# Patient Record
Sex: MALE | Race: WHITE | Employment: OTHER | URBAN - METROPOLITAN AREA
[De-identification: names, ages, dates, MRNs, and addresses within clinical notes are randomized per-mention and may not be internally consistent; named-entity substitution may affect disease eponyms.]

---

## 2017-10-20 ENCOUNTER — OFFICE VISIT (OUTPATIENT)
Dept: FAMILY MEDICINE | Facility: CLINIC | Age: 52
End: 2017-10-20

## 2017-10-20 VITALS
DIASTOLIC BLOOD PRESSURE: 100 MMHG | SYSTOLIC BLOOD PRESSURE: 160 MMHG | HEIGHT: 68 IN | WEIGHT: 206 LBS | BODY MASS INDEX: 31.22 KG/M2 | HEART RATE: 80 BPM

## 2017-10-20 DIAGNOSIS — I47.10 PAROXYSMAL SUPRAVENTRICULAR TACHYCARDIA (H): ICD-10-CM

## 2017-10-20 DIAGNOSIS — F10.11 ALCOHOL ABUSE, IN REMISSION: ICD-10-CM

## 2017-10-20 DIAGNOSIS — R53.83 FATIGUE, UNSPECIFIED TYPE: Primary | ICD-10-CM

## 2017-10-20 DIAGNOSIS — Z13.6 SCREENING FOR CARDIOVASCULAR CONDITION: ICD-10-CM

## 2017-10-20 DIAGNOSIS — I10 HYPERTENSION, GOAL BELOW 140/90: ICD-10-CM

## 2017-10-20 DIAGNOSIS — F41.1 GAD (GENERALIZED ANXIETY DISORDER): ICD-10-CM

## 2017-10-20 DIAGNOSIS — R94.30 EJECTION FRACTION < 50%: ICD-10-CM

## 2017-10-20 DIAGNOSIS — Z72.0 TOBACCO ABUSE: ICD-10-CM

## 2017-10-20 LAB
ANION GAP SERPL CALCULATED.3IONS-SCNC: 5 MMOL/L (ref 3–14)
BUN SERPL-MCNC: 12 MG/DL (ref 7–30)
CALCIUM SERPL-MCNC: 8.8 MG/DL (ref 8.5–10.1)
CHLORIDE SERPL-SCNC: 105 MMOL/L (ref 94–109)
CHOLEST SERPL-MCNC: 149 MG/DL
CO2 SERPL-SCNC: 26 MMOL/L (ref 20–32)
CREAT SERPL-MCNC: 0.81 MG/DL (ref 0.66–1.25)
ERYTHROCYTE [DISTWIDTH] IN BLOOD BY AUTOMATED COUNT: 12.8 % (ref 10–15)
GFR SERPL CREATININE-BSD FRML MDRD: >90 ML/MIN/1.7M2
GLUCOSE SERPL-MCNC: 82 MG/DL (ref 70–99)
HBA1C MFR BLD: 5.6 % (ref 4.3–6)
HCT VFR BLD AUTO: 40.4 % (ref 40–53)
HDLC SERPL-MCNC: 31 MG/DL
HGB BLD-MCNC: 14.1 G/DL (ref 13.3–17.7)
LDLC SERPL CALC-MCNC: 79 MG/DL
MCH RBC QN AUTO: 31.5 PG (ref 26.5–33)
MCHC RBC AUTO-ENTMCNC: 34.9 G/DL (ref 31.5–36.5)
MCV RBC AUTO: 90 FL (ref 78–100)
NONHDLC SERPL-MCNC: 118 MG/DL
PLATELET # BLD AUTO: 201 10E9/L (ref 150–450)
POTASSIUM SERPL-SCNC: 4.1 MMOL/L (ref 3.4–5.3)
RBC # BLD AUTO: 4.48 10E12/L (ref 4.4–5.9)
SODIUM SERPL-SCNC: 136 MMOL/L (ref 133–144)
TRIGL SERPL-MCNC: 197 MG/DL
TSH SERPL DL<=0.005 MIU/L-ACNC: 2.61 MU/L (ref 0.4–4)
WBC # BLD AUTO: 5.1 10E9/L (ref 4–11)

## 2017-10-20 PROCEDURE — 84443 ASSAY THYROID STIM HORMONE: CPT | Performed by: FAMILY MEDICINE

## 2017-10-20 PROCEDURE — 80048 BASIC METABOLIC PNL TOTAL CA: CPT | Performed by: FAMILY MEDICINE

## 2017-10-20 PROCEDURE — 83036 HEMOGLOBIN GLYCOSYLATED A1C: CPT | Performed by: FAMILY MEDICINE

## 2017-10-20 PROCEDURE — 99203 OFFICE O/P NEW LOW 30 MIN: CPT | Performed by: FAMILY MEDICINE

## 2017-10-20 PROCEDURE — 80061 LIPID PANEL: CPT | Performed by: FAMILY MEDICINE

## 2017-10-20 PROCEDURE — 36415 COLL VENOUS BLD VENIPUNCTURE: CPT | Performed by: FAMILY MEDICINE

## 2017-10-20 PROCEDURE — 85027 COMPLETE CBC AUTOMATED: CPT | Performed by: FAMILY MEDICINE

## 2017-10-20 RX ORDER — LISINOPRIL 20 MG/1
20 TABLET ORAL 2 TIMES DAILY
COMMUNITY
Start: 2016-02-04 | End: 2017-10-20

## 2017-10-20 RX ORDER — AMLODIPINE BESYLATE 10 MG/1
10 TABLET ORAL DAILY
COMMUNITY
Start: 2017-02-22 | End: 2017-10-20

## 2017-10-20 RX ORDER — HYDROCHLOROTHIAZIDE 25 MG/1
25 TABLET ORAL DAILY
COMMUNITY
Start: 2017-03-08 | End: 2018-08-29

## 2017-10-20 RX ORDER — LISINOPRIL 20 MG/1
20 TABLET ORAL 2 TIMES DAILY
Qty: 90 TABLET | Refills: 1 | Status: SHIPPED | OUTPATIENT
Start: 2017-10-20 | End: 2018-01-09

## 2017-10-20 RX ORDER — ESCITALOPRAM OXALATE 20 MG/1
20 TABLET ORAL DAILY
Qty: 90 TABLET | Refills: 1 | Status: SHIPPED | OUTPATIENT
Start: 2017-10-20 | End: 2018-08-29

## 2017-10-20 RX ORDER — ALPRAZOLAM 0.5 MG
.25-.5 TABLET ORAL 2 TIMES DAILY PRN
COMMUNITY
Start: 2017-03-08 | End: 2017-11-07

## 2017-10-20 RX ORDER — AMLODIPINE BESYLATE 10 MG/1
10 TABLET ORAL DAILY
Qty: 90 TABLET | Refills: 1 | Status: SHIPPED | OUTPATIENT
Start: 2017-10-20 | End: 2018-07-07

## 2017-10-20 ASSESSMENT — ANXIETY QUESTIONNAIRES
2. NOT BEING ABLE TO STOP OR CONTROL WORRYING: SEVERAL DAYS
1. FEELING NERVOUS, ANXIOUS, OR ON EDGE: MORE THAN HALF THE DAYS
7. FEELING AFRAID AS IF SOMETHING AWFUL MIGHT HAPPEN: MORE THAN HALF THE DAYS
GAD7 TOTAL SCORE: 8
3. WORRYING TOO MUCH ABOUT DIFFERENT THINGS: MORE THAN HALF THE DAYS
5. BEING SO RESTLESS THAT IT IS HARD TO SIT STILL: NOT AT ALL
6. BECOMING EASILY ANNOYED OR IRRITABLE: SEVERAL DAYS

## 2017-10-20 ASSESSMENT — PATIENT HEALTH QUESTIONNAIRE - PHQ9
5. POOR APPETITE OR OVEREATING: NOT AT ALL
SUM OF ALL RESPONSES TO PHQ QUESTIONS 1-9: 5

## 2017-10-20 NOTE — NURSING NOTE
"Chief Complaint   Patient presents with     Hypertension       Initial BP (!) 160/100  Pulse 80  Ht 5' 8\" (1.727 m)  Wt 206 lb (93.4 kg)  BMI 31.32 kg/m2 Estimated body mass index is 31.32 kg/(m^2) as calculated from the following:    Height as of this encounter: 5' 8\" (1.727 m).    Weight as of this encounter: 206 lb (93.4 kg).  Medication Reconciliation: complete  "

## 2017-10-20 NOTE — PROGRESS NOTES
SUBJECTIVE:   Munir Storey is a 52 year old male who presents to clinic today for the following health issues:    The patient is currently uninsured.    * New patient to  system. He was previously seen at [facility] *      Hypertension Follow-up  Amlodipine 10mg qd, hydrochlorothiazide 25mg qd, lisinopril 20mg bid    Outpatient blood pressures are being checked at home.    Low Salt Diet: not monitoring salt    Anxiety Follow-Up    Status since last visit: He states he has some good days and some bad days. He was previously prescribed Lexapro 10 mg qd and Xanax 0.25-0.5 mg bid prn but stopped taking this due to wanting to deal with his anxiety without medications. He believes his anxiety has been somewhat worse since discontinuing these medications. He is interested in restarting medication therapy.    Other associated symptoms: fatigue    Complicating factors:   Significant life event: Yes - Father passed away this past spring. He also notes work stress.  Current substance abuse: None. He has a hx of alcoholism and has been sober for 2 years. He quit drinking due to developing an atrial flutter.  Depression symptoms: Yes    No flowsheet data found.      - Tobacco abuse. Patient currently is smoking cigarettes, 0.5 ppd. He also uses chewing tobacco. He feels it would be difficult for him to quit smoking. He has not tried any specific therapies to assist with tobacco use cessation.    - Patient reports having colonoscopy completed, biopsy taken and was negative.    - He often needs to take nap during the day. He reports his girlfriend is concerned this could be due to diabetes. He has been trying to watch what he eats and has reduced his soda intake to 1-2 servings per day. He is not exercising regularly, but works a physical job. He denies extreme thirst or weight changes. He has a family hx of diabetes (father). He snores and has not had a sleep study completed in the past.        Past medical,  "family, and social histories, medications, and allergies are reviewed and updated in Epic as needed.        Amount of exercise or physical activity: None outside of work    Problems taking medications regularly: Yes, discontinued Lexapro (as above) and out of BP medications for a couple days.    Medication side effects: none    Diet: regular (no restrictions)      Past Medical History:   Diagnosis Date     Atrial flutter (H) 6/30/2015     Depressive disorder      SANDRA (generalized anxiety disorder) 10/20/2017     Hypertension, goal below 140/90 1/23/2009     Tobacco abuse 10/20/2017       Past Surgical History:   Procedure Laterality Date     COLONOSCOPY  08/01/2013    w/ EGD       Family History   Problem Relation Age of Onset     DIABETES Father      Depression Father      Depression Paternal Grandfather        Social History   Substance Use Topics     Smoking status: Current Every Day Smoker     Packs/day: 0.50     Types: Cigarettes, Dip, chew, snus or snuff     Smokeless tobacco: Current User     Types: Chew     Alcohol use No      Comment: hx alcohol abuse, sober since 2015         ROS:   HENT: No current dental provider.  MS: Asymptomatic umbilical hernia. Occasional knee and other joint pain.  7 point ROS otherwise normal except for those listed above.        This document serves as a record of the services and decisions personally performed and made by Magalis Morales MD. It was created on his behalf by Tatiana Negron, a trained medical scribe. The creation of this document is based the provider's statements to the medical scribe.  Tatiana Negron 11:31 AM October 20, 2017  OBJECTIVE:     BP (!) 160/100  Pulse 80  Ht 5' 8\" (1.727 m)  Wt 206 lb (93.4 kg)  BMI 31.32 kg/m2  Body mass index is 31.32 kg/(m^2).     GENERAL: Healthy, alert and no distress  RESP: Lungs clear to auscultation - no rales, rhonchi or wheezes  CV: Regular rate and rhythm, normal S1 S2, no murmur, no peripheral edema  ABD: umbilical hernia  MS: " No gross musculoskeletal defects noted, no edema  NEURO: Normal strength and tone, mentation intact and speech normal  PSYCH: Mentation appears normal, affect normal/bright      Results for orders placed or performed in visit on 10/20/17   Hemoglobin A1c   Result Value Ref Range    Hemoglobin A1C 5.6 4.3 - 6.0 %     ASSESSMENT/PLAN:     (R53.83) Fatigue, unspecified type  (primary encounter diagnosis)  Comment: A1c 5.6 today, no evidence of diabetes. Given his snoring, advised sleep study, however, patient is currently uninsured. His fatigue may be contributed to by an underlying depression.   Plan: TSH with free T4 reflex, CBC with platelets - Patient to look into out-of-pocket cost of sleep study and if there are other options that would be more affordable.    (Z13.6) Screening for cardiovascular condition  Comment: Routine screening.  Plan: Lipid panel reflex to direct LDL    (Z72.0) Tobacco abuse  Comment: Encouraged to consider using Nicotine patches to assist with cessation.  Plan: TOBACCO CESSATION - FOR HEALTH MAINTENANCE - Due to lack of insurance coverage, patient to buy patches OTC if desired.    (I47.1) Paroxysmal supraventricular tachycardia (H)  Comment: Appears to have resolved with discontinuation of alcohol intake.  Plan: Will continue to monitor.      (I10) Hypertension, goal below 140/90  Comment: /100, elevated, likely secondary to being out of his medications for the past couple days.  Plan: amLODIPine (NORVASC) 10 MG tablet, lisinopril         (PRINIVIL/ZESTRIL) 20 MG tablet - Refilled medications. Recheck BP in 1 month.    (F41.1) SANDRA (generalized anxiety disorder)  Comment: Increased symptoms since he discontinued his medications. I suspect he was underdosed with Lexapro 10 mg qd.  Plan: escitalopram (LEXAPRO) 20 MG tablet - Restart Lexapro at higher dose, 20 mg qd.        Patient will follow up in 1 month for nurse BP check and 3 months for clinic visit or sooner, PRN. Patient  instructed to call with any questions or concerns.      Patient Instructions   *   Your blood pressure is up.     *   You don't have diabetes.     *   For anxiety, sounds like work is a source stress.     *   Okay to say no occasionally.    *   Anyway to stop smoking and chewing? Try the nicotine patch.     *   Stop by here for a nurse blood pressure in 1 months.     *   Not sure what to tell you about insurance.     *   See the dentist.     *   The skin spots are okay.     *   The belly button is called an umbilical hernia. It's okay.     *    Think about a sleep study.     *    Will increase the dose of lexapro 20 mg daily.     *     Follow up in 3 months.       The information in this document, created by a scribe for me, accurately reflects the services I personally performed and the decisions made by me. I have reviewed and approved this document for accuracy.  11:50 AM October 20, 2017    Magalis Morales MD  [facility]

## 2017-10-20 NOTE — MR AVS SNAPSHOT
After Visit Summary   10/20/2017    Munir Storey    MRN: [id number]           Patient Information     Date Of Birth          1965        Visit Information        Provider Department      10/20/2017 10:40 AM Magalis Morales MD [facility]        Today's Diagnoses     Fatigue, unspecified type    -  1    Screening for cardiovascular condition        Tobacco abuse        Paroxysmal supraventricular tachycardia (H)        Hypertension, goal below 140/90          Care Instructions    *   Your blood pressure is up.     *   You don't have diabetes.     *   For anxiety, sounds like work is a source stress.     *   Okay to say no occasionally.    *   Anyway to stop smoking and chewing? Try the nicotine patch.     *   Stop by here for a nurse blood pressure in 1 months.     *   Not sure what to tell you about insurance.     *   See the dentist.     *   The skin spots are okay.     *   The belly button is called an umbilical hernia. It's okay.           Follow-ups after your visit        Who to contact     Normal or non-critical lab and imaging results will be communicated to you by [employer], letter or phone within 4 business days after the clinic has received the results. If you do not hear from us within 7 days, please contact the clinic through [employer] or phone. If you have a critical or abnormal lab result, we will notify you by phone as soon as possible.  Submit refill requests through [employer] or call your pharmacy and they will forward the refill request to us. Please allow 3 business days for your refill to be completed.          If you need to speak with a  for additional information , please call: [phone]           Additional Information About Your Visit        TwtBks Information     TwtBks lets you send messages to your doctor, view your test results, renew your prescriptions, schedule appointments and more. To sign up, go to [email or web address] .  "Click on \"Log in\" on the left side of the screen, which will take you to the Welcome page. Then click on \"Sign up Now\" on the right side of the page.     You will be asked to enter the access code listed below, as well as some personal information. Please follow the directions to create your username and password.     Your access code is: [email or web address]  Expires: 2018 11:50 AM     Your access code will  in 90 days. If you need help or a new code, please call your [facility] clinic or [phone].        Care EveryWhere ID     This is your Care EveryWhere ID. This could be used by other organizations to access your [facility] medical records  [id number]        Your Vitals Were     Pulse Height BMI (Body Mass Index)             80 5' 8\" (1.727 m) 31.32 kg/m2          Blood Pressure from Last 3 Encounters:   10/20/17 (!) 160/100    Weight from Last 3 Encounters:   10/20/17 206 lb (93.4 kg)              We Performed the Following     Basic metabolic panel  (Ca, Cl, CO2, Creat, Gluc, K, Na, BUN)     CBC with platelets     Hemoglobin A1c     Lipid panel reflex to direct LDL     TOBACCO CESSATION - FOR HEALTH MAINTENANCE     TSH with free T4 reflex          Where to get your medicines      These medications were sent to [facility] [id number] [place] [address]  [address] [place] [id number]     Phone:  [phone]     amLODIPine 10 MG tablet    lisinopril 20 MG tablet          Primary Care Provider Office Phone # Fax #    Magalis Morales [phone] [address] [place] [address]        Equal Access to Services     AMBER LINDSAY : Hadcatrachito Aguilar, anand abarca, qashahnaz garza. So [facility] [phone].    ATENCIÓN: Si habla español, tiene a montague disposición servicios gratuitos de asistencia lingüística. Llame al [phone].    We comply with applicable federal civil rights laws and [place] laws. " We do not discriminate on the basis of race, color, national origin, age, disability, sex, sexual orientation, or gender identity.            Thank you!     Thank you for choosing [facility]  for your care. Our goal is always to provide you with excellent care. Hearing back from our patients is one way we can continue to improve our services. Please take a few minutes to complete the written survey that you may receive in the mail after your visit with us. Thank you!             Your Updated Medication List - Protect others around you: Learn how to safely use, store and throw away your medicines at www.disposemymeds.org.          This list is accurate as of: 10/20/17 11:50 AM.  Always use your most recent med list.                   Brand Name Dispense Instructions for use Diagnosis    ALPRAZolam 0.5 MG tablet    XANAX     Take 0.25-0.5 mg by mouth 2 times daily as needed        amLODIPine 10 MG tablet    NORVASC    90 tablet    Take 1 tablet (10 mg) by mouth daily    Hypertension, goal below 140/90       hydrochlorothiazide 25 MG tablet    HYDRODIURIL     Take 25 mg by mouth daily        lisinopril 20 MG tablet    PRINIVIL/ZESTRIL    90 tablet    Take 1 tablet (20 mg) by mouth 2 times daily    Hypertension, goal below 140/90

## 2017-10-20 NOTE — PATIENT INSTRUCTIONS
*   Your blood pressure is up.     *   You don't have diabetes.     *   For anxiety, sounds like work is a source stress.     *   Okay to say no occasionally.    *   Anyway to stop smoking and chewing? Try the nicotine patch.     *   Stop by here for a nurse blood pressure in 1 months.     *   Not sure what to tell you about insurance.     *   See the dentist.     *   The skin spots are okay.     *   The belly button is called an umbilical hernia. It's okay.     *    Think about a sleep study.     *    Will increase the dose of lexapro 20 mg daily.     *     Follow up in 3 months.

## 2017-10-20 NOTE — LETTER
Dear Munir Storey,      Thank you for choosing our clinic.     Your blood tests show that you have normal kidney and thyroid function.  Also, there's no sign of diabetes or amenia. Please see enclosed copies.  Your cholesterol is acceptable, there's no need for a cholesterol lowering medication at this time. Nothing with your blood tests would explain your fatigue.     Hopefully, restarting the Lexapro will help with your mood.  As we talked about, I would recommend a follow up appointment to recheck your blood pressure.     Please call with any questions or concerns.    Sincerely,    Magalis Morales MD/ BARNEY VILLANUEVA

## 2017-10-21 ASSESSMENT — ANXIETY QUESTIONNAIRES: GAD7 TOTAL SCORE: 8

## 2017-11-06 RX ORDER — ALPRAZOLAM 0.5 MG
.25-.5 TABLET ORAL 2 TIMES DAILY PRN
Qty: 90 TABLET | Status: CANCELLED | OUTPATIENT
Start: 2017-11-06

## 2017-11-06 NOTE — TELEPHONE ENCOUNTER
Reason for Call:  Medication or medication refill:    Do you use a [facility]?  Name of the pharmacy and phone number for the current request:  See above    Name of the medication requested: xanax     Other request: patient needs medication before Wednesday for flying.    Can we leave a detailed message on this number? YES    Phone number patient can be reached at: Home number on file [phone] (home)    Best Time:     Call taken on 11/6/2017 at 1:47 PM by Jaimie Freitas

## 2017-11-07 ENCOUNTER — ALLIED HEALTH/NURSE VISIT (OUTPATIENT)
Dept: NURSING | Facility: CLINIC | Age: 52
End: 2017-11-07

## 2017-11-07 VITALS — DIASTOLIC BLOOD PRESSURE: 80 MMHG | SYSTOLIC BLOOD PRESSURE: 144 MMHG

## 2017-11-07 DIAGNOSIS — F41.1 GAD (GENERALIZED ANXIETY DISORDER): Primary | ICD-10-CM

## 2017-11-07 DIAGNOSIS — F41.9 ANXIETY: Primary | ICD-10-CM

## 2017-11-07 PROCEDURE — 99207 ZZC NO CHARGE NURSE ONLY: CPT

## 2017-11-07 RX ORDER — ALPRAZOLAM 0.5 MG
TABLET ORAL
Qty: 10 TABLET | Refills: 0 | Status: SHIPPED | OUTPATIENT
Start: 2017-11-07 | End: 2018-02-09

## 2017-11-07 NOTE — PROGRESS NOTES
PAtient is going out of town and requested medication for flying anxiety. Spoke with Dr. Gamboa. Script ordred and handed to roshni.  Muna Black RN

## 2017-11-07 NOTE — MR AVS SNAPSHOT
"              After Visit Summary   2017    Munir Storey    MRN: [id number]           Patient Information     Date Of Birth          1965        Visit Information        Provider Department      2017 3:30 PM [place] DARWIN VARELA [facility]        Today's Diagnoses     Anxiety    -  1       Follow-ups after your visit        Who to contact     If you have questions or need follow up information about today's clinic visit or your schedule please contact [facility] directly at [phone].  Normal or non-critical lab and imaging results will be communicated to you by MyChart, letter or phone within 4 business days after the clinic has received the results. If you do not hear from us within 7 days, please contact the clinic through MyChart or phone. If you have a critical or abnormal lab result, we will notify you by phone as soon as possible.  Submit refill requests through [employer] or call your pharmacy and they will forward the refill request to us. Please allow 3 business days for your refill to be completed.          Additional Information About Your Visit        MyChart Information     [employer] lets you send messages to your doctor, view your test results, renew your prescriptions, schedule appointments and more. To sign up, go to [email or web address] . Click on \"Log in\" on the left side of the screen, which will take you to the Welcome page. Then click on \"Sign up Now\" on the right side of the page.     You will be asked to enter the access code listed below, as well as some personal information. Please follow the directions to create your username and password.     Your access code is: [email or web address]  Expires: 2018 10:50 AM     Your access code will  in 90 days. If you need help or a new code, please call your [facility] or [phone].        Care EveryWhere ID     This is your Care EveryWhere ID. This could be used by other organizations to access your " [place] medical records  [id number]         Blood Pressure from Last 3 Encounters:   11/07/17 144/80   10/20/17 (!) 160/100    Weight from Last 3 Encounters:   10/20/17 206 lb (93.4 kg)              Today, you had the following     No orders found for display         Today's Medication Changes          These changes are accurate as of: 11/7/17  3:50 PM.  If you have any questions, ask your nurse or doctor.               These medicines have changed or have updated prescriptions.        Dose/Directions    ALPRAZolam 0.5 MG tablet   Commonly known as:  XANAX   This may have changed:  See the new instructions.   Used for:  SANDRA (generalized anxiety disorder)   Changed by:  Magalis Morales MD        TAKE 0.5-1 TABLETS BY MOUTH 2 TIMES DAILY IF NEEDED FOR ANXIETY OR PANIC.   Quantity:  10 tablet   Refills:  0            Where to get your medicines      Some of these will need a paper prescription and others can be bought over the counter.  Ask your nurse if you have questions.     Bring a paper prescription for each of these medications     ALPRAZolam 0.5 MG tablet                Primary Care Provider Office Phone # Fax #    Magalis Morales [phone] [address] [place] [address]        Equal Access to Services     NGUYEN LINDSAY AH: Hadii charlotte kim hadasho Soomaali, waaxda luqadaha, qaybta kaalmada adeegyada, shahnaz garcia. So [facility] [phone].    ATENCIÓN: Si habla español, tiene a montague disposición servicios gratuitos de asistencia lingüística. Llame al [phone].    We comply with applicable federal civil rights laws and [place] laws. We do not discriminate on the basis of race, color, national origin, age, disability, sex, sexual orientation, or gender identity.            Thank you!     Thank you for choosing [facility] [place]  for your care. Our goal is always to provide you with excellent care. Hearing back from our patients is one way we  can continue to improve our services. Please take a few minutes to complete the written survey that you may receive in the mail after your visit with us. Thank you!             Your Updated Medication List - Protect others around you: Learn how to safely use, store and throw away your medicines at www.disposemymeds.org.          This list is accurate as of: 11/7/17  3:50 PM.  Always use your most recent med list.                   Brand Name Dispense Instructions for use Diagnosis    ALPRAZolam 0.5 MG tablet    XANAX    10 tablet    TAKE 0.5-1 TABLETS BY MOUTH 2 TIMES DAILY IF NEEDED FOR ANXIETY OR PANIC.    SANDRA (generalized anxiety disorder)       amLODIPine 10 MG tablet    NORVASC    90 tablet    Take 1 tablet (10 mg) by mouth daily    Hypertension, goal below 140/90       escitalopram 20 MG tablet    LEXAPRO    90 tablet    Take 1 tablet (20 mg) by mouth daily    SANDRA (generalized anxiety disorder)       hydrochlorothiazide 25 MG tablet    HYDRODIURIL     Take 25 mg by mouth daily        lisinopril 20 MG tablet    PRINIVIL/ZESTRIL    90 tablet    Take 1 tablet (20 mg) by mouth 2 times daily    Hypertension, goal below 140/90

## 2018-01-09 DIAGNOSIS — I10 HYPERTENSION, GOAL BELOW 140/90: ICD-10-CM

## 2018-01-09 NOTE — TELEPHONE ENCOUNTER
"Requested Prescriptions   Pending Prescriptions Disp Refills     lisinopril (PRINIVIL/ZESTRIL) 20 MG tablet 90 tablet 1    Last Written Prescription Date:  10/20/2017 #90 x 1  Last filled - not provided  Last Office Visit with [facility], [facility] or [facility] prescribing provider:  10/20/2017 SHADY Morales   Future Office Visit: none      Note: Requested a 90 day supply, disp changed to #180   Sig: Take 1 tablet (20 mg) by mouth 2 times daily    ACE Inhibitors (Including Combos) Protocol Failed    1/9/2018 11:34 AM       Failed - Blood pressure under 140/90    BP Readings from Last 3 Encounters:   11/07/17 144/80   10/20/17 (!) 160/100                Passed - Recent or future visit with authorizing provider's specialty    Patient had office visit in the last year or has a visit in the next 30 days with authorizing provider.  See \"Patient Info\" tab in inbasket, or \"Choose Columns\" in Meds & Orders section of the refill encounter.              Passed - Patient is age 18 or older       Passed - Normal serum creatinine on file in past 12 months    Recent Labs   Lab Test  10/20/17   1124   CR  0.81            Passed - Normal serum potassium on file in past 12 months    Recent Labs   Lab Test  10/20/17   1124   POTASSIUM  4.1               "

## 2018-01-12 ENCOUNTER — OFFICE VISIT (OUTPATIENT)
Dept: FAMILY MEDICINE | Facility: CLINIC | Age: 53
End: 2018-01-12

## 2018-01-12 VITALS
SYSTOLIC BLOOD PRESSURE: 158 MMHG | DIASTOLIC BLOOD PRESSURE: 91 MMHG | BODY MASS INDEX: 31.81 KG/M2 | TEMPERATURE: 98.1 F | HEART RATE: 91 BPM | WEIGHT: 209.2 LBS

## 2018-01-12 DIAGNOSIS — Z72.0 TOBACCO ABUSE: ICD-10-CM

## 2018-01-12 DIAGNOSIS — F41.1 GAD (GENERALIZED ANXIETY DISORDER): ICD-10-CM

## 2018-01-12 DIAGNOSIS — Z28.21 VACCINATION NOT CARRIED OUT BECAUSE OF PATIENT REFUSAL: ICD-10-CM

## 2018-01-12 DIAGNOSIS — I10 HYPERTENSION, GOAL BELOW 140/90: Primary | ICD-10-CM

## 2018-01-12 PROCEDURE — 99214 OFFICE O/P EST MOD 30 MIN: CPT | Performed by: FAMILY MEDICINE

## 2018-01-12 RX ORDER — HYDROCHLOROTHIAZIDE 25 MG/1
25 TABLET ORAL DAILY
Qty: 30 TABLET | Status: CANCELLED | OUTPATIENT
Start: 2018-01-12

## 2018-01-12 RX ORDER — HYDROCHLOROTHIAZIDE 12.5 MG/1
12.5 TABLET ORAL DAILY
Qty: 90 TABLET | Refills: 3 | Status: SHIPPED | OUTPATIENT
Start: 2018-01-12 | End: 2019-03-06

## 2018-01-12 ASSESSMENT — ANXIETY QUESTIONNAIRES
7. FEELING AFRAID AS IF SOMETHING AWFUL MIGHT HAPPEN: MORE THAN HALF THE DAYS
1. FEELING NERVOUS, ANXIOUS, OR ON EDGE: NEARLY EVERY DAY
6. BECOMING EASILY ANNOYED OR IRRITABLE: SEVERAL DAYS
GAD7 TOTAL SCORE: 12
2. NOT BEING ABLE TO STOP OR CONTROL WORRYING: NEARLY EVERY DAY
3. WORRYING TOO MUCH ABOUT DIFFERENT THINGS: NEARLY EVERY DAY
5. BEING SO RESTLESS THAT IT IS HARD TO SIT STILL: NOT AT ALL

## 2018-01-12 ASSESSMENT — PATIENT HEALTH QUESTIONNAIRE - PHQ9
5. POOR APPETITE OR OVEREATING: NOT AT ALL
SUM OF ALL RESPONSES TO PHQ QUESTIONS 1-9: 13

## 2018-01-12 NOTE — MR AVS SNAPSHOT
After Visit Summary   1/12/2018    Munir Storey    MRN: [id number]           Patient Information     Date Of Birth          1965        Visit Information        Provider Department      1/12/2018 8:20 AM Magalis Morales MD [facility]        Today's Diagnoses     Hypertension, goal below 140/90    -  1    SANDRA (generalized anxiety disorder)        Vaccination not carried out because of patient refusal        Tobacco abuse          Care Instructions    *  Sounds like side effects from the Lexapro.     *  The side effects should get better in 2 weeks.     *  Give the Lexapro about one month to work.     *  Consider seeing a counselor, call [facility] [phone]    *  Follow up in one month.           Follow-ups after your visit        Additional Services     MENTAL HEALTH REFERRAL  - Adult; Outpatient Treatment; Individual/Couples/Family/Group Therapy/Health Psychology; Other: [facility] [phone]; We will contact you to schedule the appointment or please call with any questi...       All scheduling is subject to the client's specific insurance plan & benefits, provider/location availability, and provider clinical specialities.  Please arrive 15 minutes early for your first appointment and bring your completed paperwork.    Please be aware that coverage of these services is subject to the terms and limitations of your health insurance plan.  Call member services at your health plan with any benefit or coverage questions.                            Who to contact     Normal or non-critical lab and imaging results will be communicated to you by MyChart, letter or phone within 4 business days after the clinic has received the results. If you do not hear from us within 7 days, please contact the clinic through MyChart or phone. If you have a critical or abnormal lab result, we will notify you by phone as soon as possible.  Submit  "refill requests through [employer] or call your pharmacy and they will forward the refill request to us. Please allow 3 business days for your refill to be completed.          If you need to speak with a  for additional information , please call: [phone]           Additional Information About Your Visit        [employer] Information     [employer] lets you send messages to your doctor, view your test results, renew your prescriptions, schedule appointments and more. To sign up, go to [email or web address] . Click on \"Log in\" on the left side of the screen, which will take you to the Welcome page. Then click on \"Sign up Now\" on the right side of the page.     You will be asked to enter the access code listed below, as well as some personal information. Please follow the directions to create your username and password.     Your access code is: [email or web address]  Expires: 2018 10:50 AM     Your access code will  in 90 days. If you need help or a new code, please call your [facility] clinic or [phone].        Care EveryWhere ID     This is your Care EveryWhere ID. This could be used by other organizations to access your [facility] medical records  [id number]        Your Vitals Were     Pulse Temperature BMI (Body Mass Index)             91 98.1  F (36.7  C) (Tympanic) 31.81 kg/m2          Blood Pressure from Last 3 Encounters:   18 (!) 158/91   17 144/80   10/20/17 (!) 160/100    Weight from Last 3 Encounters:   18 209 lb 3.2 oz (94.9 kg)   10/20/17 206 lb (93.4 kg)              We Performed the Following     MENTAL HEALTH REFERRAL  - Adult; Outpatient Treatment; Individual/Couples/Family/Group Therapy/Health Psychology; Other: [facility] [phone]; We will contact you to schedule the appointment or please call with any questi...          Today's Medication Changes          These changes are accurate as of: 18  9:06 AM.  If you have any questions, " ask your nurse or doctor.               These medicines have changed or have updated prescriptions.        Dose/Directions    * hydrochlorothiazide 25 MG tablet   Commonly known as:  HYDRODIURIL   This may have changed:  Another medication with the same name was added. Make sure you understand how and when to take each.   Changed by:  Magalis Morales MD        Dose:  25 mg   Take 25 mg by mouth daily   Refills:  0       * hydrochlorothiazide 12.5 MG Tabs tablet   This may have changed:  You were already taking a medication with the same name, and this prescription was added. Make sure you understand how and when to take each.   Used for:  Hypertension, goal below 140/90   Changed by:  Magalis Morales MD        Dose:  12.5 mg   Take 1 tablet (12.5 mg) by mouth daily   Quantity:  90 tablet   Refills:  3       * Notice:  This list has 2 medication(s) that are the same as other medications prescribed for you. Read the directions carefully, and ask your doctor or other care provider to review them with you.         Where to get your medicines      These medications were sent to [place] [id number] [place] [address] [address]     Phone:  [phone]     hydrochlorothiazide 12.5 MG Tabs tablet                Primary Care Provider Office Phone # Fax #    Magalis Morales [phone] [address] [facility] [address]        Equal Access to Services     AMBER LINDSAY AH: Hadii charlotte kim hadasho Sotitus, waaxda luqadaha, qaybta kaalmada sandra, shahnaz garcia. So [facility] [phone].    ATENCIÓN: Si habla español, tiene a montague disposición servicios gratuitos de asistencia lingüística. Llame al [phone].    We comply with applicable federal civil rights laws and [place] laws. We do not discriminate on the basis of race, color, national origin, age, disability, sex, sexual orientation, or gender identity.            Thank  you!     Thank you for choosing [facility]  for your care. Our goal is always to provide you with excellent care. Hearing back from our patients is one way we can continue to improve our services. Please take a few minutes to complete the written survey that you may receive in the mail after your visit with us. Thank you!             Your Updated Medication List - Protect others around you: Learn how to safely use, store and throw away your medicines at www.disposemymeds.org.          This list is accurate as of: 1/12/18  9:06 AM.  Always use your most recent med list.                   Brand Name Dispense Instructions for use Diagnosis    ALPRAZolam 0.5 MG tablet    XANAX    10 tablet    TAKE 0.5-1 TABLETS BY MOUTH 2 TIMES DAILY IF NEEDED FOR ANXIETY OR PANIC.    SANDRA (generalized anxiety disorder)       amLODIPine 10 MG tablet    NORVASC    90 tablet    Take 1 tablet (10 mg) by mouth daily    Hypertension, goal below 140/90       escitalopram 20 MG tablet    LEXAPRO    90 tablet    Take 1 tablet (20 mg) by mouth daily    SANDRA (generalized anxiety disorder)       * hydrochlorothiazide 25 MG tablet    HYDRODIURIL     Take 25 mg by mouth daily        * hydrochlorothiazide 12.5 MG Tabs tablet     90 tablet    Take 1 tablet (12.5 mg) by mouth daily    Hypertension, goal below 140/90       lisinopril 20 MG tablet    PRINIVIL/ZESTRIL    90 tablet    Take 1 tablet (20 mg) by mouth 2 times daily    Hypertension, goal below 140/90       * Notice:  This list has 2 medication(s) that are the same as other medications prescribed for you. Read the directions carefully, and ask your doctor or other care provider to review them with you.

## 2018-01-12 NOTE — PROGRESS NOTES
"  SUBJECTIVE:   Munir Storey is a 52 year old male who presents to clinic today for the following health issues:        Hypertension Follow-up  Lisinopril 20 mg bid, amlodipine 10 mg qd, hydrochlorothiazide 25 mg qd    Outpatient blood pressures are not being checked.    Low Salt Diet: no added salt    He is also prescribed hydrochlorothiazide 25 mg qd. He says he has not been taking this for a while because he just stopped picking up the prescription.      Anxiety Follow-Up  Lexapro 20 mg qd, Xanax 0.25-0.5 mg bid prn    Status since last visit: He ran out of Lexapro around [place] time and did not pick it up because he was sick with a URI. His anxiety \"doubled\" with stopping Lexapro. He restarted Lexapro 4 days ago but still feels very anxious. He has also had some left-sided abdominal pain and GI upset since restarting Lexapro. He feels like everything in his life is \"perfect\", except for how he feels. He gets very anxious about any physical symptoms he is experiencing. He notes that his anxiety symptoms originally developed ~10 years ago after his best friend's wife  unexpectedly. He has never seen a counselor before.       Complicating factors:   Significant life event: No - but this past [place] was the first one since his father . He has also not seen his two daughters for nearly 15 years after his divorce.  Current substance abuse: None - prior hx of alcoholism. He has noticed things being more difficult now that he needs to \"deal with them\" instead of drinking.  Depression symptoms: Yes- recently worse with stopping Lexapro. He notes lack of sex drive and little interest in doing doing things he has previously enjoyed, such as pheasant hunting. He is still able to get an erection. He denies any relationship issues, but does note a decreased lack of romantic affection and that his partner has insecurities about her appearance.     SANDRA-7 SCORE 10/20/2017   Total Score 8           Amount of " exercise or physical activity: 4-5 days/week for an average of greater than 60 minutes    Problems taking medications regularly: Yes, not taking hydrochlorothiazide and stopped taking Lexapro for a period of time (see above)     Medication side effects: none    Diet: regular (no restrictions)        Problem list, Medication list, Allergies, and Medical/Social/Surgical/Family histories reviewed in [facility] and updated as appropriate.    Labs reviewed in EPIC      ROS:  POSITIVE for URI symptoms of cough and plugged ears for 3 weeks.  Constitutional, HEENT, cardiovascular, pulmonary, gi and gu systems are negative, except as otherwise noted.      This document serves as a record of the services and decisions personally performed and made by Magalis Morales MD. It was created on his behalf by Tatiana Negron, a trained medical scribe. The creation of this document is based the provider's statements to the medical scribe.  Tatiana Negron 8:48 AM January 12, 2018  OBJECTIVE:     BP (!) 158/91  Pulse 91  Temp 98.1  F (36.7  C) (Tympanic)  Wt 209 lb 3.2 oz (94.9 kg)  BMI 31.81 kg/m2  Body mass index is 31.81 kg/(m^2).     8:38 AM: 169/98  8:43 AM: 158/91    GENERAL: Healthy, alert and mild distress  RESP: Lungs clear to auscultation - no rales, rhonchi or wheezes  CV: Regular rate and rhythm, normal S1 S2, no murmur  MS: No gross musculoskeletal defects noted, no edema  NEURO: Normal strength and tone, mentation intact and speech normal  PSYCH: Mentation appears normal, affect normal    ASSESSMENT/PLAN:     (I10) Hypertension, goal below 140/90  (primary encounter diagnosis)  Comment: BP elevated today, likely secondary to increased anxiety as well as non-compliance with diuretic therapy.  Plan: hydrochlorothiazide 12.5 MG TABS tablet - Advised patient to restart hydrochlorothiazide but at lower dose of 12.5 mg qd. Continue other medications as prescribed.    (F41.1) SANDRA (generalized anxiety disorder)  Comment: Increased symptoms  secondary to discontinuing Lexapro for a period of time. He restarted this 4 days ago, but continues to feel anxious. I discussed that Lexapro will take 2-4 weeks to build up in his system again. Counseling referral offered and accepted by patient.  Plan: MENTAL HEALTH REFERRAL  - Adult; Outpatient         Treatment; Individual/Couples/Family/Group         Therapy/Health Psychology; Other: [facility] [phone]; We will         contact you to schedule the appointment or         please call with any questi... - Continue on current medications.     (Z28.21) Vaccination not carried out because of patient refusal  Comment: Influenza and tetanus vaccines offered but declined by the patient.    (Z72.0) Tobacco abuse  Comment: Reviewed tobacco use.  Plan: Encouraged cessation.        Total time spent with patient was 25 minutes. 20 minutes of the visit was spent discussing the above issues, including pathophysiology, treatment options, and expected outcomes.      Patient will follow up in 1 month or sooner, PRN. Patient instructed to call with any questions or concerns.      Patient Instructions   *  Sounds like side effects from the Lexapro.     *  The side effects should get better in 2 weeks.     *  Give the Lexapro about one month to work.     *  Consider seeing a counselor, call [facility] [phone]    *  Follow up in one month.       The information in this document, created by a scribe for me, accurately reflects the services I personally performed and the decisions made by me. I have reviewed and approved this document for accuracy.  9:08 AM January 12, 2018    Magalis Morales MD  [facility]

## 2018-01-12 NOTE — PATIENT INSTRUCTIONS
*  Sounds like side effects from the Lexapro.     *  The side effects should get better in 2 weeks.     *  Give the Lexapro about one month to work.     *  Consider seeing a counselor, call [facility] [phone]    *  Follow up in one month.

## 2018-01-12 NOTE — NURSING NOTE
"Chief Complaint   Patient presents with     Anxiety     Hypertension       Initial There were no vitals taken for this visit. Estimated body mass index is 31.32 kg/(m^2) as calculated from the following:    Height as of 10/20/17: 5' 8\" (1.727 m).    Weight as of 10/20/17: 206 lb (93.4 kg).  Medication Reconciliation: complete   Roopa Bueno CMA      "

## 2018-01-13 ASSESSMENT — ANXIETY QUESTIONNAIRES: GAD7 TOTAL SCORE: 12

## 2018-01-15 RX ORDER — LISINOPRIL 20 MG/1
20 TABLET ORAL 2 TIMES DAILY
Qty: 180 TABLET | Refills: 3 | Status: SHIPPED | OUTPATIENT
Start: 2018-01-15 | End: 2019-03-06

## 2018-01-15 NOTE — TELEPHONE ENCOUNTER
Prescription approved per [facility] Refill Protocol or patient Primary care provider (PCP)  INGRIS Rubalcava RN/[facility]

## 2018-02-08 ENCOUNTER — TELEPHONE (OUTPATIENT)
Dept: FAMILY MEDICINE | Facility: CLINIC | Age: 53
End: 2018-02-08

## 2018-02-08 NOTE — TELEPHONE ENCOUNTER
"Dark stools   Patient  Presents  As  walk in to discuss his stool,(hehas sample in brown bad) Today he noted his  Stool  To be black, this is first  Time,  He is very ANXIOUS about it, denies any bright red blood in  toilet or with wiping,   Has had GI surgery for a 'black spot\" on his small intestine was removed , NOT cancer   occ has blood from know hemorrhoid  Denies constipation   Is having problems with heart burn and has been using Pepto -bismal  Several doses  Has taken meds for heart burn in past  -none current    Advised this may cause dark stools and may be reason, again pt very anxious   Advised may do FIT test if Dr Morales orders --explained  ANXIETY   Pt having anxiety about  his first two children during divorce s;hoose not to be in contact with him and it has been over 15 years, they are adults not and not returning any of his contact,  Blames his first wife  Currently things are going well for him he has a wife family step children, and grand children, finically comfortable, not a lot of job stress as  He has his own [employer] company and enjoy his work  Pt denies any thought os hurting self or others    He is taking Lexapro and not liking how he feels on it, is taking at night  Pt states he is taking his BP meds as ordered   ? His cuff will bring to have ck here in clinic   138/88  - 84 - 24    Discussed in past pt has had Xanax for emergency use , and when he flies or goes out of town as he feels something bad will happen  Last filled here on 11/7/2017  # 10 tabs NO  done     I spoke with him for a byron gtime he appeared to settle down advised appt made for tomorrow , advised it sx change or worsen may seek [facility]   Pt states he felt better    Pt has Mental health  ref advised to call today and make appt     [facility]  RN/[facility]             "

## 2018-02-09 ENCOUNTER — TELEPHONE (OUTPATIENT)
Dept: FAMILY MEDICINE | Facility: CLINIC | Age: 53
End: 2018-02-09

## 2018-02-09 DIAGNOSIS — I10 HYPERTENSION, GOAL BELOW 140/90: ICD-10-CM

## 2018-02-09 DIAGNOSIS — F41.1 GAD (GENERALIZED ANXIETY DISORDER): ICD-10-CM

## 2018-02-09 RX ORDER — ALPRAZOLAM 0.5 MG
TABLET ORAL
Qty: 10 TABLET | Refills: 0 | Status: SHIPPED | OUTPATIENT
Start: 2018-02-09 | End: 2018-08-29

## 2018-02-09 NOTE — TELEPHONE ENCOUNTER
Reason for Call:  Medication or medication refill:    Do you use a [facility]?  Name of the pharmacy and phone number for the current request:  See above    Name of the medication requested: xanax    Other request: patient cant make appt today but will need his script filled.    Can we leave a detailed message on this number? YES    Phone number patient can be reached at: Home number on file [phone] (home)    Best Time:     Call taken on 2/9/2018 at 9:06 AM by Jaimie Freitas

## 2018-07-07 DIAGNOSIS — I10 HYPERTENSION, GOAL BELOW 140/90: ICD-10-CM

## 2018-07-09 RX ORDER — AMLODIPINE BESYLATE 10 MG/1
TABLET ORAL
Qty: 90 TABLET | Refills: 1 | Status: SHIPPED | OUTPATIENT
Start: 2018-07-09 | End: 2019-03-06

## 2018-07-09 NOTE — TELEPHONE ENCOUNTER
Routing refill request to provider for review/approval because:    Failed RN protocol, see below, patient was told to follow up in one month per office visit instructions on 1-12-18.  B/P not at goal.    NICHOLE Hernández

## 2018-07-09 NOTE — TELEPHONE ENCOUNTER
"Requested Prescriptions   Pending Prescriptions Disp Refills     amLODIPine (NORVASC) 10 MG tablet [Pharmacy Med Name: AMLODIPINE BESYLATE 10 MG TAB] 90 tablet 1    Last Written Prescription Date:  10/20/2017 #90 x 1  Last filled 04/09/2018  Last office visit: 1/12/2018 SHADY Morales   Future Office Visit: None    Sig: TAKE 1 TABLET (10 MG) BY MOUTH DAILY    Calcium Channel Blockers Protocol  Failed    7/7/2018  1:33 AM       Failed - Blood pressure under 140/90 in past 12 months    BP Readings from Last 3 Encounters:   01/12/18 (!) 158/91   11/07/17 144/80   10/20/17 (!) 160/100                Passed - Recent (12 mo) or future (30 days) visit within the authorizing provider's specialty    Patient had office visit in the last 12 months or has a visit in the next 30 days with authorizing provider or within the authorizing provider's specialty.  See \"Patient Info\" tab in inbasket, or \"Choose Columns\" in Meds & Orders section of the refill encounter.           Passed - Patient is age 18 or older       Passed - Normal serum creatinine on file in past 12 months    Recent Labs   Lab Test  10/20/17   1124   CR  0.81               "

## 2018-08-29 ENCOUNTER — OFFICE VISIT (OUTPATIENT)
Dept: FAMILY MEDICINE | Facility: CLINIC | Age: 53
End: 2018-08-29

## 2018-08-29 VITALS
OXYGEN SATURATION: 97 % | WEIGHT: 199.6 LBS | HEIGHT: 68 IN | DIASTOLIC BLOOD PRESSURE: 80 MMHG | SYSTOLIC BLOOD PRESSURE: 142 MMHG | TEMPERATURE: 97.6 F | BODY MASS INDEX: 30.25 KG/M2 | HEART RATE: 78 BPM | RESPIRATION RATE: 16 BRPM

## 2018-08-29 DIAGNOSIS — Z72.0 TOBACCO ABUSE: ICD-10-CM

## 2018-08-29 DIAGNOSIS — F10.11 ALCOHOL ABUSE, IN REMISSION: ICD-10-CM

## 2018-08-29 DIAGNOSIS — I48.92 ATRIAL FLUTTER, UNSPECIFIED TYPE (H): ICD-10-CM

## 2018-08-29 DIAGNOSIS — F41.1 GAD (GENERALIZED ANXIETY DISORDER): ICD-10-CM

## 2018-08-29 DIAGNOSIS — F33.1 MODERATE EPISODE OF RECURRENT MAJOR DEPRESSIVE DISORDER (H): ICD-10-CM

## 2018-08-29 DIAGNOSIS — J06.9 VIRAL URI WITH COUGH: Primary | ICD-10-CM

## 2018-08-29 PROCEDURE — 99214 OFFICE O/P EST MOD 30 MIN: CPT | Performed by: PHYSICIAN ASSISTANT

## 2018-08-29 RX ORDER — ALPRAZOLAM 0.5 MG
TABLET ORAL
Qty: 20 TABLET | Refills: 0 | Status: SHIPPED | OUTPATIENT
Start: 2018-08-29 | End: 2019-07-27

## 2018-08-29 ASSESSMENT — ANXIETY QUESTIONNAIRES
6. BECOMING EASILY ANNOYED OR IRRITABLE: MORE THAN HALF THE DAYS
5. BEING SO RESTLESS THAT IT IS HARD TO SIT STILL: NOT AT ALL
1. FEELING NERVOUS, ANXIOUS, OR ON EDGE: NEARLY EVERY DAY
7. FEELING AFRAID AS IF SOMETHING AWFUL MIGHT HAPPEN: MORE THAN HALF THE DAYS
2. NOT BEING ABLE TO STOP OR CONTROL WORRYING: NEARLY EVERY DAY
3. WORRYING TOO MUCH ABOUT DIFFERENT THINGS: NEARLY EVERY DAY

## 2018-08-29 NOTE — PROGRESS NOTES
SUBJECTIVE:   Munir Storey is a 53 year old male who presents to clinic today for the following health issues:    ENT Symptoms             Symptoms: cc Present Absent Comment   Fever/Chills   x    Fatigue  x     Muscle Aches   x    Eye Irritation   x    Sneezing  x     Nasal Michael/Drg   x    Sinus Pressure/Pain   x    Loss of smell   x    Dental pain   x    Sore Throat   x    Swollen Glands   x    Ear Pain/Fullness   x    Cough  x  Productive clear mucus   Wheeze  x     Chest Pain   x    Shortness of breath   x    Rash   x    Other   x      Symptom duration:  3 weeks   Symptom severity:  moderate   Treatments tried: none   Contacts:  Grandson was sick but had different symptoms         Anxiety Follow-Up    Status since last visit: No change    Other associated symptoms:Lack of motivation, fatigue    Complicating factors:   Significant life event: No   Current substance abuse: None  Depression symptoms: Yes  SANDRA-7 SCORE 10/20/2017 2018   Total Score 8 12       SANDRA-7    Doesn't feel like lexapro is helping with anxiety.  He has tried to go off the medication and feels better (less fatigue and headaches).   But then the anxiety restarts a few weeks later.   Was on wellbutrin in the past, unsure how this worked.      He is worried about his heart.  He will get chest pains off and on (for years).   He is very worried about his heart especially given his family history.   Stress test: normal 3 years ago.  He has seen a cardiologist in the past as well, and told things are normal.     Family history of heart issues:  Mom  of an aortic aneurysm.    Father  due to CAD.     He does still smoke cigarettes (approximately 1 ppd).    He has stopped drinking alcohol.        Problem list and histories reviewed & adjusted, as indicated.  Additional history: as documented    Patient Active Problem List   Diagnosis     Atrial flutter (H)     Hypertension, goal below 140/90     Tobacco abuse     SANDRA (generalized anxiety  disorder)     Ejection fraction < 50%     Alcohol abuse, in remission     Past Surgical History:   Procedure Laterality Date     BACK SURGERY      cervical fusion      CARDIAC SURGERY  07/06/2015    EP cardioversion     COLONOSCOPY  08/01/2013    w/ EGD     GI SURGERY  08/08/2013    small bowel resection and lap dx abd     HERNIA REPAIR         Social History   Substance Use Topics     Smoking status: Current Every Day Smoker     Packs/day: 0.50     Types: Cigarettes, Dip, chew, snus or snuff     Smokeless tobacco: Current User     Types: Chew     Alcohol use No      Comment: hx alcohol abuse, sober since 2015     Family History   Problem Relation Age of Onset     Diabetes Father      Depression Father      Depression Paternal Grandfather          Current Outpatient Prescriptions   Medication Sig Dispense Refill     ALPRAZolam (XANAX) 0.5 MG tablet TAKE 0.5-1 TABLETS BY MOUTH 2 TIMES DAILY IF NEEDED FOR ANXIETY OR PANIC. 20 tablet 0     amLODIPine (NORVASC) 10 MG tablet TAKE 1 TABLET (10 MG) BY MOUTH DAILY 90 tablet 1     hydrochlorothiazide 12.5 MG TABS tablet Take 1 tablet (12.5 mg) by mouth daily 90 tablet 3     lisinopril (PRINIVIL/ZESTRIL) 20 MG tablet Take 1 tablet (20 mg) by mouth 2 times daily 180 tablet 3     sertraline (ZOLOFT) 50 MG tablet Take 1 tablet (50 mg) by mouth daily 30 tablet 0     [DISCONTINUED] hydrochlorothiazide (HYDRODIURIL) 25 MG tablet Take 25 mg by mouth daily       BP Readings from Last 3 Encounters:   08/29/18 142/80   01/12/18 (!) 158/91   11/07/17 144/80    Wt Readings from Last 3 Encounters:   08/29/18 199 lb 9.6 oz (90.5 kg)   01/12/18 209 lb 3.2 oz (94.9 kg)   10/20/17 206 lb (93.4 kg)                    Reviewed and updated as needed this visit by clinical staff       Reviewed and updated as needed this visit by Provider         ROS:  Constitutional, HEENT, cardiovascular, pulmonary, gi and gu systems are negative, except as otherwise noted.    OBJECTIVE:     /80  Pulse 78  " Temp 97.6  F (36.4  C) (Tympanic)  Resp 16  Ht 5' 8\" (1.727 m)  Wt 199 lb 9.6 oz (90.5 kg)  SpO2 97%  BMI 30.35 kg/m2  Body mass index is 30.35 kg/(m^2).  GENERAL: healthy, alert and no distress  EYES: Eyes grossly normal to inspection, PERRL and conjunctivae and sclerae normal  HENT: ear canals and TM's normal, nose and mouth without ulcers or lesions  NECK: no adenopathy, no asymmetry, masses, or scars and thyroid normal to palpation  RESP: lungs clear to auscultation - no rales, rhonchi or wheezes  CV: regular rate and rhythm, normal S1 S2, no S3 or S4, no murmur, click or rub, no peripheral edema and peripheral pulses strong  ABDOMEN: soft, nontender, no hepatosplenomegaly, no masses and bowel sounds normal  MS: no gross musculoskeletal defects noted, no edema    Diagnostic Test Results:  none     ASSESSMENT/PLAN:       1. Viral URI with cough  URI (Upper Respiratory Infection)  (primary encounter diagnosis)    I discussed the pathophysiology of upper respiratory infections and likely viral etiology.   Discussed general respiratory tract infection care including importance of hydration, rest, over the counter therapies and techniques to prevent future infection as well as transmission to others.  Discussed signs or symptoms that would indicate need for recheck.    Patient was instructed to f/u or call if symptoms worsen or fail to improve as anticipated.       2. SANDRA (generalized anxiety disorder)  Anxiety    I discussed the potential side effects of antianxiety medications as well as the likelihood of worsening before improvement over the next few weeks. I reemphasized the importance of a multi-armed approach towards the treatment of anxiety including regular exercise, adequate sleep, and a well rounded, low-glycemic diet.  In addition, I emphasized the importance of ongoing development of his support network. If new or worsening symptoms, he will seek help immediately.      - ALPRAZolam (XANAX) 0.5 MG " tablet; TAKE 0.5-1 TABLETS BY MOUTH 2 TIMES DAILY IF NEEDED FOR ANXIETY OR PANIC.  Dispense: 20 tablet; Refill: 0  - sertraline (ZOLOFT) 50 MG tablet; Take 1 tablet (50 mg) by mouth daily  Dispense: 30 tablet; Refill: 0    3. Tobacco abuse  Discussed importance of smoking cessation to decrease cardiovascular risks.      4. Atrial flutter, unspecified type (H)  S/p ablation.     5. Alcohol abuse, in remission  Continues to refrain from alcohol use.       FUTURE APPOINTMENTS:       - Follow-up visit in 1 month for a recheck on anxiety/depression/smoking cessation/heart concerns.  I offered a referral to cardiology for a second opinion as far as reducing his heart risk, may consider a statin as well in the future.      Nasreen Martínez PA-C  [facility]

## 2018-08-29 NOTE — MR AVS SNAPSHOT
"              After Visit Summary   8/29/2018    Munir Storey    MRN: [id number]           Patient Information     Date Of Birth          1965        Visit Information        Provider Department      8/29/2018 3:20 PM Nasreen Martínez PA-C [facility]        Today's Diagnoses     SANDRA (generalized anxiety disorder)           Follow-ups after your visit        Who to contact     Normal or non-critical lab and imaging results will be communicated to you by MyChart, letter or phone within 4 business days after the clinic has received the results. If you do not hear from us within 7 days, please contact the clinic through MyChart or phone. If you have a critical or abnormal lab result, we will notify you by phone as soon as possible.  Submit refill requests through [employer] or call your pharmacy and they will forward the refill request to us. Please allow 3 business days for your refill to be completed.          If you need to speak with a  for additional information , please call: [phone]           Additional Information About Your Visit        Care EveryWhere ID     This is your Care EveryWhere ID. This could be used by other organizations to access your [place] medical records  [id number]        Your Vitals Were     Pulse Temperature Respirations Height Pulse Oximetry BMI (Body Mass Index)    78 97.6  F (36.4  C) (Tympanic) 16 5' 8\" (1.727 m) 97% 30.35 kg/m2       Blood Pressure from Last 3 Encounters:   08/29/18 142/80   01/12/18 (!) 158/91   11/07/17 144/80    Weight from Last 3 Encounters:   08/29/18 199 lb 9.6 oz (90.5 kg)   01/12/18 209 lb 3.2 oz (94.9 kg)   10/20/17 206 lb (93.4 kg)              Today, you had the following     No orders found for display         Today's Medication Changes          These changes are accurate as of 8/29/18  3:55 PM.  If you have any questions, ask your nurse or doctor.               Start taking these medicines.        " Dose/Directions    sertraline 50 MG tablet   Commonly known as:  ZOLOFT   Used for:  SANDRA (generalized anxiety disorder)   Started by:  Nasreen Martínez PA-C        Dose:  50 mg   Take 1 tablet (50 mg) by mouth daily   Quantity:  30 tablet   Refills:  0            Where to get your medicines      These medications were sent to [facility] [id number] [facility] - [place], [place] [facility] [address] [address]     Phone:  [phone]     sertraline 50 MG tablet         Some of these will need a paper prescription and others can be bought over the counter.  Ask your nurse if you have questions.     Bring a paper prescription for each of these medications     ALPRAZolam 0.5 MG tablet                Primary Care Provider Office Phone # Fax #    Magalis Morales [phone] [address]   [place] [facility] [address]        Equal Access to Services     : Hadii charlotte kim hadasho Sotitus, waaxda luqadaha, qaybta kaalmada adeegyada, shahnaz constantino haynadira lopez . So [facility] [phone].    ATENCIÓN: Si habla español, tiene a montague disposición servicios gratuitos de asistencia lingüística. Llame al [phone].    We comply with applicable federal civil rights laws and [place] laws. We do not discriminate on the basis of race, color, national origin, age, disability, sex, sexual orientation, or gender identity.            Thank you!     Thank you for choosing [facility]  for your care. Our goal is always to provide you with excellent care. Hearing back from our patients is one way we can continue to improve our services. Please take a few minutes to complete the written survey that you may receive in the mail after your visit with us. Thank you!             Your Updated Medication List - Protect others around you: Learn how to safely use, store and throw away your medicines at www.disposemymeds.org.          This list is accurate as of 8/29/18  3:55 PM.   Always use your most recent med list.                   Brand Name Dispense Instructions for use Diagnosis    ALPRAZolam 0.5 MG tablet    XANAX    20 tablet    TAKE 0.5-1 TABLETS BY MOUTH 2 TIMES DAILY IF NEEDED FOR ANXIETY OR PANIC.    SANDRA (generalized anxiety disorder)       amLODIPine 10 MG tablet    NORVASC    90 tablet    TAKE 1 TABLET (10 MG) BY MOUTH DAILY    Hypertension, goal below 140/90       hydrochlorothiazide 12.5 MG Tabs tablet     90 tablet    Take 1 tablet (12.5 mg) by mouth daily    Hypertension, goal below 140/90       lisinopril 20 MG tablet    PRINIVIL/ZESTRIL    180 tablet    Take 1 tablet (20 mg) by mouth 2 times daily    Hypertension, goal below 140/90       sertraline 50 MG tablet    ZOLOFT    30 tablet    Take 1 tablet (50 mg) by mouth daily    SANDRA (generalized anxiety disorder)

## 2018-08-29 NOTE — NURSING NOTE
"Initial There were no vitals taken for this visit. Estimated body mass index is 31.81 kg/(m^2) as calculated from the following:    Height as of 10/20/17: 5' 8\" (1.727 m).    Weight as of 1/12/18: 209 lb 3.2 oz (94.9 kg). .      "

## 2018-08-30 ASSESSMENT — PATIENT HEALTH QUESTIONNAIRE - PHQ9: SUM OF ALL RESPONSES TO PHQ QUESTIONS 1-9: 6

## 2018-10-17 ENCOUNTER — TELEPHONE (OUTPATIENT)
Dept: FAMILY MEDICINE | Facility: CLINIC | Age: 53
End: 2018-10-17

## 2018-10-17 DIAGNOSIS — F41.1 GAD (GENERALIZED ANXIETY DISORDER): ICD-10-CM

## 2018-10-18 NOTE — TELEPHONE ENCOUNTER
"Requested Prescriptions   Pending Prescriptions Disp Refills     sertraline (ZOLOFT) 50 MG tablet [Pharmacy Med Name: SERTRALINE HCL 50 MG TABLET] 30 tablet 0    Last Written Prescription Date:  08/29/2018 #30 x 0  Last filled 08/29/2018  Last office visit: 8/29/2018 SHADY Martínez   Future Office Visit:  None   Sig: TAKE 1 TABLET BY MOUTH EVERY DAY    SSRIs Protocol Passed    10/17/2018  4:53 PM  PHQ-9 SCORE 10/20/2017 1/12/2018 8/29/2018   Total Score 5 13 6       SANDRA-7 SCORE 10/20/2017 1/12/2018   Total Score 8 12              Passed - Recent (12 mo) or future (30 days) visit within the authorizing provider's specialty    Patient had office visit in the last 12 months or has a visit in the next 30 days with authorizing provider or within the authorizing provider's specialty.  See \"Patient Info\" tab in inbasket, or \"Choose Columns\" in Meds & Orders section of the refill encounter.             Passed - Patient is age 18 or older          "

## 2018-10-18 NOTE — TELEPHONE ENCOUNTER
**This refill requires provider completion and is not appropriate for RN review per RN refill guidelines.**  PH-Q9 needs to be less than 5 to approve medication on RN Refill protocol pt's score is 6.  Latoya Graham RN

## 2018-10-19 NOTE — TELEPHONE ENCOUNTER
Left msg for pt to cb and schedule visit for further refills.    Kaylan Hinojosa, [place]

## 2018-10-19 NOTE — TELEPHONE ENCOUNTER
Please call patient, due for recheck for further refills, please assist patient in scheduling appt with Dr. Andrew Pierre refill given.       Nasreen Martínez PA-C

## 2019-03-06 ENCOUNTER — ALLIED HEALTH/NURSE VISIT (OUTPATIENT)
Dept: FAMILY MEDICINE | Facility: CLINIC | Age: 54
End: 2019-03-06

## 2019-03-06 VITALS — HEART RATE: 84 BPM | SYSTOLIC BLOOD PRESSURE: 192 MMHG | OXYGEN SATURATION: 99 % | DIASTOLIC BLOOD PRESSURE: 96 MMHG

## 2019-03-06 DIAGNOSIS — F41.1 GAD (GENERALIZED ANXIETY DISORDER): ICD-10-CM

## 2019-03-06 DIAGNOSIS — K62.5 RECTAL BLEEDING: Primary | ICD-10-CM

## 2019-03-06 DIAGNOSIS — I10 HYPERTENSION, GOAL BELOW 140/90: ICD-10-CM

## 2019-03-06 LAB
ANION GAP SERPL CALCULATED.3IONS-SCNC: 7 MMOL/L (ref 3–14)
BUN SERPL-MCNC: 11 MG/DL (ref 7–30)
CALCIUM SERPL-MCNC: 9.1 MG/DL (ref 8.5–10.1)
CHLORIDE SERPL-SCNC: 104 MMOL/L (ref 94–109)
CO2 SERPL-SCNC: 27 MMOL/L (ref 20–32)
CREAT SERPL-MCNC: 0.78 MG/DL (ref 0.66–1.25)
ERYTHROCYTE [DISTWIDTH] IN BLOOD BY AUTOMATED COUNT: 12.7 % (ref 10–15)
GFR SERPL CREATININE-BSD FRML MDRD: >90 ML/MIN/{1.73_M2}
GLUCOSE SERPL-MCNC: 88 MG/DL (ref 70–99)
HCT VFR BLD AUTO: 44.8 % (ref 40–53)
HGB BLD-MCNC: 14.8 G/DL (ref 13.3–17.7)
MCH RBC QN AUTO: 30.5 PG (ref 26.5–33)
MCHC RBC AUTO-ENTMCNC: 33 G/DL (ref 31.5–36.5)
MCV RBC AUTO: 92 FL (ref 78–100)
PLATELET # BLD AUTO: 203 10E9/L (ref 150–450)
POTASSIUM SERPL-SCNC: 3.8 MMOL/L (ref 3.4–5.3)
RBC # BLD AUTO: 4.86 10E12/L (ref 4.4–5.9)
SODIUM SERPL-SCNC: 138 MMOL/L (ref 133–144)
WBC # BLD AUTO: 5.8 10E9/L (ref 4–11)

## 2019-03-06 PROCEDURE — 36415 COLL VENOUS BLD VENIPUNCTURE: CPT | Performed by: FAMILY MEDICINE

## 2019-03-06 PROCEDURE — 85027 COMPLETE CBC AUTOMATED: CPT | Performed by: FAMILY MEDICINE

## 2019-03-06 PROCEDURE — 80048 BASIC METABOLIC PNL TOTAL CA: CPT | Performed by: FAMILY MEDICINE

## 2019-03-06 PROCEDURE — 99213 OFFICE O/P EST LOW 20 MIN: CPT | Performed by: FAMILY MEDICINE

## 2019-03-06 RX ORDER — HYDROCHLOROTHIAZIDE 12.5 MG/1
12.5 TABLET ORAL DAILY
Qty: 90 TABLET | Refills: 1 | Status: SHIPPED | OUTPATIENT
Start: 2019-03-06 | End: 2019-07-31 | Stop reason: DRUGHIGH

## 2019-03-06 RX ORDER — AMLODIPINE BESYLATE 10 MG/1
10 TABLET ORAL DAILY
Qty: 90 TABLET | Refills: 1 | Status: SHIPPED | OUTPATIENT
Start: 2019-03-06 | End: 2019-07-27

## 2019-03-06 RX ORDER — LISINOPRIL 20 MG/1
20 TABLET ORAL 2 TIMES DAILY
Qty: 180 TABLET | Refills: 1 | Status: SHIPPED | OUTPATIENT
Start: 2019-03-06 | End: 2019-07-31 | Stop reason: DRUGHIGH

## 2019-03-06 ASSESSMENT — ANXIETY QUESTIONNAIRES
6. BECOMING EASILY ANNOYED OR IRRITABLE: SEVERAL DAYS
1. FEELING NERVOUS, ANXIOUS, OR ON EDGE: MORE THAN HALF THE DAYS
5. BEING SO RESTLESS THAT IT IS HARD TO SIT STILL: MORE THAN HALF THE DAYS
IF YOU CHECKED OFF ANY PROBLEMS ON THIS QUESTIONNAIRE, HOW DIFFICULT HAVE THESE PROBLEMS MADE IT FOR YOU TO DO YOUR WORK, TAKE CARE OF THINGS AT HOME, OR GET ALONG WITH OTHER PEOPLE: SOMEWHAT DIFFICULT
2. NOT BEING ABLE TO STOP OR CONTROL WORRYING: MORE THAN HALF THE DAYS
GAD7 TOTAL SCORE: 9
7. FEELING AFRAID AS IF SOMETHING AWFUL MIGHT HAPPEN: SEVERAL DAYS
3. WORRYING TOO MUCH ABOUT DIFFERENT THINGS: SEVERAL DAYS

## 2019-03-06 ASSESSMENT — PAIN SCALES - GENERAL: PAINLEVEL: NO PAIN (0)

## 2019-03-06 ASSESSMENT — PATIENT HEALTH QUESTIONNAIRE - PHQ9: 5. POOR APPETITE OR OVEREATING: NOT AT ALL

## 2019-03-06 NOTE — PROGRESS NOTES
SUBJECTIVE:   Munir Storey is a 53 year old male who presents to clinic today for the following health issues:    Gastrointestinal symptoms  rectal  Bleeding x 2 days   Pt has anxiety not currently taking anything  for     Rectal Bleeding    Duration: x 2 days     Description: bright red blood in the stool and bright red blood when wiping    Intensity:  Mild no pain or cramping     Accompanying signs and symptoms:  none and BM x 1 days  Normal  Stools      History  Previous similar problem: YES  occ gets bleeding  3 x year x 1 day  Previous evaluation:  Normal colonoscopy 2010. Colonoscopy 2013. Small bowel resection 2013 - pathology normal.      Aggravating factors: none and alcohol    Alleviating factors: nothing    Other Therapies tried: None    PKarlee [facility]  Clinic  RN/Junior Ervin    BP Readings from Last 3 Encounters:   03/06/19 (!) 192/96 08/29/18 142/80   01/12/18 (!) 158/91     Anxiety Follow-Up    Status since last visit: taking CBD oil x1 month - is not taking zoloft as directed, states doesn't like the side effects.    Other associated symptoms:fatigue wants to nap every day     Complicating factors:   Significant life event: No   Current substance abuse: No   Depression symptoms: Yes-  Fatigue   SANDRA-7 SCORE 10/20/2017 1/12/2018 3/6/2019   Total Score 8 12 9       SANDRA-7    Amount of exercise or physical activity: work physically     Problems taking medications regularly: No    Medication side effects: fatigue - using CBD oil Once a day     Diet: regular (no restrictions)       Hypertension Follow-up  Not taking his prescribed blood pressure medications as directed    Outpatient blood pressures are not being checked.    Low Salt Diet: not monitoring salt  BP Readings from Last 3 Encounters:   03/06/19 (!) 192/96   08/29/18 142/80   01/12/18 (!) 158/91         Problem list and histories reviewed & adjusted, as indicated.  Additional history: as documented    Labs reviewed in EPIC    Reviewed and updated as  needed this visit by clinical staff       Reviewed and updated as needed this visit by Provider         ROS:  CONSTITUTIONAL: NEGATIVE for fever, chills, change in weight  INTEGUMENTARY/SKIN: NEGATIVE for worrisome rashes, moles or lesions  ENT/MOUTH: NEGATIVE for ear, mouth and throat problems  RESP: NEGATIVE for significant cough or SOB  CV: NEGATIVE for chest pain, palpitations or peripheral edema  GI: POSITIVE for melena  PSYCHIATRIC: POSITIVE for anxiety    This document serves as a record of the services and decisions personally performed and made by Magalis Morales MD. It was created on his behalf by Harry Durán, a trained medical scribe. The creation of this document is based the provider's statements to the medical scribe.  Harry Durán 2:21 PM March 6, 2019    OBJECTIVE:                                                    BP (!) 192/96 (BP Location: Left arm, Patient Position: Sitting, Cuff Size: Adult Regular)   Pulse 84   SpO2 99%   There is no height or weight on file to calculate BMI.   GENERAL: alert and in mild distress  RESP: lungs clear to auscultation - no rales, no rhonchi, no wheezes  CV: regular rates and rhythm, normal S1 S2  ABDOMEN: soft, no tenderness  RECTAL- male: small external hemmorhoid noted  PSYCH: mentation appears normal, affect normal/bright    Diagnostic test results:  none      ASSESSMENT/PLAN:                                                    (K62.5) Rectal bleeding  (primary encounter diagnosis)  Comment: Probably secondary to hemorrhoid.  Patient is clinically stable.  Plan: **CBC with platelets FUTURE 1yr        When he has insurance, will order CBC.    (I10) Hypertension, goal below 140/90.  Comment: BP not within guidelines due to non compliance.  Secondary to lack of insurance.  Restart medication advise follow-up.  Plan: **Basic metabolic panel FUTURE 1yr, amLODIPine         (NORVASC) 10 MG tablet, hydrochlorothiazide         (HYDRODIURIL) 12.5 MG tablet, lisinopril          (PRINIVIL/ZESTRIL) 20 MG tablet      (F41.1) SANDRA (generalized anxiety disorder)  Comment: We will restart SSRI therapy.  Plan: sertraline (ZOLOFT) 50 MG tablet      Patient Instructions   *    The bleeding is okay, sounds like a hemorrhoid. Nothing serious.     *    I sent a refill for your blood pressure medications.     *    When you get insurance, come back for an office visit.     *    What you eat can make a difference.     See Patient Instructions    The information in this document, created by a scribe for me, accurately reflects the services I personally performed and the decisions made by me. I have reviewed and approved this document for accuracy.     Magalis Morales MD  [facility]

## 2019-03-06 NOTE — PATIENT INSTRUCTIONS
*    The bleeding is okay, sounds like a hemorrhoid. Nothing serious.     *    I sent a refill for your blood pressure medications.     *    When you get insurance, come back for an office visit.     *    What you eat can make a difference.

## 2019-03-07 ASSESSMENT — ANXIETY QUESTIONNAIRES: GAD7 TOTAL SCORE: 9

## 2019-07-27 ENCOUNTER — APPOINTMENT (OUTPATIENT)
Dept: GENERAL RADIOLOGY | Facility: CLINIC | Age: 54
End: 2019-07-27
Attending: FAMILY MEDICINE
Payer: COMMERCIAL

## 2019-07-27 ENCOUNTER — HOSPITAL ENCOUNTER (EMERGENCY)
Facility: CLINIC | Age: 54
Discharge: HOME | End: 2019-07-27
Attending: FAMILY MEDICINE | Admitting: FAMILY MEDICINE
Payer: COMMERCIAL

## 2019-07-27 VITALS
TEMPERATURE: 99.2 F | DIASTOLIC BLOOD PRESSURE: 106 MMHG | RESPIRATION RATE: 16 BRPM | BODY MASS INDEX: 30.61 KG/M2 | HEIGHT: 67 IN | OXYGEN SATURATION: 100 % | SYSTOLIC BLOOD PRESSURE: 199 MMHG | WEIGHT: 195 LBS

## 2019-07-27 DIAGNOSIS — M54.9 UPPER BACK PAIN: ICD-10-CM

## 2019-07-27 DIAGNOSIS — F41.9 ANXIETY: ICD-10-CM

## 2019-07-27 DIAGNOSIS — R35.89 POLYURIA: ICD-10-CM

## 2019-07-27 DIAGNOSIS — I10 HYPERTENSION, UNSPECIFIED TYPE: ICD-10-CM

## 2019-07-27 LAB
ALBUMIN SERPL-MCNC: 4.4 G/DL (ref 3.4–5)
ALP SERPL-CCNC: 73 U/L (ref 40–150)
ALT SERPL W P-5'-P-CCNC: 23 U/L (ref 0–70)
ANION GAP SERPL CALCULATED.3IONS-SCNC: 8 MMOL/L (ref 3–14)
AST SERPL W P-5'-P-CCNC: 15 U/L (ref 0–45)
BASOPHILS # BLD AUTO: 0 10E9/L (ref 0–0.2)
BASOPHILS NFR BLD AUTO: 0.7 %
BILIRUB SERPL-MCNC: 0.5 MG/DL (ref 0.2–1.3)
BUN SERPL-MCNC: 13 MG/DL (ref 7–30)
CALCIUM SERPL-MCNC: 8.7 MG/DL (ref 8.5–10.1)
CHLORIDE SERPL-SCNC: 108 MMOL/L (ref 94–109)
CO2 SERPL-SCNC: 26 MMOL/L (ref 20–32)
CREAT SERPL-MCNC: 0.82 MG/DL (ref 0.66–1.25)
D DIMER PPP FEU-MCNC: 0.5 UG/ML FEU (ref 0–0.5)
DIFFERENTIAL METHOD BLD: NORMAL
EOSINOPHIL # BLD AUTO: 0 10E9/L (ref 0–0.7)
EOSINOPHIL NFR BLD AUTO: 0.7 %
ERYTHROCYTE [DISTWIDTH] IN BLOOD BY AUTOMATED COUNT: 12.2 % (ref 10–15)
GFR SERPL CREATININE-BSD FRML MDRD: >90 ML/MIN/{1.73_M2}
GLUCOSE SERPL-MCNC: 104 MG/DL (ref 70–99)
HCT VFR BLD AUTO: 46.1 % (ref 40–53)
HGB BLD-MCNC: 15.4 G/DL (ref 13.3–17.7)
IMM GRANULOCYTES # BLD: 0 10E9/L (ref 0–0.4)
IMM GRANULOCYTES NFR BLD: 0.3 %
LYMPHOCYTES # BLD AUTO: 1.2 10E9/L (ref 0.8–5.3)
LYMPHOCYTES NFR BLD AUTO: 19.4 %
MCH RBC QN AUTO: 30.8 PG (ref 26.5–33)
MCHC RBC AUTO-ENTMCNC: 33.4 G/DL (ref 31.5–36.5)
MCV RBC AUTO: 92 FL (ref 78–100)
MONOCYTES # BLD AUTO: 0.4 10E9/L (ref 0–1.3)
MONOCYTES NFR BLD AUTO: 6.4 %
NEUTROPHILS # BLD AUTO: 4.3 10E9/L (ref 1.6–8.3)
NEUTROPHILS NFR BLD AUTO: 72.5 %
NRBC # BLD AUTO: 0 10*3/UL
NRBC BLD AUTO-RTO: 0 /100
PLATELET # BLD AUTO: 203 10E9/L (ref 150–450)
POTASSIUM SERPL-SCNC: 3.5 MMOL/L (ref 3.4–5.3)
PROT SERPL-MCNC: 8 G/DL (ref 6.8–8.8)
RBC # BLD AUTO: 5 10E12/L (ref 4.4–5.9)
SODIUM SERPL-SCNC: 142 MMOL/L (ref 133–144)
TROPONIN I SERPL-MCNC: <0.015 UG/L (ref 0–0.04)
WBC # BLD AUTO: 5.9 10E9/L (ref 4–11)

## 2019-07-27 PROCEDURE — 25000128 H RX IP 250 OP 636: Performed by: FAMILY MEDICINE

## 2019-07-27 PROCEDURE — 85025 COMPLETE CBC W/AUTO DIFF WBC: CPT | Performed by: FAMILY MEDICINE

## 2019-07-27 PROCEDURE — 80053 COMPREHEN METABOLIC PANEL: CPT | Performed by: FAMILY MEDICINE

## 2019-07-27 PROCEDURE — 85379 FIBRIN DEGRADATION QUANT: CPT | Performed by: FAMILY MEDICINE

## 2019-07-27 PROCEDURE — 71046 X-RAY EXAM CHEST 2 VIEWS: CPT

## 2019-07-27 PROCEDURE — 99285 EMERGENCY DEPT VISIT HI MDM: CPT | Mod: 25 | Performed by: FAMILY MEDICINE

## 2019-07-27 PROCEDURE — 84484 ASSAY OF TROPONIN QUANT: CPT | Performed by: FAMILY MEDICINE

## 2019-07-27 PROCEDURE — 96374 THER/PROPH/DIAG INJ IV PUSH: CPT | Performed by: FAMILY MEDICINE

## 2019-07-27 PROCEDURE — 93005 ELECTROCARDIOGRAM TRACING: CPT | Performed by: FAMILY MEDICINE

## 2019-07-27 PROCEDURE — 93010 ELECTROCARDIOGRAM REPORT: CPT | Mod: Z6 | Performed by: FAMILY MEDICINE

## 2019-07-27 RX ORDER — DIPHENHYDRAMINE HYDROCHLORIDE 50 MG/ML
25 INJECTION INTRAMUSCULAR; INTRAVENOUS ONCE
Status: COMPLETED | OUTPATIENT
Start: 2019-07-27 | End: 2019-07-27

## 2019-07-27 RX ADMIN — DIPHENHYDRAMINE HYDROCHLORIDE 25 MG: 50 INJECTION, SOLUTION INTRAMUSCULAR; INTRAVENOUS at 16:37

## 2019-07-27 ASSESSMENT — MIFFLIN-ST. JEOR: SCORE: 1673.14

## 2019-07-27 NOTE — ED NOTES
"Pt states that he is feeling \"off\".  Has had intermittent chest pains x2-3 days, primarily on left side of chest.  Denies SOA but states it \"feels different.\"  Pt is vague with symptoms description but continues to say he feels not right.  Made appt with PCP, but states he felt flush today and is concerned.  Hx of ablation, approx 2 years ago.    "

## 2019-07-27 NOTE — ED AVS SNAPSHOT
[facility]  [address] [address]  Phone:  [phone]  Fax:  [phone]                                    Munir Storey   MRN: [id number]    Department:  [facility]   Date of Visit:  7/27/2019           After Visit Summary Signature Page    I have received my discharge instructions, and my questions have been answered. I have discussed any challenges I see with this plan with the nurse or doctor.    ..........................................................................................................................................  Patient/Patient Representative Signature      ..........................................................................................................................................  Patient Representative Print Name and Relationship to Patient    ..................................................               ................................................  Date                                   Time    ..........................................................................................................................................  Reviewed by Signature/Title    ...................................................              ..............................................  Date                                               Time          22EPIC Rev 08/18

## 2019-07-27 NOTE — DISCHARGE INSTRUCTIONS
Return to the Emergency Room if the following occurs:     Severely worsened pain or breathing, fainting, fever >101, or for any concern at anytime.    Or, follow-up with the following provider as we discussed:     Return to your primary doctor next week to discuss your visit today.  Specifically, you should talk to them about your anxiety and hypertension.  I will call you if the urine analysis is abnormal.  If you continue to have increased urinary frequency despite a normal urine analysis he should talk to your doctor about this as well.    Medications discussed:    None new.  No changes.    If you received pain-relieving or sedating medication during your time in the ER, avoid alcohol, driving automobiles, or working with machinery.  Also, a responsible adult must stay with you.        Call the Nurse Advice Line at [phone] or [phone] for any concern at anytime.

## 2019-07-27 NOTE — ED PROVIDER NOTES
"  HPI   The patient is a 54-year-old male presenting with chest pain.  The patient denies any prior heart or lung pathology except for atrial flutter with an ablation.  He does not drink alcohol any longer.  He quit smoking 2 days ago.  He denies drugs of abuse.  He reports struggling with anxiety on a regular basis.    The patient has concerns about his breathing, chest tightness, and back discomfort since Monday, 5 days ago.  He has difficulty providing concise details about his symptoms but with repeated questioning we are able to get a better picture of what his concerns are.  He has had back discomfort that seems to come and go.  He cannot point to anything that obviously exacerbates or brings on his symptoms.  He cannot tell me if it lasts for seconds or hours.  The back discomfort is in his mid and upper back.  It is mild to moderate in severity when present.  He denies any recent trauma or injury.  No skin rash.  He has not had similar symptoms previously.  He also describes chest tightness.  This is new over the past 5 days as well.  It does not seem to correlate with his back discomfort.  It does not seem to correlate with his \"trouble breathing.\"  The chest tightness is intermittent and again he is unable to tell me how long it lasts.  He is unable to tell me what seems to make it better or worse.  Finally, he denies feeling short of breath but he simply says \"my breathing is not right.\"  He denies that it is worse with activity.  He does report occasional palpitations but these are not new.  He tells me that he saw \"a spike\" on the monitor when he felt one of these palpitations here in the room.  His palpitations do not seem to correlate with his other symptoms.        Allergies:  Allergies   Allergen Reactions     Sulfamethoxazole-Trimethoprim Nausea     Problem List:    Patient Active Problem List    Diagnosis Date Noted     Moderate episode of recurrent major depressive disorder (H) 08/29/2018     " Priority: Medium     Tobacco abuse 10/20/2017     Priority: Medium     SANDRA (generalized anxiety disorder) 10/20/2017     Priority: Medium     Ejection fraction < 50% 10/20/2017     Priority: Medium     Ejection fraction 45% per echo April 2015 (per [facility] records)  - Magalis Morales MD       Alcohol abuse, in remission 10/20/2017     Priority: Medium     October 20, 2017: sobriety x 2 years  - Magalis Morales MD       Atrial flutter (H) 06/30/2015     Priority: Medium     Hypertension, goal below 140/90 01/23/2009     Priority: Medium      Past Medical History:    Past Medical History:   Diagnosis Date     Atrial flutter (H) 6/30/2015     Depressive disorder      SANDRA (generalized anxiety disorder) 10/20/2017     Hypertension, goal below 140/90 1/23/2009     Tobacco abuse 10/20/2017     Past Surgical History:    Past Surgical History:   Procedure Laterality Date     BACK SURGERY      cervical fusion      CARDIAC SURGERY  07/06/2015    EP cardioversion     COLONOSCOPY  08/01/2013     COLONOSCOPY  4/6/2019    normal colonoscopy - repeat 10 years     HERNIA REPAIR       LAPAROSCOPIC RESECTION SMALL BOWEL  08/08/2013     UPPER GI ENDOSCOPY  08/01/2013     Family History:    Family History   Problem Relation Age of Onset     Diabetes Father      Depression Father      Depression Paternal Grandfather      Social History:  Marital Status:  Single [1]  Social History     Tobacco Use     Smoking status: Current Every Day Smoker     Packs/day: 0.50     Types: Cigarettes, Dip, chew, snus or snuff     Smokeless tobacco: Current User     Types: Chew   Substance Use Topics     Alcohol use: No     Comment: hx alcohol abuse, sober since 2015     Drug use: No      Medications:      hydrochlorothiazide (HYDRODIURIL) 12.5 MG tablet   lisinopril (PRINIVIL/ZESTRIL) 20 MG tablet     Review of Systems   All other systems reviewed and are negative.      PE   BP: (!) 199/106  Heart Rate: 94  Temp: 99.2  F (37.3  C)  Resp: 16  Height: 170.2 cm  "(5' 7\")  Weight: 88.5 kg (195 lb)  SpO2: 100 %  Physical Exam   Constitutional: He is oriented to person, place, and time. He appears distressed.   Anxious.   HENT:   Head: Atraumatic.   Mouth/Throat: Oropharynx is clear and moist.   Eyes: Pupils are equal, round, and reactive to light. EOM are normal. No scleral icterus.   Neck: Normal range of motion.   Cardiovascular: Normal rate, regular rhythm, normal heart sounds and intact distal pulses.   Pulmonary/Chest: Breath sounds normal. No respiratory distress.   Abdominal: Soft. Bowel sounds are normal. There is no tenderness.   Musculoskeletal: Normal range of motion. He exhibits no edema or tenderness.   Neurological: He is alert and oriented to person, place, and time.   Skin: Skin is warm. No rash noted. He is not diaphoretic.   Psychiatric:   Anxious.   Nursing note and vitals reviewed.      ED COURSE and [facility]   1617.  Patient is anxious.  He has multiple concerns which are described above.  His EKG does show some subtle ST depression as described below.  Lab values pending.  D-dimer added.  Chest x-ray ordered.  He requests something for his anxiety and Benadryl 25 mg IV as ordered.    1654.  The patient has a negative troponin.  My concern for acute coronary obstruction is very low.  His symptoms began 5 days ago and with a negative troponin this is highly unlikely.  His d-dimer is negative.  Low concern for PE.  His chest x-ray is unremarkable.  His urine analysis is still pending at the time of his discharge.  I will call the patient and discuss the results with him if there is any abnormality.  I reviewed this with him and his wife.  He does continue to have hypertension.  He is obviously anxious though.  He should continue to check his blood pressure on a regular basis in the morning and then have a conversation with his doctor if it continues to be high.  He will return here for worsening of symptoms, as discussed.    LABS  Labs Ordered and Resulted from " Time of ED Arrival Up to the Time of Departure from the ED   COMPREHENSIVE METABOLIC PANEL - Abnormal; Notable for the following components:       Result Value    Glucose 104 (*)     All other components within normal limits   CBC WITH PLATELETS DIFFERENTIAL   TROPONIN I   D DIMER QUANTITATIVE   PERIPHERAL IV CATHETER       IMAGING  Images reviewed by me.  Radiology report also reviewed.  XR Chest 2 Views   Final Result   IMPRESSION:  Negative exam.      MICH GREWAL MD        EKG  (0654)   Interpretation performed by me.  Rate: 85     Rhythm: sinus     Axis: nl  Intervals: KS (12-2) 196, QRS (<12) 100, QTc (>5) 415  P wave: nl     QRS complex: nl  ST segment / T-wave: 0.5 mm ST depression in leads V3-V6  Conclusion: Nonspecific ST depression in leads V3-V6.  Otherwise unremarkable EKG.      Medications   diphenhydrAMINE (BENADRYL) injection 25 mg (25 mg Intravenous Given 7/27/19 1637)         IMPRESSION       ICD-10-CM    1. Anxiety F41.9    2. Upper back pain M54.9    3. Polyuria R35.8    4. Hypertension, unspecified type I10             Medication List      There are no discharge medications for this visit.                       Rogelio Alfaro MD  07/27/19 [id number]

## 2019-07-31 ENCOUNTER — OFFICE VISIT (OUTPATIENT)
Dept: FAMILY MEDICINE | Facility: CLINIC | Age: 54
End: 2019-07-31
Payer: COMMERCIAL

## 2019-07-31 VITALS
HEART RATE: 72 BPM | BODY MASS INDEX: 28.98 KG/M2 | TEMPERATURE: 98.5 F | DIASTOLIC BLOOD PRESSURE: 92 MMHG | OXYGEN SATURATION: 99 % | HEIGHT: 68 IN | SYSTOLIC BLOOD PRESSURE: 168 MMHG | WEIGHT: 191.2 LBS | RESPIRATION RATE: 20 BRPM

## 2019-07-31 DIAGNOSIS — E66.3 OVERWEIGHT (BMI 25.0-29.9): ICD-10-CM

## 2019-07-31 DIAGNOSIS — F41.1 GAD (GENERALIZED ANXIETY DISORDER): Primary | ICD-10-CM

## 2019-07-31 DIAGNOSIS — I10 HYPERTENSION, GOAL BELOW 140/90: ICD-10-CM

## 2019-07-31 DIAGNOSIS — Z72.0 TOBACCO ABUSE: ICD-10-CM

## 2019-07-31 DIAGNOSIS — Z13.6 CARDIOVASCULAR SCREENING; LDL GOAL LESS THAN 160: ICD-10-CM

## 2019-07-31 DIAGNOSIS — F33.1 MODERATE EPISODE OF RECURRENT MAJOR DEPRESSIVE DISORDER (H): ICD-10-CM

## 2019-07-31 PROCEDURE — 99215 OFFICE O/P EST HI 40 MIN: CPT | Performed by: PHYSICIAN ASSISTANT

## 2019-07-31 RX ORDER — ALPRAZOLAM 0.5 MG
TABLET ORAL
Refills: 0 | COMMUNITY
Start: 2018-08-29 | End: 2019-07-31

## 2019-07-31 RX ORDER — AMLODIPINE BESYLATE 10 MG/1
10 TABLET ORAL DAILY
Refills: 1 | COMMUNITY
Start: 2019-06-19 | End: 2019-07-31 | Stop reason: DRUGHIGH

## 2019-07-31 RX ORDER — ESCITALOPRAM OXALATE 10 MG/1
10 TABLET ORAL DAILY
Qty: 30 TABLET | Refills: 0 | Status: SHIPPED | OUTPATIENT
Start: 2019-07-31 | End: 2019-08-14

## 2019-07-31 RX ORDER — AMLODIPINE BESYLATE 5 MG/1
5 TABLET ORAL DAILY
Qty: 30 TABLET | Refills: 0 | COMMUNITY
Start: 2019-07-31 | End: 2019-08-14

## 2019-07-31 RX ORDER — LOSARTAN POTASSIUM AND HYDROCHLOROTHIAZIDE 12.5; 1 MG/1; MG/1
1 TABLET ORAL DAILY
Qty: 30 TABLET | Refills: 0 | Status: SHIPPED | OUTPATIENT
Start: 2019-07-31 | End: 2019-08-14

## 2019-07-31 RX ORDER — ALPRAZOLAM 0.25 MG
0.25 TABLET ORAL 2 TIMES DAILY PRN
Qty: 30 TABLET | Refills: 0 | Status: SHIPPED | OUTPATIENT
Start: 2019-07-31 | End: 2019-09-02

## 2019-07-31 ASSESSMENT — ANXIETY QUESTIONNAIRES
5. BEING SO RESTLESS THAT IT IS HARD TO SIT STILL: SEVERAL DAYS
6. BECOMING EASILY ANNOYED OR IRRITABLE: SEVERAL DAYS
IF YOU CHECKED OFF ANY PROBLEMS ON THIS QUESTIONNAIRE, HOW DIFFICULT HAVE THESE PROBLEMS MADE IT FOR YOU TO DO YOUR WORK, TAKE CARE OF THINGS AT HOME, OR GET ALONG WITH OTHER PEOPLE: VERY DIFFICULT
2. NOT BEING ABLE TO STOP OR CONTROL WORRYING: NEARLY EVERY DAY
7. FEELING AFRAID AS IF SOMETHING AWFUL MIGHT HAPPEN: NEARLY EVERY DAY
GAD7 TOTAL SCORE: 15
3. WORRYING TOO MUCH ABOUT DIFFERENT THINGS: NEARLY EVERY DAY
1. FEELING NERVOUS, ANXIOUS, OR ON EDGE: NEARLY EVERY DAY

## 2019-07-31 ASSESSMENT — MIFFLIN-ST. JEOR: SCORE: 1671.78

## 2019-07-31 ASSESSMENT — PATIENT HEALTH QUESTIONNAIRE - PHQ9
5. POOR APPETITE OR OVEREATING: SEVERAL DAYS
SUM OF ALL RESPONSES TO PHQ QUESTIONS 1-9: 8

## 2019-07-31 NOTE — PATIENT INSTRUCTIONS
Do fasting labs first and then a follow-up physical/BP recheck a few days later.    BP medications are changing as follows:    Stop lisinopril and hydrochlorothiazide. . . Instead I am putting you on a combo medication called losartan/hctz.  The losartan replaces the lisinopril (which caused a cough for you).    Your BP is still quite high and I am concerned this combo med alone will not get you back into a safe range, therefore I suggest you restart the amlodipine but instead of 10 mg (which may have caused fatigue), let's go down to 5 mg (just cut up the tabs you have in half).     We'll recheck BP in 2 weeks at your physical.     Anxiety:  Limit caffeine and definitely no energy drinks.    Try to get outside and go for walks or do things you enjoy.   Start lexapro, which you have been on in the past and tolerated well.  Let me know if you have any issues with this (may have a mild headache or dizziness the first few days of taking it, this usually goes away).  Sometimes upset stomach, if this happens try taking it with food.  If it causes drowsiness, may consider starting it at night instead.     Referral for counselor placed today.

## 2019-07-31 NOTE — PROGRESS NOTES
Subjective     Munir Storey is a 54 year old male who presents to clinic today for the following health issues:    HPI   Hypertension Follow-up      Do you check your blood pressure regularly outside of the clinic? No     Are you following a low salt diet? No     Stopped amlodipine, he felt fatigued while on this.      Depression and Anxiety Follow-Up    How are you doing with your depression since your last visit? Worsened     How are you doing with your anxiety since your last visit?  Worsened    Are you having other symptoms that might be associated with depression or anxiety? Yes:  chest pain- evaluated in ED on 07/27/19    Have you had a significant life event? No     Do you have any concerns with your use of alcohol or other drugs? No    Social History     Tobacco Use     Smoking status: Current Every Day Smoker     Packs/day: 0.50     Types: Cigarettes, Dip, chew, snus or snuff     Smokeless tobacco: Current User     Types: Chew   Substance Use Topics     Alcohol use: No     Comment: hx alcohol abuse, sober since 2015     Drug use: No     PHQ 1/12/2018 8/29/2018 7/31/2019   PHQ-9 Total Score 13 6 8   Q9: Thoughts of better off dead/self-harm past 2 weeks Not at all Not at all Several days   F/U: Thoughts of suicide or self-harm - - No   F/U: Safety concerns - - No     SANDRA-7 SCORE 1/12/2018 3/6/2019 7/31/2019   Total Score 12 9 15     No flowsheet data found.  No flowsheet data found.      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 B.P. Also running high despite taking lisinopril and HCTZ     Sejal Garcia  "drank a cup of coffee and a 5 hour energy the morning of the ER visit (which he typically does not use).   Looking back at the situation, he realized it was this excessive caffeine burst and anxiety that likely caused his symptoms in the ER.      Was on zoloft, the first 10-20 days he felt very fatigued and he stopped it.   He tried lexapro in 2017 and was on this for a few months and he felt better so he stopped it .    He feels he cannot do the things he loves due to this anxiety.  The other day he opted to stay home instead of going out for ice cream with his family and Grandkids.  He is tearful while discussing how he feels about missing these family memories.  He used to enjoy golf, however no longer golfs.  He states \"I just want to get Early back.\"    Has tried to cut out carbs/sugars with a keto diet.  Has tried CBD oil.  These things have not helped his anxiety.       Didn't have insurance so he stopped coming in.     Patient Active Problem List   Diagnosis     Atrial flutter (H)     Hypertension, goal below 140/90     Tobacco abuse     SANDRA (generalized anxiety disorder)     Ejection fraction < 50%     Alcohol abuse, in remission     Moderate episode of recurrent major depressive disorder (H)     Past Surgical History:   Procedure Laterality Date     BACK SURGERY      cervical fusion      CARDIAC SURGERY  07/06/2015    EP cardioversion     COLONOSCOPY  08/01/2013     COLONOSCOPY  4/6/2019    normal colonoscopy - repeat 10 years     HERNIA REPAIR       LAPAROSCOPIC RESECTION SMALL BOWEL  08/08/2013     UPPER GI ENDOSCOPY  08/01/2013       Social History     Tobacco Use     Smoking status: Current Every Day Smoker     Packs/day: 0.50     Types: Cigarettes, Dip, chew, snus or snuff     Smokeless tobacco: Current User     Types: Chew   Substance Use Topics     Alcohol use: No     Comment: hx alcohol abuse, sober since 2015     Family History   Problem Relation Age of Onset     Diabetes Father      Depression " "Father      Depression Paternal Grandfather          Current Outpatient Medications   Medication Sig Dispense Refill     ALPRAZolam (XANAX) 0.25 MG tablet Take 1 tablet (0.25 mg) by mouth 2 times daily as needed for anxiety 30 tablet 0     amLODIPine (NORVASC) 5 MG tablet Take 1 tablet (5 mg) by mouth daily 30 tablet 0     escitalopram (LEXAPRO) 10 MG tablet Take 1 tablet (10 mg) by mouth daily 30 tablet 0     losartan-hydrochlorothiazide (HYZAAR) 100-12.5 MG tablet Take 1 tablet by mouth daily 30 tablet 0     BP Readings from Last 3 Encounters:   07/31/19 (!) 168/92   07/27/19 (!) 199/106   03/06/19 (!) 192/96    Wt Readings from Last 3 Encounters:   07/31/19 86.7 kg (191 lb 3.2 oz)   07/27/19 88.5 kg (195 lb)   08/29/18 90.5 kg (199 lb 9.6 oz)                      Reviewed and updated as needed this visit by Provider         Review of Systems   ROS COMP: Constitutional, HEENT, cardiovascular, pulmonary, GI, , musculoskeletal, neuro, skin, endocrine and psych systems are negative, except as otherwise noted.      Objective    BP (!) 168/92   Pulse 72   Temp 98.5  F (36.9  C) (Tympanic)   Resp 20   Ht 1.727 m (5' 8\")   Wt 86.7 kg (191 lb 3.2 oz)   SpO2 99%   BMI 29.07 kg/m    Body mass index is 29.07 kg/m .  Physical Exam   GENERAL: healthy, alert and no distress,  Tearful at times.   RESP: lungs clear to auscultation - no rales, rhonchi or wheezes  CV: regular rate and rhythm, normal S1 S2, no S3 or S4, no murmur, click or rub, no peripheral edema and peripheral pulses strong  ABDOMEN: soft, nontender, no hepatosplenomegaly, no masses and bowel sounds normal  MS: no gross musculoskeletal defects noted, no edema    Diagnostic Test Results:  Labs reviewed in Epic  none         Assessment & Plan     1. SANDRA (generalized anxiety disorder)    I discussed the potential side effects of antianxiety medications as well as the likelihood of worsening before improvement over the next few weeks. I reemphasized the " importance of a multi-armed approach towards the treatment of anxiety including regular exercise, adequate sleep, and a well rounded, low-glycemic diet.  In addition, I emphasized the importance of ongoing development of his support network. If new or worsening symptoms, he will seek help immediately.         - escitalopram (LEXAPRO) 10 MG tablet; Take 1 tablet (10 mg) by mouth daily  Dispense: 30 tablet; Refill: 0  - ALPRAZolam (XANAX) 0.25 MG tablet; Take 1 tablet (0.25 mg) by mouth 2 times daily as needed for anxiety  Dispense: 30 tablet; Refill: 0  - MENTAL HEALTH REFERRAL  - Adult; Outpatient Treatment; Individual/Couples/Family/Group Therapy/Health Psychology; [facility]: [facility] [phone]; We will contact you to schedule the appointment or please call with any questions    2. Hypertension, goal below 140/90  Do fasting labs first and then a follow-up physical/BP recheck a few days later.    BP medications are changing as follows:    Stop lisinopril and hydrochlorothiazide. . . Instead I am putting you on a combo medication called losartan/hctz.  The losartan replaces the lisinopril (which caused a cough for you).    Your BP is still quite high and I am concerned this combo med alone will not get you back into a safe range, therefore I suggest you restart the amlodipine but instead of 10 mg (which may have caused fatigue), let's go down to 5 mg (just cut up the tabs you have in half).     We'll recheck BP in 2 weeks at your physical.   - Albumin Random Urine Quantitative with Creat Ratio; Future  - losartan-hydrochlorothiazide (HYZAAR) 100-12.5 MG tablet; Take 1 tablet by mouth daily  Dispense: 30 tablet; Refill: 0    3. Tobacco abuse  Will work on this in the future    4. Moderate episode of recurrent major depressive disorder (H)   Limit caffeine and definitely no energy drinks.    Try to get outside and go for walks or do things you enjoy.   Start lexapro, which you have been on in the  "past and tolerated well.  Let me know if you have any issues with this (may have a mild headache or dizziness the first few days of taking it, this usually goes away).  Sometimes upset stomach, if this happens try taking it with food.  If it causes drowsiness, may consider starting it at night instead.     Referral for counselor placed today.    - MENTAL HEALTH REFERRAL  - Adult; Outpatient Treatment; Individual/Couples/Family/Group Therapy/Health Psychology; G: [facility] [phone]; We will contact you to schedule the appointment or please call with any questions    5. CARDIOVASCULAR SCREENING; LDL GOAL LESS THAN 160  Will check prior to upcoming physical in 2 weeks.   - Lipid panel reflex to direct LDL Fasting; Future     Tobacco Cessation:   reports that he has been smoking cigarettes and dip, chew, snus or snuff.  He has been smoking about 0.50 packs per day. His smokeless tobacco use includes chew.  Tobacco Cessation Action Plan: Information offered: Patient not interested at this time      BMI:   Estimated body mass index is 29.07 kg/m  as calculated from the following:    Height as of this encounter: 1.727 m (5' 8\").    Weight as of this encounter: 86.7 kg (191 lb 3.2 oz).   Weight management plan: Discussed healthy diet and exercise guidelines            Return in about 2 weeks (around 8/14/2019) for BP Recheck, Physical Exam.    Approximately 41 minutes were spent face-to-face with patient and greater than 50% of that time was spent on counseling and coordination of care for the above issues.    Nasreen Martínez PA-C  [facility]      "

## 2019-08-01 ASSESSMENT — ANXIETY QUESTIONNAIRES: GAD7 TOTAL SCORE: 15

## 2019-08-13 NOTE — PROGRESS NOTES
SUBJECTIVE:   CC: Munir Storey is an 54 year old male who presents for preventive health visit.   *Advanced care planning information outside of door     Healthy Habits:    Do you get at least three servings of calcium containing foods daily (dairy, green leafy vegetables, etc.)? yes    Amount of exercise or daily activities, outside of work: 4 day(s) per week    Problems taking medications regularly No    Medication side effects: Yes headache    Have you had an eye exam in the past two years? no    Do you see a dentist twice per year? no    Do you have sleep apnea, excessive snoring or daytime drowsiness?yes- snoring  Colonoscopy done on this date: 08/01/13, by this group: [email or web address], results were Normal/Negative.     *Copied and pasted the above information into the CC chart note and routed to abstract data pool*    Hypertension Follow-up      Do you check your blood pressure regularly outside of the clinic? No     Are you following a low salt diet? Yes    Are your blood pressures ever more than 140 on the top number (systolic) OR more   than 90 on the bottom number (diastolic), for example 140/90? Yes     Currently taking hyzaar 1 tab daily and 10 mg amlodipine (not cutting those in half as we planned).   Start hyzaar 3 days ago (pharmacy did not have it in stock).       Depression and Anxiety Follow-Up    How are you doing with your depression since your last visit? No change    How are you doing with your anxiety since your last visit?  No change/ slightly improved    Are you having other symptoms that might be associated with depression or anxiety? No    Have you had a significant life event? No     Do you have any concerns with your use of alcohol or other drugs? Yes:      Had a panic attack the other day.  He tried a dose of xanax and a few hours later slept for a few weeks.  Has been feeling good otherwise all week, does not feel anxious today.     Energy level is good.      Smoking about 11-15  cigs/day.  He is using nicotine lozenges throughout the day, sometimes while he is smoking a cigarette as well.    He does smoke within 1 hour of waking.    Has quit in the past for about 5-6 weeks.    Triggers:  Smoke breaks at work      He has a spot on his right forehead that has been present for about 2 years now.  NO change, however part of it did fall off the other day.    Social History     Tobacco Use     Smoking status: Current Every Day Smoker     Packs/day: 0.50     Years: 15.00     Pack years: 7.50     Types: Cigarettes, Dip, chew, snus or snuff     Smokeless tobacco: Current User     Types: Chew   Substance Use Topics     Alcohol use: No     Comment: hx alcohol abuse, sober since 2015     Drug use: No     PHQ 8/29/2018 7/31/2019 8/14/2019   PHQ-9 Total Score 6 8 8   Q9: Thoughts of better off dead/self-harm past 2 weeks Not at all Several days Several days   F/U: Thoughts of suicide or self-harm - No -   F/U: Safety concerns - No -     SANDRA-7 SCORE 3/6/2019 7/31/2019 8/14/2019   Total Score 9 15 11     No flowsheet data found.  No flowsheet data found.      Suicide Assessment Five-step Evaluation and Treatment (SAFE-T)    Today's PHQ-2 Score:   PHQ-2 ( 1999 [employer]) 10/20/2017   Q1: Little interest or pleasure in doing things 2   Q2: Feeling down, depressed or hopeless 0   PHQ-2 Score 2       Abuse: Current or Past(Physical, Sexual or Emotional)- No  Do you feel safe in your environment? Yes    Social History     Tobacco Use     Smoking status: Current Every Day Smoker     Packs/day: 0.50     Years: 15.00     Pack years: 7.50     Types: Cigarettes, Dip, chew, snus or snuff     Smokeless tobacco: Current User     Types: Chew   Substance Use Topics     Alcohol use: No     Comment: hx alcohol abuse, sober since 2015     If you drink alcohol do you typically have >3 drinks per day or >7 drinks per week? Sober for 4 years.                      Last PSA: No results found for: PSA    Reviewed orders with  "patient. Reviewed health maintenance and updated orders accordingly - Yes  BP Readings from Last 3 Encounters:   08/14/19 (!) 144/88   07/31/19 (!) 168/92   07/27/19 (!) 199/106    Wt Readings from Last 3 Encounters:   08/14/19 84.4 kg (186 lb)   07/31/19 86.7 kg (191 lb 3.2 oz)   07/27/19 88.5 kg (195 lb)        Reviewed and updated as needed this visit by clinical staff  Nasir Marcelino [facility]    Reviewed and updated as needed this visit by Provider            ROS:  CONSTITUTIONAL: NEGATIVE for fever, chills, change in weight  INTEGUMENTARY/SKIN: NEGATIVE for worrisome rashes, moles or lesions  EYES: NEGATIVE for vision changes or irritation  ENT: NEGATIVE for ear, mouth and throat problems  RESP: NEGATIVE for significant cough or SOB  CV: NEGATIVE for chest pain, palpitations or peripheral edema  GI: NEGATIVE for nausea, abdominal pain, heartburn, or change in bowel habits   male: negative for dysuria, hematuria, decreased urinary stream, erectile dysfunction, urethral discharge  MUSCULOSKELETAL: NEGATIVE for significant arthralgias or myalgia  NEURO: NEGATIVE for weakness, dizziness or paresthesias  PSYCHIATRIC: NEGATIVE for changes in mood or affect    OBJECTIVE:   BP (!) 144/88   Pulse 79   Temp 98.5  F (36.9  C) (Tympanic)   Resp 18   Ht 1.727 m (5' 8\")   Wt 84.4 kg (186 lb)   SpO2 98%   BMI 28.28 kg/m    EXAM:  GENERAL: healthy, alert and no distress  EYES: Eyes grossly normal to inspection, PERRL and conjunctivae and sclerae normal  HENT: ear canals and TM's normal, nose and mouth without ulcers or lesions  NECK: no adenopathy, no asymmetry, masses, or scars and thyroid normal to palpation  RESP: lungs clear to auscultation - no rales, rhonchi or wheezes  CV: regular rate and rhythm, normal S1 S2, no S3 or S4, no murmur, click or rub, no peripheral edema and peripheral pulses strong  ABDOMEN: soft, nontender, no hepatosplenomegaly, no masses and bowel sounds normal   (male): " "normal male genitalia without lesions or urethral discharge, no hernia  MS: no gross musculoskeletal defects noted, no edema  SKIN: Skin: Rough, \"stuck on\" appearing papules, located right forehead  Color: tan  Size of largest lesion: 3 mm    NEURO: Normal strength and tone, mentation intact and speech normal  PSYCH: mentation appears normal, affect normal/bright    Diagnostic Test Results:  Labs reviewed in Epic  No results found for this or any previous visit (from the past 24 hour(s)).    ASSESSMENT/PLAN:   1. Routine general medical examination at a health care facility       HEALTH CARE MAINTENANCE              Reviewed USPTF recommendations and anticipatory guidance.              See orders.            2. Hypertension, goal below 140/90  BP not quite at goal, however has only been on hyzaar for 3 days now.  Continue with both hyzaar and norvasc 10 mg daily for now.  Appt for recheck in 1 month scheduled with MA  - losartan-hydrochlorothiazide (HYZAAR) 100-25 MG tablet; Take 1 tablet by mouth daily  Dispense: 90 tablet; Refill: 0  - amLODIPine (NORVASC) 10 MG tablet; Take 1 tablet (10 mg) by mouth daily  Dispense: 90 tablet; Refill: 0    3. Overweight (BMI 25.0-29.9)  Will work on healthy lifestyle changes.     4. Tobacco abuse    Discussed risks of smoking and strongly advised smoking cessation.  Went over various options including patches, meds, and cessation programs available.    - TOBACCO CESSATION ORDER FOR   - Prof fee: Shared Decisionmaking for Lung Cancer Screening  - CT Chest Lung Cancer Scrn Low Dose wo; Future  - Okay for Smoking Cessation Study (PLUTO) to Contact Patient    5. Need for hepatitis C screening test  Due for screening    6. Encounter for screening for HIV  Due for screeing    7. Screening for prostate cancer  The meaning of a false positive PSA and a false negative PSA has been discussed, and the patient indicates his understanding of the limitations of this screening test.       8. " SANDRA (generalized anxiety disorder)  Recently started on this, he feels like it is starting to work.  Will leave as is for now.    - escitalopram (LEXAPRO) 10 MG tablet; Take 1 tablet (10 mg) by mouth daily  Dispense: 90 tablet; Refill: 1    9. Personal history of tobacco use  As above.     10. Bilateral impacted cerumen  Cerumen Impaction    Wax successfully removed from left ear(s) using irrigation and removed a large amount of wax but not fully cleaned out of right ear with irrigation at first and then I used a currette to remove large collections of wax.   Hearing improved s/p wax removal.  Tympanic membranes intact s/p wax removal.   Can use OTC debrox PRN in future (to the right ear currently).  May f/u to have ears rechecked by nurse in 1 month.           Seborrheic keratoses.  Discussed the benign nature of these lesions, patient may opt for treatment or not.   Treatment consists of liquid nitrogen, which may cause blistering or scarring.    He declines treatment today, may consider at next visit.            COUNSELING:  Reviewed preventive health counseling, as reflected in patient instructions       Regular exercise       Healthy diet/nutrition       Consider Hep C screening for patients born between 1945 and 1965       HIV screeninx in teen years, 1x in adult years, and at intervals if high risk       Colon cancer screening       Prostate cancer screening       Consider lung cancer screening for ages 55-80 years and 30 pack-year smoking history.  Discussed with Munir that this screening may not be covered as he has about 16 pack year smoking history.  Will also refer to Pluto Study, he may qualify for this.  Otherwise, he is contemplating paying out of pocket for this screening test as he is worried.  I discussed with him the most important thing he can do for his health is to stop smoking.  He will work on this (may consider chantix or wellbutrin in the future, once anxiety levels are stable).         "Advance Care Planning    Estimated body mass index is 28.28 kg/m  as calculated from the following:    Height as of this encounter: 1.727 m (5' 8\").    Weight as of this encounter: 84.4 kg (186 lb).    Weight management plan: Discussed healthy diet and exercise guidelines     reports that he has been smoking cigarettes and dip, chew, snus or snuff.  He has a 7.50 pack-year smoking history. His smokeless tobacco use includes chew.  Tobacco Cessation Action Plan: Self help information given to patient    Counseling Resources:  ATP IV Guidelines  Pooled Cohorts Equation Calculator  FRAX Risk Assessment  ICSI Preventive Guidelines  Dietary Guidelines for Americans, 2010  [employer]'s MyPlate  ASA Prophylaxis  Lung CA Screening    Nasreen Martínez PA-C  [facility]  Lung Cancer Screening Shared Decision Making Visit     Munir Storey is not eligible for lung cancer screening on the basis of the information provided in my signed lung cancer screening order. Munir's smoking history is below the threshold and so it is not recommended.  May consider enrollment in PLUTO trial    Patient is currently a smoker and so we did discuss that the only way to prevent lung cancer is to not smoke. Smoking cessation assistance was offered.    ShouldIScreen    "

## 2019-08-14 ENCOUNTER — OFFICE VISIT (OUTPATIENT)
Dept: FAMILY MEDICINE | Facility: CLINIC | Age: 54
End: 2019-08-14
Payer: COMMERCIAL

## 2019-08-14 VITALS
SYSTOLIC BLOOD PRESSURE: 144 MMHG | HEART RATE: 79 BPM | OXYGEN SATURATION: 98 % | HEIGHT: 68 IN | TEMPERATURE: 98.5 F | DIASTOLIC BLOOD PRESSURE: 88 MMHG | RESPIRATION RATE: 18 BRPM | WEIGHT: 186 LBS | BODY MASS INDEX: 28.19 KG/M2

## 2019-08-14 DIAGNOSIS — F41.1 GAD (GENERALIZED ANXIETY DISORDER): ICD-10-CM

## 2019-08-14 DIAGNOSIS — H61.23 BILATERAL IMPACTED CERUMEN: ICD-10-CM

## 2019-08-14 DIAGNOSIS — Z12.5 SCREENING FOR PROSTATE CANCER: ICD-10-CM

## 2019-08-14 DIAGNOSIS — Z87.891 PERSONAL HISTORY OF TOBACCO USE: ICD-10-CM

## 2019-08-14 DIAGNOSIS — I10 HYPERTENSION, GOAL BELOW 140/90: ICD-10-CM

## 2019-08-14 DIAGNOSIS — Z11.4 ENCOUNTER FOR SCREENING FOR HIV: ICD-10-CM

## 2019-08-14 DIAGNOSIS — E66.3 OVERWEIGHT (BMI 25.0-29.9): ICD-10-CM

## 2019-08-14 DIAGNOSIS — Z13.6 CARDIOVASCULAR SCREENING; LDL GOAL LESS THAN 160: ICD-10-CM

## 2019-08-14 DIAGNOSIS — Z11.59 NEED FOR HEPATITIS C SCREENING TEST: ICD-10-CM

## 2019-08-14 DIAGNOSIS — Z72.0 TOBACCO ABUSE: ICD-10-CM

## 2019-08-14 DIAGNOSIS — Z00.00 ROUTINE GENERAL MEDICAL EXAMINATION AT A HEALTH CARE FACILITY: Primary | ICD-10-CM

## 2019-08-14 DIAGNOSIS — L82.1 SEBORRHEIC KERATOSIS: ICD-10-CM

## 2019-08-14 LAB
ALBUMIN SERPL-MCNC: 4.2 G/DL (ref 3.4–5)
ALP SERPL-CCNC: 67 U/L (ref 40–150)
ALT SERPL W P-5'-P-CCNC: 24 U/L (ref 0–70)
ANION GAP SERPL CALCULATED.3IONS-SCNC: 6 MMOL/L (ref 3–14)
AST SERPL W P-5'-P-CCNC: 18 U/L (ref 0–45)
BILIRUB SERPL-MCNC: 0.5 MG/DL (ref 0.2–1.3)
BUN SERPL-MCNC: 19 MG/DL (ref 7–30)
CALCIUM SERPL-MCNC: 8.8 MG/DL (ref 8.5–10.1)
CHLORIDE SERPL-SCNC: 104 MMOL/L (ref 94–109)
CHOLEST SERPL-MCNC: 170 MG/DL
CO2 SERPL-SCNC: 27 MMOL/L (ref 20–32)
CREAT SERPL-MCNC: 0.82 MG/DL (ref 0.66–1.25)
CREAT UR-MCNC: 165 MG/DL
GFR SERPL CREATININE-BSD FRML MDRD: >90 ML/MIN/{1.73_M2}
GLUCOSE SERPL-MCNC: 97 MG/DL (ref 70–99)
HDLC SERPL-MCNC: 39 MG/DL
LDLC SERPL CALC-MCNC: 104 MG/DL
MICROALBUMIN UR-MCNC: 17 MG/L
MICROALBUMIN/CREAT UR: 10.3 MG/G CR (ref 0–17)
NONHDLC SERPL-MCNC: 131 MG/DL
POTASSIUM SERPL-SCNC: 3.8 MMOL/L (ref 3.4–5.3)
PROT SERPL-MCNC: 7.8 G/DL (ref 6.8–8.8)
PSA SERPL-ACNC: 0.52 UG/L (ref 0–4)
SODIUM SERPL-SCNC: 137 MMOL/L (ref 133–144)
TRIGL SERPL-MCNC: 135 MG/DL

## 2019-08-14 PROCEDURE — 80061 LIPID PANEL: CPT | Performed by: PHYSICIAN ASSISTANT

## 2019-08-14 PROCEDURE — 99212 OFFICE O/P EST SF 10 MIN: CPT | Mod: 25 | Performed by: PHYSICIAN ASSISTANT

## 2019-08-14 PROCEDURE — 36415 COLL VENOUS BLD VENIPUNCTURE: CPT | Performed by: PHYSICIAN ASSISTANT

## 2019-08-14 PROCEDURE — 99396 PREV VISIT EST AGE 40-64: CPT | Mod: 25 | Performed by: PHYSICIAN ASSISTANT

## 2019-08-14 PROCEDURE — 80053 COMPREHEN METABOLIC PANEL: CPT | Performed by: PHYSICIAN ASSISTANT

## 2019-08-14 PROCEDURE — G0103 PSA SCREENING: HCPCS | Performed by: PHYSICIAN ASSISTANT

## 2019-08-14 PROCEDURE — G0296 VISIT TO DETERM LDCT ELIG: HCPCS | Performed by: PHYSICIAN ASSISTANT

## 2019-08-14 PROCEDURE — 69209 REMOVE IMPACTED EAR WAX UNI: CPT | Mod: 50 | Performed by: PHYSICIAN ASSISTANT

## 2019-08-14 PROCEDURE — 82043 UR ALBUMIN QUANTITATIVE: CPT | Performed by: PHYSICIAN ASSISTANT

## 2019-08-14 RX ORDER — LOSARTAN POTASSIUM AND HYDROCHLOROTHIAZIDE 25; 100 MG/1; MG/1
1 TABLET ORAL DAILY
Qty: 90 TABLET | Refills: 0 | Status: SHIPPED | OUTPATIENT
Start: 2019-08-14 | End: 2019-09-11

## 2019-08-14 RX ORDER — ESCITALOPRAM OXALATE 10 MG/1
10 TABLET ORAL DAILY
Qty: 90 TABLET | Refills: 1 | Status: SHIPPED | OUTPATIENT
Start: 2019-08-14 | End: 2019-09-03

## 2019-08-14 RX ORDER — AMLODIPINE BESYLATE 10 MG/1
10 TABLET ORAL DAILY
Qty: 90 TABLET | Refills: 0 | Status: SHIPPED | OUTPATIENT
Start: 2019-08-14 | End: 2019-09-30

## 2019-08-14 ASSESSMENT — ANXIETY QUESTIONNAIRES
IF YOU CHECKED OFF ANY PROBLEMS ON THIS QUESTIONNAIRE, HOW DIFFICULT HAVE THESE PROBLEMS MADE IT FOR YOU TO DO YOUR WORK, TAKE CARE OF THINGS AT HOME, OR GET ALONG WITH OTHER PEOPLE: SOMEWHAT DIFFICULT
5. BEING SO RESTLESS THAT IT IS HARD TO SIT STILL: SEVERAL DAYS
1. FEELING NERVOUS, ANXIOUS, OR ON EDGE: MORE THAN HALF THE DAYS
2. NOT BEING ABLE TO STOP OR CONTROL WORRYING: MORE THAN HALF THE DAYS
6. BECOMING EASILY ANNOYED OR IRRITABLE: SEVERAL DAYS
3. WORRYING TOO MUCH ABOUT DIFFERENT THINGS: MORE THAN HALF THE DAYS
GAD7 TOTAL SCORE: 11
7. FEELING AFRAID AS IF SOMETHING AWFUL MIGHT HAPPEN: MORE THAN HALF THE DAYS

## 2019-08-14 ASSESSMENT — PATIENT HEALTH QUESTIONNAIRE - PHQ9
SUM OF ALL RESPONSES TO PHQ QUESTIONS 1-9: 8
5. POOR APPETITE OR OVEREATING: SEVERAL DAYS

## 2019-08-14 ASSESSMENT — MIFFLIN-ST. JEOR: SCORE: 1648.19

## 2019-08-14 NOTE — Clinical Note
Colonoscopy done on this date: 08/01/13, by this group: [facility], results were Normal/Negative.

## 2019-08-15 ASSESSMENT — ANXIETY QUESTIONNAIRES: GAD7 TOTAL SCORE: 11

## 2019-08-19 ENCOUNTER — TELEPHONE (OUTPATIENT)
Dept: FAMILY MEDICINE | Facility: CLINIC | Age: 54
End: 2019-08-19

## 2019-08-19 NOTE — TELEPHONE ENCOUNTER
"Patient called regarding an episode of a quick/sharp pain and burning sensation yesterday.  Patient reports that on the left side of his chest he had a \"quick, sharp pain and burning sensation that lasted about 2 seconds.\" About an hour later the burning sensation returned and radiated down toward his legs. Patient denies chest heaviness or shortness of breath. No other symptoms reported. No symptoms today. Patient did state that he has been advised to abstain from caffeine intake and he did \"drink a bunch of caffeine\" yesterday prior to this episode. Patient does have anxiety also.  Advised patient x3 to call 911 if chest heaviness or shortness of breath occurs. If patient does not experience any further symptoms he will be ok to keep 8/21 appointment with Nasreen. Patient is requesting to be seen in clinic with Nasreen.    Daxa Wesley RN    "

## 2019-08-20 NOTE — PROGRESS NOTES
Subjective     Munir Storey is a 54 year old male who presents to clinic today for the following health issues:    HPI   CHEST PAIN     Onset: 1 day- now resolved    Description:   Location:  entire chest  Character: sharp and then burning into bilateral legs  Radiation: down left arm and both legs  Duration: 15 seconds     Intensity: severe    Progression of Symptoms:  Improved- 15 seconds then resolved    Accompanying Signs & Symptoms:  Shortness of breath: no  Sweating: no  Nausea/vomiting: no  Lightheadedness: no   Palpitations: no  Fever/Chills: no  Cough: no  Heartburn: no     History:   Family history of heart disease no  Tobacco use: YES    Precipitating factors:   Worse with exertion: no   Worse with deep breaths :  no   Related to food: no     Alleviating factors:  None- now resolved       Therapies Tried and outcome: None    * Increased anxiety since episode.    Sejal Marcelino CMA    Has been on lexapro x 3 weeks.  He does have a foggy brain and headache since starting this.  Sleep is not very restful currently (trying many different mattresses).  Takes lexapro after dinner and then goes to bed.  Took xanax after the above episode and this made his very drowsy the rest of the day and no further sensations since that time.   Still taking lexapro (upon review of his chart he has actually been on on this in the past from his previous PCP).     Son is on effexor and has tolerated this well.    Dad had a heart attack in his 50's.   Mom  of an aortic dissection at a young age.     Heart history:      He has a past medical history of atrial flutter with rapid ventricular response occuring in the setting of heavy alcohol use. Patient also has hypertension and obstructive sleep apnea. Patient was started on anticoagulation in 2015 when atrial flutter was diagnosed. Since then he has had two cardioversions, one in 2015 and one in 2015.     Note from [facility] 1/11/16    Impression:   1. Atrial flutter, status post ablation and maintaining sinus rhythm at this time.  2. Hypertension with suboptimal control.   3. History of alcohol use but he tells me he has cut back a lot on alcohol.     Discussion:   We will leave Mr. Storey off of Metoprolol at this time, given the sedating and fatiguing effects of this drug. I left him on Amlodipine. He does not have any lower extremity swelling. He will remain on Lisinopril as well. I have added hydrochlorothiazide 25 mg once a day to help with his blood pressure. He will have a basic metabolic panel in 1 week to check his potassium and creatinine. I have asked him to check in with Dr. Holland in 2 weeks to continue working on his blood pressure.    I will also check an echocardiogram to see what his ejection fraction is; however, we will wait for 1 more month. This will give him 2 months of being in sinus rhythm before we review his ejection fraction. If his ejection fraction is normal, we will leave him off of Metoprolol and see him on an as-needed basis; however, if his ejection fraction is diminished, we would contact him and put him back on a beta blocker and continue to follow him for congestive heart failure management.     Echo from 2/11/16  Final Conclusion Previous Study: 04/29/2015  Normal LV size and systolic function with estimated ejection fraction of 55-60%.  No regional wall motion abnormalities noted.  Left ventricular wall thickness mildly increased.  No significant valvular heart disease noted.  Normal RV size and systolic function.  Cannot estimate PA pressures on this study.  Ascending aorta dilated within the upper limits of normal.  When compared to the previous echocardiographic report of 04/29/2015, there has been no   significant change.  Estimated EF: 55-60%          Patient Active Problem List   Diagnosis     Hypertension, goal below 140/90     Tobacco abuse     SANDRA (generalized anxiety disorder)      Ejection fraction < 50%     Alcohol abuse, in remission     Moderate episode of recurrent major depressive disorder (H)     Overweight (BMI 25.0-29.9)     Seborrheic keratosis     H/O atrial flutter     H/O atrioventricular quita ablation     Peyronie disease     Past Surgical History:   Procedure Laterality Date     BACK SURGERY      cervical fusion      CARDIAC SURGERY  2015    EP cardioversion     COLONOSCOPY  2013     COLONOSCOPY  2019    normal colonoscopy - repeat 10 years     HERNIA REPAIR       LAPAROSCOPIC RESECTION SMALL BOWEL  2013     UPPER GI ENDOSCOPY  2013       Social History     Tobacco Use     Smoking status: Former Smoker     Packs/day: 0.50     Years: 15.00     Pack years: 7.50     Types: Cigarettes, Dip, chew, snus or snuff     Last attempt to quit: 2019     Years since quittin.0     Smokeless tobacco: Current User     Types: Chew   Substance Use Topics     Alcohol use: No     Comment: hx alcohol abuse, sober since      Family History   Problem Relation Age of Onset     Diabetes Father      Depression Father      Depression Paternal Grandfather          Current Outpatient Medications   Medication Sig Dispense Refill     ALPRAZolam (XANAX) 0.25 MG tablet Take 1 tablet (0.25 mg) by mouth 2 times daily as needed for anxiety 30 tablet 0     amLODIPine (NORVASC) 10 MG tablet Take 1 tablet (10 mg) by mouth daily 90 tablet 0     escitalopram (LEXAPRO) 10 MG tablet Take 1 tablet (10 mg) by mouth daily 90 tablet 1     losartan-hydrochlorothiazide (HYZAAR) 100-25 MG tablet Take 1 tablet by mouth daily 90 tablet 0     BP Readings from Last 3 Encounters:   19 (!) 144/88   19 (!) 168/92   19 (!) 199/106    Wt Readings from Last 3 Encounters:   19 84.1 kg (185 lb 6.4 oz)   19 84.4 kg (186 lb)   19 86.7 kg (191 lb 3.2 oz)                      Reviewed and updated as needed this visit by Provider  Tobacco  Allergies  Meds  Problems  " Med Hx  Surg Hx  Fam Hx         Review of Systems   ROS COMP: Constitutional, HEENT, cardiovascular, pulmonary, GI, , musculoskeletal, neuro, skin, endocrine and psych systems are negative, except as otherwise noted.      Objective    Pulse 80   Temp 98.7  F (37.1  C) (Tympanic)   Resp 20   Ht 1.727 m (5' 8\")   Wt 84.1 kg (185 lb 6.4 oz)   SpO2 97%   BMI 28.19 kg/m    Body mass index is 28.19 kg/m .  Physical Exam   GENERAL: healthy, alert, anxious demeanor  NECK: no adenopathy, no asymmetry, masses, or scars and thyroid normal to palpation  RESP: lungs clear to auscultation - no rales, rhonchi or wheezes  CV: regular rate and rhythm, normal S1 S2, no S3 or S4, no murmur, click or rub, no peripheral edema and peripheral pulses strong  ABDOMEN: soft, nontender, no hepatosplenomegaly, no masses and bowel sounds normal  MS: no gross musculoskeletal defects noted, no edema    Diagnostic Test Results:  Labs reviewed in Epic  none         Assessment & Plan     I reassured Munir that his previous symptoms of chest pain are related to anxiety and do not sound cardiac in etiology.       1. SANDRA (generalized anxiety disorder)    Lexapro start taking 10 mg in the am going forward.  Increase to 15 mg after 3-4 days if you are tolerating the medication ok.  If your side effects do not improve, then let's consider switching to effexor.      Call me if you want to switch to effexor.        2. Moderate episode of recurrent major depressive disorder (H)  Continue on lexapro for now    3. Hypertension, goal below 140/90  BP not documented in [facility] by MA staff, however last BP during physical was 144/88 mmHg and today's BP was similar.  Will continue to monitor this, scheduled BP recheck visit on 9/11/19       4. Family history of aortic aneurysm  Please call [facility] at [phone]  to set up the abdominal ultrasound (you may want to check with insurance to see coverage, but I do recommend this given " "your family and smoking history).   - US Abdominal Aorta Imaging; Future    5. H/O atrial flutter  Reviewed cardiac history in care everywhere and was f/b [facility] [place].  Atrial flutter in the past was in the setting of heavy alcohol use and he has since had 2 ablations and has remained in sinus rhythm.  He has been sober since 2015, will continue to monitor this.  Last visit with cardiology, as long as EF on echo as normal, no f/u needed.      He was on a beta blocker in the past, however this caused too much fatigue.  Will leave meds as is and no f/u needed with cardio at this time.      6. H/O atrioventricular quita ablation  As above.     7. Alcohol abuse, in remission  Remains sober, will continue to monitor this.     8. Tobacco abuse  Please call [facility] at [phone]  to set up the abdominal ultrasound (you may want to check with insurance to see coverage, but I do recommend this given your family and smoking history).     - US Abdominal Aorta Imaging; Future     BMI:   Estimated body mass index is 28.19 kg/m  as calculated from the following:    Height as of this encounter: 1.727 m (5' 8\").    Weight as of this encounter: 84.1 kg (185 lb 6.4 oz).   Weight management plan: Discussed healthy diet and exercise guidelines            Return in about 4 weeks (around 9/18/2019) for anxiety recheck.    Nasreen Martínez PA-C  [facility]      "

## 2019-08-21 ENCOUNTER — OFFICE VISIT (OUTPATIENT)
Dept: FAMILY MEDICINE | Facility: CLINIC | Age: 54
End: 2019-08-21
Payer: COMMERCIAL

## 2019-08-21 VITALS
WEIGHT: 185.4 LBS | RESPIRATION RATE: 20 BRPM | HEART RATE: 80 BPM | BODY MASS INDEX: 28.1 KG/M2 | HEIGHT: 68 IN | TEMPERATURE: 98.7 F | OXYGEN SATURATION: 97 %

## 2019-08-21 DIAGNOSIS — I10 HYPERTENSION, GOAL BELOW 140/90: ICD-10-CM

## 2019-08-21 DIAGNOSIS — Z82.49 FAMILY HISTORY OF AORTIC ANEURYSM: ICD-10-CM

## 2019-08-21 DIAGNOSIS — F33.1 MODERATE EPISODE OF RECURRENT MAJOR DEPRESSIVE DISORDER (H): ICD-10-CM

## 2019-08-21 DIAGNOSIS — F41.1 GAD (GENERALIZED ANXIETY DISORDER): Primary | ICD-10-CM

## 2019-08-21 DIAGNOSIS — Z86.79 H/O ATRIAL FLUTTER: ICD-10-CM

## 2019-08-21 DIAGNOSIS — Z98.890 H/O ATRIOVENTRICULAR NODAL ABLATION: ICD-10-CM

## 2019-08-21 DIAGNOSIS — F10.11 ALCOHOL ABUSE, IN REMISSION: ICD-10-CM

## 2019-08-21 DIAGNOSIS — Z72.0 TOBACCO ABUSE: ICD-10-CM

## 2019-08-21 PROCEDURE — 99214 OFFICE O/P EST MOD 30 MIN: CPT | Performed by: PHYSICIAN ASSISTANT

## 2019-08-21 ASSESSMENT — MIFFLIN-ST. JEOR: SCORE: 1645.47

## 2019-08-21 NOTE — PATIENT INSTRUCTIONS
Please call [facility] at [phone]  to set up the abdominal ultrasound (you may want to check with insurance to see coverage, but I do recommend this given your family and smoking history).     Lexapro start taking 10 mg in the am going forward.  Increase to 15 mg after 3-4 days if you are tolerating the medication ok.  If your side effects do not improve, then let's consider switching to effexor.      Call me if you want to switch to effexor.

## 2019-08-30 DIAGNOSIS — F41.1 GAD (GENERALIZED ANXIETY DISORDER): ICD-10-CM

## 2019-08-30 NOTE — TELEPHONE ENCOUNTER
Patient calling to request a refill on his Xanax.     Please review,  Thank you!  THELMA Stephens.

## 2019-09-02 PROBLEM — Z98.890 H/O ATRIOVENTRICULAR NODAL ABLATION: Status: ACTIVE | Noted: 2019-09-02

## 2019-09-02 PROBLEM — Z86.79 H/O ATRIAL FLUTTER: Status: ACTIVE | Noted: 2019-09-02

## 2019-09-02 RX ORDER — ALPRAZOLAM 0.25 MG
0.25 TABLET ORAL 2 TIMES DAILY PRN
Qty: 30 TABLET | Refills: 0 | Status: SHIPPED | OUTPATIENT
Start: 2019-09-02 | End: 2020-01-28

## 2019-09-02 NOTE — TELEPHONE ENCOUNTER
Please call Munir,     I have refilled his xanax.  How does he feel the lexapro is working?  Is he taking it daily?  Does he want to switch to effexor if it is not working?    I did review his cardiac history in detail and he does not need any further testing.  Last echo 2/11/16 with [facility] [place] was normal and he was discharged back to primary care team (may f/u with cardioly PRN) to manage BP.      Please remind him to come in for BP check as scheduled 9/11/19    Let's do a recheck on his anxiety in about 1 month (ok to do a phone visit if he prefers)    Nasreen Martínez PA-C

## 2019-09-03 RX ORDER — ESCITALOPRAM OXALATE 10 MG/1
10 TABLET ORAL DAILY
Qty: 30 TABLET | Refills: 0 | Status: SHIPPED | OUTPATIENT
Start: 2019-09-03 | End: 2019-09-30

## 2019-09-03 NOTE — TELEPHONE ENCOUNTER
"Call placed to patient.  Relayed PAOLO Martínez message.  Patient reports he is out of Lexapro.  Patient wants to refill this for \"a week or so\".  Patient does report headaches.  Patient would like to  Start Effexor \"after this next week- I have plans and don't want to change now\".  Relayed message regarding cardiac questions.  Reminded of BP check appointment.  Patient agrees to schedule telephone visit in 1 month.  Debbie Daniels RN     "

## 2019-09-11 ENCOUNTER — OFFICE VISIT (OUTPATIENT)
Dept: FAMILY MEDICINE | Facility: CLINIC | Age: 54
End: 2019-09-11
Payer: COMMERCIAL

## 2019-09-11 VITALS — SYSTOLIC BLOOD PRESSURE: 136 MMHG | DIASTOLIC BLOOD PRESSURE: 80 MMHG | HEART RATE: 82 BPM

## 2019-09-11 DIAGNOSIS — I10 HYPERTENSION, GOAL BELOW 140/90: ICD-10-CM

## 2019-09-11 PROCEDURE — 99207 ZZC NO CHARGE NURSE ONLY: CPT

## 2019-09-11 RX ORDER — LOSARTAN POTASSIUM AND HYDROCHLOROTHIAZIDE 25; 100 MG/1; MG/1
1 TABLET ORAL DAILY
Qty: 90 TABLET | Refills: 1 | Status: SHIPPED | OUTPATIENT
Start: 2019-09-11 | End: 2019-09-21

## 2019-09-11 RX ORDER — LOSARTAN POTASSIUM AND HYDROCHLOROTHIAZIDE 25; 100 MG/1; MG/1
1 TABLET ORAL DAILY
Qty: 90 TABLET | Refills: 0 | Status: CANCELLED | OUTPATIENT
Start: 2019-09-11

## 2019-09-11 NOTE — PROGRESS NOTES
SUBJECTIVE:  Munir Storey is a 54 year old male who presents for a follow up evaluation of his hypertension.    The reason for the visit is:  a recent medication change    Patient is taking medication as prescribed  Patient is tolerating medications well.  Patient is not monitoring Blood Pressure at home.      Current complaints: patient still complaining of headaches and anxiety but states these symptoms have improved some and are tolerable.        Current Outpatient Medications   Medication     ALPRAZolam (XANAX) 0.25 MG tablet     amLODIPine (NORVASC) 10 MG tablet     escitalopram (LEXAPRO) 10 MG tablet     losartan-hydrochlorothiazide (HYZAAR) 100-25 MG tablet     No current facility-administered medications for this visit.        Allergies   Allergen Reactions     Sulfamethoxazole-Trimethoprim Nausea         OBJECTIVE:  Please get a blood pressure AND a pulse.  A height is also needed if has not been done in the past year.    BP (!) 148/80 (BP Location: Right arm, Cuff Size: Adult Large)   Pulse 82     Vitals as recorded, a large cuff was used.    ASSESSMENT:    Is the HYPERTENSION goal on the problem list? Yes  Patient Active Problem List   Diagnosis     Hypertension, goal below 140/90     Tobacco abuse     SANDRA (generalized anxiety disorder)     Ejection fraction < 50%     Alcohol abuse, in remission     Moderate episode of recurrent major depressive disorder (H)     Overweight (BMI 25.0-29.9)     Seborrheic keratosis     H/O atrial flutter     H/O atrioventricular quita ablation     Peyronie disease       Plan:         Betty Nelson CMA on 9/11/2019 at 3:12 PM

## 2019-09-17 ENCOUNTER — TELEPHONE (OUTPATIENT)
Dept: FAMILY MEDICINE | Facility: CLINIC | Age: 54
End: 2019-09-17

## 2019-09-17 DIAGNOSIS — I10 HYPERTENSION, GOAL BELOW 140/90: Primary | ICD-10-CM

## 2019-09-20 NOTE — TELEPHONE ENCOUNTER
Pharmacy calling in regards to this back order.     Please respond at your earliest convenience.     Thank you,  Melanie Moore, CSS

## 2019-09-21 RX ORDER — HYDROCHLOROTHIAZIDE 12.5 MG/1
25 TABLET ORAL DAILY
Qty: 90 TABLET | Refills: 0 | Status: SHIPPED | OUTPATIENT
Start: 2019-09-21 | End: 2019-10-18

## 2019-09-21 RX ORDER — LOSARTAN POTASSIUM 100 MG/1
100 TABLET ORAL DAILY
Qty: 90 TABLET | Refills: 1 | Status: SHIPPED | OUTPATIENT
Start: 2019-09-21 | End: 2019-09-30 | Stop reason: ALTCHOICE

## 2019-09-26 DIAGNOSIS — F41.1 GAD (GENERALIZED ANXIETY DISORDER): ICD-10-CM

## 2019-09-26 NOTE — TELEPHONE ENCOUNTER
"Requested Prescriptions   Pending Prescriptions Disp Refills     escitalopram (LEXAPRO) 10 MG tablet [Pharmacy Med Name: ESCITALOPRAM 10 MG TABLET] 30 tablet 0     Sig: TAKE 1 TABLET BY MOUTH EVERY DAY  Last Written Prescription Date:  09/03/2019 #30 x 0  Last filled 09/03/2019  Last office visit: 08/21/2019 [facility]   Future Office Visit:  None         SSRIs Protocol Passed - 9/26/2019  8:31 AM  PHQ-9 SCORE 8/29/2018 7/31/2019 8/14/2019   PHQ-9 Total Score 6 8 8       SANDRA-7 SCORE 3/6/2019 7/31/2019 8/14/2019   Total Score 9 15 11               Passed - Recent (12 mo) or future (30 days) visit within the authorizing provider's specialty     Patient had office visit in the last 12 months or has a visit in the next 30 days with authorizing provider or within the authorizing provider's specialty.  See \"Patient Info\" tab in inbasket, or \"Choose Columns\" in Meds & Orders section of the refill encounter.              Passed - Medication is active on med list        Passed - Patient is age 18 or older          "

## 2019-09-27 RX ORDER — ESCITALOPRAM OXALATE 10 MG/1
TABLET ORAL
Qty: 30 TABLET | Refills: 0 | OUTPATIENT
Start: 2019-09-27

## 2019-09-27 NOTE — TELEPHONE ENCOUNTER
Patient scheduled for 9/30. Should have enough meds to last until then. Pharmacy notified.    Latoya Graham RN

## 2019-09-30 ENCOUNTER — OFFICE VISIT (OUTPATIENT)
Dept: FAMILY MEDICINE | Facility: CLINIC | Age: 54
End: 2019-09-30
Payer: COMMERCIAL

## 2019-09-30 VITALS
TEMPERATURE: 99.5 F | RESPIRATION RATE: 20 BRPM | HEART RATE: 84 BPM | OXYGEN SATURATION: 97 % | BODY MASS INDEX: 29.1 KG/M2 | HEIGHT: 68 IN | DIASTOLIC BLOOD PRESSURE: 70 MMHG | SYSTOLIC BLOOD PRESSURE: 138 MMHG | WEIGHT: 192 LBS

## 2019-09-30 DIAGNOSIS — F41.1 GAD (GENERALIZED ANXIETY DISORDER): Primary | ICD-10-CM

## 2019-09-30 DIAGNOSIS — I10 HYPERTENSION, GOAL BELOW 140/90: ICD-10-CM

## 2019-09-30 DIAGNOSIS — Z23 NEED FOR VACCINATION: ICD-10-CM

## 2019-09-30 DIAGNOSIS — F33.1 MODERATE EPISODE OF RECURRENT MAJOR DEPRESSIVE DISORDER (H): ICD-10-CM

## 2019-09-30 DIAGNOSIS — L82.0 INFLAMED SEBORRHEIC KERATOSIS: ICD-10-CM

## 2019-09-30 DIAGNOSIS — R06.83 SNORING: ICD-10-CM

## 2019-09-30 PROCEDURE — 90471 IMMUNIZATION ADMIN: CPT | Performed by: PHYSICIAN ASSISTANT

## 2019-09-30 PROCEDURE — 17110 DESTRUCTION B9 LES UP TO 14: CPT | Performed by: PHYSICIAN ASSISTANT

## 2019-09-30 PROCEDURE — 90682 RIV4 VACC RECOMBINANT DNA IM: CPT | Performed by: PHYSICIAN ASSISTANT

## 2019-09-30 PROCEDURE — 99214 OFFICE O/P EST MOD 30 MIN: CPT | Mod: 25 | Performed by: PHYSICIAN ASSISTANT

## 2019-09-30 RX ORDER — LISINOPRIL 40 MG/1
40 TABLET ORAL DAILY
Qty: 90 TABLET | Refills: 1 | Status: SHIPPED | OUTPATIENT
Start: 2019-09-30 | End: 2020-01-28

## 2019-09-30 RX ORDER — AMLODIPINE BESYLATE 10 MG/1
10 TABLET ORAL DAILY
Qty: 90 TABLET | Refills: 1 | Status: SHIPPED | OUTPATIENT
Start: 2019-09-30 | End: 2020-01-28

## 2019-09-30 RX ORDER — ESCITALOPRAM OXALATE 10 MG/1
15 TABLET ORAL DAILY
Qty: 135 TABLET | Refills: 1 | Status: SHIPPED | OUTPATIENT
Start: 2019-09-30 | End: 2020-01-28

## 2019-09-30 ASSESSMENT — MIFFLIN-ST. JEOR: SCORE: 1675.41

## 2019-09-30 ASSESSMENT — ANXIETY QUESTIONNAIRES
IF YOU CHECKED OFF ANY PROBLEMS ON THIS QUESTIONNAIRE, HOW DIFFICULT HAVE THESE PROBLEMS MADE IT FOR YOU TO DO YOUR WORK, TAKE CARE OF THINGS AT HOME, OR GET ALONG WITH OTHER PEOPLE: SOMEWHAT DIFFICULT
3. WORRYING TOO MUCH ABOUT DIFFERENT THINGS: SEVERAL DAYS
2. NOT BEING ABLE TO STOP OR CONTROL WORRYING: SEVERAL DAYS
5. BEING SO RESTLESS THAT IT IS HARD TO SIT STILL: SEVERAL DAYS
6. BECOMING EASILY ANNOYED OR IRRITABLE: SEVERAL DAYS
GAD7 TOTAL SCORE: 8
1. FEELING NERVOUS, ANXIOUS, OR ON EDGE: SEVERAL DAYS
7. FEELING AFRAID AS IF SOMETHING AWFUL MIGHT HAPPEN: MORE THAN HALF THE DAYS

## 2019-09-30 ASSESSMENT — PATIENT HEALTH QUESTIONNAIRE - PHQ9
5. POOR APPETITE OR OVEREATING: SEVERAL DAYS
SUM OF ALL RESPONSES TO PHQ QUESTIONS 1-9: 7

## 2019-09-30 NOTE — PROGRESS NOTES
Subjective     Munir Storey is a 54 year old male who presents to clinic today for the following health issues:    *Unsure if wants to get flu vaccine- discuss further    HPI     Hypertension Follow-up      Do you check your blood pressure regularly outside of the clinic? No     Are you following a low salt diet? No    Are your blood pressures ever more than 140 on the top number (systolic) OR more   than 90 on the bottom number (diastolic), for example 140/90? Yes     Depression and Anxiety Follow-Up    How are you doing with your depression since your last visit? Improved     How are you doing with your anxiety since your last visit?  Improved     Are you having other symptoms that might be associated with depression or anxiety? Yes:  increased fatigue    Have you had a significant life event? No     Do you have any concerns with your use of alcohol or other drugs? No     He feels the medication has helped, but there is room for improvement.   Mental fogginess and headache side effect has resolved.   He c/o decreased energy mid-day.      He is feeling a decrease in his energy mid-day.      Sleeps in a recliner (this is more comfortable for his neck).   Goes to bed around 10 pm and up around 4:30 - 5 am.    Does not wake up at night.  He has been told he snores at night.   He has not done a sleep study yet.  His insurance is not the best (per Munir) and he is concerned about the cost.      He does feel more engaged with friends and family with social activities.     No cigarettes for about 3 weeks now.  He is still having cravings, he will use a nicotine pouch to help     Not on losartan due to backorder.  Switched to lisinopril, but unsure of the dose.     Social History     Tobacco Use     Smoking status: Former Smoker     Packs/day: 0.50     Years: 15.00     Pack years: 7.50     Types: Cigarettes, Dip, chew, snus or snuff     Last attempt to quit: 2019     Years since quittin.1     Smokeless tobacco:  Current User     Types: Chew   Substance Use Topics     Alcohol use: No     Comment: hx alcohol abuse, sober since      Drug use: No     PHQ 2019   PHQ-9 Total Score 8 8 7   Q9: Thoughts of better off dead/self-harm past 2 weeks Several days Several days Not at all   F/U: Thoughts of suicide or self-harm No - -   F/U: Safety concerns No - -     SANDRA-7 SCORE 2019   Total Score 15 11 8     No flowsheet data found.  No flowsheet data found.      Suicide Assessment Five-step Evaluation and Treatment (SAFE-T)      How many servings of fruits and vegetables do you eat daily?  0-1    On average, how many sweetened beverages do you drink each day (soda, juice, sweet tea, etc)?   1    How many days per week do you miss taking your medication? 0    *Check/ possibly remove lesion of scalp  Present for about 1 year.  It did fall off once, but then return.  No bleeding.   Sejal aMrcelino CMA        Patient Active Problem List   Diagnosis     Hypertension, goal below 140/90     Tobacco abuse     SANDRA (generalized anxiety disorder)     Ejection fraction < 50%     Alcohol abuse, in remission     Moderate episode of recurrent major depressive disorder (H)     Overweight (BMI 25.0-29.9)     Seborrheic keratosis     H/O atrial flutter     H/O atrioventricular quita ablation     Peyronie disease     Past Surgical History:   Procedure Laterality Date     BACK SURGERY      cervical fusion      CARDIAC SURGERY  2015    EP cardioversion     COLONOSCOPY  2013     COLONOSCOPY  2019    normal colonoscopy - repeat 10 years     HERNIA REPAIR       LAPAROSCOPIC RESECTION SMALL BOWEL  2013     UPPER GI ENDOSCOPY  2013       Social History     Tobacco Use     Smoking status: Former Smoker     Packs/day: 0.50     Years: 15.00     Pack years: 7.50     Types: Cigarettes, Dip, chew, snus or snuff     Last attempt to quit: 2019     Years since quittin.1     Smokeless  "tobacco: Current User     Types: Chew   Substance Use Topics     Alcohol use: No     Comment: hx alcohol abuse, sober since 2015     Family History   Problem Relation Age of Onset     Diabetes Father      Depression Father      Depression Paternal Grandfather          Current Outpatient Medications   Medication Sig Dispense Refill     ALPRAZolam (XANAX) 0.25 MG tablet Take 1 tablet (0.25 mg) by mouth 2 times daily as needed for anxiety 30 tablet 0     amLODIPine (NORVASC) 10 MG tablet Take 1 tablet (10 mg) by mouth daily 90 tablet 1     escitalopram (LEXAPRO) 10 MG tablet Take 1.5 tablets (15 mg) by mouth daily 135 tablet 1     hydrochlorothiazide (HYDRODIURIL) 12.5 MG tablet Take 2 tablets (25 mg) by mouth daily For blood pressure. 90 tablet 0     lisinopril (PRINIVIL/ZESTRIL) 40 MG tablet Take 1 tablet (40 mg) by mouth daily 90 tablet 1     BP Readings from Last 3 Encounters:   09/30/19 138/70   09/11/19 136/80   08/14/19 (!) 144/88    Wt Readings from Last 3 Encounters:   09/30/19 87.1 kg (192 lb)   08/21/19 84.1 kg (185 lb 6.4 oz)   08/14/19 84.4 kg (186 lb)                      Reviewed and updated as needed this visit by Provider         Review of Systems   ROS COMP: Constitutional, HEENT, cardiovascular, pulmonary, GI, , musculoskeletal, neuro, skin, endocrine and psych systems are negative, except as otherwise noted.      Objective    /70   Pulse 84   Temp 99.5  F (37.5  C) (Tympanic)   Resp 20   Ht 1.727 m (5' 8\")   Wt 87.1 kg (192 lb)   SpO2 97%   BMI 29.19 kg/m    Body mass index is 29.19 kg/m .  Physical Exam   GENERAL: healthy, alert and no distress  NECK: no adenopathy, no asymmetry, masses, or scars and thyroid normal to palpation  RESP: lungs clear to auscultation - no rales, rhonchi or wheezes  CV: regular rate and rhythm, normal S1 S2, no S3 or S4, no murmur, click or rub, no peripheral edema and peripheral pulses strong  ABDOMEN: soft, nontender, no hepatosplenomegaly, no masses and " "bowel sounds normal  MS: no gross musculoskeletal defects noted, no edema  Skin: Rough, \"stuck on\" appearing papules, located right scalp  Color: flesh  Size of largest lesion: 3 mm  Diagnostic Test Results:  none         Assessment & Plan     1. SANDRA (generalized anxiety disorder)  Will try increasing dose of lexapro from 10 mg to 15 mg and monitor for changes/side effects.    - escitalopram (LEXAPRO) 10 MG tablet; Take 1.5 tablets (15 mg) by mouth daily  Dispense: 135 tablet; Refill: 1    2. Moderate episode of recurrent major depressive disorder (H)  Will try higher dose of lexapro, anxiety and depression have improved    3. Hypertension, goal below 140/90  Repeat BP at goal.  Will leave medication as is (of note, lisinopril switched from losartan due to recall)  - lisinopril (PRINIVIL/ZESTRIL) 40 MG tablet; Take 1 tablet (40 mg) by mouth daily  Dispense: 90 tablet; Refill: 1  - amLODIPine (NORVASC) 10 MG tablet; Take 1 tablet (10 mg) by mouth daily  Dispense: 90 tablet; Refill: 1    4. Inflamed seborrheic keratosis    Seborrheic keratoses.  Discussed the benign nature of these lesions, patient may opt for treatment or not.   Treatment consists of liquid nitrogen, which may cause blistering or scarring.     Liquid nitrogen was applied for 10-12 seconds x 2 to the lesion and the expected blistering or scabbing reaction explained.     If lesion does not resolve, f/u with derm.       - DERMATOLOGY REFERRAL  - DESTRUCT BENIGN LESION, UP TO 14    5. Snoring  Discussed importance of detecting and diagnosed sleep apnea, as this can affect his health and raise his cardiovascular risk.    - SLEEP EVALUATION & MANAGEMENT REFERRAL - ADULT -[facility] [place]  [phone] (Age 15 and up); Future    6. Need for vaccination  Due for flu.  Discussed Shingrex vaccine, patient will inquire at pharmacy for coverage and administration.      - INFLUENZA QUAD, RECOMBINANT, P-FREE (RIV4) (FLUBLOCK) [13765]  - " "ADMIN: Vaccine, Initial (04980)             Seborrheic keratoses.  Discussed the benign nature of these lesions, patient may opt for treatment or not.   Treatment consists of liquid nitrogen, which may cause blistering or scarring.          BMI:   Estimated body mass index is 29.19 kg/m  as calculated from the following:    Height as of this encounter: 1.727 m (5' 8\").    Weight as of this encounter: 87.1 kg (192 lb).               Return in about 3 months (around 12/30/2019) for recheck on anxiety/depression.    Nasreen Martínez PA-C  [facility]      "

## 2019-09-30 NOTE — PATIENT INSTRUCTIONS
If the skin lesion on your scalp does not resolve, I suggest f/u with derm.     Increase lexapro from 10 mg to 15 mg.     Schedule a sleep study    Go to pharmacy to shingles vaccine.

## 2019-10-01 ASSESSMENT — ANXIETY QUESTIONNAIRES: GAD7 TOTAL SCORE: 8

## 2019-10-18 DIAGNOSIS — I10 HYPERTENSION, GOAL BELOW 140/90: ICD-10-CM

## 2019-10-18 NOTE — TELEPHONE ENCOUNTER
"Requested Prescriptions   Pending Prescriptions Disp Refills     hydrochlorothiazide (HYDRODIURIL) 12.5 MG tablet [Pharmacy Med Name: HYDROCHLOROTHIAZIDE 12.5 MG TB] 60 tablet 1     Sig: TAKE 2 TABLETS (25 MG) BY MOUTH DAILY FOR BLOOD PRESSURE.  Last Written Prescription Date:  09/21/2019 #90 x 0  Last filled 09/21/2019 #60 x 1  Last office visit: 9/30/2019 [facility]   Future Office Visit:  None         Diuretics (Including Combos) Protocol Passed - 10/18/2019 12:36 PM        Passed - Blood pressure under 140/90 in past 12 months     BP Readings from Last 3 Encounters:   09/30/19 138/70   09/11/19 136/80   08/14/19 (!) 144/88                 Passed - Recent (12 mo) or future (30 days) visit within the authorizing provider's specialty     Patient has had an office visit with the authorizing provider or a provider within the authorizing providers department within the previous 12 mos or has a future within next 30 days. See \"Patient Info\" tab in inbasket, or \"Choose Columns\" in Meds & Orders section of the refill encounter.              Passed - Medication is active on med list        Passed - Patient is age 18 or older        Passed - Normal serum creatinine on file in past 12 months     Recent Labs   Lab Test 08/14/19  0744   CR 0.82              Passed - Normal serum potassium on file in past 12 months     Recent Labs   Lab Test 08/14/19  0744   POTASSIUM 3.8                    Passed - Normal serum sodium on file in past 12 months     Recent Labs   Lab Test 08/14/19  0744                   "

## 2019-10-23 RX ORDER — HYDROCHLOROTHIAZIDE 12.5 MG/1
25 TABLET ORAL DAILY
Qty: 60 TABLET | Refills: 5 | Status: SHIPPED | OUTPATIENT
Start: 2019-10-23 | End: 2019-11-07

## 2019-10-23 NOTE — TELEPHONE ENCOUNTER
Prescription approved per G Refill Protocol or patient Primary care provider (PCP) Chart and last OV reviewed   INGRIS Rubalcava RN/Junior Ervin

## 2019-11-07 ENCOUNTER — TELEPHONE (OUTPATIENT)
Dept: FAMILY MEDICINE | Facility: CLINIC | Age: 54
End: 2019-11-07

## 2019-11-07 DIAGNOSIS — I10 HYPERTENSION, GOAL BELOW 140/90: ICD-10-CM

## 2019-11-07 RX ORDER — HYDROCHLOROTHIAZIDE 12.5 MG/1
25 TABLET ORAL DAILY
Qty: 180 TABLET | Refills: 1 | Status: SHIPPED | OUTPATIENT
Start: 2019-11-07 | End: 2020-01-28

## 2019-11-07 NOTE — TELEPHONE ENCOUNTER
Received a fax from [facility] for HCTZ 12.5mg    Requesting a 90 day supply    Approved 10/23/2019 #60 x 5    Vik Erazo RT (r)  [facility]

## 2020-01-28 ENCOUNTER — OFFICE VISIT (OUTPATIENT)
Dept: FAMILY MEDICINE | Facility: CLINIC | Age: 55
End: 2020-01-28
Payer: COMMERCIAL

## 2020-01-28 VITALS
DIASTOLIC BLOOD PRESSURE: 84 MMHG | WEIGHT: 195.38 LBS | BODY MASS INDEX: 29.61 KG/M2 | RESPIRATION RATE: 16 BRPM | HEART RATE: 88 BPM | SYSTOLIC BLOOD PRESSURE: 134 MMHG | HEIGHT: 68 IN | TEMPERATURE: 97.6 F

## 2020-01-28 DIAGNOSIS — I10 HYPERTENSION, GOAL BELOW 140/90: ICD-10-CM

## 2020-01-28 DIAGNOSIS — F41.1 GENERALIZED ANXIETY DISORDER: Primary | ICD-10-CM

## 2020-01-28 PROCEDURE — 99214 OFFICE O/P EST MOD 30 MIN: CPT | Performed by: FAMILY MEDICINE

## 2020-01-28 RX ORDER — VENLAFAXINE HYDROCHLORIDE 37.5 MG/1
CAPSULE, EXTENDED RELEASE ORAL
Qty: 60 CAPSULE | Refills: 1 | Status: SHIPPED | OUTPATIENT
Start: 2020-01-28 | End: 2020-06-29

## 2020-01-28 RX ORDER — HYDROCHLOROTHIAZIDE 12.5 MG/1
12.5 TABLET ORAL DAILY
Qty: 90 TABLET | Refills: 1 | Status: SHIPPED | OUTPATIENT
Start: 2020-01-28 | End: 2020-09-24

## 2020-01-28 RX ORDER — AMLODIPINE BESYLATE 10 MG/1
10 TABLET ORAL DAILY
Qty: 90 TABLET | Refills: 1 | Status: SHIPPED | OUTPATIENT
Start: 2020-01-28 | End: 2020-09-28

## 2020-01-28 RX ORDER — LISINOPRIL 40 MG/1
40 TABLET ORAL DAILY
Qty: 90 TABLET | Refills: 1 | Status: SHIPPED | OUTPATIENT
Start: 2020-01-28 | End: 2020-09-24

## 2020-01-28 ASSESSMENT — PAIN SCALES - GENERAL: PAINLEVEL: NO PAIN (0)

## 2020-01-28 ASSESSMENT — ANXIETY QUESTIONNAIRES
1. FEELING NERVOUS, ANXIOUS, OR ON EDGE: SEVERAL DAYS
3. WORRYING TOO MUCH ABOUT DIFFERENT THINGS: SEVERAL DAYS
2. NOT BEING ABLE TO STOP OR CONTROL WORRYING: SEVERAL DAYS
5. BEING SO RESTLESS THAT IT IS HARD TO SIT STILL: NOT AT ALL
6. BECOMING EASILY ANNOYED OR IRRITABLE: SEVERAL DAYS
7. FEELING AFRAID AS IF SOMETHING AWFUL MIGHT HAPPEN: SEVERAL DAYS
GAD7 TOTAL SCORE: 5

## 2020-01-28 ASSESSMENT — PATIENT HEALTH QUESTIONNAIRE - PHQ9
5. POOR APPETITE OR OVEREATING: NOT AT ALL
SUM OF ALL RESPONSES TO PHQ QUESTIONS 1-9: 4

## 2020-01-28 ASSESSMENT — MIFFLIN-ST. JEOR: SCORE: 1690.72

## 2020-01-28 NOTE — PATIENT INSTRUCTIONS
*    Okay with some eggs and red meat, portion. Up to 6 eggs per day.     *     More vegetables, less sugar, less flour, less potatoes, less pasta.     *    Less processed food.     *    I think your heart is okay.     *    Stay on the blood pressure pills.     *    Try a new medication for anxiety: venlafaxine.     *    Consider seeing a counselor:  Call [phone].     *    Follow up in 2 months.

## 2020-01-28 NOTE — PROGRESS NOTES
Subjective     Munir Storey is a 54 year old male who presents to clinic today for the following health issues:    HPI     Depression and Anxiety Follow-Up  Lexapro 15mg every day, xanax 0.25mg bid prn (last filled 19) (last Rx 19, never filled) - He is not taking these medications about a month ago due to causing side effects, headaches and fatigue    How are you doing with your depression since your last visit? Unsure    How are you doing with your anxiety since your last visit?  He notes that his anxiety is low but his drive and ambition is not there. He has hunting dogs and this year was unable to take them out.Worries about having a MI. He does have intermittent heart flutters. Denies any chest pain, vision or speech changes, headaches, sweating. Notes some pain in the left extremity but thinks this is from back pain    Are you having other symptoms that might be associated with depression or anxiety? No    Have you had a significant life event? Less hours at work     Do you have any concerns with your use of alcohol or other drugs? No    Social History     Tobacco Use     Smoking status: Former Smoker     Packs/day: 0.50     Years: 15.00     Pack years: 7.50     Types: Cigarettes, Dip, chew, snus or snuff     Last attempt to quit: 2019     Years since quittin.4     Smokeless tobacco: Current User     Types: Chew   Substance Use Topics     Alcohol use: No     Comment: hx alcohol abuse, sober since      Drug use: No     PHQ 2019   PHQ-9 Total Score 8 7 4   Q9: Thoughts of better off dead/self-harm past 2 weeks Several days Not at all Not at all   F/U: Thoughts of suicide or self-harm - - -   F/U: Safety concerns - - -     SANDRA-7 SCORE 2019   Total Score 11 8 5         Reviewed and updated as needed this visit by Provider         Review of Systems   ROS COMP: CONSTITUTIONAL:NEGATIVE for fever, chills, change in weight  INTEGUMENTARY/SKIN:  "NEGATIVE for worrisome rashes, moles or lesions  RESP:NEGATIVE for significant cough or SOB  CV: POSITIVE for palpitations  MUSCULOSKELETAL: POSITIVE for left upper extremity pain and back pain   NEURO: NEGATIVE for weakness, dizziness or paresthesias  PSYCHIATRIC: POSITIVE for stress/worrying, Hx anxiety and depression    This document serves as a record of the services and decisions personally performed and made by Magalis Morales MD. It was created on his behalf by Harry Durán, a trained medical scribe. The creation of this document is based the provider's statements to the medical scribe.  Harry Durán 9:29 AM January 28, 2020        Objective    /84   Pulse 88   Temp 97.6  F (36.4  C) (Tympanic)   Resp 16   Ht 1.727 m (5' 8\")   Wt 88.6 kg (195 lb 6 oz)   BMI 29.71 kg/m    Body mass index is 29.71 kg/m .  Physical Exam   GENERAL: alert, pleasant and no distress  RESP: lungs clear to auscultation - no rales, rhonchi or wheezes  CV: regular rate and rhythm, normal S1 S2  MS: no gross musculoskeletal defects noted, no edema  SKIN: no suspicious lesions or rashes  NEURO: mentation intact and speech normal  PSYCH: mentation appears normal, affect normal/bright    Diagnostic Test Results:    Component Latest Ref Rng & Units 8/14/2019   Sodium 133 - 144 mmol/L 137   Potassium 3.4 - 5.3 mmol/L 3.8   Chloride 94 - 109 mmol/L 104   Carbon Dioxide 20 - 32 mmol/L 27   Anion Gap 3 - 14 mmol/L 6   Glucose 70 - 99 mg/dL 97   Urea Nitrogen 7 - 30 mg/dL 19   Creatinine 0.66 - 1.25 mg/dL 0.82   GFR Estimate >60 mL/min/1.73:m2 >90   GFR Estimate If Black >60 mL/min/1.73:m2 >90   Calcium 8.5 - 10.1 mg/dL 8.8   Bilirubin Total 0.2 - 1.3 mg/dL 0.5   Albumin 3.4 - 5.0 g/dL 4.2   Protein Total 6.8 - 8.8 g/dL 7.8   Alkaline Phosphatase 40 - 150 U/L 67   ALT 0 - 70 U/L 24   AST 0 - 45 U/L 18   Cholesterol <200 mg/dL 170   Triglycerides <150 mg/dL 135   HDL Cholesterol >39 mg/dL 39 (L)   LDL Cholesterol Calculated <100 mg/dL " 104 (H)   Non HDL Cholesterol <130 mg/dL 131 (H)   Creatinine Urine mg/dL 165   Albumin Urine mg/L mg/L 17   Albumin Urine mg/g Cr 0 - 17 mg/g Cr 10.30   PSA  0 - 4 ug/L 0.52           Assessment & Plan     (F41.1) Generalized anxiety disorder  (primary encounter diagnosis)  Comment: Reviewed options, will start SSRN and refer for counseling.  Plan: venlafaxine (EFFEXOR-XR) 37.5 MG 24 hr capsule,        MENTAL HEALTH REFERRAL  - Adult; Outpatient         Treatment; Individual/Couples/Family/Group         Therapy/Health Psychology; G: [facility] [phone]; We will         contact you to schedule the appointment or         please call with any questions        Reviewed side effects.  Follow-up in 2 months.    (I10) Hypertension, goal below 140/90  Comment: BP within guidelines using 3 drug therapy, ACE Inhibitor, calcium channel blocker, thiazide diuretic.   Plan: amLODIPine (NORVASC) 10 MG tablet,         hydrochlorothiazide (HYDRODIURIL) 12.5 MG         tablet, lisinopril (PRINIVIL/ZESTRIL) 40 MG         tablet        Continue current medications.    Patient Instructions   *    Okay with some eggs and red meat, portion. Up to 6 eggs per day.     *     More vegetables, less sugar, less flour, less potatoes, less pasta.     *    Less processed food.     *    I think your heart is okay.     *    Stay on the blood pressure pills.     *    Try a new medication for anxiety: venlafaxine.     *    Consider seeing a counselor:  Call [phone].     *    Follow up in 2 months.        No follow-ups on file.    The information in this document, created by a scribe for me, accurately reflects the services I personally performed and the decisions made by me. I have reviewed and approved this document for accuracy.     Magalis Morales MD  [facility]

## 2020-01-29 ASSESSMENT — ANXIETY QUESTIONNAIRES: GAD7 TOTAL SCORE: 5

## 2020-04-02 ENCOUNTER — APPOINTMENT (OUTPATIENT)
Dept: CT IMAGING | Facility: CLINIC | Age: 55
End: 2020-04-02
Attending: EMERGENCY MEDICINE
Payer: COMMERCIAL

## 2020-04-02 ENCOUNTER — HOSPITAL ENCOUNTER (EMERGENCY)
Facility: CLINIC | Age: 55
Discharge: HOME | End: 2020-04-02
Attending: EMERGENCY MEDICINE | Admitting: EMERGENCY MEDICINE
Payer: COMMERCIAL

## 2020-04-02 VITALS
BODY MASS INDEX: 30.77 KG/M2 | RESPIRATION RATE: 18 BRPM | HEART RATE: 83 BPM | WEIGHT: 203 LBS | SYSTOLIC BLOOD PRESSURE: 143 MMHG | HEIGHT: 68 IN | OXYGEN SATURATION: 97 % | TEMPERATURE: 98.2 F | DIASTOLIC BLOOD PRESSURE: 81 MMHG

## 2020-04-02 DIAGNOSIS — N20.1 URETEROLITHIASIS: ICD-10-CM

## 2020-04-02 DIAGNOSIS — N13.30 HYDRONEPHROSIS, UNSPECIFIED HYDRONEPHROSIS TYPE: ICD-10-CM

## 2020-04-02 LAB
ALBUMIN SERPL-MCNC: 4.2 G/DL (ref 3.4–5)
ALBUMIN UR-MCNC: NEGATIVE MG/DL
ALP SERPL-CCNC: 62 U/L (ref 40–150)
ALT SERPL W P-5'-P-CCNC: 28 U/L (ref 0–70)
ANION GAP SERPL CALCULATED.3IONS-SCNC: 5 MMOL/L (ref 3–14)
APPEARANCE UR: CLEAR
AST SERPL W P-5'-P-CCNC: 23 U/L (ref 0–45)
BASOPHILS # BLD AUTO: 0 10E9/L (ref 0–0.2)
BASOPHILS NFR BLD AUTO: 0.2 %
BILIRUB SERPL-MCNC: 0.6 MG/DL (ref 0.2–1.3)
BILIRUB UR QL STRIP: NEGATIVE
BUN SERPL-MCNC: 15 MG/DL (ref 7–30)
CALCIUM SERPL-MCNC: 8.9 MG/DL (ref 8.5–10.1)
CHLORIDE SERPL-SCNC: 105 MMOL/L (ref 94–109)
CO2 SERPL-SCNC: 27 MMOL/L (ref 20–32)
COLOR UR AUTO: YELLOW
CREAT SERPL-MCNC: 0.82 MG/DL (ref 0.66–1.25)
DIFFERENTIAL METHOD BLD: NORMAL
EOSINOPHIL # BLD AUTO: 0 10E9/L (ref 0–0.7)
EOSINOPHIL NFR BLD AUTO: 0.3 %
ERYTHROCYTE [DISTWIDTH] IN BLOOD BY AUTOMATED COUNT: 12.7 % (ref 10–15)
GFR SERPL CREATININE-BSD FRML MDRD: >90 ML/MIN/{1.73_M2}
GLUCOSE SERPL-MCNC: 104 MG/DL (ref 70–99)
GLUCOSE UR STRIP-MCNC: NEGATIVE MG/DL
HCT VFR BLD AUTO: 41.2 % (ref 40–53)
HGB BLD-MCNC: 13.4 G/DL (ref 13.3–17.7)
HGB UR QL STRIP: NEGATIVE
IMM GRANULOCYTES # BLD: 0 10E9/L (ref 0–0.4)
IMM GRANULOCYTES NFR BLD: 0.3 %
KETONES UR STRIP-MCNC: 20 MG/DL
LACTATE BLD-SCNC: 1.4 MMOL/L (ref 0.7–2)
LEUKOCYTE ESTERASE UR QL STRIP: NEGATIVE
LIPASE SERPL-CCNC: 56 U/L (ref 73–393)
LYMPHOCYTES # BLD AUTO: 0.9 10E9/L (ref 0.8–5.3)
LYMPHOCYTES NFR BLD AUTO: 9.2 %
MCH RBC QN AUTO: 29.7 PG (ref 26.5–33)
MCHC RBC AUTO-ENTMCNC: 32.5 G/DL (ref 31.5–36.5)
MCV RBC AUTO: 91 FL (ref 78–100)
MONOCYTES # BLD AUTO: 0.5 10E9/L (ref 0–1.3)
MONOCYTES NFR BLD AUTO: 5.6 %
NEUTROPHILS # BLD AUTO: 7.8 10E9/L (ref 1.6–8.3)
NEUTROPHILS NFR BLD AUTO: 84.4 %
NITRATE UR QL: NEGATIVE
NRBC # BLD AUTO: 0 10*3/UL
NRBC BLD AUTO-RTO: 0 /100
PH UR STRIP: 5 PH (ref 5–7)
PLATELET # BLD AUTO: 216 10E9/L (ref 150–450)
POTASSIUM SERPL-SCNC: 3.8 MMOL/L (ref 3.4–5.3)
PROT SERPL-MCNC: 7.8 G/DL (ref 6.8–8.8)
RBC # BLD AUTO: 4.51 10E12/L (ref 4.4–5.9)
SODIUM SERPL-SCNC: 137 MMOL/L (ref 133–144)
SOURCE: ABNORMAL
SP GR UR STRIP: 1.03 (ref 1–1.03)
UROBILINOGEN UR STRIP-MCNC: 0 MG/DL (ref 0–2)
WBC # BLD AUTO: 9.3 10E9/L (ref 4–11)

## 2020-04-02 PROCEDURE — 99285 EMERGENCY DEPT VISIT HI MDM: CPT | Mod: Z6 | Performed by: EMERGENCY MEDICINE

## 2020-04-02 PROCEDURE — 25000128 H RX IP 250 OP 636: Performed by: EMERGENCY MEDICINE

## 2020-04-02 PROCEDURE — 96361 HYDRATE IV INFUSION ADD-ON: CPT | Performed by: EMERGENCY MEDICINE

## 2020-04-02 PROCEDURE — 85025 COMPLETE CBC W/AUTO DIFF WBC: CPT | Performed by: EMERGENCY MEDICINE

## 2020-04-02 PROCEDURE — 25000132 ZZH RX MED GY IP 250 OP 250 PS 637: Performed by: EMERGENCY MEDICINE

## 2020-04-02 PROCEDURE — 74177 CT ABD & PELVIS W/CONTRAST: CPT

## 2020-04-02 PROCEDURE — 83605 ASSAY OF LACTIC ACID: CPT | Performed by: EMERGENCY MEDICINE

## 2020-04-02 PROCEDURE — 81003 URINALYSIS AUTO W/O SCOPE: CPT | Performed by: EMERGENCY MEDICINE

## 2020-04-02 PROCEDURE — 96374 THER/PROPH/DIAG INJ IV PUSH: CPT | Mod: 59 | Performed by: EMERGENCY MEDICINE

## 2020-04-02 PROCEDURE — 96376 TX/PRO/DX INJ SAME DRUG ADON: CPT | Performed by: EMERGENCY MEDICINE

## 2020-04-02 PROCEDURE — 99285 EMERGENCY DEPT VISIT HI MDM: CPT | Mod: 25 | Performed by: EMERGENCY MEDICINE

## 2020-04-02 PROCEDURE — 25800030 ZZH RX IP 258 OP 636: Performed by: EMERGENCY MEDICINE

## 2020-04-02 PROCEDURE — 25000125 ZZHC RX 250: Performed by: EMERGENCY MEDICINE

## 2020-04-02 PROCEDURE — 83690 ASSAY OF LIPASE: CPT | Performed by: EMERGENCY MEDICINE

## 2020-04-02 PROCEDURE — 80053 COMPREHEN METABOLIC PANEL: CPT | Performed by: EMERGENCY MEDICINE

## 2020-04-02 RX ORDER — ONDANSETRON 2 MG/ML
4 INJECTION INTRAMUSCULAR; INTRAVENOUS EVERY 30 MIN PRN
Status: DISCONTINUED | OUTPATIENT
Start: 2020-04-02 | End: 2020-04-02 | Stop reason: HOSPADM

## 2020-04-02 RX ORDER — IOPAMIDOL 755 MG/ML
99 INJECTION, SOLUTION INTRAVASCULAR ONCE
Status: COMPLETED | OUTPATIENT
Start: 2020-04-02 | End: 2020-04-02

## 2020-04-02 RX ORDER — OXYCODONE HYDROCHLORIDE 5 MG/1
5 TABLET ORAL EVERY 6 HOURS PRN
Qty: 12 TABLET | Refills: 0 | Status: SHIPPED | OUTPATIENT
Start: 2020-04-02 | End: 2020-06-29

## 2020-04-02 RX ORDER — ONDANSETRON 4 MG/1
4 TABLET, ORALLY DISINTEGRATING ORAL EVERY 8 HOURS PRN
Qty: 12 TABLET | Refills: 0 | Status: SHIPPED | OUTPATIENT
Start: 2020-04-02 | End: 2021-11-11

## 2020-04-02 RX ORDER — HYDROMORPHONE HYDROCHLORIDE 1 MG/ML
0.5 INJECTION, SOLUTION INTRAMUSCULAR; INTRAVENOUS; SUBCUTANEOUS
Status: COMPLETED | OUTPATIENT
Start: 2020-04-02 | End: 2020-04-02

## 2020-04-02 RX ADMIN — IOPAMIDOL 99 ML: 755 INJECTION, SOLUTION INTRAVENOUS at 20:21

## 2020-04-02 RX ADMIN — HYDROMORPHONE HYDROCHLORIDE 0.5 MG: 1 INJECTION, SOLUTION INTRAMUSCULAR; INTRAVENOUS; SUBCUTANEOUS at 19:12

## 2020-04-02 RX ADMIN — SODIUM CHLORIDE, POTASSIUM CHLORIDE, SODIUM LACTATE AND CALCIUM CHLORIDE 1000 ML: 600; 310; 30; 20 INJECTION, SOLUTION INTRAVENOUS at 19:11

## 2020-04-02 RX ADMIN — SODIUM CHLORIDE 65 ML: 9 INJECTION, SOLUTION INTRAVENOUS at 20:21

## 2020-04-02 RX ADMIN — HYDROMORPHONE HYDROCHLORIDE 0.5 MG: 1 INJECTION, SOLUTION INTRAMUSCULAR; INTRAVENOUS; SUBCUTANEOUS at 19:51

## 2020-04-02 RX ADMIN — HYDROMORPHONE HYDROCHLORIDE 0.5 MG: 1 INJECTION, SOLUTION INTRAMUSCULAR; INTRAVENOUS; SUBCUTANEOUS at 20:33

## 2020-04-02 RX ADMIN — IBUPROFEN 600 MG: 400 TABLET ORAL at 21:19

## 2020-04-02 ASSESSMENT — ENCOUNTER SYMPTOMS
NAUSEA: 0
CHEST TIGHTNESS: 0
DIFFICULTY URINATING: 1
ABDOMINAL PAIN: 1
FREQUENCY: 0
DIARRHEA: 0
PALPITATIONS: 0
CHILLS: 0
SORE THROAT: 0
DYSURIA: 0
VOMITING: 0
FEVER: 0

## 2020-04-02 ASSESSMENT — MIFFLIN-ST. JEOR: SCORE: 1725.3

## 2020-04-02 NOTE — ED NOTES
Right-sided abdominal pain that started at approximately 1600 after having a bowel movement.  Patient stated that he feels like needs to urinate and then doesn't.  Patient denies nausea/vomiting/diarrhea.

## 2020-04-02 NOTE — ED PROVIDER NOTES
History     Chief Complaint   Patient presents with     Abdominal Pain     right upper quadrant pain  sudden onset       HPI  Munir Storey is a 54 year old male with history of hypertension, anxiety, depression, atrial flutter status post ablation, and alcohol abuse in remission who presents to the ED with right lower quadrant abdominal pain that onset at 4 pm today. The patient reports he had a bowel movement and then had a sudden onset of right lower quadrant abdominal pain shortly after. Pain is non radiating, constant, moderate severity, and not associated with n/v/d. He reports a sense of urinary urgency but states he often has little to no urine output. This urinary symptom onset today. He denies dysuria and frequency. He also denies  back pain, cough, headache, chest pain, fever, chills, changes in vision, changes in hearing, sore throat, palpitations, leg swelling, penile discharge, testicular pain. He denies a history of kidney stones. The patient still has his appendix.     The patient's PMHx, Surgical Hx, Allergies, and Medications were all reviewed with the patient.    Allergies:  Allergies   Allergen Reactions     Sulfamethoxazole-Trimethoprim Nausea       Problem List:    Patient Active Problem List    Diagnosis Date Noted     H/O atrial flutter 09/02/2019     Priority: Medium     H/O atrioventricular quita ablation 09/02/2019     Priority: Medium     Seborrheic keratosis 08/14/2019     Priority: Medium     Overweight (BMI 25.0-29.9) 08/04/2019     Priority: Medium     Moderate episode of recurrent major depressive disorder (H) 08/29/2018     Priority: Medium     Tobacco abuse 10/20/2017     Priority: Medium     SANDRA (generalized anxiety disorder) 10/20/2017     Priority: Medium     Ejection fraction < 50% 10/20/2017     Priority: Medium     Ejection fraction 45% per echo April 2015 (per [facility] records)  - Magalis Morales MD       Alcohol abuse, in remission 10/20/2017     Priority: Medium     October  2017: sobriety x 2 years  - Magalis Morales MD       Peyronie disease 2009     Priority: Medium     Hypertension, goal below 140/90 2009     Priority: Medium        Past Medical History:    Past Medical History:   Diagnosis Date     Atrial flutter (H) 2015     Depressive disorder      SANDRA (generalized anxiety disorder) 10/20/2017     Hypertension, goal below 140/90 2009     Tobacco abuse 10/20/2017       Past Surgical History:    Past Surgical History:   Procedure Laterality Date     BACK SURGERY      cervical fusion      CARDIAC SURGERY  2015    EP cardioversion     COLONOSCOPY  2013     COLONOSCOPY  2019    normal colonoscopy - repeat 10 years     HERNIA REPAIR       LAPAROSCOPIC RESECTION SMALL BOWEL  2013     UPPER GI ENDOSCOPY  2013       Family History:    Family History   Problem Relation Age of Onset     Diabetes Father      Depression Father      Depression Paternal Grandfather        Social History:  Marital Status:  Single [1]  Social History     Tobacco Use     Smoking status: Former Smoker     Packs/day: 0.50     Years: 15.00     Pack years: 7.50     Types: Cigarettes, Dip, chew, snus or snuff     Last attempt to quit: 2019     Years since quittin.6     Smokeless tobacco: Current User     Types: Chew   Substance Use Topics     Alcohol use: No     Comment: hx alcohol abuse, sober since      Drug use: No        Medications:    ondansetron (ZOFRAN-ODT) 4 MG ODT tab  oxyCODONE (ROXICODONE) 5 MG tablet  amLODIPine (NORVASC) 10 MG tablet  hydrochlorothiazide (HYDRODIURIL) 12.5 MG tablet  lisinopril (PRINIVIL/ZESTRIL) 40 MG tablet  venlafaxine (EFFEXOR-XR) 37.5 MG 24 hr capsule          Review of Systems   Constitutional: Negative for chills and fever.   HENT: Negative for sore throat.    Eyes: Negative for visual disturbance.   Respiratory: Negative for chest tightness.    Cardiovascular: Negative for chest pain, palpitations and leg swelling.  "  Gastrointestinal: Positive for abdominal pain. Negative for diarrhea, nausea and vomiting.   Genitourinary: Positive for difficulty urinating and urgency. Negative for discharge, dysuria, frequency and testicular pain.   Musculoskeletal: Negative for neck pain and neck stiffness.   Skin: Negative for rash.   Allergic/Immunologic: Negative for immunocompromised state.   Neurological: Negative for headaches.   Hematological: Does not bruise/bleed easily.       Physical Exam   BP: (!) 152/74  Pulse: 65  Temp: 98.2  F (36.8  C)  Resp: 18  Height: 172.7 cm (5' 8\")  Weight: 92.1 kg (203 lb)  SpO2: 100 %    Physical Exam  GEN: Awake, alert, and cooperative. Appears mildly distressed.   HENT: MMM. External ears and nose normal bilaterally.  EYES: EOM intact. Conjunctiva clear.   NECK: Supple, symmetric.  CV : Regular rate and rhythm. Extremities warm and well perfused. No murmurs appreciated.   PULM: Normal effort. No wheezes, rales, or rhonchi bilaterally.  ABD: Soft, mild right sided lumbar tenderness, non-distended. No rebound or guarding.   CHEST: No CVA tenderness.   NEURO: Normal speech. Following commands. CN II-XII grossly intact. Answering questions and interacting appropriately.   EXT: No gross deformity.   INT: Warm. No diaphoresis. Normal color.        ED Course        Procedures           Critical Care time:  none               Results for orders placed or performed during the hospital encounter of 04/02/20 (from the past 24 hour(s))   CBC with platelets differential   Result Value Ref Range    WBC 9.3 4.0 - 11.0 10e9/L    RBC Count 4.51 4.4 - 5.9 10e12/L    Hemoglobin 13.4 13.3 - 17.7 g/dL    Hematocrit 41.2 40.0 - 53.0 %    MCV 91 78 - 100 fl    MCH 29.7 26.5 - 33.0 pg    MCHC 32.5 31.5 - 36.5 g/dL    RDW 12.7 10.0 - 15.0 %    Platelet Count 216 150 - 450 10e9/L    Diff Method Automated Method     % Neutrophils 84.4 %    % Lymphocytes 9.2 %    % Monocytes 5.6 %    % Eosinophils 0.3 %    % Basophils 0.2 %    " % Immature Granulocytes 0.3 %    Nucleated RBCs 0 0 /100    Absolute Neutrophil 7.8 1.6 - 8.3 10e9/L    Absolute Lymphocytes 0.9 0.8 - 5.3 10e9/L    Absolute Monocytes 0.5 0.0 - 1.3 10e9/L    Absolute Eosinophils 0.0 0.0 - 0.7 10e9/L    Absolute Basophils 0.0 0.0 - 0.2 10e9/L    Abs Immature Granulocytes 0.0 0 - 0.4 10e9/L    Absolute Nucleated RBC 0.0    Comprehensive metabolic panel   Result Value Ref Range    Sodium 137 133 - 144 mmol/L    Potassium 3.8 3.4 - 5.3 mmol/L    Chloride 105 94 - 109 mmol/L    Carbon Dioxide 27 20 - 32 mmol/L    Anion Gap 5 3 - 14 mmol/L    Glucose 104 (H) 70 - 99 mg/dL    Urea Nitrogen 15 7 - 30 mg/dL    Creatinine 0.82 0.66 - 1.25 mg/dL    GFR Estimate >90 >60 mL/min/[1.73_m2]    GFR Estimate If Black >90 >60 mL/min/[1.73_m2]    Calcium 8.9 8.5 - 10.1 mg/dL    Bilirubin Total 0.6 0.2 - 1.3 mg/dL    Albumin 4.2 3.4 - 5.0 g/dL    Protein Total 7.8 6.8 - 8.8 g/dL    Alkaline Phosphatase 62 40 - 150 U/L    ALT 28 0 - 70 U/L    AST 23 0 - 45 U/L   Lipase   Result Value Ref Range    Lipase 56 (L) 73 - 393 U/L   Lactic acid whole blood   Result Value Ref Range    Lactic Acid 1.4 0.7 - 2.0 mmol/L   UA reflex to Microscopic   Result Value Ref Range    Color Urine Yellow     Appearance Urine Clear     Glucose Urine Negative NEG^Negative mg/dL    Bilirubin Urine Negative NEG^Negative    Ketones Urine 20 (A) NEG^Negative mg/dL    Specific Gravity Urine 1.028 1.003 - 1.035    Blood Urine Negative NEG^Negative    pH Urine 5.0 5.0 - 7.0 pH    Protein Albumin Urine Negative NEG^Negative mg/dL    Urobilinogen mg/dL 0.0 0.0 - 2.0 mg/dL    Nitrite Urine Negative NEG^Negative    Leukocyte Esterase Urine Negative NEG^Negative    Source Midstream Urine    CT Abdomen Pelvis w Contrast    Narrative    EXAM: CT ABDOMEN PELVIS W CONTRAST  LOCATION: [facility]  DATE/TIME: 4/2/2020 8:21 PM    INDICATION: Acute right-sided pain.  COMPARISON: None.  TECHNIQUE: CT scan of the abdomen and pelvis was  performed following injection of IV contrast. Multiplanar reformats were obtained. Dose reduction techniques were used.  CONTRAST: 99 mL Isovue 370.    FINDINGS:   LOWER CHEST: Basilar atelectasis.    HEPATOBILIARY: Normal.    PANCREAS: Normal.    SPLEEN: Normal.    ADRENAL GLANDS: Normal.    KIDNEYS/BLADDER: 2 mm stone in the distal right ureter with moderate right-sided hydronephrosis and perinephric soft tissue stranding. There is an additional 4 mm stone in the upper pole of the right kidney.    BOWEL: Postsurgical changes in the small intestine. Normal appendix.    LYMPH NODES: Normal.    VASCULATURE: Unremarkable.    PELVIC ORGANS: Normal.    MUSCULOSKELETAL: Normal.      Impression    IMPRESSION:   1.  2 mm stone in the distal right ureter with moderate right-sided hydronephrosis and perinephric soft tissue stranding. Additional 4 mm stone upper right kidney.    2.  No appendicitis.          Medications   lactated ringers BOLUS 1,000 mL (0 mLs Intravenous Stopped 4/2/20 2035)   HYDROmorphone (PF) (DILAUDID) injection 0.5 mg (0.5 mg Intravenous Given 4/2/20 2033)   iopamidol (ISOVUE-370) solution 99 mL (99 mLs Intravenous Given 4/2/20 2021)   sodium chloride 0.9 % bag 500mL for CT scan flush use (65 mLs Intravenous Given 4/2/20 2021)   ibuprofen (ADVIL/MOTRIN) tablet 600 mg (600 mg Oral Given 4/2/20 2119)        7:14 PM Patient assessed.    Assessments & Plan (with Medical Decision Making)   54 year old male with past medical history of hypertension, anxiety, depression, small bowel resection open (2013), atrial fibrillation status post ablation, with acute onset right-sided abdominal pain not associated with nausea, vomiting, diarrhea.  on arrival to Emergency Department, vital signs were notable for blood pressure 152/74.  On exam patient appeared mildly distressed.  Mild right-sided abdominal pain in the lumbar region without peritoneal signs.  Patient was given 0.5 mg IV hydromorphone for pain with modest  "improvement.  Additional dose given approximately 30 minutes later. On reevaluation, pain was now \"tolerable\". Urinalysis without evidence of acute infection, no CVA tenderness. CT of abdomen/pelvis shows 2mm stone in distal right ureter, additional 4 mm stone in right kidney. Mild hydronephrosis and perinephric standing also noted. No fever, chills, CVA tenderness, no leukocytosis, and urinalysis is without evidence of acute infection. I have low suspicion for underlying systemic infection or UTI. No other intraabdominal pathology identified (no appendicitis/cholecystitis). This is first time stone and given that it is 2 mm is size and already in the distal ureter I feel there is a high likelihood that it will pass spontaneously. All results discussed with patient. Plan for ibuprofen for pain control with oxycodone for breakthru pain. Not currently nauseated but could develop and will also send prescription for ondansetron. Plan for PCP follow up. He understands that pain could last for weeks. Urine strainer given. He expresses agreement and understanding of plan and discharged in improved condition.     I have reviewed the nursing notes.         Discharge Medication List as of 4/2/2020  9:05 PM      START taking these medications    Details   ondansetron (ZOFRAN-ODT) 4 MG ODT tab Take 1 tablet (4 mg) by mouth every 8 hours as needed for nausea or vomiting, Disp-12 tablet,R-0, E-Prescribe      oxyCODONE (ROXICODONE) 5 MG tablet Take 1 tablet (5 mg) by mouth every 6 hours as needed for severe pain, Disp-12 tablet,R-0, E-Prescribe             Final diagnoses:   Ureterolithiasis   Hydronephrosis, unspecified hydronephrosis type     Tremaine Cotter MD    This document serves as a record of the services and decisions personally performed and made by Tremaine Cotter MD. It was created on HIS/HER behalf by   Juan Manuel Hill, a trained medical scribe. The creation of this document is based the provider's statements " to the medical scribe.  Juan Manuel Hill 7:14 PM 4/2/2020    Provider:   The information in this document, created by the medical scribe for me, accurately reflects the services I personally performed and the decisions made by me. I have reviewed and approved this document for accuracy prior to leaving the patient care area.  Tremaine Cotter MD 7:14 PM 4/2/2020 4/2/2020   [facility] EMERGENCY DEPARTMENT    Disclaimer: This note consists of words and symbols derived from keyboarding and dictation using voice recognition software.  As a result, there may be errors that have gone undetected.  Please consider this when interpreting information found in this note.             Tremaine Cotter MD  04/03/20 [id number]

## 2020-04-02 NOTE — ED AVS SNAPSHOT
[facility] Emergency Department  [address] [address]  Phone:  [phone]  Fax:  [phone]                                    Munir Storey   MRN: [id number]    Department:  [facility] Emergency Department   Date of Visit:  4/2/2020           After Visit Summary Signature Page    I have received my discharge instructions, and my questions have been answered. I have discussed any challenges I see with this plan with the nurse or doctor.    ..........................................................................................................................................  Patient/Patient Representative Signature      ..........................................................................................................................................  Patient Representative Print Name and Relationship to Patient    ..................................................               ................................................  Date                                   Time    ..........................................................................................................................................  Reviewed by Signature/Title    ...................................................              ..............................................  Date                                               Time          22EPIC Rev 08/18

## 2020-04-03 ASSESSMENT — ENCOUNTER SYMPTOMS
NECK STIFFNESS: 0
NECK PAIN: 0
HEADACHES: 0
BRUISES/BLEEDS EASILY: 0

## 2020-04-03 NOTE — DISCHARGE INSTRUCTIONS
You have a right-sided kidney stone which is likely causing your pain.  Recommend taking 600 mg ibuprofen up to 3 times a day as needed for pain.  You may take Roxicodone for breakthrough pain.  Is important that you do not drive or operate machinery while taking this medication.  You also given a prescription for ondansetron which you can use if you develop nausea or vomiting.  Sure drink plenty fluids to stay hydrated and follow-up with your primary care physician this next week.  You may need referral to urologist.  I expect the stone will pass on its own.    You develop fever, chills, persistent nausea vomiting, pain with urination, or uncontrollable abdominal pain, please return to emergency department for further evaluation and treatment.    You may try [facility] @ [phone] for your dental needs.

## 2020-06-24 ENCOUNTER — TELEPHONE (OUTPATIENT)
Dept: FAMILY MEDICINE | Facility: CLINIC | Age: 55
End: 2020-06-24

## 2020-06-24 NOTE — TELEPHONE ENCOUNTER
Appt on Friday is with Dr. Morales and last visit for anxiety was with Dr. Morales.  Will forward to him for refill request.     Nasreen Martínez PA-C

## 2020-06-24 NOTE — TELEPHONE ENCOUNTER
Pt has appt 6/29. Requesting xanax as prescribed by Sind[facility] in the past. Experiencing increased anxiety. Pt uses [facility] if approved.    Thank you,    Kaylan Hinojosa, [place]

## 2020-06-25 NOTE — TELEPHONE ENCOUNTER
ML for pt that request will be addressed at appt tomorrow , advised needs to keep appt  [facility]  RN/Junior Ervin

## 2020-06-25 NOTE — TELEPHONE ENCOUNTER
Please call, we can address this at his visit tomorrow. Typically, I don't use this medication to treat anxiety but we can talk about it. Thank you.

## 2020-06-26 NOTE — TELEPHONE ENCOUNTER
Patient called and says he wants his medication today please.  His appt is on Monday 6/29.    Sarah Freitas [facility]

## 2020-06-29 ENCOUNTER — OFFICE VISIT (OUTPATIENT)
Dept: FAMILY MEDICINE | Facility: CLINIC | Age: 55
End: 2020-06-29
Payer: COMMERCIAL

## 2020-06-29 VITALS
HEIGHT: 68 IN | HEART RATE: 76 BPM | SYSTOLIC BLOOD PRESSURE: 165 MMHG | TEMPERATURE: 97.9 F | RESPIRATION RATE: 16 BRPM | WEIGHT: 182.13 LBS | DIASTOLIC BLOOD PRESSURE: 92 MMHG | BODY MASS INDEX: 27.6 KG/M2

## 2020-06-29 DIAGNOSIS — F41.1 GAD (GENERALIZED ANXIETY DISORDER): ICD-10-CM

## 2020-06-29 DIAGNOSIS — I10 HYPERTENSION, GOAL BELOW 140/90: Primary | ICD-10-CM

## 2020-06-29 DIAGNOSIS — F10.11 ALCOHOL ABUSE, IN REMISSION: ICD-10-CM

## 2020-06-29 DIAGNOSIS — R06.83 SNORING: ICD-10-CM

## 2020-06-29 PROCEDURE — 99214 OFFICE O/P EST MOD 30 MIN: CPT | Performed by: FAMILY MEDICINE

## 2020-06-29 RX ORDER — LORAZEPAM 0.5 MG/1
0.5 TABLET ORAL EVERY 6 HOURS PRN
Qty: 30 TABLET | Refills: 0 | Status: SHIPPED | OUTPATIENT
Start: 2020-06-29 | End: 2020-12-14

## 2020-06-29 RX ORDER — DULOXETIN HYDROCHLORIDE 30 MG/1
30 CAPSULE, DELAYED RELEASE ORAL DAILY
Qty: 30 CAPSULE | Refills: 1 | Status: SHIPPED | OUTPATIENT
Start: 2020-06-29 | End: 2020-08-14

## 2020-06-29 ASSESSMENT — MIFFLIN-ST. JEOR: SCORE: 1625.61

## 2020-06-29 ASSESSMENT — PAIN SCALES - GENERAL: PAINLEVEL: NO PAIN (0)

## 2020-06-29 NOTE — PATIENT INSTRUCTIONS
*   We can another for anxiety, similar to venlafaxine. It's called Cymbalta.     *   Take one pill each morning for one month, then update me about how it's working.      *    Time to quit chewing too.     *   Refill and the happy pills, I use lorazepam instead of alprazolam.     *   Blood pressure is still up a bit.     *   Come back in 3 months for a blood pressure.     *   Call about setting up a sleep study: [phone].

## 2020-06-29 NOTE — PROGRESS NOTES
Subjective     Munir Storey is a 55 year old male who presents to clinic today for the following health issues:    HPI     Hypertension Follow-up  Amlodipine 10mg every day, hydrochlorothiazide 12.5mg every day, lisinopril 40mg qd    Do you check your blood pressure regularly outside of the clinic? No     Are you following a low salt diet? Yes    Are your blood pressures ever more than 140 on the top number (systolic) OR more   than 90 on the bottom number (diastolic), for example 140/90? N/A    BP Readings from Last 6 Encounters:   20 (!) 165/92   20 (!) 143/81   20 134/84   19 138/70   19 136/80   19 (!) 144/88       Anxiety Follow-Up  Effexor XR 75mg every day - he notes that this medication makes him feel drowsy    How are you doing with your anxiety since your last visit? He notes have both good and bad days    Are you having other symptoms that might be associated with anxiety? Yes:  fatigue    Have you had a significant life event? He got  yesterday!     Are you feeling depressed? No    Do you have any concerns with your use of alcohol or other drugs? No    Social History     Tobacco Use     Smoking status: Former Smoker     Packs/day: 0.50     Years: 15.00     Pack years: 7.50     Types: Cigarettes, Dip, chew, snus or snuff     Last attempt to quit: 2019     Years since quittin.9     Smokeless tobacco: Former User     Types: Chew   Substance Use Topics     Alcohol use: No     Comment: hx alcohol abuse, sober since      Drug use: No     SANDRA-7 SCORE 2019   Total Score 11 8 5     PHQ 2019   PHQ-9 Total Score 8 7 4   Q9: Thoughts of better off dead/self-harm past 2 weeks Several days Not at all Not at all   F/U: Thoughts of suicide or self-harm - - -   F/U: Safety concerns - - -     Last PHQ-9 2020   1.  Little interest or pleasure in doing things 2   2.  Feeling down, depressed, or hopeless 0   3.   Trouble falling or staying asleep, or sleeping too much 0   4.  Feeling tired or having little energy 2   5.  Poor appetite or overeating 0   6.  Feeling bad about yourself 0   7.  Trouble concentrating 0   8.  Moving slowly or restless 0   Q9: Thoughts of better off dead/self-harm past 2 weeks 0   PHQ-9 Total Score 4   Difficulty at work, home, or with people -   In the past two weeks have you had thoughts of suicide or self harm? -   Do you have concerns about your personal safety or the safety of others? -     SANDRA-7  1/28/2020   1. Feeling nervous, anxious, or on edge 1   2. Not being able to stop or control worrying 1   3. Worrying too much about different things 1   4. Trouble relaxing 0   5. Being so restless that it is hard to sit still 0   6. Becoming easily annoyed or irritable 1   7. Feeling afraid, as if something awful might happen 1   SANDRA-7 Total Score 5   If you checked any problems, how difficult have they made it for you to do your work, take care of things at home, or get along with other people? -       - Since last visit he has lost 21lb! He notes doing low sugar and sometimes keto diet. He also stopped smoking tobacco!    - Discuss lab draw for Covid exposure.      How many servings of fruits and vegetables do you eat daily?  2-3    On average, how many sweetened beverages do you drink each day (Examples: soda, juice, sweet tea, etc.  Do NOT count diet or artificially sweetened beverages)?   0    How many days per week do you exercise enough to make your heart beat faster? 7    How many minutes a day do you exercise enough to make your heart beat faster? While at work    How many days per week do you miss taking your medication? 0        Patient Active Problem List   Diagnosis     Hypertension, goal below 140/90     Tobacco abuse     SANDRA (generalized anxiety disorder)     Ejection fraction < 50%     Alcohol abuse, in remission     Moderate episode of recurrent major depressive disorder (H)      Overweight (BMI 25.0-29.9)     Seborrheic keratosis     H/O atrial flutter     H/O atrioventricular quita ablation     Peyronie disease     Past Surgical History:   Procedure Laterality Date     BACK SURGERY      cervical fusion      CARDIAC SURGERY  2015    EP cardioversion     COLONOSCOPY  2013     COLONOSCOPY  2019    normal colonoscopy - repeat 10 years     HERNIA REPAIR       LAPAROSCOPIC RESECTION SMALL BOWEL  2013     UPPER GI ENDOSCOPY  2013       Social History     Tobacco Use     Smoking status: Former Smoker     Packs/day: 0.50     Years: 15.00     Pack years: 7.50     Types: Cigarettes, Dip, chew, snus or snuff     Last attempt to quit: 2019     Years since quittin.9     Smokeless tobacco: Former User     Types: Chew   Substance Use Topics     Alcohol use: No     Comment: hx alcohol abuse, sober since      Family History   Problem Relation Age of Onset     Diabetes Father      Depression Father      Depression Paternal Grandfather          Current Outpatient Medications   Medication Sig Dispense Refill     amLODIPine (NORVASC) 10 MG tablet Take 1 tablet (10 mg) by mouth daily 90 tablet 1     DULoxetine (CYMBALTA) 30 MG capsule Take 1 capsule (30 mg) by mouth daily For anxiety. 30 capsule 1     hydrochlorothiazide (HYDRODIURIL) 12.5 MG tablet Take 1 tablet (12.5 mg) by mouth daily For blood pressure. 90 tablet 1     lisinopril (PRINIVIL/ZESTRIL) 40 MG tablet Take 1 tablet (40 mg) by mouth daily 90 tablet 1     LORazepam (ATIVAN) 0.5 MG tablet Take 1 tablet (0.5 mg) by mouth every 6 hours as needed for anxiety 30 tablet 0     ondansetron (ZOFRAN-ODT) 4 MG ODT tab Take 1 tablet (4 mg) by mouth every 8 hours as needed for nausea or vomiting 12 tablet 0         Reviewed and updated as needed this visit by Provider         Review of Systems   CONSTITUTIONAL: NEGATIVE for fever, chills, change in weight  ENT/MOUTH: NEGATIVE for ear, mouth and throat problems  RESP:  "NEGATIVE for significant cough or SOB  CV: NEGATIVE for chest pain, palpitations or peripheral edema  PSYCHIATRIC: Notes increased anxiety.      Objective    BP (!) 165/92   Pulse 76   Temp 97.9  F (36.6  C) (Tympanic)   Resp 16   Ht 1.727 m (5' 8\")   Wt 82.6 kg (182 lb 2 oz)   BMI 27.69 kg/m    Body mass index is 27.69 kg/m .  Physical Exam   GENERAL: Healthy, alert and no distress  EYES: Eyes grossly normal to inspection, conjunctivae and sclerae normal  RESP: Lungs clear to auscultation - no rales, rhonchi or wheezes  CV: Regular rate and rhythm, normal S1 S2, no murmur  MS: No gross musculoskeletal defects noted, no edema  NEURO: Normal strength and tone, mentation intact and speech normal  PSYCH: Mentation appears normal, affect normal/bright        Assessment/Plan    No results found for any visits on 06/29/20.     (I10) Hypertension, goal below 140/90  (primary encounter diagnosis)  Comment: Above recommended guidelines using 2 drug therapy, amlodipine plus ACE inhibitor.  Excellent renal function.  Plan: For now, continue on current medications, address sleep apnea, recheck blood pressure in 3 months.  If still elevated, will add a third agent.    (F41.1) SANDRA (generalized anxiety disorder)  Comment: Increased.  Try another SSRN.  Blood pressure may be a consideration when using this medication.  Also refilled lorazepam to be used as needed for now anxiety.  Discussed that this is not for the long-term manage of anxiety, just short-term management of anxiety symptoms.  Plan: DULoxetine (CYMBALTA) 30 MG capsule, LORazepam         (ATIVAN) 0.5 MG tablet        Reviewed side effects.    (F10.11) Alcohol abuse, in remission  Comment: Patient states he is abstaining from alcohol, does not feel the desire to drink.  Plan: Monitor.    (R06.83) Snoring  Comment: Reviewed the multiple comorbidities associated with untreated obstructive sleep apnea including elevated blood pressure.  Plan: SLEEP EVALUATION & " MANAGEMENT REFERRAL - ADULT         -[facility] - [facility]          [phone] (Age 15 and up)        Referral placed.  Stressed importance of completing a sleep evaluation.    Patient Instructions   *   We can another for anxiety, similar to venlafaxine. It's called Cymbalta.     *   Take one pill each morning for one month, then update me about how it's working.      *    Time to quit chewing too.     *   Refill and the happy pills, I use lorazepam instead of alprazolam.     *   Blood pressure is still up a bit.     *   Come back in 3 months for a blood pressure.     *   Call about setting up a sleep study: [phone].

## 2020-07-03 ENCOUNTER — TELEPHONE (OUTPATIENT)
Dept: SLEEP MEDICINE | Facility: CLINIC | Age: 55
End: 2020-07-03

## 2020-08-14 ENCOUNTER — TELEPHONE (OUTPATIENT)
Dept: FAMILY MEDICINE | Facility: CLINIC | Age: 55
End: 2020-08-14

## 2020-08-14 ENCOUNTER — OFFICE VISIT (OUTPATIENT)
Dept: FAMILY MEDICINE | Facility: CLINIC | Age: 55
End: 2020-08-14
Payer: COMMERCIAL

## 2020-08-14 VITALS
TEMPERATURE: 98.8 F | DIASTOLIC BLOOD PRESSURE: 100 MMHG | WEIGHT: 179 LBS | BODY MASS INDEX: 27.13 KG/M2 | SYSTOLIC BLOOD PRESSURE: 158 MMHG | HEIGHT: 68 IN | RESPIRATION RATE: 14 BRPM | OXYGEN SATURATION: 98 % | HEART RATE: 88 BPM

## 2020-08-14 DIAGNOSIS — I10 HYPERTENSION, GOAL BELOW 140/90: ICD-10-CM

## 2020-08-14 DIAGNOSIS — F41.1 GAD (GENERALIZED ANXIETY DISORDER): ICD-10-CM

## 2020-08-14 DIAGNOSIS — R53.83 FATIGUE, UNSPECIFIED TYPE: ICD-10-CM

## 2020-08-14 DIAGNOSIS — R00.2 PALPITATIONS: Primary | ICD-10-CM

## 2020-08-14 DIAGNOSIS — Z11.59 ENCOUNTER FOR SCREENING FOR OTHER VIRAL DISEASES: ICD-10-CM

## 2020-08-14 LAB
ANION GAP SERPL CALCULATED.3IONS-SCNC: 6 MMOL/L (ref 3–14)
BUN SERPL-MCNC: 10 MG/DL (ref 7–30)
CALCIUM SERPL-MCNC: 9.2 MG/DL (ref 8.5–10.1)
CHLORIDE SERPL-SCNC: 105 MMOL/L (ref 94–109)
CO2 SERPL-SCNC: 28 MMOL/L (ref 20–32)
CREAT SERPL-MCNC: 0.72 MG/DL (ref 0.66–1.25)
ERYTHROCYTE [DISTWIDTH] IN BLOOD BY AUTOMATED COUNT: 12.7 % (ref 10–15)
GFR SERPL CREATININE-BSD FRML MDRD: >90 ML/MIN/{1.73_M2}
GLUCOSE SERPL-MCNC: 97 MG/DL (ref 70–99)
HCT VFR BLD AUTO: 45.6 % (ref 40–53)
HGB BLD-MCNC: 15.4 G/DL (ref 13.3–17.7)
MCH RBC QN AUTO: 31.2 PG (ref 26.5–33)
MCHC RBC AUTO-ENTMCNC: 33.8 G/DL (ref 31.5–36.5)
MCV RBC AUTO: 92 FL (ref 78–100)
PLATELET # BLD AUTO: 211 10E9/L (ref 150–450)
POTASSIUM SERPL-SCNC: 4.5 MMOL/L (ref 3.4–5.3)
RBC # BLD AUTO: 4.94 10E12/L (ref 4.4–5.9)
SODIUM SERPL-SCNC: 139 MMOL/L (ref 133–144)
WBC # BLD AUTO: 5.1 10E9/L (ref 4–11)

## 2020-08-14 PROCEDURE — 86769 SARS-COV-2 COVID-19 ANTIBODY: CPT | Performed by: PHYSICIAN ASSISTANT

## 2020-08-14 PROCEDURE — 99214 OFFICE O/P EST MOD 30 MIN: CPT | Performed by: PHYSICIAN ASSISTANT

## 2020-08-14 PROCEDURE — 96127 BRIEF EMOTIONAL/BEHAV ASSMT: CPT | Performed by: PHYSICIAN ASSISTANT

## 2020-08-14 PROCEDURE — 85027 COMPLETE CBC AUTOMATED: CPT | Performed by: PHYSICIAN ASSISTANT

## 2020-08-14 PROCEDURE — 80048 BASIC METABOLIC PNL TOTAL CA: CPT | Performed by: PHYSICIAN ASSISTANT

## 2020-08-14 PROCEDURE — 36415 COLL VENOUS BLD VENIPUNCTURE: CPT | Performed by: PHYSICIAN ASSISTANT

## 2020-08-14 PROCEDURE — 93000 ELECTROCARDIOGRAM COMPLETE: CPT | Performed by: PHYSICIAN ASSISTANT

## 2020-08-14 RX ORDER — DULOXETIN HYDROCHLORIDE 30 MG/1
30 CAPSULE, DELAYED RELEASE ORAL DAILY
Qty: 30 CAPSULE | Refills: 0 | Status: SHIPPED | OUTPATIENT
Start: 2020-08-14 | End: 2020-10-06

## 2020-08-14 ASSESSMENT — ANXIETY QUESTIONNAIRES
3. WORRYING TOO MUCH ABOUT DIFFERENT THINGS: NOT AT ALL
5. BEING SO RESTLESS THAT IT IS HARD TO SIT STILL: NOT AT ALL
7. FEELING AFRAID AS IF SOMETHING AWFUL MIGHT HAPPEN: MORE THAN HALF THE DAYS
2. NOT BEING ABLE TO STOP OR CONTROL WORRYING: SEVERAL DAYS
6. BECOMING EASILY ANNOYED OR IRRITABLE: SEVERAL DAYS
IF YOU CHECKED OFF ANY PROBLEMS ON THIS QUESTIONNAIRE, HOW DIFFICULT HAVE THESE PROBLEMS MADE IT FOR YOU TO DO YOUR WORK, TAKE CARE OF THINGS AT HOME, OR GET ALONG WITH OTHER PEOPLE: SOMEWHAT DIFFICULT
1. FEELING NERVOUS, ANXIOUS, OR ON EDGE: MORE THAN HALF THE DAYS
GAD7 TOTAL SCORE: 6

## 2020-08-14 ASSESSMENT — ENCOUNTER SYMPTOMS
SHORTNESS OF BREATH: 0
LIGHT-HEADEDNESS: 0
ABDOMINAL PAIN: 0
COUGH: 0
NERVOUS/ANXIOUS: 0
PALPITATIONS: 0
FEVER: 0
NAUSEA: 0
DIAPHORESIS: 0
DYSURIA: 0

## 2020-08-14 ASSESSMENT — MIFFLIN-ST. JEOR: SCORE: 1611.44

## 2020-08-14 ASSESSMENT — PAIN SCALES - GENERAL: PAINLEVEL: NO PAIN (0)

## 2020-08-14 ASSESSMENT — PATIENT HEALTH QUESTIONNAIRE - PHQ9
SUM OF ALL RESPONSES TO PHQ QUESTIONS 1-9: 7
5. POOR APPETITE OR OVEREATING: NOT AT ALL

## 2020-08-14 NOTE — TELEPHONE ENCOUNTER
Patient reports that yesterday and today he has felt flush, tired and lethargic, heart racing and skipping beats and feels like his BP is elevated. Patient does not have shortness of breath, sore throat, temp or congestion. He is not drinking as much as he should. Advised to be seen. Appointment made.  Muna Black RN

## 2020-08-14 NOTE — PATIENT INSTRUCTIONS
It is unclear what specifically caused your heart rate to feel faster yesterday.  Your EKG does not show any abnormal rhythms today and the rate is normal.  We will complete lab work to ensure your electrolytes are normal as well as your blood counts today.    Your blood pressure continues to be elevated in clinic here today.  You have been prescribed a blood pressure cuff to use at home.  You may  a blood pressure cuff at the pharmacy.  Please keep a log of your blood pressures (check it daily during rest and while you are feeling calm) and follow-up with your primary care doctor, Dr. Morales, in 3-4 weeks for evaluation of your log as well as possible medication management if needed.    Your Cymbalta has been refilled.  Please follow-up with your primary care provider at your next visit to discuss whether this medication is working well for you or not.    A COVID antibody test has been ordered for you as you think you may have had an exposure in November/December.  This will be completed with your lab work.  We will contact you with results.    Patient Education     Controlling High Blood Pressure  High blood pressure (hypertension) is often called the silent killer. This is because many people who have it, don t know it. High blood pressure can raise your risk of heart attack, stroke, heart disease, and heart failure. Controlling your blood pressure can decrease your risk of these problems. Know your blood pressure and remember to check it regularly. Doing so can save your life.  Blood pressure measurements are given as 2 numbers. Systolic blood pressure is the upper number. This is the pressure when the heart contracts. Diastolic blood pressure is the lower number. This is the pressure when the heart relaxes between beats.  Blood pressure is categorized as normal, elevated, or stage 1 or stage 2 high blood pressure:    Normal blood pressure is systolic of less than 120 and diastolic of less than 80  (120/80)    Elevated blood pressure is systolic of 120 to 129 and diastolic less than 80    Stage 1 high blood pressure is systolic is 130 to 139 or diastolic between 80 to 89    Stage 2 high blood pressure is when systolic is 140 or higher or the diastolic is 90 or higher  Here are some things you can do to help control your blood pressure.    Choose heart-healthy foods    Select low-salt, low-fat foods. Limit sodium intake to 2,400 mg per day or the amount suggested by your healthcare provider.    Limit canned, dried, cured, packaged, and fast foods. These can contain a lot of salt.    Eat 8 to 10 servings of fruits and vegetables every day.    Choose lean meats, fish, or chicken.    Eat whole-grain pasta, brown rice, and beans.    Eat 2 to 3 servings of low-fat or fat-free dairy products.    Ask your doctor about the DASH eating plan. This plan helps reduce blood pressure.    When you go to a restaurant, ask that your meal be prepared with no added salt.    Maintain a healthy weight    Ask your healthcare provider how many calories to eat a day. Then stick to that number.    Ask your healthcare provider what weight range is healthiest for you. If you are overweight, a weight loss of only 3% to 5% of your body weight can help lower blood pressure. Generally, a good weight loss goal is to lose 10% of your body weight in a year.    Limit snacks and sweets.    Get regular exercise.    Get up and get active    Choose activities you enjoy. Find ones you can do with friends or family. This includes bicycling, dancing, walking, and jogging.    Park farther away from building entrances.    Use stairs instead of the elevator.    When you can, walk or bike instead of driving.    [place] leaves, garden, or do household repairs.    Be active at a moderate to vigorous level of physical activity for at least 40 minutes for a minimum of 3 to 4 days a week.     Manage stress    Make time to relax and enjoy life. Find time to  laugh.    Communicate your concerns with your loved ones and your healthcare provider.    Visit with family and friends, and keep up with hobbies.    Limit alcohol and quit smoking    Men should have no more than 2 drinks per day.    Women should have no more than 1 drink per day.    Talk with your healthcare provider about quitting smoking. Smoking significantly increases your risk for heart disease and stroke. Ask your healthcare provider about community smoking cessation programs and other options.    Medicines  If lifestyle changes aren t enough, your healthcare provider may prescribe high blood pressure medicine. Take all medicines as prescribed. If you have any questions about your medicines, ask your healthcare provider before stopping or changing them.  Date Last Reviewed: 4/27/2016 2000-2019 [employer]. [address] [place] [address], [place] [address]. All rights reserved. This information is not intended as a substitute for professional medical care. Always follow your healthcare professional's instructions.           Patient Education     Understanding Heart Palpitations    Heart palpitations are a symptom. It s the feeling you have when your heartbeat seems to be racing, pounding, skipping, or fluttering. Heart palpitations are most often felt in the chest. Sometimes, they may also be felt in the neck.  What causes heart palpitations?  In most cases, heart palpitations are caused by:    Stress or anxiety    Exercise    Pregnancy    Some medicines    Caffeine    Nicotine    Alcohol    Illegal drugs, such as cocaine    Health problems, such as anemia or overactive thyroid  In some cases, heart palpitations may be caused by a problem with the heart. Abnormal heart rhythms (arrhythmias) are the main concern. They may need to be managed by you and your healthcare provider or treated right away.  How are heart palpitations treated?  Treatments for heart palpitations depend on the cause. Options may  include:    Managing the things that trigger your heart palpitations. This could mean:  ? Learning ways to reduce stress and anxiety  ? Avoiding caffeine, nicotine, alcohol, or illegal drugs  ? Stopping the use of certain medicines, under your doctor s guidance    Medicines, procedures, or surgery to treat an arrhythmia or other health problem that is causing your symptoms  What are the complications of heart palpitations?  Complications of heart palpitations are rare unless they are caused by a problem such as an arrhythmia. In such cases, complications can include:    Fainting    Heart failure. This problem occurs when the heart is so weak it no longer pumps blood well.    Blood clots and stroke    Sudden cardiac arrest. This problem occurs when the heart suddenly stops beating.  When should I call my healthcare provider?  Call your healthcare provider right away if you have any of these:    Fever of 100.4 F (38 C) or higher, or as directed    Symptoms that don t get better with treatment, or symptoms that get worse    New symptoms, such as chest pain, shortness of breath, dizziness, or fainting   Date Last Reviewed: 5/1/2016 2000-2019 [employer]. [address] [place] [address], [place] [address]. All rights reserved. This information is not intended as a substitute for professional medical care. Always follow your healthcare professional's instructions.

## 2020-08-14 NOTE — LETTER
August 14, 2020      Munir Storey  [address] [id number]        Dear ,    We are writing to inform you of your test results.    Your blood counts appear normal.  Please continue with your treatment plan as discussed.     Resulted Orders   CBC with platelets   Result Value Ref Range    WBC 5.1 4.0 - 11.0 10e9/L    RBC Count 4.94 4.4 - 5.9 10e12/L    Hemoglobin 15.4 13.3 - 17.7 g/dL    Hematocrit 45.6 40.0 - 53.0 %    MCV 92 78 - 100 fl    MCH 31.2 26.5 - 33.0 pg    MCHC 33.8 31.5 - 36.5 g/dL    RDW 12.7 10.0 - 15.0 %    Platelet Count 211 150 - 450 10e9/L       If you have any questions or concerns, please call the clinic at the number listed above.       Sincerely,        Leticia Ruano PA-C/ mary

## 2020-08-14 NOTE — LETTER
August 17, 2020      Munir Storey  [address] [address]        Dear ,    We are writing to inform you of your test results.    Your COVID-19 antibody screening was negative.  This means that you were not previously exposed to COVID-19, or you no longer have antibodies to this virus.       Please follow-up with your primary care provider as discussed.       Take care,   KENNETH Day-C/am    Resulted Orders   CBC with platelets   Result Value Ref Range    WBC 5.1 4.0 - 11.0 10e9/L    RBC Count 4.94 4.4 - 5.9 10e12/L    Hemoglobin 15.4 13.3 - 17.7 g/dL    Hematocrit 45.6 40.0 - 53.0 %    MCV 92 78 - 100 fl    MCH 31.2 26.5 - 33.0 pg    MCHC 33.8 31.5 - 36.5 g/dL    RDW 12.7 10.0 - 15.0 %    Platelet Count 211 150 - 450 10e9/L   Basic metabolic panel  (Ca, Cl, CO2, Creat, Gluc, K, Na, BUN)   Result Value Ref Range    Sodium 139 133 - 144 mmol/L    Potassium 4.5 3.4 - 5.3 mmol/L    Chloride 105 94 - 109 mmol/L    Carbon Dioxide 28 20 - 32 mmol/L    Anion Gap 6 3 - 14 mmol/L    Glucose 97 70 - 99 mg/dL    Urea Nitrogen 10 7 - 30 mg/dL    Creatinine 0.72 0.66 - 1.25 mg/dL    GFR Estimate >90 >60 mL/min/[1.73_m2]      Comment:      Non  GFR Calc  Starting 12/18/2018, serum creatinine based estimated GFR (eGFR) will be   calculated using the Chronic Kidney Disease Epidemiology Collaboration   (CKD-EPI) equation.      GFR Estimate If Black >90 >60 mL/min/[1.73_m2]      Comment:       GFR Calc  Starting 12/18/2018, serum creatinine based estimated GFR (eGFR) will be   calculated using the Chronic Kidney Disease Epidemiology Collaboration   (CKD-EPI) equation.      Calcium 9.2 8.5 - 10.1 mg/dL   COVID-19 Virus (Coronavirus) Antibody & Titer Reflex   Result Value Ref Range    COVID-19 Antibody Screen Negative       Comment:      No COVID-19 antibodies detected.  Patients within 10 days of symptom onset for   COVID-19 may not produce sufficient levels of detectable  antibodies.    Immunocompromised COVID-19 patients may take longer to develop antibodies.      COVID-19 Antibody, IgG Titer Not Applicable       Comment:      Qualitative screen for total antibodies to COVID-19 (SARS-CoV-2) with   semi-quantitative measurement of IgG COVID-19 antibodies by endpoint titer.    COVID-19 antibodies may be elevated due to a past or current infection.  Negative results do not rule out COVID-19 infection.  Results from antibody   testing should not be used as the sole basis to diagnose or exclude SARS-CoV-2   infection or to inform infection status.  COVID-19 PCR test should be ordered   if current infection is suspected.  False positive results may occur in rare   cases due to cross-reacting antibodies.  This test was developed and its performance characteristics determined by the   [facility] ([facility]),   which is regulated under CLIA as qualified to perform high-complexity testing.    This test has not been reviewed by the FDA.  Testing performed by [facility], [facility], [address], [address], [place] [address]

## 2020-08-14 NOTE — PROGRESS NOTES
Subjective     Munir Storey is a 55 year old male who presents to clinic today for the following health issues:    HPI     Patient notes he woke up from nap yesterday with his heart rate seemingly faster than normal, but not racing. He noticed a few skipped beats. Symptoms lasted for around 3-4 minutes and have not returned. He has been compliant with medications, but states he is out of Duloxetine which is for anxiety. He feels this medication worked well with anxiety. He notes ongoing physical fatigue which has been present for months. Patient admits to feeling anxious during clinic visits and does not have capability to check blood pressure at home.  He has been compliant with antihypertensive medications.    Patient denies chest pain, dyspnea, palpations, cough, fever.      Patient also notes that he had influenza-like illness in November/December and would like COVID antibody testing.  No known contacts with COVID-19.    Anxiety Follow-Up    How are you doing with your anxiety since your last visit? Worsened been having fatigue and woke up with heart racing and skipped a beat one time    Are you having other symptoms that might be associated with anxiety? No    Have you had a significant life event? No     Are you feeling depressed? No    Do you have any concerns with your use of alcohol or other drugs? No    Social History     Tobacco Use     Smoking status: Former Smoker     Packs/day: 0.50     Years: 15.00     Pack years: 7.50     Types: Cigarettes, Dip, chew, snus or snuff     Last attempt to quit: 2019     Years since quittin.9     Smokeless tobacco: Former User     Types: Chew   Substance Use Topics     Alcohol use: No     Comment: hx alcohol abuse, sober since      Drug use: No     SANDRA-7 SCORE 2019   Total Score 11 8 5     PHQ 2019   PHQ-9 Total Score 8 7 4   Q9: Thoughts of better off dead/self-harm past 2 weeks Several days Not at all Not  at all   F/U: Thoughts of suicide or self-harm - - -   F/U: Safety concerns - - -     Last PHQ-9 8/14/2020   1.  Little interest or pleasure in doing things 2   2.  Feeling down, depressed, or hopeless 0   3.  Trouble falling or staying asleep, or sleeping too much 2   4.  Feeling tired or having little energy 3   5.  Poor appetite or overeating 0   6.  Feeling bad about yourself 0   7.  Trouble concentrating 0   8.  Moving slowly or restless 0   Q9: Thoughts of better off dead/self-harm past 2 weeks 0   PHQ-9 Total Score 7   Difficulty at work, home, or with people Somewhat difficult   In the past two weeks have you had thoughts of suicide or self harm? -   Do you have concerns about your personal safety or the safety of others? -     SANDRA-7  8/14/2020   1. Feeling nervous, anxious, or on edge 2   2. Not being able to stop or control worrying 1   3. Worrying too much about different things 0   4. Trouble relaxing 0   5. Being so restless that it is hard to sit still 0   6. Becoming easily annoyed or irritable 1   7. Feeling afraid, as if something awful might happen 2   SANDRA-7 Total Score 6   If you checked any problems, how difficult have they made it for you to do your work, take care of things at home, or get along with other people? Somewhat difficult         How many days per week do you miss taking your medication? 0    Patient Active Problem List   Diagnosis     Hypertension, goal below 140/90     Tobacco abuse     SANDRA (generalized anxiety disorder)     Ejection fraction < 50%     Alcohol abuse, in remission     Moderate episode of recurrent major depressive disorder (H)     Overweight (BMI 25.0-29.9)     Seborrheic keratosis     H/O atrial flutter     H/O atrioventricular quita ablation     Peyronie disease     Past Surgical History:   Procedure Laterality Date     BACK SURGERY      cervical fusion      CARDIAC SURGERY  07/06/2015    EP cardioversion     COLONOSCOPY  08/01/2013     COLONOSCOPY  4/6/2019     normal colonoscopy - repeat 10 years     HERNIA REPAIR       LAPAROSCOPIC RESECTION SMALL BOWEL  2013     UPPER GI ENDOSCOPY  2013       Social History     Tobacco Use     Smoking status: Former Smoker     Packs/day: 0.50     Years: 15.00     Pack years: 7.50     Types: Cigarettes, Dip, chew, snus or snuff     Last attempt to quit: 2019     Years since quittin.9     Smokeless tobacco: Former User     Types: Chew   Substance Use Topics     Alcohol use: No     Comment: hx alcohol abuse, sober since      Family History   Problem Relation Age of Onset     Diabetes Father      Depression Father      Depression Paternal Grandfather          Current Outpatient Medications   Medication Sig Dispense Refill     amLODIPine (NORVASC) 10 MG tablet Take 1 tablet (10 mg) by mouth daily 90 tablet 1     DULoxetine (CYMBALTA) 30 MG capsule Take 1 capsule (30 mg) by mouth daily For anxiety. 30 capsule 1     hydrochlorothiazide (HYDRODIURIL) 12.5 MG tablet Take 1 tablet (12.5 mg) by mouth daily For blood pressure. 90 tablet 1     lisinopril (PRINIVIL/ZESTRIL) 40 MG tablet Take 1 tablet (40 mg) by mouth daily 90 tablet 1     LORazepam (ATIVAN) 0.5 MG tablet Take 1 tablet (0.5 mg) by mouth every 6 hours as needed for anxiety 30 tablet 0     ondansetron (ZOFRAN-ODT) 4 MG ODT tab Take 1 tablet (4 mg) by mouth every 8 hours as needed for nausea or vomiting (Patient not taking: Reported on 2020) 12 tablet 0     Allergies   Allergen Reactions     Sulfamethoxazole-Trimethoprim Nausea       Reviewed and updated as needed this visit by Provider         Review of Systems   Constitutional: Negative for diaphoresis and fever.   HENT: Negative for congestion.    Respiratory: Negative for cough and shortness of breath.    Cardiovascular: Negative for chest pain, palpitations and peripheral edema.   Gastrointestinal: Negative for abdominal pain and nausea.   Genitourinary: Negative for dysuria and urgency.   Skin:  "Negative for rash.   Neurological: Negative for light-headedness.   Psychiatric/Behavioral: The patient is not nervous/anxious.             Objective    BP (!) 158/100 (BP Location: Left arm, Patient Position: Chair, Cuff Size: Adult Large)   Pulse 88   Temp 98.8  F (37.1  C) (Tympanic)   Resp 14   Ht 1.727 m (5' 8\")   Wt 81.2 kg (179 lb)   SpO2 98%   BMI 27.22 kg/m    Body mass index is 27.22 kg/m .  Physical Exam  Vitals signs and nursing note reviewed.   Constitutional:       General: He is not in acute distress.     Appearance: Normal appearance.   HENT:      Head: Normocephalic and atraumatic.      Nose: No rhinorrhea.      Mouth/Throat:      Mouth: Mucous membranes are moist.      Pharynx: Oropharynx is clear.   Eyes:      Extraocular Movements: Extraocular movements intact.      Pupils: Pupils are equal, round, and reactive to light.   Neck:      Musculoskeletal: Normal range of motion.   Cardiovascular:      Rate and Rhythm: Normal rate and regular rhythm.      Heart sounds: Normal heart sounds. No murmur. No gallop.    Pulmonary:      Effort: Pulmonary effort is normal.      Breath sounds: Normal breath sounds. No wheezing, rhonchi or rales.   Musculoskeletal: Normal range of motion.   Skin:     General: Skin is warm and dry.   Neurological:      General: No focal deficit present.      Mental Status: He is alert.   Psychiatric:         Mood and Affect: Mood normal.         Behavior: Behavior normal.            Diagnostic Test Results:  Labs reviewed in Epic  EKG -NSR, no arrhythmia  CBC, BMP pending        Assessment & Plan     1. Palpitations  EKG shows NSR.  No signs of arrhythmia.  We will complete lab work to ensure no electrolyte abnormalities.  Unclear etiology of symptoms.  Recommended follow-up if symptoms return and will consider Holter monitor at that time.  - EKG 12-lead complete w/read - Clinics    2. SANDRA (generalized anxiety disorder)  Patient has been on Cymbalta for 1 month for " generalized anxiety disorder and notes this medication helps some.  Recommended continuing medication as it may not have full effect for 6-8 weeks.  Recommended follow-up with PCP in 1 month for reevaluation.  Refill provided.  - DULoxetine (CYMBALTA) 30 MG capsule; Take 1 capsule (30 mg) by mouth daily For anxiety.  Dispense: 30 capsule; Refill: 0    3. Hypertension, goal below 140/90  Patient's blood pressure is elevated today in clinic.  Additionally he notes anxiety with office visits and in general.  Patient does not have blood pressure monitor at home and has been compliant with antihypertensive medications.  Patient prescribed home blood pressure monitor.  Recommended keeping log and reviewing log when following up with PCP next month.  - Home Blood Pressure Monitor Order for DME - ONLY FOR DME    4. Fatigue, unspecified type  Patient notes ongoing fatigue.  EKG without signs of arrhythmia.  Patient notes fatigue has not improved with treatment for anxiety.  Will evaluate with lab work.  - CBC with platelets  - Basic metabolic panel  (Ca, Cl, CO2, Creat, Gluc, K, Na, BUN)       See Patient Instructions    Return in about 4 weeks (around 9/11/2020) for BP Recheck and PCP follow up.    Leticia Ruano PA-C  [facility]

## 2020-08-15 LAB
COVID-19 SPIKE RBD ABY TITER: NORMAL
COVID-19 SPIKE RBD ABY: NEGATIVE

## 2020-08-15 ASSESSMENT — ANXIETY QUESTIONNAIRES: GAD7 TOTAL SCORE: 6

## 2020-09-21 ENCOUNTER — TELEPHONE (OUTPATIENT)
Dept: FAMILY MEDICINE | Facility: CLINIC | Age: 55
End: 2020-09-21

## 2020-09-21 DIAGNOSIS — I10 HYPERTENSION, GOAL BELOW 140/90: ICD-10-CM

## 2020-09-21 NOTE — TELEPHONE ENCOUNTER
Pt is calling back and states that he has no idea what this form is for, please email it to him at     [email or web address]

## 2020-09-21 NOTE — TELEPHONE ENCOUNTER
Left message for patient to return call to clinic. We received a form from [employer] for Nasreen Martínez to complete. We need to ask the patient what this is for before Nasreen can fill it out for him. We will hold onto the form in Pod 4 at [place].   Merle Meza CMA

## 2020-09-23 DIAGNOSIS — I10 HYPERTENSION, GOAL BELOW 140/90: ICD-10-CM

## 2020-09-24 RX ORDER — HYDROCHLOROTHIAZIDE 12.5 MG/1
12.5 TABLET ORAL DAILY
Qty: 90 TABLET | Refills: 1 | Status: SHIPPED | OUTPATIENT
Start: 2020-09-24 | End: 2021-06-13

## 2020-09-24 RX ORDER — LISINOPRIL 40 MG/1
TABLET ORAL
Qty: 90 TABLET | Refills: 1 | Status: SHIPPED | OUTPATIENT
Start: 2020-09-24 | End: 2021-06-13

## 2020-09-24 NOTE — TELEPHONE ENCOUNTER
Routing refill request to provider for review/approval because:  Labs out of range    BP Readings from Last 3 Encounters:   08/14/20 (!) 158/100   06/29/20 (!) 165/92   04/02/20 (!) 143/81     LOV:  8/14/20 with BURAK Ruano  Return in about 4 weeks (around 9/11/2020) for BP Recheck and PCP follow up.  No appointment scheduled    Sarah Beach RN

## 2020-09-25 NOTE — TELEPHONE ENCOUNTER
"  Routing refill request to provider for review/approval because:  Blood pressure out of range   Muna Black RN    Last office visit: 8/14/2020 with prescribing provider:  Bindu   Requested Prescriptions   Pending Prescriptions Disp Refills     amLODIPine (NORVASC) 10 MG tablet [Pharmacy Med Name: AMLODIPINE BESYLATE 10 MG TAB] 90 tablet 1     Sig: TAKE 1 TABLET BY MOUTH EVERY DAY       Calcium Channel Blockers Protocol  Failed - 9/23/2020 12:24 AM        Failed - Blood pressure under 140/90 in past 12 months     BP Readings from Last 3 Encounters:   08/14/20 (!) 158/100   06/29/20 (!) 165/92   04/02/20 (!) 143/81                 Passed - Recent (12 mo) or future (30 days) visit within the authorizing provider's specialty     Patient has had an office visit with the authorizing provider or a provider within the authorizing providers department within the previous 12 mos or has a future within next 30 days. See \"Patient Info\" tab in inbasket, or \"Choose Columns\" in Meds & Orders section of the refill encounter.              Passed - Medication is active on med list        Passed - Patient is age 18 or older        Passed - Normal serum creatinine on file in past 12 months     Recent Labs   Lab Test 08/14/20  1203   CR 0.72       Ok to refill medication if creatinine is low               "

## 2020-09-28 RX ORDER — AMLODIPINE BESYLATE 10 MG/1
TABLET ORAL
Qty: 90 TABLET | Refills: 1 | Status: SHIPPED | OUTPATIENT
Start: 2020-09-28 | End: 2020-12-14

## 2020-10-06 DIAGNOSIS — F41.1 GAD (GENERALIZED ANXIETY DISORDER): ICD-10-CM

## 2020-10-06 RX ORDER — DULOXETIN HYDROCHLORIDE 30 MG/1
30 CAPSULE, DELAYED RELEASE ORAL DAILY
Qty: 30 CAPSULE | Refills: 0 | Status: SHIPPED | OUTPATIENT
Start: 2020-10-06 | End: 2020-10-30

## 2020-10-06 NOTE — TELEPHONE ENCOUNTER
Pt is calling and asking for a refill on his medication cymbalta. Pt is out of medication .    Brianna Rogers, [facility]

## 2020-10-06 NOTE — TELEPHONE ENCOUNTER
Routing refill request to provider for review/approval because:  Labs out of range:  BP  Patient needs to be seen because Over due for office visit    LOV:  8/14/20, recommended 1 month follow-up (9/11/2020) no appointment scheduled    BP Readings from Last 3 Encounters:   08/14/20 (!) 158/100   06/29/20 (!) 165/92   04/02/20 (!) 143/81     Sarah Beach RN

## 2020-12-14 ENCOUNTER — OFFICE VISIT (OUTPATIENT)
Dept: FAMILY MEDICINE | Facility: CLINIC | Age: 55
End: 2020-12-14
Payer: COMMERCIAL

## 2020-12-14 VITALS
TEMPERATURE: 97.7 F | WEIGHT: 189.8 LBS | DIASTOLIC BLOOD PRESSURE: 95 MMHG | HEART RATE: 85 BPM | BODY MASS INDEX: 28.86 KG/M2 | OXYGEN SATURATION: 99 % | RESPIRATION RATE: 22 BRPM | SYSTOLIC BLOOD PRESSURE: 170 MMHG

## 2020-12-14 DIAGNOSIS — F41.1 GAD (GENERALIZED ANXIETY DISORDER): Primary | ICD-10-CM

## 2020-12-14 DIAGNOSIS — I10 HYPERTENSION, GOAL BELOW 140/90: ICD-10-CM

## 2020-12-14 DIAGNOSIS — Z86.79 H/O ATRIAL FLUTTER: ICD-10-CM

## 2020-12-14 DIAGNOSIS — F10.11 ALCOHOL ABUSE, IN REMISSION: ICD-10-CM

## 2020-12-14 DIAGNOSIS — Z98.890 H/O ATRIOVENTRICULAR NODAL ABLATION: ICD-10-CM

## 2020-12-14 DIAGNOSIS — F33.1 MODERATE EPISODE OF RECURRENT MAJOR DEPRESSIVE DISORDER (H): ICD-10-CM

## 2020-12-14 DIAGNOSIS — Z72.0 TOBACCO ABUSE: ICD-10-CM

## 2020-12-14 PROCEDURE — 99215 OFFICE O/P EST HI 40 MIN: CPT | Performed by: PHYSICIAN ASSISTANT

## 2020-12-14 RX ORDER — LORAZEPAM 0.5 MG/1
0.5 TABLET ORAL EVERY 6 HOURS PRN
Qty: 30 TABLET | Refills: 0 | Status: SHIPPED | OUTPATIENT
Start: 2020-12-14 | End: 2021-06-10

## 2020-12-14 RX ORDER — PROPRANOLOL HCL 60 MG
60 CAPSULE, EXTENDED RELEASE 24HR ORAL DAILY
Qty: 30 CAPSULE | Refills: 0 | Status: SHIPPED | OUTPATIENT
Start: 2020-12-14 | End: 2021-01-08

## 2020-12-14 ASSESSMENT — ANXIETY QUESTIONNAIRES
6. BECOMING EASILY ANNOYED OR IRRITABLE: SEVERAL DAYS
7. FEELING AFRAID AS IF SOMETHING AWFUL MIGHT HAPPEN: NEARLY EVERY DAY
1. FEELING NERVOUS, ANXIOUS, OR ON EDGE: MORE THAN HALF THE DAYS
2. NOT BEING ABLE TO STOP OR CONTROL WORRYING: MORE THAN HALF THE DAYS
3. WORRYING TOO MUCH ABOUT DIFFERENT THINGS: MORE THAN HALF THE DAYS
5. BEING SO RESTLESS THAT IT IS HARD TO SIT STILL: NOT AT ALL
GAD7 TOTAL SCORE: 10
IF YOU CHECKED OFF ANY PROBLEMS ON THIS QUESTIONNAIRE, HOW DIFFICULT HAVE THESE PROBLEMS MADE IT FOR YOU TO DO YOUR WORK, TAKE CARE OF THINGS AT HOME, OR GET ALONG WITH OTHER PEOPLE: SOMEWHAT DIFFICULT

## 2020-12-14 ASSESSMENT — PATIENT HEALTH QUESTIONNAIRE - PHQ9
SUM OF ALL RESPONSES TO PHQ QUESTIONS 1-9: 6
5. POOR APPETITE OR OVEREATING: NOT AT ALL

## 2020-12-14 NOTE — PROGRESS NOTES
Subjective     Munir Storey is a 55 year old male who presents to clinic today for the following health issues:    HPI         Chest Pain  Onset/Duration: unable to answer   Description:   Location: left side  Character: quick, sharp pain   Radiation: left shoulder  Duration: seconds    Intensity: 2/10  Progression of Symptoms: has not had chest pain since last week but worsening of symptoms, felt more fatigue yesterday and did not feel right   Accompanying Signs & Symptoms:  Shortness of breath: no  Sweating: no  Nausea/vomiting: no  Lightheadedness: YES- some  Palpitations: YES- reporting fluttering in chest not heart but feels like his heart is skipping a beat   Fever/Chills: YES- yesterday, chills   Cough: no           Heartburn: no  History:   Family history of heart disease: YES- mother and father   Tobacco use: no, former smoker   Previous similar symptoms: YES- x2-3 years ago prior to ablation   Precipitating factors:   Worse with exertion: no  Worse with deep breaths: no           Related to eating: no           Better with burping: YES  Alleviating factors: na     Unsure if these symptoms are related to his heart, anxiety or pinched nerve.     He stopped his Cymbalta x2 weeks ago because he thought it was causing headaches.  He stopped amlodipine x3 weeks ago as he thought it was causing fatigue.        Has pinched nerve in left side of neck which radiates down left arm     Feels heart rate is out of rhythm at times.  Feels like it will skip a beat.    He continues to struggle with depression and anxiety.    He has no motivation to do things he loves (hunting) and has a constant fear something is wrong.   He has tried 5 meds and feels like he isn't getting anywhere.      Does not use ativan.       He has been able to continue his sobriety from alcohol.  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      He has not done therapy as advised in the  past.  He is cautious and worried this will bring out a lot of skeletons in the closet that he has kept hidden for so long (he states nothing too worrisome/bad).        Therapies tried and outcome: na         Review of Systems   Constitutional, HEENT, cardiovascular, pulmonary, GI, , musculoskeletal, neuro, skin, endocrine and psych systems are negative, except as otherwise noted.      Objective    BP (!) 170/95   Pulse 85   Temp 97.7  F (36.5  C) (Tympanic)   Resp 22   Wt 86.1 kg (189 lb 12.8 oz)   SpO2 99%   BMI 28.86 kg/m    Body mass index is 28.86 kg/m .  Physical Exam   GENERAL: healthy, anxious appearing at times  NECK: no adenopathy, no asymmetry, masses, or scars and thyroid normal to palpation  RESP: lungs clear to auscultation - no rales, rhonchi or wheezes  CV: regular rate and rhythm, normal S1 S2, no S3 or S4, no murmur, click or rub, no peripheral edema and peripheral pulses strong  MS: no gross musculoskeletal defects noted, no edema            Assessment & Plan     SANDRA (generalized anxiety disorder)  We had a long discussion about anxiety and how this can affect the body.  It is extremely important that he address this anxiety.      Discussed with Munir that I do not recommend he stop medications on his own in the future.      He stopped the amlodipine 3 weeks ago due to fatigue (however he has had fatigue prior to this).  He then noticed worsening headaches and stopped the cymbalta, which I actually would think the headaches were due to increases in his BP since stopping the amlodipine.  I suggest going forward if he has any issues, to reach out to the clinic and we'll trouble shoot issues as they come up.      For now, will continue to hold off on the cymbalta but may consider restarting this down the road.      I suggest instead let's add a beta blocker which should help with headaches, hypertension and anxiety.  I think he would do well with this medication as well due to some heart  palpitations he has had.      We discussed checking a holter monitor and I am ok with ordering that at anytime.  He is somewhat concerned with the cost of his medical care at this time.  Discussed that beta blockers can help with rate control and are often used for arrhythmia's.  His heart rate and rhythm was normal today during the exam, therefore I feel comfortable giving this more time and a trial on a beta blocker first.     Encouraged him to reach out to a counselor, this is a very important piece of getting his health back on track.     - MENTAL HEALTH REFERRAL  - Adult; Outpatient Treatment; Individual/Couples/Family/Group Therapy/Health Psychology; [facility]: [facility] [phone]; We will contact you to schedule the appointment or please call with any questions  - LORazepam (ATIVAN) 0.5 MG tablet; Take 1 tablet (0.5 mg) by mouth every 6 hours as needed for anxiety    Hypertension, goal below 140/90  BP not at goal, he stopped the amlodipine on his own.  We may consider coming back to this down the road, but first, I'd like to try a beta blocker to see if this will also help with his anxiety.   - propranolol ER (INDERAL LA) 60 MG 24 hr capsule; Take 1 capsule (60 mg) by mouth daily  - MENTAL HEALTH REFERRAL  - Adult; Outpatient Treatment; Individual/Couples/Family/Group Therapy/Health Psychology; [facility]: [facility] [phone]; We will contact you to schedule the appointment or please call with any questions    H/O atrial flutter  Currently in sinus rhythm.  EKG from 8/14/20 was NSR.     H/O atrioventricular quita ablation  Currently in sinus rhythm.     Alcohol abuse, in remission  Congratulated him on continued sobriety.      Tobacco abuse  Cessation advised.     Moderate episode of recurrent major depressive disorder (H)  Encouraged f/u with therapist and may consider restarting cymbalta down the road.        BMI:   Estimated body mass index is 28.86 kg/m  as calculated  "from the following:    Height as of 8/14/20: 1.727 m (5' 8\").    Weight as of this encounter: 86.1 kg (189 lb 12.8 oz).   Weight management plan: Discussed healthy diet and exercise guidelines       Patient instructions:   Start checking your blood pressure every other day.    Will add propranolol once per day to help with anxiety, BP, headache, and heart palpitations.      Continue to hold the Cymbalta and amlodipine for now, we may re-introduce those down the road.     Please sign up for mychart and send me a message if you develop any symptoms.  Do not stop meds without a message in.      Schedule a visit with a therapist.      Continue to work on things you love (woodworking and skating)    Return in about 4 weeks (around 1/11/2021) for Physical Exam.    Approximately 45 minutes were spent face-to-face with patient and greater than 50% of that time was spent on counseling and coordination of care for the above issues.    Nasreen Martínez PA-C  [facility]    "

## 2020-12-14 NOTE — PATIENT INSTRUCTIONS
Start checking your blood pressure every other day.    Will add propranolol once per day to help with anxiety, BP, headache, and heart palpitations.      Continue to hold the Cymbalta and amlodipine for now, we may re-introduce those down the road.     Please sign up for mychart and send me a message if you develop any symptoms.  Do not stop meds without a message in.      Schedule a visit with a therapist.      Continue to work on things you love (woodworking and skating)

## 2020-12-15 ASSESSMENT — ANXIETY QUESTIONNAIRES: GAD7 TOTAL SCORE: 10

## 2021-01-05 DIAGNOSIS — I10 HYPERTENSION, GOAL BELOW 140/90: ICD-10-CM

## 2021-01-07 NOTE — TELEPHONE ENCOUNTER
Routing refill request to provider for review/approval because:    Beta-Blockers Protocol [id number]/05/2021 12:08 PM  x Blood pressure under 140/90 in past 12 months         BP Readings from Last 3 Encounters:   12/14/20 (!) 170/95   08/14/20 (!) 158/100   06/29/20 (!) 165/92

## 2021-01-08 RX ORDER — PROPRANOLOL HCL 60 MG
CAPSULE, EXTENDED RELEASE 24HR ORAL
Qty: 30 CAPSULE | Refills: 0 | Status: SHIPPED | OUTPATIENT
Start: 2021-01-08 | End: 2021-02-18

## 2021-01-15 PROBLEM — F10.11 ALCOHOL ABUSE, IN REMISSION: Status: ACTIVE | Noted: 2017-10-20

## 2021-01-15 PROBLEM — E66.3 OVERWEIGHT (BMI 25.0-29.9): Status: ACTIVE | Noted: 2019-08-04

## 2021-01-15 PROBLEM — R94.30 EJECTION FRACTION < 50%: Status: ACTIVE | Noted: 2017-10-20

## 2021-01-15 PROBLEM — R94.30 EJECTION FRACTION < 50%: Status: RESOLVED | Noted: 2017-10-20 | Resolved: 2021-01-15

## 2021-01-15 PROBLEM — L82.1 SEBORRHEIC KERATOSIS: Status: ACTIVE | Noted: 2019-08-14

## 2021-01-15 PROBLEM — F41.1 GAD (GENERALIZED ANXIETY DISORDER): Chronic | Status: ACTIVE | Noted: 2017-10-20

## 2021-01-15 PROBLEM — Z98.890 H/O ATRIOVENTRICULAR NODAL ABLATION: Status: RESOLVED | Noted: 2019-09-02 | Resolved: 2021-01-15

## 2021-01-15 PROBLEM — Z86.79 H/O ATRIAL FLUTTER: Chronic | Status: ACTIVE | Noted: 2019-09-02

## 2021-01-26 ENCOUNTER — TELEPHONE (OUTPATIENT)
Dept: SLEEP MEDICINE | Facility: CLINIC | Age: 56
End: 2021-01-26

## 2021-01-26 NOTE — TELEPHONE ENCOUNTER
Reason for call:  Other   Patient called regarding (reason for call): call back  Additional comments: Patient called for price of sleep study, not covered under insurance per patient.    Phone number to reach patient:  Cell number on file:    Telephone Information:   Mobile [phone]       Best Time:  Anytime    Can we leave a detailed message on this number?  YES    Travel screening: Not Applicable

## 2021-02-16 NOTE — PATIENT INSTRUCTIONS
Check with insurance to see if they will cover a home sleep study.  Otherwise, check with the clinic in [place] to see if this is more affordable and if they can also prescribe CPAP machine.      Restart the amlodipine.     Preventive Health Recommendations  Male Ages 50 - 64    Yearly exam:             See your health care provider every year in order to  o   Review health changes.   o   Discuss preventive care.    o   Review your medicines if your doctor has prescribed any.     Have a cholesterol test every 5 years, or more frequently if you are at risk for high cholesterol/heart disease.     Have a diabetes test (fasting glucose) every three years. If you are at risk for diabetes, you should have this test more often.     Have a colonoscopy at age 50, or have a yearly FIT test (stool test). These exams will check for colon cancer.      Talk with your health care provider about whether or not a prostate cancer screening test (PSA) is right for you.    You should be tested each year for STDs (sexually transmitted diseases), if you re at risk.     Shots: Get a flu shot each year. Get a tetanus shot every 10 years.     Nutrition:    Eat at least 5 servings of fruits and vegetables daily.     Eat whole-grain bread, whole-wheat pasta and brown rice instead of white grains and rice.     Get adequate Calcium and Vitamin D.     Lifestyle    Exercise for at least 150 minutes a week (30 minutes a day, 5 days a week). This will help you control your weight and prevent disease.     Limit alcohol to one drink per day.     No smoking.     Wear sunscreen to prevent skin cancer.     See your dentist every six months for an exam and cleaning.     See your eye doctor every 1 to 2 years.

## 2021-02-16 NOTE — PROGRESS NOTES
3  SUBJECTIVE:   CC: Munir Storey is an 55 year old male who presents for preventive health visit.       Patient has been advised of split billing requirements and indicates understanding: Yes  Healthy Habits:    Do you get at least three servings of calcium containing foods daily (dairy, green leafy vegetables, etc.)? no    Amount of exercise or daily activities, outside of work: 1 day(s) per week    Problems taking medications regularly No    Medication side effects: Yes fatigue    Have you had an eye exam in the past two years? no    Do you see a dentist twice per year? no    Do you have sleep apnea, excessive snoring or daytime drowsiness?yes    Energy levels are up and down.        He started propranolol, no longer with heart palpitations. His energy levels/fatigue have worsened however.     Sleep continues to be an issue, however insurance does not cover a sleep study and he cannot afford testing at this time.   He has tried the mouth guard to help with sleep apnea, however he wakes up with dry mouth.    Feels unrested when he wakes up.  His neck pain seems to keep him p at night; he has tried many different beds (including a sleep number) however the recliner is the only comfortable way to sleep.  He has had cervical spinal fusion in the past.  He does have pain in his neck from the neck/shoulder and radiates down the arm and into the chest.   If he is sitting and relaxing, this is worse.   He thinks this stems from his previous neck fusion.  This pain has been present for years, is unchanged.  He has been seen in ER many times for this and cardiac workup negative.  Has seen [facility] in the past.  Was on metoprolol in the past but stopped due to fatigue/sedation.     Does not come on with a flight or two of stairs.    No swelling of his legs.      Energy levels have worsened.  He still feels quite depressed and his hobbies feel like chores now.      He has tried effexor in the past,  this made him feel drowsy.   He has tried cymbalta in the past, he stopped this on his own.   He has tried lexapro, stopped this due to side effects of headache/fatigue when dose was increased from 10 mg to 15 mg.   Sertraline tried, also with side effects.     He does monitor blood pressures at home BP are usually < 140/90 mmHg.           Today's PHQ-2 Score:   PHQ-2 (  [employer]) 2020 10/20/2017   Q1: Little interest or pleasure in doing things 2 2   Q2: Feeling down, depressed or hopeless 0 0   PHQ-2 Score 2 2       Abuse: Current or Past(Physical, Sexual or Emotional)- No  Do you feel safe in your environment? Yes        Social History     Tobacco Use     Smoking status: Former Smoker     Packs/day: 0.50     Years: 15.00     Pack years: 7.50     Types: Cigarettes, Dip, chew, snus or snuff     Quit date: 2019     Years since quittin.5     Smokeless tobacco: Former User     Types: Chew   Substance Use Topics     Alcohol use: No     Comment: hx alcohol abuse, sober since      If you drink alcohol do you typically have >3 drinks per day or >7 drinks per week? No                      Last PSA:   PSA   Date Value Ref Range Status   2019 0.52 0 - 4 ug/L Final     Comment:     Assay Method:  Chemiluminescence using Siemens Vista analyzer       Reviewed orders with patient. Reviewed health maintenance and updated orders accordingly - Yes  Labs reviewed in EPIC  BP Readings from Last 3 Encounters:   21 (!) 194/106   20 (!) 170/95   20 (!) 158/100    Wt Readings from Last 3 Encounters:   21 90.2 kg (198 lb 12.8 oz)   20 86.1 kg (189 lb 12.8 oz)   20 81.2 kg (179 lb)                  Patient Active Problem List   Diagnosis     Hypertension, goal below 140/90     Tobacco abuse     SANDRA (generalized anxiety disorder)     Alcohol abuse, in remission     Moderate episode of recurrent major depressive disorder (H)     Overweight (BMI 25.0-29.9)     Seborrheic keratosis  "    H/O atrial flutter     Peyronie disease     Chronic neck pain with history of cervical spinal surgery     Alcohol dependence in remission (H)     Past Surgical History:   Procedure Laterality Date     CARDIAC SURGERY  2015    EP cardioversion     COLONOSCOPY  2013     COLONOSCOPY  2019    normal colonoscopy - repeat 10 years     HERNIA REPAIR       LAPAROSCOPIC RESECTION SMALL BOWEL  2013     SPINE SURGERY      cervical fusion      UPPER GI ENDOSCOPY  2013       Social History     Tobacco Use     Smoking status: Former Smoker     Packs/day: 0.50     Years: 15.00     Pack years: 7.50     Types: Cigarettes, Dip, chew, snus or snuff     Quit date: 2019     Years since quittin.5     Smokeless tobacco: Former User     Types: Chew   Substance Use Topics     Alcohol use: No     Comment: hx alcohol abuse, sober since      Family History   Problem Relation Age of Onset     Diabetes Father      Depression Father      Depression Paternal Grandfather            Reviewed and updated as needed this visit by clinical staff  Tobacco  Allergies  Meds              Reviewed and updated as needed this visit by Provider                    ROS:  CONSTITUTIONAL: NEGATIVE for fever, chills, change in weight  INTEGUMENTARY/SKIN: NEGATIVE for worrisome rashes, moles or lesions  EYES: NEGATIVE for vision changes or irritation  ENT: NEGATIVE for ear, mouth and throat problems  RESP: NEGATIVE for significant cough or SOB  CV: NEGATIVE for chest pain, palpitations or peripheral edema  GI: NEGATIVE for nausea, abdominal pain, heartburn, or change in bowel habits   male: negative for dysuria, hematuria, decreased urinary stream, erectile dysfunction, urethral discharge  MUSCULOSKELETAL: NEGATIVE for significant arthralgias or myalgia  NEURO: NEGATIVE for weakness, dizziness or paresthesias  PSYCHIATRIC: NEGATIVE for changes in mood or affect    OBJECTIVE:   Ht 1.715 m (5' 7.5\")   Wt 90.2 kg (198 " lb 12.8 oz)   BMI 30.68 kg/m    EXAM:  GENERAL: healthy, alert and no distress  EYES: Eyes grossly normal to inspection, PERRL and conjunctivae and sclerae normal  HENT: ear canals and TM's normal, nose and mouth without ulcers or lesions  NECK: no adenopathy, no asymmetry, masses, or scars and thyroid normal to palpation  RESP: lungs clear to auscultation - no rales, rhonchi or wheezes  CV: regular rate and rhythm, normal S1 S2, no S3 or S4, no murmur, click or rub, no peripheral edema and peripheral pulses strong  ABDOMEN: soft, nontender, no hepatosplenomegaly, no masses and bowel sounds normal  MS: no gross musculoskeletal defects noted, no edema  SKIN: no suspicious lesions or rashes  NEURO: Normal strength and tone, mentation intact and speech normal  PSYCH: mentation appears normal, affect normal/bright    Diagnostic Test Results:  Labs reviewed in Epic  Results for orders placed or performed in visit on 02/18/21   Vitamin D Deficiency     Status: Abnormal   Result Value Ref Range    Vitamin D Deficiency screening 17 (L) 20 - 75 ug/L   CBC with platelets     Status: None   Result Value Ref Range    WBC 4.6 4.0 - 11.0 10e9/L    RBC Count 4.62 4.4 - 5.9 10e12/L    Hemoglobin 14.5 13.3 - 17.7 g/dL    Hematocrit 43.7 40.0 - 53.0 %    MCV 95 78 - 100 fl    MCH 31.4 26.5 - 33.0 pg    MCHC 33.2 31.5 - 36.5 g/dL    RDW 12.8 10.0 - 15.0 %    Platelet Count 203 150 - 450 10e9/L   TSH with free T4 reflex     Status: None   Result Value Ref Range    TSH 3.34 0.40 - 4.00 mU/L   Vitamin B12     Status: None   Result Value Ref Range    Vitamin B12 477 193 - 986 pg/mL   Comprehensive metabolic panel (BMP + Alb, Alk Phos, ALT, AST, Total. Bili, TP)     Status: Abnormal   Result Value Ref Range    Sodium 137 133 - 144 mmol/L    Potassium 4.7 3.4 - 5.3 mmol/L    Chloride 103 94 - 109 mmol/L    Carbon Dioxide 28 20 - 32 mmol/L    Anion Gap 6 3 - 14 mmol/L    Glucose 101 (H) 70 - 99 mg/dL    Urea Nitrogen 12 7 - 30 mg/dL     Creatinine 0.92 0.66 - 1.25 mg/dL    GFR Estimate >90 >60 mL/min/[1.73_m2]    GFR Estimate If Black >90 >60 mL/min/[1.73_m2]    Calcium 9.1 8.5 - 10.1 mg/dL    Bilirubin Total 0.7 0.2 - 1.3 mg/dL    Albumin 4.4 3.4 - 5.0 g/dL    Protein Total 7.7 6.8 - 8.8 g/dL    Alkaline Phosphatase 55 40 - 150 U/L    ALT 29 0 - 70 U/L    AST 16 0 - 45 U/L   Lyme Disease Jaimie with reflex to WB Serum     Status: None   Result Value Ref Range    Lyme Disease Antibodies Serum 0.07 0.00 - 0.89   Albumin Random Urine Quantitative with Creat Ratio     Status: None   Result Value Ref Range    Creatinine Urine 96 mg/dL    Albumin Urine mg/L 7 mg/L    Albumin Urine mg/g Cr 7.44 0 - 17 mg/g Cr   Lipid panel reflex to direct LDL Fasting     Status: Abnormal   Result Value Ref Range    Cholesterol 210 (H) <200 mg/dL    Triglycerides 144 <150 mg/dL    HDL Cholesterol 43 >39 mg/dL    LDL Cholesterol Calculated 138 (H) <100 mg/dL    Non HDL Cholesterol 167 (H) <130 mg/dL       ASSESSMENT/PLAN:   1. Routine general medical examination at a health care facility       HEALTH CARE MAINTENANCE              Reviewed USPTF recommendations and anticipatory guidance.              See orders.        2. Fatigue, unspecified type/Daytime somnolence  Likely multifactorial in etiology.  Labs do show a significantly low Vitamin D level, this certainly can contribute to fatigue.  I also worry the beta blocker is worsening his fatigue.  It doesn't seem to have help as much with his anxiety as hoped, therefore will also stop this.      Will place an order for a sleep study in hopes he can do a home sleep study.  He is very certain that he has sleep apnea and would like a CPAP machine.  I explained to him that I cannot just order a CPAP, he needs to have a sleep study and then settings for a CPAP machine will be determined based off that.  There is a place in [place] that possibly does affordable sleep studies.           - Vitamin D Deficiency  - CBC with  platelets  - TSH with free T4 reflex  - Vitamin B12  - Comprehensive metabolic panel (BMP + Alb, Alk Phos, ALT, AST, Total. Bili, TP)  - Lyme Disease Jaimie with reflex to WB Serum  - SLEEP EVALUATION & MANAGEMENT REFERRAL - ADULT -[facility] [place]  [phone] (Age 15 and up); Future    3. Hypertension, goal below 140/90  Blood pressure not well controlled.  Will stop the propranolol, as I am worried this is contributing to fatigue.  Instead, will use amlodipine, which we know has worked well in the past.   - Comprehensive metabolic panel (BMP + Alb, Alk Phos, ALT, AST, Total. Bili, TP)  - Albumin Random Urine Quantitative with Creat Ratio  - amLODIPine (NORVASC) 10 MG tablet; Take 1 tablet (10 mg) by mouth daily  Dispense: 90 tablet; Refill: 0    4. CARDIOVASCULAR SCREENING; LDL GOAL LESS THAN 160  Due for screening.   - Lipid panel reflex to direct LDL Fasting    5. Chronic neck pain with history of cervical spinal surgery  He may need to restart physical therapy and/or see his spine specialist for this.     6. SANDRA (generalized anxiety disorder)  Will try a new medication and slowly increase as tolerated.   - FLUoxetine 20 MG tablet; Take 0.5 tablets (10 mg) by mouth daily for 7 days, THEN 1 tablet (20 mg) daily for 21 days.  Dispense: 30 tablet; Refill: 1    7. Moderate episode of recurrent major depressive disorder (H)  Will try prozac instead, I think this will help with both depression and anxiety.    - FLUoxetine 20 MG tablet; Take 0.5 tablets (10 mg) by mouth daily for 7 days, THEN 1 tablet (20 mg) daily for 21 days.  Dispense: 30 tablet; Refill: 1    8. Vitamin D deficiency  Your vitamin D level is low.  Vitamin D is important in preventing bone loss and preventing certain cancers.  Low levels often contribute to fatigue.  I recommend you start taking a prescriptions strength Vitamin D supplement of 50,000 IU once per week for the next 8 weeks.      Lets recheck this level in 8 weeks.  "        - vitamin D3 (CHOLECALCIFEROL) 1.25 MG (94151 UT) capsule; Take 1 capsule (50,000 Units) by mouth every 7 days  Dispense: 8 capsule; Refill: 0    9. Alcohol dependence in remission (H)  Continues to remain sober.       Patient has been advised of split billing requirements and indicates understanding: Yes  COUNSELING:  Reviewed preventive health counseling, as reflected in patient instructions       Regular exercise       Healthy diet/nutrition       Colon cancer screening    Estimated body mass index is 30.68 kg/m  as calculated from the following:    Height as of this encounter: 1.715 m (5' 7.5\").    Weight as of this encounter: 90.2 kg (198 lb 12.8 oz).    Weight management plan: Discussed healthy diet and exercise guidelines    He reports that he quit smoking about 18 months ago. His smoking use included cigarettes and dip, chew, snus or snuff. He has a 7.50 pack-year smoking history. He has quit using smokeless tobacco.  His smokeless tobacco use included chew.      Counseling Resources:  ATP IV Guidelines  Pooled Cohorts Equation Calculator  FRAX Risk Assessment  ICSI Preventive Guidelines  Dietary Guidelines for Americans, 2010  USDA's MyPlate  ASA Prophylaxis  Lung CA Screening    CE[facility]  "

## 2021-02-18 ENCOUNTER — OFFICE VISIT (OUTPATIENT)
Dept: FAMILY MEDICINE | Facility: CLINIC | Age: 56
End: 2021-02-18
Payer: COMMERCIAL

## 2021-02-18 VITALS
SYSTOLIC BLOOD PRESSURE: 194 MMHG | OXYGEN SATURATION: 100 % | BODY MASS INDEX: 30.13 KG/M2 | WEIGHT: 198.8 LBS | DIASTOLIC BLOOD PRESSURE: 106 MMHG | TEMPERATURE: 98.2 F | HEART RATE: 59 BPM | HEIGHT: 68 IN

## 2021-02-18 DIAGNOSIS — Z00.00 ROUTINE GENERAL MEDICAL EXAMINATION AT A HEALTH CARE FACILITY: Primary | ICD-10-CM

## 2021-02-18 DIAGNOSIS — F10.21 ALCOHOL DEPENDENCE IN REMISSION (H): ICD-10-CM

## 2021-02-18 DIAGNOSIS — Z13.6 CARDIOVASCULAR SCREENING; LDL GOAL LESS THAN 160: ICD-10-CM

## 2021-02-18 DIAGNOSIS — R40.0 DAYTIME SOMNOLENCE: ICD-10-CM

## 2021-02-18 DIAGNOSIS — I10 HYPERTENSION, GOAL BELOW 140/90: ICD-10-CM

## 2021-02-18 DIAGNOSIS — E55.9 VITAMIN D DEFICIENCY: ICD-10-CM

## 2021-02-18 DIAGNOSIS — F33.1 MODERATE EPISODE OF RECURRENT MAJOR DEPRESSIVE DISORDER (H): ICD-10-CM

## 2021-02-18 DIAGNOSIS — F41.1 GAD (GENERALIZED ANXIETY DISORDER): Chronic | ICD-10-CM

## 2021-02-18 DIAGNOSIS — G89.28 CHRONIC NECK PAIN WITH HISTORY OF CERVICAL SPINAL SURGERY: ICD-10-CM

## 2021-02-18 DIAGNOSIS — M54.2 CHRONIC NECK PAIN WITH HISTORY OF CERVICAL SPINAL SURGERY: ICD-10-CM

## 2021-02-18 DIAGNOSIS — Z98.890 CHRONIC NECK PAIN WITH HISTORY OF CERVICAL SPINAL SURGERY: ICD-10-CM

## 2021-02-18 DIAGNOSIS — R53.83 FATIGUE, UNSPECIFIED TYPE: ICD-10-CM

## 2021-02-18 LAB
ALBUMIN SERPL-MCNC: 4.4 G/DL (ref 3.4–5)
ALP SERPL-CCNC: 55 U/L (ref 40–150)
ALT SERPL W P-5'-P-CCNC: 29 U/L (ref 0–70)
ANION GAP SERPL CALCULATED.3IONS-SCNC: 6 MMOL/L (ref 3–14)
AST SERPL W P-5'-P-CCNC: 16 U/L (ref 0–45)
BILIRUB SERPL-MCNC: 0.7 MG/DL (ref 0.2–1.3)
BUN SERPL-MCNC: 12 MG/DL (ref 7–30)
CALCIUM SERPL-MCNC: 9.1 MG/DL (ref 8.5–10.1)
CHLORIDE SERPL-SCNC: 103 MMOL/L (ref 94–109)
CHOLEST SERPL-MCNC: 210 MG/DL
CO2 SERPL-SCNC: 28 MMOL/L (ref 20–32)
CREAT SERPL-MCNC: 0.92 MG/DL (ref 0.66–1.25)
CREAT UR-MCNC: 96 MG/DL
ERYTHROCYTE [DISTWIDTH] IN BLOOD BY AUTOMATED COUNT: 12.8 % (ref 10–15)
GFR SERPL CREATININE-BSD FRML MDRD: >90 ML/MIN/{1.73_M2}
GLUCOSE SERPL-MCNC: 101 MG/DL (ref 70–99)
HCT VFR BLD AUTO: 43.7 % (ref 40–53)
HDLC SERPL-MCNC: 43 MG/DL
HGB BLD-MCNC: 14.5 G/DL (ref 13.3–17.7)
LDLC SERPL CALC-MCNC: 138 MG/DL
MCH RBC QN AUTO: 31.4 PG (ref 26.5–33)
MCHC RBC AUTO-ENTMCNC: 33.2 G/DL (ref 31.5–36.5)
MCV RBC AUTO: 95 FL (ref 78–100)
MICROALBUMIN UR-MCNC: 7 MG/L
MICROALBUMIN/CREAT UR: 7.44 MG/G CR (ref 0–17)
NONHDLC SERPL-MCNC: 167 MG/DL
PLATELET # BLD AUTO: 203 10E9/L (ref 150–450)
POTASSIUM SERPL-SCNC: 4.7 MMOL/L (ref 3.4–5.3)
PROT SERPL-MCNC: 7.7 G/DL (ref 6.8–8.8)
RBC # BLD AUTO: 4.62 10E12/L (ref 4.4–5.9)
SODIUM SERPL-SCNC: 137 MMOL/L (ref 133–144)
TRIGL SERPL-MCNC: 144 MG/DL
TSH SERPL DL<=0.005 MIU/L-ACNC: 3.34 MU/L (ref 0.4–4)
VIT B12 SERPL-MCNC: 477 PG/ML (ref 193–986)
WBC # BLD AUTO: 4.6 10E9/L (ref 4–11)

## 2021-02-18 PROCEDURE — 82607 VITAMIN B-12: CPT | Performed by: PHYSICIAN ASSISTANT

## 2021-02-18 PROCEDURE — 36415 COLL VENOUS BLD VENIPUNCTURE: CPT | Performed by: PHYSICIAN ASSISTANT

## 2021-02-18 PROCEDURE — 82306 VITAMIN D 25 HYDROXY: CPT | Performed by: PHYSICIAN ASSISTANT

## 2021-02-18 PROCEDURE — 99214 OFFICE O/P EST MOD 30 MIN: CPT | Mod: 25 | Performed by: PHYSICIAN ASSISTANT

## 2021-02-18 PROCEDURE — 99396 PREV VISIT EST AGE 40-64: CPT | Performed by: PHYSICIAN ASSISTANT

## 2021-02-18 PROCEDURE — 80061 LIPID PANEL: CPT | Performed by: PHYSICIAN ASSISTANT

## 2021-02-18 PROCEDURE — 82043 UR ALBUMIN QUANTITATIVE: CPT | Performed by: PHYSICIAN ASSISTANT

## 2021-02-18 PROCEDURE — 84443 ASSAY THYROID STIM HORMONE: CPT | Performed by: PHYSICIAN ASSISTANT

## 2021-02-18 PROCEDURE — 80053 COMPREHEN METABOLIC PANEL: CPT | Performed by: PHYSICIAN ASSISTANT

## 2021-02-18 PROCEDURE — 86618 LYME DISEASE ANTIBODY: CPT | Performed by: PHYSICIAN ASSISTANT

## 2021-02-18 PROCEDURE — 85027 COMPLETE CBC AUTOMATED: CPT | Performed by: PHYSICIAN ASSISTANT

## 2021-02-18 RX ORDER — FLUOXETINE 20 MG/1
TABLET, FILM COATED ORAL
Qty: 30 TABLET | Refills: 1 | Status: SHIPPED | OUTPATIENT
Start: 2021-02-18 | End: 2021-03-16

## 2021-02-18 RX ORDER — AMLODIPINE BESYLATE 10 MG/1
10 TABLET ORAL DAILY
Qty: 90 TABLET | Refills: 0 | Status: SHIPPED | OUTPATIENT
Start: 2021-02-18 | End: 2021-10-20

## 2021-02-18 ASSESSMENT — ANXIETY QUESTIONNAIRES
IF YOU CHECKED OFF ANY PROBLEMS ON THIS QUESTIONNAIRE, HOW DIFFICULT HAVE THESE PROBLEMS MADE IT FOR YOU TO DO YOUR WORK, TAKE CARE OF THINGS AT HOME, OR GET ALONG WITH OTHER PEOPLE: SOMEWHAT DIFFICULT
1. FEELING NERVOUS, ANXIOUS, OR ON EDGE: NEARLY EVERY DAY
3. WORRYING TOO MUCH ABOUT DIFFERENT THINGS: NEARLY EVERY DAY
GAD7 TOTAL SCORE: 13
2. NOT BEING ABLE TO STOP OR CONTROL WORRYING: NEARLY EVERY DAY
5. BEING SO RESTLESS THAT IT IS HARD TO SIT STILL: NOT AT ALL
6. BECOMING EASILY ANNOYED OR IRRITABLE: SEVERAL DAYS
7. FEELING AFRAID AS IF SOMETHING AWFUL MIGHT HAPPEN: MORE THAN HALF THE DAYS

## 2021-02-18 ASSESSMENT — MIFFLIN-ST. JEOR: SCORE: 1693.31

## 2021-02-18 ASSESSMENT — PATIENT HEALTH QUESTIONNAIRE - PHQ9
SUM OF ALL RESPONSES TO PHQ QUESTIONS 1-9: 9
5. POOR APPETITE OR OVEREATING: SEVERAL DAYS

## 2021-02-18 NOTE — LETTER
[facility]    [address] [place] [address]  [phone]                                   February 19, 2021    Munir Storey  [address] [address]        Dear Munir,    Your vitamin D level is low.  Vitamin D is important in preventing bone loss and preventing certain cancers.  Low levels often contribute to fatigue.  I recommend you start taking a prescriptions strength Vitamin D supplement of 50,000 IU once per week for the next 8 weeks.       Lets recheck this level in 8 weeks.     The rest of your labs are normal.       Please follow-up if you have any questions or concerns.     Results for orders placed or performed in visit on 02/18/21   Vitamin D Deficiency     Status: Abnormal   Result Value Ref Range    Vitamin D Deficiency screening 17 (L) 20 - 75 ug/L   CBC with platelets     Status: None   Result Value Ref Range    WBC 4.6 4.0 - 11.0 10e9/L    RBC Count 4.62 4.4 - 5.9 10e12/L    Hemoglobin 14.5 13.3 - 17.7 g/dL    Hematocrit 43.7 40.0 - 53.0 %    MCV 95 78 - 100 fl    MCH 31.4 26.5 - 33.0 pg    MCHC 33.2 31.5 - 36.5 g/dL    RDW 12.8 10.0 - 15.0 %    Platelet Count 203 150 - 450 10e9/L   TSH with free T4 reflex     Status: None   Result Value Ref Range    TSH 3.34 0.40 - 4.00 mU/L   Vitamin B12     Status: None   Result Value Ref Range    Vitamin B12 477 193 - 986 pg/mL   Comprehensive metabolic panel (BMP + Alb, Alk Phos, ALT, AST, Total. Bili, TP)     Status: Abnormal   Result Value Ref Range    Sodium 137 133 - 144 mmol/L    Potassium 4.7 3.4 - 5.3 mmol/L    Chloride 103 94 - 109 mmol/L    Carbon Dioxide 28 20 - 32 mmol/L    Anion Gap 6 3 - 14 mmol/L    Glucose 101 (H) 70 - 99 mg/dL    Urea Nitrogen 12 7 - 30 mg/dL    Creatinine 0.92 0.66 - 1.25 mg/dL    GFR Estimate >90 >60 mL/min/[1.73_m2]    GFR Estimate If Black >90 >60 mL/min/[1.73_m2]    Calcium 9.1 8.5 - 10.1 mg/dL    Bilirubin Total 0.7 0.2 - 1.3 mg/dL    Albumin 4.4 3.4 - 5.0 g/dL    Protein Total 7.7  6.8 - 8.8 g/dL    Alkaline Phosphatase 55 40 - 150 U/L    ALT 29 0 - 70 U/L    AST 16 0 - 45 U/L   Lyme Disease Jaimie with reflex to WB Serum     Status: None   Result Value Ref Range    Lyme Disease Antibodies Serum 0.07 0.00 - 0.89   Albumin Random Urine Quantitative with Creat Ratio     Status: None   Result Value Ref Range    Creatinine Urine 96 mg/dL    Albumin Urine mg/L 7 mg/L    Albumin Urine mg/g Cr 7.44 0 - 17 mg/g Cr   Lipid panel reflex to direct LDL Fasting     Status: Abnormal   Result Value Ref Range    Cholesterol 210 (H) <200 mg/dL    Triglycerides 144 <150 mg/dL    HDL Cholesterol 43 >39 mg/dL    LDL Cholesterol Calculated 138 (H) <100 mg/dL    Non HDL Cholesterol 167 (H) <130 mg/dL       If you have any questions please call the clinic at [phone]    Sincerely,    Nasreen Martínez PA-C  bmd

## 2021-02-19 DIAGNOSIS — I10 HYPERTENSION, GOAL BELOW 140/90: ICD-10-CM

## 2021-02-19 LAB
B BURGDOR IGG+IGM SER QL: 0.07 (ref 0–0.89)
DEPRECATED CALCIDIOL+CALCIFEROL SERPL-MC: 17 UG/L (ref 20–75)

## 2021-02-19 RX ORDER — PROPRANOLOL HCL 60 MG
CAPSULE, EXTENDED RELEASE 24HR ORAL
Qty: 30 CAPSULE | Refills: 0 | OUTPATIENT
Start: 2021-02-19

## 2021-02-19 ASSESSMENT — ANXIETY QUESTIONNAIRES: GAD7 TOTAL SCORE: 13

## 2021-02-22 NOTE — PATIENT INSTRUCTIONS
Discussed Shingrex vaccine, patient will inquire at pharmacy for coverage and administration.      I recommend you use over-the-counter Debrox ear drops, 5-10 drops to right ear twice per day for a total of 4 days.  Follow-up if no improvement.          Preventive Health Recommendations  Male Ages 50 - 64    Yearly exam:             See your health care provider every year in order to  o   Review health changes.   o   Discuss preventive care.    o   Review your medicines if your doctor has prescribed any.     Have a cholesterol test every 5 years, or more frequently if you are at risk for high cholesterol/heart disease.     Have a diabetes test (fasting glucose) every three years. If you are at risk for diabetes, you should have this test more often.     Have a colonoscopy at age 50, or have a yearly FIT test (stool test). These exams will check for colon cancer.      Talk with your health care provider about whether or not a prostate cancer screening test (PSA) is right for you.    You should be tested each year for STDs (sexually transmitted diseases), if you re at risk.     Shots: Get a flu shot each year. Get a tetanus shot every 10 years.     Nutrition:    Eat at least 5 servings of fruits and vegetables daily.     Eat whole-grain bread, whole-wheat pasta and brown rice instead of white grains and rice.     Get adequate Calcium and Vitamin D.     Lifestyle    Exercise for at least 150 minutes a week (30 minutes a day, 5 days a week). This will help you control your weight and prevent disease.     Limit alcohol to one drink per day.     No smoking.     Wear sunscreen to prevent skin cancer.     See your dentist every six months for an exam and cleaning.     See your eye doctor every 1 to 2 years.    Lung Cancer Screening   Frequently Asked Questions  If you are at high-risk for lung cancer, getting screened with low-dose computed tomography (LDCT) every year can help save your life. This handout offers answers  to some of the most common questions about lung cancer screening. If you have other questions, please call [phone] ([phone]).     What is it?  Lung cancer screening uses special X-ray technology to create an image of your lung tissue. The exam is quick and easy and takes less than 10 seconds. We don t give you any medicine or use any needles. You can eat before and after the exam. You don t need to change your clothes as long as the clothing on your chest doesn t contain metal. But, you do need to be able to hold your breath for at least 6 seconds during the exam.    What is the goal of lung cancer screening?  The goal of lung cancer screening is to save lives. Many times, lung cancer is not found until a person starts having physical symptoms. Lung cancer screening can help detect lung cancer in the earliest stages when it may be easier to treat.    Who should be screened for lung cancer?  We suggest lung cancer screening for anyone who is at high-risk for lung cancer. You are in the high-risk group if you:      are between the ages of 55 and 79, and    have smoked at least 1 pack of cigarettes a day for 30 or more years, and    still smoke or have quit within the past 15 years.    However, if you have a new cough or shortness of breath, you should talk to your doctor before being screened.    Some national lung health advocacy groups also recommend screening for people ages 50 to 79 who have smoked an average of 1 pack of cigarettes a day for 20 years. They must also have at least 1 other risk factor for lung cancer, not including exposure to secondhand smoke. Other risk factors are having had cancer in the past, emphysema, pulmonary fibrosis, COPD, a family history of lung cancer, or exposure to certain materials such as arsenic, asbestos, beryllium, cadmium, chromium, diesel fumes, nickel, radon or silica. Your care team can help you know if you have one of these risk factors.     Why does it  matter if I have symptoms?  Certain symptoms can be a sign that you have a condition in your lungs that should be checked and treated by your doctor. These symptoms include fever, chest pain, a new or changing cough, shortness of breath that you have never felt before, coughing up blood or unexplained weight loss. Having any of these symptoms can greatly affect the results of lung cancer screening.       Should all smokers get an LDCT lung cancer screening exam?  It depends. Lung cancer screening is for a very specific group of men and women who have a history of heavy smoking over a long period of time (see  Who should be screened for lung cancer  above).  I am in the high-risk group, but have been diagnosed with cancer in the past. Is LDCT lung cancer screening right for me?  In some cases, you should not have LDCT lung screening, such as when your doctor is already following your cancer with CT scan studies. Your doctor will help you decide if LDCT lung screening is right for you.  Do I need to have a screening exam every year?  Yes. If you are in the high-risk group described earlier, you should get an LDCT lung cancer screening exam every year until you are 79, or are no longer willing or able to undergo screening and possible procedures to diagnose and treat lung cancer.  How effective is LDCT at preventing death from lung cancer?  Studies have shown that LDCT lung cancer screening can lower the risk of death from lung cancer by 20 percent in people who are at high-risk.  What are the risks?  There are some risks and limitations of LDCT lung cancer screening. We want to make sure you understand the risks and benefits, so please let us know if you have any questions. Your doctor may want to talk with you more about these risks.    Radiation exposure: As with any exam that uses radiation, there is a very small increased risk of cancer. The amount of radiation in LDCT is small--about the same amount a person  would get from a mammogram. Your doctor orders the exam when he or she feels the potential benefits outweigh the risks.    False negatives: No test is perfect, including LDCT. It is possible that you may have a medical condition, including lung cancer, that is not found during your exam. This is called a false negative result.    False positives and more testing: LDCT very often finds something in the lung that could be cancer, but in fact is not. This is called a false positive result. False positive tests often cause anxiety. To make sure these findings are not cancer, you may need to have more tests. These tests will be done only if you give us permission. Sometimes patients need a treatment that can have side effects, such as a biopsy. For more information on false positives, see  What can I expect from the results?     Findings not related to lung cancer: Your LDCT exam also takes pictures of areas of your body next to your lungs. In a very small number of cases, the CT scan will show an abnormal finding in one of these areas, such as your kidneys, adrenal glands, liver or thyroid. This finding may not be serious, but you may need more tests. Your doctor can help you decide what other tests you may need, if any.  What can I expect from the results?  About 1 out of 4 LDCT exams will find something that may need more tests. Most of the time, these findings are lung nodules. Lung nodules are very small collections of tissue in the lung. These nodules are very common, and the vast majority--more than 97 percent--are not cancer (benign). Most are normal lymph nodes or small areas of scarring from past infections.  But, if a small lung nodule is found to be cancer, the cancer can be cured more than 90 percent of the time. To know if the nodule is cancer, we may need to get more images before your next yearly screening exam. If the nodule has suspicious features (for example, it is large, has an odd shape or grows  over time), we will refer you to a specialist for further testing.  Will my doctor also get the results?  Yes. Your doctor will get a copy of your results.  Is it okay to keep smoking now that there s a cancer screening exam?  No. Tobacco is one of the strongest cancer-causing agents. It causes not only lung cancer, but other cancers and cardiovascular (heart) diseases as well. The damage caused by smoking builds over time. This means that the longer you smoke, the higher your risk of disease. While it is never too late to quit, the sooner you quit, the better.  Where can I find help to quit smoking?  The best way to prevent lung cancer is to stop smoking. If you have already quit smoking, congratulations and keep it up! For help on quitting smoking, please call [employer] at 8-604-103-BSCB (1742) or the American Cancer Society at 1-393.616.3900 to find local resources near you.  One-on-one health coaching:  If you d prefer to work individually with a health care provider on tobacco cessation, we offer:      Medication Therapy Management:  Our specially trained pharmacists work closely with you and your doctor to help you quit smoking.  Call [phone] or [phone] (toll free).     Can Do: Health coaching offered by [facility].  [email or web address]     No

## 2021-02-23 PROBLEM — R53.83 FATIGUE, UNSPECIFIED TYPE: Status: ACTIVE | Noted: 2021-02-23

## 2021-02-23 PROBLEM — F10.21 ALCOHOL DEPENDENCE IN REMISSION (H): Status: ACTIVE | Noted: 2021-02-23

## 2021-02-23 PROBLEM — E66.9 OBESITY (BMI 30-39.9): Status: ACTIVE | Noted: 2019-08-04

## 2021-02-23 PROBLEM — E55.9 VITAMIN D DEFICIENCY: Status: ACTIVE | Noted: 2021-02-23

## 2021-02-25 ENCOUNTER — OFFICE VISIT (OUTPATIENT)
Dept: FAMILY MEDICINE | Facility: CLINIC | Age: 56
End: 2021-02-25
Payer: COMMERCIAL

## 2021-02-25 VITALS
DIASTOLIC BLOOD PRESSURE: 94 MMHG | WEIGHT: 189 LBS | BODY MASS INDEX: 28.64 KG/M2 | HEIGHT: 68 IN | SYSTOLIC BLOOD PRESSURE: 159 MMHG | HEART RATE: 82 BPM | OXYGEN SATURATION: 100 % | TEMPERATURE: 97.3 F

## 2021-02-25 DIAGNOSIS — F10.11 ALCOHOL ABUSE, IN REMISSION: ICD-10-CM

## 2021-02-25 DIAGNOSIS — I10 HYPERTENSION, GOAL BELOW 140/90: Chronic | ICD-10-CM

## 2021-02-25 DIAGNOSIS — F41.1 GAD (GENERALIZED ANXIETY DISORDER): Chronic | ICD-10-CM

## 2021-02-25 DIAGNOSIS — R35.0 INCREASED FREQUENCY OF URINATION: ICD-10-CM

## 2021-02-25 DIAGNOSIS — R10.12 ABDOMINAL PAIN, LEFT UPPER QUADRANT: Primary | ICD-10-CM

## 2021-02-25 LAB
ALBUMIN UR-MCNC: NEGATIVE MG/DL
APPEARANCE UR: CLEAR
BILIRUB UR QL STRIP: NEGATIVE
COLOR UR AUTO: YELLOW
GLUCOSE UR STRIP-MCNC: NEGATIVE MG/DL
HGB UR QL STRIP: NEGATIVE
KETONES UR STRIP-MCNC: 40 MG/DL
LEUKOCYTE ESTERASE UR QL STRIP: NEGATIVE
NITRATE UR QL: NEGATIVE
PH UR STRIP: 5.5 PH (ref 5–7)
SOURCE: ABNORMAL
SP GR UR STRIP: 1.01 (ref 1–1.03)
UROBILINOGEN UR STRIP-ACNC: 0.2 EU/DL (ref 0.2–1)

## 2021-02-25 PROCEDURE — 99214 OFFICE O/P EST MOD 30 MIN: CPT | Performed by: FAMILY MEDICINE

## 2021-02-25 PROCEDURE — 81003 URINALYSIS AUTO W/O SCOPE: CPT | Performed by: FAMILY MEDICINE

## 2021-02-25 ASSESSMENT — MIFFLIN-ST. JEOR: SCORE: 1648.86

## 2021-02-25 NOTE — PATIENT INSTRUCTIONS
*   I'm not sure what's going on. I think you're okay.     *   This could be constipation and anxiety.     *    I would recommend take a laxative, Miralax, there are generic forms. One capful daily, adjust the dose to a daily BM.     *    Stay on the Prozac.     *    If the pain isn't better in 2 weeks, or if worse. Let me know through MyChart.

## 2021-02-25 NOTE — LETTER
My Depression Action Plan  Name: Munir Storey   Date of Birth 1965  Date: 2/25/2021    My doctor: Nasreen Martínez   My clinic: [facility] [place]  [address]  [place] [id number] [phone]          GREEN    ZONE   Good Control    What it looks like:     Things are going generally well. You have normal ups and downs. You may even feel depressed from time to time, but bad moods usually last less than a day.   What you need to do:  1. Continue to care for yourself (see self care plan)  2. Check your depression survival kit and update it as needed  3. Follow your physician s recommendations including any medication.  4. Do not stop taking medication unless you consult with your physician first.           YELLOW         ZONE Getting Worse    What it looks like:     Depression is starting to interfere with your life.     It may be hard to get out of bed; you may be starting to isolate yourself from others.    Symptoms of depression are starting to last most all day and this has happened for several days.     You may have suicidal thoughts but they are not constant.   What you need to do:     1. Call your care team. Your response to treatment will improve if you keep your care team informed of your progress. Yellow periods are signs an adjustment may need to be made.     2. Continue your self-care.  Just get dressed and ready for the day.  Don't give yourself time to talk yourself out of it.    3. Talk to someone in your support network.    4. Open up your Depression Self-Care Plan/Wellness Kit.           RED    ZONE Medical Alert - Get Help    What it looks like:     Depression is seriously interfering with your life.     You may experience these or other symptoms: You can t get out of bed most days, can t work or engage in other necessary activities, you have trouble taking care of basic hygiene, or basic responsibilities, thoughts of suicide or death that will not  go away, self-injurious behavior.     What you need to do:  1. Call your care team and request a same-day appointment. If they are not available (weekends or after hours) call your local crisis line, emergency room or 911.          Depression Self-Care Plan / Wellness Kit    Many people find that medication and therapy are helpful treatments for managing depression. In addition, making small changes to your everyday life can help to boost your mood and improve your wellbeing. Below are some tips for you to consider. Be sure to talk with your medical provider and/or behavioral health consultant if your symptoms are worsening or not improving.     Sleep   Sleep hygiene  means all of the habits that support good, restful sleep. It includes maintaining a consistent bedtime and wake time, using your bedroom only for sleeping or sex, and keeping the bedroom dark and free of distractions like a computer, smartphone, or television.     Develop a Healthy Routine  Maintain good hygiene. Get out of bed in the morning, make your bed, brush your teeth, take a shower, and get dressed. Don t spend too much time viewing media that makes you feel stressed. Find time to relax each day.    Exercise  Get some form of exercise every day. This will help reduce pain and release endorphins, the  feel good  chemicals in your brain. It can be as simple as just going for a walk or doing some gardening, anything that will get you moving.      Diet  Strive to eat healthy foods, including fruits and vegetables. Drink plenty of water. Avoid excessive sugar, caffeine, alcohol, and other mood-altering substances.     Stay Connected with Others  Stay in touch with friends and family members.    Manage Your Mood  Try deep breathing, massage therapy, biofeedback, or meditation. Take part in fun activities when you can. Try to find something to smile about each day.     Psychotherapy  Be open to working with a therapist if your provider recommends it.      Medication  Be sure to take your medication as prescribed. Most anti-depressants need to be taken every day. It usually takes several weeks for medications to work. Not all medicines work for all people. It is important to follow-up with your provider to make sure you have a treatment plan that is working for you. Do not stop your medication abruptly without first discussing it with your provider.    Crisis Resources   These hotlines are for both adults and children. They and are open 24 hours a day, 7 days a week unless noted otherwise.      National Suicide Prevention Lifeline   6-102-093-TALK (5411)      Crisis Text Line    www.crisistextline.org  Text HOME to 737612 from anywhere in the [place], anytime, about any type of crisis. A live, trained crisis counselor will receive the text and respond quickly.      [facility] Lifeline for LGBTQ Youth  A national crisis intervention and suicide lifeline for LGBTQ youth under 25. Provides a safe place to talk without judgement. Call 1-901.877.2235; text START to 716544 or visit www.thetrevorproject.org to talk to a trained counselor.      For [facility] crisis numbers, visit the [facility] website at:  https://[place].gov/dhs/people-we-serve/adults/health-care/mental-health/resources/crisis-contacts.jsp

## 2021-02-25 NOTE — PROGRESS NOTES
Assessment & Plan     (R10.12) Abdominal pain, left upper quadrant  (primary encounter diagnosis)  Comment: Location of the left upper quadrant pain is suggestive of the splenic flexure of the large bowel.  Patient's BM history is consistent with intermittent episodes of constipation.  Unremarkable colonoscopy 8 years ago.  Exam suggest a very small inguinal hernia, this may contribute to his symptoms also.  Plan: Reassurance given, advised daily MiraLAX for consistent BM and see if his pain improves.  No signs of an acute abdomen, history not consistent with colitis.  Update in 2 weeks if there is no improvement, sooner if symptoms worsen.  Will consider abdominal CT scan and general surgery consult for possible inguinal hernia..    (R35.0) Increased frequency of urination  Comment: No signs of infection.  Plan: *UA reflex to Microscopic and Culture ([facility] (except [place] and         [place])        Monitor.    (F10.11) Alcohol abuse, in remission  Comment: Patient states he is not drinking alcohol.      (I10) Hypertension, goal below 140/90  Comment: Elevated, may be secondary to anxiety.  Currently on 3 drug regiment, ACE inhibitor, thiazide diuretic, amlodipine.  Plan: Continue.  Blood pressure check in 1 month.    (F41.1) SANDRA (generalized anxiety disorder)  Comment: Advised to continue SSRI therapy consider counseling.      Results for orders placed or performed in visit on 02/25/21   *UA reflex to Microscopic and Culture ([facility] (except [place] and [place])     Status: Abnormal    Specimen: Midstream Urine   Result Value Ref [facility]    Color Urine Yellow     Appearance Urine Clear     Glucose Urine Negative NEG^Negative mg/dL    Bilirubin Urine Negative NEG^Negative    Ketones Urine 40 (A) NEG^Negative mg/dL    Specific Gravity Urine 1.010 1.003 - 1.035    Blood Urine Negative NEG^Negative    pH Urine 5.5 5.0 - 7.0 pH    Protein Albumin Urine Negative  NEG^Negative mg/dL    Urobilinogen Urine 0.2 0.2 - 1.0 EU/dL    Nitrite Urine Negative NEG^Negative    Leukocyte Esterase Urine Negative NEG^Negative    Source Midstream Urine         Patient Instructions   *   I'm not sure what's going on. I think you're okay.     *   This could be constipation and anxiety.     *    I would recommend take a laxative, Miralax, there are generic forms. One capful daily, adjust the dose to a daily BM.     *    Stay on the Prozac.     *    If the pain isn't better in 2 weeks, or if worse. Let me know through MyChart.        Return in about 2 weeks (around 3/11/2021), or if symptoms worsen or fail to improve.    Magalis Morales MD  [facility] [place] Amaral is a 55 year old who presents for the following health issues     HPI       Abdominal/Flank Pain  Onset/Duration: Patient noticed it 2-3 weeks ago   Description:   Character: Can be sharp enough to take pts breath away, pressure sometimes has funny feeling in chest sometimes   Location: left upper quadrant  Radiation: Center Back  Intensity: mild to severe  Progression of Symptoms:  worsening  Accompanying Signs & Symptoms:  Fever/Chills: no  Gas/Bloating: YES- gas   Nausea: Yes- mild   Vomitting: no  Diarrhea: no  Constipation: YES  Dysuria or Hematuria: no  History:   Trauma: YES- patient has had surgery in that area a couple years ago   Previous similar pain: no  Previous tests done: Colonoscopy 6 years ago   Precipitating factors:   Does the pain change with:     Food: no    Bowel Movement: no    Urination: YES   Other factors:  no  Therapies tried and outcome: No carb diet has helped with bloating       Past medical history: See problem list.    Review of Systems   CONSTITUTIONAL: NEGATIVE for fever, chills, change in weight  ENT/MOUTH: NEGATIVE for ear, mouth and throat problems  RESP: NEGATIVE for significant cough or SOB  CV: NEGATIVE for chest pain, palpitations or peripheral edema  GI:  "Above-noted abdominal pain.  : Urinary frequency.  PSYCHIATRIC: Notes anxiety.      Objective    BP (!) 159/94 (BP Location: Left arm, Patient Position: Chair, Cuff Size: Adult Large)   Pulse 82   Temp 97.3  F (36.3  C) (Tympanic)   Ht 1.715 m (5' 7.5\")   Wt 85.7 kg (189 lb)   SpO2 100%   BMI 29.16 kg/m    Body mass index is 29.16 kg/m .  Physical Exam   GENERAL: Healthy, alert and no distress  EYES: Eyes grossly normal to inspection, conjunctivae and sclerae normal  RESP: Lungs clear to auscultation - no rales, rhonchi or wheezes  CV: Regular rate and rhythm, normal S1 S2, no murmur  GI:  soft, questionable tenderness to palpation left upper quadrant without mass noted, no rebound, no guarding no HSM   : both testes descended, without mass, questionable small defect noted in the left inguinal canal.  MS: No gross musculoskeletal defects noted, no edema  NEURO: Normal strength and tone, mentation intact and speech is somewhat pressured.  PSYCH: Mentation appears normal, affect normal/bright, appears anxious           "

## 2021-03-12 ENCOUNTER — TELEPHONE (OUTPATIENT)
Dept: FAMILY MEDICINE | Facility: CLINIC | Age: 56
End: 2021-03-12

## 2021-03-12 DIAGNOSIS — F41.1 GAD (GENERALIZED ANXIETY DISORDER): Chronic | ICD-10-CM

## 2021-03-12 DIAGNOSIS — F33.1 MODERATE EPISODE OF RECURRENT MAJOR DEPRESSIVE DISORDER (H): ICD-10-CM

## 2021-03-16 RX ORDER — FLUOXETINE 20 MG/1
20 TABLET, FILM COATED ORAL DAILY
Qty: 30 TABLET | Refills: 0 | Status: SHIPPED | OUTPATIENT
Start: 2021-03-16 | End: 2021-04-13

## 2021-04-11 ENCOUNTER — TELEPHONE (OUTPATIENT)
Dept: FAMILY MEDICINE | Facility: CLINIC | Age: 56
End: 2021-04-11

## 2021-04-11 DIAGNOSIS — F41.1 GAD (GENERALIZED ANXIETY DISORDER): Chronic | ICD-10-CM

## 2021-04-11 DIAGNOSIS — F33.1 MODERATE EPISODE OF RECURRENT MAJOR DEPRESSIVE DISORDER (H): ICD-10-CM

## 2021-04-14 DIAGNOSIS — E55.9 VITAMIN D DEFICIENCY: ICD-10-CM

## 2021-04-14 NOTE — TELEPHONE ENCOUNTER
Routing refill request to provider for review/approval because:  Labs out of range:  phq9  Pt no show on 3/29/21    Medication pended for approval, 30 day supply with reminder.   2nd reminder

## 2021-04-15 RX ORDER — FLUOXETINE 20 MG/1
20 TABLET, FILM COATED ORAL DAILY
Qty: 30 TABLET | Refills: 0 | Status: SHIPPED | OUTPATIENT
Start: 2021-04-15 | End: 2021-05-03

## 2021-04-15 NOTE — TELEPHONE ENCOUNTER
Please call patient, due for recheck for further refills (last iesha refill), please assist patient in scheduling appt (office visit to recheck BP and recheck meds).  Iesha refill given.      Nasreen Martínez PA-C

## 2021-04-15 NOTE — TELEPHONE ENCOUNTER
Routing refill request to provider for review/approval because:  Failed protocol  Patient to have labs redone for follow up    Thank you,  Nasreen Montoya RN

## 2021-04-16 ENCOUNTER — MYC MEDICAL ADVICE (OUTPATIENT)
Dept: FAMILY MEDICINE | Facility: CLINIC | Age: 56
End: 2021-04-16

## 2021-04-16 RX ORDER — METHOCARBAMOL 750 MG/1
TABLET ORAL
Qty: 8 CAPSULE | Refills: 0
Start: 2021-04-16

## 2021-04-16 NOTE — TELEPHONE ENCOUNTER
Left message for patient to call back pod 1 hotline([phone]) and sent [employer]. Yasmin Rey MA

## 2021-04-20 ENCOUNTER — IMMUNIZATION (OUTPATIENT)
Dept: FAMILY MEDICINE | Facility: CLINIC | Age: 56
End: 2021-04-20
Payer: COMMERCIAL

## 2021-04-20 PROCEDURE — 91301 PR COVID VAC MODERNA 100 MCG/0.5 ML IM: CPT

## 2021-04-20 PROCEDURE — 0011A PR COVID VAC MODERNA 100 MCG/0.5 ML IM: CPT

## 2021-04-30 ENCOUNTER — TELEPHONE (OUTPATIENT)
Dept: FAMILY MEDICINE | Facility: CLINIC | Age: 56
End: 2021-04-30

## 2021-04-30 DIAGNOSIS — F41.1 GAD (GENERALIZED ANXIETY DISORDER): Chronic | ICD-10-CM

## 2021-04-30 DIAGNOSIS — F33.1 MODERATE EPISODE OF RECURRENT MAJOR DEPRESSIVE DISORDER (H): ICD-10-CM

## 2021-04-30 NOTE — TELEPHONE ENCOUNTER
Patient saw Dr. Morales on 2/25/21 for anxiety and abdominal pain.  He was advised to follow up in 2 weeks.    Patient no showed on 3/29/21 with Nasreen Martínez PA-C.    He is looking for a 90 day supply of his Prozac   # 30 tabs were given on 4/15/21 and he was advised to follow up      PHQ-9 score:    PHQ 2/18/2021   PHQ-9 Total Score 9   Q9: Thoughts of better off dead/self-harm past 2 weeks Not at all   F/U: Thoughts of suicide or self-harm -   F/U: Safety concerns -       Routing refill request to provider for review/approval because:  Failed protocol  Patient to have labs redone for follow up  PHQ 9 > 5        Routed to provider to advise on a 90 day refill    Ragini MÉNDEZN-RN  Triage Nurse  [facility]: [facility]

## 2021-05-03 RX ORDER — FLUOXETINE 20 MG/1
20 TABLET, FILM COATED ORAL DAILY
Qty: 90 TABLET | Refills: 0 | Status: SHIPPED | OUTPATIENT
Start: 2021-05-03 | End: 2021-06-10

## 2021-05-03 NOTE — TELEPHONE ENCOUNTER
Left message on voice mail for patient to call clinic.   [phone] to schedule an appointment prior to his next refill. Patient also informed of current refill.     Deandra Sarmiento RN BSN  [facility]

## 2021-05-18 ENCOUNTER — IMMUNIZATION (OUTPATIENT)
Dept: FAMILY MEDICINE | Facility: CLINIC | Age: 56
End: 2021-05-18
Attending: FAMILY MEDICINE
Payer: COMMERCIAL

## 2021-05-18 PROCEDURE — 91301 PR COVID VAC MODERNA 100 MCG/0.5 ML IM: CPT

## 2021-05-18 PROCEDURE — 0012A PR COVID VAC MODERNA 100 MCG/0.5 ML IM: CPT

## 2021-05-28 ENCOUNTER — APPOINTMENT (OUTPATIENT)
Dept: GENERAL RADIOLOGY | Facility: CLINIC | Age: 56
End: 2021-05-28
Attending: EMERGENCY MEDICINE
Payer: COMMERCIAL

## 2021-05-28 ENCOUNTER — HOSPITAL ENCOUNTER (EMERGENCY)
Facility: CLINIC | Age: 56
Discharge: HOME | End: 2021-05-28
Attending: EMERGENCY MEDICINE | Admitting: EMERGENCY MEDICINE
Payer: COMMERCIAL

## 2021-05-28 VITALS
HEIGHT: 67 IN | DIASTOLIC BLOOD PRESSURE: 103 MMHG | RESPIRATION RATE: 20 BRPM | WEIGHT: 170 LBS | OXYGEN SATURATION: 99 % | BODY MASS INDEX: 26.68 KG/M2 | TEMPERATURE: 98.6 F | SYSTOLIC BLOOD PRESSURE: 150 MMHG | HEART RATE: 67 BPM

## 2021-05-28 DIAGNOSIS — R07.9 ACUTE CHEST PAIN: ICD-10-CM

## 2021-05-28 LAB
ALBUMIN SERPL-MCNC: 4.6 G/DL (ref 3.4–5)
ALP SERPL-CCNC: 49 U/L (ref 40–150)
ALT SERPL W P-5'-P-CCNC: 29 U/L (ref 0–70)
ANION GAP SERPL CALCULATED.3IONS-SCNC: 9 MMOL/L (ref 3–14)
AST SERPL W P-5'-P-CCNC: 17 U/L (ref 0–45)
BASOPHILS # BLD AUTO: 0 10E9/L (ref 0–0.2)
BASOPHILS NFR BLD AUTO: 0.3 %
BILIRUB SERPL-MCNC: 1 MG/DL (ref 0.2–1.3)
BUN SERPL-MCNC: 14 MG/DL (ref 7–30)
CALCIUM SERPL-MCNC: 9.1 MG/DL (ref 8.5–10.1)
CHLORIDE SERPL-SCNC: 101 MMOL/L (ref 94–109)
CO2 SERPL-SCNC: 26 MMOL/L (ref 20–32)
CREAT SERPL-MCNC: 0.77 MG/DL (ref 0.66–1.25)
DIFFERENTIAL METHOD BLD: NORMAL
EOSINOPHIL # BLD AUTO: 0 10E9/L (ref 0–0.7)
EOSINOPHIL NFR BLD AUTO: 0.3 %
ERYTHROCYTE [DISTWIDTH] IN BLOOD BY AUTOMATED COUNT: 12.2 % (ref 10–15)
GFR SERPL CREATININE-BSD FRML MDRD: >90 ML/MIN/{1.73_M2}
GLUCOSE SERPL-MCNC: 116 MG/DL (ref 70–99)
HCT VFR BLD AUTO: 42.2 % (ref 40–53)
HGB BLD-MCNC: 14.3 G/DL (ref 13.3–17.7)
IMM GRANULOCYTES # BLD: 0 10E9/L (ref 0–0.4)
IMM GRANULOCYTES NFR BLD: 0.3 %
LYMPHOCYTES # BLD AUTO: 0.8 10E9/L (ref 0.8–5.3)
LYMPHOCYTES NFR BLD AUTO: 13.4 %
MCH RBC QN AUTO: 31.8 PG (ref 26.5–33)
MCHC RBC AUTO-ENTMCNC: 33.9 G/DL (ref 31.5–36.5)
MCV RBC AUTO: 94 FL (ref 78–100)
MONOCYTES # BLD AUTO: 0.3 10E9/L (ref 0–1.3)
MONOCYTES NFR BLD AUTO: 5.4 %
NEUTROPHILS # BLD AUTO: 4.9 10E9/L (ref 1.6–8.3)
NEUTROPHILS NFR BLD AUTO: 80.3 %
NRBC # BLD AUTO: 0 10*3/UL
NRBC BLD AUTO-RTO: 0 /100
PLATELET # BLD AUTO: 223 10E9/L (ref 150–450)
POTASSIUM SERPL-SCNC: 3.2 MMOL/L (ref 3.4–5.3)
PROT SERPL-MCNC: 7.9 G/DL (ref 6.8–8.8)
RBC # BLD AUTO: 4.49 10E12/L (ref 4.4–5.9)
SODIUM SERPL-SCNC: 136 MMOL/L (ref 133–144)
TROPONIN I SERPL-MCNC: <0.015 UG/L (ref 0–0.04)
TROPONIN I SERPL-MCNC: <0.015 UG/L (ref 0–0.04)
WBC # BLD AUTO: 6.1 10E9/L (ref 4–11)

## 2021-05-28 PROCEDURE — 85025 COMPLETE CBC W/AUTO DIFF WBC: CPT | Performed by: EMERGENCY MEDICINE

## 2021-05-28 PROCEDURE — 71045 X-RAY EXAM CHEST 1 VIEW: CPT

## 2021-05-28 PROCEDURE — 99285 EMERGENCY DEPT VISIT HI MDM: CPT | Mod: 25 | Performed by: EMERGENCY MEDICINE

## 2021-05-28 PROCEDURE — 84484 ASSAY OF TROPONIN QUANT: CPT | Mod: 91 | Performed by: EMERGENCY MEDICINE

## 2021-05-28 PROCEDURE — 93010 ELECTROCARDIOGRAM REPORT: CPT | Performed by: EMERGENCY MEDICINE

## 2021-05-28 PROCEDURE — 84484 ASSAY OF TROPONIN QUANT: CPT | Performed by: EMERGENCY MEDICINE

## 2021-05-28 PROCEDURE — 93308 TTE F-UP OR LMTD: CPT | Performed by: EMERGENCY MEDICINE

## 2021-05-28 PROCEDURE — 93005 ELECTROCARDIOGRAM TRACING: CPT | Performed by: EMERGENCY MEDICINE

## 2021-05-28 PROCEDURE — 80053 COMPREHEN METABOLIC PANEL: CPT | Performed by: EMERGENCY MEDICINE

## 2021-05-28 RX ORDER — FAMOTIDINE 20 MG/1
20 TABLET, FILM COATED ORAL 2 TIMES DAILY
Qty: 28 TABLET | Refills: 0 | Status: SHIPPED | OUTPATIENT
Start: 2021-05-28 | End: 2021-06-10

## 2021-05-28 ASSESSMENT — MIFFLIN-ST. JEOR: SCORE: 1554.74

## 2021-05-28 NOTE — ED PROVIDER NOTES
History     Chief Complaint   Patient presents with     Chest Pain     Back Pain     lower     HPI  Munir Storey is a 55 year old male who presents for episodic chest pain.  The patient says that this has been coming and going over the past several months.  He feels a sharp pain in his left chest that lasts for seconds and then he has a spreading warmth and flushness that goes into his face and left arm.  Pain is mild to moderate when it happens.  He is not sure what seems to trigger it.  It could happen at rest or when he is active.  No shortness of breath, cough, or hemoptysis.  No pleuritic pain.  No nausea or vomiting.  No diaphoresis with the symptoms.  He has not take anything for the symptoms.  Not currently having symptoms but it did happen about an hour prior to his arrival here.  He denies fever, chills, abdominal pain, diarrhea, bloody stools, or rash.    Allergies:  Allergies   Allergen Reactions     Sulfamethoxazole-Trimethoprim Nausea       Problem List:    Patient Active Problem List    Diagnosis Date Noted     Alcohol dependence in remission (H) 02/23/2021     Priority: Medium     Vitamin D deficiency 02/23/2021     Priority: Medium     Fatigue, unspecified type 02/23/2021     Priority: Medium     Chronic neck pain with history of cervical spinal surgery 02/18/2021     Priority: Medium     H/O atrial flutter 09/02/2019     Priority: Medium     Typical atrial flutter, new onset 04/28/2015, s/p cardioversion 7/2015, s/p atrial flutter ablation 12/2015         Seborrheic keratosis 08/14/2019     Priority: Medium     Obesity (BMI 30-39.9) 08/04/2019     Priority: Medium     Moderate episode of recurrent major depressive disorder (H) 08/29/2018     Priority: Medium     Tobacco abuse 10/20/2017     Priority: Medium     SANDRA (generalized anxiety disorder) 10/20/2017     Priority: Medium     Alcohol abuse, in remission 10/20/2017     Priority: Medium     October 20, 2017: sobriety x 2 years       Eunice  disease 2009     Priority: Medium     Hypertension, goal below 140/90 2009     Priority: Medium        Past Medical History:    Past Medical History:   Diagnosis Date     Alcohol withdrawal (H) 2015     Atrial flutter (H)      Depressive disorder      Ejection fraction < 50% 2015     SANDRA (generalized anxiety disorder)      H/O atrioventricular quita ablation 2015     Hypertension, goal below 140/90      Tobacco abuse        Past Surgical History:    Past Surgical History:   Procedure Laterality Date     CARDIAC SURGERY  2015    EP cardioversion     COLONOSCOPY  2013     COLONOSCOPY  2019    normal colonoscopy - repeat 10 years     HERNIA REPAIR       LAPAROSCOPIC RESECTION SMALL BOWEL  2013     SPINE SURGERY      cervical fusion      UPPER GI ENDOSCOPY  2013       Family History:    Family History   Problem Relation Age of Onset     Diabetes Father      Depression Father      Depression Paternal Grandfather        Social History:  Marital Status:   [2]  Social History     Tobacco Use     Smoking status: Former Smoker     Packs/day: 0.50     Years: 15.00     Pack years: 7.50     Types: Cigarettes, Dip, chew, snus or snuff     Quit date: 2019     Years since quittin.7     Smokeless tobacco: Former User     Types: Chew   Substance Use Topics     Alcohol use: No     Comment: hx alcohol abuse, sober since      Drug use: No        Medications:    famotidine (PEPCID) 20 MG tablet  amLODIPine (NORVASC) 10 MG tablet  FLUoxetine 20 MG tablet  hydrochlorothiazide (HYDRODIURIL) 12.5 MG tablet  lisinopril (ZESTRIL) 40 MG tablet  LORazepam (ATIVAN) 0.5 MG tablet  ondansetron (ZOFRAN-ODT) 4 MG ODT tab  vitamin D3 (CHOLECALCIFEROL) 1.25 MG (23960 UT) capsule          Review of Systems  Pertinent positives and negatives listed in the HPI, all other systems reviewed and are negative.    Physical Exam   BP: (!) 191/98  Pulse: 89  Temp: 98.6  F (37  C)  Resp:  "16  Height: 170.2 cm (5' 7\")  Weight: 77.1 kg (170 lb)  SpO2: 98 %      Physical Exam  Vitals signs and nursing note reviewed.   Constitutional:       General: He is in acute distress.      Appearance: He is well-developed. He is not diaphoretic.   HENT:      Head: Normocephalic and atraumatic.      Right Ear: External ear normal.      Left Ear: External ear normal.      Nose: Nose normal.   Eyes:      General: No scleral icterus.     Conjunctiva/sclera: Conjunctivae normal.   Neck:      Musculoskeletal: Normal range of motion.   Cardiovascular:      Rate and Rhythm: Normal rate and regular rhythm.      Pulses:           Radial pulses are 2+ on the right side and 2+ on the left side.        Posterior tibial pulses are 2+ on the right side and 2+ on the left side.   Pulmonary:      Effort: Pulmonary effort is normal. No respiratory distress.      Breath sounds: No stridor.   Abdominal:      General: There is no distension.      Palpations: Abdomen is soft.   Musculoskeletal:      Right lower leg: No edema.      Left lower leg: No edema.   Skin:     General: Skin is warm and dry.   Neurological:      Mental Status: He is alert and oriented to person, place, and time.   Psychiatric:         Behavior: Behavior normal.         ED Course        Procedures    Results for orders placed during the hospital encounter of 05/28/21   POC US ECHO LIMITED    [facility] Procedure Note      Limited Bedside ED Cardiac Ultrasound:    PROCEDURE: PERFORMED BY: Dr. Slick Herrera MD  INDICATIONS/SYMPTOM:  Chest Pain  PROBE: Cardiac phased array probe  BODY LOCATION: Chest  FINDINGS:   The ultrasound was performed utilizing the parasternal long axis, parasternal short axis and apical 4 chamber views.  Cardiac contractility:  Present  Gross estimation of cardiac kinesis: normal  Pericardial Effusion:  None  RV:LV ratio: LV > RV  INTERPRETATION:    Chamber size and motion were grossly normal with LV > RV, normal " cardiac kinesis.  No pericardial effusion was found.   IMAGE DOCUMENTATION: Images were archived to PACs system.                  EKG Interpretation:      Interpreted by Slick Herrera MD  Time reviewed: 1230  Symptoms at time of EKG: None   Rhythm: normal sinus   Rate: normal  Axis: normal  Ectopy: none  Conduction: normal  ST Segments/ T Waves: No ST-T wave changes  Q Waves: none  Comparison to prior: Unchanged    Clinical Impression: normal EKG      Critical Care time:  none               Results for orders placed or performed during the hospital encounter of 05/28/21 (from the past 24 hour(s))   CBC with platelets differential   Result Value Ref Range    WBC 6.1 4.0 - 11.0 10e9/L    RBC Count 4.49 4.4 - 5.9 10e12/L    Hemoglobin 14.3 13.3 - 17.7 g/dL    Hematocrit 42.2 40.0 - 53.0 %    MCV 94 78 - 100 fl    MCH 31.8 26.5 - 33.0 pg    MCHC 33.9 31.5 - 36.5 g/dL    RDW 12.2 10.0 - 15.0 %    Platelet Count 223 150 - 450 10e9/L    Diff Method Automated Method     % Neutrophils 80.3 %    % Lymphocytes 13.4 %    % Monocytes 5.4 %    % Eosinophils 0.3 %    % Basophils 0.3 %    % Immature Granulocytes 0.3 %    Nucleated RBCs 0 0 /100    Absolute Neutrophil 4.9 1.6 - 8.3 10e9/L    Absolute Lymphocytes 0.8 0.8 - 5.3 10e9/L    Absolute Monocytes 0.3 0.0 - 1.3 10e9/L    Absolute Eosinophils 0.0 0.0 - 0.7 10e9/L    Absolute Basophils 0.0 0.0 - 0.2 10e9/L    Abs Immature Granulocytes 0.0 0 - 0.4 10e9/L    Absolute Nucleated RBC 0.0    Comprehensive metabolic panel   Result Value Ref Range    Sodium 136 133 - 144 mmol/L    Potassium 3.2 (L) 3.4 - 5.3 mmol/L    Chloride 101 94 - 109 mmol/L    Carbon Dioxide 26 20 - 32 mmol/L    Anion Gap 9 3 - 14 mmol/L    Glucose 116 (H) 70 - 99 mg/dL    Urea Nitrogen 14 7 - 30 mg/dL    Creatinine 0.77 0.66 - 1.25 mg/dL    GFR Estimate >90 >60 mL/min/[1.73_m2]    GFR Estimate If Black >90 >60 mL/min/[1.73_m2]    Calcium 9.1 8.5 - 10.1 mg/dL    Bilirubin Total 1.0 0.2 - 1.3 mg/dL     Albumin 4.6 3.4 - 5.0 g/dL    Protein Total 7.9 6.8 - 8.8 g/dL    Alkaline Phosphatase 49 40 - 150 U/L    ALT 29 0 - 70 U/L    AST 17 0 - 45 U/L   Troponin I   Result Value Ref Range    Troponin I ES <0.015 0.000 - 0.045 ug/L   POC US ECHO LIMITED    Impression    [facility] Procedure Note      Limited Bedside ED Cardiac Ultrasound:    PROCEDURE: PERFORMED BY: Dr. Slick Herrera MD  INDICATIONS/SYMPTOM:  Chest Pain  PROBE: Cardiac phased array probe  BODY LOCATION: Chest  FINDINGS:   The ultrasound was performed utilizing the parasternal long axis, parasternal short axis and apical 4 chamber views.  Cardiac contractility:  Present  Gross estimation of cardiac kinesis: normal  Pericardial Effusion:  None  RV:LV ratio: LV > RV  INTERPRETATION:    Chamber size and motion were grossly normal with LV > RV, normal cardiac kinesis.  No pericardial effusion was found.   IMAGE DOCUMENTATION: Images were archived to PACs system.        Troponin I   Result Value Ref Range    Troponin I ES <0.015 0.000 - 0.045 ug/L   XR Chest Port 1 View    Narrative    CHEST ONE VIEW  5/28/2021 3:49 PM     HISTORY: Intermittent chest pain    COMPARISON: July 27, 2019      Impression    IMPRESSION: No acute disease.    HARRY GAN MD       Medications - No data to display    Assessments & Plan (with Medical Decision Making)   55-year-old male presents for episodic chest pain.  Temperature is 37  C, heart rate 95, SPO2 is 98% on room air.  EKG is sinus rhythm without signs of ischemia or dysrhythmia.  Low blood cell count is 6.1.  Hemoglobin is 14.3, no signs of anemia.  Bedside ultrasound shows good cardiac function without pericardial effusion.  Chest x-ray obtained, images reviewed independently as well as radiology read reviewed, no signs of infiltrate to suggest pneumonia, no signs of pneumothorax.  Troponin is normal which makes ACS unlikely.  Repeat troponin is also normal which is very reassuring.  His electrolytes are  within normal limits.  He is safe to discharge home, unclear cause of his symptoms at this time.  He is told to return if he has worsening symptoms or other concerns, otherwise follow-up in clinic for a recheck in the next 1 to 2 weeks.  I did recommend trying famotidine for the next 2 weeks to see if this could help his symptoms.  The patient is in agreement to this plan.    I have reviewed the nursing notes.    I have reviewed the findings, diagnosis, plan and need for follow up with the patient.       Discharge Medication List as of 5/28/2021  4:54 PM      START taking these medications    Details   famotidine (PEPCID) 20 MG tablet Take 1 tablet (20 mg) by mouth 2 times daily for 14 days, Disp-28 tablet, R-0, E-Prescribe             Final diagnoses:   Acute chest pain       5/28/2021   [facility] EMERGENCY DEPT     Slick Herrera MD  05/28/21 [id number]

## 2021-05-28 NOTE — DISCHARGE INSTRUCTIONS
I do not know what is causing your chest pain.  So far all of your tests have returned reassuring.  Drink plenty fluids.  Return to the emergency department for worsening symptoms, repeated vomiting, or other concerns.  You could try taking famotidine twice a day for the next 2 weeks to see if this helps your symptoms.  Follow-up in clinic for recheck in the next 1 to 2 weeks.

## 2021-06-08 NOTE — PROGRESS NOTES
Assessment & Plan     SANDRA (generalized anxiety disorder)    He is concerned that there is a medical condition that is being missed to explain his symptoms.  He went into the ER recently and cardiac workup negative (again, has had a full cardiac workup in the past).     Labs have been relatively normal, besides a low VItamin D recently.     I reassured him that there is not another significant/urgent diagnosis that is contributing to his symptoms.  I very much think many of his symptoms (including the elevated blood pressure) are a result of uncontrolled anxiety.      He did have some improvement for a brief time since starting the prozac, I suggest we try increasing the dose from 20 mg to 30 mg.     Make sure you take this consistently every day (let's start taking this in the morning).     Try guided meditation (such as Tracks to Relax), yoga, pilates to help decrease anxiety/stress.     May consider counseling.     Recommend he use ativan very sparingly (last 30 day script lasted 6 months).  We discussed that this can be very effective short term for anxiety, but not safe to use long term.  He states, it is helpful sometimes just knowing he has it if needed.      He is concerned about his insurance and would like more information on different plans.  He has thought about just not having any insurance as he currently has a high deductible plan that doesn't seem to pay for much.        - LORazepam (ATIVAN) 0.5 MG tablet; Take 1 tablet (0.5 mg) by mouth every 6 hours as needed for anxiety  - FLUoxetine 20 MG tablet; Take 1.5 tablets (30 mg) by mouth daily  - CARE COORDINATION REFERRAL    Moderate episode of recurrent major depressive disorder (H)  Will increase dose from 20 mg to 30 mg.   - FLUoxetine 20 MG tablet; Take 1.5 tablets (30 mg) by mouth daily    Chronic bilateral low back pain without sciatica  Low Back Pain.    The patient was advised to apply heat intermittently (avoid sleeping on heating  pad).  Lifting mechanics discussed  Analgesics such as Tylenol and/or ibuprofen, see epic for orders.  Back exercises, instruction sheet given  Aerobic exercise program, this was strongly encouraged as one of the best ways to manage chronic back pain  Physical therapy/imaging if symptoms not improving    Patient was instructed to f/u if symptoms worsen or fail to improve as anticipated.        - cyclobenzaprine (FLEXERIL) 10 MG tablet; Take 1 tablet (10 mg) by mouth nightly as needed for muscle spasms      56 minutes spent on the date of the encounter doing chart review, history and exam, documentation and further activities per the note           Return in about 2 months (around 8/10/2021) for BP Recheck and med recheck. .    Nasreen Martínez PA-C  [facility] TESSA jensen is a 55 year old who presents for the following health issues     HPI     ED/UC Followup:    Facility:  [facility]  Date of visit: 5/28/21  Reason for visit: Acute Chest Pain  Current Status: Doing good, had an episode yesterday-lasted for maybe 3 minutes.     Was checking blood pressure at home and readings were 130/90 mmHg.  He brought in his BP cuff today and the reading he got was much different than our reading.    This was rechecked:  Manual BP: 170/86 mmHg   Home BP cuff: 150/82 mmHg.       Sometimes he feels overmedicated.  He will sometimes feel lightheaded.  He was out working on his boat and felt lightheaded when he was bending over.      He had a 2 week period where he felt really good, he was very happy and enjoyed the things he used to enjoy.  However that did not last and now depression is worsening again.  He has not been consistent with taking prozac.   He sometimes will get a mild headache since starting prozac.   Seems to occur if he misses a dose.       He c/o sharp pain in mid back, present for a few weeks.  Sometimes it happens when he moves or takes a deep breath.  Sometimes  with position changes.  Pain is sharp.  No radiation.  Has tried ibuprofen for this and his neck, with some relief.   Has not tried muscle relaxer's for this.   No injury noted.  No change in urine.      He woke up with the pain one day, but it does not wake him from sleep.           Review of Systems   Constitutional, HEENT, cardiovascular, pulmonary, GI, , musculoskeletal, neuro, skin, endocrine and psych systems are negative, except as otherwise noted.      Objective    There were no vitals taken for this visit.  There is no height or weight on file to calculate BMI.  Physical Exam   GENERAL: healthy, alert and no distress  NECK: no adenopathy, no asymmetry, masses, or scars and thyroid normal to palpation  RESP: lungs clear to auscultation - no rales, rhonchi or wheezes  CV: regular rate and rhythm, normal S1 S2, no S3 or S4, no murmur, click or rub, no peripheral edema and peripheral pulses strong  MS: no gross musculoskeletal defects noted, no edema    Patient appears to be in mild pain, normal gait noted. Lumbosacral spine area reveals normal spinal curvature and no local tenderness or mass.  Mildly painful but full LS ROM noted. Supine straight leg raise is negative at 90 degrees on both sides. DTR's, motor strength and sensation normal, including heel and toe gait.  Peripheral pulses are palpable.

## 2021-06-10 ENCOUNTER — OFFICE VISIT (OUTPATIENT)
Dept: FAMILY MEDICINE | Facility: CLINIC | Age: 56
End: 2021-06-10
Payer: COMMERCIAL

## 2021-06-10 ENCOUNTER — PATIENT OUTREACH (OUTPATIENT)
Dept: CARE COORDINATION | Facility: CLINIC | Age: 56
End: 2021-06-10

## 2021-06-10 VITALS
WEIGHT: 167.8 LBS | SYSTOLIC BLOOD PRESSURE: 164 MMHG | HEART RATE: 80 BPM | DIASTOLIC BLOOD PRESSURE: 103 MMHG | BODY MASS INDEX: 26.34 KG/M2 | OXYGEN SATURATION: 98 % | HEIGHT: 67 IN | TEMPERATURE: 98.3 F

## 2021-06-10 DIAGNOSIS — F41.1 GAD (GENERALIZED ANXIETY DISORDER): Primary | ICD-10-CM

## 2021-06-10 DIAGNOSIS — F33.1 MODERATE EPISODE OF RECURRENT MAJOR DEPRESSIVE DISORDER (H): ICD-10-CM

## 2021-06-10 DIAGNOSIS — G89.29 CHRONIC BILATERAL LOW BACK PAIN WITHOUT SCIATICA: ICD-10-CM

## 2021-06-10 DIAGNOSIS — M54.50 CHRONIC BILATERAL LOW BACK PAIN WITHOUT SCIATICA: ICD-10-CM

## 2021-06-10 PROCEDURE — 99215 OFFICE O/P EST HI 40 MIN: CPT | Performed by: PHYSICIAN ASSISTANT

## 2021-06-10 RX ORDER — CYCLOBENZAPRINE HCL 10 MG
10 TABLET ORAL
Qty: 30 TABLET | Refills: 0 | Status: SHIPPED | OUTPATIENT
Start: 2021-06-10 | End: 2022-02-10

## 2021-06-10 RX ORDER — LORAZEPAM 0.5 MG/1
0.5 TABLET ORAL EVERY 6 HOURS PRN
Qty: 30 TABLET | Refills: 0 | Status: SHIPPED | OUTPATIENT
Start: 2021-06-10 | End: 2021-10-29

## 2021-06-10 RX ORDER — FLUOXETINE 20 MG/1
30 TABLET, FILM COATED ORAL DAILY
Qty: 135 TABLET | Refills: 0 | Status: SHIPPED | OUTPATIENT
Start: 2021-06-10 | End: 2021-11-11 | Stop reason: SINTOL

## 2021-06-10 ASSESSMENT — MIFFLIN-ST. JEOR: SCORE: 1549.26

## 2021-06-10 NOTE — PATIENT INSTRUCTIONS
I suggest increasing the prozac from 20 mg to 30 mg.  Make sure you take this consistently every day (let's start taking this in the morning).     Try guided meditation (such as Tracks to Relax), yoga, pilates to help decrease anxiety/stress.     Restart amlodipine at 10 mg daily, along with your other BP meds.     Calorie goal:  Around 2300 calories/day.      Patient Education     Back Exercises: Abdominal Lift Brace with Marching    The abdominal lift brace with march strengthens your lower abdominal muscles, helping you keep your pelvis and back stable:    Lie on the floor with both knees bent. Put your feet flat on the floor and your arms by your sides. Tighten your abdominal muscles. Be sure to continue to breathe.    Lift one bent knee about 2 inches then return it to the floor and lift the other about 2 inches. Keep your abdominal muscles tight and continue to breathe. These motions should be slow and controlled without your pelvis rocking side to side.    Repeat 10 times.  [employer] last reviewed this educational content on 3/1/2018    9809-1984 [employer]. All rights reserved. This information is not intended as a substitute for professional medical care. Always follow your healthcare professional's instructions.           Patient Education     Back Exercises: Arm Reach    Do this exercise on your hands and knees. Keep your knees under your hips and your hands under your shoulders. Keep your spine in a neutral position (not arched or sagging). Be sure to maintain your neck s natural curve:    Stretch one arm straight out in front of you. Don t raise your head or let your supporting shoulder sag.    Hold for 5 seconds.    Return to starting position.    Repeat 5 times.    Switch arms.  [employer] last reviewed this educational content on 3/1/2018    3397-0816 [employer]. All rights reserved. This information is not intended as a substitute for professional medical care. Always  follow your healthcare professional's instructions.           Patient Education     Back Exercises: Back Release  Do this exercise on your hands and knees. Keep your knees under your hips and your hands under your shoulders.        Relax your abdominal and buttocks muscles, lift your head, and let your back sag. Be sure to keep your weight evenly distributed. Don t sit back on your hips.     Hold for 5 seconds.    Return to starting position.    Tuck your head and lift (arch) your back.    Hold for 5 seconds    Return to starting position.    Repeat 5 times.  [email or web address] last reviewed this educational content on 3/1/2018    4623-0767 [employer]. All rights reserved. This information is not intended as a substitute for professional medical care. Always follow your healthcare professional's instructions.           Patient Education     Back Exercises: Knee Lift         To start, lie on your back with your knees bent and feet flat on the floor. Don t press your neck or lower back to the floor. Breathe deeply. You should feel comfortable and relaxed in this position:    Start by tightening your abdominal muscles.    Lift one bent knee off the floor 2 to 4 inches.    Hold for 10 seconds. Return to start position.    Repeat 3 times.    Switch legs.  [employer] last reviewed this educational content on 3/1/2018    2053-9578 [employer]. All rights reserved. This information is not intended as a substitute for professional medical care. Always follow your healthcare professional's instructions.           Patient Education     Back Exercises: Leg Pull    To start, lie on your back with your knees bent and feet flat on the floor. Don t press your neck or lower back to the floor. Breathe deeply. You should feel comfortable and relaxed in this position.    Pull one knee to your chest.    Hold for 30 to 60 seconds. Return to starting position.    Repeat 2 times.    Switch legs.    For a double leg pull,  pull both legs to your chest at the same time. Repeat 2 times.  For your safety, check with your healthcare provider before starting an exercise program.    [employer] last reviewed this educational content on 3/1/2018    2383-6520 [employer]. All rights reserved. This information is not intended as a substitute for professional medical care. Always follow your healthcare professional's instructions.           Patient Education     Back Exercises: Lower Back Rotation    To start, lie on your back with your knees bent and feet flat on the floor. Don t press your neck or lower back to the floor. Breathe deeply. You should feel comfortable and relaxed in this position.    Drop both knees to one side. Turn your head to the other side. Keep your shoulders flat on the floor.    Do not push through pain.    Hold for 20 seconds.    Slowly switch sides.    Repeat 2 to 5 times.  [employer] last reviewed this educational content on 3/1/2018    4409-1333 [employer]. All rights reserved. This information is not intended as a substitute for professional medical care. Always follow your healthcare professional's instructions.           Patient Education     Back Exercises: Lower Back Stretch    To start, sit in a chair with your feet flat on the floor. Shift your weight slightly forward. Relax, and keep your ears, shoulders, and hips aligned while you do the following:    Sit with your feet well apart.    Bend forward and touch the floor with the backs of your hands. Relax and let your body drop.    Hold for  20 seconds. Return to starting position.    Repeat  2 times.   Note: If you've had back or hip surgery, talk with your healthcare provider before doing this stretch.  [employer] last reviewed this educational content on 4/1/2020 2000-2021 [employer]. All rights reserved. This information is not intended as a substitute for professional medical care. Always follow your healthcare  professional's instructions.

## 2021-06-10 NOTE — PROGRESS NOTES
Clinic Care Coordination Contact        Patient has a follow up appointment with a provider within 24-48 hours of hospital discharge. Will cancel/close post-hospital call rom [facility] at this time.

## 2021-06-11 DIAGNOSIS — I10 HYPERTENSION, GOAL BELOW 140/90: ICD-10-CM

## 2021-06-13 RX ORDER — HYDROCHLOROTHIAZIDE 12.5 MG/1
12.5 TABLET ORAL DAILY
Qty: 90 TABLET | Refills: 1 | Status: SHIPPED | OUTPATIENT
Start: 2021-06-13 | End: 2021-11-11

## 2021-06-13 RX ORDER — LISINOPRIL 40 MG/1
TABLET ORAL
Qty: 90 TABLET | Refills: 1 | Status: SHIPPED | OUTPATIENT
Start: 2021-06-13 | End: 2021-11-11

## 2021-06-13 NOTE — TELEPHONE ENCOUNTER
Routing refill request to provider for review/approval because:  BP Readings from Last 3 Encounters:   06/10/21 (!) 164/103   05/28/21 (!) 150/103   02/25/21 (!) 159/94     Potassium   Date Value Ref Range Status   05/28/2021 3.2 (L) 3.4 - 5.3 mmol/L Final

## 2021-08-14 DIAGNOSIS — F41.1 GAD (GENERALIZED ANXIETY DISORDER): ICD-10-CM

## 2021-08-14 DIAGNOSIS — F33.1 MODERATE EPISODE OF RECURRENT MAJOR DEPRESSIVE DISORDER (H): ICD-10-CM

## 2021-08-17 RX ORDER — FLUOXETINE 20 MG/1
TABLET, FILM COATED ORAL
Qty: 90 TABLET | OUTPATIENT
Start: 2021-08-17

## 2021-09-26 ENCOUNTER — HEALTH MAINTENANCE LETTER (OUTPATIENT)
Age: 56
End: 2021-09-26

## 2021-10-29 ENCOUNTER — TELEPHONE (OUTPATIENT)
Dept: FAMILY MEDICINE | Facility: CLINIC | Age: 56
End: 2021-10-29

## 2021-10-29 DIAGNOSIS — F41.1 GAD (GENERALIZED ANXIETY DISORDER): ICD-10-CM

## 2021-10-29 RX ORDER — LORAZEPAM 0.5 MG/1
0.5 TABLET ORAL EVERY 6 HOURS PRN
Qty: 30 TABLET | Refills: 0 | Status: SHIPPED | OUTPATIENT
Start: 2021-10-29 | End: 2021-11-11

## 2021-10-29 NOTE — TELEPHONE ENCOUNTER
Patient call taken, he says he is out of his lorazepam; does not take it daily (30 tabs sent 6/10/21 has lasted him this long).    See follow up plan at 6/10/21 visit:      Instructions         Return in about 2 months (around 8/10/2021) for BP Recheck and med recheck. .        Scheduled patient for BP follow up/med check, 11/11/21, with Nasreen Martínez.    I advised PCP not in clinic today but may be checking messages.   He is hoping someone else can refill it, says it is more of a crutch, helps his anxiety just to know it is available.    Pharmacy selected.     Patient requests I also route this to Dr. Morales as he has seen him as well.    Routed to PCP and to Dr. Morales to address lorazepam refill request.    Routing refill request to provider for review/approval because:  Drug not on the G refill protocol     Sima Rodriguez RN  [facility]

## 2021-11-04 DIAGNOSIS — I10 HYPERTENSION, GOAL BELOW 140/90: ICD-10-CM

## 2021-11-05 NOTE — TELEPHONE ENCOUNTER
"Requested Prescriptions   Pending Prescriptions Disp Refills    amLODIPine (NORVASC) 10 MG tablet 30 tablet 0     Sig: Take 1 tablet (10 mg) by mouth daily Due for a recheck.        Calcium Channel Blockers Protocol  Failed - 11/4/2021 12:26 PM        Failed - Blood pressure under 140/90 in past 12 months       BP Readings from Last 3 Encounters:   06/10/21 (!) 164/103   05/28/21 (!) 150/103   02/25/21 (!) 159/94                 Passed - Recent (12 mo) or future (30 days) visit within the authorizing provider's specialty     Patient has had an office visit with the authorizing provider or a provider within the authorizing providers department within the previous 12 mos or has a future within next 30 days. See \"Patient Info\" tab in inbasket, or \"Choose Columns\" in Meds & Orders section of the refill encounter.              Passed - Medication is active on med list        Passed - Patient is age 18 or older        Passed - Normal serum creatinine on file in past 12 months     Recent Labs   Lab Test 05/28/21  1311   CR 0.77       Ok to refill medication if creatinine is low                Routing refill request to provider for review/approval because:  Failed protocol.  Ragini Crowder RN, BSN  Triage nurse  [facility]: [place]      "

## 2021-11-06 RX ORDER — AMLODIPINE BESYLATE 10 MG/1
10 TABLET ORAL DAILY
Qty: 30 TABLET | Refills: 0 | Status: SHIPPED | OUTPATIENT
Start: 2021-11-06 | End: 2021-11-11

## 2021-11-08 ENCOUNTER — HOSPITAL ENCOUNTER (EMERGENCY)
Facility: CLINIC | Age: 56
Discharge: HOME | End: 2021-11-08
Attending: EMERGENCY MEDICINE | Admitting: EMERGENCY MEDICINE
Payer: COMMERCIAL

## 2021-11-08 ENCOUNTER — APPOINTMENT (OUTPATIENT)
Dept: GENERAL RADIOLOGY | Facility: CLINIC | Age: 56
End: 2021-11-08
Attending: EMERGENCY MEDICINE
Payer: COMMERCIAL

## 2021-11-08 VITALS
WEIGHT: 175 LBS | BODY MASS INDEX: 27.47 KG/M2 | RESPIRATION RATE: 18 BRPM | DIASTOLIC BLOOD PRESSURE: 87 MMHG | HEIGHT: 67 IN | TEMPERATURE: 97.2 F | HEART RATE: 69 BPM | SYSTOLIC BLOOD PRESSURE: 113 MMHG | OXYGEN SATURATION: 96 %

## 2021-11-08 DIAGNOSIS — G44.209 TENSION HEADACHE: ICD-10-CM

## 2021-11-08 DIAGNOSIS — I10 BENIGN ESSENTIAL HYPERTENSION: ICD-10-CM

## 2021-11-08 LAB
ALBUMIN SERPL-MCNC: 4.3 G/DL (ref 3.4–5)
ALP SERPL-CCNC: 51 U/L (ref 40–150)
ALT SERPL W P-5'-P-CCNC: 26 U/L (ref 0–70)
ANION GAP SERPL CALCULATED.3IONS-SCNC: 9 MMOL/L (ref 3–14)
AST SERPL W P-5'-P-CCNC: 15 U/L (ref 0–45)
BASOPHILS # BLD AUTO: 0 10E3/UL (ref 0–0.2)
BASOPHILS NFR BLD AUTO: 0 %
BILIRUB DIRECT SERPL-MCNC: 0.2 MG/DL (ref 0–0.2)
BILIRUB SERPL-MCNC: 0.7 MG/DL (ref 0.2–1.3)
BUN SERPL-MCNC: 17 MG/DL (ref 7–30)
CALCIUM SERPL-MCNC: 9.3 MG/DL (ref 8.5–10.1)
CHLORIDE BLD-SCNC: 101 MMOL/L (ref 94–109)
CO2 SERPL-SCNC: 25 MMOL/L (ref 20–32)
CREAT SERPL-MCNC: 0.75 MG/DL (ref 0.66–1.25)
EOSINOPHIL # BLD AUTO: 0.1 10E3/UL (ref 0–0.7)
EOSINOPHIL NFR BLD AUTO: 1 %
ERYTHROCYTE [DISTWIDTH] IN BLOOD BY AUTOMATED COUNT: 12 % (ref 10–15)
GFR SERPL CREATININE-BSD FRML MDRD: >90 ML/MIN/1.73M2
GLUCOSE BLD-MCNC: 92 MG/DL (ref 70–99)
HCT VFR BLD AUTO: 44.6 % (ref 40–53)
HGB BLD-MCNC: 14.7 G/DL (ref 13.3–17.7)
HOLD SPECIMEN: NORMAL
IMM GRANULOCYTES # BLD: 0 10E3/UL
IMM GRANULOCYTES NFR BLD: 0 %
LIPASE SERPL-CCNC: 70 U/L (ref 73–393)
LYMPHOCYTES # BLD AUTO: 1.3 10E3/UL (ref 0.8–5.3)
LYMPHOCYTES NFR BLD AUTO: 15 %
MCH RBC QN AUTO: 31.3 PG (ref 26.5–33)
MCHC RBC AUTO-ENTMCNC: 33 G/DL (ref 31.5–36.5)
MCV RBC AUTO: 95 FL (ref 78–100)
MONOCYTES # BLD AUTO: 0.4 10E3/UL (ref 0–1.3)
MONOCYTES NFR BLD AUTO: 5 %
NEUTROPHILS # BLD AUTO: 6.9 10E3/UL (ref 1.6–8.3)
NEUTROPHILS NFR BLD AUTO: 79 %
NRBC # BLD AUTO: 0 10E3/UL
NRBC BLD AUTO-RTO: 0 /100
PLATELET # BLD AUTO: 219 10E3/UL (ref 150–450)
POTASSIUM BLD-SCNC: 3.7 MMOL/L (ref 3.4–5.3)
PROT SERPL-MCNC: 8.2 G/DL (ref 6.8–8.8)
RBC # BLD AUTO: 4.69 10E6/UL (ref 4.4–5.9)
SODIUM SERPL-SCNC: 135 MMOL/L (ref 133–144)
TROPONIN I SERPL-MCNC: <0.015 UG/L (ref 0–0.04)
TROPONIN I SERPL-MCNC: <0.015 UG/L (ref 0–0.04)
WBC # BLD AUTO: 8.7 10E3/UL (ref 4–11)

## 2021-11-08 PROCEDURE — 93010 ELECTROCARDIOGRAM REPORT: CPT | Performed by: EMERGENCY MEDICINE

## 2021-11-08 PROCEDURE — 250N000013 HC RX MED GY IP 250 OP 250 PS 637: Performed by: FAMILY MEDICINE

## 2021-11-08 PROCEDURE — 85025 COMPLETE CBC W/AUTO DIFF WBC: CPT | Performed by: EMERGENCY MEDICINE

## 2021-11-08 PROCEDURE — 99285 EMERGENCY DEPT VISIT HI MDM: CPT | Mod: 25 | Performed by: EMERGENCY MEDICINE

## 2021-11-08 PROCEDURE — 85025 COMPLETE CBC W/AUTO DIFF WBC: CPT | Performed by: FAMILY MEDICINE

## 2021-11-08 PROCEDURE — 93005 ELECTROCARDIOGRAM TRACING: CPT | Performed by: EMERGENCY MEDICINE

## 2021-11-08 PROCEDURE — 36415 COLL VENOUS BLD VENIPUNCTURE: CPT | Performed by: FAMILY MEDICINE

## 2021-11-08 PROCEDURE — 36415 COLL VENOUS BLD VENIPUNCTURE: CPT | Performed by: EMERGENCY MEDICINE

## 2021-11-08 PROCEDURE — 84484 ASSAY OF TROPONIN QUANT: CPT | Performed by: FAMILY MEDICINE

## 2021-11-08 PROCEDURE — 80048 BASIC METABOLIC PNL TOTAL CA: CPT | Performed by: FAMILY MEDICINE

## 2021-11-08 PROCEDURE — 82248 BILIRUBIN DIRECT: CPT | Performed by: EMERGENCY MEDICINE

## 2021-11-08 PROCEDURE — 71046 X-RAY EXAM CHEST 2 VIEWS: CPT

## 2021-11-08 PROCEDURE — 250N000013 HC RX MED GY IP 250 OP 250 PS 637: Performed by: EMERGENCY MEDICINE

## 2021-11-08 PROCEDURE — 84484 ASSAY OF TROPONIN QUANT: CPT | Performed by: EMERGENCY MEDICINE

## 2021-11-08 PROCEDURE — 80048 BASIC METABOLIC PNL TOTAL CA: CPT | Performed by: EMERGENCY MEDICINE

## 2021-11-08 PROCEDURE — 83690 ASSAY OF LIPASE: CPT | Performed by: EMERGENCY MEDICINE

## 2021-11-08 RX ORDER — ACETAMINOPHEN 325 MG/1
650 TABLET ORAL ONCE
Status: COMPLETED | OUTPATIENT
Start: 2021-11-08 | End: 2021-11-08

## 2021-11-08 RX ORDER — ASPIRIN 81 MG/1
324 TABLET, CHEWABLE ORAL ONCE
Status: COMPLETED | OUTPATIENT
Start: 2021-11-08 | End: 2021-11-08

## 2021-11-08 RX ADMIN — ACETAMINOPHEN 650 MG: 325 TABLET, FILM COATED ORAL at 19:42

## 2021-11-08 RX ADMIN — ASPIRIN 81 MG CHEWABLE TABLET 324 MG: 81 TABLET CHEWABLE at 14:29

## 2021-11-08 ASSESSMENT — MIFFLIN-ST. JEOR: SCORE: 1572.42

## 2021-11-08 NOTE — ED NOTES
Pt states he came in for concerns about hypertension. He says he is on lisinopril, hydrochlorothiazide, and amlodipine. He took his blood pressure today and his SBP was in the 180's which concerned him. Pt denies chest pain but states he does have a headache.

## 2021-11-08 NOTE — ED PROVIDER NOTES
History     Chief Complaint   Patient presents with     Hypertension     stated blood pressure been high at home      Chest Pain     chest discomfort     HPI  Munir Storey is a 56 year old male with a past medical history significant for hypertension previous alcohol use in remission, fatigue history atrial flutter history of general anxiety disorder and depression who presents emergency department complaining of headache left shoulder pain elevated blood pressure.  Patient states symptoms have been present for few days now.  He has been monitoring his blood pressure and I have been in the 180s at times.  He states he has had frontal headaches and has had intermittent left shoulder pain.  He has had twinges in his chest but is not having chest pain at this time.  He denies any recent fevers or chills has not had a headache or visual changes.  He denies any neck pain or back pain.  He is not any bowel or bladder dysfunction denies any focal numbness weakness any extremity has not had any significant calf swelling or tenderness did had some tingling in his left foot at one point but this is not present at this time.    Allergies:  Allergies   Allergen Reactions     Sulfamethoxazole-Trimethoprim Nausea       Problem List:    Patient Active Problem List    Diagnosis Date Noted     Alcohol dependence in remission (H) 02/23/2021     Priority: Medium     Vitamin D deficiency 02/23/2021     Priority: Medium     Fatigue, unspecified type 02/23/2021     Priority: Medium     Chronic neck pain with history of cervical spinal surgery 02/18/2021     Priority: Medium     H/O atrial flutter 09/02/2019     Priority: Medium     Typical atrial flutter, new onset 04/28/2015, s/p cardioversion 7/2015, s/p atrial flutter ablation 12/2015         Seborrheic keratosis 08/14/2019     Priority: Medium     Obesity (BMI 30-39.9) 08/04/2019     Priority: Medium     Moderate episode of recurrent major depressive disorder (H) 08/29/2018      Priority: Medium     Tobacco abuse 10/20/2017     Priority: Medium     SANDRA (generalized anxiety disorder) 10/20/2017     Priority: Medium     Alcohol abuse, in remission 10/20/2017     Priority: Medium     2017: sobriety x 2 years       Peyronie disease 2009     Priority: Medium     Hypertension, goal below 140/90 2009     Priority: Medium        Past Medical History:    Past Medical History:   Diagnosis Date     Alcohol withdrawal (H) 2015     Atrial flutter (H)      Depressive disorder      Ejection fraction < 50% 2015     SANDRA (generalized anxiety disorder)      H/O atrioventricular quita ablation 2015     Hypertension, goal below 140/90      Tobacco abuse        Past Surgical History:    Past Surgical History:   Procedure Laterality Date     CARDIAC SURGERY  2015    EP cardioversion     COLONOSCOPY  2013     COLONOSCOPY  2019    normal colonoscopy - repeat 10 years     HERNIA REPAIR       LAPAROSCOPIC RESECTION SMALL BOWEL  2013     SPINE SURGERY      cervical fusion      UPPER GI ENDOSCOPY  2013       Family History:    Family History   Problem Relation Age of Onset     Diabetes Father      Depression Father      Depression Paternal Grandfather        Social History:  Marital Status:   [2]  Social History     Tobacco Use     Smoking status: Former Smoker     Packs/day: 0.50     Years: 15.00     Pack years: 7.50     Types: Cigarettes, Dip, chew, snus or snuff     Quit date: 2019     Years since quittin.2     Smokeless tobacco: Former User     Types: Chew   Substance Use Topics     Alcohol use: No     Comment: hx alcohol abuse, sober since      Drug use: No        Medications:    amLODIPine (NORVASC) 10 MG tablet  cyclobenzaprine (FLEXERIL) 10 MG tablet  FLUoxetine 20 MG tablet  hydrochlorothiazide (HYDRODIURIL) 12.5 MG tablet  lisinopril (ZESTRIL) 40 MG tablet  LORazepam (ATIVAN) 0.5 MG tablet  ondansetron (ZOFRAN-ODT) 4 MG ODT  "tab          Review of Systems  All systems reviewed and other than pertinent positives and negatives in HPI all other systems are negative.  Physical Exam   BP: (!) 167/104  Pulse: 95  Temp: 97.2  F (36.2  C)  Resp: 18  Height: 170.2 cm (5' 7\")  Weight: 79.4 kg (175 lb)  SpO2: 94 %      Physical Exam  Vitals and nursing note reviewed.   Constitutional:       General: He is not in acute distress.     Appearance: He is well-developed. He is not ill-appearing, toxic-appearing or diaphoretic.   HENT:      Head: Normocephalic and atraumatic.      Nose: Nose normal.      Mouth/Throat:      Mouth: Mucous membranes are moist.      Pharynx: Oropharynx is clear.   Eyes:      Extraocular Movements: Extraocular movements intact.      Conjunctiva/sclera: Conjunctivae normal.      Pupils: Pupils are equal, round, and reactive to light.   Cardiovascular:      Rate and Rhythm: Normal rate and regular rhythm.      Pulses: Normal pulses.      Heart sounds: Normal heart sounds. No murmur heard.      Pulmonary:      Effort: Pulmonary effort is normal.      Breath sounds: Normal breath sounds. No wheezing or rhonchi.   Abdominal:      General: Abdomen is flat. Bowel sounds are normal. There is no distension.      Palpations: Abdomen is soft.      Tenderness: There is no abdominal tenderness. There is no right CVA tenderness or left CVA tenderness.   Musculoskeletal:         General: No swelling. Normal range of motion.      Cervical back: Normal range of motion and neck supple. No tenderness.      Right lower leg: No edema.      Left lower leg: No edema.      Comments: Mild tenderness to palpation of left anterior shoulder this summer reproduces the patient's pain in his shoulder there is no anterior chest wall tenderness at this time.   Skin:     General: Skin is warm and dry.      Capillary Refill: Capillary refill takes less than 2 seconds.      Findings: No rash.   Neurological:      General: No focal deficit present.      Mental " Status: He is alert and oriented to person, place, and time.      Motor: No weakness.      Coordination: Coordination normal.   Psychiatric:         Mood and Affect: Mood normal.         ED Course        Procedures              EKG Interpretation:      Interpreted by Jonah Anglin MD  Rhythm: normal sinus   Rate: Normal  Axis: Normal  Ectopy: premature atrial contraction  Conduction: normal  ST Segments/ T Waves: Non-specific ST-T wave changes  Q Waves: none  Comparison to prior: Unchanged from 5/28/21    Clinical Impression: Normal sinus rhythm with occasional PAC and nonspecific ST-T wave changes.      Critical Care time:  none               Results for orders placed or performed during the hospital encounter of 11/08/21 (from the past 24 hour(s))   [place] Draw    Narrative    The following orders were created for panel order [place] Draw.  Procedure                               Abnormality         Status                     ---------                               -----------         ------                     Extra Blue Top Tube[[id number]]                              Final result               Extra Red Top Tube[[id number]]                               Final result               Extra Green Top (Lithium...[[id number]]                      Final result               Extra Purple Top Tube[[id number]]                            Final result                 Please view results for these tests on the individual orders.   CBC with Platelets & Differential    Narrative    The following orders were created for panel order CBC with Platelets & Differential.  Procedure                               Abnormality         Status                     ---------                               -----------         ------                     CBC with platelets and d...[[id number]]                      Final result                 Please view results for these tests on the individual orders.   Basic metabolic panel   Result  Value Ref Range    Sodium 135 133 - 144 mmol/L    Potassium 3.7 3.4 - 5.3 mmol/L    Chloride 101 94 - 109 mmol/L    Carbon Dioxide (CO2) 25 20 - 32 mmol/L    Anion Gap 9 3 - 14 mmol/L    Urea Nitrogen 17 7 - 30 mg/dL    Creatinine 0.75 0.66 - 1.25 mg/dL    Calcium 9.3 8.5 - 10.1 mg/dL    Glucose 92 70 - 99 mg/dL    GFR Estimate >90 >60 mL/min/1.73m2   Troponin I   Result Value Ref Range    Troponin I <0.015 0.000 - 0.045 ug/L   Extra Blue Top Tube   Result Value Ref Range    Hold Specimen JIC    Extra Red Top Tube   Result Value Ref Range    Hold Specimen JIC    Extra Green Top (Lithium Heparin) Tube   Result Value Ref Range    Hold Specimen JIC    Extra Purple Top Tube   Result Value Ref Range    Hold Specimen JIC    CBC with platelets and differential   Result Value Ref Range    WBC Count 8.7 4.0 - 11.0 10e3/uL    RBC Count 4.69 4.40 - 5.90 10e6/uL    Hemoglobin 14.7 13.3 - 17.7 g/dL    Hematocrit 44.6 40.0 - 53.0 %    MCV 95 78 - 100 fL    MCH 31.3 26.5 - 33.0 pg    MCHC 33.0 31.5 - 36.5 g/dL    RDW 12.0 10.0 - 15.0 %    Platelet Count 219 150 - 450 10e3/uL    % Neutrophils 79 %    % Lymphocytes 15 %    % Monocytes 5 %    % Eosinophils 1 %    % Basophils 0 %    % Immature Granulocytes 0 %    NRBCs per 100 WBC 0 <1 /100    Absolute Neutrophils 6.9 1.6 - 8.3 10e3/uL    Absolute Lymphocytes 1.3 0.8 - 5.3 10e3/uL    Absolute Monocytes 0.4 0.0 - 1.3 10e3/uL    Absolute Eosinophils 0.1 0.0 - 0.7 10e3/uL    Absolute Basophils 0.0 0.0 - 0.2 10e3/uL    Absolute Immature Granulocytes 0.0 <=0.0 10e3/uL    Absolute NRBCs 0.0 10e3/uL       Medications   aspirin (ASA) chewable tablet 324 mg (324 mg Oral Given 11/8/21 1429)       Assessments & Plan (with Medical Decision Making) records were reviewed.  Labs were obtained.  EKG was obtained.  Patient was given aspirin.  EKG revealed a sinus rhythm with 2 PACs no significant change from previous EKG.  Patient's white count was 8.7 hemoglobin 14.7 platelet count was 219.  Basic  metabolic panel without significant abnormality.  [place] panel and lipase were also unremarkable.  Troponin was less than 0.015.  His blood pressure did improve systolically and diastolic pressure remained in the 90s.  Chest x-ray revealed no obvious acute process present.  I have made an appointment for him tomorrow at the [facility] to recheck his blood pressure and possibly adjust this as needed.  I do not feel I should adjust his blood pressure further at this point.  His last blood pressure reading is 113/87.     I have reviewed the nursing notes.    I have reviewed the findings, diagnosis, plan and need for follow up with the patient.       Discharge Medication List as of 11/8/2021  7:36 PM          Final diagnoses:   Benign essential hypertension   Tension headache       11/8/2021   [facility] EMERGENCY DEPT     Derrek, Jonah Abad MD  11/09/21 1600

## 2021-11-09 ENCOUNTER — OFFICE VISIT (OUTPATIENT)
Dept: FAMILY MEDICINE | Facility: CLINIC | Age: 56
End: 2021-11-09
Payer: COMMERCIAL

## 2021-11-09 VITALS
DIASTOLIC BLOOD PRESSURE: 88 MMHG | BODY MASS INDEX: 28.72 KG/M2 | OXYGEN SATURATION: 95 % | SYSTOLIC BLOOD PRESSURE: 141 MMHG | HEART RATE: 83 BPM | RESPIRATION RATE: 16 BRPM | WEIGHT: 183.38 LBS | TEMPERATURE: 97.8 F

## 2021-11-09 DIAGNOSIS — I10 HYPERTENSION, GOAL BELOW 140/90: Primary | Chronic | ICD-10-CM

## 2021-11-09 DIAGNOSIS — R40.0 DAYTIME SOMNOLENCE: ICD-10-CM

## 2021-11-09 DIAGNOSIS — R06.83 SNORING: ICD-10-CM

## 2021-11-09 PROCEDURE — 99214 OFFICE O/P EST MOD 30 MIN: CPT | Performed by: PHYSICIAN ASSISTANT

## 2021-11-09 ASSESSMENT — ENCOUNTER SYMPTOMS
SHORTNESS OF BREATH: 0
NERVOUS/ANXIOUS: 0
ABDOMINAL PAIN: 0
COUGH: 0
LIGHT-HEADEDNESS: 0
FEVER: 0

## 2021-11-09 ASSESSMENT — PAIN SCALES - GENERAL: PAINLEVEL: NO PAIN (0)

## 2021-11-09 NOTE — DISCHARGE INSTRUCTIONS
Return if symptoms worsen or new symptoms develop.  Follow-up with [place] [facility] tomorrow to recheck your blood pressure.  Drink plenty of fluids.  Continue hypertensive medications.  If any worsening headaches vomiting focal numbness weakness in extremity or other symptoms present please return for further evaluation and care.  At this point your blood pressure is slightly elevated and this could be due to being in the emergency department.  I feel recheck blood pressure tomorrow with adjustments being made for elevated is warranted.  I do not want to drop your blood pressure too low at this point.

## 2021-11-09 NOTE — PATIENT INSTRUCTIONS
-Please continue hydrochlorothiazide and lisinopril as prescribed.  -Take 1/2 tablet of amlodipine each morning.  Hopefully, this will help decrease your blood pressure without the fatigue.  -A referral to the care coordination team was placed.  They will be reaching out to you to help figure out the best plan for for a sleep study as sleep apnea can play a large role in ongoing high blood pressure.  -Schedule a follow-up visit with Nasreen, your primary care provider, and 2-3 weeks for a recheck.      Patient Education     Controlling High Blood Pressure   High blood pressure (hypertension) is often called the silent killer. This is because many people who have it, don t know it. It can be very dangerous. High blood pressure can raise your risk of heart attack, stroke, heart disease, and heart failure. Controlling your blood pressure can decrease your risk of these problems. It's important to know the appropriate blood pressure range and remember to check your blood pressure regularly. Doing so can save your life.   Blood pressure measurements are given as 2 numbers. Systolic blood pressure is the upper number. This is the pressure when the heart contracts. Diastolic blood pressure is the lower number. This is the pressure when the heart relaxes between beats.   Blood pressure is categorized as normal, elevated, or stage 1 or stage 2 high blood pressure:     Normal blood pressure is systolic of less than 120 and diastolic of less than 80 (120/80)    Elevated blood pressure is systolic of 120 to 129 and diastolic less than 80    Stage 1 high blood pressure is systolic of 130 to 139 or diastolic between 80 to 89    Stage 2 high blood pressure is when systolic is 140 or higher or the diastolic is 90 or higher  A heart-healthy lifestyle can help you control your blood pressure without medicines. Here are some things you can do to pursue a heart-healthy lifestyle:     Choose heart-healthy foods     Select low-salt,  low-fat foods. Limit sodium intake to 2,400 mg per day or the amount suggested by your healthcare provider.    Limit canned, dried, cured, packaged, and fast foods. These can contain a lot of salt.    Eat 8 to 10 servings of fruits and vegetables every day.    Choose lean meats, fish, or chicken.    Eat whole-grain pasta, brown rice, and beans.    Eat 2 to 3 servings of low-fat or fat-free dairy products.    Ask your doctor about the DASH eating plan. This plan helps reduce blood pressure.    When you go to a restaurant, ask that your meal be prepared with no added salt.    Stay at a healthy weight     Ask your healthcare provider how many calories to eat a day. Then stick to that number.    Ask your healthcare provider what weight range is healthiest for you. If you are overweight, a weight loss of only 3% to 5% of your body weight can help lower blood pressure. Generally, a good weight loss goal is to lose 10% of your body weight in a year.    Limit snacks and sweets.    Get regular exercise.    Get up and get active     Find activities you enjoy that can be done alone or with friends or family. Such activities might include bicycling, dancing, walking, or jogging.    Park farther away from building entrances to walk more.    Use stairs instead of the elevator.    When you can, walk or bike instead of driving.    [place] leaves, garden, or do household repairs.    Be active at a moderate to vigorous level of physical activity for at least 40 minutes for a minimum of 3 to 4 days a week.     Manage stress     Make time to relax and enjoy life. Find time to laugh.    Communicate your concerns with your loved ones and your healthcare provider.    Visit with family and friends, and keep up with hobbies.    Limit alcohol and quit smoking     Men should have no more than 2 drinks per day.    Women should have no more than 1 drink per day.    Talk with your healthcare provider about quitting smoking. Smoking significantly  increases your risk for heart disease and stroke. Ask your healthcare provider about community smoking cessation programs and other options.    Medicines  If lifestyle changes aren t enough, your healthcare provider may prescribe high blood pressure medicine. Take all medicines as prescribed. If you have any questions about your medicines, ask your healthcare provider before stopping or changing them.   Wendy last reviewed this educational content on 6/1/2019 2000-2021 [employer]. All rights reserved. This information is not intended as a substitute for professional medical care. Always follow your healthcare professional's instructions.           Patient Education     What Are Snoring and Obstructive Sleep Apnea?  If you ve ever had a stuffed-up nose, you know the feeling of trying to breathe through a very narrow passageway. This is what happens in your throat when you snore. While you sleep, structures in your throat partly block your air passage. This makes the passage narrow and hard to breathe through. In some cases, the entire passage may become blocked so you can t breathe at all. This is called obstructive sleep apnea.      Snoring       Obstructive sleep apnea      Snoring  If your throat structures are too large or the muscles relax too much during sleep, the air passage may be partly blocked for a brief moment. But the air eventually passes through. As air from the nose or mouth passes around this blockage, the throat structures vibrate. This causes the familiar sound of snoring. This snoring sound can be loud and may wake up others, or even themselves, during the night. Snoring gets worse as more and more of the air passage is blocked.   Obstructive sleep apnea  If the structures partly or completely block the throat, air can t flow to the lungs at all. This is called hypopnea (decreased breathing) or apnea (meaning  no breathing ). The lungs aren t getting fresh air. So the brain  tells the body to wake up just enough to tighten the muscles and unblock the air passage. With a loud gasp, breathing starts again. This process may be repeated over and over again during the night. This can make your sleep fragmented with lighter stages of sleep. You may not remember waking up many times during the night however due to lighter sleep you will most likely feel tired the next day. The lack of sleep and fresh air can also strain your lungs, heart, and other organs. This may lead to problems such as high blood pressure, diabetes, behavioral disorders, heart attack, or stroke.   Problems in the nose and jaw  Problems in the structure of the nose may block breathing. A crooked (deviated) septum or swollen turbinates can make snoring worse or lead to apnea. Also, a receding jaw may make the tongue sit too far back. Then it s more likely to block the airway when you re asleep.   Wendy last reviewed this educational content on 9/1/2019 2000-2021 [employer]. All rights reserved. This information is not intended as a substitute for professional medical care. Always follow your healthcare professional's instructions.

## 2021-11-09 NOTE — PROGRESS NOTES
Assessment & Plan     Hypertension, goal below 140/90  Daytime somnolence  Snoring  Patient is a 56-year-old male presents to clinic for emergency department follow-up.  Patient was evaluated 1 day ago due to hypertension and chest pain per ED notes.  Patient notes he is compliant with prescribed lisinopril and hydrochlorothiazide, but does not take prescribed amlodipine as it makes him feel significantly fatigued.  Vital signs significant for blood pressure of 141/88 today, which is significantly improved from initial ED reading.  Patient notes he did take amlodipine this morning.     Discussed possible other causes of hypertension.  Patient no longer drinks alcohol and notes following healthy diet and exercising daily.  Patient notes he does snore and stop breathing at night.  He was previously referred for sleep study, but did not complete this as he was unable to cover the cost financially.  Insurance did not cover cost.  Discussed that significant sleep apnea could be a contributing factor to uncontrolled hypertension.  Patient would like assistance inferior plan for coverage.  Referral placed to care coordination.    In the meantime, as patient has been noncompliant with amlodipine, recommended taking half tablet (5 mg) of amlodipine daily over the next 2 weeks and keeping log of blood pressures.  Patient to schedule follow-up visit with primary care provider for review of log as well as further hypertension management.  Return precautions provided.    - Care Coordination Referral; Future    See Patient Instructions    Return in about 2 weeks (around 11/23/2021) for Reevaluation.    Leticia Ruano PA-C  [facility] TESSA jensen is a 56 year old who presents for the following health issues     HPI     ED/UC Followup:    Facility:  [facility]  Date of visit: 1/8/2021  Reason for visit: Hypertension, chest pain  Current Status: Patient was started on amlodipine 10mg  every day and is also taking hydrochlorothiazide 12.5mg every day and lisinopril 40mg every day and he notes fatigue, headaches and eye pain. He is not checking blood pressure at home currently. 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 He drinks 2 cups of caffeinated coffee per day and does not use alcohol. He does use chewing tobacco. He denies, chest pain, dizziness, sweating, LOC/falls, left sided neck/arm pain, vision changes, ringing in the ears and hearing loss.     Patient notes that he does not like the Amlodipine as it makes him fatigued. Fatigue occurs regardless of taking this medication at morning or night. He does not take it consistently as it makes him feel poorly, but notes that the medication does improve blood pressure control. He is compliant with hydrochlorothiazide and Lisinopril.     Patient notes that he snores and stops breathing at night. He was previously referred for a sleep study, but cannot afford this as he has to pay out of pocket for it.     BP Readings from Last 6 Encounters:   11/09/21 (!) 141/88   11/08/21 113/87   06/10/21 (!) 164/103   05/28/21 (!) 150/103   02/25/21 (!) 159/94   02/18/21 (!) 194/106            Per chart review, patient evaluated in emergency department yesterday due to chest pain and elevated blood pressure.  ED blood pressure was found to be 167/104.  EKG showing NSR, nonspecific ST-T wave changes.  And unchanged from prior readings.  Troponin negative.  Upon discharge blood pressure reading was 113/87.    Review of Systems   Constitutional: Negative for fever.   Respiratory: Negative for cough and shortness of breath.    Cardiovascular: Negative for chest pain.   Gastrointestinal: Negative for abdominal pain.   Skin: Negative for rash.   Neurological: Negative for light-headedness.   Psychiatric/Behavioral: The patient is not nervous/anxious.             Objective    BP (!) 141/88   Pulse 83   Temp 97.8   F (36.6  C) (Tympanic)   Resp 16   Wt 83.2 kg (183 lb 6 oz)   SpO2 95%   BMI 28.72 kg/m    Body mass index is 28.72 kg/m .  Physical Exam  Vitals and nursing note reviewed.   Constitutional:       General: He is not in acute distress.     Appearance: Normal appearance.   HENT:      Head: Normocephalic and atraumatic.      Mouth/Throat:      Mouth: Mucous membranes are moist.      Pharynx: Oropharynx is clear.   Eyes:      Extraocular Movements: Extraocular movements intact.      Pupils: Pupils are equal, round, and reactive to light.   Cardiovascular:      Rate and Rhythm: Normal rate and regular rhythm.      Heart sounds: Normal heart sounds.   Pulmonary:      Effort: Pulmonary effort is normal.      Breath sounds: Normal breath sounds.   Musculoskeletal:         General: Normal range of motion.      Cervical back: Normal range of motion.   Skin:     General: Skin is warm and dry.   Neurological:      General: No focal deficit present.      Mental Status: He is alert.   Psychiatric:         Mood and Affect: Mood normal.         Behavior: Behavior normal.

## 2021-11-10 ENCOUNTER — PATIENT OUTREACH (OUTPATIENT)
Dept: NURSING | Facility: CLINIC | Age: 56
End: 2021-11-10
Payer: COMMERCIAL

## 2021-11-10 NOTE — LETTER
[facility] CARE COORDINATION  [facility] [address]  [place] [address]    November 12, 2021    Munir Storey  [address] [place] [address]      Dear Munir,    I am a clinic community health worker who works with Nasreen Martínez PA-C at [facility]. I have been trying to reach you recently to introduce Clinic Care Coordination and to see if there was anything I could assist you with.  Below is a description of clinic care coordination and how I can further assist you.      The clinic care coordination team is made up of a registered nurse,  and community health worker who understand the health care system. The goal of clinic care coordination is to help you manage your health and improve access to the health care system in the most efficient manner. The team can assist you in meeting your health care goals by providing education, coordinating services, strengthening the communication among your providers and supporting you with any resource needs.    Please feel free to contact me at [phone] with any questions or concerns. We are focused on providing you with the highest-quality healthcare experience possible and that all starts with you.     Sincerely,     Savana Doan  Community Health Worker  [facility] Care Coordination  Office: [phone]

## 2021-11-10 NOTE — PROGRESS NOTES
Clinic Care Coordination Contact  [facility]/Voicemail    Reason for Referral: Financial Support       Additional Information: Patient needs sleep study but is having trouble affording  11/8/2021    ED Dx: Hypertension    Chest Pain     Clinical Data: Care Coordination Outreach  Outreach attempted x 1, phone disconnected.    CHW called back and left message on patient's voicemail with call back information and requested return call.  Plan: CHW will try to reach patient again in 1-2 business days.    Per OV note 11/9, Leticia Ruano PA-C:  Patient was previously referred for sleep study, but did not complete this as he was unable to cover the cost financially.  Insurance did not cover cost.  Discussed that significant sleep apnea could be a contributing factor to uncontrolled hypertension.  Patient would like assistance inferior plan for coverage.  Referral placed to care coordination.    Note: 11/11/2021   Pateint has BP and med check-up with Nasreen Martínez PA-C Shawna C  Community Health Worker  [facility]  Clinic Care Coordination  [facility]  [email or web address]   Office: [phone]

## 2021-11-11 ENCOUNTER — OFFICE VISIT (OUTPATIENT)
Dept: FAMILY MEDICINE | Facility: CLINIC | Age: 56
End: 2021-11-11
Payer: COMMERCIAL

## 2021-11-11 VITALS
HEART RATE: 80 BPM | OXYGEN SATURATION: 99 % | TEMPERATURE: 97.1 F | WEIGHT: 180 LBS | DIASTOLIC BLOOD PRESSURE: 86 MMHG | HEIGHT: 67 IN | SYSTOLIC BLOOD PRESSURE: 136 MMHG | BODY MASS INDEX: 28.25 KG/M2

## 2021-11-11 DIAGNOSIS — M19.041 PRIMARY OSTEOARTHRITIS OF RIGHT HAND: ICD-10-CM

## 2021-11-11 DIAGNOSIS — B35.3 TINEA PEDIS OF BOTH FEET: ICD-10-CM

## 2021-11-11 DIAGNOSIS — F41.1 GAD (GENERALIZED ANXIETY DISORDER): ICD-10-CM

## 2021-11-11 DIAGNOSIS — I10 HYPERTENSION, GOAL BELOW 140/90: ICD-10-CM

## 2021-11-11 DIAGNOSIS — F33.1 MODERATE EPISODE OF RECURRENT MAJOR DEPRESSIVE DISORDER (H): Primary | ICD-10-CM

## 2021-11-11 DIAGNOSIS — B35.1 ONYCHOMYCOSIS: ICD-10-CM

## 2021-11-11 DIAGNOSIS — Z11.4 SCREENING FOR HIV (HUMAN IMMUNODEFICIENCY VIRUS): ICD-10-CM

## 2021-11-11 DIAGNOSIS — R45.84 ANHEDONIA: ICD-10-CM

## 2021-11-11 DIAGNOSIS — Z11.59 NEED FOR HEPATITIS C SCREENING TEST: ICD-10-CM

## 2021-11-11 LAB — HIV 1+2 AB+HIV1 P24 AG SERPL QL IA: NONREACTIVE

## 2021-11-11 PROCEDURE — 87389 HIV-1 AG W/HIV-1&-2 AB AG IA: CPT | Performed by: PHYSICIAN ASSISTANT

## 2021-11-11 PROCEDURE — 86803 HEPATITIS C AB TEST: CPT | Performed by: PHYSICIAN ASSISTANT

## 2021-11-11 PROCEDURE — 96127 BRIEF EMOTIONAL/BEHAV ASSMT: CPT | Performed by: PHYSICIAN ASSISTANT

## 2021-11-11 PROCEDURE — 99215 OFFICE O/P EST HI 40 MIN: CPT | Performed by: PHYSICIAN ASSISTANT

## 2021-11-11 RX ORDER — AMLODIPINE BESYLATE 10 MG/1
10 TABLET ORAL DAILY
Qty: 90 TABLET | Refills: 1 | Status: SHIPPED | OUTPATIENT
Start: 2021-11-11 | End: 2022-10-19

## 2021-11-11 RX ORDER — BUPROPION HYDROCHLORIDE 150 MG/1
150 TABLET ORAL EVERY MORNING
Qty: 30 TABLET | Refills: 0 | Status: SHIPPED | OUTPATIENT
Start: 2021-11-11 | End: 2021-12-06

## 2021-11-11 RX ORDER — HYDROCHLOROTHIAZIDE 12.5 MG/1
12.5 TABLET ORAL DAILY
Qty: 90 TABLET | Refills: 1 | Status: SHIPPED | OUTPATIENT
Start: 2021-11-11 | End: 2022-02-16

## 2021-11-11 RX ORDER — CLOTRIMAZOLE 1 %
CREAM (GRAM) TOPICAL 2 TIMES DAILY
Qty: 45 G | Refills: 0 | Status: SHIPPED | OUTPATIENT
Start: 2021-11-11 | End: 2021-12-11

## 2021-11-11 RX ORDER — LORAZEPAM 0.5 MG/1
0.5 TABLET ORAL EVERY 6 HOURS PRN
Qty: 30 TABLET | Refills: 0 | Status: SHIPPED | OUTPATIENT
Start: 2021-11-11 | End: 2022-01-04

## 2021-11-11 RX ORDER — LISINOPRIL 40 MG/1
40 TABLET ORAL DAILY
Qty: 90 TABLET | Refills: 1 | Status: SHIPPED | OUTPATIENT
Start: 2021-11-11 | End: 2022-02-16

## 2021-11-11 ASSESSMENT — ANXIETY QUESTIONNAIRES
1. FEELING NERVOUS, ANXIOUS, OR ON EDGE: NEARLY EVERY DAY
6. BECOMING EASILY ANNOYED OR IRRITABLE: SEVERAL DAYS
GAD7 TOTAL SCORE: 12
IF YOU CHECKED OFF ANY PROBLEMS ON THIS QUESTIONNAIRE, HOW DIFFICULT HAVE THESE PROBLEMS MADE IT FOR YOU TO DO YOUR WORK, TAKE CARE OF THINGS AT HOME, OR GET ALONG WITH OTHER PEOPLE: SOMEWHAT DIFFICULT
2. NOT BEING ABLE TO STOP OR CONTROL WORRYING: NEARLY EVERY DAY
3. WORRYING TOO MUCH ABOUT DIFFERENT THINGS: SEVERAL DAYS
5. BEING SO RESTLESS THAT IT IS HARD TO SIT STILL: SEVERAL DAYS
7. FEELING AFRAID AS IF SOMETHING AWFUL MIGHT HAPPEN: NEARLY EVERY DAY

## 2021-11-11 ASSESSMENT — PATIENT HEALTH QUESTIONNAIRE - PHQ9: 5. POOR APPETITE OR OVEREATING: NOT AT ALL

## 2021-11-11 ASSESSMENT — MIFFLIN-ST. JEOR: SCORE: 1595.1

## 2021-11-11 NOTE — PATIENT INSTRUCTIONS
Think about getting a Moderna booster (at the  Pharmacy either M, W, Th, I am in clinic).  Or pfizer booster is an option as well.    After Nov 18, 2021.      Start lotrimin twice per day for athletes foot.     Try an arthritis cream.   Patient Education     Understanding Carpometacarpal Osteoarthritis     The base of the thumb where it meets the hand is called the carpometacarpal (CMC) joint. This joint lets the thumb move freely in many directions. It also provides strength so the hand can grasp and .   A smooth tissue called cartilage lines and cushions the bones of the CMC joint. Using the thumb puts stress on the joint. Over time, this can lead to the breakdown of the cartilage in the joint. This is known as osteoarthritis. With this condition, bones of the joint may be exposed and rub together. They may become irritated and rough. This keeps the joint from moving smoothly and can lead to pain.   How to say it  [id number]   What causes CMC joint osteoarthritis?    This type of osteoarthritis is mainly caused by years of using the hand and thumb. The condition may be more likely if you:     Regularly do things that put great stress on the thumb joint    Have had previous thumb injuries    Have weak or loose structures in the thumb      Symptoms of CMC joint osteoarthritis  Symptoms commonly include:    Thumb pain that may get worse with pinching or gripping    Thumb weakness    Sounds of grinding or popping in the thumb joint    Base of the thumb is enlarged   Treatment for CMC joint osteoarthritis  Osteoarthritis is a long-term (chronic) condition. Treatment focuses on managing symptoms. It may include:     Taking prescription or over-the-counter pain medicines to help reduce pain and swelling    Using a brace to rest and support the thumb joint    Using hot or cold therapy to help relieve pain    Learning and practicing ways to reduce stress on the thumb joint    Using devices that help  protect the joint such as jar openers, pen , and spring-action scissors    Following a plan of physical therapy and exercises to help improve the flexibility and strength of the hand and thumb    Getting shots of medicine into the joint to help relieve symptoms for a time  If these treatments don t do enough to relieve severe pain, you may need surgery. There are several different types of surgery. In general, the goal is to ease pain and help you to be able to use the hand.   When to call your healthcare provider  Call your healthcare provider right away if you have any of these:    Fever of 100.4 F (38 C) or higher, or as directed by your healthcare provider    Symptoms that don t get better, or get worse    New symptoms  [employer]Well last reviewed this educational content on 11/1/2019 2000-2021 [employer]. All rights reserved. This information is not intended as a substitute for professional medical care. Always follow your healthcare professional's instructions.           Patient Education     Athlete s Foot     Athlete s foot (tinea pedis) is caused by a fungal infection in the skin. It affects the skin between the toes, causing cracks in the skin called fissures. It can also affect the bottom of the foot where it causes dry white scales and peeling of the skin. This infection is more likely to occur when the foot is in hot, sweaty socks and shoes for long periods of time. You may feel itching and burning between your toes. This infection is treated with skin creams or medicine taken by mouth.  Home care  The following are general care guidelines:    It's important to keep the feet dry. Use absorbent cotton socks and change them if they become sweaty. Or wear an open-toe shoe or sandal. Wash the feet at least once a day with soap and water.    Apply the antifungal cream as prescribed. Some antifungal creams are available without a prescription.    It may take a week before the rash starts to  improve. It can take about 3 to 4 weeks to completely clear. Continue the medicine until the rash is all gone.    Use over-the-counter antifungal powders or sprays on your feet after exposure to high-risk environments, such as public showers, gyms, and locker rooms. This can help prevent future infections. Wearing appropriate shoes in these situations can help.  Prevention  These tips may help prevent athlete s foot:    Don't share shoes or socks with someone who has athlete's foot.    Don't walk barefoot in places where a fungal infection can spread quickly such as locker rooms, showers, and swimming pools.    Change your socks regularly.    Alternate shoes to help with drying.  Follow-up care  Follow up with your healthcare provider as recommended if the rash doesn't improve after 10 days of treatment, or if the rash continues to spread.  When to seek medical care  Get medical attention right away if any of the following occur:    Fever of 100.4 F (38 C) or higher, or as directed    Increasing redness or swelling of the foot    Infection comes back soon after treatment    Pus draining from cracks in the skin  Wendy last reviewed this educational content on 7/1/2019 2000-2021 [employer]. All rights reserved. This information is not intended as a substitute for professional medical care. Always follow your healthcare professional's instructions.

## 2021-11-11 NOTE — PROGRESS NOTES
Clinic Care Coordination Contact  [facility]/Voicemail    Clinical Data: Care Coordinator Outreach  Outreach attempted x 1.  Left message on patient's voicemail with call back information and requested return call.  Plan: Care Coordinator will try to reach patient again in 1-2 business days.    ** CHW will make 1 more attempt.    Savana Doan  Community Health Worker  [facility] Care Coordination   Office: [phone]

## 2021-11-11 NOTE — PROGRESS NOTES
Assessment & Plan     Moderate episode of recurrent major depressive disorder (H)  He has tried many selective serotonin reuptake inhibitor's in the past and has not tolerated them well.  He has used wellbutrin in the past and thinks this worked ok for him.   Will start Wellbutrin today.  We discussed various treatment options, including pros and cons of each.  I discussed in detail possible side effects of medications and that the full benefits of medication may take 4-6 weeks, patient verbalized understanding.  [facility].    We have discussed counseling as an adjuvent to medical treatment, patient was agreeable.    Follow-up in 4 weeks.      Patient is to follow-up sooner should symptoms change or worsen.             - buPROPion (WELLBUTRIN XL) 150 MG 24 hr tablet; Take 1 tablet (150 mg) by mouth every morning    Anhedonia  Encouraged him to continue to do things that have brought pleasure in the past, and with time, this rabia may return.  Adding wellbutrin may also specifically help with this symptom of his depression given it's affect on dopamine levels.       SANDRA (generalized anxiety disorder)  Stop prozac (as this was potentially causing fatigue, however many meds seem to cause this side effect in him and he still is overdue for a sleep study, so I question if this is truly a side effect of the medication).  Regardless, will instead try wellbutrin and start counseling.      Discussed the importance of using ativan very sparingly as it can become addictive and can increase risk of falls as he gets older.     - buPROPion (WELLBUTRIN XL) 150 MG 24 hr tablet; Take 1 tablet (150 mg) by mouth every morning  - LORazepam (ATIVAN) 0.5 MG tablet; Take 1 tablet (0.5 mg) by mouth every 6 hours as needed for anxiety  - MENTAL HEALTH REFERRAL  - Adult; Outpatient Treatment; Individual/Couples/Family/Group Therapy/Health Psychology; [facility] [phone]; We will contact you to schedule the  appointment or please call with any questions; Future    Hypertension, goal below 140/90  BP looks good, will leave things as is.   - lisinopril (ZESTRIL) 40 MG tablet; Take 1 tablet (40 mg) by mouth daily  - hydrochlorothiazide (HYDRODIURIL) 12.5 MG tablet; Take 1 tablet (12.5 mg) by mouth daily For blood pressure.  - amLODIPine (NORVASC) 10 MG tablet; Take 1 tablet (10 mg) by mouth daily    Primary osteoarthritis of right hand    I discussed the pathophysiology of osteoarthritis and the progressive nature of this disease.  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   I recommend he start ROM/strengthening exercises/ physical therapy.      Tinea pedis of both feet      Will start antifungal cream to be applied BID x 2-4weeks, see epic for orders.  Patient is to keep area clean and dry, he is to avoid contact of this area with others, as it is contagious.  Patient education materials given during visit today (Tinea Pedis)    - clotrimazole (LOTRIMIN) 1 % external cream; Apply topically 2 times daily    Onychomycosis  Will first start treatment with lotrimin, may consider PO lamisil down the road (I do not want to start two new meds at once given his propensity to side effects).     Screening for HIV (human immunodeficiency virus)  Discussed CDC guidelines on preventative screening for this condition.    Patient would like screening done.     - HIV Antigen Antibody Combo    Need for hepatitis C screening test  Discussed CDC guidelines on preventative screening for this condition.    Patient would like screening done.     - Hepatitis C Screen Reflex to HCV RNA Quant and Genotype      45 minutes spent on the date of the encounter doing chart review, history and exam, documentation and further activities per the note       BMI:   Estimated body mass index is 28.19 kg/m  as calculated from  "the following:    Height as of this encounter: 1.702 m (5' 7\").    Weight as of this encounter: 81.6 kg (180 lb).           Return in about 4 weeks (around 12/9/2021) for recheck on wellbutrin.    CE[facility] TESSA Amaral is a 56 year old who presents for the following health issues     HPI     Hypertension Follow-up  Patient would like to discuss amLODIPine (NORVASC) 10 MG tablet, medicaiton works well for his blood pressure but causes headaches and fatigue. Patient reports having same side effects with Lorazepam, headaches and fatigue, he says it helps with his mind but his body feels unreliable.     Do you check your blood pressure regularly outside of the clinic? Yes     Are you following a low salt diet? Yes, low added salt     Are your blood pressures ever more than 140 on the top number (systolic) OR more than 90 on the bottom number (diastolic), for example 140/90? Yes, fluctuates between 130 to 150    Depression and Anxiety Follow-Up    How are you doing with your depression since your last visit? Worsened     How are you doing with your anxiety since your last visit?  Worsened     Are you having other symptoms that might be associated with depression or anxiety? Yes:  fatigue and feels like he has no will to live     Have you had a significant life event? No     Do you have any concerns with your use of alcohol or other drugs? No    Off the prozac for the past month and feels less fatigued.  But now he has more racing thoughts and worsening anxiety.     He took a lorazepam today.  Has been taking them 4-5 days per week.     He has tried effexor in the past, this made him feel drowsy.   He has tried cymbalta in the past, he stopped this on his own.   He has tried lexapro, stopped this due to side effects of headache/fatigue when dose was increased from 10 mg to 15 mg.   Sertraline tried, also with side effects.     He was on wellbutrin in the past and " this was helpful    He did go out hunting with his son and had a good time (it was difficulty to go do it, but once he got there he felt good).    Feels swelling in left foot, this comes and goes. No injury.  No pain.      Right thumb pain, worse with hammering.  Works construction, he is the boss.     Chronic neck pain. Not interested    Social History     Tobacco Use     Smoking status: Former Smoker     Packs/day: 0.50     Years: 15.00     Pack years: 7.50     Types: Cigarettes, Dip, chew, snus or snuff     Quit date: 2019     Years since quittin.2     Smokeless tobacco: Former User     Types: Chew   Substance Use Topics     Alcohol use: No     Comment: hx alcohol abuse, sober since      Drug use: No     PHQ 2020   PHQ-9 Total Score 6 9 11   Q9: Thoughts of better off dead/self-harm past 2 weeks Not at all Not at all Not at all   F/U: Thoughts of suicide or self-harm - - -   F/U: Safety concerns - - -     SANDRA-7 SCORE 2020   Total Score 10 13 12     Last PHQ-9 2021   1.  Little interest or pleasure in doing things 2   2.  Feeling down, depressed, or hopeless 1   3.  Trouble falling or staying asleep, or sleeping too much 1   4.  Feeling tired or having little energy 3   5.  Poor appetite or overeating 1   6.  Feeling bad about yourself 2   7.  Trouble concentrating 1   8.  Moving slowly or restless 0   Q9: Thoughts of better off dead/self-harm past 2 weeks 0   PHQ-9 Total Score 11   Difficulty at work, home, or with people Very difficult   In the past two weeks have you had thoughts of suicide or self harm? -   Do you have concerns about your personal safety or the safety of others? -     SANDRA-7  2021   1. Feeling nervous, anxious, or on edge 3   2. Not being able to stop or control worrying 3   3. Worrying too much about different things 1   4. Trouble relaxing 0   5. Being so restless that it is hard to sit still 1   6. Becoming easily  "annoyed or irritable 1   7. Feeling afraid, as if something awful might happen 3   SANDRA-7 Total Score 12   If you checked any problems, how difficult have they made it for you to do your work, take care of things at home, or get along with other people? Somewhat difficult       Suicide Assessment Five-step Evaluation and Treatment (SAFE-T)      How many servings of fruits and vegetables do you eat daily?  0-1    On average, how many sweetened beverages do you drink each day (Examples: soda, juice, sweet tea, etc.  Do NOT count diet or artificially sweetened beverages)?   0    How many days per week do you exercise enough to make your heart beat faster? 3 or less    How many minutes a day do you exercise enough to make your heart beat faster? 9 or less    How many days per week do you miss taking your medication? 0        Review of Systems   Constitutional, HEENT, cardiovascular, pulmonary, GI, , musculoskeletal, neuro, skin, endocrine and psych systems are negative, except as otherwise noted.      Objective    /86 (BP Location: Left arm, Patient Position: Chair, Cuff Size: Adult Large)   Pulse 80   Temp 97.1  F (36.2  C) (Tympanic)   Ht 1.702 m (5' 7\")   Wt 81.6 kg (180 lb)   SpO2 99%   BMI 28.19 kg/m    Body mass index is 28.19 kg/m .  Physical Exam   GENERAL: healthy, alert and no distress  NECK: no adenopathy, no asymmetry, masses, or scars and thyroid normal to palpation  RESP: lungs clear to auscultation - no rales, rhonchi or wheezes  CV: regular rate and rhythm, normal S1 S2, no S3 or S4, no murmur, click or rub, no peripheral edema and peripheral pulses strong  ABDOMEN: soft, nontender, no hepatosplenomegaly, no masses and bowel sounds normal  MS: no gross musculoskeletal defects noted, no edema.  Tenderness noted over right CMC joint, no redness or warmth.  Full ROM of thumb.   Foot examination performed.  Diffuse dry cracking scaling feet, with callous formation and thickened/yellow toenails.  "  Dorsalis pedis and posterior tibialis pulses intact bilaterally.      No results found for any visits on 11/11/21.

## 2021-11-12 LAB — HCV AB SERPL QL IA: NONREACTIVE

## 2021-11-12 ASSESSMENT — ANXIETY QUESTIONNAIRES: GAD7 TOTAL SCORE: 12

## 2021-11-12 ASSESSMENT — PATIENT HEALTH QUESTIONNAIRE - PHQ9: SUM OF ALL RESPONSES TO PHQ QUESTIONS 1-9: 11

## 2021-11-12 NOTE — PROGRESS NOTES
Clinic Care Coordination Contact  [facility]/Voicemail       Clinical Data: Care Coordinator Outreach  Outreach attempted x 2.  Left message on patient's voicemail with call back information and requested return call.  Plan: Care Coordinator sent care coordination introduction letter on 11/12/21 via [employer]. Care Coordinator will do no further outreaches at this time.

## 2021-11-24 ENCOUNTER — PATIENT OUTREACH (OUTPATIENT)
Dept: NURSING | Facility: CLINIC | Age: 56
End: 2021-11-24
Payer: COMMERCIAL

## 2021-11-24 NOTE — PROGRESS NOTES
Clinic Care Coordination Contact  Community Health Worker Initial Outreach    CHW Initial Information Gathering:  Referral Source: Care Team  Preferred Hospital: [place], [place]  [phone]  Current living arrangement:: I live in a private home with spouse  Type of residence:: Private home - stairs  Community Resources: None  Supplies used at home:: None  Equipment Currently Used at Home: none  Informal Support system:: Family,Spouse  Transportation means:: Accessible car  CHW Additional Questions  MyChart active?: Yes  Patient sent Social Determinants of Health questionnaire?: Yes    Patient accepts CC: Yes. Patient scheduled for assessment with JULIUS Durán on 11/29/21 at 9:00. Patient noted desire to discuss help with looking into other Health insurance options, support with getting sleep study (patients current insurance does not cover sleep studies.)support with looking into financial assistance program for medical bills with [facility] and support with patients anxiety.     ** CHW sent Social Determinants of Health Questionnaire through [employer].

## 2021-11-29 ENCOUNTER — PATIENT OUTREACH (OUTPATIENT)
Dept: CARE COORDINATION | Facility: CLINIC | Age: 56
End: 2021-11-29
Payer: COMMERCIAL

## 2021-11-29 NOTE — PROGRESS NOTES
Clinic Care Coordination Contact  [facility]/Voicemail       Clinical Data: Care Coordinator Outreach  Outreach attempted x 2.  Left message on patient's voicemail with call back information and requested return call.    Plan: CHW please reschedule if able to reach pt

## 2021-12-01 ENCOUNTER — PATIENT OUTREACH (OUTPATIENT)
Dept: CARE COORDINATION | Facility: CLINIC | Age: 56
End: 2021-12-01
Payer: COMMERCIAL

## 2021-12-01 NOTE — PROGRESS NOTES
Clinic Care Coordination Contact  [facility]/Voicemail    Clinical Data: Care Coordinator Outreach  Outreach attempted x 1.  Left message on patient's voicemail with call back information and requested return call.  Plan: Care Coordinator will try to reach patient again in 1-2 business days.    [facility] #1 to  Assessment on 11/29/21.    Savana Doan  Critical access hospital Health Worker  [facility] Care Coordination    Office: [phone]

## 2021-12-01 NOTE — LETTER
[facility] CARE COORDINATION  [facility] [address]  [place] [address]    December 2, 2021    Munir Storey  [address] [place] [address]      Dear Munir,    I am a clinic community health worker who works with Nasreen Martínez PA-C at [facility]. I recently tried to call and was unable to reach you. Below is a description of clinic care coordination and how I can further assist you.      The clinic care coordination team is made up of a registered nurse,  and community health worker who understand the health care system. The goal of clinic care coordination is to help you manage your health and improve access to the health care system in the most efficient manner. The team can assist you in meeting your health care goals by providing education, coordinating services, strengthening the communication among your providers and supporting you with any resource needs.    Please feel free to contact me at [phone] with any questions or concerns. We are focused on providing you with the highest-quality healthcare experience possible and that all starts with you.     Sincerely,     Savana Doan  Community Health Worker  [facility] Care Coordination   Office: [phone]

## 2021-12-02 NOTE — PROGRESS NOTES
Clinic Care Coordination Contact  [facility]/Voicemail    Clinical Data: Care Coordinator Outreach  Outreach attempted x 2.  Left message on patient's voicemail with call back information and requested return call.  Plan: Care Coordinator sent care coordination introduction letter on 12/2/21 via [employer]. Care Coordinator will do no further outreaches at this time.

## 2021-12-03 DIAGNOSIS — F33.1 MODERATE EPISODE OF RECURRENT MAJOR DEPRESSIVE DISORDER (H): ICD-10-CM

## 2021-12-03 DIAGNOSIS — F41.1 GAD (GENERALIZED ANXIETY DISORDER): ICD-10-CM

## 2021-12-06 RX ORDER — BUPROPION HYDROCHLORIDE 150 MG/1
150 TABLET ORAL EVERY MORNING
Qty: 30 TABLET | Refills: 0 | Status: SHIPPED | OUTPATIENT
Start: 2021-12-06 | End: 2021-12-16

## 2021-12-06 NOTE — TELEPHONE ENCOUNTER
Routing refill request to provider for review/approval because:  Drug not on the [facility] refill protocol     PHQ-9 score:    PHQ 11/11/2021   PHQ-9 Total Score 11   Q9: Thoughts of better off dead/self-harm past 2 weeks Not at all   F/U: Thoughts of suicide or self-harm -   F/U: Safety concerns -

## 2021-12-16 ENCOUNTER — PATIENT OUTREACH (OUTPATIENT)
Dept: NURSING | Facility: CLINIC | Age: 56
End: 2021-12-16

## 2021-12-16 ENCOUNTER — VIRTUAL VISIT (OUTPATIENT)
Dept: FAMILY MEDICINE | Facility: CLINIC | Age: 56
End: 2021-12-16
Payer: COMMERCIAL

## 2021-12-16 DIAGNOSIS — Z87.891 PERSONAL HISTORY OF TOBACCO USE: Primary | ICD-10-CM

## 2021-12-16 DIAGNOSIS — F33.1 MODERATE EPISODE OF RECURRENT MAJOR DEPRESSIVE DISORDER (H): ICD-10-CM

## 2021-12-16 DIAGNOSIS — F41.1 GAD (GENERALIZED ANXIETY DISORDER): ICD-10-CM

## 2021-12-16 PROCEDURE — 99214 OFFICE O/P EST MOD 30 MIN: CPT | Mod: 95 | Performed by: PHYSICIAN ASSISTANT

## 2021-12-16 PROCEDURE — 96127 BRIEF EMOTIONAL/BEHAV ASSMT: CPT | Mod: 59 | Performed by: PHYSICIAN ASSISTANT

## 2021-12-16 RX ORDER — BUPROPION HYDROCHLORIDE 300 MG/1
300 TABLET ORAL EVERY MORNING
Qty: 30 TABLET | Refills: 1 | Status: SHIPPED | OUTPATIENT
Start: 2021-12-16 | End: 2022-01-12

## 2021-12-16 RX ORDER — HYDROXYZINE HYDROCHLORIDE 25 MG/1
25 TABLET, FILM COATED ORAL 3 TIMES DAILY PRN
Qty: 30 TABLET | Refills: 0 | Status: SHIPPED | OUTPATIENT
Start: 2021-12-16 | End: 2022-03-29

## 2021-12-16 ASSESSMENT — ANXIETY QUESTIONNAIRES
GAD7 TOTAL SCORE: 17
5. BEING SO RESTLESS THAT IT IS HARD TO SIT STILL: SEVERAL DAYS
2. NOT BEING ABLE TO STOP OR CONTROL WORRYING: NEARLY EVERY DAY
3. WORRYING TOO MUCH ABOUT DIFFERENT THINGS: NEARLY EVERY DAY
1. FEELING NERVOUS, ANXIOUS, OR ON EDGE: NEARLY EVERY DAY
IF YOU CHECKED OFF ANY PROBLEMS ON THIS QUESTIONNAIRE, HOW DIFFICULT HAVE THESE PROBLEMS MADE IT FOR YOU TO DO YOUR WORK, TAKE CARE OF THINGS AT HOME, OR GET ALONG WITH OTHER PEOPLE: VERY DIFFICULT
7. FEELING AFRAID AS IF SOMETHING AWFUL MIGHT HAPPEN: NEARLY EVERY DAY
6. BECOMING EASILY ANNOYED OR IRRITABLE: MORE THAN HALF THE DAYS

## 2021-12-16 ASSESSMENT — PATIENT HEALTH QUESTIONNAIRE - PHQ9
SUM OF ALL RESPONSES TO PHQ QUESTIONS 1-9: 6
5. POOR APPETITE OR OVEREATING: MORE THAN HALF THE DAYS

## 2021-12-16 NOTE — PROGRESS NOTES
Clinic Care Coordination Contact  Community Health Worker Initial Outreach     CHW Initial Information Gathering:  Referral Source: Care Team  Preferred Hospital: [place], [place]  [phone]  Current living arrangement:: I live in a private home with spouse  Type of residence:: Private home - stairs  Community Resources: None  Supplies used at home:: None  Equipment Currently Used at Home: none  Informal Support system:: Family,Spouse  Transportation means:: Accessible car  CHW Additional Questions  MyChart active?: Yes  Patient sent Social Determinants of Health questionnaire?: Yes     Patient accepts CC: Yes. Patient scheduled for assessment with JULIUS Durán on 12/20/21 at 3:00. Patient noted desire to discuss help with looking into other Health insurance options, support with getting sleep study (patients current insurance does not cover sleep studies.)support with looking into financial assistance program for medical bills with [facility] and support with patients anxiety.                 ** CHW sent Social Determinants of Health Questionnaire through [employer] 11/24/21.    Savana Doan  Community Health Worker  [facility] Care Coordination   Office: [phone]

## 2021-12-16 NOTE — PATIENT INSTRUCTIONS
Please call [facility] Scheduling at [phone]  to set up the CT scan of your lungs    Lung Cancer Screening   Frequently Asked Questions  If you are at high-risk for lung cancer, getting screened with low-dose computed tomography (LDCT) every year can help save your life. This handout offers answers to some of the most common questions about lung cancer screening. If you have other questions, please call [phone] ([phone]).     What is it?  Lung cancer screening uses special X-ray technology to create an image of your lung tissue. The exam is quick and easy and takes less than 10 seconds. We don t give you any medicine or use any needles. You can eat before and after the exam. You don t need to change your clothes as long as the clothing on your chest doesn t contain metal. But, you do need to be able to hold your breath for at least 6 seconds during the exam.    What is the goal of lung cancer screening?  The goal of lung cancer screening is to save lives. Many times, lung cancer is not found until a person starts having physical symptoms. Lung cancer screening can help detect lung cancer in the earliest stages when it may be easier to treat.    Who should be screened for lung cancer?  We suggest lung cancer screening for anyone who is at high-risk for lung cancer. You are in the high-risk group if you:      are between the ages of 55 and 79, and    have smoked at least 1 pack of cigarettes a day for 30 or more years, and    still smoke or have quit within the past 15 years.    However, if you have a new cough or shortness of breath, you should talk to your doctor before being screened.    Some national lung health advocacy groups also recommend screening for people ages 50 to 79 who have smoked an average of 1 pack of cigarettes a day for 20 years. They must also have at least 1 other risk factor for lung cancer, not including exposure to secondhand smoke. Other risk factors are having  had cancer in the past, emphysema, pulmonary fibrosis, COPD, a family history of lung cancer, or exposure to certain materials such as arsenic, asbestos, beryllium, cadmium, chromium, diesel fumes, nickel, radon or silica. Your care team can help you know if you have one of these risk factors.     Why does it matter if I have symptoms?  Certain symptoms can be a sign that you have a condition in your lungs that should be checked and treated by your doctor. These symptoms include fever, chest pain, a new or changing cough, shortness of breath that you have never felt before, coughing up blood or unexplained weight loss. Having any of these symptoms can greatly affect the results of lung cancer screening.       Should all smokers get an LDCT lung cancer screening exam?  It depends. Lung cancer screening is for a very specific group of men and women who have a history of heavy smoking over a long period of time (see  Who should be screened for lung cancer  above).  I am in the high-risk group, but have been diagnosed with cancer in the past. Is LDCT lung cancer screening right for me?  In some cases, you should not have LDCT lung screening, such as when your doctor is already following your cancer with CT scan studies. Your doctor will help you decide if LDCT lung screening is right for you.  Do I need to have a screening exam every year?  Yes. If you are in the high-risk group described earlier, you should get an LDCT lung cancer screening exam every year until you are 79, or are no longer willing or able to undergo screening and possible procedures to diagnose and treat lung cancer.  How effective is LDCT at preventing death from lung cancer?  Studies have shown that LDCT lung cancer screening can lower the risk of death from lung cancer by 20 percent in people who are at high-risk.  What are the risks?  There are some risks and limitations of LDCT lung cancer screening. We want to make sure you understand the risks  and benefits, so please let us know if you have any questions. Your doctor may want to talk with you more about these risks.    Radiation exposure: As with any exam that uses radiation, there is a very small increased risk of cancer. The amount of radiation in LDCT is small--about the same amount a person would get from a mammogram. Your doctor orders the exam when he or she feels the potential benefits outweigh the risks.    False negatives: No test is perfect, including LDCT. It is possible that you may have a medical condition, including lung cancer, that is not found during your exam. This is called a false negative result.    False positives and more testing: LDCT very often finds something in the lung that could be cancer, but in fact is not. This is called a false positive result. False positive tests often cause anxiety. To make sure these findings are not cancer, you may need to have more tests. These tests will be done only if you give us permission. Sometimes patients need a treatment that can have side effects, such as a biopsy. For more information on false positives, see  What can I expect from the results?     Findings not related to lung cancer: Your LDCT exam also takes pictures of areas of your body next to your lungs. In a very small number of cases, the CT scan will show an abnormal finding in one of these areas, such as your kidneys, adrenal glands, liver or thyroid. This finding may not be serious, but you may need more tests. Your doctor can help you decide what other tests you may need, if any.  What can I expect from the results?  About 1 out of 4 LDCT exams will find something that may need more tests. Most of the time, these findings are lung nodules. Lung nodules are very small collections of tissue in the lung. These nodules are very common, and the vast majority--more than 97 percent--are not cancer (benign). Most are normal lymph nodes or small areas of scarring from past  infections.  But, if a small lung nodule is found to be cancer, the cancer can be cured more than 90 percent of the time. To know if the nodule is cancer, we may need to get more images before your next yearly screening exam. If the nodule has suspicious features (for example, it is large, has an odd shape or grows over time), we will refer you to a specialist for further testing.  Will my doctor also get the results?  Yes. Your doctor will get a copy of your results.  Is it okay to keep smoking now that there s a cancer screening exam?  No. Tobacco is one of the strongest cancer-causing agents. It causes not only lung cancer, but other cancers and cardiovascular (heart) diseases as well. The damage caused by smoking builds over time. This means that the longer you smoke, the higher your risk of disease. While it is never too late to quit, the sooner you quit, the better.  Where can I find help to quit smoking?  The best way to prevent lung cancer is to stop smoking. If you have already quit smoking, congratulations and keep it up! For help on quitting smoking, please call [employer] at 7-322-335-NCDW ([phone]) or the American Cancer Society at 1-619.811.9016 to find local resources near you.  One-on-one health coaching:  If you d prefer to work individually with a health care provider on tobacco cessation, we offer:      Medication Therapy Management:  Our specially trained pharmacists work closely with you and your doctor to help you quit smoking.  Call [phone] or [phone] (toll free).     Can Do: Health coaching offered by [facility].  [email or web address]

## 2021-12-16 NOTE — PROGRESS NOTES
Munir is a 56 year old who is being evaluated via a billable telephone visit.      What phone number would you like to be contacted at? [phone]  How would you like to obtain your AVS? MyChart    Assessment & Plan     SANDRA (generalized anxiety disorder)  Will increase wellbutrin and add hydroxyzine PRN.  Use hydroxyzine instead of ativan.  May cause drowsiness, do not drive after taking medication.  He does not drink alcohol.    - buPROPion (WELLBUTRIN XL) 300 MG 24 hr tablet; Take 1 tablet (300 mg) by mouth every morning  - hydrOXYzine (ATARAX) 25 MG tablet; Take 1 tablet (25 mg) by mouth 3 times daily as needed for anxiety    Moderate episode of recurrent major depressive disorder (H)  Will try increasing dose of wellbutrin.   Has been in touch with care coordinator and plans to f/u with therapist.   - buPROPion (WELLBUTRIN XL) 300 MG 24 hr tablet; Take 1 tablet (300 mg) by mouth every morning    Personal history of tobacco use  He is interested in lung cancer screening given his history of tobacco use and asbestos exposure.   - Prof Fee: Shared Decision Making Visit for Lung Cancer Screening  - CT Chest Lung Cancer Scrn Low Dose wo; Future    Due for covid booster and flu shot, he will arrange to have these done soon.     27 minutes spent on the date of the encounter doing chart review, history and exam, documentation and further activities per the note           Return in about 4 weeks (around 1/13/2022) for med recheck. .    Nasreen Martínez PA-C  [facility]   Munir is a 56 year old who presents for the following health issues     HPI     Hypertension Follow-up      Do you check your blood pressure regularly outside of the clinic? Yes     Are you following a low salt diet? No    Are your blood pressures ever more than 140 on the top number (systolic) OR more   than 90 on the bottom number (diastolic), for example 140/90? No, pts average readings have stayed around  130/80    Depression and Anxiety Follow-Up    How are you doing with your depression since your last visit? Unchanged per pt     How are you doing with your anxiety since your last visit?  Worsened, pt struggling with his anxiety, had recently cancelled a trip to [place] due to his anxiety     Are you having other symptoms that might be associated with depression or anxiety? No    Have you had a significant life event? No    Do you have any concerns with your use of alcohol or other drugs? No    He started wellbutrin about a month ago.  Feels a mild headache in the morning, but this usually resolves with a few minutes.    Currently on 150 mg daily of wellbutrin.     Will use lorazepam to help with anxiety PRN (typically needs to use this if going out in public or he gets out of his comfort zone).     He does have     Smoked 1 ppd for   Social History     Tobacco Use     Smoking status: Former Smoker     Packs/day: 0.50     Years: 20.00     Pack years: 10.00     Types: Cigarettes, Dip, chew, snus or snuff     Quit date: 2019     Years since quittin.3     Smokeless tobacco: Former User     Types: Chew   Substance Use Topics     Alcohol use: No     Comment: hx alcohol abuse, sober since      Drug use: No     PHQ 2021   PHQ-9 Total Score 9 11 6   Q9: Thoughts of better off dead/self-harm past 2 weeks Not at all Not at all Not at all   F/U: Thoughts of suicide or self-harm - - -   F/U: Safety concerns - - -     SANDRA-7 SCORE 2021   Total Score 13 12 17     Last PHQ-9 2021   1.  Little interest or pleasure in doing things 2   2.  Feeling down, depressed, or hopeless 0   3.  Trouble falling or staying asleep, or sleeping too much 0   4.  Feeling tired or having little energy 2   5.  Poor appetite or overeating 2   6.  Feeling bad about yourself 0   7.  Trouble concentrating 0   8.  Moving slowly or restless 0   Q9: Thoughts of better off dead/self-harm  past 2 weeks 0   PHQ-9 Total Score 6   Difficulty at work, home, or with people Very difficult   In the past two weeks have you had thoughts of suicide or self harm? -   Do you have concerns about your personal safety or the safety of others? -     SANDRA-7  12/16/2021   1. Feeling nervous, anxious, or on edge 3   2. Not being able to stop or control worrying 3   3. Worrying too much about different things 3   4. Trouble relaxing 2   5. Being so restless that it is hard to sit still 1   6. Becoming easily annoyed or irritable 2   7. Feeling afraid, as if something awful might happen 3   SANDRA-7 Total Score 17   If you checked any problems, how difficult have they made it for you to do your work, take care of things at home, or get along with other people? Very difficult       Suicide Assessment Five-step Evaluation and Treatment (SAFE-T)      How many servings of fruits and vegetables do you eat daily?  2-3    On average, how many sweetened beverages do you drink each day (Examples: soda, juice, sweet tea, etc.  Do NOT count diet or artificially sweetened beverages)?   0-1    How many days per week do you exercise enough to make your heart beat faster? 3 or less    How many minutes a day do you exercise enough to make your heart beat faster? 9 or less    How many days per week do you miss taking your medication? 0        Review of Systems   Constitutional, HEENT, cardiovascular, pulmonary, gi and gu systems are negative, except as otherwise noted.      Objective           Vitals:  No vitals were obtained today due to virtual visit.    Physical Exam   healthy, alert and no distress  PSYCH: Alert and oriented times 3; coherent speech, normal   rate and volume, able to articulate logical thoughts, able   to abstract reason, no tangential thoughts, no hallucinations   or delusions  His affect is normal  RESP: No cough, no audible wheezing, able to talk in full sentences  Remainder of exam unable to be completed due to  telephone visits                Phone call duration: 22 minutes  Lung Cancer Screening Shared Decision Making Visit     Munir Storey is eligible for lung cancer screening on the basis of the information provided in my signed lung cancer screening order.     I have discussed with patient the risks and benefits of screening for lung cancer with low-dose CT.     The risks include:  radiation exposure: one low dose chest CT has as much ionizing radiation as about 15 chest x-rays or 6 months of background radiation living in [place]    false positives: 96% of positive findings/nodules are NOT cancer, but some might still require additional diagnostic evaluation, including biopsy  over-diagnosis: some slow growing cancers that might never have been clinically significant will be detected and treated unnecessarily     The benefit of early detection of lung cancer is contingent upon adherence to annual screening or more frequent follow up if indicated.     Furthermore, reaping the benefits of screening requires Munir Storey to be willing and physically able to undergo diagnostic procedures, if indicated. Although no specific guide is available for determining severity of comorbidities, it is reasonable to withhold screening in patients who have greater mortality risk from other diseases.     We did discuss that the only way to prevent lung cancer is to not smoke. Smoking cessation counseling was not given.      I did offer risk estimation using a calculator such as this one:    ShouldIScreen

## 2021-12-17 ASSESSMENT — ANXIETY QUESTIONNAIRES: GAD7 TOTAL SCORE: 17

## 2021-12-20 ENCOUNTER — PATIENT OUTREACH (OUTPATIENT)
Dept: CARE COORDINATION | Facility: CLINIC | Age: 56
End: 2021-12-20
Payer: COMMERCIAL

## 2021-12-20 NOTE — PROGRESS NOTES
Clinic Care Coordination Contact  [facility]/Voicemail- No Show x2       Clinical Data: Care Coordinator Outreach  Outreach attempted x 2.  Left message on patient's voicemail with call back information and requested return call.    Plan: CHW reschedule if able to reach pt and pt still in need of Care Coordination resources

## 2021-12-22 ENCOUNTER — PATIENT OUTREACH (OUTPATIENT)
Dept: CARE COORDINATION | Facility: CLINIC | Age: 56
End: 2021-12-22
Payer: COMMERCIAL

## 2021-12-22 NOTE — PROGRESS NOTES
Clinic Care Coordination Contact  [facility]/Voicemail    Clinical Data: Care Coordinator Outreach  Outreach attempted x 1.  Left message on patient's voicemail with call back information and requested return call.  Plan: Care Coordinator will try to reach patient again in 1-2 business days.    NS to  Assessments on 11/29/21 and 12/20/21.    Savana Doan  [facility] Health Worker  [facility] Care Coordination   Office: [phone]

## 2021-12-23 ENCOUNTER — PATIENT OUTREACH (OUTPATIENT)
Dept: NURSING | Facility: CLINIC | Age: 56
End: 2021-12-23
Payer: COMMERCIAL

## 2021-12-23 NOTE — PROGRESS NOTES
Clinic Care Coordination Contact  Community Health Worker Initial Outreach     CHW Initial Information Gathering:  Referral Source: Care Team  Preferred Hospital: [place], [place]  [phone]  Current living arrangement:: I live in a private home with spouse  Type of residence:: Private home - stairs  Community Resources: None  Supplies used at home:: None  Equipment Currently Used at Home: none  Informal Support system:: Family,Spouse  Transportation means:: Accessible car  CHW Additional Questions  MyChart active?: Yes  Patient sent Social Determinants of Health questionnaire?: Yes     Patient accepts CC: Yes. Patient scheduled for assessment with JULIUS Durán on 1/3/22 @ 1:00. Patient noted desire to discuss help with looking into other Health insurance options, support with getting sleep study (patients current insurance does not cover sleep studies.)support with looking into financial assistance program for medical bills with [facility] and support with patients anxiety.                 ** CHW sent Social Determinants of Health Questionnaire through [employer] 11/24/21.     Savana Doan  Community Health Worker  [facility] Care Coordination   Office: [phone]

## 2021-12-27 ENCOUNTER — ANCILLARY PROCEDURE (OUTPATIENT)
Dept: CT IMAGING | Facility: CLINIC | Age: 56
End: 2021-12-27
Attending: PHYSICIAN ASSISTANT
Payer: COMMERCIAL

## 2021-12-27 DIAGNOSIS — Z87.891 PERSONAL HISTORY OF TOBACCO USE: ICD-10-CM

## 2021-12-27 PROCEDURE — 71271 CT THORAX LUNG CANCER SCR C-: CPT | Mod: TC | Performed by: RADIOLOGY

## 2021-12-28 DIAGNOSIS — I25.10 CORONARY ARTERY CALCIFICATION SEEN ON CT SCAN: Primary | ICD-10-CM

## 2021-12-30 ENCOUNTER — TELEPHONE (OUTPATIENT)
Dept: FAMILY MEDICINE | Facility: CLINIC | Age: 56
End: 2021-12-30
Payer: COMMERCIAL

## 2021-12-30 NOTE — TELEPHONE ENCOUNTER
Left message on voice mail [phone] for patient to call clinic.   [phone] or to read his my chart message.      Jimmy Amaral,     I am one of the physicians covering for Nasreen while she is out of of the office.     Your recent results show the following:  -Lung CT did not show any signs of suspicious lung nodules. ADVISE: Rechecking a lung CT scan in 12 months.  However, there was an incidental finding of coronary (heart) artery calcifications.  You are at risk for heart disease.  I would like to make a cardiology referral to determine if further work-up and medications are needed.  They will be calling you.      Sincerely,     Dr. Adams   Written by Jean Adams DO on 12/28/2021  3:52 PM CST

## 2021-12-31 ENCOUNTER — MYC MEDICAL ADVICE (OUTPATIENT)
Dept: FAMILY MEDICINE | Facility: CLINIC | Age: 56
End: 2021-12-31
Payer: COMMERCIAL

## 2021-12-31 ENCOUNTER — TELEPHONE (OUTPATIENT)
Dept: FAMILY MEDICINE | Facility: CLINIC | Age: 56
End: 2021-12-31
Payer: COMMERCIAL

## 2021-12-31 DIAGNOSIS — F41.1 GAD (GENERALIZED ANXIETY DISORDER): ICD-10-CM

## 2021-12-31 NOTE — TELEPHONE ENCOUNTER
Routing refill request to provider for review/approval because:  Drug not on the [facility] refill protocol     Last Written Prescription Date:  11/11/21  Last Fill Quantity: 30 tablets refills: 0     Last office visit: 12/16/21 VIRTUAL with prescribing provider:  Nasreen Martínez PA-C     Patient has Hydroxyzine 25 mg on his medication list, however he said it puts him to sleep. The Lorazepam has more of a calming effect for him.     Patient states that he is more anxious due to his recent referral to Cardiology.     Patient states he is completely out of the Lorazepam.     In the absence of Nasreen Martínez PA-C, please review and advise.     Deandra Sarmiento RN BSN  [facility]

## 2021-12-31 NOTE — TELEPHONE ENCOUNTER
Munir states that he has been informed of his recent Lung CT results and is aware of the coronary artery calcifications.     Patient is scheduled to be seen by Cardiology on February 10. He is wondering if there are any other tests or medications that should be ordered prior to then?     Patient is not having any symptoms or  problems at this time, however he wonders if it's ok to wait over a month for the Cardiology evaluation.      Please review and advise.     Deandra ENCISO  [facility]

## 2021-12-31 NOTE — TELEPHONE ENCOUNTER
Notified patient of the information/results below.    Patient stated understanding and agreeable with the plan of care.     Ragini RN,BSN  Triage Nurse  [facility]: [facility]

## 2022-01-03 ENCOUNTER — PATIENT OUTREACH (OUTPATIENT)
Dept: NURSING | Facility: CLINIC | Age: 57
End: 2022-01-03
Payer: COMMERCIAL

## 2022-01-03 DIAGNOSIS — R40.0 DAYTIME SOMNOLENCE: ICD-10-CM

## 2022-01-03 DIAGNOSIS — I10 HYPERTENSION, GOAL BELOW 140/90: Chronic | ICD-10-CM

## 2022-01-03 DIAGNOSIS — R06.83 SNORING: ICD-10-CM

## 2022-01-03 SDOH — ECONOMIC STABILITY: FOOD INSECURITY: WITHIN THE PAST 12 MONTHS, THE FOOD YOU BOUGHT JUST DIDN'T LAST AND YOU DIDN'T HAVE MONEY TO GET MORE.: NEVER TRUE

## 2022-01-03 SDOH — ECONOMIC STABILITY: FOOD INSECURITY: WITHIN THE PAST 12 MONTHS, YOU WORRIED THAT YOUR FOOD WOULD RUN OUT BEFORE YOU GOT MONEY TO BUY MORE.: NEVER TRUE

## 2022-01-03 SDOH — ECONOMIC STABILITY: TRANSPORTATION INSECURITY
IN THE PAST 12 MONTHS, HAS LACK OF TRANSPORTATION KEPT YOU FROM MEETINGS, WORK, OR FROM GETTING THINGS NEEDED FOR DAILY LIVING?: NO

## 2022-01-03 SDOH — ECONOMIC STABILITY: TRANSPORTATION INSECURITY
IN THE PAST 12 MONTHS, HAS THE LACK OF TRANSPORTATION KEPT YOU FROM MEDICAL APPOINTMENTS OR FROM GETTING MEDICATIONS?: NO

## 2022-01-03 ASSESSMENT — ACTIVITIES OF DAILY LIVING (ADL): DEPENDENT_IADLS:: INDEPENDENT

## 2022-01-03 ASSESSMENT — SOCIAL DETERMINANTS OF HEALTH (SDOH)
HOW HARD IS IT FOR YOU TO PAY FOR THE VERY BASICS LIKE FOOD, HOUSING, MEDICAL CARE, AND HEATING?: SOMEWHAT HARD
IN A TYPICAL WEEK, HOW MANY TIMES DO YOU TALK ON THE PHONE WITH FAMILY, FRIENDS, OR NEIGHBORS?: MORE THAN THREE TIMES A WEEK

## 2022-01-03 NOTE — LETTER
[facility] CARE COORDINATION  [facility] [place] [address] [place] [address]    January 3, 2022    Munir Storey  [address] [place] [address]      Dear Munir,    I am a clinic care coordinator who works with Nasreen Martínez PA-C. I wanted to thank you for spending the time to talk with me.  Below  are the resources we discussed during our call.    [facility]  [email or web address]  [phone]  Mon, Wed, Fri: 1p-3p  Mon-Thurs: 5:30p - 7:30p    [facility]  [phone]      Please feel free to contact the Community Health Worker at [phone] with any questions or concerns. We are focused on providing you with the highest-quality healthcare experience possible and that all starts with you.     Sincerely,     Care Coordination Team    Enclosed: I have enclosed a copy of the Patient Centered Plan of Care. This has helpful information and goals that we have talked about. Please keep this in an easy to access place to use as needed.

## 2022-01-03 NOTE — TELEPHONE ENCOUNTER
Ok to wait until Feb for cardiology as he is asymptomatic.  Please schedule a phone visit with me and we can discuss medication options and I would like to recheck how things are going on the hydroxyzine and higher dose wellbutrin  Nasreen Martínez PA-C

## 2022-01-03 NOTE — PROGRESS NOTES
Clinic Care Coordination Contact    Clinic Care Coordination Contact  OUTREACH    Referral Information:  Referral Source: Care Team    Primary Diagnosis: Cardiovascular - other    Chief Complaint   Patient presents with     Clinic Care Coordination - Initial        Universal Utilization:   Clinic Utilization  Difficulty keeping appointments:: No  Compliance Concerns: No  No-Show Concerns: No  No PCP office visit in Past Year: No  Utilization    Hospital Admissions  0             ED Visits  2             No Show Count (past year)  4                Current as of: 1/2/2022 10:01 PM              Clinical Concerns:  Current Medical Concerns:  Cardiology     Current Behavioral Concerns: Anxiety      Education Provided to patient:    [facility]  [email or web address]  [phone]  Mon, Wed, Fri: 1p-3p  Mon-Thurs: 5:30p - 7:30p    [facility]  [phone]    Pain  Pain (GOAL):: No  Health Maintenance Reviewed: Up to date    Clinical Pathway: Clinic Care Coordination Anxiety Assessment  Currently being treated or treated in past for your anxiety?: Yes  Type of treatment:: Medication  Symptom relief: No  Overall your anxiety symptoms are (GOAL):: Stable    Medication Management:  Medication review status: Medications reviewed and no changes reported per patient.             Functional Status:  Dependent ADLs:: Independent  Dependent IADLs:: Independent  Bed or wheelchair confined:: No  Mobility Status: Independent  Fallen 2 or more times in the past year?: No  Any fall with injury in the past year?: No    Living Situation:  Current living arrangement:: I live in a private home with spouse  Type of residence:: Private home - stairs    Lifestyle & Psychosocial Needs:    Social Determinants of Health     Tobacco Use: Medium Risk     Smoking Tobacco Use: Former Smoker     Smokeless Tobacco Use: Former User   Alcohol Use: Not on file   Financial Resource Strain: Medium Risk     Difficulty of Paying Living  Expenses: Somewhat hard   Food Insecurity: No Food Insecurity     Worried About Running Out of Food in the Last Year: Never true     Ran Out of Food in the Last Year: Never true   Transportation Needs: No Transportation Needs     Lack of Transportation (Medical): No     Lack of Transportation (Non-Medical): No   Physical Activity: Not on file   Stress: Stress Concern Present     Feeling of Stress : Rather much   Social Connections: Unknown     Frequency of Communication with Friends and Family: More than three times a week     Frequency of Social Gatherings with Friends and Family: Not on file     Attends Latter day Services: Not on file     Active Member of Clubs or Organizations: Not on file     Attends Club or Organization Meetings: Not on file     Marital Status: Not on file   Intimate Partner Violence: Not on file   Depression: Not at risk     PHQ-2 Score: 2   Housing Stability: Not on file     Diet:: Regular  Inadequate nutrition (GOAL):: No  Tube Feeding: No  Inadequate activity/exercise (GOAL):: No  Significant changes in sleep pattern (GOAL): No  Transportation means:: Regular car     Latter day or spiritual beliefs that impact treatment:: No  Mental health DX:: Yes  Mental health DX how managed:: Medication  Mental health management concern (GOAL):: Yes  Chemical Dependency Status: No Current Concerns  Informal Support system:: Family,Spouse             Resources and Interventions:  Current Resources:      Community Resources: None  Supplies used at home:: None  Equipment Currently Used at Home: none  Employment Status: employed part-time (self employed)         Advance Care Plan/Directive  Advanced Care Plans/Directives on file:: No  Type Advanced Care Plans/Directives: Other  Advanced Care Plan/Directive Status: Declined Further Information    Referrals Placed: Mental Health,Financial Services     Goals:   Goals        General     Financial Wellbeing (pt-stated)      Notes - Note created  1/3/2022  1:42 PM  by Vikki Durán LSW     Goal Statement: I will apply for health insurance and [facility] within the next 1-2 weeks if I am eligible.     Date Goal Set:  1/3/22  Strengths:  motivated   Barriers: current insurance plan, current medical bills  Date to Achieve By: 2 months  Patient expressed understanding of goal: yes    Action steps to achieve this goal:  1. I understand a referral was placed to the Financial Resource Worker, I will receive a call within the next 3 business days.    2. I understand the financial worker will make two attempts to call me. If I still need help with this goal, I will connect with my Community Health Worker Savana at [phone].              Medical (pt-stated)      Notes - Note created  1/3/2022  1:41 PM by Vikki Durán LSW     Goal Statement: I will attend Cardiology appointment on February 10    Date Goal set: 1/3/22  Barriers: insurance  Strengths: motivated  Date to Achieve By: 2/10/22  Patient expressed understanding of goal: yes    Action steps to achieve this goal:  1. I will follow up with recommendations from Cardiology          Mental Health Management (pt-stated)      Notes - Note created  1/3/2022  1:44 PM by Vikki Durán LSW     Goal Statement: I will take steps to manage anxiety    Date Goal set: 1/3/22  Barriers: financial and medical stressors  Strengths: motivated  Date to Achieve By: 3 months  Patient expressed understanding of goal: yes    Action steps to achieve this goal:  1. I will continue to take medication   2. I will contact [facility] for free counseling sessions  3. I will establish with a therapist when I get better insurance             Financial Resource Worker Screening    County Benefits  Is patient requesting help applying for county benefits?: No    [place]  [place] Yes  Is this an insurance renewal?: No  Is this a new insurance application request?: Yes  Have you recently  applied for insurance?: No  How many people in your household? : 2  Do you file taxes?: Yes  How many dependents do you claim?: 0    Any other information for the FRW?: FRW referral for [facility] and [facility] care. Pt is , self employed. Did not have income off hand. Pt has anxiety about medical bills and now needing to see Cardiology. He pays $300/month for self only current plan with no mental health benefits. Sleep study also not covered.    Patient/Caregiver understanding: Pt agreeable to follow up from CC team     Outreach Frequency: monthly  Future Appointments              In 1 month Sadi Shelley MD [facility] [place], [facility] PSA CLIN          Plan: CHW outreach monthly.  CC SW available as needed and will monitor chart every 45 days.

## 2022-01-03 NOTE — LETTER
[facility]  Patient Centered Plan of Care  About Me:        Patient Name:  Munir Storey    YOB: 1965  Age:         56 year old   Andres MRN:    [id number] Telephone Information:  Home Phone [phone]   Mobile [phone]       Address:  [address]   [place] [address] Email address:  [email or web address]      Emergency Contact(s)    Name Relationship Lgl Grd Work Phone Home Phone Mobile Phone   1. RAKESH LUDWIG Significant ot*   [phone]             Health Maintenance  Health Maintenance Reviewed: Up to date      My Access Plan  Medical Emergency 911   Primary Clinic Line [facility] - [phone]   24 Hour Appointment Line [phone] or  [phone]) (toll-free)   24 Hour Nurse Line [phone] (toll-free)   Preferred Urgent Care Other     Preferred Hospital [place], [place]  [phone]     Preferred Pharmacy [id number] [employer] - [place], [place] - [address]     Behavioral Health Crisis Line The National Suicide Prevention Lifeline at 1-935.954.3017 or 413             My Care Team Members  Patient Care Team       Relationship Specialty Notifications Start End    Nasreen Martínez PA-C PCP - General Family Medicine  12/14/20     Phone: [phone] Fax: [phone]         [facility] [place] [address] [address] [place] [address]    Nasreen Martínez PA-C Assigned PCP   1/31/21     Phone: [phone] Fax: [phone]         [facility] [place] [address] [address] [place] [address]    Vikki Durán LSW Lead Care Coordinator  Admissions 1/3/22     Savana Doan Community Health Worker  Admissions 1/3/22             My Care Plans  Self Management and Treatment Plan  Goals and (Comments)  Goals        General     1- Financial Wellbeing (pt-stated)      Notes - Note created  1/3/2022  1:42 PM by Vikki Durán LSW     Goal Statement: I will apply for  health insurance and [facility] Care within the next 1-2 weeks if I am eligible.     Date Goal Set:  1/3/22  Strengths:  motivated   Barriers: current insurance plan, current medical bills  Date to Achieve By: 2 months  Patient expressed understanding of goal: yes    Action steps to achieve this goal:  1. I understand a referral was placed to the Financial Resource Worker, I will receive a call within the next 3 business days.    2. I understand the financial worker will make two attempts to call me. If I still need help with this goal, I will connect with my Community Health Worker Savana at [phone].              2- Medical (pt-stated)      Notes - Note created  1/3/2022  1:41 PM by Vikki Durán LSW     Goal Statement: I will attend Cardiology appointment on February 10    Date Goal set: 1/3/22  Barriers: insurance  Strengths: motivated  Date to Achieve By: 2/10/22  Patient expressed understanding of goal: yes    Action steps to achieve this goal:  1. I will follow up with recommendations from Cardiology          3- Mental Health Management (pt-stated)      Notes - Note created  1/3/2022  1:44 PM by Vikki Durán LSW     Goal Statement: I will take steps to manage anxiety    Date Goal set: 1/3/22  Barriers: financial and medical stressors  Strengths: motivated  Date to Achieve By: 3 months  Patient expressed understanding of goal: yes    Action steps to achieve this goal:  1. I will continue to take medication   2. I will contact [facility] for free counseling sessions  3. I will establish with a therapist when I get better insurance              Action Plans on File:            Depression          Advance Care Plans/Directives Type:   No data recorded    My Medical and Care Information  Problem List   Patient Active Problem List   Diagnosis     Hypertension, goal below 140/90     Tobacco abuse     SANDRA (generalized anxiety disorder)     Alcohol abuse, in remission     Moderate  episode of recurrent major depressive disorder (H)     Obesity (BMI 30-39.9)     Seborrheic keratosis     H/O atrial flutter     Peyronie disease     Chronic neck pain with history of cervical spinal surgery     Alcohol dependence in remission (H)     Vitamin D deficiency     Fatigue, unspecified type     Onychomycosis     Tinea pedis of both feet     Primary osteoarthritis of right hand     Anhedonia      Current Medications and Allergies:  See printed Medication Report.    Care Coordination Start Date: 1/3/2022   Frequency of Care Coordination: monthly     Form Last Updated: 01/03/2022

## 2022-01-04 ENCOUNTER — PATIENT OUTREACH (OUTPATIENT)
Dept: CARE COORDINATION | Facility: CLINIC | Age: 57
End: 2022-01-04
Payer: COMMERCIAL

## 2022-01-04 RX ORDER — LORAZEPAM 0.5 MG/1
0.5 TABLET ORAL EVERY 6 HOURS PRN
Qty: 20 TABLET | Refills: 0 | Status: SHIPPED | OUTPATIENT
Start: 2022-01-04 | End: 2022-02-02

## 2022-01-04 NOTE — LETTER
Dear Munir,                                                                          You were referred to the Clinic Financial Worker regarding help with an [employer] application. The Financial Worker has placed two calls to you.     If you are interested in discussing this referral further, please call your Clinic Community Health Worker, Savana Doan.                                                     Sincerely,      Jen Rapp   [facility], [facility]  Ph: [phone]  Fx: [phone]                                                                                                                             Your Clinic Care Team                                   [facility]

## 2022-01-04 NOTE — PROGRESS NOTES
Clinic Care Coordination Contact  Program: MNsure / [facility] Care Application    County:  [facility] Case #:  [facility] Worker:   Pinky #:   Subscriber #:   Renewal:  Date Applied:     FRW Outreach:   1/10/2022: Patient called FRW back and informed her that he did apply for M[place] but the plans are still to high for him. FRW is going to send out an Sidra care application for him and his wife to look at and they will send it in if they want to go that route patient stated that his wife's income is very high and with his income he knows he it might not help.  1/10/2022: Outreach attempted x 2. Left message on voicemail indicating last outreach attempt.   Plan: FRW will send an unreachable letter and remove from panel. Patient can be referred back to FRW.        1/4/2022: Outreach attempted x 1. Left message on voicemail with call back information and requested return call.  Plan: FRW will call again within one week.       Health Insurance:      Referral/Screening:  FR Screening    Row Name 01/03/22 1337         [facility] Benefits     Is patient requesting help applying for [facility] benefits? No                   [place]:     [place]         Is this an insurance renewal? No         Is this a new insurance application request? Yes         Have you recently applied for insurance? No         How many people in your household?  2         Do you file taxes? Yes         How many dependents do you claim? 0                   OTHER     Is this a sidra care application? Yes         Any other information for the FRW? FRW referral for M[employer]ure and [place] care. Pt is , self employed. Did not have income off hand. Pt has anxiety about medical bills and now needing to see Cardiology. He pays $300/month for self only current plan with no mental health benefits. Sleep study also not covered.               Goals Addressed:

## 2022-01-04 NOTE — TELEPHONE ENCOUNTER
Called [phone] (home)     Did patient answer the phone: No, left a message on voicemail to return call to the [facility] at [phone].    Ragini RN,BSN  Triage Nurse  [facility]: [facility]

## 2022-01-04 NOTE — TELEPHONE ENCOUNTER
He is asking if he should take a baby aspirin daily?  Please advise or can this wait until appt?    Patient notified of below and voiced understanding and agreement.  appt scheduled 1/6/22.  Lisa Lundberg RN  [facility]

## 2022-01-06 ENCOUNTER — VIRTUAL VISIT (OUTPATIENT)
Dept: FAMILY MEDICINE | Facility: CLINIC | Age: 57
End: 2022-01-06
Payer: COMMERCIAL

## 2022-01-06 DIAGNOSIS — E78.5 HYPERLIPIDEMIA LDL GOAL <130: ICD-10-CM

## 2022-01-06 DIAGNOSIS — I25.10 CORONARY ARTERY CALCIFICATION SEEN ON CT SCAN: Primary | ICD-10-CM

## 2022-01-06 DIAGNOSIS — F41.1 GAD (GENERALIZED ANXIETY DISORDER): ICD-10-CM

## 2022-01-06 DIAGNOSIS — F33.1 MODERATE EPISODE OF RECURRENT MAJOR DEPRESSIVE DISORDER (H): ICD-10-CM

## 2022-01-06 PROCEDURE — 96127 BRIEF EMOTIONAL/BEHAV ASSMT: CPT | Mod: 59 | Performed by: PHYSICIAN ASSISTANT

## 2022-01-06 PROCEDURE — 99214 OFFICE O/P EST MOD 30 MIN: CPT | Mod: 95 | Performed by: PHYSICIAN ASSISTANT

## 2022-01-06 RX ORDER — ATORVASTATIN CALCIUM 40 MG/1
40 TABLET, FILM COATED ORAL DAILY
Qty: 90 TABLET | Refills: 1 | Status: SHIPPED | OUTPATIENT
Start: 2022-01-06 | End: 2022-06-13

## 2022-01-06 ASSESSMENT — ANXIETY QUESTIONNAIRES
7. FEELING AFRAID AS IF SOMETHING AWFUL MIGHT HAPPEN: MORE THAN HALF THE DAYS
5. BEING SO RESTLESS THAT IT IS HARD TO SIT STILL: NOT AT ALL
6. BECOMING EASILY ANNOYED OR IRRITABLE: NOT AT ALL
2. NOT BEING ABLE TO STOP OR CONTROL WORRYING: MORE THAN HALF THE DAYS
1. FEELING NERVOUS, ANXIOUS, OR ON EDGE: MORE THAN HALF THE DAYS
IF YOU CHECKED OFF ANY PROBLEMS ON THIS QUESTIONNAIRE, HOW DIFFICULT HAVE THESE PROBLEMS MADE IT FOR YOU TO DO YOUR WORK, TAKE CARE OF THINGS AT HOME, OR GET ALONG WITH OTHER PEOPLE: SOMEWHAT DIFFICULT
GAD7 TOTAL SCORE: 9
3. WORRYING TOO MUCH ABOUT DIFFERENT THINGS: NEARLY EVERY DAY

## 2022-01-06 ASSESSMENT — PATIENT HEALTH QUESTIONNAIRE - PHQ9
5. POOR APPETITE OR OVEREATING: NOT AT ALL
SUM OF ALL RESPONSES TO PHQ QUESTIONS 1-9: 8

## 2022-01-06 NOTE — PROGRESS NOTES
Munir is a 56 year old who is being evaluated via a billable telephone visit.      What phone number would you like to be contacted at? [phone]  How would you like to obtain your AVS? [facility]    Assessment & Plan     Coronary artery calcification seen on CT scan  Entered patient information into ASCVD risk calculator and reviewed results with Munir.  He has an intermediate (11.1%) 10 year risk and 50% lifetime risk.  With optimal risk reduction, lifetime risk can come down to 4.8%.     Discussed heart health in detail.   Recommend Mediterranean diet (he does like to eat red meat).  He does not eat fast foods.    Discussed importance of exercise.   He no longer smokes or drinks which is great.   Discussed importance of keeping BP's controlled, which he has.   We discussed medications that may help lower heart disease risk, including lipitor and aspirin.    Discussed pro's and con's of these and he was agreeable to treatment.    Guidelines for aspirin use have continually changed, we'll see what cardiology recommends as well.  He is not at a fall risk.     - Comprehensive metabolic panel (BMP + Alb, Alk Phos, ALT, AST, Total. Bili, TP); Future  - atorvastatin (LIPITOR) 40 MG tablet; Take 1 tablet (40 mg) by mouth daily  - Lipid panel reflex to direct LDL Fasting; Future  - aspirin (ASA) 81 MG EC tablet; Take 1 tablet (81 mg) by mouth daily    Hyperlipidemia LDL goal <130  Discussed diet and lifestyle changes, as well as medication.   Recheck labs in about 4-5 months.     - Comprehensive metabolic panel (BMP + Alb, Alk Phos, ALT, AST, Total. Bili, TP); Future  - atorvastatin (LIPITOR) 40 MG tablet; Take 1 tablet (40 mg) by mouth daily  - Lipid panel reflex to direct LDL Fasting; Future  - aspirin (ASA) 81 MG EC tablet; Take 1 tablet (81 mg) by mouth daily    Moderate depression/Anxiety:    Seems to be doing just ok on wellbutrin alone.  Thinking back, he feels he felt his best while he was on prozac 20 mg and would  like to consider restarting this in a few weeks. He has some at home, therefore will just message when he needs a refill.  Start lipitor and aspirin first, as I prefer not to start too many new meds at one time in case side effects develop (which are common for him).     25 minutes spent on the date of the encounter doing chart review, history and exam, documentation and further activities per the note           Return in about 4 months (around 5/6/2022) for med recheck with fasting labs..    Nasreen Martínez PA-C  [facility] TESSA Amaral is a 56 year old who presents for the following health issues     Had screening CT chest for lung cancer (given his smoking history and asbestos exposure) which showed the following:   IMPRESSION:   1. ACR Assessment Category:  Lung-RADS Category 2. Benign appearance  or behavior. Recommendation: Continue annual screening, if clinically  relevant (please order exam code IMG 2290).   2. Significant Incidental Finding(s):  Category S: Yes. Moderate  coronary artery calcifications.      He would like to review results.     HPI     Hypertension Follow-up      Do you check your blood pressure regularly outside of the clinic? No, has cuff at home but has not been checking recently. Patient states he feels physically healthy     Are you following a low salt diet? Yes, low added salt   Are your blood pressures ever more than 140 on the top number (systolic) OR more than 90 on the bottom number (diastolic), for example 140/90? No     Denies any chest pains or shortness of breath.      Depression and Anxiety Follow-Up    How are you doing with your depression since your last visit? Fluctuates, some days are better for pt than others    How are you doing with your anxiety since your last visit?  Same as above     Are you having other symptoms that might be associated with depression or anxiety? Yes:  fatigue    Have you had a significant life event? Health  Concerns     Do you have any concerns with your use of alcohol or other drugs? No     Was on prozac in the past and out of all the meds he has tried so far, that seems to have worked the best.    He is still working on getting into a therapist, working on getting insurance coverage.     Social History     Tobacco Use     Smoking status: Former Smoker     Packs/day: 0.50     Years: 20.00     Pack years: 10.00     Types: Cigarettes, Dip, chew, snus or snuff     Quit date: 2019     Years since quittin.3     Smokeless tobacco: Former User     Types: Chew   Substance Use Topics     Alcohol use: No     Comment: hx alcohol abuse, sober since      Drug use: No     PHQ 2021   PHQ-9 Total Score 11 6 8   Q9: Thoughts of better off dead/self-harm past 2 weeks Not at all Not at all Not at all   F/U: Thoughts of suicide or self-harm - - -   F/U: Safety concerns - - -     SANDRA-7 SCORE 2021   Total Score 12 17 9     Last PHQ-9 2022   1.  Little interest or pleasure in doing things 2   2.  Feeling down, depressed, or hopeless 2   3.  Trouble falling or staying asleep, or sleeping too much 1   4.  Feeling tired or having little energy 1   5.  Poor appetite or overeating 1   6.  Feeling bad about yourself 1   7.  Trouble concentrating 0   8.  Moving slowly or restless 0   Q9: Thoughts of better off dead/self-harm past 2 weeks 0   PHQ-9 Total Score 8   Difficulty at work, home, or with people Somewhat difficult   In the past two weeks have you had thoughts of suicide or self harm? -   Do you have concerns about your personal safety or the safety of others? -     SANDRA-7  2022   1. Feeling nervous, anxious, or on edge 2   2. Not being able to stop or control worrying 2   3. Worrying too much about different things 3   4. Trouble relaxing 0   5. Being so restless that it is hard to sit still 0   6. Becoming easily annoyed or irritable 0   7. Feeling afraid, as if  something awful might happen 2   SANDRA-7 Total Score 9   If you checked any problems, how difficult have they made it for you to do your work, take care of things at home, or get along with other people? Somewhat difficult       Suicide Assessment Five-step Evaluation and Treatment (SAFE-T)      How many servings of fruits and vegetables do you eat daily?  0-1    On average, how many sweetened beverages do you drink each day (Examples: soda, juice, sweet tea, etc.  Do NOT count diet or artificially sweetened beverages)?   0-1    How many days per week do you exercise enough to make your heart beat faster? 3 or less    How many minutes a day do you exercise enough to make your heart beat faster? 9 or less    How many days per week do you miss taking your medication? 0        Review of Systems   Constitutional, HEENT, cardiovascular, pulmonary, GI, , musculoskeletal, neuro, skin, endocrine and psych systems are negative, except as otherwise noted.      Objective           Vitals:  No vitals were obtained today due to virtual visit.    Physical Exam   healthy, alert and no distress  PSYCH: Alert and oriented times 3; coherent speech, normal   rate and volume, able to articulate logical thoughts, able   to abstract reason, no tangential thoughts, no hallucinations   or delusions  His affect is normal  RESP: No cough, no audible wheezing, able to talk in full sentences  Remainder of exam unable to be completed due to telephone visits                Phone call duration: 17 minutes

## 2022-01-07 ASSESSMENT — ANXIETY QUESTIONNAIRES: GAD7 TOTAL SCORE: 9

## 2022-01-08 DIAGNOSIS — F41.1 GAD (GENERALIZED ANXIETY DISORDER): ICD-10-CM

## 2022-01-08 DIAGNOSIS — F33.1 MODERATE EPISODE OF RECURRENT MAJOR DEPRESSIVE DISORDER (H): ICD-10-CM

## 2022-01-10 DIAGNOSIS — F33.1 MODERATE EPISODE OF RECURRENT MAJOR DEPRESSIVE DISORDER (H): ICD-10-CM

## 2022-01-10 DIAGNOSIS — F41.1 GAD (GENERALIZED ANXIETY DISORDER): ICD-10-CM

## 2022-01-10 RX ORDER — BUPROPION HYDROCHLORIDE 150 MG/1
150 TABLET ORAL EVERY MORNING
Qty: 30 TABLET | Refills: 0 | OUTPATIENT
Start: 2022-01-10

## 2022-01-12 RX ORDER — BUPROPION HYDROCHLORIDE 300 MG/1
TABLET ORAL
Qty: 30 TABLET | Refills: 1 | Status: SHIPPED | OUTPATIENT
Start: 2022-01-12 | End: 2022-02-16

## 2022-01-12 NOTE — TELEPHONE ENCOUNTER
"Requested Prescriptions   Pending Prescriptions Disp Refills     buPROPion (WELLBUTRIN XL) 300 MG 24 hr tablet [Pharmacy Med Name: BUPROPION HCL  MG TABLET] 30 tablet 1     Sig: TAKE 1 TABLET BY MOUTH EVERY MORNING       SSRIs Protocol Failed - 1/10/2022  8:32 AM        Failed - PHQ-9 score less than 5 in past 6 months     Please review last PHQ-9 score.           Passed - Medication is Bupropion     If the medication is Bupropion (Wellbutrin), and the patient is taking for smoking cessation; OK to refill.          Passed - Medication is active on med list        Passed - Patient is age 18 or older        Passed - Recent (6 mo) or future (30 days) visit within the authorizing provider's specialty     Patient had office visit in the last 6 months or has a visit in the next 30 days with authorizing provider or within the authorizing provider's specialty.  See \"Patient Info\" tab in inbasket, or \"Choose Columns\" in Meds & Orders section of the refill encounter.               Routing refill request to provider for review/approval because:  Failed protocol.  Ragini Crowder RN, BSN  Triage nurse  [facility]: [place]    "

## 2022-01-31 ENCOUNTER — TELEPHONE (OUTPATIENT)
Dept: FAMILY MEDICINE | Facility: CLINIC | Age: 57
End: 2022-01-31
Payer: COMMERCIAL

## 2022-01-31 DIAGNOSIS — R40.0 DAYTIME SOMNOLENCE: ICD-10-CM

## 2022-01-31 DIAGNOSIS — R06.83 SNORING: Primary | ICD-10-CM

## 2022-01-31 DIAGNOSIS — F41.1 GAD (GENERALIZED ANXIETY DISORDER): ICD-10-CM

## 2022-01-31 NOTE — TELEPHONE ENCOUNTER
Patient requesting refill of LORazepam (ATIVAN) 0.5 MG tablet. Please send to [facility] in [facility].    Patient has questions on taking the wellbutrin and Fluoxetine together. Please advise.

## 2022-02-01 NOTE — TELEPHONE ENCOUNTER
Called [phone] (home)     Did patient answer the phone: No, left a message on voicemail to return call to the [facility] at [phone].    Ragini RN,BSN  Triage Nurse  [facility]: [facility]

## 2022-02-02 RX ORDER — LORAZEPAM 0.5 MG/1
0.5 TABLET ORAL EVERY 6 HOURS PRN
Qty: 20 TABLET | Refills: 0 | Status: SHIPPED | OUTPATIENT
Start: 2022-02-02 | End: 2022-03-22

## 2022-02-02 NOTE — TELEPHONE ENCOUNTER
"1. Patient is calling for a refill on Ativan.  He has one pill left left.     2. He is wondering if he can get a referral for a sleep study.  He stated that his wife video taped him, and he was having apnea, and snoring.    3. He is asking for a referral for the \"best\" person to see for psychology therapy.    4. He stated that he does not think that the Wellbutrin works well for him. It also makes him feel groggy in the AM, and gives him headaches.  He would like to go back on the Fluoxitine.  He stated that he went off of it, as it was giving him headaches, but it worked better for him.    Routed to PCP to please advise.    Ragini RN,BSN  Triage Nurse  [facility]: [facility]  Ph: [phone]        "

## 2022-02-03 NOTE — TELEPHONE ENCOUNTER
Patient notified off all below, referral numbers given below and for psychologist/psychiatrist, he voiced understanding and agreement.  Lisa Lundberg RN  [facility]

## 2022-02-03 NOTE — TELEPHONE ENCOUNTER
"1. Refilled ativan.   2. Referral again given for sleep study.  He should call to schedule   [facility]   [address] [address]   Phone: [phone]     3. I placed a referral to psychologist.  There is no \"best person\" I can refer him to.  I suggest he try someone within network and if things don't click with that person after a few visits, then try a different person.  People have different perspectives on who may be the right psychologist for them and it is very individual.     4.  I suggest he schedule an appt with psychiatrist to discuss what next medication would be advised.  Going back and forth with these meds is not ideal.  We stopped prozac due to fatigue on 11/11/21.  Referral placed for psychiatry as well.   Please schedule a f/u visit (virtual ok) to check back in on this.  May need to be with another provider as I am out of the office for the next week now.     CEZAR Richter PA-C      "

## 2022-02-08 NOTE — PROGRESS NOTES
CARDIOLOGY CONSULT    REASON FOR CONSULT: Coronary calcification    PRIMARY CARE PHYSICIAN:  Nasreen Martínez    HISTORY OF PRESENT ILLNESS:  56-year-old male seen for coronary calcification on chest CT.  He has hypertension, history of alcohol use, reported history of atrial flutter, tobacco abuse (quit around 2015).    2015 he underwent atrial flutter ablation through [facility].    Echo from [facility] 2016 showed EF 60%, mild LVH, normal RV, aorta 3.7 cm.    He is quite physically active at baseline.  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  He also does some skating on his backyard rink and shoveling.  He denies any exertional chest pain or shortness of breath.  He admits to having a lot of anxiety, he has had multiple ED visits for chest pain and other symptoms.  He gets quite anxious when he checks his blood pressure at home.    Chest CT December 2021 showed moderate coronary calcification.  Aspirin and atorvastatin was started by his primary physician.    PAST MEDICAL HISTORY:  Past Medical History:   Diagnosis Date     Alcohol withdrawal (H) 04/28/2015     Atrial flutter (H)      Depressive disorder      Ejection fraction < 50% 04/2015    Ejection fraction 45% per echo April 2015 (per [facility] records), possible alcohol or tachycardia induced cardiomyopathy. EF normalized on follow-up echo 2016     SANDRA (generalized anxiety disorder)      H/O atrioventricular quita ablation 12/2015     Hypertension, goal below 140/90      Tobacco abuse        MEDICATIONS:  Current Outpatient Medications   Medication     amLODIPine (NORVASC) 10 MG tablet     aspirin (ASA) 81 MG EC tablet     atorvastatin (LIPITOR) 40 MG tablet     buPROPion (WELLBUTRIN XL) 300 MG 24 hr tablet     cyclobenzaprine (FLEXERIL) 10 MG tablet     hydrochlorothiazide (HYDRODIURIL) 12.5 MG tablet     hydrOXYzine (ATARAX) 25 MG tablet     lisinopril (ZESTRIL) 40 MG tablet     LORazepam (ATIVAN) 0.5 MG tablet      No current facility-administered medications for this visit.       ALLERGIES:  Allergies   Allergen Reactions     Sulfamethoxazole-Trimethoprim Nausea       SOCIAL HISTORY:  I have reviewed this patient's social history and updated it with pertinent information if needed. Munir Storey  reports that he quit smoking about 2 years ago. His smoking use included cigarettes and dip, chew, snus or snuff. He has a 10.00 pack-year smoking history. He has quit using smokeless tobacco.  His smokeless tobacco use included chew. He reports that he does not drink alcohol and does not use drugs.    FAMILY HISTORY:  I have reviewed this patient's family history and updated it with pertinent information if needed.   Family History   Problem Relation Age of Onset     Diabetes Father      Depression Father      Depression Paternal Grandfather        REVIEW OF SYSTEMS:  Constitutional:  No weight loss, fever, chills, weakness or fatigue.  HEENT:  Eyes:  No visual loss, blurred vision, double vision or yellow sclerae. No hearing loss, sneezing, congestion, runny nose or sore throat.  Skin:  No rash or itching.  Cardiovascular: per HPI  Respiratory: per HPI  GI:  No anorexia, nausea, vomiting or diarrhea. No abdominal pain or blood.  :  No dysurea, hematuria  Neurologic:  No headache, dizziness, syncope, paralysis, ataxia, numbness or tingling in the extremities. No change in bowel or bladder control.  Musculoskeletal:  No muscle, back pain, joint pain or stiffness.  Hematologic:  No anemia, bleeding or bruising.  Lymphatics:  No enlarged nodes. No history of splenectomy.  Psychiatric:  No history of depression  Endocrine:  No reports of sweating, cold or heat intolerance. No polyuria or polydipsia.  Allergies:  No history of asthma, hives, eczema or rhinitis.    PHYSICAL EXAM:  BP (!) 156/93 (BP Location: Left arm, Patient Position: Sitting, Cuff Size: Adult Regular)   Pulse 78   Temp 96.9  F (36.1  C) (Tympanic)   Wt 78.4 kg  (172 lb 12.8 oz)   SpO2 99%   BMI 27.06 kg/m      Constitutional: awake, alert, no distress  Eyes: PERRL, sclera nonicteric  ENT: trachea midline  Respiratory: Lungs clear  Cardiovascular: Regular rate and rhythm, no murmurs  GI: nondistended, nontender, bowel sounds present  Lymph/Hematologic: no lymphadenopathy  Skin: dry, no rash  Musculoskeletal: good muscle tone, strength 5/5 in upper and lower extremities  Neurologic: no focal deficits  Neuropsychiatric: appropriate affact    DATA:  Labs:   Recent Labs   Lab Test 02/18/21  1034 08/14/19  0744   CHOL 210* 170   HDL 43 39*   * 104*   TRIG 144 135     EKG November 2021 showed sinus rhythm, frequent PVCs    ASSESSMENT:  56-year-old male seen for coronary calcification.  His chest CT was reviewed, he has a mild to at most moderate amount of coronary calcification.  This would be expected with his history of tobacco abuse and his age.  He has no cardiac symptoms.  His main issue is a lot of anxiety which is probably causing some noncardiac chest pain.  Stress echo will be done to rule out any underlying silent ischemia.    We talked about the importance of risk factor management going forward.  Lipids should be better on atorvastatin.  He was encouraged to keep checking blood pressure at home.  Mediterranean diet was exercised.    He is interested in getting help for his anxiety.  He has met with counselors before.  This would be very helpful if he can find a helpful counselor or therapist.    RECOMMENDATIONS:  1.  Coronary calcification on chest CT with no cardiac symptoms  -Treadmill stress echo  -Agree with aspirin and atorvastatin, order placed for lipids, goal LDL around 70  -Goal blood pressure 130/80 or less  -Mediterranean diet recommended    Follow-up as needed with cardiology.    Sadi Shelley MD  Cardiology - [facility]  Pager:  [phone]  Text Page  February 10, 2022

## 2022-02-10 ENCOUNTER — OFFICE VISIT (OUTPATIENT)
Dept: CARDIOLOGY | Facility: CLINIC | Age: 57
End: 2022-02-10
Attending: FAMILY MEDICINE
Payer: COMMERCIAL

## 2022-02-10 ENCOUNTER — TELEPHONE (OUTPATIENT)
Dept: CARE COORDINATION | Facility: CLINIC | Age: 57
End: 2022-02-10

## 2022-02-10 ENCOUNTER — PATIENT OUTREACH (OUTPATIENT)
Dept: NURSING | Facility: CLINIC | Age: 57
End: 2022-02-10

## 2022-02-10 VITALS
SYSTOLIC BLOOD PRESSURE: 156 MMHG | WEIGHT: 172.8 LBS | OXYGEN SATURATION: 99 % | BODY MASS INDEX: 27.06 KG/M2 | HEART RATE: 78 BPM | DIASTOLIC BLOOD PRESSURE: 93 MMHG | TEMPERATURE: 96.9 F

## 2022-02-10 DIAGNOSIS — I25.10 CORONARY ARTERY CALCIFICATION SEEN ON CT SCAN: ICD-10-CM

## 2022-02-10 DIAGNOSIS — E78.5 DYSLIPIDEMIA: Primary | ICD-10-CM

## 2022-02-10 DIAGNOSIS — E66.9 OBESITY (BMI 30-39.9): Primary | ICD-10-CM

## 2022-02-10 PROCEDURE — 99204 OFFICE O/P NEW MOD 45 MIN: CPT | Performed by: INTERNAL MEDICINE

## 2022-02-10 RX ORDER — WADDING
1.25 EACH MISCELLANEOUS DAILY
COMMUNITY

## 2022-02-10 NOTE — PROGRESS NOTES
Clinic Care Coordination Contact    Community Health Worker Follow Up    Care Gaps:     Health Maintenance Due   Topic Date Due     ZOSTER IMMUNIZATION (1 of 2) Never done     INFLUENZA VACCINE (1) 09/01/2021     PREVENTIVE CARE VISIT  02/18/2022       Patient accepted scheduling phone number for [facility]  to schedule independently     Goals:   Goals Addressed as of 2/10/2022 at 1:49 PM                    Today       2- Medical (pt-stated)   50%    Added 1/3/22 by Vikki Durán LSW      Goal Statement: I will attend Cardiology appointment on February 10    Date Goal set: 1/3/22  Barriers: insurance  Strengths: motivated  Date to Achieve By: 2/10/22  Patient expressed understanding of goal: yes    Action steps to achieve this goal:  1. I will follow up with recommendations from Cardiology. Completed   2. I will complete a Stress test 3/4/22.    Goal Updated: 2/10/22           3- Mental Health Management (pt-stated)   20%    Added 1/3/22 by Vikki Durán LSW      Goal Statement: I will take steps to manage anxiety    Date Goal set: 1/3/22  Barriers: financial and medical stressors  Strengths: motivated  Date to Achieve By: 3 months  Patient expressed understanding of goal: yes    Action steps to achieve this goal:  1. I will continue to take medication. I have been continuing to take my medications as prescribed.  2. I will contact [facility] for free counseling sessions. I have not needed to use this resource yet but have the information if needed.   3. I will establish with a therapist when I get better insurance. I have health insurance now and will check to see who in network.     Goal Updated: 2/10/22            Intervention and Education during outreach: Patient asked CHW to sent request to Nasreen Martínez PA-C for Nutritionist referral. Message sent.     CHW Plan: CHW will follow up with patient in 1 month on 3/9/22.    Savana Doan  Community Health Worker  [facility]   Clinic Care Coordination    Office: [phone]

## 2022-02-10 NOTE — LETTER
2/10/2022    Nasreen Martínez PA-C  [facility] [place] [address] [address]  [place] [address]    RE: Munir Storey       Dear Colleague,     I had the pleasure of seeing Munir Storey in the [facility].  CARDIOLOGY CONSULT    REASON FOR CONSULT: Coronary calcification    PRIMARY CARE PHYSICIAN:  Nasreen Martínez    HISTORY OF PRESENT ILLNESS:  56-year-old male seen for coronary calcification on chest CT.  He has hypertension, history of alcohol use, reported history of atrial flutter, tobacco abuse (quit around 2015).    2015 he underwent atrial flutter ablation through [facility].    Echo from [facility] 2016 showed EF 60%, mild LVH, normal RV, aorta 3.7 cm.    He is quite physically active at baseline.  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  He also does some skating on his backyard rink and shoveling.  He denies any exertional chest pain or shortness of breath.  He admits to having a lot of anxiety, he has had multiple ED visits for chest pain and other symptoms.  He gets quite anxious when he checks his blood pressure at home.    Chest CT December 2021 showed moderate coronary calcification.  Aspirin and atorvastatin was started by his primary physician.    PAST MEDICAL HISTORY:  Past Medical History:   Diagnosis Date     Alcohol withdrawal (H) 04/28/2015     Atrial flutter (H)      Depressive disorder      Ejection fraction < 50% 04/2015    Ejection fraction 45% per echo April 2015 (per [facility] records), possible alcohol or tachycardia induced cardiomyopathy. EF normalized on follow-up echo 2016     SANDRA (generalized anxiety disorder)      H/O atrioventricular quita ablation 12/2015     Hypertension, goal below 140/90      Tobacco abuse        MEDICATIONS:  Current Outpatient Medications   Medication     amLODIPine (NORVASC) 10 MG tablet     aspirin (ASA) 81 MG EC tablet     atorvastatin (LIPITOR) 40 MG tablet     buPROPion  (WELLBUTRIN XL) 300 MG 24 hr tablet     cyclobenzaprine (FLEXERIL) 10 MG tablet     hydrochlorothiazide (HYDRODIURIL) 12.5 MG tablet     hydrOXYzine (ATARAX) 25 MG tablet     lisinopril (ZESTRIL) 40 MG tablet     LORazepam (ATIVAN) 0.5 MG tablet     No current facility-administered medications for this visit.       ALLERGIES:  Allergies   Allergen Reactions     Sulfamethoxazole-Trimethoprim Nausea       SOCIAL HISTORY:  I have reviewed this patient's social history and updated it with pertinent information if needed. Munir Storey  reports that he quit smoking about 2 years ago. His smoking use included cigarettes and dip, chew, snus or snuff. He has a 10.00 pack-year smoking history. He has quit using smokeless tobacco.  His smokeless tobacco use included chew. He reports that he does not drink alcohol and does not use drugs.    FAMILY HISTORY:  I have reviewed this patient's family history and updated it with pertinent information if needed.   Family History   Problem Relation Age of Onset     Diabetes Father      Depression Father      Depression Paternal Grandfather        REVIEW OF SYSTEMS:  Constitutional:  No weight loss, fever, chills, weakness or fatigue.  HEENT:  Eyes:  No visual loss, blurred vision, double vision or yellow sclerae. No hearing loss, sneezing, congestion, runny nose or sore throat.  Skin:  No rash or itching.  Cardiovascular: per HPI  Respiratory: per HPI  GI:  No anorexia, nausea, vomiting or diarrhea. No abdominal pain or blood.  :  No dysurea, hematuria  Neurologic:  No headache, dizziness, syncope, paralysis, ataxia, numbness or tingling in the extremities. No change in bowel or bladder control.  Musculoskeletal:  No muscle, back pain, joint pain or stiffness.  Hematologic:  No anemia, bleeding or bruising.  Lymphatics:  No enlarged nodes. No history of splenectomy.  Psychiatric:  No history of depression  Endocrine:  No reports of sweating, cold or heat intolerance. No polyuria or  polydipsia.  Allergies:  No history of asthma, hives, eczema or rhinitis.    PHYSICAL EXAM:  BP (!) 156/93 (BP Location: Left arm, Patient Position: Sitting, Cuff Size: Adult Regular)   Pulse 78   Temp 96.9  F (36.1  C) (Tympanic)   Wt 78.4 kg (172 lb 12.8 oz)   SpO2 99%   BMI 27.06 kg/m      Constitutional: awake, alert, no distress  Eyes: PERRL, sclera nonicteric  ENT: trachea midline  Respiratory: Lungs clear  Cardiovascular: Regular rate and rhythm, no murmurs  GI: nondistended, nontender, bowel sounds present  Lymph/Hematologic: no lymphadenopathy  Skin: dry, no rash  Musculoskeletal: good muscle tone, strength 5/5 in upper and lower extremities  Neurologic: no focal deficits  Neuropsychiatric: appropriate affact    DATA:  Labs:   Recent Labs   Lab Test 02/18/21  1034 08/14/19  0744   CHOL 210* 170   HDL 43 39*   * 104*   TRIG 144 135     EKG November 2021 showed sinus rhythm, frequent PVCs    ASSESSMENT:  56-year-old male seen for coronary calcification.  His chest CT was reviewed, he has a mild to at most moderate amount of coronary calcification.  This would be expected with his history of tobacco abuse and his age.  He has no cardiac symptoms.  His main issue is a lot of anxiety which is probably causing some noncardiac chest pain.  Stress echo will be done to rule out any underlying silent ischemia.    We talked about the importance of risk factor management going forward.  Lipids should be better on atorvastatin.  He was encouraged to keep checking blood pressure at home.  Mediterranean diet was exercised.    He is interested in getting help for his anxiety.  He has met with counselors before.  This would be very helpful if he can find a helpful counselor or therapist.    RECOMMENDATIONS:  1.  Coronary calcification on chest CT with no cardiac symptoms  -Treadmill stress echo  -Agree with aspirin and atorvastatin, order placed for lipids, goal LDL around 70  -Goal blood pressure 130/80 or  less  -Mediterranean diet recommended    Follow-up as needed with cardiology.    Sadi Shelley MD  Cardiology - [facility]  Pager:  [phone]  Text Page  February 10, 2022      [facility]  cc:   Jean Adams DO  [address]  [place] [address]

## 2022-02-10 NOTE — TELEPHONE ENCOUNTER
What is request: Patient would like a referral to see a Nutritionist.   Why the request: For weight loss and to try and get into better eating habits.  When is request needed: As soon as possible  Where does request need to go Nasreen Martínez PA-C  Okay to leave detailed message: No

## 2022-02-15 NOTE — TELEPHONE ENCOUNTER
Referral placed, please notify patient he should call to schedule.  I also suggest he check with insurance to see if this is a service covered.  If not, we can discuss healthy diet in more detail through a virtual or office visit if he'd like.        [facility]   [address]   [place] [address]   Phone: [phone]

## 2022-02-15 NOTE — TELEPHONE ENCOUNTER
Called [phone] (home)     Did patient answer the phone: No, left a message on voicemail to return call to the [facility] at [phone].    Ragini RN,BSN  Triage Nurse  [facility]: [facility]

## 2022-02-16 ENCOUNTER — PATIENT OUTREACH (OUTPATIENT)
Dept: CARE COORDINATION | Facility: CLINIC | Age: 57
End: 2022-02-16
Payer: COMMERCIAL

## 2022-02-16 DIAGNOSIS — F33.1 MODERATE EPISODE OF RECURRENT MAJOR DEPRESSIVE DISORDER (H): ICD-10-CM

## 2022-02-16 DIAGNOSIS — F41.1 GAD (GENERALIZED ANXIETY DISORDER): ICD-10-CM

## 2022-02-16 DIAGNOSIS — I10 HYPERTENSION, GOAL BELOW 140/90: ICD-10-CM

## 2022-02-16 RX ORDER — LISINOPRIL 40 MG/1
40 TABLET ORAL DAILY
Qty: 90 TABLET | Refills: 1 | Status: SHIPPED | OUTPATIENT
Start: 2022-02-16 | End: 2022-08-11

## 2022-02-16 RX ORDER — BUPROPION HYDROCHLORIDE 300 MG/1
300 TABLET ORAL EVERY MORNING
Qty: 30 TABLET | Refills: 0 | Status: SHIPPED | OUTPATIENT
Start: 2022-02-16 | End: 2022-03-31

## 2022-02-16 RX ORDER — HYDROCHLOROTHIAZIDE 12.5 MG/1
12.5 TABLET ORAL DAILY
Qty: 90 TABLET | Refills: 1 | Status: SHIPPED | OUTPATIENT
Start: 2022-02-16 | End: 2022-03-22

## 2022-02-16 NOTE — TELEPHONE ENCOUNTER
Routing refill request to provider for review/approval because:  Failed: blood pressure not at goal.     Patient called in, referral information relayed from other encounter. Patient requested these to be refilled. He stated cardiology said everything looks good for his age. He has an upcoming lab appointment and is trying to get into therapy.       Nasreen Montoya RN

## 2022-02-16 NOTE — PROGRESS NOTES
Care Coordination Clinician Chart Review     Situation: Patient chart reviewed by care coordinator.?     Background: Initial assessment and enrollment to Care Coordination was December 2021.?? Patient centered goals were developed with participation from patient.? Lead CC handed patient off to CHW for continued outreach every 30 days.??     Assessment: Per chart review, patient outreach completed by CC CHW on 2/10/22.? Patient is actively working to accomplish goal(s).? Patient worked with W and approved for insurance.  Saw Cardiology on 2/10/22. Patient's goal(s) remain(s) appropriate at this time.? Patient is not due for updated Plan of Care.? Annual assessment will be due December 2022.     Goals        2- Medical (pt-stated)       Goal Statement: I will follow up with Cardiology and other services    Date Goal set: 1/3/22  Barriers: insurance  Strengths: motivated  Date to Achieve By: 2/10/22  Patient expressed understanding of goal: yes    Action steps to achieve this goal:  1. I will follow up with recommendations from Cardiology. Completed   2. I will complete a Stress test 3/4/22.  3. I will see a dietician (referral placed- clinic unable to reach pt to schedule)    Goal Updated: 2/16/22           3- Mental Health Management (pt-stated)       Goal Statement: I will take steps to manage anxiety    Date Goal set: 1/3/22  Barriers: financial and medical stressors  Strengths: motivated  Date to Achieve By: 3 months  Patient expressed understanding of goal: yes    Action steps to achieve this goal:  1. I will continue to take medication. I have been continuing to take my medications as prescribed.  2. I will contact [facility] for free counseling sessions. I have not needed to use this resource yet but have the information if needed.   3. I will establish with a therapist when I get better insurance. I have health insurance now and will check to see who in network.     Goal Updated: 2/10/22        ??      Plan/Recommendations: The patient will continue working with Care Coordination to achieve above goal(s).? CHW will involve Lead CC as needed or if patient is ready to move to maintenance.? Lead CC will continue to monitor CHW s monthly outreaches and progress to goal(s) every 6 weeks.?     Plan of Care updated and sent to patient: No

## 2022-02-16 NOTE — TELEPHONE ENCOUNTER
Patient returned call, message relayed to patient. He verbalized understanding.    Nasreen Montoya RN

## 2022-02-16 NOTE — TELEPHONE ENCOUNTER
Called [phone] (home)     Did patient answer the phone: No, left a message on voicemail to return call to the [facility] at [phone].    Ragini RN,BSN  Triage Nurse  [facility]: [facility]

## 2022-02-17 DIAGNOSIS — F41.1 GAD (GENERALIZED ANXIETY DISORDER): ICD-10-CM

## 2022-02-17 DIAGNOSIS — F33.1 MODERATE EPISODE OF RECURRENT MAJOR DEPRESSIVE DISORDER (H): ICD-10-CM

## 2022-02-17 NOTE — TELEPHONE ENCOUNTER
"Routing refill request to provider for review/approval because:  Drug not on the G refill protocol     Requested Prescriptions   Signed Prescriptions Disp Refills    buPROPion (WELLBUTRIN XL) 300 MG 24 hr tablet 30 tablet 0     Sig: Take 1 tablet (300 mg) by mouth every morning        SSRIs Protocol Failed - 2/16/2022 12:48 PM        Failed - PHQ-9 score less than 5 in past 6 months     Please review last PHQ-9 score.           Passed - Medication is Bupropion     If the medication is Bupropion (Wellbutrin), and the patient is taking for smoking cessation; OK to refill.            Passed - Medication is active on med list        Passed - Patient is age 18 or older        Passed - Recent (6 mo) or future (30 days) visit within the authorizing provider's specialty     Patient had office visit in the last 6 months or has a visit in the next 30 days with authorizing provider or within the authorizing provider's specialty.  See \"Patient Info\" tab in inbasket, or \"Choose Columns\" in Meds & Orders section of the refill encounter.               hydrochlorothiazide (HYDRODIURIL) 12.5 MG tablet 90 tablet 1     Sig: Take 1 tablet (12.5 mg) by mouth daily For blood pressure.        Diuretics (Including Combos) Protocol Failed - 2/16/2022 12:48 PM        Failed - Blood pressure under 140/90 in past 12 months       BP Readings from Last 3 Encounters:   02/10/22 (!) 156/93   11/11/21 136/86   11/09/21 (!) 141/88                 Passed - Recent (12 mo) or future (30 days) visit within the authorizing provider's specialty     Patient has had an office visit with the authorizing provider or a provider within the authorizing providers department within the previous 12 mos or has a future within next 30 days. See \"Patient Info\" tab in inbasket, or \"Choose Columns\" in Meds & Orders section of the refill encounter.              Passed - Medication is active on med list        Passed - Patient is age 18 or older        Passed - Normal " "serum creatinine on file in past 12 months       Recent Labs   Lab Test 11/08/21  1428   CR 0.75              Passed - Normal serum potassium on file in past 12 months       Recent Labs   Lab Test 11/08/21  1428   POTASSIUM 3.7                    Passed - Normal serum sodium on file in past 12 months       Recent Labs   Lab Test 11/08/21  1428                    lisinopril (ZESTRIL) 40 MG tablet 90 tablet 1     Sig: Take 1 tablet (40 mg) by mouth daily        ACE Inhibitors (Including Combos) Protocol Failed - 2/16/2022 12:48 PM        Failed - Blood pressure under 140/90 in past 12 months       BP Readings from Last 3 Encounters:   02/10/22 (!) 156/93   11/11/21 136/86   11/09/21 (!) 141/88                 Passed - Recent (12 mo) or future (30 days) visit within the authorizing provider's specialty     Patient has had an office visit with the authorizing provider or a provider within the authorizing providers department within the previous 12 mos or has a future within next 30 days. See \"Patient Info\" tab in inbasket, or \"Choose Columns\" in Meds & Orders section of the refill encounter.              Passed - Medication is active on med list        Passed - Patient is age 18 or older        Passed - Normal serum creatinine on file in past 12 months     Recent Labs   Lab Test 11/08/21  1428   CR 0.75       Ok to refill medication if creatinine is low          Passed - Normal serum potassium on file in past 12 months     Recent Labs   Lab Test 11/08/21  1428   POTASSIUM 3.7                      Germaine Reyes RN   [facility]   "

## 2022-02-18 NOTE — TELEPHONE ENCOUNTER
Routing refill request to provider for review/approval because:  PHQ9 out of range.     PHQ 11/11/2021 12/16/2021 1/6/2022   PHQ-9 Total Score 11 6 8   Q9: Thoughts of better off dead/self-harm past 2 weeks Not at all Not at all Not at all   F/U: Thoughts of suicide or self-harm - - -   F/U: Safety concerns - - -       Germaine Reyes RN   [facility]

## 2022-02-21 RX ORDER — BUPROPION HYDROCHLORIDE 300 MG/1
TABLET ORAL
Qty: 30 TABLET | Refills: 0 | OUTPATIENT
Start: 2022-02-21

## 2022-02-23 ENCOUNTER — LAB (OUTPATIENT)
Dept: LAB | Facility: CLINIC | Age: 57
End: 2022-02-23
Payer: COMMERCIAL

## 2022-02-23 DIAGNOSIS — E78.5 HYPERLIPIDEMIA LDL GOAL <130: ICD-10-CM

## 2022-02-23 DIAGNOSIS — I25.10 CORONARY ARTERY CALCIFICATION SEEN ON CT SCAN: ICD-10-CM

## 2022-02-23 LAB
ALBUMIN SERPL-MCNC: 4.2 G/DL (ref 3.4–5)
ALP SERPL-CCNC: 53 U/L (ref 40–150)
ALT SERPL W P-5'-P-CCNC: 29 U/L (ref 0–70)
ANION GAP SERPL CALCULATED.3IONS-SCNC: 2 MMOL/L (ref 3–14)
AST SERPL W P-5'-P-CCNC: 14 U/L (ref 0–45)
BILIRUB SERPL-MCNC: 0.5 MG/DL (ref 0.2–1.3)
BUN SERPL-MCNC: 21 MG/DL (ref 7–30)
CALCIUM SERPL-MCNC: 9.2 MG/DL (ref 8.5–10.1)
CHLORIDE BLD-SCNC: 107 MMOL/L (ref 94–109)
CHOLEST SERPL-MCNC: 132 MG/DL
CO2 SERPL-SCNC: 29 MMOL/L (ref 20–32)
CREAT SERPL-MCNC: 0.7 MG/DL (ref 0.66–1.25)
FASTING STATUS PATIENT QL REPORTED: YES
GFR SERPL CREATININE-BSD FRML MDRD: >90 ML/MIN/1.73M2
GLUCOSE BLD-MCNC: 99 MG/DL (ref 70–99)
HDLC SERPL-MCNC: 47 MG/DL
LDLC SERPL CALC-MCNC: 75 MG/DL
NONHDLC SERPL-MCNC: 85 MG/DL
POTASSIUM BLD-SCNC: 4.9 MMOL/L (ref 3.4–5.3)
PROT SERPL-MCNC: 7.7 G/DL (ref 6.8–8.8)
SODIUM SERPL-SCNC: 138 MMOL/L (ref 133–144)
TRIGL SERPL-MCNC: 49 MG/DL

## 2022-02-23 PROCEDURE — 80053 COMPREHEN METABOLIC PANEL: CPT

## 2022-02-23 PROCEDURE — 80061 LIPID PANEL: CPT

## 2022-02-23 PROCEDURE — 36415 COLL VENOUS BLD VENIPUNCTURE: CPT

## 2022-03-04 ENCOUNTER — HOSPITAL ENCOUNTER (OUTPATIENT)
Dept: CARDIOLOGY | Facility: CLINIC | Age: 57
Discharge: HOME | End: 2022-03-04
Attending: INTERNAL MEDICINE | Admitting: INTERNAL MEDICINE
Payer: COMMERCIAL

## 2022-03-04 DIAGNOSIS — I25.10 CORONARY ARTERY CALCIFICATION SEEN ON CT SCAN: ICD-10-CM

## 2022-03-04 PROCEDURE — 93325 DOPPLER ECHO COLOR FLOW MAPG: CPT | Mod: 26 | Performed by: INTERNAL MEDICINE

## 2022-03-04 PROCEDURE — 93016 CV STRESS TEST SUPVJ ONLY: CPT | Performed by: INTERNAL MEDICINE

## 2022-03-04 PROCEDURE — 255N000002 HC RX 255 OP 636: Performed by: INTERNAL MEDICINE

## 2022-03-04 PROCEDURE — 93018 CV STRESS TEST I&R ONLY: CPT | Performed by: INTERNAL MEDICINE

## 2022-03-04 PROCEDURE — 93321 DOPPLER ECHO F-UP/LMTD STD: CPT | Mod: 26 | Performed by: INTERNAL MEDICINE

## 2022-03-04 PROCEDURE — 93350 STRESS TTE ONLY: CPT | Mod: 26 | Performed by: INTERNAL MEDICINE

## 2022-03-04 PROCEDURE — 93017 CV STRESS TEST TRACING ONLY: CPT

## 2022-03-04 RX ADMIN — HUMAN ALBUMIN MICROSPHERES AND PERFLUTREN 4 ML: 10; .22 INJECTION, SOLUTION INTRAVENOUS at 11:00

## 2022-03-09 ENCOUNTER — TELEPHONE (OUTPATIENT)
Dept: CARDIOLOGY | Facility: CLINIC | Age: 57
End: 2022-03-09
Payer: COMMERCIAL

## 2022-03-09 NOTE — TELEPHONE ENCOUNTER
Called patient to review stress test results.  No answer, message left to return call to clinic.  Susie Graff RN on 3/9/2022 at 3:19 PM

## 2022-03-11 ENCOUNTER — PATIENT OUTREACH (OUTPATIENT)
Dept: CARE COORDINATION | Facility: CLINIC | Age: 57
End: 2022-03-11
Payer: COMMERCIAL

## 2022-03-11 NOTE — PROGRESS NOTES
Clinic Care Coordination Contact  [facility]/Voicemail    Clinical Data: Care Coordinator Outreach  Outreach attempted x 1.  Left message on patient's voicemail with call back information and requested return call.  Plan: Care Coordinator will try to reach patient again in 10 business days.    Next CHW outreach date: 3/28/22    [facility] Worker  [facility] Care Coordination   Office: [phone]

## 2022-03-17 ENCOUNTER — APPOINTMENT (OUTPATIENT)
Dept: GENERAL RADIOLOGY | Facility: CLINIC | Age: 57
End: 2022-03-17
Attending: PHYSICIAN ASSISTANT
Payer: COMMERCIAL

## 2022-03-17 ENCOUNTER — HOSPITAL ENCOUNTER (EMERGENCY)
Facility: CLINIC | Age: 57
Discharge: HOME | End: 2022-03-17
Attending: PHYSICIAN ASSISTANT | Admitting: PHYSICIAN ASSISTANT
Payer: COMMERCIAL

## 2022-03-17 VITALS
OXYGEN SATURATION: 94 % | RESPIRATION RATE: 18 BRPM | SYSTOLIC BLOOD PRESSURE: 165 MMHG | HEART RATE: 74 BPM | WEIGHT: 173.2 LBS | DIASTOLIC BLOOD PRESSURE: 87 MMHG | TEMPERATURE: 98.8 F | BODY MASS INDEX: 27.13 KG/M2

## 2022-03-17 DIAGNOSIS — S68.621A PARTIAL TRAUMATIC AMPUTATION OF LEFT INDEX FINGER THROUGH PHALANX, INITIAL ENCOUNTER: ICD-10-CM

## 2022-03-17 PROCEDURE — 250N000011 HC RX IP 250 OP 636: Performed by: PHYSICIAN ASSISTANT

## 2022-03-17 PROCEDURE — 11012 DEB SKIN BONE AT FX SITE: CPT | Performed by: PHYSICIAN ASSISTANT

## 2022-03-17 PROCEDURE — 12042 INTMD RPR N-HF/GENIT2.6-7.5: CPT | Mod: 59 | Performed by: PHYSICIAN ASSISTANT

## 2022-03-17 PROCEDURE — 250N000013 HC RX MED GY IP 250 OP 250 PS 637: Performed by: PHYSICIAN ASSISTANT

## 2022-03-17 PROCEDURE — 99284 EMERGENCY DEPT VISIT MOD MDM: CPT | Mod: 25 | Performed by: PHYSICIAN ASSISTANT

## 2022-03-17 PROCEDURE — 73140 X-RAY EXAM OF FINGER(S): CPT | Mod: LT

## 2022-03-17 PROCEDURE — 90715 TDAP VACCINE 7 YRS/> IM: CPT | Performed by: PHYSICIAN ASSISTANT

## 2022-03-17 PROCEDURE — 90471 IMMUNIZATION ADMIN: CPT | Performed by: PHYSICIAN ASSISTANT

## 2022-03-17 PROCEDURE — 250N000009 HC RX 250: Performed by: PHYSICIAN ASSISTANT

## 2022-03-17 RX ORDER — LORAZEPAM 1 MG/1
1 TABLET ORAL ONCE
Status: COMPLETED | OUTPATIENT
Start: 2022-03-17 | End: 2022-03-17

## 2022-03-17 RX ORDER — BUPIVACAINE HYDROCHLORIDE 5 MG/ML
1 INJECTION, SOLUTION EPIDURAL; INTRACAUDAL ONCE
Status: COMPLETED | OUTPATIENT
Start: 2022-03-17 | End: 2022-03-17

## 2022-03-17 RX ORDER — CEPHALEXIN 500 MG/1
500 CAPSULE ORAL 4 TIMES DAILY
Qty: 28 CAPSULE | Refills: 0 | Status: SHIPPED | OUTPATIENT
Start: 2022-03-17 | End: 2022-03-24

## 2022-03-17 RX ORDER — HYDROCODONE BITARTRATE AND ACETAMINOPHEN 5; 325 MG/1; MG/1
1 TABLET ORAL EVERY 6 HOURS PRN
Qty: 10 TABLET | Refills: 0 | Status: SHIPPED | OUTPATIENT
Start: 2022-03-17 | End: 2022-03-31

## 2022-03-17 RX ORDER — HYDROCODONE BITARTRATE AND ACETAMINOPHEN 5; 325 MG/1; MG/1
1 TABLET ORAL EVERY 6 HOURS PRN
Qty: 10 TABLET | Refills: 0 | Status: SHIPPED | OUTPATIENT
Start: 2022-03-17 | End: 2022-03-17

## 2022-03-17 RX ADMIN — CLOSTRIDIUM TETANI TOXOID ANTIGEN (FORMALDEHYDE INACTIVATED), CORYNEBACTERIUM DIPHTHERIAE TOXOID ANTIGEN (FORMALDEHYDE INACTIVATED), BORDETELLA PERTUSSIS TOXOID ANTIGEN (GLUTARALDEHYDE INACTIVATED), BORDETELLA PERTUSSIS FILAMENTOUS HEMAGGLUTININ ANTIGEN (FORMALDEHYDE INACTIVATED), BORDETELLA PERTUSSIS PERTACTIN ANTIGEN, AND BORDETELLA PERTUSSIS FIMBRIAE 2/3 ANTIGEN 0.5 ML: 5; 2; 2.5; 5; 3; 5 INJECTION, SUSPENSION INTRAMUSCULAR at 14:59

## 2022-03-17 RX ADMIN — BUPIVACAINE HYDROCHLORIDE 5 MG: 5 INJECTION, SOLUTION EPIDURAL; INTRACAUDAL at 14:00

## 2022-03-17 RX ADMIN — LORAZEPAM 1 MG: 1 TABLET ORAL at 14:21

## 2022-03-17 NOTE — ED PROVIDER NOTES
History     Chief Complaint   Patient presents with     Laceration     HPI  Munir Storey is a 56 year old male who presents to the emergency department with a laceration to his left index finger.  Patient reports he was working with a table saw and somehow his left finger got caught.  He is able to move the finger but it is bleeding.  Reports exquisite pain in the distal tip where it is partially amputated.  Denies any other injuries.Unknown last tetanus.        Allergies:  Allergies   Allergen Reactions     Sulfamethoxazole-Trimethoprim Nausea       Problem List:    Patient Active Problem List    Diagnosis Date Noted     Onychomycosis 11/11/2021     Priority: Medium     Tinea pedis of both feet 11/11/2021     Priority: Medium     Primary osteoarthritis of right hand 11/11/2021     Priority: Medium     Anhedonia 11/11/2021     Priority: Medium     Alcohol dependence in remission (H) 02/23/2021     Priority: Medium     Vitamin D deficiency 02/23/2021     Priority: Medium     Fatigue, unspecified type 02/23/2021     Priority: Medium     Chronic neck pain with history of cervical spinal surgery 02/18/2021     Priority: Medium     H/O atrial flutter 09/02/2019     Priority: Medium     Typical atrial flutter, new onset 04/28/2015, s/p cardioversion 7/2015, s/p atrial flutter ablation 12/2015         Seborrheic keratosis 08/14/2019     Priority: Medium     Obesity (BMI 30-39.9) 08/04/2019     Priority: Medium     Moderate episode of recurrent major depressive disorder (H) 08/29/2018     Priority: Medium     Tobacco abuse 10/20/2017     Priority: Medium     SANDRA (generalized anxiety disorder) 10/20/2017     Priority: Medium     Alcohol abuse, in remission 10/20/2017     Priority: Medium     October 20, 2017: sobriety x 2 years       Peyronie disease 08/26/2009     Priority: Medium     Hypertension, goal below 140/90 01/23/2009     Priority: Medium        Past Medical History:    Past Medical History:   Diagnosis Date      Alcohol withdrawal (H) 2015     Atrial flutter (H)      Depressive disorder      Ejection fraction < 50% 2015     SANDRA (generalized anxiety disorder)      H/O atrioventricular quita ablation 2015     Hypertension, goal below 140/90      Tobacco abuse        Past Surgical History:    Past Surgical History:   Procedure Laterality Date     CARDIAC SURGERY  2015    EP cardioversion     COLONOSCOPY  2013     COLONOSCOPY  2019    normal colonoscopy - repeat 10 years     HERNIA REPAIR       LAPAROSCOPIC RESECTION SMALL BOWEL  2013     SPINE SURGERY      cervical fusion      UPPER GI ENDOSCOPY  2013       Family History:    Family History   Problem Relation Age of Onset     Diabetes Father      Depression Father      Depression Paternal Grandfather        Social History:  Marital Status:   [2]  Social History     Tobacco Use     Smoking status: Former Smoker     Packs/day: 0.50     Years: 20.00     Pack years: 10.00     Types: Cigarettes, Dip, chew, snus or snuff     Quit date: 2019     Years since quittin.5     Smokeless tobacco: Current User     Types: Chew   Substance Use Topics     Alcohol use: No     Comment: hx alcohol abuse, sober since      Drug use: No        Medications:    cephALEXin (KEFLEX) 500 MG capsule  HYDROcodone-acetaminophen (NORCO) 5-325 MG tablet  amLODIPine (NORVASC) 10 MG tablet  aspirin (ASA) 81 MG EC tablet  atorvastatin (LIPITOR) 40 MG tablet  buPROPion (WELLBUTRIN XL) 300 MG 24 hr tablet  hydrochlorothiazide (HYDRODIURIL) 12.5 MG tablet  hydrOXYzine (ATARAX) 25 MG tablet  lisinopril (ZESTRIL) 40 MG tablet  LORazepam (ATIVAN) 0.5 MG tablet  Misc Natural Products (T-RELIEF CBD+13 SL)  pediatric electrolyte (PEDIALYTE) SOLN solution  VITAMIN D PO          Review of Systems   All other systems reviewed and are negative.      Physical Exam   BP: (!) 165/87  Pulse: 74  Temp: 98.8  F (37.1  C)  Resp: 18  Weight: 78.6 kg (173 lb 3.2 oz)  SpO2: 94  %      Physical Exam  Vitals and nursing note reviewed.   Constitutional:       General: He is not in acute distress.     Appearance: Normal appearance. He is not ill-appearing, toxic-appearing or diaphoretic.   HENT:      Head: Normocephalic.   Cardiovascular:      Pulses: Normal pulses.   Pulmonary:      Effort: Pulmonary effort is normal. No respiratory distress.   Musculoskeletal:      Comments: Left index finger: Complex partial amputation to the distal phalanx.  Nailbed is completely damaged.  Slow bleeding present.  Bone exposed.   Skin:     General: Skin is warm and dry.   Neurological:      General: No focal deficit present.      Mental Status: He is alert and oriented to person, place, and time.   Psychiatric:         Mood and Affect: Affect normal. Mood is anxious.                 ED [facility]    -Laceration Repair    Date/Time: 3/17/2022 2:55 PM  Performed by: Allie Gracia PA-C  Authorized by: Allie Gracia PA-C     Risks, benefits and alternatives discussed.      ANESTHESIA (see MAR for exact dosages):     Anesthesia method:  Nerve block    Block needle gauge:  27 G    Block anesthetic:  Bupivacaine 0.5% w/o epi    Block technique:  Digital    Block injection procedure:  Anatomic landmarks identified, anatomic landmarks palpated, introduced needle, negative aspiration for blood and incremental injection    Block outcome:  Anesthesia achieved      LACERATION DETAILS     Location:  Finger    Finger location:  L index finger    Length (cm):  3    REPAIR TYPE:     Repair type:  Complex      EXPLORATION:     Hemostasis achieved with:  Tourniquet    Wound exploration: wound explored through full range of motion and entire depth of wound probed and visualized      Wound extent: underlying fracture      TREATMENT:     Area cleansed with:  Saline    Amount of cleaning:  Extensive    Irrigation solution:  Sterile saline    Irrigation method:   Syringe    Debridement:  Moderate    Undermining:  Minimal    Scar revision: no      SKIN REPAIR     Repair method:  Sutures    Suture size:  4-0    Suture material:  Nylon    Number of sutures:  7    APPROXIMATION     Approximation:  Close    POST-PROCEDURE DETAILS     Dressing:  Antibiotic ointment, tube gauze and splint for protection        PROCEDURE  Describe Procedure: Bone fragments present within the wound, carefully removed.  Rongeur used to trim bone in order to create a flap to close the wound.  Nailbed completely damaged, remaining portion of nail was also removed prior to flap revision.  Patient Tolerance:  Patient tolerated the procedure well with no immediate complications        Results for orders placed or performed during the hospital encounter of 03/17/22 (from the past 24 hour(s))   Fingers XR, 2-3 views, left    Narrative    FINGER(S) TWO-THREE VIEWS LEFT  3/17/2022 2:58 PM     HISTORY: trauma, pain  COMPARISON: None.      Impression    IMPRESSION: Status post partial amputation of the second digit at the  level of the distal phalangeal diaphysis. Intra-articular fracture of  the residual second distal phalanx with 3 mm distraction. Surrounding  soft tissue swelling. There are 2 tiny metallic foreign bodies in the  soft tissue medial to the second proximal phalangeal diaphysis. Mild  degenerative changes.    PHILLIP REY MD         SYSTEM ID:  [id number]       Medications   bupivacaine (MARCAINE) 0.5% preservative free injection (5 mg Intradermal Given by Other 3/17/22 1400)   Tdap (tetanus-diphtheria-acell pertussis) (ADACEL) injection 0.5 mL (0.5 mLs Intramuscular Given 3/17/22 8681)   LORazepam (ATIVAN) tablet 1 mg (1 mg Oral Given 3/17/22 1421)          Assessments & Plan (with Medical Decision Making)  Munir Storey is a 56 year old male who presented to the ED for concerns of an amputation to his left index fingertip, sustained on a table saw.  Denies any other injuries.  Complex  wound noted involving bone, complete nailbed injury.  Reports feeling very anxious so he was given 1 tablet of Ativan here during procedure.  Wound was repaired as detailed above.  Complex injury given fragments of bone and wound that were carefully cleaned out and the bone was trimmed to create a flap to close the wound.  Patient tolerated procedure well.  X-ray did show there was still bone fragment left with intra-articular fracture of residual second distal phalanx.  I had Dr. Sharma review images.  No further intervention warranted at this time.  Patient will get a referral to orthopedics for further management of this injury.  He was prescribed Keflex for infection prophylaxis and tetanus was updated today.  He was prescribed Norco for severe breakthrough pain and advised to use ibuprofen or Tylenol as well.  Finger placed in splint and he was advised to wear this at all times.  We will follow-up with orthopedics, advised suture removal in 7 to 10 days.  He was given instructions on wound cares as well as indications of when to return to the ED.  All questions answered and patient discharged home in suitable condition.     I have reviewed the nursing notes.    I have reviewed the findings, diagnosis, plan and need for follow up with the patient.    Discharge Medication List as of 3/17/2022  3:20 PM      START taking these medications    Details   cephALEXin (KEFLEX) 500 MG capsule Take 1 capsule (500 mg) by mouth 4 times daily for 7 days, Disp-28 capsule, R-0, E-Prescribe      HYDROcodone-acetaminophen (NORCO) 5-325 MG tablet Take 1 tablet by mouth every 6 hours as needed for severe pain, Disp-10 tablet, R-0, Local Print             Final diagnoses:   Partial traumatic amputation of left index finger through phalanx, initial encounter     Note: Chart documentation done in part with Dragon Voice Recognition software. Although reviewed after completion, some word and grammatical errors may remain.     3/17/2022    [facility] EMERGENCY DEPT     Allie Gracia PA-C  03/17/22 6046

## 2022-03-17 NOTE — DISCHARGE INSTRUCTIONS
Please take the antibiotics to prevent infection.  Use Tylenol or ibuprofen as needed for pain control.  Reserve the use of the Norco prescribed for severe breakthrough pain.  Keep the wound covered with a bandage and antibiotic ointment.  Wear the splint to support the broken bone.  A referral was placed to orthopedics for you to follow-up with them for reassessment.  The stitches will need to be removed in about 7 to 10 days.  If you have any worsening symptoms please return to the emergency department.    Thank you for choosing [facility]'s Emergency Department. It was a pleasure taking care of you today. If you have any questions, please call [phone].    Allie Gracia PA-C

## 2022-03-21 ENCOUNTER — NURSE TRIAGE (OUTPATIENT)
Dept: FAMILY MEDICINE | Facility: CLINIC | Age: 57
End: 2022-03-21
Payer: COMMERCIAL

## 2022-03-21 DIAGNOSIS — F41.1 GAD (GENERALIZED ANXIETY DISORDER): ICD-10-CM

## 2022-03-21 NOTE — TELEPHONE ENCOUNTER
Requested Prescriptions   Pending Prescriptions Disp Refills    LORazepam (ATIVAN) 0.5 MG tablet [Pharmacy Med Name: LORAZEPAM 0.5 MG TABLET] 20 tablet 0     Sig: Take 1 tablet (0.5 mg) by mouth every 6 hours as needed for anxiety Use sparingly        There is no refill protocol information for this order         Routing refill request to provider for review/approval because:  Drug not on the [facility] refill protocol

## 2022-03-22 ENCOUNTER — PRE VISIT (OUTPATIENT)
Dept: ORTHOPEDICS | Facility: CLINIC | Age: 57
End: 2022-03-22
Payer: COMMERCIAL

## 2022-03-22 ENCOUNTER — OFFICE VISIT (OUTPATIENT)
Dept: ORTHOPEDICS | Facility: CLINIC | Age: 57
End: 2022-03-22
Payer: COMMERCIAL

## 2022-03-22 ENCOUNTER — OFFICE VISIT (OUTPATIENT)
Dept: FAMILY MEDICINE | Facility: CLINIC | Age: 57
End: 2022-03-22
Payer: COMMERCIAL

## 2022-03-22 VITALS
HEART RATE: 120 BPM | SYSTOLIC BLOOD PRESSURE: 181 MMHG | DIASTOLIC BLOOD PRESSURE: 103 MMHG | WEIGHT: 172 LBS | BODY MASS INDEX: 26.94 KG/M2

## 2022-03-22 VITALS
RESPIRATION RATE: 20 BRPM | DIASTOLIC BLOOD PRESSURE: 90 MMHG | OXYGEN SATURATION: 99 % | WEIGHT: 170.4 LBS | HEIGHT: 67 IN | TEMPERATURE: 97.3 F | SYSTOLIC BLOOD PRESSURE: 160 MMHG | BODY MASS INDEX: 26.74 KG/M2 | HEART RATE: 83 BPM

## 2022-03-22 DIAGNOSIS — F41.1 GAD (GENERALIZED ANXIETY DISORDER): ICD-10-CM

## 2022-03-22 DIAGNOSIS — S68.621A PARTIAL TRAUMATIC AMPUTATION OF LEFT INDEX FINGER THROUGH PHALANX, INITIAL ENCOUNTER: ICD-10-CM

## 2022-03-22 DIAGNOSIS — R35.0 URINARY FREQUENCY: Primary | ICD-10-CM

## 2022-03-22 DIAGNOSIS — S62.639B OPEN FRACTURE OF TUFT OF DISTAL PHALANX OF FINGER: Primary | ICD-10-CM

## 2022-03-22 DIAGNOSIS — I10 HYPERTENSION, GOAL BELOW 140/90: ICD-10-CM

## 2022-03-22 LAB
ALBUMIN UR-MCNC: NEGATIVE MG/DL
APPEARANCE UR: CLEAR
BACTERIA #/AREA URNS HPF: ABNORMAL /HPF
BILIRUB UR QL STRIP: NEGATIVE
COLOR UR AUTO: YELLOW
GLUCOSE UR STRIP-MCNC: NEGATIVE MG/DL
HGB UR QL STRIP: NEGATIVE
KETONES UR STRIP-MCNC: 15 MG/DL
LEUKOCYTE ESTERASE UR QL STRIP: NEGATIVE
NITRATE UR QL: NEGATIVE
PH UR STRIP: 7 [PH] (ref 5–7)
RBC #/AREA URNS AUTO: ABNORMAL /HPF
SP GR UR STRIP: 1.01 (ref 1–1.03)
SQUAMOUS #/AREA URNS AUTO: ABNORMAL /LPF
UROBILINOGEN UR STRIP-ACNC: 0.2 E.U./DL
WBC #/AREA URNS AUTO: ABNORMAL /HPF

## 2022-03-22 PROCEDURE — 99214 OFFICE O/P EST MOD 30 MIN: CPT | Performed by: NURSE PRACTITIONER

## 2022-03-22 PROCEDURE — 81001 URINALYSIS AUTO W/SCOPE: CPT | Performed by: NURSE PRACTITIONER

## 2022-03-22 PROCEDURE — 99204 OFFICE O/P NEW MOD 45 MIN: CPT | Performed by: PHYSICIAN ASSISTANT

## 2022-03-22 RX ORDER — LORAZEPAM 0.5 MG/1
0.5 TABLET ORAL 2 TIMES DAILY PRN
Qty: 60 TABLET | Refills: 2 | Status: SHIPPED | OUTPATIENT
Start: 2022-03-22 | End: 2022-03-28

## 2022-03-22 RX ORDER — METOPROLOL SUCCINATE 25 MG/1
25 TABLET, EXTENDED RELEASE ORAL DAILY
Qty: 90 TABLET | Refills: 1 | Status: SHIPPED | OUTPATIENT
Start: 2022-03-22 | End: 2022-09-25

## 2022-03-22 RX ORDER — LORAZEPAM 0.5 MG/1
0.5 TABLET ORAL EVERY 6 HOURS PRN
Qty: 10 TABLET | Refills: 0 | Status: SHIPPED | OUTPATIENT
Start: 2022-03-22 | End: 2022-03-22

## 2022-03-22 ASSESSMENT — PAIN SCALES - GENERAL: PAINLEVEL: NO PAIN (0)

## 2022-03-22 ASSESSMENT — PATIENT HEALTH QUESTIONNAIRE - PHQ9
10. IF YOU CHECKED OFF ANY PROBLEMS, HOW DIFFICULT HAVE THESE PROBLEMS MADE IT FOR YOU TO DO YOUR WORK, TAKE CARE OF THINGS AT HOME, OR GET ALONG WITH OTHER PEOPLE: NOT DIFFICULT AT ALL
SUM OF ALL RESPONSES TO PHQ QUESTIONS 1-9: 6
SUM OF ALL RESPONSES TO PHQ QUESTIONS 1-9: 6

## 2022-03-22 NOTE — TELEPHONE ENCOUNTER
Called [phone] (home)     Did patient answer the phone: No, left a message on voicemail to return call to the [facility] at [phone].    Ragini RN,BSN  Triage Nurse  [facility]: [facility]

## 2022-03-22 NOTE — LETTER
March 23, 2022      Munir Storey  [address] [place] [address]        Dear ,    We are writing to inform you of your test results.    Thank you for your recent office visit.     Here are your recent results. Urinalysis negative for infection.      Resulted Orders   UA with Microscopic reflex to Culture - lab collect   Result Value Ref Range    Color Urine Yellow Colorless, Straw, Light Yellow, Yellow    Appearance Urine Clear Clear    Glucose Urine Negative Negative mg/dL    Bilirubin Urine Negative Negative    Ketones Urine 15  (A) Negative mg/dL    Specific Gravity Urine 1.010 1.003 - 1.035    Blood Urine Negative Negative    pH Urine 7.0 5.0 - 7.0    Protein Albumin Urine Negative Negative mg/dL    Urobilinogen Urine 0.2 0.2, 1.0 E.U./dL    Nitrite Urine Negative Negative    Leukocyte Esterase Urine Negative Negative   Urine Microscopic   Result Value Ref Range    Bacteria Urine None Seen None Seen /HPF    RBC Urine 0-2 0-2 /HPF /HPF    WBC Urine 0-5 0-5 /HPF /HPF    Squamous Epithelials Urine Few (A) None Seen /LPF    Narrative    Urine Culture not indicated       If you have any questions or concerns, please call the clinic at the number listed above.       Sincerely,      CORAZON Christine, FNP-BC/hlo

## 2022-03-22 NOTE — PATIENT INSTRUCTIONS
I am guessing that you are having urinary frequency due to them putting you on hydrochlorothiazide.  Lets stop that medication and start you on toprol XL. Check your blood pressure once weekly or if symptotic dizzy, headache, etc. If BP is not < 140/80 at rest, we can increase the Toprol.  Let me know so I can send in refills if needed. If anxiety is not improving, let us know. Follow up as needed.         Patient Education     Checking Your Own Blood Pressure    Blood pressure checks are a common reason for visits to your healthcare provider. Yet, for less than the cost of a single appointment, you may be able to purchase your own blood pressure monitor. This way you can check the reading yourself at home.  Blood pressure is the force of blood against the walls of your arteries. Blood pressure readings tend to vary, depending on many factors, including stress levels and time of day. Your blood pressure reading in a healthcare provider's office can be as much as 20 or 30 points higher. The nervousness of being there can be enough to increase blood pressure.  Home blood pressure kits  You may buy blood pressure monitors at pharmacies, medical supply stores, and discount chain stores. An electronic digital monitor that is battery operated is often easier to use than the more traditional blood pressure cuff. Electronic monitors usually cost more.  It s important to check the accuracy of either type of monitor every so often. One way to make sure your monitor is accurate is to take it with you to your next healthcare provider appointment. Take your blood pressure with your monitor and compare it with the reading from your provider's monitor.  Ask your healthcare provider or pharmacist to recommend a monitor for you. Keep in mind that if you have a large upper arm, you'll need a special, large cuff to get a proper reading.  Read the instructions  Each type of blood pressure monitor works differently. Be sure to read the  instructions that come with yours. Ask your healthcare provider, nurse, or pharmacist to teach you how to use it. Many people can check their own blood pressure at home without difficulty. Some need help from a family member or friend.  Your home blood pressure reading is more likely to be accurate if you do the following:    Don't take readings within a half-hour after smoking, exercising, or drinking beverages with caffeine.    Take 2 or 3 readings at least 1 minute apart, and average the results.    Take readings at different times during the day, or on several days at different times.    Before you take your blood pressure, sit for 5 minutes with your back supported and your feet flat on the ground. Rest your arm on a table at the level of your heart.    Use the bathroom before taking your reading. A full bladder can change the results.  Blood pressure measurements are given as 2 numbers. Systolic blood pressure is the upper number. This is the pressure when the heart contracts. Diastolic blood pressure is the lower number. This is the pressure when the heart relaxes between beats. Both numbers in a blood pressure reading are important. As we grow older, systolic blood pressure is particularly important.  Blood pressure is categorized as normal, elevated, or stage 1 or stage 2 high blood pressure:    Normal blood pressure is systolic of less than 120 and diastolic of less than 80 (120/80)    Elevated blood pressure is systolic of 120 to 129 and diastolic less than 80    Stage 1 high blood pressure is systolic is 130 to 139 or diastolic between 80 to 89    Stage 2 high blood pressure is when systolic is 140 or higher or the diastolic is 90 or higher  Get immediate medical care if your blood pressure is much higher or lower than expected. Whenever you visit your healthcare provider, take your blood pressure record with you.  As a monitor ages, it may become less accurate. If the equipment or monitor you have is  older than 5 years, you may need a new monitor. Take your monitor with you to your healthcare appointments and check the accuracy of the monitor against the reading the providers are getting.    9715-2328 [employer]. All rights reserved. This information is not intended as a substitute for professional medical care. Always follow your healthcare professional's instructions.           Patient Education     Low-Salt Choices  Eating salt (sodium) can make your body retain too much water. Extra water makes your heart work harder. Canned, packaged, and frozen foods are easy to prepare. But they are often high in sodium. Here are some ideas for low-salt foods you can easily make yourself.   For breakfast    Fruit or 100% fruit juice. It's better to have whole fruit instead of 100% fruit juice.    Whole-wheat bread or an English muffin. Look for sodium content on Nutrition Facts labels.    Low-fat milk or yogurt    Unsalted eggs    Shredded wheat    Corn tortillas    Unsalted steamed rice    Regular (not instant) hot cereal, made without salt    Stay away from    Sausage, carnye, and ham    Flour tortillas    Packaged muffins, pancakes, and biscuits    Instant hot cereals    Cottage cheese    For lunch and dinner    Fresh fish, chicken, turkey, or meat--baked, broiled, or roasted without salt    Dry beans, cooked without salt    Tofu, stir-fried without salt    Unsalted fresh fruit and vegetables, or frozen or canned fruit and vegetables with no added salt  Stay away from    Lunch or deli meat that is cured or smoked    Cheese    Tomato juice and ketchup    Canned vegetables, soups, and fish not labeled as no-salt-added or reduced sodium    Packaged gravies and sauces    Olives, pickles, and relish    Bottled salad dressings    For snacks and desserts    Yogurt    Unsalted, air-popped popcorn    Unsalted nuts or seeds  Stay away from    Pies and cakes    Packaged dessert mixes    Pizza    Canned and packaged  "puddings    Pretzels, chips, crackers, and nuts--unless the label says unsalted    [employer] last reviewed this educational content on 11/1/2019 2000-2021 [employer]. All rights reserved. This information is not intended as a substitute for professional medical care. Always follow your healthcare professional's instructions.           Patient Education       Stress Relief: Relaxation    Focusing the mind helps provide stress relief. Taking 5 to 10 minutes to practice relaxation each day helps you feel more refreshed. You can do these exercises almost anywhere. Try one or more until you find what works best for you.  Calm your mind  Find a quiet place where you won't be disturbed. Then try the following:    Sit comfortably. Take off your shoes. Turn off your cell phone. Take a few deep breaths.    Focus your mind on one peaceful thought, image, or word. Then try to hold that thought for 5 minutes.    When other thoughts enter your mind, relax and refocus. Let the invading thoughts fall away.    When you're done, stand up slowly and stretch your arms over your head. With practice, this exercise can help you feel restored.  Calm your body  With practice, you can use mental cues to tell your body how to feel.    Sit comfortably and clear your mind. A few deep breaths will help.    Mentally focus on your left hand and repeat to yourself, \"My left hand feels warm and heavy.\" Keep doing this until your hand does feel heavier and warmer.    Repeat the exercise using your right hand. Then focus on your arms, legs, and feet until your whole body feels relaxed.    When you're done, stand up slowly and stretch your arms overhead.  Visualization  Visualization is like taking a mental vacation. It frees your mind while keeping your body in a calm state. To get started, picture yourself feeling warm and relaxed. Choose a peaceful setting that appeals to you and fill in the details. If you imagine a tropical beach, " listen to the waves on the shore. Feel the sun on your face. Dig your toes in the sand. By using the power of your mind, you can take a soothing break when you need to.  Other relaxing activities include yoga and yakelin chi. You can learn about these and other healthy and relaxing activities on the National Center for Complementary and Integrative Health (NCCIH) website at www.[facility] Services[facility].nih.gov/health/stress.   Wendy last reviewed this educational content on 12/1/2019 2000-2021 [employer]. All rights reserved. This information is not intended as a substitute for professional medical care. Always follow your healthcare professional's instructions.           Patient Education       Stress Relief: Changing Your Response  You are the only person responsible for your thoughts and actions. This simple idea is your most powerful tool for managing stress. Start by having realistic expectations. Then learn to recognize what you can--and can't--control. Finally, think about ways to change your response. With practice, you can learn to let go of stressful ways of thinking.  Have realistic expectations    When it comes to events that cause you stress, ask yourself:    What are my expectations?    How likely is it that my expectations, good or bad, will be met? Are they realistic?    If they aren't met, do I have to respond by feeling badly? How can I work with other outcomes?  Understand what you can do  To get better at managing stress, try these tips:    Put the stressor in perspective. Will being late to work really get you fired?    Be flexible and look for answers. If you re stuck in traffic and your car is stopped or you are not the , try calling to let people know you re on the way.    Plan ahead for next time. If being late is a worry, plan to leave a few minutes earlier.    Get some physical activity. Exercise boosts stress-relieving hormones and improves mood.    Get enough sleep. Poor or little  sleep can add to your feelings of stress and anxiety.    Don't turn to alcohol or drugs. Drinking and drug use make stress and anxiety worse. If you use drugs or alcohol to reduce stress, see your healthcare provider for help.  Make mountains into molehills  A common cause of stress is feeling as if you have to solve all your problems at once. To shake this feeling, learn to take things one step at a time. Try to break big problems into smaller tasks that you can handle. That way, worries that seem like mountains become little hills you can climb over. Remember, taking small steps will carry you forward. Get professional help if you find you can t manage your stress or your reactions are becoming more frequent or violent. Also get help if you have stress-related symptoms such as insomnia or nausea.  [employer]ity last reviewed this educational content on 9/1/2019 2000-2021 [employer]. All rights reserved. This information is not intended as a substitute for professional medical care. Always follow your healthcare professional's instructions.           Patient Education   Please make an appointment to follow up with your therapist and/or Psychiatric Clinic (phone: [phone]) within 1-3 days.     Return to the ED if you are having worsening thoughts/feelings of harming yourself or others, or any urgent/life-threatening concerns.     DISCHARGE RESOURCES:    [facility] [phone]     [facility] [phone] (detox), [phone] (outpatient & [facility])      Crisis Intervention: 943.492.2234 or 145-420-4732 (TTY: 411.726.8121).  Call anytime.    Suicide Awareness Voices of Education (SAVE) (www.save.org): 418-790-EBPH (8253)    National Suicide Prevention Line (www.mentalhealthmn.org): 322-755-CONA (7348)    National [place] on [place] Illness (www.[place].maritza.org): 242.693.4499 or 743-865-2650.    Prqg1wepr: text the word LIFE to 76950 for  immediate support and crisis intervention    Mental Health Consumer/Survivor Network of [place] (www.mhcsn.net): 415-237-5923 or 462-660-0001    Mental Health Association of [place] (www.mentalhealth.org): 815.929.8108 or 776-646-2554

## 2022-03-22 NOTE — LETTER
3/22/2022         RE: Munir Storey  [address] [place] [address]        Dear Colleague,    Thank you for referring your patient, Munir Storey, to the [facility]. Please see a copy of my visit note below.    ORTHOPEDIC CONSULT      Chief Complaint: Munir Storey is a 56 year old right hand dominant male who works as a cooper for 40 years.  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    Left index finger partial amputation    I read and reviewed the emergency department note from 3/17/2022 of RACHANA Gracia.    History of Present Illness:   Mechanism of Injury: Table saw  Location: Left index finger distal phalanx  Duration of Pain: Since date of injury, 3/17/2022  Rating of Pain: Mild to moderate  Pain Quality: Achy  Pain is better with: Rest and elevation  Pain is worse with: Arm dependent and not elevated  Treatment so far consists of: Emergency department washed and sutured the wound and bandaged it.  He has been doing daily dressing changes.   Associated Features: Patient has intermittent numbness and tingling.  Overall the sensation is intact.  Patient denies any fevers chills nausea vomiting night sweats or drainage from the wound.  Prior history of related problems: No  Pain is Limiting: Partial use of the left upper extremity  Here to: Orthopedic consultation  The Pain Has: Gotten slightly better  Additional History: Patient has been having a lot of anxiety.  Patient states he has been dealing with anxiety for years.  This recent amputation has made it much worse.  He has been in contact with his primary care provider RACHANA Martínez and she is aware that his anxiety has been elevated.  I educated him to focus on going through her to help with his anxiety.Dr. Altman      Patient's past medical, surgical, social and family histories reviewed.     Past Medical History:   Diagnosis Date     Alcohol withdrawal (H) 04/28/2015     Atrial  flutter (H)      Depressive disorder      Ejection fraction < 50% 04/2015    Ejection fraction 45% per echo April 2015 (per [facility] records), possible alcohol or tachycardia induced cardiomyopathy. EF normalized on follow-up echo 2016     SANDRA (generalized anxiety disorder)      H/O atrioventricular quita ablation 12/2015     Hypertension, goal below 140/90      Tobacco abuse         Past Surgical History:   Procedure Laterality Date     CARDIAC SURGERY  07/06/2015    EP cardioversion     COLONOSCOPY  08/01/2013     COLONOSCOPY  4/6/2019    normal colonoscopy - repeat 10 years     HERNIA REPAIR       LAPAROSCOPIC RESECTION SMALL BOWEL  08/08/2013     SPINE SURGERY      cervical fusion      UPPER GI ENDOSCOPY  08/01/2013       Medications:  amLODIPine (NORVASC) 10 MG tablet, Take 1 tablet (10 mg) by mouth daily  aspirin (ASA) 81 MG EC tablet, Take 1 tablet (81 mg) by mouth daily (Patient not taking: Reported on 2/10/2022)  atorvastatin (LIPITOR) 40 MG tablet, Take 1 tablet (40 mg) by mouth daily  buPROPion (WELLBUTRIN XL) 300 MG 24 hr tablet, Take 1 tablet (300 mg) by mouth every morning  cephALEXin (KEFLEX) 500 MG capsule, Take 1 capsule (500 mg) by mouth 4 times daily for 7 days  hydrochlorothiazide (HYDRODIURIL) 12.5 MG tablet, Take 1 tablet (12.5 mg) by mouth daily For blood pressure.  HYDROcodone-acetaminophen (NORCO) 5-325 MG tablet, Take 1 tablet by mouth every 6 hours as needed for severe pain  hydrOXYzine (ATARAX) 25 MG tablet, Take 1 tablet (25 mg) by mouth 3 times daily as needed for anxiety  lisinopril (ZESTRIL) 40 MG tablet, Take 1 tablet (40 mg) by mouth daily  LORazepam (ATIVAN) 0.5 MG tablet, TAKE 1 TABLET (0.5 MG) BY MOUTH EVERY 6 HOURS AS NEEDED FOR ANXIETY USE SPARINGLY  Misc Natural Products (T-RELIEF CBD+13 SL), Place 600 Units under the tongue Hemp oil sublingual  pediatric electrolyte (PEDIALYTE) SOLN solution, Take by mouth daily as needed  VITAMIN D PO, Take by mouth daily    No current  facility-administered medications on file prior to visit.      Allergies   Allergen Reactions     Sulfamethoxazole-Trimethoprim Nausea       Social History     Occupational History     Not on file   Tobacco Use     Smoking status: Former Smoker     Packs/day: 0.50     Years: 20.00     Pack years: 10.00     Types: Cigarettes, Dip, chew, snus or snuff     Quit date: 2019     Years since quittin.5     Smokeless tobacco: Current User     Types: Chew   Substance and Sexual Activity     Alcohol use: No     Comment: hx alcohol abuse, sober since      Drug use: No     Sexual activity: Yes     Partners: Female       Family History   Problem Relation Age of Onset     Diabetes Father      Depression Father      Depression Paternal Grandfather        REVIEW OF SYSTEMS  10 point review systems performed otherwise negative as noted as per history of present illness.    Physical Exam:  Vitals: BP (!) 181/103   Pulse 120   Wt 78 kg (172 lb)   BMI 26.94 kg/m   Pulse also taken at 74 also 76.  Pulse also noted to be irregular at times today as well as regular.  BMI= Body mass index is 26.94 kg/m .    Constitutional: healthy, alert and no acute distress   Psychiatric: mentation appears normal and affect normal/bright  NEURO: no focal deficits, CMS intact left upper extremity  RESP: Normal with easy respirations and no use of accessory muscles to breathe, no audible wheezing or retractions  CV: +2 radial pulse, but irregular and regular during visit, and his hand is warm to palpation. His left digit is warm to the touch in all areas but not warmer than other digits   SKIN: left index finger with granulation and sutures noted.  No drainage from the wound.  Wound is dry and clean.  Very slight amount of erythema around the distal end of the wound..  Slight swelling when compared to the other digits.  Rest of the left hand and fingers with no erythema, rashes, excoriation, or breakdown. No evidence of infection.    MUSCULOSKELETAL:    INSPECTION of left index finger: Wound as described above.  No other gross deformities, erythema, edema, ecchymosis, atrophy or fasciculations.     PALPATION: Slight tenderness to palpation on the very distal portion of the wound, otherwise the proximal area of the wound is nontender to palpation today.  No increased warmth noted when compared to the other digits.  No tenderness to palpation of the PIP or MCP joint or any of the other digits hand wrist or forearm.    ROM: Passive: MCP 0-90, PIP 0-45.  All range of motion without catching locking or pain.  Moving all other digits without any catching locking or pain.     STRENGTH: Able to extend and flex against gravity.  Did not check  strength today.    SPECIAL TEST: None  GAIT: non-antalgic  Lymph: no palpable lymph nodes    Diagnostic Modalities:  Fingers XR, 2-3 views, left    Narrative    FINGER(S) TWO-THREE VIEWS LEFT  3/17/2022 2:58 PM     HISTORY: trauma, pain  COMPARISON: None.      Impression    IMPRESSION: Status post partial amputation of the second digit at the  level of the distal phalangeal diaphysis. Intra-articular fracture of  the residual second distal phalanx with 3 mm distraction. Surrounding  soft tissue swelling. There are 2 tiny metallic foreign bodies in the  soft tissue medial to the second proximal phalangeal diaphysis. Mild  degenerative changes.    PHILLIP REY MD         SYSTEM ID:  [id number]     I agree with the above reading.    Independent visualization of the images was performed.    Impression: 1.  5 day status post left index finger open tuft fracture and partial amputation    Plan:  All of the above pertinent physical exam and imaging modalities findings were reviewed with Munir .    FOCUSED PLAN:  Patient is to continue daily dressing changes.  Xeroform, gauze and Coban or tape.  Patient to continue Keflex antibiotic until gone.  Patient educated to elevate above heart level as much as possible.   As for anxiety patient to discuss with his primary care team.  Also discussed high blood pressure and irregular heart rate.  Heart rate was fast and normal and also irregular at different times during our visit.  Patient is aware he should follow-up with primary care on this issue.  Follow-up next week for wound check.  Most likely suture removal at a time.    Re-x-ray on return: No      This note was dictated with Amitree.    Ralph Nelson PA-C      Scribed by:  Ralph Nelson PA-C   1:27 PM  3/22/2022  The information in this document, created by a scribe for me, accurately reflects the services I personally performed and the decisions made by me. I have reviewed and approved this document for accuracy    Eleonora Cabral MD          Again, thank you for allowing me to participate in the care of your patient.        Sincerely,        Roberto Cabral MD

## 2022-03-22 NOTE — TELEPHONE ENCOUNTER
"  Reason for Disposition    Patient wants to be seen    Systolic BP >= 160 OR Diastolic >= 100    Additional Information    Negative: Sounds like a life-threatening emergency to the triager    Negative: Systolic BP >= 160 OR Diastolic >= 100, and any cardiac or neurologic symptoms (e.g., chest pain, difficulty breathing, unsteady gait, blurred vision)    Negative: Patient sounds very sick or weak to the triager    Negative: Systolic BP >= 180 OR Diastolic >= 110, and missed most recent dose of blood pressure medication    Negative: Ran out of BP medications    Negative: Taking BP medications and feels is having side effects (e.g., impotence, cough, dizziness)    Negative: Systolic BP >= 180 OR Diastolic >= 110    Answer Assessment - Initial Assessment Questions  1. BLOOD PRESSURE: \"What is the blood pressure?\" \"Did you take at least two measurements 5 minutes apart?\"      It was 181/103 at ortho appointment today  2. ONSET: \"When did you take your blood pressure?\"      At 1:00  3. HOW: \"How did you obtain the blood pressure?\" (e.g., visiting nurse, automatic home BP monitor)      Nurse at appointment  4. HISTORY: \"Do you have a history of high blood pressure?\"      yes  5. MEDICATIONS: \"Are you taking any medications for blood pressure?\" \"Have you missed any doses recently?\"      I am on a couple, and have not missed a dose  6. OTHER SYMPTOMS: \"Do you have any symptoms?\" (e.g., headache, chest pain, blurred vision, difficulty breathing, weakness)      Denies any of the above symptoms    Protocols used: HIGH BLOOD PRESSURE-A-OH    "

## 2022-03-22 NOTE — TELEPHONE ENCOUNTER
Nurse Triage SBAR    Is this a 2nd Level Triage? NO    Situation:  Patient was seen today by ortho, and BP was elevated at 181/103.  He also stated that he has had more anxiety lately due to finger surgery, and step son was just dx with a brain tumor    Background:  Patient has high BP, and anxiety    Assessment: Patient needs to be seen today per protocol.  Patient denies any symptoms. Just having some increased anxiety.  Denies feeling out of control, or any other concerns.    Protocol Recommended Disposition: To be seen in the office today  See Within 3 Days In Office , See Today In Office    Recommendation: Appointment made for 2:40 today with CORAZON Christine, FNP-BC.    ADVISED: Go to ER/call 911 if: (patient denies at this time, any of the symptoms below.    * Headache, blurred vision, difficulty talking, or difficulty walking occurs  * Chest pain or difficulty breathing occurs  * Dizziness, lightheadedness. Heart palpitations, feeling faint, numbness and/or tingling in extremities on one side.          scheduled OV per protocol    Does the patient meet one of the following criteria for ADS visit consideration? No       Patient stated understanding and agreeable with the plan of care.     Ragini RN,BSN  Triage Nurse  [facility] [place]: [facility]

## 2022-03-22 NOTE — TELEPHONE ENCOUNTER
Notified patient of the message below per PCP.    Patient stated understanding and agreeable with the plan of care.     Ragini RN,BSN  Triage Nurse  [facility]: [facility]

## 2022-03-22 NOTE — RESULT ENCOUNTER NOTE
Please send letter    Jimmy Amaral,    Thank you for your recent office visit.    Here are your recent results.  Urinalysis negative for infection.     Feel free to contact me via [employer] or call the clinic at [phone].    Sincerely,    CORAZON Christine, FNP-BC

## 2022-03-22 NOTE — PROGRESS NOTES
Assessment & Plan     SANDRA (generalized anxiety disorder)    - LORazepam (ATIVAN) 0.5 MG tablet; Take 1 tablet (0.5 mg) by mouth 2 times daily as needed for anxiety Use sparingly  - Adult Mental Health  Referral; Future    Hypertension, goal below 140/90    - metoprolol succinate ER (TOPROL-XL) 25 MG 24 hr tablet; Take 1 tablet (25 mg) by mouth daily Monitor your blood pressure once weekly or if symptomatic. This medication can be increased if needed- let me know (<140/80)    Urinary frequency    - UA with Microscopic reflex to Culture - lab collect; Future  - UA with Microscopic reflex to Culture - lab collect  - Urine Microscopic    30 minutes spent on the date of the encounter doing chart review, history and exam, documentation and further activities per the note     See Patient Instructions: I am guessing that you are having urinary frequency due to them putting you on hydrochlorothiazide.  Lets stop that medication and start you on toprol XL. Check your blood pressure once weekly or if symptotic dizzy, headache, etc. If BP is not < 140/80 at rest, we can increase the Toprol.  Let me know so I can send in refills if needed. If anxiety is not improving, let us know. Follow up as needed.     Return in about 3 months (around 6/22/2022), or if symptoms worsen or fail to improve.    Roopa Frazier, JAY  [facility] TESSA Amaral is a 56 year old who presents for the following health issues     He reports increased anxiety, panic attacks at times. He has been trying to get in with a counselor but the wait is into summer. Has tried many antidepressants but he does not tolerate them. He does not think wellbutrin is helping but it is the only one he has tolerated. Step son has tumor in his head and needs surgery. He has a BP cuff at home but hasn't been checking his blood pressure. He denies CP, SOB, dizziness, headaches. He was being seen earlier -? Dentist and was told his BP was  "too high and his pulse was irregular.  Pulse is normal now. Pt reports urinary frequency.  Chart review shows he as put on hydrochlorothiazide in February. Discussed risks of uncontrolled HTN- cardiomyopathy, MI, stroke, death.     History of Present Illness       Mental Health Follow-up:                    Today's PHQ-9         PHQ-9 Total Score: 6  PHQ-9 Q9 Thoughts of better off dead/self-harm past 2 weeks :   (P) Not at all    How difficult have these problems made it for you to do your work, take care of things at home, or get along with other people: Not difficult at all        Hypertension: He presents for follow up of hypertension.  He does not check blood pressure  regularly outside of the clinic. Outpatient blood pressures have not been over 140/90. He follows a low salt diet.     He eats 2-3 servings of fruits and vegetables daily.He consumes 0 sweetened beverage(s) daily.He exercises with enough effort to increase his heart rate 9 or less minutes per day.  He exercises with enough effort to increase his heart rate 3 or less days per week. He is missing 1 dose(s) of medications per week.         Review of Systems   Constitutional, HEENT, cardiovascular, pulmonary, GI, , musculoskeletal, neuro, skin, endocrine and psych systems are negative, except as otherwise noted.      Objective    BP (!) 160/90   Pulse 83   Temp 97.3  F (36.3  C) (Tympanic)   Resp 20   Ht 1.702 m (5' 7\")   Wt 77.3 kg (170 lb 6.4 oz)   SpO2 99%   BMI 26.69 kg/m    Body mass index is 26.69 kg/m .  Physical Exam   GENERAL: Healthy, alert and no distress  EYES: Eyes grossly normal to inspection.  No discharge or erythema, or obvious scleral/conjunctival abnormalities.  RESP: lungs clear to auscultation - no rales, rhonchi or wheezes  CV: regular rate and rhythm, normal S1 S2, no S3 or S4, no murmur, click or rub, no peripheral edema and peripheral pulses strong  SKIN: Visible skin clear. No significant rash, abnormal pigmentation " or lesions.  NEURO: Cranial nerves grossly intact.  Mentation and speech appropriate for age.  PSYCH: Mentation appears normal, affect normal/bright, judgement and insight intact, normal speech and appearance well-groomed.    See orders

## 2022-03-22 NOTE — PROGRESS NOTES
ORTHOPEDIC CONSULT      Chief Complaint: Munir Storey is a 56 year old right hand dominant male who works as a cooper for 40 years.  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    Left index finger partial amputation    I read and reviewed the emergency department note from 3/17/2022 of RACHANA Gracia.    History of Present Illness:   Mechanism of Injury: Table saw  Location: Left index finger distal phalanx  Duration of Pain: Since date of injury, 3/17/2022  Rating of Pain: Mild to moderate  Pain Quality: Achy  Pain is better with: Rest and elevation  Pain is worse with: Arm dependent and not elevated  Treatment so far consists of: Emergency department washed and sutured the wound and bandaged it.  He has been doing daily dressing changes.   Associated Features: Patient has intermittent numbness and tingling.  Overall the sensation is intact.  Patient denies any fevers chills nausea vomiting night sweats or drainage from the wound.  Prior history of related problems: No  Pain is Limiting: Partial use of the left upper extremity  Here to: Orthopedic consultation  The Pain Has: Gotten slightly better  Additional History: Patient has been having a lot of anxiety.  Patient states he has been dealing with anxiety for years.  This recent amputation has made it much worse.  He has been in contact with his primary care provider RACHANA Martínez and she is aware that his anxiety has been elevated.  I educated him to focus on going through her to help with his anxiety.,  Dr. Altman      Patient's past medical, surgical, social and family histories reviewed.     Past Medical History:   Diagnosis Date     Alcohol withdrawal (H) 04/28/2015     Atrial flutter (H)      Depressive disorder      Ejection fraction < 50% 04/2015    Ejection fraction 45% per echo April 2015 (per [facility] records), possible alcohol or tachycardia induced cardiomyopathy. EF normalized on follow-up echo 2016     SANDRA  (generalized anxiety disorder)      H/O atrioventricular quita ablation 12/2015     Hypertension, goal below 140/90      Tobacco abuse         Past Surgical History:   Procedure Laterality Date     CARDIAC SURGERY  07/06/2015    EP cardioversion     COLONOSCOPY  08/01/2013     COLONOSCOPY  4/6/2019    normal colonoscopy - repeat 10 years     HERNIA REPAIR       LAPAROSCOPIC RESECTION SMALL BOWEL  08/08/2013     SPINE SURGERY      cervical fusion      UPPER GI ENDOSCOPY  08/01/2013       Medications:  amLODIPine (NORVASC) 10 MG tablet, Take 1 tablet (10 mg) by mouth daily  aspirin (ASA) 81 MG EC tablet, Take 1 tablet (81 mg) by mouth daily (Patient not taking: Reported on 2/10/2022)  atorvastatin (LIPITOR) 40 MG tablet, Take 1 tablet (40 mg) by mouth daily  buPROPion (WELLBUTRIN XL) 300 MG 24 hr tablet, Take 1 tablet (300 mg) by mouth every morning  cephALEXin (KEFLEX) 500 MG capsule, Take 1 capsule (500 mg) by mouth 4 times daily for 7 days  hydrochlorothiazide (HYDRODIURIL) 12.5 MG tablet, Take 1 tablet (12.5 mg) by mouth daily For blood pressure.  HYDROcodone-acetaminophen (NORCO) 5-325 MG tablet, Take 1 tablet by mouth every 6 hours as needed for severe pain  hydrOXYzine (ATARAX) 25 MG tablet, Take 1 tablet (25 mg) by mouth 3 times daily as needed for anxiety  lisinopril (ZESTRIL) 40 MG tablet, Take 1 tablet (40 mg) by mouth daily  LORazepam (ATIVAN) 0.5 MG tablet, TAKE 1 TABLET (0.5 MG) BY MOUTH EVERY 6 HOURS AS NEEDED FOR ANXIETY USE SPARINGLY  Misc Natural Products (T-RELIEF CBD+13 SL), Place 600 Units under the tongue Hemp oil sublingual  pediatric electrolyte (PEDIALYTE) SOLN solution, Take by mouth daily as needed  VITAMIN D PO, Take by mouth daily    No current facility-administered medications on file prior to visit.      Allergies   Allergen Reactions     Sulfamethoxazole-Trimethoprim Nausea       Social History     Occupational History     Not on file   Tobacco Use     Smoking status: Former Smoker      Packs/day: 0.50     Years: 20.00     Pack years: 10.00     Types: Cigarettes, Dip, chew, snus or snuff     Quit date: 2019     Years since quittin.5     Smokeless tobacco: Current User     Types: Chew   Substance and Sexual Activity     Alcohol use: No     Comment: jose alcohol abuse, sober since      Drug use: No     Sexual activity: Yes     Partners: Female       Family History   Problem Relation Age of Onset     Diabetes Father      Depression Father      Depression Paternal Grandfather        REVIEW OF SYSTEMS  10 point review systems performed otherwise negative as noted as per history of present illness.    Physical Exam:  Vitals: BP (!) 181/103   Pulse 120   Wt 78 kg (172 lb)   BMI 26.94 kg/m   Pulse also taken at 74 also 76.  Pulse also noted to be irregular at times today as well as regular.  BMI= Body mass index is 26.94 kg/m .    Constitutional: healthy, alert and no acute distress   Psychiatric: mentation appears normal and affect normal/bright  NEURO: no focal deficits, CMS intact left upper extremity  RESP: Normal with easy respirations and no use of accessory muscles to breathe, no audible wheezing or retractions  CV: +2 radial pulse, but irregular and regular during visit, and his hand is warm to palpation. His left digit is warm to the touch in all areas but not warmer than other digits   SKIN: left index finger with granulation and sutures noted.  No drainage from the wound.  Wound is dry and clean.  Very slight amount of erythema around the distal end of the wound..  Slight swelling when compared to the other digits.  Rest of the left hand and fingers with no erythema, rashes, excoriation, or breakdown. No evidence of infection.   MUSCULOSKELETAL:    INSPECTION of left index finger: Wound as described above.  No other gross deformities, erythema, edema, ecchymosis, atrophy or fasciculations.     PALPATION: Slight tenderness to palpation on the very distal portion of the wound,  otherwise the proximal area of the wound is nontender to palpation today.  No increased warmth noted when compared to the other digits.  No tenderness to palpation of the PIP or MCP joint or any of the other digits hand wrist or forearm.    ROM: Passive: MCP 0-90, PIP 0-45.  All range of motion without catching locking or pain.  Moving all other digits without any catching locking or pain.     STRENGTH: Able to extend and flex against gravity.  Did not check  strength today.    SPECIAL TEST: None  GAIT: non-antalgic  Lymph: no palpable lymph nodes    Diagnostic Modalities:  Fingers XR, 2-3 views, left    Narrative    FINGER(S) TWO-THREE VIEWS LEFT  3/17/2022 2:58 PM     HISTORY: trauma, pain  COMPARISON: None.      Impression    IMPRESSION: Status post partial amputation of the second digit at the  level of the distal phalangeal diaphysis. Intra-articular fracture of  the residual second distal phalanx with 3 mm distraction. Surrounding  soft tissue swelling. There are 2 tiny metallic foreign bodies in the  soft tissue medial to the second proximal phalangeal diaphysis. Mild  degenerative changes.    PHILLIP REY MD         SYSTEM ID:  [id number]     I agree with the above reading.    Independent visualization of the images was performed.    Impression: 1.  5 day status post left index finger open tuft fracture and partial amputation    Plan:  All of the above pertinent physical exam and imaging modalities findings were reviewed with Munir .    FOCUSED PLAN:  Patient is to continue daily dressing changes.  Xeroform, gauze and Coban or tape.  Patient to continue Keflex antibiotic until gone.  Patient educated to elevate above heart level as much as possible.  As for anxiety patient to discuss with his primary care team.  Also discussed high blood pressure and irregular heart rate.  Heart rate was fast and normal and also irregular at different times during our visit.  Patient is aware he should follow-up with  primary care on this issue.  Follow-up next week for wound check.  Most likely suture removal at a time.    Re-x-ray on return: No      This note was dictated with [employer].    Ralph Nelson PA-C      Scribed by:  Ralph Nelson PA-C   1:27 PM  3/22/2022  The information in this document, created by a scribe for me, accurately reflects the services I personally performed and the decisions made by me. I have reviewed and approved this document for accuracy    Eleonora Cabral MD

## 2022-03-22 NOTE — TELEPHONE ENCOUNTER
DIAGNOSIS: Partial traumatic amputation of left index finger through phalanx   APPOINTMENT DATE: 03/22/2022   NOTES STATUS DETAILS   OFFICE NOTE from referring provider N/A    OFFICE NOTE from other specialist N/A    DISCHARGE SUMMARY from hospital N/A    DISCHARGE REPORT from the ER Internal 03/17/2022 [facility] ED Dr Gracia    OPERATIVE REPORT N/A    MEDICATION LIST N/A    EMG (for Spine) N/A    IMPLANT RECORD/STICKER N/A    LABS     CBC/DIFF N/A    CULTURES N/A    INJECTIONS DONE IN RADIOLOGY N/A    MRI N/A    CT SCAN N/A    XRAYS (IMAGES & REPORTS) Internal 03/17/2022 LFT finger   TUMOR     PATHOLOGY  Slides & report N/A

## 2022-03-22 NOTE — TELEPHONE ENCOUNTER
Please call Munir,     I have refilled the ativan, but he needs to schedule a follow-up for additional refills.  I have reviewed his recent ER visit, hope he is doing ok since his injury.  He should not take the ativan with the norco, these two meds can cause an overdose if used together.     Nasreen Martínez PA-C

## 2022-03-23 ENCOUNTER — TELEPHONE (OUTPATIENT)
Dept: FAMILY MEDICINE | Facility: CLINIC | Age: 57
End: 2022-03-23
Payer: COMMERCIAL

## 2022-03-23 ASSESSMENT — PATIENT HEALTH QUESTIONNAIRE - PHQ9: SUM OF ALL RESPONSES TO PHQ QUESTIONS 1-9: 6

## 2022-03-23 NOTE — TELEPHONE ENCOUNTER
"Patient was seen in the clinic yesterday by Roopa Frazier NP for increased BP (had been seen in Ortho prior).     Patient admits that due to his anxiety, he does not check his own blood pressure at home. I encouraged him to have someone help him with this. We discussed reasons for BP to be elevated, including pain. He reports that the dressing change was painful yesterday.     Patient was also prescribed Metoprolol succinate ER (TOPROL-XL) 25 MG 24 hr tablets. He states that he has not started taking because he wants to ask his PCP Nasreen Martínez PA-C if she agrees with this treatment.    Patient reports no chest pain, shortness of breath or cough. He states he has mild stomach upset and feels gassy (he says \"airy\"). He felt his heart rate was irregular yesterday, however it seems ok today.     Patient was also given a refill on his Lorazepam which he reports has been helpful in relieving his anxiety.     Patient wants to be sure this issue is really physical and not just mental.     Please review and advise.     Deandra Sarmiento RN BSN  [facility]    "

## 2022-03-24 NOTE — TELEPHONE ENCOUNTER
Yes, I agree with the treatment plan by Roopa.  The metoprolol will help with BP and helps with anxiety as well.  Regardless of how much anxiety or pain he was in, the pressure was still pretty high and we should get it down so it doesn't spike like that    Nasreen Martínez PA-C

## 2022-03-24 NOTE — TELEPHONE ENCOUNTER
I reviewed the directives from Nasreen Martínez PA-C with patient and he verbalized a good understanding.     Munir states that he had taken his first dose of Metoprolol succinate ER (TOPROL-XL) 25 MG 24 hr tablet this AM.     Deandra Sarmiento RN BSN  [employer]

## 2022-03-27 ENCOUNTER — HOSPITAL ENCOUNTER (EMERGENCY)
Facility: CLINIC | Age: 57
Discharge: HOME | End: 2022-03-27
Attending: EMERGENCY MEDICINE | Admitting: EMERGENCY MEDICINE
Payer: COMMERCIAL

## 2022-03-27 VITALS
RESPIRATION RATE: 8 BRPM | TEMPERATURE: 98.9 F | OXYGEN SATURATION: 97 % | SYSTOLIC BLOOD PRESSURE: 184 MMHG | BODY MASS INDEX: 26.68 KG/M2 | DIASTOLIC BLOOD PRESSURE: 107 MMHG | HEART RATE: 64 BPM | HEIGHT: 67 IN | WEIGHT: 170 LBS

## 2022-03-27 DIAGNOSIS — F41.9 ANXIETY: ICD-10-CM

## 2022-03-27 DIAGNOSIS — R00.2 PALPITATIONS: ICD-10-CM

## 2022-03-27 LAB
ANION GAP SERPL CALCULATED.3IONS-SCNC: 5 MMOL/L (ref 3–14)
BASOPHILS # BLD AUTO: 0.1 10E3/UL (ref 0–0.2)
BASOPHILS NFR BLD AUTO: 1 %
BUN SERPL-MCNC: 17 MG/DL (ref 7–30)
CALCIUM SERPL-MCNC: 8.8 MG/DL (ref 8.5–10.1)
CHLORIDE BLD-SCNC: 108 MMOL/L (ref 94–109)
CO2 SERPL-SCNC: 26 MMOL/L (ref 20–32)
CREAT SERPL-MCNC: 0.76 MG/DL (ref 0.66–1.25)
EOSINOPHIL # BLD AUTO: 0.1 10E3/UL (ref 0–0.7)
EOSINOPHIL NFR BLD AUTO: 3 %
ERYTHROCYTE [DISTWIDTH] IN BLOOD BY AUTOMATED COUNT: 12.5 % (ref 10–15)
GFR SERPL CREATININE-BSD FRML MDRD: >90 ML/MIN/1.73M2
GLUCOSE BLD-MCNC: 107 MG/DL (ref 70–99)
HCT VFR BLD AUTO: 42 % (ref 40–53)
HGB BLD-MCNC: 14 G/DL (ref 13.3–17.7)
IMM GRANULOCYTES # BLD: 0 10E3/UL
IMM GRANULOCYTES NFR BLD: 0 %
LYMPHOCYTES # BLD AUTO: 0.9 10E3/UL (ref 0.8–5.3)
LYMPHOCYTES NFR BLD AUTO: 24 %
MCH RBC QN AUTO: 31.5 PG (ref 26.5–33)
MCHC RBC AUTO-ENTMCNC: 33.3 G/DL (ref 31.5–36.5)
MCV RBC AUTO: 94 FL (ref 78–100)
MONOCYTES # BLD AUTO: 0.3 10E3/UL (ref 0–1.3)
MONOCYTES NFR BLD AUTO: 9 %
NEUTROPHILS # BLD AUTO: 2.5 10E3/UL (ref 1.6–8.3)
NEUTROPHILS NFR BLD AUTO: 63 %
NRBC # BLD AUTO: 0 10E3/UL
NRBC BLD AUTO-RTO: 0 /100
PLATELET # BLD AUTO: 212 10E3/UL (ref 150–450)
POTASSIUM BLD-SCNC: 4.7 MMOL/L (ref 3.4–5.3)
RBC # BLD AUTO: 4.45 10E6/UL (ref 4.4–5.9)
SODIUM SERPL-SCNC: 139 MMOL/L (ref 133–144)
TROPONIN I SERPL HS-MCNC: <3 NG/L
TSH SERPL DL<=0.005 MIU/L-ACNC: 2.83 MU/L (ref 0.4–4)
WBC # BLD AUTO: 4 10E3/UL (ref 4–11)

## 2022-03-27 PROCEDURE — 99284 EMERGENCY DEPT VISIT MOD MDM: CPT | Mod: 25 | Performed by: EMERGENCY MEDICINE

## 2022-03-27 PROCEDURE — 93005 ELECTROCARDIOGRAM TRACING: CPT | Performed by: EMERGENCY MEDICINE

## 2022-03-27 PROCEDURE — 36415 COLL VENOUS BLD VENIPUNCTURE: CPT | Performed by: EMERGENCY MEDICINE

## 2022-03-27 PROCEDURE — 84484 ASSAY OF TROPONIN QUANT: CPT | Performed by: EMERGENCY MEDICINE

## 2022-03-27 PROCEDURE — 85025 COMPLETE CBC W/AUTO DIFF WBC: CPT | Performed by: EMERGENCY MEDICINE

## 2022-03-27 PROCEDURE — 93010 ELECTROCARDIOGRAM REPORT: CPT | Performed by: EMERGENCY MEDICINE

## 2022-03-27 PROCEDURE — 84520 ASSAY OF UREA NITROGEN: CPT | Performed by: EMERGENCY MEDICINE

## 2022-03-27 PROCEDURE — 90791 PSYCH DIAGNOSTIC EVALUATION: CPT

## 2022-03-27 PROCEDURE — 84443 ASSAY THYROID STIM HORMONE: CPT | Performed by: EMERGENCY MEDICINE

## 2022-03-27 PROCEDURE — 99285 EMERGENCY DEPT VISIT HI MDM: CPT | Mod: 25 | Performed by: EMERGENCY MEDICINE

## 2022-03-27 NOTE — ED NOTES
"3/27/2022  Munir Storey 1965     [facility] Crisis Assessment    Patient was assessed: remote  Patient location: [facility]    Referral Data and Chief Complaint  Patient is a 56 year old who uses he/his/him pronouns. Patient presented to the ED alone and was referred to the ED by self. Patient is presenting to the ED for the following concerns: irregular heart beat and anxiety.Patient has has  history of anxiety self-presented to the ED for check-up.     Informed Consent and Assessment Methods    Patient is his own guardian. Writer met with patient and explained the crisis assessment process, including applicable information disclosures and limits to confidentiality, assessed understanding of the process, and obtained consent to proceed with the assessment. Patient was observed to be able to participate in the assessment as evidenced by answering assessment questions. Assessment methods included conducting a formal interview with patient, review of medical records, collaboration with medical staff, and obtaining relevant collateral information from family and community providers when available.    Narrative Summary of Presenting Problem and Current Functioning  What led to the patient presenting for crisis services, factors that make the crisis life threatening or complex, stressors, how is this disrupting the patient's life, and how current functioning is in comparison to baseline. How is patient presenting during the assessment.     Patient who has no history of hospitalization self-presented to the ED with symptoms of anxiety.  Patient reported that his stressors include his son's scheduled surgery in a couple of weeks and his wife leaving for [place] next week.  Patient denied symptoms of depression.  Patient reported having good appetite and good sleeping time.  Patient reported being \"overwhelmed\" with the thoughts of having unresolved anxiety.  Patient is on wellbutrin prescribed by his PCP.During this " "assessment patient denied SI/HI/AH/VH.    History of the Crisis  Duration of the current crisis, coping skills attempted to reduce the crisis, community resources used, and past presentations.    Patient reported \"it's been for a while...\"  Patient is currently on medication.  Patient will call his insurance company and schedule for in-person therapeutic appointment.      Collateral Information  Epic    Risk Assessment    Risk of Harm to Self     ESS-6  1.a. Over the past 2 weeks, have you had thoughts of killing yourself? No  1.b. Have you ever attempted to kill yourself and, if yes, when did this last happen? No   2. Recent or current suicide plan? No   3. Recent or current intent to act on ideation? No  4. Lifetime psychiatric hospitalization? No  5. Pattern of excessive substance use? No  6. Current irritability, agitation, or aggression? No  Scoring note: BOTH 1a and 1b must be yes for it to score 1 point, if both are not yes it is zero. All others are 1 point per number. If all questions 1a/1b - 6 are no, risk is negligible. If one of 1a/1b is yes, then risk is mild. If either question 2 or 3, but not both, is yes, then risk is automatically moderate regardless of total score. If both 2 and 3 are yes, risk is automatically high regardless of total score.     Score: 0, negligible risk    The patient has the following risk factors for suicide: health stressors    Is the patient experiencing current suicidal ideation: No    Is the patient engaging in preparatory suicide behaviors (formulating how to act on plan, giving away possessions, saying goodbye, displaying dramatic behavior changes, etc)? No    Does the patient have access to firearms or other lethal means? no    The patient has the following protective factors: social support    Support system information: family and friends    Patient strengths: Patient drove himself to the ED.  Patient knows when to ask for help    Does the patient engage in " non-suicidal self-injurious behavior (NSSI/SIB)? no    Is the patient vulnerable to sexual exploitation?  No    Is the patient experiencing abuse or neglect? no    Is the patient a vulnerable adult? No      Risk of Harm to Others  The patient has the following risk factors of harm to others: no risk factors identified    Does the patient have thoughts of harming others? No    Is the patient engaging in sexually inappropriate behavior?  no       Current Substance Abuse    Is there recent substance abuse? no    Was a urine drug screen or blood alcohol level obtained: No    CAGE AID  Have you felt you ought to cut down on your drinking or drug use?  No  Have people annoyed you by criticizing your drinking or drug use? No  Have you felt bad or guilty about your drinking or drug use? No  Have you ever had a drink or used drugs first thing in the morning to steady your nerves or to get rid of a hangover? No  Score: 0/4       Current Symptoms/Concerns    Symptoms  Attention, hyperactivity, and impulsivity symptoms present: No    Anxiety symptoms present: Yes: Generalized Symptoms: Excessive worry      Appetite symptoms present: No     Behavioral difficulties present: No     Cognitive impairment symptoms present: No    Depressive symptoms present: No    Eating disorder symptoms present: No    Learning disabilities, cognitive challenges, and/or developmental disorder symptoms present: No     Manic/hypomanic symptoms present: No    Personality and interpersonal functioning difficulties present : No    Psychosis symptoms present: No      Sleep difficulties present: No    Substance abuse disorder symptoms present: No     Trauma and stressor related symptoms present: No           Mental Status Exam   Affect: Appropriate   Appearance: Appropriate    Attention Span/Concentration: Attentive?    Eye Contact: Engaged   Fund of Knowledge: Appropriate    Language /Speech Content: Fluent   Language /Speech Volume: Normal    Language  /Speech Rate/Productions: Normal    Recent Memory: Intact   Remote Memory: Variable   Mood: Normal    Orientation to Person: Yes    Orientation to Place: Yes   Orientation to Time of Day: Yes    Orientation to Date: Yes    Situation (Do they understand why they are here?): Yes    Psychomotor Behavior: Normal    Thought Content: Clear   Thought Form: Goal Directed       Mental Health and Substance Abuse History    History  Current and historical diagnoses or mental health concerns: History of Anxiety    Prior MH services (inpatient, programmatic care, outpatient, etc) : No    Has the patient used [facility] crisis team services before?: No    History of substance abuse: No    Prior ALEX services (inpatient, programmatic care, detox, outpatient, etc) : No    History of commitment: No    Family history of MH/ALEX: No    Trauma history: No    Medication  Psychotropic medications: Yes. Pt is currently taking Wellbutrin. Medication compliant: Yes. Recent medication changes: No    Current Care Team  Primary Care Provider: Yes. Name: Mauricio Nasreen Craig, . Location: [facility]. Date of last visit: unknown. Frequency: as scheduled. Perceived helpfulness: yes.    Psychiatrist: No    Therapist: No    : No    CTSS or ARMHS: No    ACT Team: No    Other: No    Release of Information  Was a release of information signed: No. Reason: patient was seen virtually      Biopsychosocial Information    Socioeconomic Information  Current living situation: patient lives with his wife    Employment/income source: self-employed    Relevant legal issues: No    Cultural, Jewish, or spiritual influences on mental health care: No    Is the patient active in the  or a : No      Relevant Medical Concerns   Patient identifies concerns with completing ADLs? No     Patient can ambulate independently? Yes     Other medical concerns? Yes     History of concussion or TBI? No        Diagnosis    300.02 (F41.1) Generalized  Anxiety Disorder - provisional      300.09 (F41.8) Other Specified Anxiety Disorder  - by history     300.00 (F41.9) Unspecified Anxiety Disorder - rule out       Therapeutic Intervention  The following therapeutic methodologies were employed when working with the patient: establishing rapport. Patient response to intervention: Patient was able to identify some sources of his anxiety..      Disposition  Recommended disposition: Individual Therapy      Reviewed case and recommendations with attending provider. Attending Name: Dr. Cotter      Attending concurs with disposition: Yes      Patient concurs with disposition: Yes      Guardian concurs with disposition: NA     Final disposition: Individual therapy .     Inpatient Details (if applicable):  Is patient admitted voluntarily:NA    Patient aware of potential for transfer if there is not appropriate placement? NA     [facility] NA      Behavioral Intake Notified? NA       Clinical Substantiation of Recommendations   Rationale with supporting factors for disposition and diagnosis.     Patient presented to the ED voluntarily due to irregular heart beat.  Patient denied SI/HI/AH/VH.  Patient is on wellbutrin prescribed by his PCP.  Patient denied any drug or alcohol use.  Patient was overwhelmed with unresolved medical condition.  Patient has history of Anxiety.         Assessment Details  Patient interview started at: 11:00 am and completed at: 11:45 am.    Total duration spent on the patient case in minutes: .75 hrs     CPT code(s) utilized: 92849 - Psychotherapy for Crisis - 60 (30-74*) min       Aftercare and Safety Planning  Follow up plans with MH/ALEX services: No      Aftercare plan placed in the AVS and provided to patient: Yes. Given to patient by ED Staff    PACO Agudelo          Additional Information  Today you were seen by a licensed mental health professional through Triage and Transition  services, [facility] Providers ([facility])  for a crisis assessment in the Emergency Department at [facility].  It is recommended that you follow up with your established providers (psychiatrist, mental health therapist, and/or primary care doctor - as relevant) as soon as possible. Coordinators from [facility] will be calling you in the next 24-48 hours to ensure that you have the resources you need.  You can also contact [facility] coordinators directly at [phone]. You may have been scheduled for or offered an appointment with a mental health provider. [facility] maintains an extensive network of licensed behavioral health providers to connect patients with the services they need.  We do not charge providers a fee to participate in our referral network.  We match patients with providers based on a patient's specific needs, insurance coverage, and location.  Our first effort will be to refer you to a provider within your care system, and will utilize providers outside your care system as needed.

## 2022-03-27 NOTE — DISCHARGE SUMMARY
"1) Warning Signs.    ((Include thoughts, images, mood, situation, behavior that a crisis may be developing).   Thoughts of unresolved medical issue  2) Things that make me feel better.   (Internal coping strategies - Things I can do to take my mind off my problems without contacting another person (relaxation technique, physical activity). What healthy actions can I take to make myself feel better?   Work on house projects and go to [employer] games  3) People and social settings that provide distraction.   a) The goal for this step is distraction from suicidal thoughts/feelings. Ask for at least two locations/people.   Family including grand kids  4) People whom I can ask for help.   a) Who can I talk to that makes me feel better?  Therapist, family and friends    5) Professionals or agencies I can contact during a crisis.   a) List names/numbers of the identified providers, as well as any additional crisis lines or professional contacts.   Patient will scheduled appointment with a therapist out-of-network to get an in-person therapist.  National Suicide Prevention Lifeline  2.125.486.7183    Crisis Text Line  Text \"[place]\" to  159562    Warmline  014.673.4079 or text  Support  to 10542   The [place] Warmline provides a [id number]-zp-[id number] approach to mental health recovery, support and wellness.   Mon-Sat- 5pm-10pm.     6) Ways to improve my environment or surroundings.   Take prescribed medications and see a therapist.  "

## 2022-03-27 NOTE — DISCHARGE INSTRUCTIONS
"Your EKG and laboratory testing emergency department is reassuring.  An order for a heart monitor has been placed and you need to contact [phone] to schedule appointment to get the monitor.  You are this for 1 week.  When results are in you can follow-up with your primary care provider.  If you experience chest pain/pressure, shortness of breath, or other new symptoms you are concerning, please return to the emergency department for further evaluation.          1) Warning Signs.    ((Include thoughts, images, mood, situation, behavior that a crisis may be developing).   Thought of having unresolved medical issue  2) Things that make me feel better.   (Internal coping strategies - Things I can do to take my mind off my problems without contacting another person (relaxation technique, physical activity). What healthy actions can I take to make myself feel better?   Getting busy with grand kids and going to [employer] games  3) People and social settings that provide distraction.   a) The goal for this step is distraction from suicidal thoughts/feelings. Ask for at least two locations/people.   Wife, adult children and grand kids  4) People whom I can ask for help.   a) Who can I talk to that makes me feel better?  Wife and family members    5) Professionals or agencies I can contact during a crisis.   a) List names/numbers of the identified providers, as well as any additional crisis lines or professional contacts.   Patient has to schedule for individual therapist  National Suicide Prevention Lifeline  0.190.210.4491    Crisis Text Line  Text \"[place]\" to  954070    Warmline  001.139.6245 or text  Support  to 39425   The [place] Warmline provides a [id number]-jb-[id number] approach to mental health recovery, support and wellness.   Mon-Sat- 5pm-10pm.     6) Ways to improve my environment or surroundings.   Take prescribed medications and see a therapist in-person  "

## 2022-03-27 NOTE — ED TRIAGE NOTES
"Pt here d/t feeling an irregular heartbeat that he noticed this morning. He states \"I'm going crazy\". Needs help. Pt is tearful in triage. Reports wishing he was dead while laying in bed. States once he's up he doesn't have those thoughts. States \"I would never kill myself because that is a sin and I wouldn't go to [facility].\"    "

## 2022-03-27 NOTE — ED PROVIDER NOTES
History     Chief Complaint   Patient presents with     Palpitations     Mental Health Problem     HPI  Munir Storey is a 56 year old male with history of atrial flutter status post ablation in 2015, generalized anxiety, hypertension, depression, tobacco use, alcohol abuse in remission, who presents for evaluation of palpitations and worsening anxiety.  He reports that he is been dealing with anxiety for him the past 3 years.  Currently on Wellbutrin and lorazepam as needed.  Says he has tried multiple different medications over the years and had trouble finding one that can both manage his symptoms anxiety and that can tolerate physically.  He has new insurance and has been trying to get into see a therapist.  Thinks he needs a combination of therapy and medications.  Unfortunately he cannot be seen until June 6 in his network.  He could go out of network for which she will have to pay out-of-pocket next week.  Denies any thoughts of harming himself.  Says he has a wife, children, grandchildren and a job he enjoys.  Also [employer] believes do not allow him to harm himself.  Non-smoker, quit drinking alcohol.  Uses smokeless tobacco.    Had a recent tablesaw injury which amputated tip of finger on his left hand.  When he was in for follow-up was noticed that his heart rate was fast.  This made him worry.  Sometimes he has a sensation of his heart beating fast which can last for anywhere from 10 minutes to several hours.  No obvious provocative or palliating features.  Has history of a flutter status post ablation.  State he did not have any symptoms at that time.  No current chest pain, pressure, cough, shortness of breath, nausea, vomiting or diarrhea.  No neck pain or tenderness.  No difficulty swallowing.  No change to his hair skin or nails.  No new intolerance to cold or heat.    The patient's PMHx, Surgical Hx, Allergies, and Medications were all reviewed with the patient.    Allergies:  Allergies    Allergen Reactions     Sulfamethoxazole-Trimethoprim Nausea       Problem List:    Patient Active Problem List    Diagnosis Date Noted     Onychomycosis 11/11/2021     Priority: Medium     Tinea pedis of both feet 11/11/2021     Priority: Medium     Primary osteoarthritis of right hand 11/11/2021     Priority: Medium     Anhedonia 11/11/2021     Priority: Medium     Alcohol dependence in remission (H) 02/23/2021     Priority: Medium     Vitamin D deficiency 02/23/2021     Priority: Medium     Fatigue, unspecified type 02/23/2021     Priority: Medium     Chronic neck pain with history of cervical spinal surgery 02/18/2021     Priority: Medium     H/O atrial flutter 09/02/2019     Priority: Medium     Typical atrial flutter, new onset 04/28/2015, s/p cardioversion 7/2015, s/p atrial flutter ablation 12/2015         Seborrheic keratosis 08/14/2019     Priority: Medium     Obesity (BMI 30-39.9) 08/04/2019     Priority: Medium     Moderate episode of recurrent major depressive disorder (H) 08/29/2018     Priority: Medium     Tobacco abuse 10/20/2017     Priority: Medium     SANDRA (generalized anxiety disorder) 10/20/2017     Priority: Medium     Alcohol abuse, in remission 10/20/2017     Priority: Medium     October 20, 2017: sobriety x 2 years       Peyronie disease 08/26/2009     Priority: Medium     Hypertension, goal below 140/90 01/23/2009     Priority: Medium        Past Medical History:    Past Medical History:   Diagnosis Date     Alcohol withdrawal (H) 04/28/2015     Atrial flutter (H)      Depressive disorder      Ejection fraction < 50% 04/2015     SANDRA (generalized anxiety disorder)      H/O atrioventricular quita ablation 12/2015     Hypertension, goal below 140/90      Tobacco abuse        Past Surgical History:    Past Surgical History:   Procedure Laterality Date     CARDIAC SURGERY  07/06/2015    EP cardioversion     COLONOSCOPY  08/01/2013     COLONOSCOPY  4/6/2019    normal colonoscopy - repeat 10 years  "    HERNIA REPAIR       LAPAROSCOPIC RESECTION SMALL BOWEL  2013     SPINE SURGERY      cervical fusion      UPPER GI ENDOSCOPY  2013       Family History:    Family History   Problem Relation Age of Onset     Diabetes Father      Depression Father      Depression Paternal Grandfather        Social History:  Marital Status:   [2]  Social History     Tobacco Use     Smoking status: Former Smoker     Packs/day: 0.50     Years: 20.00     Pack years: 10.00     Types: Cigarettes, Dip, chew, snus or snuff     Quit date: 2019     Years since quittin.6     Smokeless tobacco: Current User     Types: Chew   Vaping Use     Vaping Use: Never used   Substance Use Topics     Alcohol use: No     Comment: hx alcohol abuse, sober since      Drug use: No        Medications:    amLODIPine (NORVASC) 10 MG tablet  aspirin (ASA) 81 MG EC tablet  atorvastatin (LIPITOR) 40 MG tablet  buPROPion (WELLBUTRIN XL) 300 MG 24 hr tablet  HYDROcodone-acetaminophen (NORCO) 5-325 MG tablet  hydrOXYzine (ATARAX) 25 MG tablet  lisinopril (ZESTRIL) 40 MG tablet  LORazepam (ATIVAN) 0.5 MG tablet  metoprolol succinate ER (TOPROL-XL) 25 MG 24 hr tablet  Misc Natural Products (T-RELIEF CBD+13 SL)  pediatric electrolyte (PEDIALYTE) SOLN solution  VITAMIN D PO          Review of Systems  A complete review of systems performed and is otherwise negative except as noted in the HPI    Physical Exam   BP: (!) 183/90  Pulse: 71  Temp: 98.9  F (37.2  C)  Resp: 18  Height: 170.2 cm (5' 7\")  Weight: 77.1 kg (170 lb)  SpO2: 97 %    Physical Exam  GEN: Awake, alert, and cooperative.  Appears anxious  HENT: MMM. External ears and nose normal bilaterally.  EYES: EOM intact. Conjunctiva clear. No discharge.   NECK: Symmetric, freely mobile.  No anterior masses.  No JVD  CV : Regular rate and rhythm.  No murmurs appreciated  PULM: Normal effort. No wheezes, rales, or rhonchi bilaterally.  NEURO: Normal speech. Following commands. CN II-XII " grossly intact. Answering questions and interacting appropriately.   EXT: No gross deformity. Warm and well perfused.  INT: Warm. No diaphoresis. Normal color.   PSYCH: anxious mood and affect. Good eye contact. Logical thought content.        ED Course     ED Course as of 03/27/22 1314   Sun Mar 27, 2022   1201 DEC - denies SI, no drugs or alcohol. On Wellbutrin. Son is having surgery and wife will be out of town for 4 weeks. Insurance issues and can't get into therapist in network until June. Out of network is too expensive. Could get in next week, should speak again to insurance company.     Protective factors - children, grandchildren, mother, religous beliefs.      Procedures        EKG: Interpreted myself.  Sinus rhythm with rate of 66 bpm.  Normal axis, good R wave progression.  Normal intervals.  No ectopy.  No ST elevations or depressions.  EKG 11/at bedtime 21 with similar morphology however frequent PACs.    Critical Care time:  none               Results for orders placed or performed during the hospital encounter of 03/27/22 (from the past 24 hour(s))   TSH with free T4 reflex   Result Value Ref Range    TSH 2.83 0.40 - 4.00 mU/L   Basic metabolic panel   Result Value Ref Range    Sodium 139 133 - 144 mmol/L    Potassium 4.7 3.4 - 5.3 mmol/L    Chloride 108 94 - 109 mmol/L    Carbon Dioxide (CO2) 26 20 - 32 mmol/L    Anion Gap 5 3 - 14 mmol/L    Urea Nitrogen 17 7 - 30 mg/dL    Creatinine 0.76 0.66 - 1.25 mg/dL    Calcium 8.8 8.5 - 10.1 mg/dL    Glucose 107 (H) 70 - 99 mg/dL    GFR Estimate >90 >60 mL/min/1.73m2   Troponin I   Result Value Ref Range    Troponin I High Sensitivity <3 <79 ng/L   CBC with platelets differential    Narrative    The following orders were created for panel order CBC with platelets differential.  Procedure                               Abnormality         Status                     ---------                               -----------         ------                     CBC with  platelets and d...[[id number]]                      Final result                 Please view results for these tests on the individual orders.   CBC with platelets and differential   Result Value Ref Range    WBC Count 4.0 4.0 - 11.0 10e3/uL    RBC Count 4.45 4.40 - 5.90 10e6/uL    Hemoglobin 14.0 13.3 - 17.7 g/dL    Hematocrit 42.0 40.0 - 53.0 %    MCV 94 78 - 100 fL    MCH 31.5 26.5 - 33.0 pg    MCHC 33.3 31.5 - 36.5 g/dL    RDW 12.5 10.0 - 15.0 %    Platelet Count 212 150 - 450 10e3/uL    % Neutrophils 63 %    % Lymphocytes 24 %    % Monocytes 9 %    % Eosinophils 3 %    % Basophils 1 %    % Immature Granulocytes 0 %    NRBCs per 100 WBC 0 <1 /100    Absolute Neutrophils 2.5 1.6 - 8.3 10e3/uL    Absolute Lymphocytes 0.9 0.8 - 5.3 10e3/uL    Absolute Monocytes 0.3 0.0 - 1.3 10e3/uL    Absolute Eosinophils 0.1 0.0 - 0.7 10e3/uL    Absolute Basophils 0.1 0.0 - 0.2 10e3/uL    Absolute Immature Granulocytes 0.0 <=0.4 10e3/uL    Absolute NRBCs 0.0 10e3/uL       Medications - No data to display    Assessments & Plan (with Medical Decision Making)   56 year old male with past medical history of atrial flutter status post ablation, anxiety and depression who presents with worsening anxiety and palpitations.  EKG was sinus rhythm no evidence of acute ischemia.  No ectopy and normal intervals.  Cardiac troponin is not elevated.  CBC normal.  TSH normal.  BMP grossly normal.  No chest pain or anginal symptoms.  Very low suspicion for ACS.  No clear etiology of his palpitations.  Review of EKG from earlier did show PACs.  Will get Zio patch monitor to better evaluate.  Regarding his anxiety, he denies any thoughts of harming himself and says his [employer] would not allow him to kill himself and his wife children and grandchildren need him.  He enjoys his work.  He is trying to get into see a therapist again for his symptoms.  Current new stresses of stepson needing surgery and wife having to make trips to [place] to help care  for him.  Was assessed by behavioral health who did not think he needed inpatient treatment.  Has 2 options for outpatient therapy unfortunately the more timely of the 2 is out of his insurance network and he will have to pay out-of-pocket.  He is on Wellbutrin and takes lorazepam as needed.  He is worried about running out of lorazepam.  Currently has enough for this week.  I recommended that he contacts his primary prescriber for needed refills of this.  All follow-up and ED return precautions discussed.  Zio patch ordered.    I have reviewed the nursing notes.         New Prescriptions    No medications on file       Final diagnoses:   Anxiety   Palpitations     Tremaine Cotter MD    3/27/2022   [facility] EMERGENCY DEPT    Disclaimer: This note consists of words and symbols derived from keyboarding and dictation using voice recognition software.  As a result, there may be errors that have gone undetected.  Please consider this when interpreting information found in this note.             Tremaine Cotter MD  03/27/22 [id number]

## 2022-03-27 NOTE — ED NOTES
Pt states he's had anxiety and heart palpitations.  Does not fee well with current medications, trying to get appointment to see a therapist.

## 2022-03-28 ENCOUNTER — TELEPHONE (OUTPATIENT)
Dept: FAMILY MEDICINE | Facility: CLINIC | Age: 57
End: 2022-03-28
Payer: COMMERCIAL

## 2022-03-28 DIAGNOSIS — F41.1 GAD (GENERALIZED ANXIETY DISORDER): ICD-10-CM

## 2022-03-28 RX ORDER — LORAZEPAM 0.5 MG/1
0.5 TABLET ORAL 2 TIMES DAILY PRN
Qty: 10 TABLET | Refills: 0 | Status: SHIPPED | OUTPATIENT
Start: 2022-03-28 | End: 2022-04-06

## 2022-03-28 NOTE — TELEPHONE ENCOUNTER
Attempted to call pharmacy at [phone] and per message pharmacy is closed for lunch until 2 pm  Will need to contact pharmacy after 2 pm to cancel Ativan RX and refills ordered on 3-22-22 by Roopa Frazier.  
Called pharmacy and canceled out the lorazepam 0.5 mg tablet 60 tablets with 2 refills. They verified that the patient did not fill any portion of this refill and his last refill was for 10 tablets.     Nasreen Montoya RN    
Patient scheduled appt for 3/30/22. Patient requesting refill of LORazepam (ATIVAN) 0.5 MG tablet. He does not have enough med until appt.   
Please call Munir,   He was in ER for anxiety yesterday and told to f/u with PCP for a recheck.  Please schedule a phone visit some time this week (ok to take a same day or hosp f/u).     Nasreen Martínez PA-C    
Please call pharmacy and cancel his script for #60 with 2 refills from Roopa Frazier NP.  He has a h/o severe anxiety, with a h/o alcohol abuse. I have been trying to be extremely cautious with benzo's with him.      I have sent a small script for #10 to last until his appointment with me.       PDMP reviewed (he has not picked up the #60 ativan yet)  Nasreen Martínez PA-C      
27-Jun-2018 21:23

## 2022-03-29 ENCOUNTER — OFFICE VISIT (OUTPATIENT)
Dept: FAMILY MEDICINE | Facility: CLINIC | Age: 57
End: 2022-03-29
Payer: COMMERCIAL

## 2022-03-29 ENCOUNTER — HOSPITAL ENCOUNTER (OUTPATIENT)
Dept: CARDIOLOGY | Facility: CLINIC | Age: 57
Discharge: HOME | End: 2022-03-29
Attending: EMERGENCY MEDICINE | Admitting: EMERGENCY MEDICINE
Payer: COMMERCIAL

## 2022-03-29 VITALS
OXYGEN SATURATION: 100 % | DIASTOLIC BLOOD PRESSURE: 80 MMHG | HEART RATE: 64 BPM | SYSTOLIC BLOOD PRESSURE: 150 MMHG | BODY MASS INDEX: 26.21 KG/M2 | WEIGHT: 167 LBS | HEIGHT: 67 IN | TEMPERATURE: 97.6 F

## 2022-03-29 DIAGNOSIS — S68.621D PARTIAL TRAUMATIC AMPUTATION OF LEFT INDEX FINGER THROUGH PHALANX, SUBSEQUENT ENCOUNTER: Primary | ICD-10-CM

## 2022-03-29 DIAGNOSIS — R00.2 PALPITATIONS: ICD-10-CM

## 2022-03-29 DIAGNOSIS — Z48.02 ENCOUNTER FOR REMOVAL OF SUTURES: ICD-10-CM

## 2022-03-29 PROCEDURE — 93242 EXT ECG>48HR<7D RECORDING: CPT

## 2022-03-29 PROCEDURE — 93244 EXT ECG>48HR<7D REV&INTERPJ: CPT | Performed by: INTERNAL MEDICINE

## 2022-03-29 PROCEDURE — 99214 OFFICE O/P EST MOD 30 MIN: CPT | Performed by: FAMILY MEDICINE

## 2022-03-29 RX ORDER — MUPIROCIN 20 MG/G
OINTMENT TOPICAL DAILY
Qty: 30 G | Refills: 0 | Status: SHIPPED | OUTPATIENT
Start: 2022-03-29 | End: 2022-04-29

## 2022-03-29 NOTE — PROGRESS NOTES
Assessment & Plan     Partial traumatic amputation of left index finger through phalanx, subsequent encounter  Patient is here for follow-up after recent ED visit and requested suture removal.  He was seen in the ED on 03/17/2022 for amputation of the left index finger tip sustained on a table saw.  X-ray showed :  Status post partial amputation of the second digit at the  level of the distal phalangeal diaphysis. Intra-articular fracture of  the residual second distal phalanx with 3 mm distraction. Surrounding  soft tissue swelling. There are 2 tiny metallic foreign bodies in the  soft tissue medial to the second proximal phalangeal diaphysis. Mild  degenerative changes.  Laceration repair was performed in the ED.  Dressing, antibiotic ointment, splint was applied in the ED.  Bone fragments were removed in the ED.  The remaining portion of the nail was also removed prior to flap revision.  7 sutures were placed.  Patient was referred to orthopedic surgery.  He saw Dr. Cabral on 03/21/2022.  And was advised to continue Keflex until gone.  No surgical and intervention indicated.    He was prescribed Keflex for infection prophylaxis and tetanus was updated.    Discussed wound care  Use Bactroban for dressing change  Continue with the splint and dressing for protection    - mupirocin (BACTROBAN) 2 % external ointment; Apply topically daily    Encounter for removal of sutures  Munir Storey presents to the clinic today for  removal of sutures.  The patient has had the sutures in place for 12 days.    There has been no history of infection or drainage.    O: 7 sutures are seen located on the left index.  The wound is healing well with no signs of infection.    Tetanus status is up to date.    A: Suture removal.    P:  All sutures were easily removed today.  He had extensive discussion with the patient about wound care and red flags and warning signs.  Applied nonstick gauze and antibiotic ointment.  I offered referral  to home care health care nurse to help with dressing change but patient declined and said he is able to perform dressing change by himself.  He will return on 04/01/2022 for wound check and follow-up.      37 minutes spent on the date of the encounter doing chart review, history and exam, documentation and further activities per the note           Return in about 3 days (around 4/1/2022).    Rafita Sanford MD  [facility] ANDLACY Amaral is a 56 year old who presents for the following health issues     HPI     ED/UC Followup:    Facility:  [facility]  Date of visit: 3/17/22  Reason for visit: Partial traumatic amputation of left index finger through phalanx, initial encounter  Current Status: Recheck/Removal Sutures     Patient is here for follow-up after recent ED visit and requested suture removal.  He was seen in the ED on 03/17/2022 for amputation of the left index finger tip sustained on a table saw.  X-ray showed :  Status post partial amputation of the second digit at the  level of the distal phalangeal diaphysis. Intra-articular fracture of  the residual second distal phalanx with 3 mm distraction. Surrounding  soft tissue swelling. There are 2 tiny metallic foreign bodies in the  soft tissue medial to the second proximal phalangeal diaphysis. Mild  degenerative changes.  Laceration repair was performed in the ED.  Dressing, antibiotic ointment, splint was applied in the ED.  Bone fragments were removed in the ED.  The remaining portion of the nail was also removed prior to flap revision.  7 sutures were placed.  Patient was referred to orthopedic surgery.  He saw Dr. Cabral on 03/21/2022.  And was advised to continue Keflex until gone.  No surgical and intervention indicated.    He was prescribed Keflex for infection prophylaxis and tetanus was updated.        Review of Systems   Constitutional, HEENT, cardiovascular, pulmonary, gi and gu systems are negative,  "except as otherwise noted.      Objective    BP (!) 150/80   Pulse 64   Temp 97.6  F (36.4  C) (Tympanic)   Ht 1.702 m (5' 7\")   Wt 75.8 kg (167 lb)   SpO2 100%   BMI 26.16 kg/m    Body mass index is 26.16 kg/m .  Physical Exam  Musculoskeletal:        Hands:                         "

## 2022-03-31 ENCOUNTER — VIRTUAL VISIT (OUTPATIENT)
Dept: FAMILY MEDICINE | Facility: CLINIC | Age: 57
End: 2022-03-31
Payer: COMMERCIAL

## 2022-03-31 DIAGNOSIS — K21.00 GASTROESOPHAGEAL REFLUX DISEASE WITH ESOPHAGITIS WITHOUT HEMORRHAGE: ICD-10-CM

## 2022-03-31 DIAGNOSIS — F41.1 GAD (GENERALIZED ANXIETY DISORDER): Primary | ICD-10-CM

## 2022-03-31 DIAGNOSIS — F10.21 ALCOHOL DEPENDENCE IN REMISSION (H): ICD-10-CM

## 2022-03-31 DIAGNOSIS — F33.1 MODERATE EPISODE OF RECURRENT MAJOR DEPRESSIVE DISORDER (H): ICD-10-CM

## 2022-03-31 PROCEDURE — 96127 BRIEF EMOTIONAL/BEHAV ASSMT: CPT | Performed by: PHYSICIAN ASSISTANT

## 2022-03-31 PROCEDURE — 99214 OFFICE O/P EST MOD 30 MIN: CPT | Mod: 95 | Performed by: PHYSICIAN ASSISTANT

## 2022-03-31 RX ORDER — BUPROPION HYDROCHLORIDE 300 MG/1
300 TABLET ORAL EVERY MORNING
Qty: 90 TABLET | Refills: 0 | Status: SHIPPED | OUTPATIENT
Start: 2022-03-31 | End: 2022-05-04

## 2022-03-31 RX ORDER — AMITRIPTYLINE HYDROCHLORIDE 10 MG/1
10 TABLET ORAL AT BEDTIME
Status: CANCELLED | OUTPATIENT
Start: 2022-03-31

## 2022-03-31 ASSESSMENT — ANXIETY QUESTIONNAIRES
1. FEELING NERVOUS, ANXIOUS, OR ON EDGE: NEARLY EVERY DAY
6. BECOMING EASILY ANNOYED OR IRRITABLE: NOT AT ALL
5. BEING SO RESTLESS THAT IT IS HARD TO SIT STILL: SEVERAL DAYS
GAD7 TOTAL SCORE: 14
2. NOT BEING ABLE TO STOP OR CONTROL WORRYING: NEARLY EVERY DAY
3. WORRYING TOO MUCH ABOUT DIFFERENT THINGS: NEARLY EVERY DAY
7. FEELING AFRAID AS IF SOMETHING AWFUL MIGHT HAPPEN: NEARLY EVERY DAY
IF YOU CHECKED OFF ANY PROBLEMS ON THIS QUESTIONNAIRE, HOW DIFFICULT HAVE THESE PROBLEMS MADE IT FOR YOU TO DO YOUR WORK, TAKE CARE OF THINGS AT HOME, OR GET ALONG WITH OTHER PEOPLE: SOMEWHAT DIFFICULT

## 2022-03-31 ASSESSMENT — PATIENT HEALTH QUESTIONNAIRE - PHQ9: 5. POOR APPETITE OR OVEREATING: SEVERAL DAYS
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
## 2022-03-31 NOTE — PATIENT INSTRUCTIONS
Let's restart the prozac in the evening and continue with the wellbutrin in the morning.    Schedule an appointment with a psychiatrist (this may be 3-4 months out, that's ok, schedule anyone and I can help until that time).   Keep the appointment with your therapist as is for now, but I also suggest scheduling with someone that is in network (so they can help long term).     For your stomach, see tips below.  Let's try some omeprazole.     Patient Education     Tips to Control Acid Reflux    To control acid reflux, you ll need to make some basic diet and lifestyle changes. The simple steps outlined below may be all you ll need to ease discomfort.   Watch what you eat    Don't have fatty foods or spicy foods.    Eat fewer acidic foods, such as citrus and tomato-based foods. These can increase symptoms.    Limit drinking alcohol, caffeine, and fizzy beverages. All increase acid reflux.    Try limiting chocolate, peppermint, and spearmint. These can make acid reflux worse in some people.    Watch when you eat    Don't lie down for 3 hours after eating.    Don't snack before going to bed.    Raise your head  Raising your head and upper body by 4 to 6 inches helps limit reflux when you re lying down. Put blocks under the head of your bed frame or a wedge under your mattress to raise it.   Other changes    Lose weight, if you need to    Don t exercise near bedtime    Don't wear tight-fitting clothes    Limit aspirin and ibuprofen    Stop smoking    [employer] last reviewed this educational content on 6/1/2019 2000-2021 [employer]. All rights reserved. This information is not intended as a substitute for professional medical care. Always follow your healthcare professional's instructions.

## 2022-04-01 ENCOUNTER — PATIENT OUTREACH (OUTPATIENT)
Dept: CARE COORDINATION | Facility: CLINIC | Age: 57
End: 2022-04-01

## 2022-04-01 ENCOUNTER — TELEPHONE (OUTPATIENT)
Dept: FAMILY MEDICINE | Facility: CLINIC | Age: 57
End: 2022-04-01

## 2022-04-01 DIAGNOSIS — F41.1 GAD (GENERALIZED ANXIETY DISORDER): Primary | ICD-10-CM

## 2022-04-01 RX ORDER — GABAPENTIN 100 MG/1
100 CAPSULE ORAL 3 TIMES DAILY
Qty: 90 CAPSULE | Refills: 0 | Status: SHIPPED | OUTPATIENT
Start: 2022-04-01 | End: 2022-04-12

## 2022-04-01 ASSESSMENT — ANXIETY QUESTIONNAIRES: GAD7 TOTAL SCORE: 14

## 2022-04-01 NOTE — TELEPHONE ENCOUNTER
Attempted to call patient at home/mobile number, no answer, left message on voicemail; patient was instructed to return call to [facility] at [phone].    Sima Rodriguez RN  [facility]

## 2022-04-01 NOTE — PROGRESS NOTES
Clinic Care Coordination Contact  [facility]/Voicemail     Clinical Data: Care Coordinator Outreach  Outreach attempted x 2.  Left message on patient's voicemail with call back information and requested return call.  Plan: Care Coordinator will try to reach patient again in 10 business days.    ** Because of time gap from last CHW follow up. CHW will follow up 1 more time before sending to [facility] for un enrollment.    Savana Doan  Community Health Worker  [facility] Care Coordination   Office: [phone]

## 2022-04-01 NOTE — TELEPHONE ENCOUNTER
"Patient calling and would like to change anxiety med to one provider recommended. Started with a \"C\"?  Patient states he would like to get off the \"hamster wheel\".   "

## 2022-04-01 NOTE — TELEPHONE ENCOUNTER
Please call Munir,     I suggest we add the fluoxetine as discussed and we can also add gabapentin onto that.  I suggest starting with just 1 tab per day in the morning for 2 days then, increase to 1 tab twice per day for 2 days then 1 tab three times per day going forward.  This medication may cause drowsiness.     That is my first suggestion.     If he does not want to use the fluoxetine, then he can just start the gabapentin as mentioned above.     Nasreen Martínez PA-C

## 2022-04-01 NOTE — PROGRESS NOTES
Care Coordination Clinician Chart Review     Situation: Patient chart reviewed by care coordinator.?     Background: Initial assessment and enrollment to Care Coordination was December 2021.?? Patient centered goals were developed with participation from patient.? Lead CC handed patient off to CHW for continued outreach every 30 days.??     Assessment: Per chart review, CHW unable to reach pt x2 attempts. Pt has had very regular office visits with PCP and providers at [facility]. Last virtual visit with PCP yesterday 3/31/22. ED visit for anxiety on 3/27/22.  Pt being followed by Orthopedics and Cardiology specialists    Goals        2- Medical (pt-stated)       Goal Statement: I will follow up with Cardiology and other services    Date Goal set: 1/3/22  Barriers: insurance  Strengths: motivated  Date to Achieve By: 2/10/22  Patient expressed understanding of goal: yes    Action steps to achieve this goal:  1. I will follow up with recommendations from Cardiology. Completed   2. I will complete a Stress test 3/4/22.  3. I will see a dietician (referral placed- clinic unable to reach pt to schedule)    Goal Updated: 2/16/22           3- Mental Health Management (pt-stated)       Goal Statement: I will take steps to manage anxiety    Date Goal set: 1/3/22  Barriers: financial and medical stressors  Strengths: motivated  Date to Achieve By: 3 months  Patient expressed understanding of goal: yes    Action steps to achieve this goal:  1. I will continue to take medication. I have been continuing to take my medications as prescribed.  2. I will contact [facility] for free counseling sessions. I have not needed to use this resource yet but have the information if needed.   3. I will establish with a therapist when I get better insurance. I have health insurance now and will check to see who in network.     Goal Updated: 2/10/22        ??     Plan/Recommendations: CHW to make one additional outreach attempt. Ok  to un-enroll if unable to reach.

## 2022-04-04 ENCOUNTER — OFFICE VISIT (OUTPATIENT)
Dept: FAMILY MEDICINE | Facility: CLINIC | Age: 57
End: 2022-04-04
Payer: COMMERCIAL

## 2022-04-04 VITALS
SYSTOLIC BLOOD PRESSURE: 144 MMHG | OXYGEN SATURATION: 97 % | HEART RATE: 65 BPM | BODY MASS INDEX: 26.13 KG/M2 | RESPIRATION RATE: 20 BRPM | WEIGHT: 166.5 LBS | TEMPERATURE: 97.4 F | HEIGHT: 67 IN | DIASTOLIC BLOOD PRESSURE: 78 MMHG

## 2022-04-04 DIAGNOSIS — Z51.89 VISIT FOR WOUND CHECK: Primary | ICD-10-CM

## 2022-04-04 PROCEDURE — 99213 OFFICE O/P EST LOW 20 MIN: CPT | Performed by: PHYSICIAN ASSISTANT

## 2022-04-04 ASSESSMENT — ENCOUNTER SYMPTOMS
FEVER: 0
NUMBNESS: 1
PARESTHESIAS: 1
WEAKNESS: 0

## 2022-04-04 ASSESSMENT — PAIN SCALES - GENERAL: PAINLEVEL: NO PAIN (0)

## 2022-04-04 NOTE — PATIENT INSTRUCTIONS
Your finger appears to be healing well.  I would recommend continue with mupirocin antibacterial ointment as well as dressings to keep the area clean, covered, and protected.  Once all of the scabbing is off, you can discontinue dressings.  I would recommend wrapping approximately every 24 hours.  You can leave it unwrapped at home and overnight as long as it stays clean.  You can shower with it unwrapped, but do not soak in any sitting or still water such as sinks, bathtubs, hot tubs, or pools until all scabbing has fallen off.     If you develop any worsening pain, redness, or swelling, please return to clinic for a follow-up.  Reach out with any questions or concerns.  Patient Education     Wound Check, No Infection  Your wound is healing as expected. There are no signs of infection.   Home care  Continue to care for your wound as directed.    Cover your wound with a bandage unless your healthcare provider tells you not to.    Gently clean your wound with soap and water when you shower.     Unless told otherwise, don't swim or take tub baths until your wound has healed.  Follow-up care  Follow up with your healthcare provider as advised.    If you have sutures or staples, return as directed to have them removed. If they are not taken out on time, they may be harder to remove and scarring may be worse. Infection may develop.    If surgical tape strips were used, you can remove them yourself if they have not fallen off by 10 days after they were applied.   When to seek medical advice  Call your healthcare provider right away if any of these occur:    Fever of 100.4 F (38 C) or higher, or as directed by your healthcare provider    Symptoms of a wound infection, including:  ? Redness or swelling around the wound  ? Warmth coming from the wound  ? New or worsening pain  ? Red streaks around the wound  ? Draining pus  Wendy last reviewed this educational content on 3/1/2018    0657-9168 [employer].  All rights reserved. This information is not intended as a substitute for professional medical care. Always follow your healthcare professional's instructions.

## 2022-04-04 NOTE — TELEPHONE ENCOUNTER
Called patient and relayed the providers message. He said his therapist recommended seeing a psychiatrist and he is scheduled to be seen tomorrow for that. He will run this plan by her at that appointment.    Nasreen Montoya RN

## 2022-04-04 NOTE — PROGRESS NOTES
Assessment & Plan     Visit for wound check  Patient is a 56-year-old male presents to clinic for wound check.  Patient had traumatic amputation of distal aspect of left index finger with a table saw 3/17/2022.  Patient was evaluated at emergency department where wound was repaired.  Patient also has been evaluated by hand surgeon and no further intervention needed.  Patient notes he took most of the antibiotics prescribed and has been compliant with protective dressings and mupirocin ointment.  Pain is improving as well as numbness/tingling and swelling. Vital signs without fever or tachycardia.  No signs of infection or cellulitis today.  Recommended continuing to apply mupirocin and soft dressings to keep area clean and protected. Patient is okay to shower and let water run over area.  Patient to continue to avoid any possible contaminated water such as dishes, hot tub, bathtub, pool until all scabbing has resolved.  Return and urgent/emergent follow-up precautions provided.     See Patient Instructions    Return in about 2 weeks (around 4/18/2022), or if symptoms worsen or fail to improve.    Leticia Linares PA-C  [facility] TESSA Amaral is a 56 year old who presents for the following health issues     HPI     Follow up after traumatic amputation of distal aspect of left index finger with table saw 3/17/2022. Patient completed most of the antibiotics from the ED, Keflex, but stated they upset his stomach so he did not complete entire course. He has been compliant with Mupirocin ointment. No swelling, bleeding, drainage, itching. Area is not warm to the touch. No fevers or increased pain. Patient notes some mild numbness and tingling at the tip of the finger which is improving.  Swelling is improving.      Review of Systems   Constitutional: Negative for fever.   Neurological: Positive for numbness and paresthesias. Negative for weakness.            Objective    BP (!) 144/78    "Pulse 65   Temp 97.4  F (36.3  C)   Resp 20   Ht 1.702 m (5' 7\")   Wt 75.5 kg (166 lb 8 oz)   SpO2 97%   BMI 26.08 kg/m    Body mass index is 26.08 kg/m .  Physical Exam  Vitals and nursing note reviewed.   Constitutional:       General: He is not in acute distress.     Appearance: Normal appearance.   HENT:      Head: Normocephalic and atraumatic.      Mouth/Throat:      Mouth: Mucous membranes are moist.      Pharynx: Oropharynx is clear.   Eyes:      Extraocular Movements: Extraocular movements intact.      Pupils: Pupils are equal, round, and reactive to light.   Cardiovascular:      Rate and Rhythm: Normal rate and regular rhythm.      Heart sounds: Normal heart sounds.   Pulmonary:      Effort: Pulmonary effort is normal.      Breath sounds: Normal breath sounds.   Musculoskeletal:         General: Normal range of motion.      Cervical back: Normal range of motion.      Comments: Left hand: Radial pulse 2+.  Left index finger. Amputation near dip.  Sensation intact.  PIP ROM within normal limits.  Minimal swelling.  No erythema or increased warmth.  No tenderness to palpation.  Wound appears to be healing.  No discharge.   Skin:     General: Skin is warm and dry.   Neurological:      General: No focal deficit present.      Mental Status: He is alert.   Psychiatric:         Mood and Affect: Mood normal.         Behavior: Behavior normal.                        "

## 2022-04-06 ENCOUNTER — OFFICE VISIT (OUTPATIENT)
Dept: FAMILY MEDICINE | Facility: CLINIC | Age: 57
End: 2022-04-06
Payer: COMMERCIAL

## 2022-04-06 VITALS
WEIGHT: 169.25 LBS | TEMPERATURE: 98 F | BODY MASS INDEX: 26.56 KG/M2 | DIASTOLIC BLOOD PRESSURE: 83 MMHG | SYSTOLIC BLOOD PRESSURE: 160 MMHG | RESPIRATION RATE: 20 BRPM | HEART RATE: 63 BPM | OXYGEN SATURATION: 98 % | HEIGHT: 67 IN

## 2022-04-06 DIAGNOSIS — F41.1 GAD (GENERALIZED ANXIETY DISORDER): ICD-10-CM

## 2022-04-06 DIAGNOSIS — F33.1 MODERATE EPISODE OF RECURRENT MAJOR DEPRESSIVE DISORDER (H): Primary | ICD-10-CM

## 2022-04-06 PROCEDURE — 99214 OFFICE O/P EST MOD 30 MIN: CPT | Performed by: STUDENT IN AN ORGANIZED HEALTH CARE EDUCATION/TRAINING PROGRAM

## 2022-04-06 RX ORDER — ESCITALOPRAM OXALATE 10 MG/1
10 TABLET ORAL DAILY
Qty: 30 TABLET | Refills: 1 | Status: SHIPPED | OUTPATIENT
Start: 2022-04-06 | End: 2022-04-07 | Stop reason: SINTOL

## 2022-04-06 ASSESSMENT — ANXIETY QUESTIONNAIRES
6. BECOMING EASILY ANNOYED OR IRRITABLE: SEVERAL DAYS
GAD7 TOTAL SCORE: 15
2. NOT BEING ABLE TO STOP OR CONTROL WORRYING: NEARLY EVERY DAY
7. FEELING AFRAID AS IF SOMETHING AWFUL MIGHT HAPPEN: NEARLY EVERY DAY
1. FEELING NERVOUS, ANXIOUS, OR ON EDGE: NEARLY EVERY DAY
IF YOU CHECKED OFF ANY PROBLEMS ON THIS QUESTIONNAIRE, HOW DIFFICULT HAVE THESE PROBLEMS MADE IT FOR YOU TO DO YOUR WORK, TAKE CARE OF THINGS AT HOME, OR GET ALONG WITH OTHER PEOPLE: SOMEWHAT DIFFICULT
5. BEING SO RESTLESS THAT IT IS HARD TO SIT STILL: SEVERAL DAYS
3. WORRYING TOO MUCH ABOUT DIFFERENT THINGS: NEARLY EVERY DAY

## 2022-04-06 ASSESSMENT — PATIENT HEALTH QUESTIONNAIRE - PHQ9
5. POOR APPETITE OR OVEREATING: SEVERAL DAYS
SUM OF ALL RESPONSES TO PHQ QUESTIONS 1-9: 8

## 2022-04-06 ASSESSMENT — PAIN SCALES - GENERAL: PAINLEVEL: NO PAIN (0)

## 2022-04-06 NOTE — PROGRESS NOTES
Assessment & Plan   1. Moderate episode of recurrent major depressive disorder (H)  Uncontrolled  He saw therapist last week. He also saw his PCP last week. PCP sent prescription for prozac  to the pharmacy but he has did not pick it up as he has used this before and it gave him headache and tired. We will be trying a new selective serotonin reuptake inhibitor as pt is open to this.   He is not currently taking his gabapentin.  Advised pt to take Escitalopram at night and Wellbutrin in the morning. He can start gabapentin a week or 2 after starting Escitalopram just to minimize side effect   - escitalopram (LEXAPRO) 10 MG tablet; Take 1 tablet (10 mg) by mouth daily  Dispense: 30 tablet; Refill: 1  He is no longer taking Ativan.  2. SANDRA (generalized anxiety disorder)  Uncontrolled  As above. He is seeing his therapist tomorrow  - escitalopram (LEXAPRO) 10 MG tablet; Take 1 tablet (10 mg) by mouth daily  Dispense: 30 tablet; Refill: 1  I discussed the potential side effects of antianxiety medications as well as the liklihood of worsening before improvement over the next few weeks. I reemphasized the importance of a multi-armed approach towards the treatment of anxiety including regular exercise, adequate sleep, and a well rounded, low-glycemic diet.  In addition, I emphasized the importance of ongoing development of his support network. If new or worsening symptoms, he will seek help immediately.    Return in about 2 weeks (around 4/20/2022) for Follow up, using a phone visit, using a video visit.    Lian Rodriguez MD  [facility] TESSA Amaral is a 56 year old who presents for the following health issues   HPI     Depression and Anxiety Follow-Up    How are you doing with your depression since your last visit? Improved     How are you doing with your anxiety since your last visit?  Not improving. He was doing well 3 weeks ago until he had finger injury and he recently found out  his step son has a brain tumor    Are you having other symptoms that might be associated with depression or anxiety? Yes:  worrying    Have you had a significant life event? No    Do you have any concerns with your use of alcohol or other drugs? No    PHQ 9 of 8 and SANDRA of 15.  Social History     Tobacco Use     Smoking status: Former Smoker     Packs/day: 0.50     Years: 20.00     Pack years: 10.00     Types: Cigarettes, Dip, chew, snus or snuff     Quit date: 2019     Years since quittin.6     Smokeless tobacco: Current User     Types: Chew   Vaping Use     Vaping Use: Never used   Substance Use Topics     Alcohol use: No     Comment: hx alcohol abuse, sober since      Drug use: No     PHQ 2022 3/22/2022 2022   PHQ-9 Total Score 8 6 8   Q9: Thoughts of better off dead/self-harm past 2 weeks Not at all Not at all Not at all   F/U: Thoughts of suicide or self-harm - - -   F/U: Safety concerns - - -     SANDRA-7 SCORE 2022 3/31/2022 2022   Total Score 9 14 15     Last PHQ-9 2022   1.  Little interest or pleasure in doing things 1   2.  Feeling down, depressed, or hopeless 2   3.  Trouble falling or staying asleep, or sleeping too much 0   4.  Feeling tired or having little energy 2   5.  Poor appetite or overeating 2   6.  Feeling bad about yourself 1   7.  Trouble concentrating 0   8.  Moving slowly or restless 0   Q9: Thoughts of better off dead/self-harm past 2 weeks 0   PHQ-9 Total Score 8   Difficulty at work, home, or with people Somewhat difficult   In the past two weeks have you had thoughts of suicide or self harm? -   Do you have concerns about your personal safety or the safety of others? -     SANDRA-7  2022   1. Feeling nervous, anxious, or on edge 3   2. Not being able to stop or control worrying 3   3. Worrying too much about different things 3   4. Trouble relaxing 1   5. Being so restless that it is hard to sit still 1   6. Becoming easily annoyed or irritable 1   7.  "Feeling afraid, as if something awful might happen 3   SANDRA-7 Total Score 15   If you checked any problems, how difficult have they made it for you to do your work, take care of things at home, or get along with other people? Somewhat difficult           Review of Systems   Constitutional, HEENT, cardiovascular, pulmonary, gi and gu systems are negative, except as otherwise noted.      Objective    BP (!) 160/83   Pulse 63   Temp 98  F (36.7  C) (Tympanic)   Resp 20   Ht 1.702 m (5' 7\")   Wt 76.8 kg (169 lb 4 oz)   SpO2 98%   BMI 26.51 kg/m    Body mass index is 26.51 kg/m .  Physical Exam   GENERAL: healthy, alert and no distress  RESP: equal chest rise  MS: no gross musculoskeletal defects noted, no edema  SKIN: no suspicious lesions or rashes  NEURO:  speech normal  PSYCH: mentation appears normal, affect normal/bright            "

## 2022-04-06 NOTE — PATIENT INSTRUCTIONS
Jimmy Amaral,    Thank you for allowing [facility] to manage your care.    I sent your prescriptions to your pharmacy.    Start the 10 mg of escitalopram . You can start taking the gabapentin a week after you start escitalopram  Continue to see your therapist    For your convenience, test results are released as soon as they are available  Please allow 1-2 business days for me to send you a comment about your results.  If not done so, I encourage you to login into [email or web address] ([email or web address] to review your results in real time.     If you have any questions or concerns, please feel free to call us at [phone].    Sincerely,    Dr. Rodriguez    Did you know?      You can schedule a video visit for follow-up appointments as well as future appointments for certain conditions.  Please see the below link.     [email or web address]    If you have not already done so,  I encourage you to sign up for [email or web address] ([email or web address]).  This will allow you to review your results, securely communicate with a provider, and schedule virtual visits as well.

## 2022-04-07 ENCOUNTER — TELEPHONE (OUTPATIENT)
Dept: FAMILY MEDICINE | Facility: CLINIC | Age: 57
End: 2022-04-07
Payer: COMMERCIAL

## 2022-04-07 DIAGNOSIS — F41.1 GAD (GENERALIZED ANXIETY DISORDER): Primary | ICD-10-CM

## 2022-04-07 RX ORDER — LORAZEPAM 0.5 MG/1
0.5 TABLET ORAL 2 TIMES DAILY PRN
Qty: 10 TABLET | Refills: 0 | Status: SHIPPED | OUTPATIENT
Start: 2022-04-07 | End: 2022-04-12

## 2022-04-07 ASSESSMENT — ANXIETY QUESTIONNAIRES: GAD7 TOTAL SCORE: 15

## 2022-04-07 NOTE — TELEPHONE ENCOUNTER
Yes, sorry.  I have sent both the 20 mg prozac (#30) and 10 tabs of ativan.  He will need a f/u visit with me for further refills.  Check to see if he has psychiatry appt scheduled please.   Nasreen Martínez PA-C

## 2022-04-07 NOTE — TELEPHONE ENCOUNTER
Before nurse calls patient back.  Please answer #2 concern regarding getting lorazepam.  Will need rx for Prozac per patient only has #14 left.  Pharmacy pended.

## 2022-04-07 NOTE — TELEPHONE ENCOUNTER
Has he met with a psychiatrist yet?  Previous message from 4/1/22 said he had an appt with psychiatrist on 4/2/22. Did he have this appt?  If not, when is his psychiatry appt (this needs to be scheduled ASAP).  I do not recommend going back and forth with so many different providers and once he has a psychiatrist, then they will be in charge of meds (we don't want too many cooks in the kitchen).        Yes, ok to take the 20 mg prozac.     Would he like to schedule a phone visit to check back in with me in about 2-3 weeks?  If so, please schedule that now.    Nasreen Martínez PA-C

## 2022-04-07 NOTE — TELEPHONE ENCOUNTER
Patient calling to report he took escitalopram (LEXAPRO) 10 MG tablet last night and had a firery feeling in chest and up to his head. Had hard time taking a deep breath. Patient would like to start Fluoxetine 20 mg he has some at home. Asking if dose is ok.

## 2022-04-07 NOTE — TELEPHONE ENCOUNTER
Called patient, he said he tried to schedule with psychiatry and they set him up with Dr Huff. Nurse gave patient the phone number off his mental health referral to call and get set up. Patient expressed his appreciation for all the providers and staff are doing for him. He said he has a physical set up at the end of the month with Dr Morales because he prefers a male for that but will follow up with Nasreen.     Nasreen Montoya RN

## 2022-04-07 NOTE — TELEPHONE ENCOUNTER
Patient was seen by Dr Huff yesterday and started on lexapro.  Patient asked that message be sent to his PCP, per him he relates better to PCP.  Patient stating side effect with med, see below.  Symptoms subsided last night and he did fall asleep.  1.Patient asking if he can start Fluoxetine 20 mg or if there is another med provider recommends?  Patient stated Fluxetine did make him tired and gave him a headache but he knows he needs something else to help with his anxiety.   2. Patient did take a lorazepam today which helped with his anxiety.  Patient asked if he could also get a small supply of Lorazepam to have prn.  Please advise.      1. Moderate episode of recurrent major depressive disorder (H)  Uncontrolled  He saw therapist last week. He also saw his PCP last week. PCP sent prescription for prozac  to the pharmacy but he has did not pick it up as he has used this before and it gave him headache and tired. We will be trying a new selective serotonin reuptake inhibitor as pt is open to this.   He is not currently taking his gabapentin.  Advised pt to take Escitalopram at night and Wellbutrin in the morning. He can start gabapentin a week or 2 after starting Escitalopram just to minimize side effect   - escitalopram (LEXAPRO) 10 MG tablet; Take 1 tablet (10 mg) by mouth daily  Dispense: 30 tablet; Refill: 1  He is no longer taking Ativan.  2. SANDRA (generalized anxiety disorder)  Uncontrolled  As above. He is seeing his therapist tomorrow  - escitalopram (LEXAPRO) 10 MG tablet; Take 1 tablet (10 mg) by mouth daily  Dispense: 30 tablet; Refill: 1  I discussed the potential side effects of antianxiety medications as well as the liklihood of worsening before improvement over the next few weeks. I reemphasized the importance of a multi-armed approach towards the treatment of anxiety including regular exercise, adequate sleep, and a well rounded, low-glycemic diet.  In addition, I emphasized the importance of  ongoing development of his support network. If new or worsening symptoms, he will seek help immediately.

## 2022-04-11 ASSESSMENT — ANXIETY QUESTIONNAIRES
4. TROUBLE RELAXING: NOT AT ALL
3. WORRYING TOO MUCH ABOUT DIFFERENT THINGS: NEARLY EVERY DAY
GAD7 TOTAL SCORE: 12
1. FEELING NERVOUS, ANXIOUS, OR ON EDGE: NEARLY EVERY DAY
7. FEELING AFRAID AS IF SOMETHING AWFUL MIGHT HAPPEN: NEARLY EVERY DAY
6. BECOMING EASILY ANNOYED OR IRRITABLE: NOT AT ALL
2. NOT BEING ABLE TO STOP OR CONTROL WORRYING: NEARLY EVERY DAY
GAD7 TOTAL SCORE: 12
7. FEELING AFRAID AS IF SOMETHING AWFUL MIGHT HAPPEN: NEARLY EVERY DAY
5. BEING SO RESTLESS THAT IT IS HARD TO SIT STILL: NOT AT ALL
GAD7 TOTAL SCORE: 12

## 2022-04-11 NOTE — PROGRESS NOTES
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 noted    ASSESSMENT AND PLAN      ASSESSMENT:  Munir Storey is a 56 year old White, male who presents for initial visit with [facility] ([facility]) for medication management. Pt with a history of anxiety and panic who presents with concerns of uncontrolled increased anxiety in the past several weeks in the context of psychosocial stressors. Pt has tried several antidepressants in the past but discontinues due to SEs, generally. He recalls fluoxetine was helpful in 2021 but since restarting it 5 days ago has felt more anxious. We discussed decreasing to 10mg and restarting it, discussing how fluoxetine can increase anxiety in the short term. Recommended pt increase gabapentin for anxiety TID and use lorazepam as last-resort option vs BID. Pt's level of anxiety is so high during exam that we reviewed indications for IOP, which he is amenable to, and [facility] if needed. Pt denies SI but endorses recent morbid thoughts of death due to finding anxiety intolerable. He has been taking bupropion for years for anxiety and I doubt this is exacerbating sx now so will not make adjustments. Consider quetiapine for sleep and anxiety if needed.     TREATMENT PLAN: Medication side effects and alternatives reviewed. Health promotion  activities recommended and reviewed. All questions addressed. Education and counseling completed regarding risks and benefits of medications and psychotherapy options. [facility] model reviewed today. Recommend therapy for additional support. Safety plan reviewed as indicated.     MEDICATIONS:   -(re)start FLUOXETINE 10mg daily x 2 weeks then increase to 20mg daily  -increase GABAPENTIN to 300mg TID for anxiety  -continue BUPROPION XL 300mg daily  -continue LORAZEPAM 0.5mg daily if needed for panic    LABS/RADS:   -None at this time    PSYCHOSOCIAL:   -Referred to [facility] by [facility]  -Continue with therapy  -Follow up with primary care provider as planned or for acute medical concerns.    PSYCHOEDUCATION:  Medication side effects and alternatives reviewed. Health promotion activities recommended and reviewed today. All questions addressed. Education and counseling completed regarding risks and benefits of medications and psychotherapy options.  Consent provided by patient/guardian  Call the psychiatric nurse line with medication questions or concerns at [phone].  Wiren Boardhart may be used to communicate with your provider, but this is not intended to be used for emergencies.  BLACK BOX WARNING: Discussed the Food and Drug Administration (FDA) requires that all antidepressants carry a warning that some children, adolescents and young adults may be at increased risk of suicide when taking antidepressants. Anyone taking an antidepressant should be watched closely for worsening depression or unusual behavior especially in the first few weeks after starting an SSRI. Keep in mind, antidepressants are more likely to reduce suicide risk in the long run by improving mood.   BENZODIAZEPINE:  discussion on how benzos work and the need to use them short term due to potential of anxiety getting.  This is a controlled substance with risk for abuse, need to keep in a safe keep place and cannot replace lost  scripts.    Medlineplus.gov is information for patients.  It is run by the [employer] and it contains information about all disorders, diseases and all medications.      FOLLOW-UP: Follow up in 2 weeks    1. Continue all other treatments (including medications) per primary care provider and/or specialists.   2. To schedule individual or family therapy, call [facility] at [phone].   3. Follow up with primary care provider as planned or for acute medical concerns.  4. Call the psychiatric nurse line with medication questions or concerns at [phone] or [phone].  5. Prifloat may be used to communicate with your care team, but this is not intended to be used for emergencies.    CRISIS RESOURCES:    1. Present to the Emergency Department as needed or call after hours crisis line at [phone] or [phone].   2. [place] Crisis Text Line: Text [place] to 996703.  3. Suicide LifeLine Chat: suicidepreKFL Investment Managementline.org/chat/.  4. National Suicide Prevention Lifeline: 800.945.3209 (TTY: 480.409.1587). Call anytime for help.  (www.suicidepreventionlifeline.org)  5. National [place] on Mental Illness (www.maritza.org): 768.541.4509 or 383-992-7973.  6. Mental Health Association (www.mentalhealth.org): 198.943.2210 or 671-556-5217.    ADMINISTRATIVE BILLING:    Time spent interviewing patient, reviewing referral documents, obtaining and reviewing outside records, communication with other health specialists, and preparing this report on today's date  Video/Phone Start Time: 0802  Video/Phone End Time: 0844    Patient Status:  [facility] MD/DO/NP/PA providers offer care a specialty service for Primary Care Providers in the [facility] that seek to optimize psychotropic medications for unstable patients.  Once medications have been optimized, our providers discharge the patient back to the referring Primary Care Provider for ongoing medication management.  This type of system allows our  providers to serve a high volume of patients.   At the time of today's exam: Patient will continue to be seen for ongoing consultation and stabilization.    Signed:   Jaqueline Eden DNP, PMP-BC  [facility] ([facility])

## 2022-04-12 ENCOUNTER — VIRTUAL VISIT (OUTPATIENT)
Dept: PSYCHIATRY | Facility: CLINIC | Age: 57
End: 2022-04-12
Attending: PHYSICIAN ASSISTANT
Payer: COMMERCIAL

## 2022-04-12 ENCOUNTER — VIRTUAL VISIT (OUTPATIENT)
Dept: BEHAVIORAL HEALTH | Facility: CLINIC | Age: 57
End: 2022-04-12
Payer: COMMERCIAL

## 2022-04-12 DIAGNOSIS — F33.1 MODERATE EPISODE OF RECURRENT MAJOR DEPRESSIVE DISORDER (H): ICD-10-CM

## 2022-04-12 DIAGNOSIS — F41.1 GAD (GENERALIZED ANXIETY DISORDER): Primary | ICD-10-CM

## 2022-04-12 DIAGNOSIS — R45.89 ANXIETY ABOUT HEALTH: ICD-10-CM

## 2022-04-12 DIAGNOSIS — F41.0 PANIC DISORDER WITHOUT AGORAPHOBIA: ICD-10-CM

## 2022-04-12 DIAGNOSIS — F41.0 PANIC ATTACK: Primary | ICD-10-CM

## 2022-04-12 DIAGNOSIS — F41.1 GAD (GENERALIZED ANXIETY DISORDER): ICD-10-CM

## 2022-04-12 PROCEDURE — 99215 OFFICE O/P EST HI 40 MIN: CPT | Mod: 95 | Performed by: STUDENT IN AN ORGANIZED HEALTH CARE EDUCATION/TRAINING PROGRAM

## 2022-04-12 PROCEDURE — 90791 PSYCH DIAGNOSTIC EVALUATION: CPT | Mod: 95 | Performed by: SOCIAL WORKER

## 2022-04-12 RX ORDER — GABAPENTIN 300 MG/1
300 CAPSULE ORAL 3 TIMES DAILY
Qty: 90 CAPSULE | Refills: 0 | Status: SHIPPED | OUTPATIENT
Start: 2022-04-12 | End: 2022-05-09

## 2022-04-12 RX ORDER — LORAZEPAM 0.5 MG/1
0.5 TABLET ORAL DAILY PRN
Qty: 14 TABLET | Refills: 0 | Status: SHIPPED | OUTPATIENT
Start: 2022-04-12 | End: 2022-04-19

## 2022-04-12 RX ORDER — FLUOXETINE 10 MG/1
CAPSULE ORAL
Qty: 42 CAPSULE | Refills: 0 | Status: SHIPPED | OUTPATIENT
Start: 2022-04-12 | End: 2022-05-04

## 2022-04-12 ASSESSMENT — COLUMBIA-SUICIDE SEVERITY RATING SCALE - C-SSRS
2. HAVE YOU ACTUALLY HAD ANY THOUGHTS OF KILLING YOURSELF IN THE PAST MONTH?: NO
4. HAVE YOU HAD THESE THOUGHTS AND HAD SOME INTENTION OF ACTING ON THEM?: NO
2. HAVE YOU ACTUALLY HAD ANY THOUGHTS OF KILLING YOURSELF LIFETIME?: NO
3. HAVE YOU BEEN THINKING ABOUT HOW YOU MIGHT KILL YOURSELF?: NO
6. HAVE YOU EVER DONE ANYTHING, STARTED TO DO ANYTHING, OR PREPARED TO DO ANYTHING TO END YOUR LIFE?: NO
1. IN THE PAST MONTH, HAVE YOU WISHED YOU WERE DEAD OR WISHED YOU COULD GO TO SLEEP AND NOT WAKE UP?: YES
1. IN THE PAST MONTH, HAVE YOU WISHED YOU WERE DEAD OR WISHED YOU COULD GO TO SLEEP AND NOT WAKE UP?: YES
5. HAVE YOU STARTED TO WORK OUT OR WORKED OUT THE DETAILS OF HOW TO KILL YOURSELF? DO YOU INTEND TO CARRY OUT THIS PLAN?: NO

## 2022-04-12 ASSESSMENT — ANXIETY QUESTIONNAIRES: GAD7 TOTAL SCORE: 12

## 2022-04-12 NOTE — PATIENT INSTRUCTIONS
Restart fluoxetine 10mg daily x 2 weeks then increase to 20mg daily  Increase gabapentin to 300mg three times daily for anxiety  Continue lorazepam 0.5mg daily if needed for panic  Continue bupropion XL 300mg daily for mood/anxiety  [facility] will place a referral for [facility]  Call [phone] or [phone] to schedule follow up with me in 2 weeks.

## 2022-04-12 NOTE — PROGRESS NOTES
Munir is a 56 year old who is being evaluated via a billable video visit.      How would you like to obtain your AVS? MyChart  If the video visit is dropped, the invitation should be resent by: Text to cell phone: [phone]  Will anyone else be joining your video visit? Yenny lemon

## 2022-04-12 NOTE — PROGRESS NOTES
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 of worthlessness or inappropriate and excessive guilt.    - Diminished ability to think or concentrate, or indecisiveness.    - Recurrent thoughts of death (not just fear of dying), recurrent suicidal ideation without a specific plan, or a suicide attempt or a specific plan for committing suicide.   B) The symptoms cause clinically significant distress or impairment in social, occupational, or other important areas of functioning  C) The episode is not attributable to the physiological effects of a substance or to another medical condition  D) The occurence of major depressive episode is not better explained by other thought / psychotic disorders  E) There has never been a manic episode or hypomanic episode    Functional Status:  Patient reports the following functional impairments:  health maintenance, management of the household and or completion of tasks, self-care and work / vocational responsibilities.     Programmatic care:  Current LOCUS was assessed and patient needs the following level of care based on score Recommended for IOP  .    Clinical Summary:  1. Reason for assessment: Medication stabilization.  2. Psychosocial, Cultural and Contextual Factors:  Patient lives with his wife and is self-employed.  His wife has gone to [place] to be with her son for surgery.  Patient finds that he is unable to be on his own and is staying with his sister most of the time.  He is not functioning at work and his anxiety is causing issues.  3. Principal DSM5 Diagnoses  (Sustained by DSM5 Criteria Listed Above):   296.32 (F33.1) Major Depressive Disorder, Recurrent Episode, Moderate _  300.01 (F41.0) Panic Disorder  300.02 (F41.1) Generalized Anxiety Disorder.  4. Other Diagnoses that is relevant to services: Not applicable  5. Provisional Diagnosis: Not applicable.  6. Prognosis: Relieve Acute Symptoms.  7. Likely consequences of symptoms if not treated: Patient will continue to experience negative symptoms and potentially be hospitalized if he is unable to stabilize.  Patient requires IOP.  8. Client strengths include:  caring, empathetic, employed, goal-focused, good listener, has a previous history of therapy, insightful, intelligent, motivated, open to learning, open to suggestions / feedback, support of family, friends and providers, wants to learn, willing to ask questions, willing to relate to others and work history .     Recommendations:     1. Plan for Safety and Risk Management:   Recommended that patient call 911 or go to the local ED should there be a change in any of these risk factors..          Report to child / adult protection services was NA.     2. Patient's identified mental health concerns with a cultural influence will be addressed by Needing additional support at this time.     3. Initial Treatment will focus on:    Depressed Mood - Patient will report improved mood and no suicidal thoughts  Anxiety - Patient will be able to function and experience less panic.     4. Resources/Service Plan:    services are not indicated.   Modifications to assist communication are not indicated.   Additional disability accommodations are not indicated.      5.  Collaboration:   Collaboration / coordination of treatment will be initiated with the following  support professionals: primary care physician and psychiatry.      6.  Referrals:   The following referral(s) will be initiated: Intensive outpatient for mental health. Next Scheduled Appointment: To be determined.     A Release of Information has been obtained for the following: Not applicable.    7. ALEX:    ALEX:  Discussed the general effects of drugs and alcohol on health and well-being. Provider gave patient printed information about the effects of chemical use on their health and well being. Recommendations: No concerns at this time.     8. Records:   These were reviewed at time of assessment.   Information in this assessment was obtained from the medical record and  provided by patient who is a good historian.    Patient will have open access to their mental health medical record.      Provider Name/ Credentials:  Germain Willson  April 12, 2022

## 2022-04-13 ENCOUNTER — TELEPHONE (OUTPATIENT)
Dept: BEHAVIORAL HEALTH | Facility: CLINIC | Age: 57
End: 2022-04-13

## 2022-04-15 ENCOUNTER — HOSPITAL ENCOUNTER (OUTPATIENT)
Dept: BEHAVIORAL HEALTH | Facility: CLINIC | Age: 57
Discharge: HOME | End: 2022-04-15
Attending: FAMILY MEDICINE
Payer: COMMERCIAL

## 2022-04-15 ENCOUNTER — PATIENT OUTREACH (OUTPATIENT)
Dept: NURSING | Facility: CLINIC | Age: 57
End: 2022-04-15
Payer: COMMERCIAL

## 2022-04-15 DIAGNOSIS — F33.1 MODERATE EPISODE OF RECURRENT MAJOR DEPRESSIVE DISORDER (H): ICD-10-CM

## 2022-04-15 DIAGNOSIS — F41.1 GAD (GENERALIZED ANXIETY DISORDER): ICD-10-CM

## 2022-04-15 DIAGNOSIS — F41.0 PANIC DISORDER WITHOUT AGORAPHOBIA: ICD-10-CM

## 2022-04-15 PROCEDURE — 999N000216 HC STATISTIC ADULT CD FACE TO FACE-NO CHRG: Mod: GT,95 | Performed by: PSYCHOLOGIST

## 2022-04-15 ASSESSMENT — ANXIETY QUESTIONNAIRES
3. WORRYING TOO MUCH ABOUT DIFFERENT THINGS: NEARLY EVERY DAY
GAD7 TOTAL SCORE: 16
6. BECOMING EASILY ANNOYED OR IRRITABLE: NOT AT ALL
7. FEELING AFRAID AS IF SOMETHING AWFUL MIGHT HAPPEN: NEARLY EVERY DAY
1. FEELING NERVOUS, ANXIOUS, OR ON EDGE: NEARLY EVERY DAY
4. TROUBLE RELAXING: SEVERAL DAYS
5. BEING SO RESTLESS THAT IT IS HARD TO SIT STILL: NEARLY EVERY DAY
2. NOT BEING ABLE TO STOP OR CONTROL WORRYING: NEARLY EVERY DAY
IF YOU CHECKED OFF ANY PROBLEMS ON THIS QUESTIONNAIRE, HOW DIFFICULT HAVE THESE PROBLEMS MADE IT FOR YOU TO DO YOUR WORK, TAKE CARE OF THINGS AT HOME, OR GET ALONG WITH OTHER PEOPLE: VERY DIFFICULT

## 2022-04-15 ASSESSMENT — COLUMBIA-SUICIDE SEVERITY RATING SCALE - C-SSRS
6. HAVE YOU EVER DONE ANYTHING, STARTED TO DO ANYTHING, OR PREPARED TO DO ANYTHING TO END YOUR LIFE?: NO
5. HAVE YOU STARTED TO WORK OUT OR WORKED OUT THE DETAILS OF HOW TO KILL YOURSELF? DO YOU INTEND TO CARRY OUT THIS PLAN?: NO
1. IN THE PAST MONTH, HAVE YOU WISHED YOU WERE DEAD OR WISHED YOU COULD GO TO SLEEP AND NOT WAKE UP?: YES
3. HAVE YOU BEEN THINKING ABOUT HOW YOU MIGHT KILL YOURSELF?: NO
4. HAVE YOU HAD THESE THOUGHTS AND HAD SOME INTENTION OF ACTING ON THEM?: NO
2. HAVE YOU ACTUALLY HAD ANY THOUGHTS OF KILLING YOURSELF IN THE PAST MONTH?: NO

## 2022-04-15 ASSESSMENT — PATIENT HEALTH QUESTIONNAIRE - PHQ9: SUM OF ALL RESPONSES TO PHQ QUESTIONS 1-9: 11

## 2022-04-15 NOTE — PROGRESS NOTES
Clinic Care Coordination Contact    Community Health Worker Follow Up    Care Gaps:     Health Maintenance Due   Topic Date Due     ZOSTER IMMUNIZATION (1 of 2) Never done     INFLUENZA VACCINE (1) 09/01/2021     PREVENTIVE CARE VISIT  02/18/2022       Patient accepted scheduling phone number for [facility]  to schedule independently     Goals:    Goals Addressed as of 4/15/2022 at 11:41 AM                    Today    2/10/22       2- Medical (pt-stated)   100%  50%    Added 1/3/22 by Vikki Durán LICSW      I have accomplished following up with Cardiology and other services.    Personal Plan  If I have any questions for the cardiologist I will call them directly.                3- Mental Health Management (pt-stated)   80%  20%    Added 1/3/22 by Vikki Durán LICSW      Goal Statement: I will take steps to manage anxiety    Date Goal set: 1/3/22  Barriers: financial and medical stressors  Strengths: motivated  Date to Achieve By: 3 months  Patient expressed understanding of goal: yes    Action steps to achieve this goal:  1. I will continue to take medication. I have been continuing to take my medications as prescribed. Continuous ([facility])  2. I will contact [facility] for free counseling sessions. I have not needed to use this resource yet but have the information if needed. Continuous (MB)  3. I will establish with a therapist when I get better insurance. I have an initial appointment with a Therapist today 4/15/22.    Goal Updated: 4/15/22/22              Intervention and Education during outreach: N/A    CHW Plan: CHW will follow up with patient in 1 month on 5/16/22.    Savana Doan  Community Health Worker  [facility]  Clinic Care Coordination   Office: [phone]

## 2022-04-15 NOTE — TELEPHONE ENCOUNTER
----- Message from PACO Villavicencio sent at 4/15/2022  2:40 PM CDT -----  Regarding: PHP Referral  Scheduling Request    Patient Name: Munir Storey  Location of programming: Virtual  Start Date: 4/19/22  Group: PHP Program Track One  [id number]  9AM - 3PM  M-F   Attending Provider (MD):  Dr Ralph Monzon.  Number of visits to be scheduled: 50  Duration of Appointment in minutes: 360  Visit Type: Zoom - [id number]

## 2022-04-15 NOTE — PATIENT INSTRUCTIONS
Adult Short Safety Plan:   Name: Munir Storey  YOB: 1965  Date: April 15, 2022   My primary care provider: Nasreen Martínez  My [facility]: [facility]  My prescriber: Peggy Thompson NP  Other care team support:  [facility]    My Triggers:      Having to go somewhere. (Cancelled his last vacation)  Not being able to be there for his wife who is currently out of state and going through a rough time.  When the anxiety gets really bad, he reports he goes to 'the dark side'. Additional People, Places, and Things that I can access for support: his sister, Mariya.  Wife, Ariane.  Also my nephew Donn, sister [place] brother Drew.  therapist             What is important to me and makes life worth living: I got a million things, I don't know where to start, beautiful wife, beautiful children, a pontoon, beautiful house.  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            GREEN     Good Control  1. I feel good  2. No suicidal thoughts   3. Can work, sleep and play        Action Steps  1. Self-care: balanced meals, exercising, sleep practices, etc.  2. Take your medications as prescribed.  3. Continue meetings with therapist and prescriber.  4.  Do the healthy things that I enjoy.                YELLOW  Getting Worse  I have ANY of these:  1. I do not feel good  2. Difficulty Concentrating  3. Sleep is changing  4. Increase/Change in my thoughts to hurt self and/or others, but I can still manage and not act on it.   5. Not taking care of self.                Action Steps (in addition to the above):  1. Inform your therapist and psychiatric prescriber/PCP.  2. Keep taking your medications as prescribed.    3. Turn to people you can ask for help.  4. Use internal coping strategies -see below.  5. Create safe environment: notify friends/family of increase in symptoms                RED  Get Help  If I have ANY of these:  1. Current and uncontrollable thoughts and/or behaviors to hurt self  and/or others.        Actions to manage my safety  1. Contact your emergency person Ariane Turcios (Significant other)   [phone] (Mobile)  2. Call my crisis team- [facility] [phone] [facility]  3. Or Call 911 or go to the emergency room right away            My Internal Coping Strategies include the following:  Taking lorazepam, walking, reading the Bible, working in the shop, going to sister's house in the evening           Safety Concerns  How To Identify Situations That Make Your Mental Health Worse:  Triggers are things that make your mental health worse.  Look at the list below to help you find your triggers and what you can do about them.      1. Identify Early Warning Signs:     Sometimes symptoms return, even when people do their best to stay well. Symptoms can develop over a short period of time with little or no warning, but most of the time they emerge gradually over several weeks.  Early warning signs are changes that people experience when a relapse is starting. Some early warning signs are common and others are not as common.   Common Early Warning Signs:    starting to think he would be better off dead         2. Identify action steps to take when warning signs are noticed:     Taking Action- It is important to take action if you are experiencing early warning signs of a relapse.  The faster you act, the more likely it is that you can avoid a full relapse.  It is helpful to identify several specific ways to cope with symptoms.       The following is my list of symptoms and coping strategies that I can use when they are present:     Symptom Coping Strategies   Anxiety -Talk with someone in your support system and let him or her know how you are feeling.  -Use relaxation techniques such as deep breathing or imagery.  -Use positive affirmations to counteract negative self-talk such as  I am learning to let go of worry.    Depression - Schedule your day; include  activities you have to do and activities you enjoy doing.  - Get some exercise - walk, run, bike, or swim.  - Give yourself credit for even the smallest things you get done.   Sleep Difficulties    - Go to sleep at the same time every day.  - Do something relaxing before bed, such as drinking herbal tea or listening to music.  - Avoid having discussions about upsetting topics before going to bed.   Delusions    - Distract yourself from the disturbing thought by doing something that requires your attention such as a puzzle.  - Check out your beliefs by talking to someone you trust.    Hallucinations    - Use headphones to listen to music.  - Tell voices to  stop  or say to yourself,  I am safe.   - Ignore the hallucinations as much as possible; focus on other things.   Concentration Difficulties - Minimize distractions so there is only one thing for you to focus on at a time.    - Ask the person you are having a conversation with to slow down or repeat things you are unsure of.
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
## 2022-04-16 ASSESSMENT — ANXIETY QUESTIONNAIRES: GAD7 TOTAL SCORE: 16

## 2022-04-18 ENCOUNTER — TELEPHONE (OUTPATIENT)
Dept: BEHAVIORAL HEALTH | Facility: CLINIC | Age: 57
End: 2022-04-18
Payer: COMMERCIAL

## 2022-04-19 ENCOUNTER — HOSPITAL ENCOUNTER (OUTPATIENT)
Dept: BEHAVIORAL HEALTH | Facility: CLINIC | Age: 57
Discharge: HOME | End: 2022-04-19
Attending: PSYCHIATRY & NEUROLOGY | Admitting: PSYCHIATRY & NEUROLOGY
Payer: COMMERCIAL

## 2022-04-19 DIAGNOSIS — F33.1 MDD (MAJOR DEPRESSIVE DISORDER), RECURRENT EPISODE, MODERATE (H): ICD-10-CM

## 2022-04-19 DIAGNOSIS — F41.1 GAD (GENERALIZED ANXIETY DISORDER): ICD-10-CM

## 2022-04-19 DIAGNOSIS — F41.0 PANIC DISORDER WITHOUT AGORAPHOBIA: Primary | ICD-10-CM

## 2022-04-19 PROCEDURE — H0035 MH PARTIAL HOSP TX UNDER 24H: HCPCS | Mod: GT,95

## 2022-04-19 PROCEDURE — H0035 MH PARTIAL HOSP TX UNDER 24H: HCPCS | Mod: GT,95 | Performed by: COUNSELOR

## 2022-04-19 PROCEDURE — 99215 OFFICE O/P EST HI 40 MIN: CPT | Mod: 95 | Performed by: PSYCHIATRY & NEUROLOGY

## 2022-04-19 RX ORDER — LORAZEPAM 0.5 MG/1
0.5 TABLET ORAL DAILY PRN
Qty: 30 TABLET | Refills: 0 | Status: SHIPPED | OUTPATIENT
Start: 2022-04-19 | End: 2022-05-09

## 2022-04-19 NOTE — GROUP NOTE
Psychoeducation Group Note    PATIENT'S NAME: Munir Storey  MRN:   [id number]  :   1965  ACCT. NUMBER: [id number]  DATE OF SERVICE: 22  START TIME: 11:00 AM  END TIME: 11:50 AM  FACILITATOR: Ema Akbar OTR/L  TOPIC: MH Life Skills Group: Sensory Approaches in Mental Health  [facility] Adult Partial Hospital[facility] Program  TRACK: [facility]    NUMBER OF PARTICIPANTS: 7                                      Service Modality:  Video Visit     Telemedicine Visit: The patient's condition can be safely assessed and treated via synchronous audio and visual telemedicine encounter.      Reason for Telemedicine Visit: Services only offered telehealth    Originating Site (Patient Location): Patient's home    Distant Site (Provider Location): Provider Remote Setting- Home Office    Consent:  The patient/guardian has verbally consented to: the potential risks and benefits of telemedicine (video visit) versus in person care; bill my insurance or make self-payment for services provided; and responsibility for payment of non-covered services.     Patient would like the video invitation sent by:  My Chart    Mode of Communication:  Video Conference via Medical Zoom    As the provider I attest to compliance with applicable laws and regulations related to telemedicine.         Summary of Group / Topics Discussed:  Sensory Approaches in Mental Health:  Self Regulation Through Sensory Modulation:  Patients were provided with education on Nervous System activation and taught skills that can be used immediately to help them calm down and regain self control.  Patients were taught how to recognize specific signs and symptoms of their individualized state of arousal and how to make changes when needed.  Patients explored body based skills (bottom up processing skills) and techniques to help manage symptoms and change level of arousal when needed to be in control and comfortable so they are able to function in different  environments.       Patient Session Goals / Objectives:    Identified specific and individualized neurosensory skills to help when distressed and for crisis management    Identified signs and symptoms of current level of arousal and ways to make changes in level of arousal when needed    Established a plan for practice of these skills in their own environments      Patient Participation / Response:  Moderately participated, sharing some personal reflections / insights and adequately adequately received / provided feedback with other participants.    Patient presentation: low energy, engaged in discussion when prompted. Shared alerting activities: movement, pacing.     Treatment Plan:  Patient has an initial individualized treatment plan that was created as part of their diagnostic assessment / admission process.  A master individualized treatment plan is in the process of being developed with the patient and multi-disciplinary care team.    Ema Akbar OTR/L

## 2022-04-19 NOTE — GROUP NOTE
Psychotherapy Group Note    PATIENT'S NAME: Munir Storey  MRN:   [id number]  :   1965  ACCT. NUMBER: [id number]  DATE OF SERVICE: 22  START TIME: 10:00 AM  END TIME: 10:50 AM  FACILITATOR: Olu Ramirez LMFT  TOPIC: MH EBP Group: Behavioral Activation  [facility] Adult Partial Hospitalization Program  TRACK: [facility]    NUMBER OF PARTICIPANTS: 7    Summary of Group / Topics Discussed:  Behavioral Activation: The Change Process - Behavior Change: Patients explored the process and types of change, including but not limited to, theories of change, steps to making change, methods of changing behavior, and potential barriers.  Patients worked to identify what changes may benefit their daily lives, and work towards a plan to implement change.    One patient took some process time during group.    Patient Session Goals / Objectives:    Demonstrate understanding of the change process.      Identify positive and negative behavioral patterns.    Make plans to track and implement changes and share experiences in group.    Identify personal barriers to change    Service Modality:  Video Visit     Telemedicine Visit: The patient's condition can be safely assessed and treated via synchronous audio and visual telemedicine encounter.      Reason for Telemedicine Visit: Services only offered telehealth and due to COVID-19.    Originating Site (Patient Location): Patient's home    Distant Site (Provider Location): Provider Remote Setting- Home Office    Consent:  The patient/guardian has verbally consented to: the potential risks and benefits of telemedicine (video visit) versus in person care; bill my insurance or make self-payment for services provided; and responsibility for payment of non-covered services.     Patient would like the video invitation sent by:  My Chart and email    Mode of Communication:  Video Conference via Medical Zoom    As the provider I attest to compliance with applicable laws and  regulations related to telemedicine.        Patient Participation / Response:  Moderately participated, the group time came to an end before patient was called to share thoughts about the topic.    Demonstrated understanding of topics discussed through group participation    Treatment Plan:  Patient has an initial individualized treatment plan that was created as part of their diagnostic assessment / admission process.  A master individualized treatment plan is in the process of being developed with the patient and multi-disciplinary care team.    Olu Ramirez LMFT

## 2022-04-19 NOTE — GROUP NOTE
Process Group Note    PATIENT'S NAME: Munir Storey  MRN:   [id number]  :   1965  [id number]  DATE OF SERVICE: 22  START TIME:  9:00 AM  END TIME:  9:50 AM  FACILITATOR: Devora Ponce LMFT  TOPIC:  Process Group    Diagnoses:  296.32 (F33.1) Major Depressive Disorder, Recurrent Episode, Moderate _  300.01 (F41.0) Panic Disorder  300.02 (F41.1) Generalized Anxiety Disorder.    [facility] Adult Partial Hospitalization Program  TRACK: 1    NUMBER OF PARTICIPANTS: 7                                      Service Modality:  Video Visit     Telemedicine Visit: The patient's condition can be safely assessed and treated via synchronous audio and visual telemedicine encounter.      Reason for Telemedicine Visit: Services only offered telehealth    Originating Site (Patient Location): Patient's home    Distant Site (Provider Location): Provider Remote Setting- Home Office    Consent:  The patient/guardian has verbally consented to: the potential risks and benefits of telemedicine (video visit) versus in person care; bill my insurance or make self-payment for services provided; and responsibility for payment of non-covered services.     Patient would like the video invitation sent by:  My Chart    Mode of Communication:  Video Conference via Medical Zoom    As the provider I attest to compliance with applicable laws and regulations related to telemedicine.            Data:    Session content: At the start of this group, patients were invited to check in by identifying themselves, describing their current emotional status, and identifying issues to address in this group.   Area(s) of treatment focus addressed in this session included Symptom Management, Personal Safety and Community Resources/Discharge Planning.    Munir reported feeling  acute anxiety  today. Discussed learning of son s brain tumor, he lives in [place], wife is with him,  I am having some separation anxiety.  Reports panic  "attacks,  they ve subsided quite a bit with medications and therapy.   Patient identified the following goal(s) to work towards today:  to get over this hump, live life again, be one with God and my friends, family and grandchildren. I want to get back to who I was.  Patient plans to use the following skills to achieve their goal:  power of prayer.  Patient anticipates the following barriers that may interfere with achieving their goal: separation anxiety, mortality, fear of being alone. Endorses passive safety concerns,  I ve been there once and I don t want to go there again, sometimes I think about it and I let it pass through and flow away, I do have one hell of a support group.  Denies chemical use, and reports taking their medications as prescribed. Patient asked for the following supports from the group today:  just tell me that there s hope.  Munir received words of welcome, support and encouragement from the group.    Therapeutic Interventions/Treatment Strategies:  Psychotherapist offered support, feedback and validation and reinforced use of skills. Treatment modalities used include Motivational Interviewing, Cognitive Behavioral Therapy and Dialectical Behavioral Therapy. Interventions include Coping Skills: Discussed how the use of intentional \"in the moment\" actions can help reduce distress and Promoted understanding of how and when to apply grounding strategies to reduce distress and increase presence in the moment and Other: Explored with patient how life transitions may impact mental health and functioning  and Discussed ways to increase hopefulness.    Assessment:    Patient response:   Patient responded to session by accepting feedback, giving feedback, listening, focusing on goals, being attentive and accepting support    Possible barriers to participation / learning include: and no barriers identified    Health Issues:   None reported       Substance Use Review:   Substance Use: No active " concerns identified.    Mental Status/Behavioral Observations  Appearance:   Appropriate   Eye Contact:   Good   Psychomotor Behavior: Normal   Attitude:   Cooperative  Interested Friendly Pleasant  Orientation:   All  Speech   Rate / Production: Normal    Volume:  Normal   Mood:    Anxious  Normal  Affect:    Appropriate  Worrisome   Thought Content:   Clear and Safety reports  presence of suicidal ideation passive suicidal ideation   Thought Form:  Coherent  Logical     Insight:    Good  and Fair     Plan:     Safety Plan: Committed to safety and agreed to follow previously developed safety coping plan.      Barriers to treatment: None identified    Patient Contracts (see media tab):  None    Substance Use: Not addressed in session     Continue or Discharge: Patient will continue in Adult Partial Hospitalization Program (PHP)  as planned. Patient is likely to benefit from learning and using skills as they work toward the goals identified in their treatment plan.      Devora Ponce, CATHLEEN  April 19, 2022

## 2022-04-19 NOTE — GROUP NOTE
Psychoeducation Group Note    PATIENT'S NAME: Munir Storey  MRN:   [id number]  :   1965  ACCT. NUMBER: [id number]  DATE OF SERVICE: 22  START TIME:  2:00 PM  END TIME:  2:50 PM  FACILITATOR: Jaimie Vuong RN  TOPIC:  Wellness Group: Mental Health Maintenance  [facility] Adult Partial Hospitalization Program  TRACK: 1    NUMBER OF PARTICIPANTS: 6    Summary of Group / Topics Discussed:  Mental Health Maintenance:  Assessments of Strengths: Patients completed a self-reflection on personal strengths worksheet. The concept of personal strength as it relates to resilience were explored. Patients shared responses with the group and participated in discussion.     Patient Session Goals / Objectives:  ? Patients identified 1-3 qualities they consider a personal strength.  ? Patients understood the concept of personal strengths and the connection it has to resiliency  Telemedicine Visit: The patient's condition can be safely assessed and treated via synchronous audio and visual telemedicine encounter.      Reason for Telemedicine Visit: Services only offered telehealth    Originating Site (Patient Location): Patient's home    Distant Site (Provider Location): Provider Remote Setting- Home Office    Consent:  The patient/guardian has verbally consented to: the potential risks and benefits of telemedicine (video visit) versus in person care; bill my insurance or make self-payment for services provided; and responsibility for payment of non-covered services.     Mode of Communication:  Video Conference via Testif    As the provider I attest to compliance with applicable laws and regulations related to telemedicine.         Patient Participation / Response:  Fully participated with the group by sharing personal reflections / insights and openly received / provided feedback with other participants.    Verbalized understanding of mental health maintenance topic    Treatment Plan:  Patient has a  current master individualized treatment plan.  See Epic treatment plan for more information.    Jaimie Vuong RN
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
## 2022-04-20 ENCOUNTER — HOSPITAL ENCOUNTER (OUTPATIENT)
Dept: BEHAVIORAL HEALTH | Facility: CLINIC | Age: 57
Discharge: HOME | End: 2022-04-20
Attending: PSYCHIATRY & NEUROLOGY | Admitting: PSYCHIATRY & NEUROLOGY
Payer: COMMERCIAL

## 2022-04-20 PROCEDURE — H0035 MH PARTIAL HOSP TX UNDER 24H: HCPCS | Mod: GT,95

## 2022-04-20 PROCEDURE — H0035 MH PARTIAL HOSP TX UNDER 24H: HCPCS | Mod: GT,95 | Performed by: OCCUPATIONAL THERAPIST

## 2022-04-20 PROCEDURE — H0035 MH PARTIAL HOSP TX UNDER 24H: HCPCS | Mod: GT,95 | Performed by: COUNSELOR

## 2022-04-20 NOTE — GROUP NOTE
Psychoeducation Group Note    PATIENT'S NAME: Munir Storey  MRN:   [id number]  :   1965  ACCT. NUMBER: [id number]  DATE OF SERVICE: 22  START TIME: 10:00 AM  END TIME: 10:50 AM  FACILITATOR: Jaimie Vuong RN  TOPIC:  Wellness Group: Mental Health Maintenance  [facility] Adult Partial Hospitalization Program  TRACK: 1    NUMBER OF PARTICIPANTS: 9    Summary of Group / Topics Discussed:  Mental Health Maintenance:  Assessments of Strengths: Patients completed a self-reflection on personal strengths worksheet. The concept of personal strength as it relates to resilience were explored. Patients shared responses with the group and participated in discussion.     Patient Session Goals / Objectives:  ? Patients identified 1-3 qualities they consider a personal strength.  ? Patients understood the concept of personal strengths and the connection it has to resiliency  ? Patients understood 3 core concepts of self-compassion and completed an exercise as a group  Telemedicine Visit: The patient's condition can be safely assessed and treated via synchronous audio and visual telemedicine encounter.      Reason for Telemedicine Visit: Services only offered telehealth    Originating Site (Patient Location): Patient's home    Distant Site (Provider Location): Provider Remote Setting- Home Office    Consent:  The patient/guardian has verbally consented to: the potential risks and benefits of telemedicine (video visit) versus in person care; bill my insurance or make self-payment for services provided; and responsibility for payment of non-covered services.     Mode of Communication:  Video Conference via [employer]    As the provider I attest to compliance with applicable laws and regulations related to telemedicine.       Patient Participation / Response:  Moderately participated, sharing some personal reflections / insights and adequately adequately received / provided feedback with other  participants.    Verbalized understanding of mental health maintenance topic    Treatment Plan:  Patient has a current master individualized treatment plan.  See Epic treatment plan for more information.    Jaimie Vuong RN

## 2022-04-20 NOTE — GROUP NOTE
OT Clinic Group Note    PATIENT'S NAME: Munir Storey  MRN:   [id number]  :   1965  ACCT. NUMBER: [id number]  DATE OF SERVICE: 22  START TIME: 11:00 AM  END TIME: 11:50 AM  FACILITATOR: Erica Waters OTR/L  TOPIC: [facility] OT Group: Occupational Therapy Clinic  [facility] Adult Partial Hospitalization Program  TRACK: 1    NUMBER OF PARTICIPANTS: 8                                      Service Modality:  Video Visit     Telemedicine Visit: The patient's condition can be safely assessed and treated via synchronous audio and visual telemedicine encounter.      Reason for Telemedicine Visit: Services only offered telehealth    Originating Site (Patient Location): Patient's home    Distant Site (Provider Location): [facility] Outpatient Setting: [place] Program, [facility] [place]    Consent:  The patient/guardian has verbally consented to: the potential risks and benefits of telemedicine (video visit) versus in person care; bill my insurance or make self-payment for services provided; and responsibility for payment of non-covered services.     Patient would like the video invitation sent by:  My Chart    Mode of Communication:  Video Conference via Medical Zoom    As the provider I attest to compliance with applicable laws and regulations related to telemedicine.         Summary of Group / Topics Discussed:  Occupational Therapy Clinic: Provided opportunity for patients to independently choose and engage in a therapeutic activity that supports progress towards their goals and psychiatric recovery. The Occupational Therapy Clinic provides a context for patients to monitor their symptoms, gain self-awareness, practice skills (self-regulation, mindfulness, self-talk, focus/concentration, social, productivity), build a sense of self efficacy and mastery, as well as receive validation, support, and resources. OT checks in, observes, assesses, and monitors performance skills and patterns in  context with each group member. Patient was provided orientation to the OT clinic and overview of purpose of the OT clinic in support of meeting their goals.     Patient Session Goals / Objectives:    Independently identify a purposeful and meaningful therapeutic activity.    Identified which goal(s) they are intentionally working on during session.     Reflected on their performance and share insight about their progress.    Practiced and identified a way to generalize a skill to their everyday life      Patient Participation / Response:  Fully participated with the group by sharing personal reflections / insights and openly received / provided feedback with other participants.    Patient presentation: constricted affect observed; active in group balancing time between work related task and leisure pursuit, Verbalized understanding of content and Patient would benefit from additional opportunities to practice the content to be able to generalize it to their everyday life with increased intentionality, consistency, and efficacy in support of their psychiatric recovery    Treatment Plan:  Patient has an initial individualized treatment plan that was created as part of their diagnostic assessment / admission process.  A master individualized treatment plan is in the process of being developed with the patient and multi-disciplinary care team.   Continue to assess.  Will complete OT assessment with Munir in the next couple of treatment days.      ANISA Bedoya/L

## 2022-04-20 NOTE — GROUP NOTE
Process Group Note    PATIENT'S NAME: Munir Storey  MRN:   [id number]  :   1965  ACCT. NUMBER: [id number]  DATE OF SERVICE: 22  START TIME:  9:00 AM  END TIME:  9:50 AM  FACILITATOR: Olu Ramirez LMFT  TOPIC:  Process Group    Diagnoses:  296.32 (F33.1) Major Depressive Disorder, Recurrent Episode, Moderate   300.01 (F41.0) Panic Disorder  300.02 (F41.1) Generalized Anxiety Disorder  History of alcohol use disorder in sustained remission      [facility] Adult Partial Hospitalization Program  TRACK: PHP - split process    NUMBER OF PARTICIPANTS: 5    Service Modality:  Video Visit     Telemedicine Visit: The patient's condition can be safely assessed and treated via synchronous audio and visual telemedicine encounter.      Reason for Telemedicine Visit: Services only offered telehealth and due to COVID-19.    Originating Site (Patient Location): Patient's home    Distant Site (Provider Location): Provider Remote Setting- Home Office    Consent:  The patient/guardian has verbally consented to: the potential risks and benefits of telemedicine (video visit) versus in person care; bill my insurance or make self-payment for services provided; and responsibility for payment of non-covered services.     Patient would like the video invitation sent by:  My Chart and email    Mode of Communication:  Video Conference via Medical Zoom    As the provider I attest to compliance with applicable laws and regulations related to telemedicine.            Data:    Session content: At the start of this group, patients were invited to check in by identifying themselves, describing their current emotional status, and identifying issues to address in this group.   Area(s) of treatment focus addressed in this session included Symptom Management, Personal Safety and Community Resources/Discharge Planning.  Patient reported feeling pretty good today.     Patient discussed working toward working on skills such as  breathing, meditating, and letting some of his anxiety pass through him.   For skills they will use to address their goal(s), patient identified breathing, meditating, and letting some of his anxiety pass through him.   A barrier to working toward their goal(s) and/or addressing mental health symptoms the patient identified was himself, such as physical feeling that come up through the day.  Patient reported no safety concerns and/or self-injurious behaviors. Patient reported no substance use. Patient reported they are taking their medications as prescribed.   Patient reported feeling proud/grateful for being up to enjoy this beautiful day.  Patient discussed with the treatment group that they are he had a panic attack this morning due to health concerns of feeling a hot flash in his chest.    Therapeutic Interventions/Treatment Strategies:  Psychotherapist offered support, feedback and validation. Treatment modalities used include Cognitive Behavioral Therapy. Interventions include Coping Skills: Facilitated understanding of  what factors may contribute to symptom relapse and skills plan to manage symptom relapse  and Symptoms Management: Promoted understanding of their diagnoses and how it impacts their functioning.    Assessment:    Patient response:   Patient responded to session by accepting feedback, giving feedback, listening, focusing on goals, being attentive and accepting support    Possible barriers to participation / learning include: and no barriers identified    Health Issues:   None reported       Substance Use Review:   Substance Use: No active concerns identified.    Mental Status/Behavioral Observations  Appearance:   Appropriate   Eye Contact:   Good   Psychomotor Behavior: Normal   Attitude:   Cooperative   Orientation:   All  Speech   Rate / Production: Normal    Volume:  Normal   Mood:    Normal Anxious  Affect:    Appropriate   Thought Content:   Clear and Safety denies any current safety  concerns including suicidal ideation, self-harm, and homicidal ideation  Thought Form:  Coherent  Logical     Insight:    Good     Plan:     Safety Plan: No current safety concerns identified.      Barriers to treatment: None identified    Patient Contracts (see media tab):  None    Substance Use: Provided encouragement towards sobriety     Continue or Discharge: Patient will continue in Adult Partial Hospitalization Program (PHP)  as planned. Patient is likely to benefit from learning and using skills as they work toward the goals identified in their treatment plan.    Olu Ramirez, LMFT  April 20, 2022

## 2022-04-20 NOTE — GROUP NOTE
Psychoeducation Group Note    PATIENT'S NAME: Munir Storey  MRN:   [id number]  :   1965  ACCT. NUMBER: [id number]  DATE OF SERVICE: 22  START TIME:  1:00 PM  END TIME:  1:50 PM  FACILITATOR: Ema Akbar OTR/L  TOPIC: [facility] OT Group: Lifestyle Balance and Structure  [facility] Adult Partial Hospitalization Program  TRACK: php1    NUMBER OF PARTICIPANTS: 8    Service Modality:  Video Visit     Telemedicine Visit: The patient's condition can be safely assessed and treated via synchronous audio and visual telemedicine encounter.       Reason for Telemedicine Visit: Services only offered telehealth     Originating Site (Patient Location): Patient's home     Distant Site (Provider Location): Provider Remote Setting- Home Office     Consent:  The patient/guardian has verbally consented to: the potential risks and benefits of telemedicine (video visit) versus in person care; bill my insurance or make self-payment for services provided; and responsibility for payment of non-covered services.      Patient would like the video invitation sent by:  My Chart     Mode of Communication:  Video Conference via Medical Zoom     As the provider I attest to compliance with applicable laws and regulations related to telemedicine.      Summary of Group / Topics Discussed:  Life Skills:  Cognitive Flexibility - Executive Functioning and Lateral Thinking. Patients reflected on the importance of intellectual wellness for long term cognitive health, improving adaptability, challenging current ways of thinking, and increasing creativity, knowledge, and skills. Patients were provided opportunities to practice flexibility in interpretation and were guided to consider alternative interpretations by challenging assumptions, taking different perspectives, and slowing down to perceive more nuances and details. Patients were able to find situations where response inhibition and flexible thinking is helpful in managing  symptoms.     Patient Session Goals / Objectives:    Identify activities that support intellectual wellness    Understand the benefits of engaging in intellectual challenges    Working together to practice cognitive flexibility tasks    Identifying application of skills to everyday situations         Patient Participation / Response:  Fully participated with the group by sharing personal reflections / insights and openly received / provided feedback with other participants.    Patient presentation: congruent, demonstrated understanding of topic through discussion and participation in activities. Pt shared regular participation in the study/reading of the bible.     Treatment Plan:  Patient has an initial individualized treatment plan that was created as part of their diagnostic assessment / admission process.  A master individualized treatment plan is in the process of being developed with the patient and multi-disciplinary care team.    Ema Akbar, OTR/L

## 2022-04-21 ENCOUNTER — HOSPITAL ENCOUNTER (OUTPATIENT)
Dept: BEHAVIORAL HEALTH | Facility: CLINIC | Age: 57
Discharge: HOME | End: 2022-04-21
Attending: PSYCHIATRY & NEUROLOGY | Admitting: PSYCHIATRY & NEUROLOGY
Payer: COMMERCIAL

## 2022-04-21 PROCEDURE — H0035 MH PARTIAL HOSP TX UNDER 24H: HCPCS | Mod: GT,95 | Performed by: COUNSELOR

## 2022-04-21 PROCEDURE — H0035 MH PARTIAL HOSP TX UNDER 24H: HCPCS | Mod: GT,95

## 2022-04-21 NOTE — GROUP NOTE
Process Group Note    PATIENT'S NAME: Munir Storey  MRN:   [id number]  :   1965  ACCT. NUMBER: [id number]  DATE OF SERVICE: 22  START TIME:  9:00 AM  END TIME:  9:50 AM  FACILITATOR: Olu Ramirez LMFT  TOPIC:  Process Group    Diagnoses:  296.32 (F33.1) Major Depressive Disorder, Recurrent Episode, Moderate   300.01 (F41.0) Panic Disorder  300.02 (F41.1) Generalized Anxiety Disorder  History of alcohol use disorder in sustained remission    [facility] Adult Partial Hospitalization Program  TRACK: PHP1    NUMBER OF PARTICIPANTS: 5    Service Modality:  Video Visit     Telemedicine Visit: The patient's condition can be safely assessed and treated via synchronous audio and visual telemedicine encounter.      Reason for Telemedicine Visit: Services only offered telehealth and due to COVID-19.    Originating Site (Patient Location): Patient's home    Distant Site (Provider Location): Provider Remote Setting- Home Office    Consent:  The patient/guardian has verbally consented to: the potential risks and benefits of telemedicine (video visit) versus in person care; bill my insurance or make self-payment for services provided; and responsibility for payment of non-covered services.     Patient would like the video invitation sent by:  My Chart and email    Mode of Communication:  Video Conference via Medical Zoom    As the provider I attest to compliance with applicable laws and regulations related to telemedicine.        Data:    Session content: At the start of this group, patients were invited to check in by identifying themselves, describing their current emotional status, and identifying issues to address in this group.   Area(s) of treatment focus addressed in this session included Symptom Management, Personal Safety and Community Resources/Discharge Planning.  Patient reported feeling a little anxious, joyful, hopeful, and optimistic.   Patient discussed working toward learning as much as  he can and trying to incorporate the learning into his life.   For skills they will use to address their goal(s), patient identified breathing, praying, and meditating.   A barrier to working toward their goal(s) and/or addressing mental health symptoms the patient identified was not wanting to be alone.  Patient reported no safety concerns and/or self-injurious behaviors. Patient reported no substance use. Patient reported they are taking their medications as prescribed.   Patient reported feeling proud/grateful he didn t need to take a lorazepam yet.  Patient discussed with the treatment group that they are working to follow something he learned in high school hockey, CPMA - Consistent Positive Mental Attitude.    Therapeutic Interventions/Treatment Strategies:  Psychotherapist offered support, feedback and validation. Treatment modalities used include Cognitive Behavioral Therapy. Interventions include Coping Skills: Facilitated understanding of  what factors may contribute to symptom relapse and skills plan to manage symptom relapse  and Symptoms Management: Promoted understanding of their diagnoses and how it impacts their functioning.    Assessment:    Patient response:   Patient responded to session by accepting feedback, giving feedback, listening, focusing on goals, being attentive and accepting support    Possible barriers to participation / learning include: and no barriers identified    Health Issues:   None reported       Substance Use Review:   Substance Use: No active concerns identified.    Mental Status/Behavioral Observations  Appearance:   Appropriate   Eye Contact:   Good   Psychomotor Behavior: Normal   Attitude:   Cooperative   Orientation:   All  Speech   Rate / Production: Normal    Volume:  Normal   Mood:    Normal Anxious Optimistic  Affect:    Appropriate   Thought Content:   Clear and Safety denies any current safety concerns including suicidal ideation, self-harm, and homicidal  ideation  Thought Form:  Coherent  Logical     Insight:    Good     Plan:     Safety Plan: No current safety concerns identified.      Barriers to treatment: None identified    Patient Contracts (see media tab):  None    Substance Use: Provided encouragement towards sobriety     Continue or Discharge: Patient will continue in Adult Partial Hospitalization Program (PHP)  as planned. Patient is likely to benefit from learning and using skills as they work toward the goals identified in their treatment plan.    Olu Ramirez, RYANFT  April 21, 2022

## 2022-04-21 NOTE — GROUP NOTE
Psychotherapy Group Note    PATIENT'S NAME: Munir Storey  MRN:   [id number]  :   1965  ACCT. NUMBER: [id number]  DATE OF SERVICE: 22  START TIME:  2:00 PM  END TIME:  2:50 PM  FACILITATOR: Olu Ramirez LMFT  TOPIC:  EBP Group: Relationship Skills  [facility] Adult Partial Hospitalization Program  TRACK: [facility]    NUMBER OF PARTICIPANTS: 8    Summary of Group / Topics Discussed:  Relationship Skills: Relationship Mapping: Patients identified different types of relationships they have in their life and examined if there is conflict or closeness, the degree of conflict or closeness, and the reason for conflict. The goal of this topic is to help patients gain awareness of the relationships they have with others, identify types of conflict in patients  lives and how they impact symptoms/functioning, and identify how they can improve relationships with relationship and interpersonal skills they have learned.     Patient Session Goals / Objectives:    Familiarized patients with awareness to the different types of relationships they may have with different people and substances in their lives    Discussed and practiced strategies to promote healthier understanding of interpersonal relationships     Service Modality:  Video Visit     Telemedicine Visit: The patient's condition can be safely assessed and treated via synchronous audio and visual telemedicine encounter.      Reason for Telemedicine Visit: Services only offered telehealth and due to COVID-19.    Originating Site (Patient Location): Patient's home    Distant Site (Provider Location): Provider Remote Setting- Home Office    Consent:  The patient/guardian has verbally consented to: the potential risks and benefits of telemedicine (video visit) versus in person care; bill my insurance or make self-payment for services provided; and responsibility for payment of non-covered services.     Patient would like the video invitation sent by:  My  Chart and email    Mode of Communication:  Video Conference via Medical Zoom    As the provider I attest to compliance with applicable laws and regulations related to telemedicine.      .      Patient Participation / Response:  Fully participated with the group by sharing personal reflections / insights and openly received / provided feedback with other participants.    Demonstrated understanding of topics discussed through group discussion and participation, Demonstrated understanding of relationship skills and communication skills, Identified / Expressed personal readiness to incorporate effective communication skills, Verbalized understanding of communication skills, communication challenges, and communication strengths and Practiced skills in session    Treatment Plan:  Patient has an initial individualized treatment plan that was created as part of their diagnostic assessment / admission process.  A master individualized treatment plan is in the process of being developed with the patient and multi-disciplinary care team.    Olu Ramirez LMFT

## 2022-04-21 NOTE — GROUP NOTE
Psychoeducation Group Note    PATIENT'S NAME: Munir Storey  MRN:   [id number]  :   1965  ACCT. NUMBER: [id number]  DATE OF SERVICE: 22  START TIME: 11:00 AM  END TIME: 11:50 AM  FACILITATOR: Ema Akbar OTR/L  TOPIC:  PHP OT Group: Self- Regulation Skills  [facility] Adult Partial Hospitalization Program  TRACK: PHP1    NUMBER OF PARTICIPANTS: 5    Service Modality:  Video Visit     Telemedicine Visit: The patient's condition can be safely assessed and treated via synchronous audio and visual telemedicine encounter.      Reason for Telemedicine Visit: Services only offered telehealth    Originating Site (Patient Location): Patient's home    Distant Site (Provider Location): Provider Remote Setting- Home Office    Consent:  The patient/guardian has verbally consented to: the potential risks and benefits of telemedicine (video visit) versus in person care; bill my insurance or make self-payment for services provided; and responsibility for payment of non-covered services.     Patient would like the video invitation sent by:  My Chart    Mode of Communication:  Video Conference via Medical Zoom    As the provider I attest to compliance with applicable laws and regulations related to telemedicine.     Summary of Group / Topics Discussed:  Life Skills:  Awe, Wonder, and Gratitude. Patients were provided education on the influence of awe, wonder, and gratitude on mental health. Patients shared unique perspectives from their own lives on each mindset. Application to everyday life was reviewed. Patients were given an opportunity to engage in writing as a form of coping skills practice and to reflect positively on one another's works.    Patient Session Goals / Objectives:  ? Cultivate a sense of wonder, awe, and/or gratitude  ? Share unique perspectives with peers  ? Practice hands-on coping skills to increase inspiration and apply concepts      Patient Participation / Response:  Fully participated  "with the group by sharing personal reflections / insights and openly received / provided feedback with other participants.    Patient presentation: congruent, demonstrated understanding of topic through discussion and participation in activities. Pt shared being grateful for the mental health assistance and learning how to \"zoom.\" He shared an accomplishment of following through with laundry and a daddy-daughter dance, despite barriers. He reflected that a time of wonder [place] with [place].     Treatment Plan:  Patient has an initial individualized treatment plan that was created as part of their diagnostic assessment / admission process.  A master individualized treatment plan is in the process of being developed with the patient and multi-disciplinary care team.    Ema Akbar, OTR/L      "

## 2022-04-21 NOTE — GROUP NOTE
Psychoeducation Group Note    PATIENT'S NAME: Munir Storey  MRN:   [id number]  :   1965  ACCT. NUMBER: [id number]  DATE OF SERVICE: 22  START TIME:  2:00 PM  END TIME:  2:50 PM  FACILITATOR: Jaimie Vuong RN  TOPIC:  Wellness Group: [facility] Adult Partial Hospitalization Program  TRACK: 1    NUMBER OF PARTICIPANTS: 4    Summary of Group / Topics Discussed:  Foundations of Health: Nutrition: Super Nutrients & Micronutrients: Super Nutrients are Foods that have high nutritional yield. Micronutrients are essential elements found in food or taken through supplements that are necessary for normal physiological functioning. This group was designed to complement the macronutrients group and build upon previous education. The changes that food makes on the brain (how the brain uses sugar) and nutrition as it relates to mental health was also discussed.       Patient Session Goals / Objectives:  ? Identified the health enhancing benefits to good nutrition  ? Verbalized ways in which the food we eat affects the brain  ? Explained the role of micronutrients in the body, how much we need, and how to get it  Telemedicine Visit: The patient's condition can be safely assessed and treated via synchronous audio and visual telemedicine encounter.      Reason for Telemedicine Visit: Services only offered telehealth    Originating Site (Patient Location): Patient's home    Distant Site (Provider Location): Provider Remote Setting- Home Office    Consent:  The patient/guardian has verbally consented to: the potential risks and benefits of telemedicine (video visit) versus in person care; bill my insurance or make self-payment for services provided; and responsibility for payment of non-covered services.     Mode of Communication:  Video Conference via Kaai    As the provider I attest to compliance with applicable laws and regulations related to telemedicine.       Patient  Participation / Response:  Fully participated with the group by sharing personal reflections / insights and openly received / provided feedback with other participants.    Identified / Expressed personal readiness to practice skills    Treatment Plan:  Patient has a current master individualized treatment plan.  See Epic treatment plan for more information.    Jaimie Vuong RN

## 2022-04-21 NOTE — GROUP NOTE
Psychotherapy Group Note    PATIENT'S NAME: Munir Storey  MRN:   [id number]  :   1965  [id number]  DATE OF SERVICE: 22  START TIME: 10:00 AM  END TIME: 10:50 AM  FACILITATOR: Olu Ramirez LMFT  TOPIC:  EBP Group: [facility] Hospital[facility]  TRACK: PHP1    NUMBER OF PARTICIPANTS: 5    Summary of Group / Topics Discussed:  Specialty Topics: Grief/Transitions: Patients received an overview of the grief process.  Patients explored their relationship to loss and how that has affected their mental health symptoms.  Strategies for recognizing loss and ideas for engaging in the grief process was presented and discussed.  Patients identified needs for support and coping skills to manage loss.  The purpose of this specialty topic is to help patients identify the aspects of change due to loss that individuals experience in addition to mental health symptoms to better cope with the grief, loss, and life transitions.      Patient Session Goals / Objectives:    Identified grief, loss, and life transitions     Discussed how grief impacts mental health symptoms and disrupts usual functioning    Identified needs for support and coping with grief and planned further action for coping    Service Modality:  Video Visit     Telemedicine Visit: The patient's condition can be safely assessed and treated via synchronous audio and visual telemedicine encounter.      Reason for Telemedicine Visit: Services only offered telehealth and due to COVID-19.    Originating Site (Patient Location): Patient's home    Distant Site (Provider Location): Provider Remote Setting- Home Office    Consent:  The patient/guardian has verbally consented to: the potential risks and benefits of telemedicine (video visit) versus in person care; bill my insurance or make self-payment for services provided; and responsibility for payment of non-covered services.     Patient would  like the video invitation sent by:  My Chart and email    Mode of Communication:  Video Conference via Medical Zoom    As the provider I attest to compliance with applicable laws and regulations related to telemedicine.        Patient Participation / Response:  Fully participated with the group by sharing personal reflections / insights and openly received / provided feedback with other participants.    Demonstrated understanding of topics discussed through group discussion and participation, Identified / Expressed readiness to act on skill suggestions discussed in topic, Verbalized understanding of ways to proactively manage illness and Practiced skills in session    Treatment Plan:  Patient has an initial individualized treatment plan that was created as part of their diagnostic assessment / admission process.  A master individualized treatment plan is in the process of being developed with the patient and multi-disciplinary care team.    Olu Ramirez LMFT

## 2022-04-21 NOTE — GROUP NOTE
Psychotherapy Group Note    PATIENT'S NAME: Munir Storey  MRN:   [id number]  [facility]   1965  [id number]  DATE OF SERVICE: 22  START TIME:  1:00 PM  END TIME:  1:50 PM  FACILITATOR: Olu Ramirez LMFT  TOPIC: MH EBP Group: Coping Skills  [facility] Adult Partial Hospitalization Program  TRACK: PHP1    NUMBER OF PARTICIPANTS: 5    Summary of Group / Topics Discussed:  Coping Skills: Meditation: Patients learned about meditation and explored how and when to utilize it to increase focus, reduce mental health symptoms, decrease physical tension, and improve mental well-being.  Approaches to meditation were presented as a means of increasing self-awareness. The benefits of various meditation practices were discussed, as well as barriers to utilization of this coping strategy.     Patient Session Goals / Objectives:    Understand the purpose and efficacy of using meditation modalities to reduce stress / symptoms.    Review / discuss situations in daily life that cause distress, where establishing a meditation routine or meditating as needed may improve functioning.      Verbalize understanding of how and when to apply grounding strategies to reduce distress and increase presence in the moment.    Practice meditation and address barriers to use in daily life.    Service Modality:  Video Visit     Telemedicine Visit: The patient's condition can be safely assessed and treated via synchronous audio and visual telemedicine encounter.      Reason for Telemedicine Visit: Services only offered telehealth and due to COVID-19.    Originating Site (Patient Location): Patient's home    Distant Site (Provider Location): Provider Remote Setting- Home Office    Consent:  The patient/guardian has verbally consented to: the potential risks and benefits of telemedicine (video visit) versus in person care; bill my insurance or make self-payment for services provided; and responsibility for payment of  non-covered services.     Patient would like the video invitation sent by:  My Chart and email    Mode of Communication:  Video Conference via Medical Zoom    As the provider I attest to compliance with applicable laws and regulations related to telemedicine.          Patient Participation / Response:  Fully participated with the group by sharing personal reflections / insights and openly received / provided feedback with other participants.    Demonstrated understanding of topics discussed through group discussion and participation, Expressed understanding of the relevance / importance of coping skills at distressing times in life, Demonstrated knowledge of when to consider using a variety of coping skills in daily life, Identified 2-3 positive coping strategies that have helped maintain / improve symptoms in the past, Practiced 2-3 new coping skills in session and Identified / Expressed personal readiness to practice new coping skills    Treatment Plan:  Patient has an initial individualized treatment plan that was created as part of their diagnostic assessment / admission process.  A master individualized treatment plan is in the process of being developed with the patient and multi-disciplinary care team.    Olu Ramirez, RYANFT

## 2022-04-22 ENCOUNTER — HOSPITAL ENCOUNTER (OUTPATIENT)
Dept: BEHAVIORAL HEALTH | Facility: CLINIC | Age: 57
Discharge: HOME | End: 2022-04-22
Attending: PSYCHIATRY & NEUROLOGY | Admitting: PSYCHIATRY & NEUROLOGY
Payer: COMMERCIAL

## 2022-04-22 PROCEDURE — H0035 MH PARTIAL HOSP TX UNDER 24H: HCPCS | Mod: GT,95

## 2022-04-22 PROCEDURE — H0035 MH PARTIAL HOSP TX UNDER 24H: HCPCS | Mod: GT,95 | Performed by: COUNSELOR

## 2022-04-22 NOTE — PROGRESS NOTES
Acknowledgement of Current Treatment Plan       I have reviewed my treatment plan with my therapist / counselor on 4/22/2022.   I agree with the plan as it is written in the electronic health record.    Name:      Signature:  Munir Storey Unable to sign due to Covid 19.  Verbal permission given on 4/22/2022 1228     Ralph Monzon MD  Psychiatrist/Medical Director Ralph Monzon MD on 4/22/2022 at 6:14 PM     Erica Waters OTR/L  Occupational Therapist ANISA Bedoya/L on 4/22/2022 at 12:30 PM     Olu Ramirez MA, CATHLEEN  Psychotherapist CATHLEEN Aguirre on 4/22/2022 at 4:29 PM

## 2022-04-22 NOTE — GROUP NOTE
Psychoeducation Group Note    PATIENT'S NAME: Munir Storey  MRN:   [id number]  :   1965  ACCT. NUMBER: [id number]  DATE OF SERVICE: 22  START TIME:  2:00 PM  END TIME:  2:50 PM  FACILITATOR: Manuel Pisano RN  TOPIC:  Wellness Group: Brain Health                                    Service Modality:  Video Visit     Telemedicine Visit: The patient's condition can be safely assessed and treated via synchronous audio and visual telemedicine encounter.      Reason for Telemedicine Visit: Covid19    Originating Site (Patient Location): Patient's home    Distant Site (Provider Location): Provider Remote Setting- Home Office    Consent:  The patient/guardian has verbally consented to: the potential risks and benefits of telemedicine (video visit) versus in person care; bill my insurance or make self-payment for services provided; and responsibility for payment of non-covered services.     Patient would like the video invitation sent by:  My Chart    Mode of Communication:  Video Conference via Medical Zoom    As the provider I attest to compliance with applicable laws and regulations related to telemedicine.        [facility] Adult Dual Diagnosis Day Treatment  TRACK: PHP1    NUMBER OF PARTICIPANTS: 5    Summary of Group / Topics Discussed:  Brain Health:  Pathophysiology of Mood Disorders: Patients were educated on mood disorder etiology and neuroscience, risk factors, symptoms, and pharmacologic, psychotherapeutic, and complementary treatment options. Patients were guided on a discussion of mental, behavioral, and physical symptoms and shared their symptoms with the group.     Patient Session Goals / Objectives:  ? Described what mood disorders are and identified risk factors   ? Explained how chemical imbalances in the brain can cause symptoms and how medications work to reverse this imbalance   ? Identified and described pharmacologic, psychotherapeutic, and complementary treatment  options      Patient Participation / Response:  Fully participated with the group by sharing personal reflections / insights and openly received / provided feedback with other participants.    Demonstrated understanding of topics discussed through group discussion and participation    Treatment Plan:  Patient has a current master individualized treatment plan.  See Epic treatment plan for more information.    Manuel Pisano RN

## 2022-04-22 NOTE — GROUP NOTE
Psychoeducation Group Note    PATIENT'S NAME: Munir Storey  MRN:   [id number]  :   1965  ACCT. NUMBER: [id number]  DATE OF SERVICE: 22  START TIME:  1:15 PM  END TIME:  1:50 PM  FACILITATOR: Ema Akbar OTR/L  TOPIC: [facility] OT Group: Lifestyle Balance and Structure  [facility] Adult Partial Hospitalization Program    TRACK: PHP1    NUMBER OF PARTICIPANTS: 5    Service Modality:  Video Visit     Telemedicine Visit: The patient's condition can be safely assessed and treated via synchronous audio and visual telemedicine encounter.      Reason for Telemedicine Visit: Services only offered telehealth    Originating Site (Patient Location): Patient's home    Distant Site (Provider Location): Provider Remote Setting- Home Office    Consent:  The patient/guardian has verbally consented to: the potential risks and benefits of telemedicine (video visit) versus in person care; bill my insurance or make self-payment for services provided; and responsibility for payment of non-covered services.     Patient would like the video invitation sent by:  My Chart    Mode of Communication:  Video Conference via Medical Zoom    As the provider I attest to compliance with applicable laws and regulations related to telemedicine.     Summary of Group / Topics Discussed:  Lifestyle Balance and Structure:  Benefits of Structure on Mental Health: Patients explored and learned about the benefits and possibilities of structure to create lifestyle balance that supports their mental and physical wellbeing. Patients were assisted to identify individualized weekend plans - including self-care, home management, socializing, leisure and reflective activities. Pt's recognized the benefits of activities on mental health and problem solved barriers to engagement and strategies to overcome them.  Patients engaged in discussion regarding coping skills to address distress and discomfort.     Patient Session Goals /  Objectives:    Increased awareness of the importance of engagement in activities to support lifestyle balance and perceived quality of life    Identified strategies to recognize and challenge barriers to participation     Facilitated exploration of leisure    Practiced and reflected on how to generalize taught skills to their everyday life      Patient Participation / Response:  Fully participated with the group by sharing personal reflections / insights and openly received / provided feedback with other participants.    Patient presentation: congruent, demonstrated understanding of topic through discussion and participation in activities. Pt shared he will bring out his new tractor, work, garden, spend time in the woodshop, with pets, and see a friend or use distraction in times of distress.     Treatment Plan:  Patient has a current master individualized treatment plan and today was our weekly review of the patient's progress.  See Epic treatment plan for progress / updates on goals and plan.    Ema Akbar, OTR/L

## 2022-04-24 DIAGNOSIS — K21.00 GASTROESOPHAGEAL REFLUX DISEASE WITH ESOPHAGITIS WITHOUT HEMORRHAGE: ICD-10-CM

## 2022-04-25 ENCOUNTER — HOSPITAL ENCOUNTER (OUTPATIENT)
Dept: BEHAVIORAL HEALTH | Facility: CLINIC | Age: 57
Discharge: HOME | End: 2022-04-25
Attending: PSYCHIATRY & NEUROLOGY | Admitting: PSYCHIATRY & NEUROLOGY
Payer: COMMERCIAL

## 2022-04-25 PROCEDURE — H0035 MH PARTIAL HOSP TX UNDER 24H: HCPCS | Mod: GT,95 | Performed by: COUNSELOR

## 2022-04-25 PROCEDURE — H0035 MH PARTIAL HOSP TX UNDER 24H: HCPCS | Mod: GT,95

## 2022-04-25 NOTE — GROUP NOTE
Psychoeducation Group Note    PATIENT'S NAME: Munir Storey  MRN:   [id number]  :   1965  ACCT. NUMBER: [id number]  DATE OF SERVICE: 22  START TIME:  2:00 PM  END TIME:  2:50 PM  FACILITATOR: Jaimie Vuong RN  TOPIC:  Wellness Group: [facility] Adult Partial Hospitalization Program  TRACK: 1    NUMBER OF PARTICIPANTS: 7    Summary of Group / Topics Discussed:  Foundations of Health: Sleep: Case study/sleep hygiene: Patients explored the connection between sleep and mental illness. Patients learned about how adequate sleep can improve health, productivity, wellness, quality of life, and safety.     Patient Session Goals / Objectives:  ? Demonstrated understanding of sleep hygiene practices and benefits of sleep  ? Identified sleep hygiene strategies to utilize     Described the connection between sleep disturbances and mental illness  Telemedicine Visit: The patient's condition can be safely assessed and treated via synchronous audio and visual telemedicine encounter.      Reason for Telemedicine Visit: Services only offered telehealth    Originating Site (Patient Location): Patient's home    Distant Site (Provider Location): Provider Remote Setting- Home Office    Consent:  The patient/guardian has verbally consented to: the potential risks and benefits of telemedicine (video visit) versus in person care; bill my insurance or make self-payment for services provided; and responsibility for payment of non-covered services.     Mode of Communication:  Video Conference via [employer]    As the provider I attest to compliance with applicable laws and regulations related to telemedicine.       Patient Participation / Response:  Fully participated with the group by sharing personal reflections / insights and openly received / provided feedback with other participants.    Identified / Expressed personal readiness to practice skills    Treatment Plan:  Patient has a current  master individualized treatment plan.  See Epic treatment plan for more information.    Jaimie Vuong RN

## 2022-04-25 NOTE — GROUP NOTE
Psychotherapy Group Note    PATIENT'S NAME: Munir Storey  MRN:   [id number]  :   1965  ACCT. NUMBER: [id number]  DATE OF SERVICE: 22  START TIME: 10:00 AM  END TIME: 10:50 AM  FACILITATOR: Olu Ramirez LMFT  TOPIC: MH EBP Group: [facility] Adult Partial Hospitalization Program  TRACK: PHP1    NUMBER OF PARTICIPANTS: 7    Summary of Group / Topics Discussed:  Specialty Topics: Education Support Network: Patients identified helpful supportive statements and actions they would appreciate from caregivers.  The goal is to gather information in preparation for the education support network for problem solving and planning for support with caregivers.    Patient Session Goals / Objectives:    Understanding diagnoses and accompanying physical reactions, thoughts, and behaviors.      Explored options to support patients in their recovery     Formulated a plan to communicate with caregivers for assistance and support to aid in recovery     Problem solved barriers to follow through on plan    Service Modality:  Video Visit     Telemedicine Visit: The patient's condition can be safely assessed and treated via synchronous audio and visual telemedicine encounter.      Reason for Telemedicine Visit: Services only offered telehealth and due to COVID-19.    Originating Site (Patient Location): Patient's home    Distant Site (Provider Location): Provider Remote Setting- Home Office    Consent:  The patient/guardian has verbally consented to: the potential risks and benefits of telemedicine (video visit) versus in person care; bill my insurance or make self-payment for services provided; and responsibility for payment of non-covered services.     Patient would like the video invitation sent by:  My Chart and email    Mode of Communication:  Video Conference via Medical Zoom    As the provider I attest to compliance with applicable laws and regulations related to  telemedicine.        Patient Participation / Response:  Fully participated with the group by sharing personal reflections / insights and openly received / provided feedback with other participants.    Demonstrated understanding of topics discussed through group discussion and participation, Identified / Expressed readiness to act on skill suggestions discussed in topic, Verbalized understanding of ways to proactively manage illness and Practiced skills in session    Treatment Plan:  Patient has a current master individualized treatment plan.  See Epic treatment plan for more information.    Olu Ramirez LMFT

## 2022-04-25 NOTE — GROUP NOTE
Psychoeducation Group Note    PATIENT'S NAME: Munir Storey  MRN:   [id number]  :   1965  ACCT. NUMBER: [id number]  DATE OF SERVICE: 22  START TIME:  1:00 PM  END TIME:  1:50 PM  FACILITATOR: Ema Akbar OTR/L  TOPIC: [facility] OT Group: Lifestyle Balance and Structure  [facility] Adult Partial Hospitalization Program  TRACK: PHP1    NUMBER OF PARTICIPANTS: 6                                    Service Modality:  Video Visit     Telemedicine Visit: The patient's condition can be safely assessed and treated via synchronous audio and visual telemedicine encounter.      Reason for Telemedicine Visit: Services only offered telehealth    Originating Site (Patient Location): Patient's home    Distant Site (Provider Location): Provider Remote Setting- Home Office    Consent:  The patient/guardian has verbally consented to: the potential risks and benefits of telemedicine (video visit) versus in person care; bill my insurance or make self-payment for services provided; and responsibility for payment of non-covered services.     Patient would like the video invitation sent by:  My Chart    Mode of Communication:  Video Conference via Medical Zoom    As the provider I attest to compliance with applicable laws and regulations related to telemedicine.        Summary of Group / Topics Discussed:  Lifestyle Balance and Structure:  Leisure Experiential: Provided skilled facilitation and clinical observation of patients in context during a leisure experiential designed to provide patients the opportunity to have a hands on social experience as an opportunity to gain self-awareness, build milieu cohesion and social skills, self-monitor personal performance skills, and identify the benefits of activity on their nervous system. Facilitated reflection and group discussion to deepen the meaning of the experience for participants. Leisure  values were reviewed.     Patient Session Goals / Objectives:    Learn through an  "experiential intervention activity the benefits of leisure    Identify and reflect on the process and share insight around their engagement in the group process.     Review the value of leisure across different activities      Patient Participation / Response:  Fully participated with the group by sharing personal reflections / insights and openly received / provided feedback with other participants.    Patient presentation: congruent, demonstrated understanding of topic through discussion and participation in activities. Pt shared interest in fishing for its relaxation and challenge. He also noted he enjoyed gardening for it's \"vianey.\"     Treatment Plan:  Patient has a current master individualized treatment plan.  See Epic treatment plan for more information.    Ema Akbar, OTR/L      "

## 2022-04-25 NOTE — GROUP NOTE
Psychotherapy Group Note    PATIENT'S NAME: Munir Storey  MRN:   [id number]  [facility]   1965  [id number]  DATE OF SERVICE: 22  START TIME: 11:00 AM  END TIME: 11:50 AM  FACILITATOR: Olu Ramirez LMFT  TOPIC: MH EBP Group: Cognitive Restructuring  [facility] Adult Partial Hospitalization Program  TRACK: PHP1    NUMBER OF PARTICIPANTS: 7    Summary of Group / Topics Discussed:  Cognitive Restructuring: Core Beliefs: Patients received an overview of what a core belief is, and how they develop. Patients then began to identify their negative core beliefs. Patients worked to modify core beliefs with the goal of improved self-image and functioning.     Patient Session Goals / Objectives:    Familiarize self with the concept of core beliefs and how they develop.      Explore personal core beliefs (positive and negative)    Develop / advance recognition of the connection between negative thoughts and negative core beliefs.    Formulate new neutral/positive core beliefs    Service Modality:  Video Visit     Telemedicine Visit: The patient's condition can be safely assessed and treated via synchronous audio and visual telemedicine encounter.      Reason for Telemedicine Visit: Services only offered telehealth and due to COVID-19.    Originating Site (Patient Location): Patient's home    Distant Site (Provider Location): Provider Remote Setting- Home Office    Consent:  The patient/guardian has verbally consented to: the potential risks and benefits of telemedicine (video visit) versus in person care; bill my insurance or make self-payment for services provided; and responsibility for payment of non-covered services.     Patient would like the video invitation sent by:  My Chart and email    Mode of Communication:  Video Conference via Medical Zoom    As the provider I attest to compliance with applicable laws and regulations related to telemedicine.                 Patient Participation /  Response:  Fully participated with the group by sharing personal reflections / insights and openly received / provided feedback with other participants.    Demonstrated understanding of topics discussed through group discussion and participation, Expressed understanding of the relationship between behaviors, thoughts, and feelings, Demonstrated knowledge of personal thought patterns and how they impact their mood and behavior., Identified / Expressed personal readiness to practice CBT and Practiced skills in session    Treatment Plan:  Patient has a current master individualized treatment plan.  See Epic treatment plan for more information.    Olu Ramirez LMFT

## 2022-04-25 NOTE — GROUP NOTE
Psychotherapy Group Note    PATIENT'S NAME: Munir Storey  MRN:   [id number]  :   1965  ACCT. NUMBER: [id number]  DATE OF SERVICE: 22  START TIME: 10:00 AM  END TIME: 10:50 AM  FACILITATOR: Olu Ramirez LMFT  TOPIC: MH EBP Group: Relationship Skills  [facility] Adult Partial Hospitalization Program  TRACK: PHP1    NUMBER OF PARTICIPANTS: 5    Summary of Group / Topics Discussed:  Relationship Skills: Boundaries: Patients were provided with a general overview of interpersonal boundaries and how lack of boundaries relates to symptoms and functioning. The purpose is to help patients identify boundary issues and gain awareness and skills to work towards healthier interpersonal boundaries. Current awareness of healthy boundary characteristics and barriers to establishing healthy boundaries were discussed.    Patient Session Goals / Objectives:    Familiarized patients with the concept of interpersonal boundaries and their characteristics    Discussed and practiced strategies to promote healthier interpersonal boundaries - such as DEAR MAN    Identified boundary issues and identified plan to improve boundaries    Service Modality:  Video Visit     Telemedicine Visit: The patient's condition can be safely assessed and treated via synchronous audio and visual telemedicine encounter.      Reason for Telemedicine Visit: Services only offered telehealth and due to COVID-19.    Originating Site (Patient Location): Patient's home    Distant Site (Provider Location): Provider Remote Setting- Home Office    Consent:  The patient/guardian has verbally consented to: the potential risks and benefits of telemedicine (video visit) versus in person care; bill my insurance or make self-payment for services provided; and responsibility for payment of non-covered services.     Patient would like the video invitation sent by:  My Chart and email    Mode of Communication:  Video Conference via Medical Zoom    As the  provider I attest to compliance with applicable laws and regulations related to telemedicine.        Patient Participation / Response:  Fully participated with the group by sharing personal reflections / insights and openly received / provided feedback with other participants.  Patient took some process time and talked about challenges of working through a set back with mental health symptom management.    Demonstrated understanding of topics discussed through group discussion and participation, Identified / Expressed personal readiness to incorporate effective communication skills and Practiced skills in session    Treatment Plan:  Patient has an initial individualized treatment plan that was created as part of their diagnostic assessment / admission process.  A master individualized treatment plan is in the process of being developed with the patient and multi-disciplinary care team.    Olu Ramirez LMFT

## 2022-04-25 NOTE — GROUP NOTE
Psychotherapy Group Note    PATIENT'S NAME: Munir Storey  MRN:   [id number]  :   1965  [id number]  DATE OF SERVICE: 22  START TIME: 11:00 AM  END TIME: 11:50 AM  FACILITATOR: Olu Ramirez LMFT  TOPIC: MH EBP Group: Coping Skills  [facility] Adult Partial Hospitalization Program  TRACK: PHP1    NUMBER OF PARTICIPANTS: 4    Summary of Group / Topics Discussed:  Coping Skills: Relapse Planning: Patients discussed and increased understanding of how anticipating and planning for possible increased symptoms is a proactive way to reduce the likelihood of relapse.  Patients shared individualized factors that may lead to increased symptoms and decompensation in functioning.  Each patient developed a relapse prevention plan designed to help them recognize when they may have increasing symptoms requiring them to take action and notify their key supports and care team.      Patient Session Goals / Objectives:    Identify and understand what factors may contribute to symptom relapse.    Identify actions that may be taken to manage increased symptoms/stressors.    Create an individualized written relapse plan    Problem solve barriers to plan creation and implementation    Share relapse plan with key support people    Service Modality:  Video Visit     Telemedicine Visit: The patient's condition can be safely assessed and treated via synchronous audio and visual telemedicine encounter.      Reason for Telemedicine Visit: Services only offered telehealth and due to COVID-19.    Originating Site (Patient Location): Patient's home    Distant Site (Provider Location): Provider Remote Setting- Home Office    Consent:  The patient/guardian has verbally consented to: the potential risks and benefits of telemedicine (video visit) versus in person care; bill my insurance or make self-payment for services provided; and responsibility for payment of non-covered services.     Patient would like the  video invitation sent by:  My Chart and email    Mode of Communication:  Video Conference via Medical Zoom    As the provider I attest to compliance with applicable laws and regulations related to telemedicine.          Patient Participation / Response:  Fully participated with the group by sharing personal reflections / insights and openly received / provided feedback with other participants.    Demonstrated understanding of topics discussed through group discussion and participation, Expressed understanding of the relevance / importance of coping skills at distressing times in life and Identified / Expressed personal readiness to practice new coping skills    Treatment Plan:  Patient has an initial individualized treatment plan that was created as part of their diagnostic assessment / admission process.  A master individualized treatment plan is in the process of being developed with the patient and multi-disciplinary care team.    Olu Ramirez LMFT

## 2022-04-25 NOTE — GROUP NOTE
Process Group Note    PATIENT'S NAME: Munir Storey  MRN:   [id number]  :   1965  ACCT. NUMBER: [id number]  DATE OF SERVICE: 22  START TIME:  9:00 AM  END TIME:  9:50 AM  FACILITATOR: Olu Ramirez LMFT  TOPIC:  Process Group    Diagnoses:  296.32 (F33.1) Major Depressive Disorder, Recurrent Episode, Moderate   300.01 (F41.0) Panic Disorder  300.02 (F41.1) Generalized Anxiety Disorder  History of alcohol use disorder in sustained remission      [facility] Adult Partial Hospitalization Program  TRACK: PHP1    NUMBER OF PARTICIPANTS: 5    Service Modality:  Video Visit     Telemedicine Visit: The patient's condition can be safely assessed and treated via synchronous audio and visual telemedicine encounter.      Reason for Telemedicine Visit: Services only offered telehealth and due to COVID-19.    Originating Site (Patient Location): Patient's home    Distant Site (Provider Location): Provider Remote Setting- Home Office    Consent:  The patient/guardian has verbally consented to: the potential risks and benefits of telemedicine (video visit) versus in person care; bill my insurance or make self-payment for services provided; and responsibility for payment of non-covered services.     Patient would like the video invitation sent by:  My Chart and email    Mode of Communication:  Video Conference via Medical Zoom    As the provider I attest to compliance with applicable laws and regulations related to telemedicine.        Data:    Session content: At the start of this group, patients were invited to check in by identifying themselves, describing their current emotional status, and identifying issues to address in this group.   Area(s) of treatment focus addressed in this session included Symptom Management, Personal Safety and Community Resources/Discharge Planning.  Patient reported feeling frustrated.  He woke up with negative thoughts not sure if it was panic or anxiety, and he is feeling  frustrated about feeling a mental health set back.   Patient discussed working toward using coping skills rather than medication.   For skills they will use to address their goal(s), patient identified breathing and distraction.   A barrier to working toward their goal(s) and/or addressing mental health symptoms the patient identified was lacking confidence.  Patient reported no safety concerns and/or self-injurious behaviors. Patient reported no substance use. Patient reported they are taking their medications as prescribed.   Patient reported feeling proud/grateful for having breakfast, and he took care of dogs even though it was a bad morning.  Patient discussed with the treatment group that they are feeling frustrated.  He woke up with negative thoughts not sure if it was panic or anxiety, and he is feeling frustrated about feeling a mental health set back.    Therapeutic Interventions/Treatment Strategies:  Psychotherapist offered support, feedback and validation. Treatment modalities used include Cognitive Behavioral Therapy. Interventions include Coping Skills: Facilitated understanding of  what factors may contribute to symptom relapse and skills plan to manage symptom relapse  and Symptoms Management: Promoted understanding of their diagnoses and how it impacts their functioning.    Assessment:    Patient response:   Patient responded to session by accepting feedback, giving feedback, listening, focusing on goals, being attentive and accepting support    Possible barriers to participation / learning include: and no barriers identified    Health Issues:   None reported       Substance Use Review:   Substance Use: No active concerns identified.    Mental Status/Behavioral Observations  Appearance:   Appropriate   Eye Contact:   Good   Psychomotor Behavior: Normal   Attitude:   Cooperative   Orientation:   All  Speech   Rate / Production: Normal    Volume:  Normal   Mood:    Normal Anxious  Frustrated  Affect:    Appropriate   Thought Content:   Clear and Safety denies any current safety concerns including suicidal ideation, self-harm, and homicidal ideation  Thought Form:  Coherent  Logical     Insight:    Good     Plan:     Safety Plan: No current safety concerns identified.  Recommended that patient call 911 or go to the local ED should there be a change in any of these risk factors.     Barriers to treatment: None identified    Patient Contracts (see media tab):  None    Substance Use: Provided encouragement towards sobriety     Continue or Discharge: Patient will continue in Adult Partial Hospitalization Program (PHP)  as planned. Patient is likely to benefit from learning and using skills as they work toward the goals identified in their treatment plan.    Olu Ramirez, LMFT  April 24, 2022

## 2022-04-26 ENCOUNTER — HOSPITAL ENCOUNTER (OUTPATIENT)
Dept: BEHAVIORAL HEALTH | Facility: CLINIC | Age: 57
Discharge: HOME | End: 2022-04-26
Attending: PSYCHIATRY & NEUROLOGY | Admitting: PSYCHIATRY & NEUROLOGY
Payer: COMMERCIAL

## 2022-04-26 DIAGNOSIS — F33.1 MDD (MAJOR DEPRESSIVE DISORDER), RECURRENT EPISODE, MODERATE (H): ICD-10-CM

## 2022-04-26 DIAGNOSIS — F41.1 GAD (GENERALIZED ANXIETY DISORDER): Primary | Chronic | ICD-10-CM

## 2022-04-26 DIAGNOSIS — F41.0 PANIC DISORDER WITHOUT AGORAPHOBIA: ICD-10-CM

## 2022-04-26 PROCEDURE — 99215 OFFICE O/P EST HI 40 MIN: CPT | Mod: 95 | Performed by: PSYCHIATRY & NEUROLOGY

## 2022-04-26 PROCEDURE — H0035 MH PARTIAL HOSP TX UNDER 24H: HCPCS | Mod: GT,95 | Performed by: COUNSELOR

## 2022-04-26 PROCEDURE — H0035 MH PARTIAL HOSP TX UNDER 24H: HCPCS | Mod: GT,95

## 2022-04-26 NOTE — PROGRESS NOTES
"[facility] Mental Health Outpatient Programs  Provider Interval History Note    Program: Partial Hospital Program   Track: 1    PATIENT'S NAME: Munir Storey  MRN:   [id number]  :   1965  ACCT. NUMBER: [id number]  DATE OF SERVICE: 22  CALL/VIDEO START TIME: 1425  CALL/VIDEO END TIME: 1455      Interval History:  \"I'm optimistic\" Munir presents today for follow-up and ongoing program supervision.   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) is today   o Wife returns in 2-3 weeks     When asking how he feels today, reports \"need to get more positive, negative thoughts are further in between\"  o Concerned about his \"flare ups\" and burning sensation to his chest    o Still awaiting Holter monitor results; completed 3 weeks ago     Taking lorazepam as needed but has required to take daily to twice a day   o \"Weekends are the worst, took two Saturday and \"    Working on \"time for my stepson's surgery, time to heal my finger, time for Ariane to come home\"    Change of primary care providers causing stress or feelings of mistake   o Found a younger provider to allow long-term care instead of continuing with an older provider; limiting that timeframe of care     Requesting more information on different therapeutic programs to continue therapy sessions   o Individualized therapy with current outpatient therapist ([facility]) is a  financial burden     Symptoms:    \"Flare ups\" are described as   o \"Burning sensation in chest, blip in my heart, warm, flush in the face\"  o \"Lungs seem like they're heavy\"  o This \"chest pain is real\"  o Trying to \"fix the broken heart\"    Feelings of \"mistake\" changing recent primary care providers     Substance use:    No substance use     Reactions/thoughts about program:    \"Get pretty anxious, first hour is the toughest\"   o First hour has \"a lot of literature -- get " "anxiety listening to other people's problems\"   o \"[I'm a] simple old man, not in touch with my inner self how these younger people are\"   - Really enjoyed the anxiety session with \"Johnny\", reports \"the best hour I had in therapy\" as it helped him understand how anxiety affects \"my brain and my body.\"     Risk Assessment:    Suicidal ideation: denies current or recent suicidal ideation or behavior    Thoughts of non-suicidal self-injury: denied    Recent self-injurious behavior: denied    Homicidal ideation: denied    Other safety concerns: denied      Medications:  Current Outpatient Medications   Medication Sig Dispense Refill     amLODIPine (NORVASC) 10 MG tablet Take 1 tablet (10 mg) by mouth daily 90 tablet 1     atorvastatin (LIPITOR) 40 MG tablet Take 1 tablet (40 mg) by mouth daily (Patient not taking: No sig reported) 90 tablet 1     buPROPion (WELLBUTRIN XL) 300 MG 24 hr tablet Take 1 tablet (300 mg) by mouth every morning 90 tablet 0     FLUoxetine (PROZAC) 10 MG capsule Take 1 capsule (10 mg) by mouth daily for 14 days, THEN 2 capsules (20 mg) daily for 14 days. For anxiety 42 capsule 0     gabapentin (NEURONTIN) 300 MG capsule Take 1 capsule (300 mg) by mouth in the morning and 1 capsule (300 mg) at noon and 1 capsule (300 mg) in the evening. For anxiety 90 capsule 0     lisinopril (ZESTRIL) 40 MG tablet Take 1 tablet (40 mg) by mouth daily 90 tablet 1     LORazepam (ATIVAN) 0.5 MG tablet Take 1 tablet (0.5 mg) by mouth daily as needed for anxiety Use sparingly for panic 30 tablet 0     metoprolol succinate ER (TOPROL-XL) 25 MG 24 hr tablet Take 1 tablet (25 mg) by mouth daily Monitor your blood pressure once weekly or if symptomatic. This medication can be increased if needed- let me know (<140/80) 90 tablet 1     Misc Natural Products (T-RELIEF CBD+13 SL) Place 600 Units under the tongue daily as needed Hemp oil sublingual       mupirocin (BACTROBAN) 2 % external ointment Apply topically daily 30 g 0 "     omeprazole (PRILOSEC) 20 MG DR capsule TAKE 1 CAPSULE (20 MG) BY MOUTH DAILY (FOR HEARTBURN) 30 capsule 0     pediatric electrolyte (PEDIALYTE) SOLN solution Take by mouth daily 1/4 teaspoon       VITAMIN D PO Take 1 tablet by mouth daily  (Patient not taking: Reported on 4/19/2022)         The above list was reviewed and updated in [facility] with patient today.     Patient is taking medications as prescribed and denies adverse effects      Laboratory Results:  Most recent labs reviewed. Pertinent updates/findings: None.     Metrics:  PHQ-9 scores:   PHQ-9 SCORE 12/16/2021 1/6/2022 3/22/2022 4/6/2022   PHQ-9 Total Score MyChart - - 6 (Mild depression) -   PHQ-9 Total Score 6 8 6 8   Some encounter information is confidential and restricted. Go to Review Flowsheets activity to see all data.       SANDRA-7 scores:   SANDRA-7 SCORE 3/31/2022 4/6/2022 4/11/2022   Total Score - - 12 (moderate anxiety)   Total Score 14 15 12   Some encounter information is confidential and restricted. Go to Review Flowsheets activity to see all data.       CSSR-S: No flowsheet data found.      Mental Status Examination (limited due to video virtual visit format):  Vital Signs: There were no vitals taken for this virtual visit.  Appearance: adequately groomed, appears stated age, and in moderate distress.  Attitude: cooperative   Eye Contact: good to the extent that can be determined in a video visit  Muscle Strength and Tone: no gross abnormalities based on remote observation  Psychomotor Behavior: Appropriate and Calm; no evidence of tardive dyskinesia, dystonia, or tics based on remote observation  Gait and Station: normal, no gross abnormalities based on remote observation  Speech: clear, coherent, normal prosody, regular rate, regular rhythm and fluent  Associations: No loosening of associations  Thought Process: coherent and goal directed  Thought Content: no evidence of suicidal ideation or homicidal ideation, no evidence of psychotic  "thought, no auditory hallucinations present, no visual hallucinations present and No safety concerns   Mood: \"anxious and optimistic\"  Affect: mood congruent, intensity is normal, constricted mobility, full range and reactive  Insight: good  Judgment: intact, adequate for safety  Impulse Control: intact  Oriented to: time, place, person and situation  Attention Span and Concentration: normal  Language: Intact  Recent and Remote Memory: intact to interview. Not formally assessed. No amnesia.  Fund of Knowledge/Assessment of Intelligence: Average  Capacity of Activities of Daily Living: Independent, able to participate in programmatic care services.      Diagnosis/es:  1. 296.32 (F33.1) Major Depressive Disorder, Recurrent Episode, Moderate   2. 300.01 (F41.0) Panic Disorder  3. 300.02 (F41.1) Generalized Anxiety Disorder  4. History of alcohol use disorder in sustained remission    Assessment/Plan:  Munir presents today for follow up. Endorses ongoing anxiety, still using lorazepam twice daily. Struggling to benefit from group as he feels listening to others in therapy is causing anxiety.  Recommend that he bring this up and process group as he is likely not the only one to feel some measure of this.  Patient stated a recent education group was particularly helpful in learning about his anxiety, he feels overall some of the skills building groups have been well conditions as well.    Will benefit from increased dose of fluoxetine.  Recommend that he increase dose today.  Recommended he take with food to minimize adverse gastrointestinal effects.  Endorsing some sedation, so he may move the dose to bedtime.  Discussed that given the medications long half-life, there is dosing flexibility.    Given struggles with group therapy, individual therapy will be a good addition.  Current outpatient therapist is charging $85 session past insurance.  This is likely not sustainable in the long run and certainly would preclude him " from therapy more than once a week.  We will discuss with staff what alternatives might be available to him.    Anxiety remains severe enough that he is currently wearing a Holter monitor to continue to rule out cardiac involvement.  In addition, isolation is a big  of his anxiety, and his wife will be out of state for the next 2 to 3 weeks as she aids their son in his recovery.  Munir is endorsing some benefit at this level of care and would benefit from continued participation in group provided we can help him with the anxiety involved.  I believe exploration of this anxiety will itself be therapeutic.  Recommend he continue at this level of care and we will continue to encourage stepdown to intensive outpatient after completion of our partial hospital curriculum (proposed discharge date is 5/2).      Depression  o Overall stable   o Increase fluoxetine  o Continue partial hospital program  o Consider intensive outpatient next week      Panic disorder  o Overall stable   o Increase fluoxetine  o Treatment otherwise as above      Anxiety  o Overall stable   o Treatment as noted above      Alcohol use disorder by history  o Overall stable   o We will continue to monitor  o Treatment as above    Continue all other medications as reviewed per electronic medical record today    Continue therapy as planned:    Enrolled in Partial Hospital Program    Continues to benefit from services    Continues to meet criteria for this level of care    Patient would be at reasonable risk of requiring a higher level of care in the absence of current services.    I feel this patient does not meet criteria for an involuntary hold and is appropriate for treatment at an outpatient level of care.    Continue with individual therapist as appropriate    Safety plan reviewed:    To the Emergency Department as needed or call after hours crisis line at [phone] or [phone]. [place] Crisis Text Line: Text [place] to 758376 or Suicide  LifeLine Chat: suicidepreGlobal Grind.org/chat    Follow-up:     schedule an appointment with me or another program provider in approximately 1 week or sooner if needed.  Can speak with a staff member or call the appropriate program number (see below) to schedule    Follow up with outpatient provider(s) as planned or sooner if needed for acute medical concerns.    Questions or concerns:    Call program line with questions or concerns (see below)    MyChart may be used to communicate with your provider, but this is not intended to be used for emergencies.      [facility] Adult Mental Health Program lines:  [facility]: [phone]  Dual Disorder: [phone]  Adult Day Treatment:  [phone]  55+/Intensive Outpatient: [phone]    Community Resources:    National Suicide Prevention Lifeline: 917.893.3433 (TTY: 756.475.1040). Call anytime for help.  (www.suicideTelvent Gitline.org)  [place] [employer] Mental Illness (www.maritza.org): 668.433.3487 or 548-933-1201.   Mental Health Association (www.mentalhealth.org): 644.505.8208 or 206-644-4725.  [place] Crisis Text Line: Text [place] to 005847  Suicide LifeLine Chat: suicideJovie.[employer]/EasyPost    Treatment Objective(s) Addressed in This Session:  The purpose of today's virtual visit is for this writer to provide oversight of patient's care while receiving program services. Specific treatment goals addressed included personal safety, symptoms stabilization and management, wellness and mental health, and community resources/discharge planning.     This author or another program provider will follow up with the patient as noted above.     Patient agrees with the current plan of care.      Ralph Monzon MD   4/26/22      This document prepared with the assistance of [employer]. I have reviewed and updated the text where necessary.      Visit Details:  Type of service:  Video Visit    Start/End Time: see above    Originating  Location (pt. Location): Home in [place]    Distant Location (provider location): Provider Remote Setting- Home Office    Platform used for Video Visit: Zoom    Physician has received verbal consent for a Video Visit from the patient? Yes    45 minutes spent on the date of the encounter doing chart review, patient visit, documentation and discussion with other provider(s)     This document completed in part using Dragon Medical One dictation software.  Please excuse any inadvertent word or phrase substitutions.

## 2022-04-26 NOTE — GROUP NOTE
Process Group Note    PATIENT'S NAME: Munir Storey  MRN:   [id number]  :   1965  ACCT. NUMBER: [id number]  DATE OF SERVICE: 22  START TIME:  9:00 AM  END TIME:  9:50 AM  FACILITATOR: Olu Ramirez LMFT  TOPIC:  Process Group    Diagnoses:  296.32 (F33.1) Major Depressive Disorder, Recurrent Episode, Moderate   300.01 (F41.0) Panic Disorder  300.02 (F41.1) Generalized Anxiety Disorder  History of alcohol use disorder in sustained remission      [facility] Adult Partial Hospitalization Program  TRACK: PHP1    NUMBER OF PARTICIPANTS: 5    Service Modality:  Video Visit     Telemedicine Visit: The patient's condition can be safely assessed and treated via synchronous audio and visual telemedicine encounter.      Reason for Telemedicine Visit: Services only offered telehealth and due to COVID-19.    Originating Site (Patient Location): Patient's home    Distant Site (Provider Location): Provider Remote Setting- Home Office    Consent:  The patient/guardian has verbally consented to: the potential risks and benefits of telemedicine (video visit) versus in person care; bill my insurance or make self-payment for services provided; and responsibility for payment of non-covered services.     Patient would like the video invitation sent by:  My Chart and email    Mode of Communication:  Video Conference via Medical Zoom    As the provider I attest to compliance with applicable laws and regulations related to telemedicine.          Data:    Session content: At the start of this group, patients were invited to check in by identifying themselves, describing their current emotional status, and identifying issues to address in this group.   Area(s) of treatment focus addressed in this session included Symptom Management, Personal Safety and Community Resources/Discharge Planning.  Patient reported feeling pretty good, and a little anxious.   Patient discussed working on skills of meditation and  breathing.   For skills they will use to address their goal(s), patient identified meditation, breathing, [address] grounding, and staying busy for distraction.   A barrier to working toward their goal(s) and/or addressing mental health symptoms the patient identified was anxiety about his son s surgery tomorrow, and how long his wife will be gone.  Patient reported no safety concerns and/or self-injurious behaviors. Patient reported no substance use. Patient reported they are taking their medications as prescribed.   Patient reported feeling proud/grateful for support from family and got some stuff done this weekend.  Patient discussed with the treatment group that his son is having brain surgery tomorrow and he has some anxiety about it and how long his wife will be gone to support his son.    Therapeutic Interventions/Treatment Strategies:  Psychotherapist offered support, feedback and validation. Treatment modalities used include Cognitive Behavioral Therapy. Interventions include Coping Skills: Facilitated understanding of  what factors may contribute to symptom relapse and skills plan to manage symptom relapse  and Symptoms Management: Promoted understanding of their diagnoses and how it impacts their functioning.    Assessment:    Patient response:   Patient responded to session by accepting feedback, giving feedback, listening, focusing on goals, being attentive and accepting support    Possible barriers to participation / learning include: and no barriers identified    Health Issues:   None reported       Substance Use Review:   Substance Use: No active concerns identified.    Mental Status/Behavioral Observations  Appearance:   Appropriate   Eye Contact:   Good   Psychomotor Behavior: Normal   Attitude:   Cooperative   Orientation:   All  Speech   Rate / Production: Normal    Volume:  Normal   Mood:    Normal Anxious  Affect:    Appropriate   Thought Content:   Clear and Safety denies any current safety  concerns including suicidal ideation, self-harm, and homicidal ideation  Thought Form:  Coherent  Logical     Insight:    Good     Plan:     Safety Plan: No current safety concerns identified.      Barriers to treatment: None identified    Patient Contracts (see media tab):  None    Substance Use: Provided encouragement towards sobriety     Continue or Discharge: Patient will continue in Adult Partial Hospitalization Program (PHP)  as planned. Patient is likely to benefit from learning and using skills as they work toward the goals identified in their treatment plan.      Olu Ramirez, LMFT  April 25, 2022

## 2022-04-27 ENCOUNTER — HOSPITAL ENCOUNTER (OUTPATIENT)
Dept: BEHAVIORAL HEALTH | Facility: CLINIC | Age: 57
Discharge: HOME | End: 2022-04-27
Attending: PSYCHIATRY & NEUROLOGY | Admitting: PSYCHIATRY & NEUROLOGY
Payer: COMMERCIAL

## 2022-04-27 PROCEDURE — H0035 MH PARTIAL HOSP TX UNDER 24H: HCPCS | Mod: GT,95 | Performed by: SOCIAL WORKER

## 2022-04-27 PROCEDURE — H0035 MH PARTIAL HOSP TX UNDER 24H: HCPCS | Mod: GT,95 | Performed by: COUNSELOR

## 2022-04-27 PROCEDURE — H0035 MH PARTIAL HOSP TX UNDER 24H: HCPCS | Mod: GT,95 | Performed by: OCCUPATIONAL THERAPIST

## 2022-04-27 PROCEDURE — H0035 MH PARTIAL HOSP TX UNDER 24H: HCPCS | Mod: GT,95

## 2022-04-27 NOTE — GROUP NOTE
Process Group Note    PATIENT'S NAME: Munir Storey  MRN:   [id number]  :   1965  ACCT. NUMBER: [id number]  DATE OF SERVICE: 22  START TIME:  1:00 PM  END TIME:  1:50 PM  FACILITATOR: Olu Ramirez LMFT  TOPIC:  Process Group    Diagnoses:  296.32 (F33.1) Major Depressive Disorder, Recurrent Episode, Moderate   300.01 (F41.0) Panic Disorder  300.02 (F41.1) Generalized Anxiety Disorder  History of alcohol use disorder in sustained remission      [facility] Adult Partial Hospitalization Program  TRACK: PHP1    NUMBER OF PARTICIPANTS: 7    Service Modality:  Video Visit     Telemedicine Visit: The patient's condition can be safely assessed and treated via synchronous audio and visual telemedicine encounter.      Reason for Telemedicine Visit: Services only offered telehealth and due to COVID-19.    Originating Site (Patient Location): Patient's home    Distant Site (Provider Location): Provider Remote Setting- Home Office    Consent:  The patient/guardian has verbally consented to: the potential risks and benefits of telemedicine (video visit) versus in person care; bill my insurance or make self-payment for services provided; and responsibility for payment of non-covered services.     Patient would like the video invitation sent by:  My Chart and email    Mode of Communication:  Video Conference via Medical Zoom    As the provider I attest to compliance with applicable laws and regulations related to telemedicine.            Data:    Session content: At the start of this group, patients were invited to check in by identifying themselves, describing their current emotional status, and identifying issues to address in this group.   Area(s) of treatment focus addressed in this session included Symptom Management, Personal Safety and Community Resources/Discharge Planning.  Patient reported feeling nervous and anxious.  His step son is in surgery right now.   Patient discussed working toward  getting through the day.   For skills they will use to address their goal(s), patient identified distraction.  Meditation and praying.   A barrier to working toward their goal(s) and/or addressing mental health symptoms the patient identified was time.  Patient reported no safety concerns and/or self-injurious behaviors. Patient reported no substance use. Patient reported they are taking their medications as prescribed.   Patient reported feeling proud/grateful for making this morning productive.  Patient discussed with the treatment group that they are feeling nervous and anxious because his step son is in surgery right now.    Therapeutic Interventions/Treatment Strategies:  Psychotherapist offered support, feedback and validation. Treatment modalities used include Cognitive Behavioral Therapy. Interventions include Coping Skills: Facilitated understanding of  what factors may contribute to symptom relapse and skills plan to manage symptom relapse  and Symptoms Management: Promoted understanding of their diagnoses and how it impacts their functioning.    Assessment:    Patient response:   Patient responded to session by accepting feedback, giving feedback, listening, focusing on goals, being attentive and accepting support    Possible barriers to participation / learning include: Worried about his step son in surgery today    Health Issues:   None reported       Substance Use Review:   Substance Use: No active concerns identified.    Mental Status/Behavioral Observations  Appearance:   Appropriate   Eye Contact:   Good   Psychomotor Behavior: Normal   Attitude:   Cooperative   Orientation:   All  Speech   Rate / Production: Normal    Volume:  Normal   Mood:    Normal Nervious Anxious  Affect:    Appropriate Worrisome  Thought Content:   Clear and Safety denies any current safety concerns including suicidal ideation, self-harm, and homicidal ideation  Thought Form:  Coherent  Logical     Insight:    Good     Plan:      Safety Plan: No current safety concerns identified.  Recommended that patient call 911 or go to the local ED should there be a change in any of these risk factors.     Barriers to treatment: None identified    Patient Contracts (see media tab):  None    Substance Use: Provided encouragement towards sobriety     Continue or Discharge: Patient will continue in Adult Partial Hospitalization Program (PHP)  as planned. Patient is likely to benefit from learning and using skills as they work toward the goals identified in their treatment plan.    Olu Ramirez, RYANFT  April 26, 2022

## 2022-04-27 NOTE — GROUP NOTE
OT Clinic Group Note    PATIENT'S NAME: Munir Storey  MRN:   [id number]  :   1965  ACCT. NUMBER: [id number]  DATE OF SERVICE: 22  START TIME: 11:00 AM  END TIME: 11:50 AM  FACILITATOR: Erica Waters OTR/L  TOPIC: [facility] OT Group: Occupational Therapy Clinic  [facility] Adult Partial Hospitalization Program  TRACK: 1    NUMBER OF PARTICIPANTS: 7                                      Service Modality:  Video Visit     Telemedicine Visit: The patient's condition can be safely assessed and treated via synchronous audio and visual telemedicine encounter.      Reason for Telemedicine Visit: Services only offered telehealth    Originating Site (Patient Location): Patient's home    Distant Site (Provider Location): [facility] Outpatient Setting: Partial Hospitalization Program, [facility]    Consent:  The patient/guardian has verbally consented to: the potential risks and benefits of telemedicine (video visit) versus in person care; bill my insurance or make self-payment for services provided; and responsibility for payment of non-covered services.     Patient would like the video invitation sent by:  My Chart    Mode of Communication:  Video Conference via Medical Zoom    As the provider I attest to compliance with applicable laws and regulations related to telemedicine.         Summary of Group / Topics Discussed:  Occupational Therapy Clinic: Provided opportunity for patients to independently choose and engage in a therapeutic activity that supports progress towards their goals and psychiatric recovery. The Occupational Therapy Clinic provides a context for patients to monitor their symptoms, gain self-awareness, practice skills (self-regulation, mindfulness, self-talk, focus/concentration, social, productivity), build a sense of self efficacy and mastery, as well as receive validation, support, and resources. OT checks in, observes, assesses, and monitors performance skills and patterns in  "context with each group member. Patient was provided orientation to the virtual OT clinic and overview of purpose of the OT clinic in support of meeting their goals.     Patient Session Goals / Objectives:    Independently identify a purposeful and meaningful therapeutic activity.    Identified which goal(s) they are intentionally working on during session.     Reflected on their performance and share insight about their progress.    Practiced and identified a way to generalize a skill to their everyday life      Patient Participation / Response:  Fully participated with the group by sharing personal reflections / insights and openly received / provided feedback with other participants.    Patient presentation: constricted affect observed; reported he was \"sort of having a setback today\", Demonstrated understanding of content through asking for support/feedback from this writer; reviewed coping skills to help manage worry/anxiety , Verbalized understanding of content and Patient would benefit from additional opportunities to practice the content to be able to generalize it to their everyday life with increased intentionality, consistency, and efficacy in support of their psychiatric recovery    Treatment Plan:  Patient has a current master individualized treatment plan.  See Epic treatment plan for more information.    Erica Waters, OTR/L    "

## 2022-04-27 NOTE — GROUP NOTE
Psychotherapy Group Note    PATIENT'S NAME: Munir Storey  MRN:   [id number]  :   1965  ACCT. NUMBER: [id number]  DATE OF SERVICE: 22  START TIME: 10:00 AM  END TIME: 10:50 AM  FACILITATOR: Sima Short LICSW  TOPIC: MH EBP Group: Enhanced Mindfulness  [facility] Adult Partial Hospitalization Program  TRACK: 1    NUMBER OF PARTICIPANTS: 7    Summary of Group / Topics Discussed:  Enhanced Mindfulness: Body and Mind Integration: Patients received an overview and education regarding the importance of including the body in the management of emotional health and self-care and as a direct route to mindfulness practice.  Patients discussed various ways in which the body can serve as an informant to their physical and emotional experiences/need. Patients discussed the body as a direct link to the present moment and to mindfulness practice.  Patients discussed current relationship with body, self-awareness, mindfulness practice and barriers to connection with body.  Patients were guided through breath work and movement to facilitate greater self-awareness, grounding, self-expression, and connection to other.  Patients discussed how the experiential could be applied to better manage mental health and develop browning connection to self.    Patient Session Goals / Objectives:    Identify how movement awareness could be used for grounding, stress management, self-expression, connection to other and self-regulation    Improved awareness of breath and movement preferences    Identify how movement and the body is used in mindfulness practice    Reflect on use of these practices in everyday life.    Identify barriers to attending to body                                        Service Modality:  Video Visit         Telemedicine Visit: The patient's condition can be safely assessed and treated via synchronous audio and visual telemedicine encounter.          Reason for Telemedicine Visit: Services only offered  telehealth and covid19        Originating Site (Patient Location): Patient's home        Distant Site (Provider Location): Provider Remote Setting- Home Office        Consent:  The patient/guardian has verbally consented to: the potential risks and benefits of telemedicine (video visit) versus in person care; bill my insurance or make self-payment for services provided; and responsibility for payment of non-covered services.         Patient would like the video invitation sent by:  My Chart        Mode of Communication:  Video Conference via Medical Zoom        As the provider I attest to compliance with applicable laws and regulations related to telemedicine.             Patient Participation / Response:  Moderately participated, sharing some personal reflections / insights and adequately adequately received / provided feedback with other participants.    Demonstrated understanding of topics discussed through group discussion and participation and Practiced skills in session    Treatment Plan:  Patient has an initial individualized treatment plan that was created as part of their diagnostic assessment / admission process.  A master individualized treatment plan is in the process of being developed with the patient and multi-disciplinary care team.    CHERELLE RuizSW

## 2022-04-27 NOTE — GROUP NOTE
"Psychoeducation Group Note    PATIENT'S NAME: Munir Storey  MRN:   [id number]  :   1965  ACCT. NUMBER: [id number]  DATE OF SERVICE: 22  START TIME:  1:00 PM  END TIME:  1:50 PM  FACILITATOR: Manuel Pisano RN  TOPIC: MH PHP OT Group: Partial Hospitalization Program- Occupational Therapy                                    Service Modality:  Video Visit     Telemedicine Visit: The patient's condition can be safely assessed and treated via synchronous audio and visual telemedicine encounter.      Reason for Telemedicine Visit: Covid19    Originating Site (Patient Location): Patient's home    Distant Site (Provider Location): Provider Remote Setting- Home Office    Consent:  The patient/guardian has verbally consented to: the potential risks and benefits of telemedicine (video visit) versus in person care; bill my insurance or make self-payment for services provided; and responsibility for payment of non-covered services.     Patient would like the video invitation sent by:  My Chart    Mode of Communication:  Video Conference via Medical Zoom    As the provider I attest to compliance with applicable laws and regulations related to telemedicine.        [facility] Adult Partial Hospitalization Program  TRACK: PHP1    NUMBER OF PARTICIPANTS: 6    Summary of Group / Topics Discussed:  Discussed grounding techniques as simple active strategies to help with orienting and focusing on the present moment. They are also used to distract or self-soothe when feeling distressed, overwhelmed, or detached from your body and/or the world. Grounding helps to detach from emotional pain by focusing outward on the external world. Grounding can also be thought of as: \"distraction\", \"centering\", \"creating a safe place\", \"way to be mindful\", or \"healthy detachment\".     Patient Session Goals / Objectives:  ? Identify 3 grounding techniques they can incorporate into their self-care routine  ? Practice 1 grounding " technique as a group  ? Identify how grounding techniques can be used to improve mental health and promote resiliency.             Patient Participation / Response:  Fully participated with the group by sharing personal reflections / insights and openly received / provided feedback with other participants.    Verbalized understanding of content    Treatment Plan:  Patient has a current master individualized treatment plan.  See Epic treatment plan for more information.    Manuel Pisano RN

## 2022-04-27 NOTE — GROUP NOTE
Psychoeducation Group Note    PATIENT'S NAME: Munir Storey  MRN:   [id number]  :   1965  ACCT. NUMBER: [id number]  DATE OF SERVICE: 22  START TIME:  2:00 PM  END TIME:  2:50 PM  FACILITATOR: Jaimie Vuong RN  TOPIC:  Wellness Group: Brain Health  [facility] Adult Partial Hospitalization Program  TRACK: PHP 1    NUMBER OF PARTICIPANTS: 6    Summary of Group / Topics Discussed:  Brain Health:  Pathophysiology of stress and anxiety: Patients were educated on the difference between stress, chronic stress, and anxiety. The anatomy and pathophysiology of the body/brain were reviewed to illustrate the immediate effects of stress/anxiety in the body and the long term effects and increased risks for chronic disease that come from unmanaged stress/anxiety. Self-coping strategies to manage symptoms of stress were reviewed and pharmacologic, psychotherapeutic, and complementary treatment options were discussed.    Patient Session Goals / Objectives:  ? Described the differences between stress and anxiety and how the body responds to it  ? Listed the long term effects and increased risks for chronic disease that can arise from unmanaged stress/anxiety  ? Identified and described pharmacologic, psychotherapeutic, and complementary treatment options  Telemedicine Visit: The patient's condition can be safely assessed and treated via synchronous audio and visual telemedicine encounter.      Reason for Telemedicine Visit: Services only offered telehealth    Originating Site (Patient Location): Patient's home    Distant Site (Provider Location): Provider Remote Setting- Home Office    Consent:  The patient/guardian has verbally consented to: the potential risks and benefits of telemedicine (video visit) versus in person care; bill my insurance or make self-payment for services provided; and responsibility for payment of non-covered services.     Mode of Communication:  Video Conference via Partigi    As  the provider I attest to compliance with applicable laws and regulations related to telemedicine.       Patient Participation / Response:  Fully participated with the group by sharing personal reflections / insights and openly received / provided feedback with other participants.    Identified / Expressed personal readiness to practice skills    Treatment Plan:  Patient has a current master individualized treatment plan.  See Epic treatment plan for more information.    Jaimie Vuong RN

## 2022-04-27 NOTE — GROUP NOTE
Psychotherapy Group Note    PATIENT'S NAME: Munir Storey  MRN:   [id number]  :   1965  ACCT. NUMBER: [id number]  DATE OF SERVICE: 22  START TIME:  4:00 PM  END TIME:  5:50 PM  FACILITATOR: Olu Ramirez LMFT  TOPIC: MH EBP Group: [facility] Adult Partial Hospitalization Program  TRACK: PHP1    NUMBER OF PARTICIPANTS: 7    Summary of Group / Topics Discussed:  Specialty Topics: Education Support Network:  Patients and caregivers received an overview of diagnoses including physical, intellectual, and behavioral indicators of illness. Patients presented information created in the support network development group to engage caregivers in planning for help and support to manage mental health symptoms.  The goal is to help patients and caregivers create a plan for support and assistance after discharge.      Patient Session Goals / Objectives:    Understanding diagnoses and accompanying physical reactions, thoughts, and behaviors.      Explored options to support patients in their recovery     Formulated a plan with caregivers for assistance and support to aid in recovery     Problem solved barriers to follow through on plan    Service Modality:  Video Visit     Telemedicine Visit: The patient's condition can be safely assessed and treated via synchronous audio and visual telemedicine encounter.      Reason for Telemedicine Visit: Services only offered telehealth and due to COVID-19.    Originating Site (Patient Location): Patient's home    Distant Site (Provider Location): Provider Remote Setting- Home Office    Consent:  The patient/guardian has verbally consented to: the potential risks and benefits of telemedicine (video visit) versus in person care; bill my insurance or make self-payment for services provided; and responsibility for payment of non-covered services.     Patient would like the video invitation sent by:  My Chart and email    Mode of Communication:  Video  Conference via Medical Zoom    As the provider I attest to compliance with applicable laws and regulations related to telemedicine.        Patient Participation / Response:  Fully participated with the group by sharing personal reflections / insights and openly received / provided feedback with other participants.    Demonstrated understanding of topics discussed through group discussion and participation, Identified / Expressed readiness to act on skill suggestions discussed in topic, Verbalized understanding of ways to proactively manage illness and Practiced skills in session  Joined by sister and wife  Treatment Plan:  Patient has a current master individualized treatment plan.  See Epic treatment plan for more information.    Olu Ramirez LMFT

## 2022-04-28 ENCOUNTER — HOSPITAL ENCOUNTER (OUTPATIENT)
Dept: BEHAVIORAL HEALTH | Facility: CLINIC | Age: 57
Discharge: HOME | End: 2022-04-28
Attending: PSYCHIATRY & NEUROLOGY | Admitting: PSYCHIATRY & NEUROLOGY
Payer: COMMERCIAL

## 2022-04-28 PROCEDURE — H0035 MH PARTIAL HOSP TX UNDER 24H: HCPCS | Mod: GT,95

## 2022-04-28 PROCEDURE — H0035 MH PARTIAL HOSP TX UNDER 24H: HCPCS | Mod: GT,95 | Performed by: SOCIAL WORKER

## 2022-04-28 ASSESSMENT — ENCOUNTER SYMPTOMS
CHILLS: 0
SORE THROAT: 0
ABDOMINAL PAIN: 1
DYSURIA: 0
ARTHRALGIAS: 1
FREQUENCY: 1
MYALGIAS: 0
EYE PAIN: 0
SHORTNESS OF BREATH: 0
HEADACHES: 0
HEMATOCHEZIA: 0
DIZZINESS: 0
NERVOUS/ANXIOUS: 1
NAUSEA: 0
HEARTBURN: 0
PARESTHESIAS: 0
WEAKNESS: 0
HEMATURIA: 0
CONSTIPATION: 0
COUGH: 0
PALPITATIONS: 0
JOINT SWELLING: 0
FEVER: 0
DIARRHEA: 0

## 2022-04-28 ASSESSMENT — PATIENT HEALTH QUESTIONNAIRE - PHQ9
SUM OF ALL RESPONSES TO PHQ QUESTIONS 1-9: 5
10. IF YOU CHECKED OFF ANY PROBLEMS, HOW DIFFICULT HAVE THESE PROBLEMS MADE IT FOR YOU TO DO YOUR WORK, TAKE CARE OF THINGS AT HOME, OR GET ALONG WITH OTHER PEOPLE: SOMEWHAT DIFFICULT
SUM OF ALL RESPONSES TO PHQ QUESTIONS 1-9: 5

## 2022-04-28 NOTE — GROUP NOTE
Service Modality:  Video Visit     Telemedicine Visit: The patient's condition can be safely assessed and treated via synchronous audio and visual telemedicine encounter.       Reason for Telemedicine Visit: Services only offered telehealth and due to COVID-19.     Originating Site (Patient Location): Patient's home     Distant Site (Provider Location): Provider Remote Setting- Home Office     Consent:  The patient/guardian has verbally consented to: the potential risks and benefits of telemedicine (video visit) versus in person care; bill my insurance or make self-payment for services provided; and responsibility for payment of non-covered services.      Patient would like the video invitation sent by:  My Chart     Mode of Communication:  Video Conference via Medical Zoom     As the provider I attest to compliance with applicable laws and regulations related to telemedicine.    Process Group Note    PATIENT'S NAME: Munir Storey  MRN:   [id number]  :   1965  ACCT. NUMBER: [id number]  DATE OF SERVICE: 22  START TIME:  9:00 AM  END TIME:  9:50 AM  FACILITATOR: Lindsay Willingham LIC  TOPIC:  Process Group    Diagnoses:  296.32 (F33.1) Major Depressive Disorder, Recurrent Episode, Moderate   300.01 (F41.0) Panic Disorder  300.02 (F41.1) Generalized Anxiety Disorder  History of alcohol use disorder in sustained remission      [facility] Adult Partial Hospitalization Program  TRACK: Partial Program Group One    NUMBER OF PARTICIPANTS: 6          Data:    Session content: At the start of this group, patients were invited to check in by identifying themselves, describing their current emotional status, and identifying issues to address in this group.   Area(s) of treatment focus addressed in this session included Symptom Management, Personal Safety, Community Resources/Discharge Planning, Abstinence/Relapse Prevention, Develop / Improve Independent Living Skills, Develop Socialization /  Interpersonal Relationship Skills and Physical Health .    Munir reports depressed and anxious mood. He reports intermittent, fleeting, passive suicidal ideation thoughts. Denies intent or plan. He can maintain his safety and can be responsible for himself.  He is using coping skills for symptom management including sensory regulation tools, grounding skills and tips such as, cool water for the vagus nerve. He reports his hand surgery went well and his finger continues to heal.       Therapeutic Interventions/Treatment Strategies:  Psychotherapist offered support, feedback and validation and reinforced use of skills. Treatment modalities used include Cognitive Behavioral Therapy.    Assessment:    Patient response:   Patient responded to session by accepting feedback, giving feedback, listening, focusing on goals, being attentive, accepting support and verbalizing understanding    Possible barriers to participation / learning include: and no barriers identified    Health Issues:   Yes: Post surgery for hand.       Substance Use Review:   Substance Use: No active concerns identified.    Mental Status/Behavioral Observations  Appearance:   Appropriate   Eye Contact:   Good   Psychomotor Behavior: Normal   Attitude:   Cooperative  Interested  Orientation:   All  Speech   Rate / Production: Normal    Volume:  Normal   Mood:    Anxious  Depressed   Affect:    Appropriate   Thought Content:   Safety reports  presence of suicidal ideation passive suicidal ideation   Thought Form:  Coherent  Goal Directed  Logical     Insight:    Good     Plan:     Safety Plan: Committed to safety and agreed to follow previously developed safety coping plan.      Barriers to treatment: None identified    Patient Contracts (see media tab):  None    Substance Use: Not addressed in session     Continue or Discharge: Patient will continue in Adult Partial Hospitalization Program (PHP)  as planned. Patient is likely to benefit from learning and  using skills as they work toward the goals identified in their treatment plan. Munir will continue for mood stabilization and symptom management education for mental health recovery.      Lindsay Willingham, [facility]  April 28, 2022

## 2022-04-28 NOTE — GROUP NOTE
Psychotherapy Group Note    PATIENT'S NAME: Munir Storey  MRN:   [id number]  :   1965  ACCT. NUMBER: [id number]  DATE OF SERVICE: 22  START TIME:  1:00 PM  END TIME:  1:50 PM  FACILITATOR: Maria Elena Torres LICSW  TOPIC: MH EBP Group: Coping Skills  [facility] Adult Partial Hospitalization Program  TRACK: PHP 1    NUMBER OF PARTICIPANTS: 6    Summary of Group / Topics Discussed:  Coping Skills: Radical Acceptance: Patients learned radical acceptance as a way to tolerate heightened stress in the moment.  Patients identified situations that necessitate radical acceptance.  They focused on applying acceptance of the moment to tolerate difficult emotions and events. Patients discussed how to distinguish when this can be useful in their lives and when other skills are more relevant or helpful.    Patient Session Goals / Objectives:    Understand that some amount of pain exists for each of us in our lives    Process the difficulty of acceptance in our lives and benefits of radical acceptance to mood and functioning.    Demonstrate understanding of when to use the radical acceptance to tolerate distress by providing examples of situations where this could be applied.    Identify barriers to applying radical acceptance to difficult situations and explore strategies to overcome them                                      Service Modality:  Video Visit     Telemedicine Visit: The patient's condition can be safely assessed and treated via synchronous audio and visual telemedicine encounter.      Reason for Telemedicine Visit: Services only offered telehealth    Originating Site (Patient Location): Patient's home    Distant Site (Provider Location): Provider Remote Setting- Home Office    Consent:  The patient/guardian has verbally consented to: the potential risks and benefits of telemedicine (video visit) versus in person care; bill my insurance or make self-payment for services provided; and  responsibility for payment of non-covered services.     Patient would like the video invitation sent by:  My Chart    Mode of Communication:  Video Conference via Medical Zoom    As the provider I attest to compliance with applicable laws and regulations related to telemedicine.            Patient Participation / Response:  Moderately participated, sharing some personal reflections / insights and adequately adequately received / provided feedback with other participants.    Demonstrated understanding of topics discussed through group discussion and participation    Treatment Plan:  Patient has a current master individualized treatment plan.  See Epic treatment plan for more information.    Maria Elena Torres, CHERELLESW

## 2022-04-28 NOTE — GROUP NOTE
Psychoeducation Group Note    PATIENT'S NAME: Munir Storey  MRN:   [id number]  :   1965  ACCT. NUMBER: [id number]  DATE OF SERVICE: 22  START TIME: 10:00 AM  END TIME: 10:50 AM  FACILITATOR: Manuel Pisano RN  TOPIC:  Wellness Group: Brain Health                                    Service Modality:  Video Visit     Telemedicine Visit: The patient's condition can be safely assessed and treated via synchronous audio and visual telemedicine encounter.      Reason for Telemedicine Visit: Covid19    Originating Site (Patient Location): Patient's home    Distant Site (Provider Location): Provider Remote Setting- Home Office    Consent:  The patient/guardian has verbally consented to: the potential risks and benefits of telemedicine (video visit) versus in person care; bill my insurance or make self-payment for services provided; and responsibility for payment of non-covered services.     Patient would like the video invitation sent by:  My Chart    Mode of Communication:  Video Conference via Medical Zoom    As the provider I attest to compliance with applicable laws and regulations related to telemedicine.        [facility] Adult Partial Hospitalization Program  TRACK: PHP1    NUMBER OF PARTICIPANTS: 6    Summary of Group / Topics Discussed:  Brain Health:  Pathophysiology of stress and anxiety: Patients were educated on the difference between stress, chronic stress, and anxiety. The anatomy and pathophysiology of the body/brain were reviewed to illustrate the immediate effects of stress/anxiety in the body and the long term effects and increased risks for chronic disease that come from unmanaged stress/anxiety. Self-coping strategies to manage symptoms of stress were reviewed and pharmacologic, psychotherapeutic, and complementary treatment options were discussed.    Patient Session Goals / Objectives:  ? Described the differences between stress and anxiety and how the body responds to  it  ? Listed the long term effects and increased risks for chronic disease that can arise from unmanaged stress/anxiety  ? Identified and described pharmacologic, psychotherapeutic, and complementary treatment options      Patient Participation / Response:  Fully participated with the group by sharing personal reflections / insights and openly received / provided feedback with other participants.    Demonstrated understanding of topics discussed through group discussion and participation    Treatment Plan:  Patient has a current master individualized treatment plan.  See Epic treatment plan for more information.    Manuel Pisano RN

## 2022-04-28 NOTE — PROGRESS NOTES
Acknowledgement of Current Treatment Plan       I have reviewed my treatment plan with my therapist / counselor on 4/28/22.   I agree with the plan as it is written in the electronic health record.    Name:      Signature:  Munir Storey Unable to sign due to Covid 19.  Verbal permission given on 4/28/2022 1211PM   Ralph Monzon MD  Psychiatrist/Medical Director Ralph Monzon MD on 4/28/2022 at 6:02 PM     SERGE Ontiveros, [facility] Hospital[facility] Maria Elena Torres [facility]  4/28/2022 12:27PM

## 2022-04-28 NOTE — GROUP NOTE
Process Group Note    PATIENT'S NAME: Munir Storey  MRN:   [id number]  :   1965  ACCT. NUMBER: [id number]  DATE OF SERVICE: 22  START TIME:  9:00 AM  END TIME:  9:50 AM  FACILITATOR: Olu Ramirez LMFT  TOPIC:  Process Group    Diagnoses:  296.32 (F33.1) Major Depressive Disorder, Recurrent Episode, Moderate   300.01 (F41.0) Panic Disorder  300.02 (F41.1) Generalized Anxiety Disorder  History of alcohol use disorder in sustained remission      [facility] Adult Partial Hospitalization Program  TRACK: PHP1    NUMBER OF PARTICIPANTS: 7    Service Modality:  Video Visit     Telemedicine Visit: The patient's condition can be safely assessed and treated via synchronous audio and visual telemedicine encounter.      Reason for Telemedicine Visit: Services only offered telehealth and due to COVID-19.    Originating Site (Patient Location): Patient's home    Distant Site (Provider Location): Provider Remote Setting- Home Office    Consent:  The patient/guardian has verbally consented to: the potential risks and benefits of telemedicine (video visit) versus in person care; bill my insurance or make self-payment for services provided; and responsibility for payment of non-covered services.     Patient would like the video invitation sent by:  My Chart and email    Mode of Communication:  Video Conference via Medical Zoom    As the provider I attest to compliance with applicable laws and regulations related to telemedicine.        Data:    Session content: At the start of this group, patients were invited to check in by identifying themselves, describing their current emotional status, and identifying issues to address in this group.   Area(s) of treatment focus addressed in this session included Symptom Management, Personal Safety and Community Resources/Discharge Planning.  Patient reported feeling anxious, and reported he woke up really anxious.   His medicine helped him to settle down.  His  sidney sainz surgery went well.     Patient discussed working toward getting well.   For skills they will use to address their goal(s), patient identified mindfulness and breathing.   A barrier to working toward their goal(s) and/or addressing mental health symptoms the patient identified was having bad days sometimes.  Patient reported no safety concerns and/or self-injurious behaviors. Patient reported no substance use. Patient reported they are taking their medications as prescribed. He had a recent medication increase.  Patient reported feeling proud/grateful for his sidney having a good surgery.  Patient discussed with the treatment group that his sidney sainz surgery went well, and Munir is feeling anxious.    Therapeutic Interventions/Treatment Strategies:  Psychotherapist offered support, feedback and validation. Treatment modalities used include Cognitive Behavioral Therapy. Interventions include Coping Skills: Facilitated understanding of  what factors may contribute to symptom relapse and skills plan to manage symptom relapse  and Symptoms Management: Promoted understanding of their diagnoses and how it impacts their functioning.    Assessment:    Patient response:   Patient responded to session by accepting feedback, giving feedback, listening, focusing on goals, being attentive and accepting support    Possible barriers to participation / learning include: Severity of symptoms    Health Issues:   None reported       Substance Use Review:   Substance Use: No active concerns identified.    Mental Status/Behavioral Observations  Appearance:   Appropriate   Eye Contact:   Good   Psychomotor Behavior: Normal   Attitude:   Cooperative   Orientation:   All  Speech   Rate / Production: Normal    Volume:  Normal   Mood:    Normal Anxious  Affect:    Appropriate   Thought Content:   Clear and Safety denies any current safety concerns including suicidal ideation, self-harm, and homicidal ideation  Thought  Form:  Coherent  Logical     Insight:    Good     Plan:     Safety Plan: No current safety concerns identified.  Recommended that patient call 911 or go to the local ED should there be a change in any of these risk factors.     Barriers to treatment: None identified    Patient Contracts (see media tab):  None    Substance Use: Provided encouragement towards sobriety     Continue or Discharge: Patient will continue in Adult Partial Hospitalization Program (PHP)  as planned. Patient is likely to benefit from learning and using skills as they work toward the goals identified in their treatment plan.    Olu Ramirez, CATHLEEN  April 27, 2022

## 2022-04-29 ENCOUNTER — OFFICE VISIT (OUTPATIENT)
Dept: FAMILY MEDICINE | Facility: CLINIC | Age: 57
End: 2022-04-29
Payer: COMMERCIAL

## 2022-04-29 ENCOUNTER — HOSPITAL ENCOUNTER (OUTPATIENT)
Dept: BEHAVIORAL HEALTH | Facility: CLINIC | Age: 57
Discharge: HOME | End: 2022-04-29
Attending: PSYCHIATRY & NEUROLOGY | Admitting: PSYCHIATRY & NEUROLOGY
Payer: COMMERCIAL

## 2022-04-29 VITALS
RESPIRATION RATE: 20 BRPM | SYSTOLIC BLOOD PRESSURE: 142 MMHG | HEIGHT: 68 IN | TEMPERATURE: 97.4 F | BODY MASS INDEX: 25.07 KG/M2 | DIASTOLIC BLOOD PRESSURE: 88 MMHG | WEIGHT: 165.4 LBS | HEART RATE: 64 BPM

## 2022-04-29 DIAGNOSIS — Z98.890 CHRONIC NECK PAIN WITH HISTORY OF CERVICAL SPINAL SURGERY: ICD-10-CM

## 2022-04-29 DIAGNOSIS — Z86.79 H/O ATRIAL FLUTTER: ICD-10-CM

## 2022-04-29 DIAGNOSIS — G89.28 CHRONIC NECK PAIN WITH HISTORY OF CERVICAL SPINAL SURGERY: ICD-10-CM

## 2022-04-29 DIAGNOSIS — Z00.00 ROUTINE GENERAL MEDICAL EXAMINATION AT A HEALTH CARE FACILITY: Primary | ICD-10-CM

## 2022-04-29 DIAGNOSIS — F33.1 MDD (MAJOR DEPRESSIVE DISORDER), RECURRENT EPISODE, MODERATE (H): ICD-10-CM

## 2022-04-29 DIAGNOSIS — I10 HYPERTENSION, GOAL BELOW 140/90: Chronic | ICD-10-CM

## 2022-04-29 DIAGNOSIS — F41.1 GAD (GENERALIZED ANXIETY DISORDER): Chronic | ICD-10-CM

## 2022-04-29 DIAGNOSIS — M54.2 CHRONIC NECK PAIN WITH HISTORY OF CERVICAL SPINAL SURGERY: ICD-10-CM

## 2022-04-29 DIAGNOSIS — F10.21 ALCOHOL DEPENDENCE IN REMISSION (H): ICD-10-CM

## 2022-04-29 DIAGNOSIS — R63.4 WEIGHT LOSS: ICD-10-CM

## 2022-04-29 DIAGNOSIS — K21.00 GASTROESOPHAGEAL REFLUX DISEASE WITH ESOPHAGITIS, UNSPECIFIED WHETHER HEMORRHAGE: ICD-10-CM

## 2022-04-29 PROBLEM — E66.9 OBESITY (BMI 30-39.9): Status: RESOLVED | Noted: 2019-08-04 | Resolved: 2022-04-29

## 2022-04-29 PROCEDURE — H0035 MH PARTIAL HOSP TX UNDER 24H: HCPCS | Mod: GT,95

## 2022-04-29 PROCEDURE — 99396 PREV VISIT EST AGE 40-64: CPT | Performed by: FAMILY MEDICINE

## 2022-04-29 PROCEDURE — 99214 OFFICE O/P EST MOD 30 MIN: CPT | Mod: 25 | Performed by: FAMILY MEDICINE

## 2022-04-29 RX ORDER — FAMOTIDINE 20 MG/1
20 TABLET, FILM COATED ORAL 2 TIMES DAILY PRN
Qty: 180 TABLET | Refills: 1 | Status: SHIPPED | OUTPATIENT
Start: 2022-04-29 | End: 2022-12-16

## 2022-04-29 ASSESSMENT — PATIENT HEALTH QUESTIONNAIRE - PHQ9: SUM OF ALL RESPONSES TO PHQ QUESTIONS 1-9: 5

## 2022-04-29 ASSESSMENT — PAIN SCALES - GENERAL: PAINLEVEL: NO PAIN (0)

## 2022-04-29 NOTE — GROUP NOTE
Psychotherapy Group Note    PATIENT'S NAME: Munir Storey  MRN:   [id number]  :   1965  ACCT. NUMBER: [id number]  DATE OF SERVICE: 22  START TIME: 10:00 AM  END TIME: 10:50 AM  FACILITATOR: Devora Ponce LMFT  TOPIC:  EBP Group: Emotions Management  [facility] Adult Partial Hospitalization Program  TRACK: 1    NUMBER OF PARTICIPANTS: 3    Summary of Group / Topics Discussed:  Emotions Management: Guilt and Shame: Patients explored and shared personal experiences associated with feelings of guilt and shame.  Group explored how these feelings develop, what they mean to each individual, and how to increase acceptance and usefulness of these feelings.  Group members assisted one another to contextualize these concepts and promote healing.     Patient Session Goals / Objectives:    Discuss and review definitions and personal views/experiences with guilt and shame    Understand the differences between guilt and shame    Explore how feelings of guilt and shame impact functioning    Understand and practice strategies to manage difficult emotions and move towards healing    Understand and normalize difficult emotions through group discussion    Understand the utility of guilt and shame    Target  unwanted  emotions for change                                    Service Modality:  Video Visit     Telemedicine Visit: The patient's condition can be safely assessed and treated via synchronous audio and visual telemedicine encounter.      Reason for Telemedicine Visit: Services only offered telehealth    Originating Site (Patient Location): Patient's home    Distant Site (Provider Location): Provider Remote Setting- Home Office    Consent:  The patient/guardian has verbally consented to: the potential risks and benefits of telemedicine (video visit) versus in person care; bill my insurance or make self-payment for services provided; and responsibility for payment of non-covered services.     Patient would  like the video invitation sent by:  My Chart    Mode of Communication:  Video Conference via Medical Zoom    As the provider I attest to compliance with applicable laws and regulations related to telemedicine.            Patient Participation / Response:  Fully participated with the group by sharing personal reflections / insights and openly received / provided feedback with other participants.    Demonstrated understanding of topics discussed through group discussion and participation, Expressed understanding of the relevance / importance of emotions management skills at distressing times in life, Self-aware of experiences with difficult emotions, and strategies to employ to manage them, Demonstrated knowledge of when to consider applying a variety of emotions management skills in daily life and Demonstrated understanding and practice strategies to manage difficult emotions and move towards healing    Treatment Plan:  Patient has a current master individualized treatment plan.  See Epic treatment plan for more information.    Devora Ponce LMFT

## 2022-04-29 NOTE — GROUP NOTE
Psychoeducation Group Note    PATIENT'S NAME: Munir Storey  MRN:   [id number]  :   1965  ACCT. NUMBER: [id number]  DATE OF SERVICE: 22  START TIME:  2:00 PM  END TIME:  2:50 PM  FACILITATOR: Maria Elena Torres LICSW  TOPIC: MH Wellness Group: Mental Health Maintenance  [facility] Adult Partial Hospitalization Program  TRACK: PHP 1    NUMBER OF PARTICIPANTS: 6    Summary of Group / Topics Discussed:  Mental Health Maintenance:  Vulnerability: In this group, the concept of vulnerability was explored through the viewing, discussion, and self-reflection of the Alexandra Shaw Talk Titled,  The Power of Vulnerability.      Patient Session Goals / Objectives:  ? Defined and described definition of vulnerability   ? Group shared and identified barriers to being vulnerable with others  ? Identified 2 or more ways of practicing authenticity                                     Service Modality:  Video Visit     Telemedicine Visit: The patient's condition can be safely assessed and treated via synchronous audio and visual telemedicine encounter.      Reason for Telemedicine Visit: Services only offered telehealth    Originating Site (Patient Location): Patient's home    Distant Site (Provider Location): Provider Remote Setting- Home Office    Consent:  The patient/guardian has verbally consented to: the potential risks and benefits of telemedicine (video visit) versus in person care; bill my insurance or make self-payment for services provided; and responsibility for payment of non-covered services.     Patient would like the video invitation sent by:  My Chart    Mode of Communication:  Video Conference via Medical Zoom    As the provider I attest to compliance with applicable laws and regulations related to telemedicine.              Patient Participation / Response:  Moderately participated, sharing some personal reflections / insights and adequately adequately received / provided feedback with  other participants.    Demonstrated understanding of topics discussed through group discussion and participation    Treatment Plan:  Patient has a current master individualized treatment plan.  See Epic treatment plan for more information.    Maria Elena Torres, CHERELLESW

## 2022-04-29 NOTE — PROGRESS NOTES
SUBJECTIVE:   CC: Munir Storey is an 56 year old male who presents for preventative health visit.     Chief Complaint   Patient presents with     Physical     Pt is fasting       Healthy Habits:     Getting at least 3 servings of Calcium per day:  Yes    Bi-annual eye exam:  NO    Dental care twice a year:  NO    Sleep apnea or symptoms of sleep apnea:  Daytime drowsiness    Diet:  Low salt and Carbohydrate counting    Frequency of exercise:  2-3 days/week    Duration of exercise:  15-30 minutes    Taking medications regularly:  Yes    Medication side effects:  None    PHQ-2 Total Score: 3    Additional concerns today:  Yes      Increased anxiety. Currently in outpatient program for anxiety and depression.       Today's PHQ-2 Score:   PHQ-2 (  [employer]) 2022   Q1: Little interest or pleasure in doing things 1   Q2: Feeling down, depressed or hopeless 2   PHQ-2 Score 3   PHQ-2 Total Score (12-17 Years)- Positive if 3 or more points; Administer PHQ-A if positive -   Q1: Little interest or pleasure in doing things Several days   Q2: Feeling down, depressed or hopeless More than half the days   PHQ-2 Score 3       Abuse: Current or Past(Physical, Sexual or Emotional)- No  Do you feel safe in your environment? Yes        Social History     Tobacco Use     Smoking status: Former Smoker     Packs/day: 0.50     Years: 20.00     Pack years: 10.00     Types: Cigarettes, Dip, chew, snus or snuff     Quit date: 2019     Years since quittin.6     Smokeless tobacco: Current User   Substance Use Topics     Alcohol use: No     Comment: hx alcohol abuse, sober since          Alcohol Use 2022   Prescreen: >3 drinks/day or >7 drinks/week? Not Applicable       Last PSA:   PSA   Date Value Ref Range Status   2019 0.52 0 - 4 ug/L Final     Comment:     Assay Method:  Chemiluminescence using Siemens Vista analyzer       Reviewed orders with patient. Reviewed health maintenance and updated orders accordingly  "- Yes  Lab work is in process  Labs reviewed in EPIC    Reviewed and updated as needed this visit by clinical staff   Tobacco  Allergies  Meds   Med Hx  Surg Hx  Fam Hx  Soc Hx          Reviewed and updated as needed this visit by Provider                       Review of Systems  CONSTITUTIONAL: NEGATIVE for fever, chills, change in weight  INTEGUMENTARY/SKIN: NEGATIVE for worrisome rashes, moles or lesions  EYES: NEGATIVE for vision changes or irritation  ENT: NEGATIVE for ear, mouth and throat problems  RESP: NEGATIVE for significant cough or SOB  CV: NEGATIVE for chest pain, palpitations or peripheral edema  GI: NEGATIVE for nausea, abdominal pain, heartburn, or change in bowel habits   male: negative for dysuria, hematuria, decreased urinary stream, erectile dysfunction  MUSCULOSKELETAL: NEGATIVE for significant arthralgias or myalgia  NEURO: NEGATIVE for weakness, dizziness or paresthesias  PSYCHIATRIC: notes anxiety.     OBJECTIVE:   BP (!) 142/88 (BP Location: Left arm, Patient Position: Sitting, Cuff Size: Adult Regular)   Pulse 64   Temp 97.4  F (36.3  C) (Tympanic)   Resp 20   Ht 1.734 m (5' 8.25\")   Wt 75 kg (165 lb 6.4 oz)   BMI 24.97 kg/m      Physical Exam  GENERAL: Healthy, alert and no distress  EYES: Eyes grossly normal to inspection, conjunctivae and sclerae normal  RESP: Lungs clear to auscultation - no rales, rhonchi or wheezes  CV: Regular rate and rhythm, normal S1 S2, no murmur  MS: No gross musculoskeletal defects noted, no edema  NEURO: Normal strength and tone, mentation intact and speech normal  PSYCH: Mentation appears normal, affect normal/bright     Diagnostic Test Results:  Labs reviewed in Epic  none     ASSESSMENT/PLAN:   (Z00.00) Routine general medical examination at a health care facility  (primary encounter diagnosis)  Comment:  Reviewed the need for periodic healthcare exams and screenings.   Plan: advised yearly physical.     (F10.21) Alcohol dependence in " remission (H)  Comment: Patient states he is abstaining from alcohol.  Plan: monitor.     (I10) Hypertension, goal below 140/90  Comment: Slight elevation.  Plan: continue  beta blocker and ace inhibitor     (F41.1) SANDRA (generalized anxiety disorder)  Comment: Increasing symptoms, this may be aggravated by Wellbutrin.  Reviewed that he may benefit from BuSpar, advised to ask his psychiatrist regarding this medication.  Using as needed lorazepam.  Discussed this medication is typically not used for the long-term management of anxiety, but is used to treat symptoms of anxiety in the short-term.  Plan: Continue current medications for now.    (F33.1) MDD (major depressive disorder), recurrent episode, moderate (H)  Comment: Ongoing, followed by psychiatry, currently in therapy.  He notes more anxiety and panic attacks.  Plan: Continue fluoxetine and Wellbutrin.    (Z86.79) H/O atrial flutter  Comment: Reviewed recent Zio patch.  Rare flutter, intermittent tachycardia.  Overall, appears relatively normal.  Plan: Monitor.    (M54.2,  G89.28,  Z98.890) Chronic neck pain with history of cervical spinal surgery  Comment: History of cervical fusion, notes ongoing neck pain.  Reviewed options, will refer to physical therapy.  Plan: Physical Therapy Referral        If there is no improvement after therapy, or if symptoms worsen, would pursue advanced imaging.    (K21.00) Gastroesophageal reflux disease with esophagitis, unspecified whether hemorrhage  Comment: Notes increasing symptoms, will try H2 blocker therapy at nighttime.  Plan: famotidine (PEPCID) 20 MG tablet        Monitor.      (R63.4) Weight loss  Comment: through lifestyle.   Wt Readings from Last 4 Encounters:   04/29/22 75 kg (165 lb 6.4 oz)   04/06/22 76.8 kg (169 lb 4 oz)   04/04/22 75.5 kg (166 lb 8 oz)   03/29/22 75.8 kg (167 lb)       Plan: monitor.         Patient has been advised of split billing requirements and indicates understanding: Yes    COUNSELING:  "  Reviewed preventive health counseling, as reflected in patient instructions       Regular exercise       Healthy diet/nutrition       Colorectal cancer screening       Prostate cancer screening    Estimated body mass index is 24.97 kg/m  as calculated from the following:    Height as of this encounter: 1.734 m (5' 8.25\").    Weight as of this encounter: 75 kg (165 lb 6.4 oz).         He reports that he quit smoking about 2 years ago. His smoking use included cigarettes and dip, chew, snus or snuff. He has a 10.00 pack-year smoking history. He uses smokeless tobacco.      Counseling Resources:  ATP IV Guidelines  Pooled Cohorts Equation Calculator  FRAX Risk Assessment  ICSI Preventive Guidelines  Dietary Guidelines for Americans, 2010  USDA's MyPlate  ASA Prophylaxis  Lung CA Screening    Magalis Morales MD  [facility]  "

## 2022-04-29 NOTE — GROUP NOTE
Psychoeducation Group Note    PATIENT'S NAME: Munir Storey  MRN:   [id number]  :   1965  ACCT. NUMBER: [id number]  DATE OF SERVICE: 22  START TIME: 11:00 AM  END TIME: 11:50 AM  FACILITATOR: Ema Akbar OTR/L  TOPIC: [facility] OT Group: Lifestyle Balance and Structure  [facility] Adult Partial Hospitalization Program  TRACK: PHP1    NUMBER OF PARTICIPANTS: 4    Service Modality:  Video Visit     Telemedicine Visit: The patient's condition can be safely assessed and treated via synchronous audio and visual telemedicine encounter.      Reason for Telemedicine Visit: Services only offered telehealth    Originating Site (Patient Location): Patient's home    Distant Site (Provider Location): Provider Remote Setting- Home Office    Consent:  The patient/guardian has verbally consented to: the potential risks and benefits of telemedicine (video visit) versus in person care; bill my insurance or make self-payment for services provided; and responsibility for payment of non-covered services.     Patient would like the video invitation sent by:  My Chart    Mode of Communication:  Video Conference via Medical Zoom    As the provider I attest to compliance with applicable laws and regulations related to telemedicine.     Summary of Group / Topics Discussed:  Lifestyle Balance and Strucure:  Benefits of Structure on Mental Health: Patients explored and learned about the benefits and possibilities of structure to create lifestyle balance that supports their mental and physical wellbeing. Patients were assisted to identify individualized weekend plans - including self-care, home management, socializing, leisure and reflective activities. Pt's recognized the benefits of activities on mental health and problem solved barriers to engagement and strategies to overcome them.  Patients engaged in an experiential leisure activity to gain self-awareness and build milieu social aspects and reflected on the impact the  "experiential activity had on their mood.       Patient Session Goals / Objectives:    Increased awareness of the importance of engagement in activities to support lifestyle balance and perceived quality of life    Identified strategies to recognize and challenge barriers to participation     Facilitated exploration of leisure    Practiced and reflected on how to generalize taught skills to their everyday life      Patient Participation / Response:  Fully participated with the group by sharing personal reflections / insights and openly received / provided feedback with other participants.    Patient presentation:   congruent, demonstrated understanding of topic through discussion and participation in activities. Pt shared he will, \"take care of the dogs, wild game, dinner, working, weather related planning - garden/yard, dinner with sister, [employer], relaxing activities (watching tv), walks if the weather is nice, time with myself.\" He also noted he has tried square breathing.    Treatment Plan:  Patient has a current master individualized treatment plan.  See Epic treatment plan for more information.    Ema Akbar, OTR/L      "

## 2022-04-29 NOTE — GROUP NOTE
Psychoeducation Group Note    PATIENT'S NAME: Munir Storey  MRN:   [id number]  :   1965  ACCT. NUMBER: [id number]  DATE OF SERVICE: 22  START TIME:  2:00 PM  END TIME:  2:50 PM  FACILITATOR: Manuel Pisano RN  TOPIC:  Wellness Group: Medication Education and Management                                    Service Modality:  Video Visit     Telemedicine Visit: The patient's condition can be safely assessed and treated via synchronous audio and visual telemedicine encounter.      Reason for Telemedicine Visit: Covid19    Originating Site (Patient Location): Patient's home    Distant Site (Provider Location): Provider Remote Setting- Home Office    Consent:  The patient/guardian has verbally consented to: the potential risks and benefits of telemedicine (video visit) versus in person care; bill my insurance or make self-payment for services provided; and responsibility for payment of non-covered services.     Patient would like the video invitation sent by:  My Chart    Mode of Communication:  Video Conference via Medical Zoom    As the provider I attest to compliance with applicable laws and regulations related to telemedicine.        [facility] Adult Partial Hospitalization Program  TRACK: PHP1    NUMBER OF PARTICIPANTS: 5    Summary of Group / Topics Discussed:  Medication Educations and Management:  Medication Assessment/Review: Patients were given a medication quiz to assess their current knowledge about the medications that are prescribed and why they are taking them. Medication lists were reviewed with each patient individually to provide education and answer questions. Safe medication storage, handling, management, and disposal were discussed within the group. Patients completed medication wallet cards    Patient Session Goals / Objectives:  ? Assessed patient understanding of their current medications and indication  ? Identify what to do if a dose is missed  ? Assessed medication  adherence  ? Assessed knowledge of medication side effects and how to treat them      Patient Participation / Response:  Fully participated with the group by sharing personal reflections / insights and openly received / provided feedback with other participants.     Demonstrated understanding of topics discussed through group discussion and participation    Treatment Plan:  Patient has a current master individualized treatment plan.  See Epic treatment plan for more information.    Manuel Pisano RN

## 2022-04-29 NOTE — ADDENDUM NOTE
Encounter addended by: Maria Elena Torres, LICSW on: 4/29/2022 3:25 PM   Actions taken: Charge Capture section accepted

## 2022-04-29 NOTE — GROUP NOTE
Psychoeducation Group Note    PATIENT'S NAME: Munir Storey  MRN:   [id number]  :   1965  ACCT. NUMBER: [id number]  DATE OF SERVICE: 22  START TIME:  1:00 PM  END TIME:  1:50 PM  FACILITATOR: Ema Akbar OTR/L  TOPIC: MH Life Skills Group: Resiliency Development  [facility] Adult Partial Hospitalization Program    TRACK: PHP1    NUMBER OF PARTICIPANTS: 4    Service Modality:  Video Visit     Telemedicine Visit: The patient's condition can be safely assessed and treated via synchronous audio and visual telemedicine encounter.      Reason for Telemedicine Visit: Services only offered telehealth    Originating Site (Patient Location): Patient's home    Distant Site (Provider Location): Provider Remote Setting- Home Office    Consent:  The patient/guardian has verbally consented to: the potential risks and benefits of telemedicine (video visit) versus in person care; bill my insurance or make self-payment for services provided; and responsibility for payment of non-covered services.     Patient would like the video invitation sent by:  My Chart    Mode of Communication:  Video Conference via Medical Zoom    As the provider I attest to compliance with applicable laws and regulations related to telemedicine.     Summary of Group / Topics Discussed:  Life Skills:  Cognitive Flexibility - Expectations and Perceptions. Patients reflected on the importance of intellectual wellness for long term cognitive health, improving adaptability, challenging current ways of thinking, and increasing creativity, knowledge, and skills. Patients were provided opportunities to practice flexibility in interpretation and were guided to consider alternative interpretations by challenging assumptions, taking different perspectives, and slowing down to perceive more nuances and details.     Patient Session Goals / Objectives:  ? Identify activities that support intellectual wellness  ? Understand the benefits of engaging in  intellectual challenges  ? Working together to practice cognitive flexibility tasks  ? Identifying application of skills to everyday situations      Patient Participation / Response:  Fully participated with the group by sharing personal reflections / insights and openly received / provided feedback with other participants.    Patient presentation:   congruent, demonstrated understanding of topic through discussion and participation in activities. Pt shared changing his mindset on working on anxiety. He also noted valuing work ethic, guidance, outdoors, and competition. Munir shared interest in address avoidance.     Treatment Plan:  Patient has a current master individualized treatment plan.  See Epic treatment plan for more information.    Ema Akbar, OTR/L

## 2022-04-30 ENCOUNTER — NURSE TRIAGE (OUTPATIENT)
Dept: NURSING | Facility: CLINIC | Age: 57
End: 2022-04-30
Payer: COMMERCIAL

## 2022-04-30 ENCOUNTER — HOSPITAL ENCOUNTER (EMERGENCY)
Facility: CLINIC | Age: 57
Discharge: HOME | End: 2022-04-30
Attending: EMERGENCY MEDICINE | Admitting: EMERGENCY MEDICINE
Payer: COMMERCIAL

## 2022-04-30 VITALS
SYSTOLIC BLOOD PRESSURE: 181 MMHG | RESPIRATION RATE: 16 BRPM | DIASTOLIC BLOOD PRESSURE: 89 MMHG | WEIGHT: 162 LBS | TEMPERATURE: 98.9 F | BODY MASS INDEX: 25.43 KG/M2 | HEIGHT: 67 IN | OXYGEN SATURATION: 98 % | HEART RATE: 62 BPM

## 2022-04-30 DIAGNOSIS — R45.851 SUICIDAL IDEATION: ICD-10-CM

## 2022-04-30 DIAGNOSIS — F10.21 ALCOHOL DEPENDENCE IN REMISSION (H): ICD-10-CM

## 2022-04-30 DIAGNOSIS — F41.1 GAD (GENERALIZED ANXIETY DISORDER): ICD-10-CM

## 2022-04-30 LAB — SARS-COV-2 RNA RESP QL NAA+PROBE: NEGATIVE

## 2022-04-30 PROCEDURE — U0003 INFECTIOUS AGENT DETECTION BY NUCLEIC ACID (DNA OR RNA); SEVERE ACUTE RESPIRATORY SYNDROME CORONAVIRUS 2 (SARS-COV-2) (CORONAVIRUS DISEASE [COVID-19]), AMPLIFIED PROBE TECHNIQUE, MAKING USE OF HIGH THROUGHPUT TECHNOLOGIES AS DESCRIBED BY CMS-2020-01-R: HCPCS | Performed by: EMERGENCY MEDICINE

## 2022-04-30 PROCEDURE — 250N000013 HC RX MED GY IP 250 OP 250 PS 637: Performed by: EMERGENCY MEDICINE

## 2022-04-30 PROCEDURE — C9803 HOPD COVID-19 SPEC COLLECT: HCPCS

## 2022-04-30 PROCEDURE — 99204 OFFICE O/P NEW MOD 45 MIN: CPT | Performed by: NURSE PRACTITIONER

## 2022-04-30 PROCEDURE — 99285 EMERGENCY DEPT VISIT HI MDM: CPT | Mod: 25

## 2022-04-30 PROCEDURE — 90791 PSYCH DIAGNOSTIC EVALUATION: CPT

## 2022-04-30 RX ORDER — BUSPIRONE HYDROCHLORIDE 10 MG/1
10 TABLET ORAL 2 TIMES DAILY
Qty: 60 TABLET | Refills: 0 | Status: SHIPPED | OUTPATIENT
Start: 2022-04-30 | End: 2023-01-13

## 2022-04-30 RX ORDER — LORAZEPAM 0.5 MG/1
1 TABLET ORAL ONCE
Status: COMPLETED | OUTPATIENT
Start: 2022-04-30 | End: 2022-04-30

## 2022-04-30 RX ORDER — ACETAMINOPHEN 325 MG/1
650 TABLET ORAL ONCE
Status: DISCONTINUED | OUTPATIENT
Start: 2022-04-30 | End: 2022-04-30 | Stop reason: HOSPADM

## 2022-04-30 RX ADMIN — LORAZEPAM 1 MG: 0.5 TABLET ORAL at 09:26

## 2022-04-30 ASSESSMENT — ENCOUNTER SYMPTOMS
VOMITING: 0
ABDOMINAL PAIN: 0
FEVER: 0

## 2022-04-30 NOTE — ED PROVIDER NOTES
"[facility] Unit - Psychiatric Consultation  [facility] Emergency Department    Munir Storey MRN: [id number]   Age: 56 year old YOB: 1965     History     Chief Complaint   Patient presents with     Suicidal     HPI  Munir Storey is a 56 year old male with history notable for SANDRA and alcohol abuse in remission who presents to the ED per self for concerns of new onset of suicidal thoughts which emerged this morning when he woke up.  Patient was evaluated by the ED provider, who medically cleared patient to transfer to [facility] for psychiatric assessment, this is reviewed along with all pertinent labs and tests performed.    On examination, patient is resting comfortably in the recliner. He received PRN ativan in the ED, which helped with the symptoms of anxiety.  Patient verbalized concern about having suicidal thoughts today. He states he does not normally experience such thoughts.  He states the thoughts lasted for about 45 minutes.  He described it as a \"banner around my head\" whispering \"suicide, suicide, suicide.\"  He called his therapist, insurance company and nurse line who recommended he present to the ED for evaluation. Patient believes the thoughts are medicated related, mostly to Prozac as he was having difficulty tolerating it at initiation, and recently increased the dose from 10 mg to 20 mg 5 days ago.  He also notes he took it at night last night vs morning as he typically dose. He does not endorse suicidal intent, He does not feel depressed. He feels his anxiety has been adequately managed, although he notes some increased worry as his wife is out of town caring for their step-son who recently had brain surgery.     Per chart review, patient was seen yesterday by Dr. Morales for routine exam. At this visit, patient did express some difficulty was heightened anxiety where buspar was introduced as a possible medication to augment his current regime.  Patient seeking advise regarding " medications.    Past Medical History  Past Medical History:   Diagnosis Date     Alcohol withdrawal (H) 2015     Atrial flutter (H)      Depressive disorder      Ejection fraction < 50% 2015    Ejection fraction 45% per echo 2015 (per [facility] records), possible alcohol or tachycardia induced cardiomyopathy. EF normalized on follow-up echo      SANDRA (generalized anxiety disorder)      H/O atrioventricular quita ablation 2015     Hypertension, goal below 140/90      Tobacco abuse      Past Surgical History:   Procedure Laterality Date     CARDIAC SURGERY  2015    EP cardioversion     COLONOSCOPY  2013     COLONOSCOPY  2019    normal colonoscopy - repeat 10 years     HERNIA REPAIR       LAPAROSCOPIC RESECTION SMALL BOWEL  2013     SPINE SURGERY      cervical fusion      UPPER GI ENDOSCOPY  2013     amLODIPine (NORVASC) 10 MG tablet  atorvastatin (LIPITOR) 40 MG tablet  buPROPion (WELLBUTRIN XL) 300 MG 24 hr tablet  busPIRone (BUSPAR) 10 MG tablet  famotidine (PEPCID) 20 MG tablet  FLUoxetine (PROZAC) 10 MG capsule  gabapentin (NEURONTIN) 300 MG capsule  lisinopril (ZESTRIL) 40 MG tablet  LORazepam (ATIVAN) 0.5 MG tablet  metoprolol succinate ER (TOPROL-XL) 25 MG 24 hr tablet  Misc Natural Products (T-RELIEF CBD+13 SL)  omeprazole (PRILOSEC) 20 MG DR capsule  pediatric electrolyte (PEDIALYTE) SOLN solution      Allergies   Allergen Reactions     Sulfamethoxazole-Trimethoprim Nausea     Family History  Family History   Problem Relation Age of Onset     Diabetes Father      Depression Father      Depression Paternal Grandfather      Social History   Social History     Tobacco Use     Smoking status: Former Smoker     Packs/day: 0.50     Years: 20.00     Pack years: 10.00     Types: Cigarettes, Dip, chew, snus or snuff     Quit date: 2019     Years since quittin.7     Smokeless tobacco: Current User   Vaping Use     Vaping Use: Never used   Substance Use Topics      "Alcohol use: No     Comment: hx alcohol abuse, sober since 2015     Drug use: No      Past medical history, past surgical history, medications, allergies, family history, and social history were reviewed with the patient. No additional pertinent items.       Review of Systems  A complete review of systems was performed with pertinent positives and negatives noted in the HPI, and all other systems negative.    Physical Examination   BP: (!) 202/105  Pulse: 85  Temp: 97.1  F (36.2  C)  Resp: 16  Height: 170.2 cm (5' 7\")  Weight: 74.8 kg (165 lb)  SpO2: 99 %    Physical Exam  General: Appears stated age.   Neuro: Alert and fully oriented. Extremities appear to demonstrate normal strength on visual inspection.   Integumentary/Skin: no rash visualized, normal color    Psychiatric Examination   Appearance: awake, alert  Attitude:  cooperative  Eye Contact:  good  Mood:  better  Affect:  mood congruent  Speech:  clear, coherent  Psychomotor Behavior:  no evidence of tardive dyskinesia, dystonia, or tics  Thought Process:  logical, linear and goal oriented  Associations:  no loose associations  Thought Content:  no evidence of suicidal ideation or homicidal ideation and no evidence of psychotic thought  Insight:  good  Judgement:  intact  Oriented to:  time, person, and place  Attention Span and Concentration:  intact  Recent and Remote Memory:  intact  Language: able to name/identify objects without impairment  Fund of Knowledge: intact with awareness of current and past events    ED Course        Labs Ordered and Resulted from Time of ED Arrival to Time of ED Departure   COVID-19 VIRUS (CORONAVIRUS) BY PCR - Normal       Result Value    SARS CoV2 PCR Negative         Assessments & Plan (with Medical Decision Making)   Patient presenting with fleeting suicidal thoughts in the context of SANDRA with recent medication adjustments. Patient concerned medications may be producing the thoughts, yet he also acknowledges other " stressors in his life recently which may also be contributing to heightened anxiety. The thoughts lasted about 45 minutes, he did not have urges to act on them. They have not returned, thus he is comfortable returning home.    Nursing notes reviewed noting no acute issues.     I have reviewed the assessment completed by the [facility].     After a period of working with the treatment team on the [facility] unit, the patient's mental state improved to allow a safe transition to outpatient care. After counseling on the diagnosis, work-up, and treatment plan, the patient was discharged. Close follow-up with a psychiatrist and/or therapist was recommended and community psychiatric resources were provided. Patient is to return to the ED if any urgent or potentially life-threatening concerns.     At the time of discharge, the patient's acute suicide risk was determined to be low due to the following factors: Reduction in the intensity of mood/anxiety symptoms that preceded the admission, denial of suicidal thoughts, denies feeling helpless or helpless, not currently under the influence of alcohol or illicit substances, denies experiencing command hallucinations, no immediate access to firearms. The patient's acute risk could be higher if noncompliant with their treatment plan, medications, follow-up appointments or using illicit substances or alcohol. Protective factors include: social supports, stable housing, employment.    Preliminary diagnosis:    ICD-10-CM    1. SANDRA (generalized anxiety disorder)  F41.1    2. Alcohol dependence in remission (H)  F10.21    3. Suicidal ideation  R45.851         Treatment Plan:  -Discharge home with safety plan in place.  -Follow up with [facility] and outpatient psychiatrist.  -Continue Prozac 20 mg daily for anxiety, Wellbutrin 300 mg daily for anxiety and Gabapentin 300 mg TID for anxiety.  -We discussed the risks and benefits of decreasing the Prozac from 20 mg to 10 mg. It sounds like he is  noticing reduction in anxiety symptoms at the higher dose, thus we opted to keep the dose as is. He notes he took it for the first time at night last night, thus he plans on starting back on it in the morning tomorrow.  -Start buspirone 10 mg BID to target anxiety.  -Medication education provided this visit includes, rationale for medication, importance of compliance, medication interactions, and common side effects. Patient agreeable.    --  CORAZON Ayala CNP   [facility] EMERGENCY DEPT  [facility] Unit  4/30/2022      Kiara Clark APRN CNP  04/30/22 [id number]

## 2022-04-30 NOTE — ED PROVIDER NOTES
History   Chief Complaint:  Suicidal       The history is provided by the patient.      Munir Storey is a 56 year old male with history of depression, SANDRA, panic disorder, and hypertension who presents with suicidal thoughts. He provides that this morning, he woke up with suicidal thoughts with no plan. He has not had this happen before and had a normal day yesterday. He has been adjusting his medications recently and took 1 of his Lorazepam prior to coming to the ED. He currently is undergoing 10 day outpatient program for his mental health and is scheduled to meet with a psychiatrist soon. On evaluation, he denies any recent illnesses or symptoms such as fevers, chest pain, abdominal pain, or vomiting. He comes to the ED seeking an evaluation from the [facility] unit.    Review of Systems   Constitutional: Negative for fever.   Cardiovascular: Negative for chest pain.   Gastrointestinal: Negative for abdominal pain and vomiting.   Psychiatric/Behavioral: Positive for suicidal ideas.   All other systems reviewed and are negative.      Allergies:  Sulfamethoxazole-Trimethoprim    Medications:  Norvasc  Lipitor  Wellbutrin XL  Pepcid  Prozac  Neurontin  Zestril  Ativan  Toprol XL  Prilosec    Past Medical History:     Alcohol withdrawal  Atrial flutter  Depression  Ejection fraction < 50%  SANDRA  Hypertension  Tobacco abuse  Alcohol abuse in remission  Seborrheic keratosis  Peyronie disease  Chronic neck pain with history of cervical spinal surgery  Vitamin D deficiency  Onychomycosis  Tinea pedis  Osteoarthritis of right hand  Anhedonia  GERD  Panic disorder      Past Surgical History:    Atrioventricular node ablation  EP cardioversion  Colonoscopy  Hernia repair  Laparoscopic resection small bowel  Cervical fusion  Upper GI endoscopy     Family History:    Diabetes  Hypertension  Prostate cancer  Heart disease  Depression    Social History:  Patient came from home.  Patient is accompanied in the ED by his  "sister.    Physical Exam     Patient Vitals for the past 24 hrs:   BP Temp Temp src Pulse Resp SpO2 Height Weight   04/30/22 1000 (!) 183/106 -- -- 59 -- -- -- --   04/30/22 0930 (!) 209/108 -- -- 66 -- 100 % -- --   04/30/22 0923 (!) 188/108 -- -- -- -- 99 % -- --   04/30/22 0915 (!) 205/104 -- -- 68 -- 99 % -- --   04/30/22 0843 (!) 202/105 97.1  F (36.2  C) Temporal 85 16 99 % 1.702 m (5' 7\") 74.8 kg (165 lb)       Physical Exam  Vitals: reviewed by me  General: Pt seen on [facility], cooperative, and alert to conversation  Eyes: Tracking well, clear conjunctiva BL  ENT: MMM, midline trachea.   Lungs: No tachypnea, no accessory muscle use. No respiratory distress.   CV: Rate as above  MSK: no joint effusion.  No evidence of trauma  Skin: No rash  Neuro: Clear speech and no facial droop.  Psych: Not RIS, no e/o AH/VH.  Endorses passive suicidal ideation, anxious appearing.      Emergency Department Course     Laboratory:    Labs Ordered and Resulted from Time of ED Arrival to Time of ED Departure   COVID-19 VIRUS (CORONAVIRUS) BY PCR - Normal       Result Value    SARS CoV2 PCR Negative          Emergency Department Course:  Reviewed:  I reviewed nursing notes, vitals and past medical history    Assessments:  0914 I obtained history and examined the patient as noted above.     Interventions:  0926 Ativan 1 mg PO    Disposition:  The patient was transferred to [facility].     Impression & Plan     Medical Decision Making:  This is a pleasant 56-year-old male presents emergency room with appears to be passive suicidal ideation.  He has done nothing to harm himself, and tells me that he does not actively have a plan for how to kill himself.  It sounds like he is doing all of the right things and trying his outpatient program appropriately.  He does seem quite anxious here so I offered him an Ativan, which he took.  He is medically cleared at this time, has a normal COVID test, and no medical complaints " with a reassuring physical exam and normal mentation.  We will plan for transfer to [facility] as above.      Diagnosis:    ICD-10-CM    1. SANDRA (generalized anxiety disorder)  F41.1    2. Alcohol dependence in remission (H)  F10.21    3. Suicidal ideation  R45.851          Scribe Disclosure:  I, Juan Savana, am serving as a scribe at 9:09 AM on 4/30/2022 to document services personally performed by Ralph Neri MD based on my observations and the provider's statements to me.        Ralph Neri MD  04/30/22 2360

## 2022-04-30 NOTE — ED NOTES
Pt met with LMHP and NP. Buspar will be sent to his Pharmacy  Patient agrees to discharge plan. Discharge instructions reviewed with patient including follow-up care plan. Medications: sent to his pharmacy. Reviewed safety plan and outpatient resources. Denies SI and HI. All belongings that were brought into the hospital have been returned to patient. Escorted off the unit at 1315 accompanied by [facility] staff. Discharged to home via sister.

## 2022-04-30 NOTE — DISCHARGE INSTRUCTIONS
"Aftercare Plan  If I am feeling unsafe or I am in a crisis, I will:   Contact my established care providers   Call the National Suicide Prevention Lifeline: 288.729.1047   Go to the nearest emergency room   Call 911      Warning signs that I or other people might notice when a crisis is developing for me: I express I am feeling suicidal.     Things I am able to do on my own to cope or help me feel better: praying, spending time outdoors, spending time with family/friends, sensory and breath work exercises, gardening      Things I can use or do for distraction: using sensory stimulating things such as cold water to ground myself, going for walks, calling my wife or a family member/friend     Changes I can make to support my mental health and wellness: continued use of outpatient therapeutic resources, letting my psychiatrist know how medication adjustments feel for me     People in my life that I can ask for help: my wife, my siblings, my friends, my [employer] community     Your [facility] has a mental health crisis team you can call 24/7: [facility]  [phone]     Other things that are important when I m in crisis: utilizing my coping skills, support network, and treatment providers     Upcoming appointments: psychiatry appointment with Dr. Peggy Eden on 05/03/2022      Crisis Lines  Crisis Text Line  Text 793998  You will be connected with a trained live crisis counselor to provide support.     Por espanol, texto  SANDI a 648038 o texto a 442-AYUDAME en WhatsApp     [place] Hope Line  1.800.SUICIDE [3738384]        Community Resources  Fast Tracker  Linking people to mental health and substance use disorder resources  fasttrackermn.org      [place] Mental Health Warm Line  Peer to peer support  Monday thru Saturday, 12 pm to 10 pm  566.426.4533 or 5.172.002.0824  Text \"Support\" to 58161     National [place] [employer] Mental Illness (ADRIANA)  997.301.2615 or 1.888.ADRIANA.HELPS           Mental Health " Apps  My3  https://Zeppelinpp.org/     VirtualHopeBox  https://[employer]/apps/virtual-hope-box/        Additional Information  Today you were seen by a licensed mental health professional through Triage and Transition services, [facility] ([facility])  for a crisis assessment in the Emergency Department at [facility].  It is recommended that you follow up with your established providers (psychiatrist, mental health therapist, and/or primary care doctor - as relevant) as soon as possible. Coordinators from [facility] will be calling you in the next 24-48 hours to ensure that you have the resources you need.  You can also contact [facility] coordinators directly at [phone]. You may have been scheduled for or offered an appointment with a mental health provider. [facility] maintains an extensive network of licensed behavioral health providers to connect patients with the services they need.  We do not charge providers a fee to participate in our referral network.  We match patients with providers based on a patient's specific needs, insurance coverage, and location.  Our first effort will be to refer you to a provider within your care system, and will utilize providers outside your care system as needed.

## 2022-04-30 NOTE — ED NOTES
Pt states he has had increased anxiety ans started Prozac 10 days ago and  Woke up this AM feeling suicidal. He has dealt with anxiety in the past and is getting therapy for this but has never felt suicidal. In his Therapy through [place], [facility] was mentioned as a safe place to go to get help. He is here for medications adjustment and has his out patient services set up. BP is now 181/89 and pt had 1 mg Ativan at 09:26 Pt states he woke up today with vague suicidal feelings no plan.

## 2022-04-30 NOTE — TELEPHONE ENCOUNTER
"Patient calling stating he is followed by Behavioral Health through [facility].     Reporting his dosing woke last night with thoughts of suicide \"that had.\"     Reporting dosage on anti depressant was increased to 20 mg in past few days.    Patient stating he woke from sleep with \"suicidal thoughts.\"   \"No plan.\" Stating \"I just don't like having those racing thoughts.\"     Patient denies any suicidal plan or attempt.   Stating he does have access to weapons in home. Denies previous suicidal attempts.    Placed call to [facility] regarding dosage change and was advised to have patient go through ED.    Patient advised Emergency Room. Caller verbalized understanding. Denies further questions.    Patient agrees to go to [facility] ED/stating he will likely go to [facility].    Advised to call back with any increase or change in symptoms.     Silke Elias RN  [facility]          Reason for Disposition    [1] Patient is not threatening suicide now BUT [2] has a suicide PLAN (e.g., overdose, gunshot) and ACCESS (e.g., collecting pills, gun in house)     Denies plan/has access to weapons in home.    Additional Information    Negative: Patient attempted suicide    Negative: Patient is threatening suicide now    Negative: Violent behavior, or threatening to physically hurt or kill someone    Negative: [1] Patient is very confused (disoriented, slurred speech) AND [2] no other adult (e.g., friend or family member) available    Negative: [1] Difficult to awaken or acting very confused (disoriented, slurred speech) AND [2] new onset    Negative: Sounds like a life-threatening emergency to the triager    Negative: [1] Depression symptoms (sadness, hopelessness, decreased energy) AND [2] unable to do any normal activities (e.g., self care, school, work; in comparison to baseline).    Negative: Patient sounds very sick or weak to the triager    Protocols used: SUICIDE " [id number]  COVID 19 Nurse Triage Plan/Patient Instructions    Please be aware that novel coronavirus (COVID-19) may be circulating in the community. If you develop symptoms such as fever, cough, or SOB or if you have concerns about the presence of another infection including coronavirus (COVID-19), please contact your health care provider or visit [email or web address].     Disposition/Instructions    ED Visit recommended. Follow protocol based instructions.     Bring Your Own Device:  Please also bring your smart device(s) (smart phones, tablets, laptops) and their charging cables for your personal use and to communicate with your care team during your visit.    Thank you for taking steps to prevent the spread of this virus.  o Limit your contact with others.  o Wear a simple mask to cover your cough.  o Wash your hands well and often.    Resources    [facility]: About COVID-19: [email or web address]    CDC: What to Do If You're Sick: www.cdc.gov/coronavirus/2019-ncov/about/steps-when-sick.html    CDC: Ending Home Isolation: www.cdc.gov/coronavirus/2019-ncov/hcp/disposition-in-home-patients.html     CDC: Caring for Someone: www.cdc.gov/coronavirus/2019-ncov/if-you-are-sick/care-for-someone.html     [facility]: Interim Guidance for Hospital Discharge to Home: www.health.[facility].[place]/diseases/coronavirus/hcp/hospdischarge.pdf    [facility] clinical trials (COVID-19 research studies): clinicalaffairs.[facility]-clinical-trials     Below are the COVID-19 hotlines at the [place] Department of Health ([facility]). Interpreters are available.   o For health questions: Call 936-420-7696 or 1-566.471.3186 (7 a.m. to 7 p.m.)  o For questions about schools and childcare: Call 760-349-9658 or 1-920.317.5392 (7 a.m. to 7 p.m.)

## 2022-04-30 NOTE — ED TRIAGE NOTES
Suicidal thoughts without a plan - pt been adjusting his medication   Hx of anxiety and depression      Triage Assessment     Row Name 04/30/22 0849       Respiratory WDL    Respiratory WDL WDL       Cardiac WDL    Cardiac WDL WDL       Cognitive/Neuro/Behavioral WDL    Cognitive/Neuro/Behavioral WDL WDL

## 2022-04-30 NOTE — CONSULTS
4/30/2022  Munir Storey 1965     [facility] Crisis Assessment    Patient was assessed: in person  Patient location: [facility]  Was a release of information signed: Yes. Providers included on the release: Antonio Ross - therapist    Referral Data and Chief Complaint  Munir Storey is a 56 year old who uses he/him pronouns. Patient presented to the ED with family/friends and was referred to the ED by self. Patient is presenting to the ED for the following concerns: suicidal ideation.      Informed Consent and Assessment Methods    Patient is his own guardian. Writer met with patient and explained the crisis assessment process, including applicable information disclosures and limits to confidentiality, assessed understanding of the process, and obtained consent to proceed with the assessment. Patient was observed to be able to participate in the assessment as evidenced by verbal understanding of the process. Assessment methods included conducting a formal interview with patient, review of medical records, collaboration with medical staff, and obtaining relevant collateral information from family and community providers when available.    Narrative Summary of Presenting Problem and Current Functioning  What led to the patient presenting for crisis services, factors that make the crisis life threatening or complex, stressors, how is this disrupting the patient's life, and how current functioning is in comparison to baseline. How is patient presenting during the assessment.     Pt is a 56 year old male who uses he/him pronouns. Pt currently lives with his wife Ariane, who has been out of state in [place] with her son for the past five weeks, due to her son needing surgery. Pt has been staying overnight with his sister Mariya while Ariane has been gone, and Ariane should be returning home to [place] in a couple weeks. Pt is self-employed as a contractor and feels supported by his Confucianist, family, friends, and treatment providers.      Pt reports that today he woke up with suicidal thoughts, with no plan or intent of hurting or kill himself. This was very unexpected, as he has not had any recent suicidal ideation, and he believes it is likely a symptom of his recent medication change of his prescribed Prozac, which he started taking ten days ago. Pt was startled by this suicidal ideation and called his sister Mariya and his providers, who recommended that he come in to [facility] for further evaluation.    Pt denies any current overt suicidal ideation with intent or planning. He denies homicidal ideation, depressive symptoms, psychosis, dany, past suicide attempts, or self injurious behaviors. Pt notes that he currently has anxiety symptoms that include occasional panic attacks, and he manages his symptoms for this via his intensive outpatient programming, therapy, and medications. Pt currently presents as highly future and goal oriented, with a desire to return home today and do gardening. Pt reports that his family is very important to him, and he would never hurt or kill himself, as he wants to be around for his grandchildren and other family members.     Pt reports a hx anxiety and notes that there is a history of anxiety and depression concerns in his family. Pt reports a good appetite and some difficulty sleeping sometimes due to his anxiety. Pt presents as alert oriented, calm and cooperative.     History of the Crisis  Duration of the current crisis, coping skills attempted to reduce the crisis, community resources used, and past presentations.      Pt denies any hx of hospitalizations or commitments. Pt has utilized the ED in the past due to panic attacks. Pt is currently in Intensive Outpatient Programming with [facility], which he finds helpful. He is receiving psychiatry and therapy services, and has appointments for these next week. Pt currently has no substance use but has a history of alcohol use and treatment for that.    Pt reports  "that his medications, therapeutic programming, friend and familial support network, and coping skills such as breath and sensory work have been helpful for him with managing his anxiety.     Collateral Information  The following information was received from Mariya Crockett whose relationship to the patient is sister. Information was obtained in person. Their phone number is [phone] and they last had contact with patient today.    What happened today: Pt woke up this morning with suicidal thoughts, but no plan or intent. He felt fine yesterday and has not had any recent suicidal thoughts, so this was \"out of the blue\" for him and startling. He called Mariya to let her know he would like to seek out mental health evaluation at [facility], so she brought him in.    What is different about patient's functioning: Pt has not recently expressed having suicidal ideation. Pt and Mariya think this change is possibly related to his recent medication change with Prozac.    Concern about alcohol/drug use: No    What do you think the patient needs: medication evaluation    Has patient made comments about wanting to kill themselves/others:  Yes Pt expressed passive suicidal ideation today. No homicidal comments.    If d/c is recommended, can they take part in safety/aftercare planning: Yes .    Other information: Pt is currently in programmatic programming (Mariya thinks it's possibly IOP), and his last day of this is on Monday. Pt has been actively participating in it and is finding it to be helpful. He is learning new coping skills and plans to continue with outpatient psychiatry and therapeutic supports.        Risk Assessment    Risk of Harm to Self     ESS-6  1.a. Over the past 2 weeks, have you had thoughts of killing yourself? Yes  1.b. Have you ever attempted to kill yourself and, if yes, when did this last happen? No   2. Recent or current suicide plan? No   3. Recent or current intent to act on ideation? No  4. Lifetime " psychiatric hospitalization? No  5. Pattern of excessive substance use? No  6. Current irritability, agitation, or aggression? No  Scoring note: BOTH 1a and 1b must be yes for it to score 1 point, if both are not yes it is zero. All others are 1 point per number. If all questions 1a/1b - 6 are no, risk is negligible. If one of 1a/1b is yes, then risk is mild. If either question 2 or 3, but not both, is yes, then risk is automatically moderate regardless of total score. If both 2 and 3 are yes, risk is automatically high regardless of total score.     Score: 0, mild risk    The patient has the following risk factors for suicide: other recent medication changes    Is the patient experiencing current suicidal ideation: No    Is the patient engaging in preparatory suicide behaviors (formulating how to act on plan, giving away possessions, saying goodbye, displaying dramatic behavior changes, etc)? No    Does the patient have access to firearms or other lethal means? yes, lethal means counseling was provided and the following plan for risk mitigation was discussed: keeping guns locked up. Pt reports never having any thoughts or plans of hurting himself with his guns..     The patient has the following protective factors: social support, voluntarily seeking mental health support, displays resiliency , established relationship community mental health provider(s), fabiana system, future focused thinking, displays insight, expresses desire to engage in treatment, sense of obligation to people/pets, safe/stable housing and fulfilling employment    Support system information: wife, siblings, Baptism    Patient strengths: Pt is help-seeking, insightful, and engaged in his treatment    Does the patient engage in non-suicidal self-injurious behavior (NSSI/SIB)? no    Is the patient vulnerable to sexual exploitation?  No    Is the patient experiencing abuse or neglect? no    Is the patient a vulnerable adult? No      Risk of Harm to  Others  The patient has the following risk factors of harm to others: no risk factors identified    Does the patient have thoughts of harming others? No    Is the patient engaging in sexually inappropriate behavior?  no       Current Substance Abuse    Is there recent substance abuse? no    Was a urine drug screen or blood alcohol level obtained: No    CAGE AID  Have you felt you ought to cut down on your drinking or drug use?  No  Have people annoyed you by criticizing your drinking or drug use? No  Have you felt bad or guilty about your drinking or drug use? No  Have you ever had a drink or used drugs first thing in the morning to steady your nerves or to get rid of a hangover? No  Score: 0/4       Current Symptoms/Concerns    Symptoms  Attention, hyperactivity, and impulsivity symptoms present: No    Anxiety symptoms present: Yes: Generalized Symptoms: Cognitive anxiety - feelings of doom, racing thoughts, difficulty concentrating , Excessive worry and Physiological anxiety - sweating, flushing, shaking, shortness of breath, or racing heart      Appetite symptoms present: No     Behavioral difficulties present: No     Cognitive impairment symptoms present: No    Depressive symptoms present: No    Eating disorder symptoms present: No    Learning disabilities, cognitive challenges, and/or developmental disorder symptoms present: No     Manic/hypomanic symptoms present: No    Personality and interpersonal functioning difficulties present : No    Psychosis symptoms present: No      Sleep difficulties present: No    Substance abuse disorder symptoms present: No     Trauma and stressor related symptoms present: No       Mental Status Exam   Affect: Appropriate   Appearance: Appropriate    Attention Span/Concentration: Attentive?    Eye Contact: Engaged   Fund of Knowledge: Appropriate    Language /Speech Content: Fluent   Language /Speech Volume: Normal    Language /Speech Rate/Productions: Normal    Recent Memory:  Intact   Remote Memory: Intact   Mood: Normal    Orientation to Person: Yes    Orientation to Place: Yes   Orientation to Time of Day: Yes    Orientation to Date: Yes    Situation (Do they understand why they are here?): Yes    Psychomotor Behavior: Normal    Thought Content: Clear   Thought Form: Goal Directed and Intact       Mental Health and Substance Abuse History    History  Current and historical diagnoses or mental health concerns: Generalized Anxiety Disorder    Prior MH services (inpatient, programmatic care, outpatient, etc) : Yes Intensive Outpatient Therapy    Has the patient used [facility] crisis team services before?: No    History of substance abuse: Yes alcohol dependency in remission    Prior ALEX services (inpatient, programmatic care, detox, outpatient, etc) : Yes outpatient ALEX services    History of commitment: No    Family history of MH/ALEX: Yes family history of anxiety and depression    Trauma history: No    Medication  Psychotropic medications: see med list    Current Care Team  Primary Care Provider: Yes. Unable to obtain.    Psychiatrist: Yes. Name: Dr. Peggy Eden. Location: [facility]. Date of last visit: unable to obtain. Frequency: unable to obtain. Perceived helpfulness: yes.    Therapist: Yes. Name: Antonio Ross. Location: unable to obtain. Date of last visit: unable to obtain. Frequency: weekly. Perceived helpfulness: yes.    : No    CTSS or ARMHS: No    ACT Team: No    Other: Intensive Outpatient Programming with [facility]    Biopsychosocial Information    Socioeconomic Information  Current living situation: lives with wife    Employment/income source: self employed contractor    Relevant legal issues: n/a    Cultural, Sikhism, or spiritual influences on mental health care: client attends Religious    Is the patient active in the  or a : No      Relevant Medical Concerns   Patient identifies concerns with completing ADLs? Yes     Patient can ambulate  independently? Yes     Other medical concerns? hypertension     History of concussion or TBI? No        Diagnosis    300.00 (F41.9) Unspecified Anxiety Disorder - primary       Therapeutic Intervention  The following therapeutic methodologies were employed when working with the patient: establishing rapport, active listening, assessing dimensions of crisis, solution focused brief therapy, identifying additional supports and alternative coping skills, establishing a discharge plan, safety planning and motivational interviewing. Patient response to intervention: Pt is not wanting to stay on [facility] for observation and wanting to discharge today. Pt engaged in safety planning and was able to identify many coping skills and supports he can utilize.      Disposition  Recommended disposition: Individual Therapy and Medication Management      Reviewed case and recommendations with attending provider. Attending Name: Kiara Clark NP      Attending concurs with disposition: Yes      Patient concurs with disposition: Yes      Guardian concurs with disposition: NA     Final disposition: Individual therapy , Medication management and Programmatic care: continued Intensive Outpatient Programming.       Clinical Substantiation of Recommendations   Rationale with supporting factors for disposition and diagnosis.     The patient's circumstances and mental state were safe for outpatient management. After crisis assessment and aftercare planning by [facility] care team and consultation with attending provider, the patient was discharged. Close follow-up with a psychiatrist and/or therapist was recommended. Patient is to return to the ED if any urgent or potentially life-threatening concerns arise.       At the time of discharge, the patient's acute suicide risk was determined to be low due to the following factors: reduction in the intensity of mood/anxiety symptoms that preceded the admission, denial of suicidal thoughts, denies feeling  helpless or hopeless, not currently under the influence of alcohol or illicit substances and denies experiencing command hallucinations. Protective factors include: social support, voluntarily seeking mental health support, displays resiliency , established relationship community mental health provider(s), fabiana system, future focused thinking, displays insight, expresses desire to engage in treatment, sense of obligation to people/pets, safe/stable housing and fulfilling employment      Assessment Details  Patient interview started at: 11:15 am and completed at: 11:45 am.    Total duration spent on the patient case in minutes: 1.0 hrs     CPT code(s) utilized: 00231 - Psychotherapy for Crisis - 60 (30-74*) min       Aftercare and Safety Planning  Follow up plans with MH/ALEX services: Yes continued use of current Intensive Outpatient Programming and psychiatry      Aftercare plan placed in the AVS and provided to patient: Yes. Given to patient by nurse    Ania Ragsdale [facility]      Aftercare Plan  If I am feeling unsafe or I am in a crisis, I will:   Contact my established care providers   Call the National Suicide Prevention Lifeline: 689.932.2694   Go to the nearest emergency room   Call 911      Warning signs that I or other people might notice when a crisis is developing for me: I express I am feeling suicidal.     Things I am able to do on my own to cope or help me feel better: praying, spending time outdoors, spending time with family/friends, sensory and breath work exercises, gardening      Things I can use or do for distraction: using sensory stimulating things such as cold water to ground myself, going for walks, calling my wife or a family member/friend     Changes I can make to support my mental health and wellness: continued use of outpatient therapeutic resources, letting my psychiatrist know how medication adjustments feel for me     People in my life that I can ask for help: my wife, my siblings, my  "friends, my Worship community     Your [facility] has a mental health crisis team you can call 24/7: [facility]  [phone]     Other things that are important when I m in crisis: utilizing my coping skills, support network, and treatment providers    Upcoming appointments: psychiatry appointment with Dr. Peggy Eden on 05/03/2022         Crisis Lines  Crisis Text Line  Text 908513  You will be connected with a trained live crisis counselor to provide support.     Por espanol, texto  SANDI a 679634 o texto a 442-AYUDAME en WhatsApp     National Hope Line  1.800.SUICIDE [6463385]        Community Resources  Fast Tracker  Linking people to mental health and substance use disorder resources  Damai.cn.org      [place] Mental Health Warm Line  Peer to peer support  Monday thru Saturday, 12 pm to 10 pm  506.312.4856 or 2.733.578.7081  Text \"Support\" to 55792     National [place] on Mental Illness (ADRIANA)  803.665.1510 or 1.888.ADRIANA.HELPS           Mental Health Apps  My3  https://Zolverspp.org/     VirtualHopeBox  https://Zank.org/apps/virtual-hope-box/        Additional Information  Today you were seen by a licensed mental health professional through Triage and Transition services, [facility] ([facility])  for a crisis assessment in the Emergency Department at [facility].  It is recommended that you follow up with your established providers (psychiatrist, mental health therapist, and/or primary care doctor - as relevant) as soon as possible. Coordinators from [facility] will be calling you in the next 24-48 hours to ensure that you have the resources you need.  You can also contact [facility] coordinators directly at [phone]. You may have been scheduled for or offered an appointment with a mental health provider. [facility] maintains an extensive network of licensed behavioral health providers to connect patients with the services they need.  We do not charge providers a fee to " participate in our referral network.  We match patients with providers based on a patient's specific needs, insurance coverage, and location.  Our first effort will be to refer you to a provider within your care system, and will utilize providers outside your care system as needed.

## 2022-05-02 ENCOUNTER — TELEPHONE (OUTPATIENT)
Dept: FAMILY MEDICINE | Facility: CLINIC | Age: 57
End: 2022-05-02

## 2022-05-02 ENCOUNTER — HOSPITAL ENCOUNTER (EMERGENCY)
Facility: CLINIC | Age: 57
Discharge: HOME | End: 2022-05-02
Attending: EMERGENCY MEDICINE | Admitting: EMERGENCY MEDICINE
Payer: COMMERCIAL

## 2022-05-02 ENCOUNTER — TELEPHONE (OUTPATIENT)
Dept: BEHAVIORAL HEALTH | Facility: CLINIC | Age: 57
End: 2022-05-02

## 2022-05-02 ENCOUNTER — VIRTUAL VISIT (OUTPATIENT)
Dept: FAMILY MEDICINE | Facility: CLINIC | Age: 57
End: 2022-05-02

## 2022-05-02 VITALS
HEIGHT: 67 IN | BODY MASS INDEX: 25.9 KG/M2 | SYSTOLIC BLOOD PRESSURE: 168 MMHG | OXYGEN SATURATION: 98 % | RESPIRATION RATE: 18 BRPM | TEMPERATURE: 98.5 F | WEIGHT: 165 LBS | DIASTOLIC BLOOD PRESSURE: 89 MMHG | HEART RATE: 68 BPM

## 2022-05-02 DIAGNOSIS — R45.851 PASSIVE SUICIDAL IDEATIONS: ICD-10-CM

## 2022-05-02 DIAGNOSIS — Z53.9 ERRONEOUS ENCOUNTER--DISREGARD: Primary | ICD-10-CM

## 2022-05-02 DIAGNOSIS — U07.1 INFECTION DUE TO 2019 NOVEL CORONAVIRUS: ICD-10-CM

## 2022-05-02 LAB — SARS-COV-2 RNA RESP QL NAA+PROBE: POSITIVE

## 2022-05-02 PROCEDURE — U0005 INFEC AGEN DETEC AMPLI PROBE: HCPCS | Performed by: EMERGENCY MEDICINE

## 2022-05-02 PROCEDURE — 90791 PSYCH DIAGNOSTIC EVALUATION: CPT

## 2022-05-02 PROCEDURE — 99285 EMERGENCY DEPT VISIT HI MDM: CPT | Mod: 25

## 2022-05-02 PROCEDURE — C9803 HOPD COVID-19 SPEC COLLECT: HCPCS

## 2022-05-02 ASSESSMENT — ENCOUNTER SYMPTOMS
DYSPHORIC MOOD: 1
FEVER: 0
SHORTNESS OF BREATH: 0
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
## 2022-05-02 NOTE — ADDENDUM NOTE
Encounter addended by: Maria Elena Torres, [facility] on: 5/2/2022 2:01 PM   Actions taken: Clinical Note Signed
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
## 2022-05-02 NOTE — ED TRIAGE NOTES
Triage Assessment     Row Name 05/02/22 0913       Triage Assessment (Adult)    Airway WDL WDL       Respiratory WDL    Respiratory WDL WDL       Skin Circulation/Temperature WDL    Skin Circulation/Temperature WDL WDL       Cardiac WDL    Cardiac WDL WDL       Peripheral/Neurovascular WDL    Peripheral Neurovascular WDL WDL       Cognitive/Neuro/Behavioral WDL    Cognitive/Neuro/Behavioral WDL WDL

## 2022-05-02 NOTE — TELEPHONE ENCOUNTER
"  Writer spoke with patient who stated he is at the hospital due to recurring suicidal thoughts in the morning. He shared that he believes this is due to his medications. He shared that they will be evaluating him but he tested positive for COVID so he cannot \"go in\" to EMPATH but he is currently at the hospital. He shared that they will be coming to his room to evaluate him. Patient requested Dr. Monzon's coordination. Writer informed patient that message will be relayed to Dr. Monzon.     Peggy Shelley [facility], [facility]  5/2/2022    "

## 2022-05-02 NOTE — PROGRESS NOTES
Patient did not answer his phone x 5 to complete a phone visit. Call back number given.    This encounter was opened in error. Please disregard.

## 2022-05-02 NOTE — DISCHARGE INSTRUCTIONS
Aftercare Plan      [facility] (P) recommends patient continue to follow up with existing providers with [facility] Day [facility] (Dr. Munguia and  Contact [facility] if there is an additional need of services or scheduling questions [phone]. Contact your providers today and request a psychiatric consult. Return to your group programming as ASAP.         I am feeling unsafe or I am in a crisis, I will:   Talk to sister, wife, brothers    Contact my established care providers   Call the [place] Suicide Prevention Lifeline: [phone]   Go to the nearest emergency room   Call 735        Warning signs that I or other people might notice when a crisis is developing for me:  Persistent worsening depression or intrusive suicide thoughts with a plan or intent to act on the plan.    Things I am able to do on my own to cope or help me feel better:  Overall staying busy with groups or classes or staying busy with job.  Trying to staying mindful and grounded. Participate in Gardening or taking nature walks.     Things that I am able to do with others to cope or help me better:  Spend quality time with family members. Enjoys going to work. He looks forward to playing with dog or grandchildren and taking the pontoon boat out.     Things I can use or do for distraction:  Prayer, take a nature walk, playing with dogs.    Changes I can make to support my mental health and wellness:  Continue with Day Treatment programming. Take all medications as prescribed by Dr. Munguia.    People in my life that I can ask for help:  Sister, wife, brothers    Your Pending sale to [facility] has a mental health crisis team you can call 24/7: [facility] Mobile Crisis  [phone]        Additional resources and information:  [facility] (P) recommends patient continue to follow up with existing providers with [facility]. Contact [facility] if there is an additional need of services or  "scheduling questions [phone]. Contact your providers today and request a psychiatric consult. Return to your group programming.         Mental Health Support Groups  [facility]  [address], [address], [place] [address]  Local: [phone]  [email or web address]    [facility]  [address] [place] [address]  [phone]  [email or web address]    [facility]  [address] [place] [address]  [phone]  [email or web address]        Crisis Lines  Crisis Text Line  Text 658804  You will be connected with a trained live crisis counselor to provide support.    Por espanol, texto  SANDI a 064547 o texto a 442-AYUDAME en WhatsApp    National Hope Line  1.800.SUICIDE [6930514]      Community Resources  Fast Tracker  Linking people to mental health and substance use disorder resources  fasttrack[place]akern.org     [place] Mental Health Warm Line  Peer to peer support  Monday thru Saturday, 12 pm to 10 pm  436.837.5318 or 2.325.123.3852  Text \"Support\" to 24579    National [place] on Mental Illness (ADRIANA)  387.671.2328 or 1.888.ADRIANA.HELPS        Additional Information  Today you were seen by a licensed mental health professional through Triage and Transition services, [facility] (P)  for a crisis assessment in the Emergency Department at [facility].  It is recommended that you follow up with your established providers (psychiatrist, mental health therapist, and/or primary care doctor - as relevant) as soon as possible. Coordinators from [facility] will be calling you in the next 24-48 hours to ensure that you have the resources you need.  You can also contact [facility] coordinators directly at [phone]. You may have been scheduled for or offered an appointment with a mental health provider. [facility] maintains an extensive network of licensed behavioral health providers to connect patients with the services they need.  We " do not charge providers a fee to participate in our referral network.  We match patients with providers based on a patient's specific needs, insurance coverage, and location.  Our first effort will be to refer you to a provider within your care system, and will utilize providers outside your care system as needed.

## 2022-05-02 NOTE — TELEPHONE ENCOUNTER
"Pt requested call back from team.  He is currently in private area from ER.  Pt was hoping to go to [facility], and have his meds evaluated because he thinks he is experiencing increased SI in relationship to increased Fluoxetine. \"It has helped my anxiety, but I also have increased SI thoughts especially in the AM.\" ER doctor arrived in room, so had to abruptly end call.      "

## 2022-05-02 NOTE — TELEPHONE ENCOUNTER
Patient has been seen in the ED twice in the past 3 days. He was referred to the [facility] program.     Today patient was screened for Covid with a Positive Result. (required screening to participate in the [facility] program).   Patient is unable to be admitted due to his Covid status. However, he has Virtual Visits set up with Psychiatry.     Patient wanted Dr. Morales to be aware of his situation and wondered if he had any suggestions for medication changes.  I told patient I would let Dr. Morales know.     Patient has Virtual Visit set up with Johnny COOPER this afternoon to discuss Covid treatment options.     Deandra Sarmiento RN BSN  [facility]

## 2022-05-02 NOTE — TREATMENT PLAN
Acknowledgement of Current Treatment Plan       I have reviewed my treatment plan with my therapist / counselor on 4/28/2022. I agree with the plan as it is written in the electronic health record.    Name Signature   Munir Storey Unable to sign due to Covid 19.  Verbal permission given on 4/28/2022 12:11PM   SERGE Ontiveros, [facility]  Name of Therapist / Counselor   Psychotherapist PACO Ontiveros on 4/22/2022 at 121:27 PM   aRlph Monzon MD  Psychiatrist/Medical Director Ralph Monzon MD on 5/2/2022 at 5:13 PM

## 2022-05-03 ENCOUNTER — TELEPHONE (OUTPATIENT)
Dept: EMERGENCY MEDICINE | Facility: CLINIC | Age: 57
End: 2022-05-03
Payer: COMMERCIAL

## 2022-05-03 ENCOUNTER — HOSPITAL ENCOUNTER (OUTPATIENT)
Dept: BEHAVIORAL HEALTH | Facility: CLINIC | Age: 57
Discharge: HOME | End: 2022-05-03
Attending: PSYCHIATRY & NEUROLOGY | Admitting: PSYCHIATRY & NEUROLOGY
Payer: COMMERCIAL

## 2022-05-03 ENCOUNTER — HOSPITAL ENCOUNTER (EMERGENCY)
Facility: CLINIC | Age: 57
Discharge: HOME | End: 2022-05-04
Attending: EMERGENCY MEDICINE | Admitting: EMERGENCY MEDICINE
Payer: COMMERCIAL

## 2022-05-03 ENCOUNTER — TELEPHONE (OUTPATIENT)
Dept: FAMILY MEDICINE | Facility: CLINIC | Age: 57
End: 2022-05-03

## 2022-05-03 ENCOUNTER — VIRTUAL VISIT (OUTPATIENT)
Dept: PSYCHIATRY | Facility: CLINIC | Age: 57
End: 2022-05-03
Payer: COMMERCIAL

## 2022-05-03 ENCOUNTER — TELEPHONE (OUTPATIENT)
Dept: BEHAVIORAL HEALTH | Facility: CLINIC | Age: 57
End: 2022-05-03
Payer: COMMERCIAL

## 2022-05-03 ENCOUNTER — VIRTUAL VISIT (OUTPATIENT)
Dept: BEHAVIORAL HEALTH | Facility: CLINIC | Age: 57
End: 2022-05-03
Payer: COMMERCIAL

## 2022-05-03 DIAGNOSIS — F33.1 MAJOR DEPRESSIVE DISORDER, RECURRENT EPISODE, MODERATE (H): ICD-10-CM

## 2022-05-03 DIAGNOSIS — F41.1 GAD (GENERALIZED ANXIETY DISORDER): Chronic | ICD-10-CM

## 2022-05-03 DIAGNOSIS — F33.1 MDD (MAJOR DEPRESSIVE DISORDER), RECURRENT EPISODE, MODERATE (H): Primary | ICD-10-CM

## 2022-05-03 DIAGNOSIS — R45.851 SUICIDAL IDEATION: ICD-10-CM

## 2022-05-03 DIAGNOSIS — U07.1 LAB TEST POSITIVE FOR DETECTION OF COVID-19 VIRUS: ICD-10-CM

## 2022-05-03 DIAGNOSIS — F41.0 GENERALIZED ANXIETY DISORDER WITH PANIC ATTACKS: ICD-10-CM

## 2022-05-03 DIAGNOSIS — F41.1 GENERALIZED ANXIETY DISORDER WITH PANIC ATTACKS: ICD-10-CM

## 2022-05-03 DIAGNOSIS — Z53.9 ERRONEOUS ENCOUNTER--DISREGARD: Primary | ICD-10-CM

## 2022-05-03 DIAGNOSIS — F41.0 PANIC DISORDER WITHOUT AGORAPHOBIA: ICD-10-CM

## 2022-05-03 DIAGNOSIS — Z03.89 NO DIAGNOSIS ON AXIS I: Primary | ICD-10-CM

## 2022-05-03 LAB
ALBUMIN SERPL-MCNC: 4.3 G/DL (ref 3.4–5)
ALP SERPL-CCNC: 50 U/L (ref 40–150)
ALT SERPL W P-5'-P-CCNC: 25 U/L (ref 0–70)
ANION GAP SERPL CALCULATED.3IONS-SCNC: 4 MMOL/L (ref 3–14)
AST SERPL W P-5'-P-CCNC: 13 U/L (ref 0–45)
BASOPHILS # BLD AUTO: 0 10E3/UL (ref 0–0.2)
BASOPHILS NFR BLD AUTO: 0 %
BILIRUB SERPL-MCNC: 0.4 MG/DL (ref 0.2–1.3)
BUN SERPL-MCNC: 17 MG/DL (ref 7–30)
CALCIUM SERPL-MCNC: 9.1 MG/DL (ref 8.5–10.1)
CHLORIDE BLD-SCNC: 103 MMOL/L (ref 94–109)
CO2 SERPL-SCNC: 28 MMOL/L (ref 20–32)
CREAT SERPL-MCNC: 0.81 MG/DL (ref 0.66–1.25)
EOSINOPHIL # BLD AUTO: 0.1 10E3/UL (ref 0–0.7)
EOSINOPHIL NFR BLD AUTO: 1 %
ERYTHROCYTE [DISTWIDTH] IN BLOOD BY AUTOMATED COUNT: 12.2 % (ref 10–15)
GFR SERPL CREATININE-BSD FRML MDRD: >90 ML/MIN/1.73M2
GLUCOSE BLD-MCNC: 99 MG/DL (ref 70–99)
HCT VFR BLD AUTO: 43.4 % (ref 40–53)
HGB BLD-MCNC: 14.2 G/DL (ref 13.3–17.7)
IMM GRANULOCYTES # BLD: 0 10E3/UL
IMM GRANULOCYTES NFR BLD: 0 %
LYMPHOCYTES # BLD AUTO: 0.8 10E3/UL (ref 0.8–5.3)
LYMPHOCYTES NFR BLD AUTO: 23 %
MCH RBC QN AUTO: 30.9 PG (ref 26.5–33)
MCHC RBC AUTO-ENTMCNC: 32.7 G/DL (ref 31.5–36.5)
MCV RBC AUTO: 95 FL (ref 78–100)
MONOCYTES # BLD AUTO: 0.5 10E3/UL (ref 0–1.3)
MONOCYTES NFR BLD AUTO: 13 %
NEUTROPHILS # BLD AUTO: 2.2 10E3/UL (ref 1.6–8.3)
NEUTROPHILS NFR BLD AUTO: 63 %
NRBC # BLD AUTO: 0 10E3/UL
NRBC BLD AUTO-RTO: 0 /100
PLATELET # BLD AUTO: 193 10E3/UL (ref 150–450)
POTASSIUM BLD-SCNC: 3.8 MMOL/L (ref 3.4–5.3)
PROT SERPL-MCNC: 8 G/DL (ref 6.8–8.8)
RBC # BLD AUTO: 4.59 10E6/UL (ref 4.4–5.9)
SODIUM SERPL-SCNC: 135 MMOL/L (ref 133–144)
WBC # BLD AUTO: 3.6 10E3/UL (ref 4–11)

## 2022-05-03 PROCEDURE — H0035 MH PARTIAL HOSP TX UNDER 24H: HCPCS | Mod: GT,95

## 2022-05-03 PROCEDURE — 96374 THER/PROPH/DIAG INJ IV PUSH: CPT

## 2022-05-03 PROCEDURE — 36415 COLL VENOUS BLD VENIPUNCTURE: CPT | Performed by: EMERGENCY MEDICINE

## 2022-05-03 PROCEDURE — H0035 MH PARTIAL HOSP TX UNDER 24H: HCPCS | Mod: GT,95 | Performed by: SOCIAL WORKER

## 2022-05-03 PROCEDURE — 85025 COMPLETE CBC W/AUTO DIFF WBC: CPT | Performed by: EMERGENCY MEDICINE

## 2022-05-03 PROCEDURE — 99215 OFFICE O/P EST HI 40 MIN: CPT | Mod: 95 | Performed by: PSYCHIATRY & NEUROLOGY

## 2022-05-03 PROCEDURE — 99285 EMERGENCY DEPT VISIT HI MDM: CPT | Mod: 25

## 2022-05-03 PROCEDURE — 80053 COMPREHEN METABOLIC PANEL: CPT | Performed by: EMERGENCY MEDICINE

## 2022-05-03 NOTE — TELEPHONE ENCOUNTER
Patient tested positive for Covid. Having issues with his depression meds. Really struggling with his depression and anxiety. Patient states meds are making him feel worse. Patient requesting call back.

## 2022-05-03 NOTE — TELEPHONE ENCOUNTER
"I checked with PCP Dr. Morales for clarification on Covid treatment options for patient.     I informed patient that since he is not complaining of any symptoms, he is does not qualify for Covid treatment at this point. He verbalized a good understanding.     Patient is currently at home (lives with his sister) and he stated \"I'm Safe.\"      He participates in Group therapy via Zoom. He is scheduled this afternoon for a Video Visit with Psychiatry.     Patient is anxious to speak with Dr. Morales about his current medication regimen. Virtual (telephone) scheduled with Dr. Morales tomorrow.     Patient requesting to be admitted for mental health observation. He wondered if Dr. Morales could write an order. Per the direction of Dr. Morales, I instructed patient to speak with the Psychiatrist about his possible admission. Patient verbalized a good understanding and agreed with this plan.     Deandra ENCISO  [facility]          "

## 2022-05-03 NOTE — GROUP NOTE
Psychoeducation Group Note    PATIENT'S NAME: Munir Storey  MRN:   [id number]  :   1965  ACCT. NUMBER: [id number]  DATE OF SERVICE: 22  START TIME: 11:15 AM  END TIME: 11:50 AM  FACILITATOR: Ema Akbar OTR/L  TOPIC: MH PHP OT Group: Self- Regulation Skills  [facility] Adult Partial Hospitalization Program  TRACK: PHP1    NUMBER OF PARTICIPANTS: 5                                    Service Modality:  Video Visit     Telemedicine Visit: The patient's condition can be safely assessed and treated via synchronous audio and visual telemedicine encounter.      Reason for Telemedicine Visit: Services only offered telehealth    Originating Site (Patient Location): Patient's home    Distant Site (Provider Location): Provider Remote Setting- Home Office    Consent:  The patient/guardian has verbally consented to: the potential risks and benefits of telemedicine (video visit) versus in person care; bill my insurance or make self-payment for services provided; and responsibility for payment of non-covered services.     Patient would like the video invitation sent by:  My Chart    Mode of Communication:  Video Conference via Medical Zoom    As the provider I attest to compliance with applicable laws and regulations related to telemedicine.         Summary of Group / Topics Discussed:  Self-Regulation Skills: Sensory Modulation: Patients were provided with education on Autonomic Nervous System activation and the importance of identifying skills for effective self-regulation.  Patients were taught how to recognize specific signs and symptoms of their individualized state of arousal and how to make changes when needed.  Patient's explored body based skills (bottom up processing skills) and techniques to help manage symptoms and change level of arousal when needed to be in control and comfortable so they are able to function in different environments.         Patient Session Goals / Objectives:    [place] how the  ANS works and importance of its  impact on functioning and mental wellbeing    [place] how developing interoceptive awareness can help one self-regulate sooner rather than later    Identified signs and symptoms of current level of arousal and ways to make changes in level of arousal when needed    Identified specific and individualized neurosensory skills to help when distressed and for crisis management    Established a plan for practice of these skills in their own environments      Patient Participation / Response:  Minimally participated, only when prompted / asked.    Patient presentation:  engaged when directly prompted, appeared distracted. Noted he enjoys golfing. Shared he is struggling today. Willing to meet with provider.     Treatment Plan:  Patient has a current master individualized treatment plan.  See Epic treatment plan for more information.    Ema Akbar, OTR/L

## 2022-05-03 NOTE — PROGRESS NOTES
Appointment cancelled by pt.    T/C to pt due to scheduled appt at 1500 but record review today noting PHP MD [facility] due to SI and anxiety. Pt has presented to ED x 2 in the past week for same.   Pt verbalizes [facility] and will need to cancel appointment due to plans to be evaluated in ED. He is accompanied by his sister to ER.     I recommended he f/u following discharge and encouraged him to complete evaluation due to his level of distress and depression with SI. Pt agreeable.

## 2022-05-03 NOTE — PROGRESS NOTES
Patient left without being seen.    Patient spoke with the psychiatrist over his partial hospitalization program earlier today and was instructed to go to [facility] emergency room due to increasing suicidal ideation.  [facility] JAZLYN Parsons contacted patient and confirmed that he is at the emergency room and will not be seen for his appointment today.    Maura Laureano, [facility]

## 2022-05-03 NOTE — ED TRIAGE NOTES
Patient sent in by psychiatrist for a med check and evaluation. States here for depression and anxiety. States psychiatrist called [facility] for patient to be admitted.     Triage Assessment     Row Name 05/03/22 1514       Triage Assessment (Adult)    Airway WDL WDL       Respiratory WDL    Respiratory WDL WDL       Skin Circulation/Temperature WDL    Skin Circulation/Temperature WDL WDL       Cardiac WDL    Cardiac WDL WDL       Peripheral/Neurovascular WDL    Peripheral Neurovascular WDL WDL       Cognitive/Neuro/Behavioral WDL    Cognitive/Neuro/Behavioral WDL WDL

## 2022-05-03 NOTE — PROGRESS NOTES
"[facility] Mental Health Outpatient Programs  Provider Interval History Note    Program: Partial Hospital Program   Track: 1    PATIENT'S NAME: Munir Storey  MRN:   [id number]  :   1965  ACCT. NUMBER: [id number]  DATE OF SERVICE: 22  CALL/VIDEO START TIME: 1305  CALL/VIDEO END TIME: 1320      Interval History:  \"My symptoms have been worsening.\" Munir presents today for follow-up and ongoing program supervision.   Endorses:    \"More suicide thoughts\"  o Also headache, insomnia      \"Afraid to be alone\"    Sister, Mariya, present for the duration of the interview at patient request  o She endorses she has been with a girl, \"from the beginning, [and she is] so, so afraid,\" for his wellbeing and safety    Munir endorses, \"my anxiety is gone, but the thoughts and the other stuff are worse,\" again referring to depressive symptoms and suicidal ideation     Mariya states that she has spoken with a staff member at  [facility], who recommended that they speak with me so I could make a recommendation as to his level of care     Discussed that COVID is a potential complication in that he may or may not get a locked inpatient psychiatric unit bed (may get a medical bed instead, for example)    Also discussed that locked inpatient beds are not like it is seen on television and movies and are designed to be safe and therapeutic environments    Patient reiterated several times that he does not feel safe being at home and his sister concurs    When asked, there are firearms in the house, and patient's sister said that she is able to secure those firearms and will alert our program (PHP) when they have been moved to a safe location and/or otherwise secured      Risk Assessment:    Suicidal ideation: As noted above      Medications:  Current Outpatient Medications   Medication Sig Dispense Refill     amLODIPine (NORVASC) 10 MG tablet Take 1 tablet (10 mg) by mouth daily 90 tablet 1 "     atorvastatin (LIPITOR) 40 MG tablet Take 1 tablet (40 mg) by mouth daily (Patient taking differently: Take 20 mg by mouth daily) 90 tablet 1     buPROPion (WELLBUTRIN XL) 300 MG 24 hr tablet Take 1 tablet (300 mg) by mouth every morning 90 tablet 0     busPIRone (BUSPAR) 10 MG tablet Take 1 tablet (10 mg) by mouth 2 times daily 60 tablet 0     famotidine (PEPCID) 20 MG tablet Take 1 tablet (20 mg) by mouth 2 times daily as needed For stomach. 180 tablet 1     FLUoxetine (PROZAC) 10 MG capsule Take 1 capsule (10 mg) by mouth daily for 14 days, THEN 2 capsules (20 mg) daily for 14 days. For anxiety 42 capsule 0     gabapentin (NEURONTIN) 300 MG capsule Take 1 capsule (300 mg) by mouth in the morning and 1 capsule (300 mg) at noon and 1 capsule (300 mg) in the evening. For anxiety 90 capsule 0     lisinopril (ZESTRIL) 40 MG tablet Take 1 tablet (40 mg) by mouth daily 90 tablet 1     LORazepam (ATIVAN) 0.5 MG tablet Take 1 tablet (0.5 mg) by mouth daily as needed for anxiety Use sparingly for panic 30 tablet 0     metoprolol succinate ER (TOPROL-XL) 25 MG 24 hr tablet Take 1 tablet (25 mg) by mouth daily Monitor your blood pressure once weekly or if symptomatic. This medication can be increased if needed- let me know (<140/80) (Patient taking differently: Take 25 mg by mouth daily Monitor your blood pressure once weekly or if symptomatic. This medication can be increased if needed- let me know (<140/80)  Metoprolol is cut in half. States he is taking 12.5) 90 tablet 1     Misc Natural Products (T-RELIEF CBD+13 SL) Place 600 Units under the tongue daily as needed Hemp oil sublingual       omeprazole (PRILOSEC) 20 MG DR capsule TAKE 1 CAPSULE (20 MG) BY MOUTH DAILY (FOR HEARTBURN) 30 capsule 0     pediatric electrolyte (PEDIALYTE) SOLN solution Take by mouth daily 1/4 teaspoon           The above list was reviewed and updated in EPIC and not discussed with patient today.         Laboratory Results:  Most recent labs  "reviewed. Pertinent updates/findings: COVID-positive yesterday.     Metrics:  PHQ-9 scores:   PHQ-9 SCORE 3/22/2022 4/6/2022 4/15/2022 4/28/2022   PHQ-9 Total Score MyChart 6 (Mild depression) - - 5 (Mild depression)   PHQ-9 Total Score 6 8 11 5       SANDRA-7 scores:   SANDRA-7 SCORE 4/6/2022 4/11/2022 4/15/2022   Total Score - 12 (moderate anxiety) -   Total Score 15 12 16       CSSR-S: No flowsheet data found.      Mental Status Examination (limited due to video virtual visit format):  Vital Signs: There were no vitals taken for this virtual visit.  Appearance: adequately groomed, appears stated age, and in severe distress.  Attitude: cooperative   Eye Contact: fair to the extent that can be determined in a video visit  Muscle Strength and Tone: no gross abnormalities based on remote observation  Psychomotor Behavior: Appropriate and Restless; no evidence of tardive dyskinesia, dystonia, or tics based on remote observation  Gait and Station: normal, no gross abnormalities based on remote observation  Speech: clear, coherent, decreased prosody and paucity of speech  Associations: No loosening of associations  Thought Process: coherent and goal directed  Thought Content: no evidence of psychotic thought, active suicidal ideation present, no auditory hallucinations present and no visual hallucinations present  Mood: \"depressed\"  Affect: intensity is blunted, fixed mobility, restricted range and nonreactive  Insight: good  Judgment: intact, adequate for safety  Impulse Control: intact  Oriented to: time, place, person and situation  Attention Span and Concentration: normal  Language: Intact  Recent and Remote Memory: intact to interview. Not formally assessed. No amnesia.  Fund of Knowledge/Assessment of Intelligence: Average  Capacity of Activities of Daily Living: Independent, able to participate in programmatic care services.      Diagnosis/es:  1. 296.32 (F33.1) Major Depressive Disorder, Recurrent Episode, " Moderate   2. 300.01 (F41.0) Panic Disorder  3. 300.02 (F41.1) Generalized Anxiety Disorder  4. History of alcohol use disorder in sustained remission      Assessment/Plan:  Munir presents today for follow up. Endorses worsening depressive symptoms and in particular suicidal ideation over recent days without clear stressor.  Endorses that anxiety has responded to fluoxetine but depression has not.  He and his sister both are concerned for his safety and wellbeing and feel that he needs admission for observation and medication adjustment.    Patient has now completed 10 days of the partial hospital program and is endorsing feeling worse rather than better.  This is no longer the appropriate level of care and I concur with patient and sister's assessment: I recommend inpatient hospitalization and observation for safety and medication adjustment.    PHP/IOP staff remain available for consultation and placement discussions once patient is stabilized.  Unclear that more time in [facility] will be therapeutic, but may be appropriate for stepdown to intensive outpatient depending on clinical progress.    As noted above, there are firearms in the home and patient's sister, Mariya, has agreed to secure those firearms and will alert our program once she has done so.  Current plan is to pack some belongings for Munir so he can be comfortable during his hospitalization and then proceed to [facility] emergency department for evaluation.  Since he was COVID-positive, he will not be able to be on the [facility] unit.    This writer has called [facility] to relay the above.    Continue all medications as listed above.      Continue therapy as planned:    Enrolled in partial hospital program    Requires higher level of care    [facility]charge from [facility] today    And discussed with inpatient staff appropriate level of care once patient is stabilized    Safety plan reviewed:    To the Emergency Department now     Follow-up:     To be  determined    Questions or concerns:    Call program line with questions or concerns (see below)    Trivnethart may be used to communicate with your provider, but this is not intended to be used for emergencies.      [facility] Adult Mental Health Program lines:  [facility]: [phone]  Dual Disorder: [phone]  Adult Day Treatment:  [phone]  55+/Intensive Outpatient: [phone]    Community Resources:    National Suicide Prevention Lifeline: 612.151.9664 (TTY: 833.923.5343). Call anytime for help.  (www.suicidepreventionlifeline.org)  [place] on Mental Illness (www.[place].org): 841.245.6348 or 325-874-5803.   Mental Health Association (www.mentalhealth.org): 818.446.1180 or 532-733-8182.  [place] Crisis Text Line: Text [place] to 929369  Suicide LifeLine Chat: suicideMissingLINK.org/chat    Treatment Objective(s) Addressed in This Session:  The purpose of today's virtual visit is for this writer to provide oversight of patient's care while receiving program services. Specific treatment goals addressed included personal safety, symptoms stabilization and management, wellness and mental health, and community resources/discharge planning.     This author or another program provider will follow up with the patient as noted above.     Patient agrees with the current plan of care.    Ralph Monzon MD  5/03/22    Visit Details:  Type of service:  Video Visit    Start/End Time: see above    Originating Location (pt. Location): Home in [place]    Distant Location (provider location): Provider Remote Setting- Home Office    Platform used for Video Visit: Zoom    Physician has received verbal consent for a Video Visit from the patient? Yes    50 minutes spent on the date of the encounter doing chart review, patient visit, documentation and discussion with other provider(s)     This document completed in part using Dragon Medical One dictation software.  Please excuse any inadvertent word or  phrase substitutions.

## 2022-05-03 NOTE — TELEPHONE ENCOUNTER
Dr. Ralph Monzon, Psychiatrist and Medical Director of PHP at [facility], called advising it was recommended that the patient return to [facility] ED today for further evaluation of SI.  He reports patient returned to PHP this morning and continues to endorse SI.  Patient was evaluated in the ED and discharged yesterday 5.2.22.  Patient tested positive for Covid as well.  Dr. Monzon reports he is recommending Inpatient Psychiatric Admission at this time.  Please refer to his note from today (5.3.22) for further information.        ALEJANDRO Pereira

## 2022-05-03 NOTE — GROUP NOTE
"Process Group Note    PATIENT'S NAME: Munir Storey  MRN:   [id number]  :   1965  ACCT. NUMBER: [id number]  DATE OF SERVICE: 22  START TIME:  9:00 AM  END TIME:  9:50 AM  FACILITATOR: Maria Elena Torres LICSW  TOPIC:  Process Group    Diagnoses:  296.32 (F33.1) Major Depressive Disorder, Recurrent Episode, Moderate   300.01 (F41.0) Panic Disorder  300.02 (F41.1) Generalized Anxiety Disorder  History of alcohol use disorder in sustained remission    [facility] Adult Partial Hospitalization Program  TRACK: PHP 1    NUMBER OF PARTICIPANTS: 5                                      Service Modality:  Video Visit     Telemedicine Visit: The patient's condition can be safely assessed and treated via synchronous audio and visual telemedicine encounter.      Reason for Telemedicine Visit: Services only offered telehealth    Originating Site (Patient Location): Patient's home    Distant Site (Provider Location): Provider Remote Setting- Home Office    Consent:  The patient/guardian has verbally consented to: the potential risks and benefits of telemedicine (video visit) versus in person care; bill my insurance or make self-payment for services provided; and responsibility for payment of non-covered services.     Patient would like the video invitation sent by:  My Chart    Mode of Communication:  Video Conference via Medical Zoom    As the provider I attest to compliance with applicable laws and regulations related to telemedicine.              Data:    Session content: At the start of this group, patients were invited to check in by identifying themselves, describing their current emotional status, and identifying issues to address in this group.   Area(s) of treatment focus addressed in this session included Symptom Management.    Patient checked in saying \"This morning was bad\" Said his goal is the same, \"to get better and heal.\" States skills he will use are \"senses, floating down the river\" " "(mindfulness skills). Identified barriers as \"sleep. worsening conditions.\"  Described himself as, \"Pretty functional until we raised the dosage\".      Patient reports No SIB and no chemical use. Said \"it's floating out there\". Writer asked him to clarify, and patient was referring to passive SI. He denied intent or plan. Reports \"taking all my medications as prescribed.\" \"I m having adverse effects from them. Not liking them [side effects] one iota.\"  Expressed feeling proud that \"I got on this morning, about being here.\"     Patient took processing time. \"I just need some answers\". Reports current symptoms \"just started recently from my meds. They're working for my anxiety, but causing no sleep.\"  Said the mornings are \"very bad with those thoughts\" and \"I work really hard to get rid of them.\" \"I just can't believe that [the medications] made shit worse. I can't be by myself, probably need further help and evaluation.\" \"I came down with COVID so I can't go to [facility]. I just need some answers.\"     Therapeutic Interventions/Treatment Strategies:  Psychotherapist offered support, feedback and validation. Treatment modalities used include Cognitive Behavioral Therapy. Interventions include Coping Skills: Facilitated understanding of  what factors may contribute to symptom relapse and skills plan to manage symptom relapse  and Symptoms Management: Promoted understanding of their diagnoses and how it impacts their functioning.    Assessment:    Patient response:   Patient responded to session by listening, accepting support and appearing distracted    Possible barriers to participation / learning include: distracted by somatic Sxs, increase in anxiety and severity of symptoms    Health Issues:   None reported       Substance Use Review:   Substance Use: No active concerns identified.    Mental Status/Behavioral Observations  Appearance:   Appropriate   Eye Contact:   Fair   Psychomotor Behavior: Restless "   Attitude:   Cooperative   Orientation:   All  Speech   Rate / Production: Normal/ Responsive Normal    Volume:  Normal   Mood:    Anxious  Irritable   Affect:    Blunted  Worrisome   Thought Content:   Clear, Safety reports  presence of suicidal ideation passive suicidal ideation  and denies intent or plan. Patient is actively engaged in seeking help from providers and is with his sister right now.   Thought Form:  Coherent     Insight:    Fair     Plan:     Safety Plan: Collaborative care team was informed of patient's risk status and plan.    Patient agreed to meet with Dr. Monzon today for further assessment and planning.      Barriers to treatment: None identified    Patient Contracts (see media tab):  None    Substance Use: Not addressed in session     Continue or Discharge: A determination will be made regarding discharge after Dr. Monzon has completed his assessment later today.      Maria Elena Torres, [facility]  May 3, 2022

## 2022-05-03 NOTE — PROGRESS NOTES
[facility] PSYCHIATRIC SERVICE FOLLOW UP     Name:  Munir Storey  : 1965     Telemedicine Visit: The patient's condition can be safely assessed and treated via synchronous audio/visual telemedicine encounter.      Reason for Telemedicine Visit: COVID 19 pandemic and the social and physical recommendations by the CDC and MDH.      Originating Site (Patient Location): Patient's home; pt verified their location for the duration of this appointment as address on record.    Distant Site (Provider Location): Provider Remote Setting    Consent:  The patient/guardian has verbally consented to: the potential risks and benefits of telemedicine (video or phone) versus in-person care; bill insurance or make self-payment for services provided; and responsibility for payment of non-covered services.     Mode of Communication:  WITOI video platform     As the treating provider, I attest to compliance with applicable laws and regulations related to telemedicine.  Chart documentation may have been completed with Dragon Voice Recognition software.     IDENTIFICATION     Patient is a 56 year old year old White, male  who presents [facility].  Patient was initially referred by their PCP. Patient attended the session alone.   The [facility] psychiatry providers act as a specialty service for Primary Care Providers in the [facility] who seek to optimize medications for unstable patients.  Once medications have been optimized, [facility] providers discharge the patient back to the referring Primary Care Provider for ongoing medication management.  This type of system allows [facility] to serve a high volume of patients.      Patient care team: Patient Care Team:  Nasreen Martínez PA-C as PCP - General (Family Medicine)  Nasreen Martínez PA-C as Assigned PCP  Vikki Durán LICSW as Lead Care Coordinator  Savana Doan as Community Health Worker  Sadi Shelley,  "MD as Assigned Heart and Vascular Provider  Roberto Cabral MD as Assigned Musculoskeletal Provider  Peggy Eden NP as Assigned Neuroscience Provider  Ralph Monzon MD as MD (Psychiatry)  Therapist: ***    INTERIM HISTORY   [facility] 12 Apr 22. At that time, the plan included restart fluoxetine, increase gabapentin to 300mg TID for anxiety, refer to IOP.    Interim pt communication:  None; record shows two ER visits for anxiety, SI. Record shows engagement in IOP. Buspirone 10mg BID added on 30 Apr 22 for anxiety    Available records were reviewed prior to visit.    HISTORY OF PRESENT ILLNESS     Per [facility] Maura Laureano during today's team-based visit: \"***\"    Mood/anxiety: ***  Suicidal ideation:  {YES / NO:351025::\"No\"}   PHQ9 score is {RPPHQ9:621386}  GAD7 score is {RPGAD7:172300}    Sleep: ***  Appetite: ***    Medications: ***    Medical: ***    SUBSTANCE USE HISTORY    Tobacco use: 3-4 pouches of chew/day  Caffeine:  Yes  1 cups/day of coffee + 2+ cups of decaf  Current alcohol:  Quit 2013  Current substance use: Denies  Past use alcohol/substance use: Denies    MEDICATIONS                                                                                              Current medications reviewed today and are noted below.   Current psychotropic medications:   Fluoxetine 20mg - caused \"major anxiety\" after starting to take it so hasn't taken it in several days  Bupropion XL 300mg for \"anxiety\"  Gabapentin 100mg TID for \"anxiety\"  Lorazepam 0.5mg     Past psychotropic medications:  Alprazolam  Duloxetine  Fluoxetine  Hydroxyzine  Lorazepam  Sertraline  Venlafaxine     Supplements      ***    Current Outpatient Medications   Medication Sig     amLODIPine (NORVASC) 10 MG tablet Take 1 tablet (10 mg) by mouth daily     atorvastatin (LIPITOR) 40 MG tablet Take 1 tablet (40 mg) by mouth daily (Patient taking differently: Take 20 mg by mouth daily)     buPROPion " (WELLBUTRIN XL) 300 MG 24 hr tablet Take 1 tablet (300 mg) by mouth every morning     busPIRone (BUSPAR) 10 MG tablet Take 1 tablet (10 mg) by mouth 2 times daily     famotidine (PEPCID) 20 MG tablet Take 1 tablet (20 mg) by mouth 2 times daily as needed For stomach.     FLUoxetine (PROZAC) 10 MG capsule Take 1 capsule (10 mg) by mouth daily for 14 days, THEN 2 capsules (20 mg) daily for 14 days. For anxiety     gabapentin (NEURONTIN) 300 MG capsule Take 1 capsule (300 mg) by mouth in the morning and 1 capsule (300 mg) at noon and 1 capsule (300 mg) in the evening. For anxiety     lisinopril (ZESTRIL) 40 MG tablet Take 1 tablet (40 mg) by mouth daily     LORazepam (ATIVAN) 0.5 MG tablet Take 1 tablet (0.5 mg) by mouth daily as needed for anxiety Use sparingly for panic     metoprolol succinate ER (TOPROL-XL) 25 MG 24 hr tablet Take 1 tablet (25 mg) by mouth daily Monitor your blood pressure once weekly or if symptomatic. This medication can be increased if needed- let me know (<140/80) (Patient taking differently: Take 25 mg by mouth daily Monitor your blood pressure once weekly or if symptomatic. This medication can be increased if needed- let me know (<140/80)  Metoprolol is cut in half. States he is taking 12.5)     Misc Natural Products (T-RELIEF CBD+13 SL) Place 600 Units under the tongue daily as needed Hemp oil sublingual     omeprazole (PRILOSEC) 20 MG DR capsule TAKE 1 CAPSULE (20 MG) BY MOUTH DAILY (FOR HEARTBURN)     pediatric electrolyte (PEDIALYTE) SOLN solution Take by mouth daily 1/4 teaspoon     No current facility-administered medications for this visit.        VITALS   There were no vitals taken for this visit.    Pulse Readings from Last 5 Encounters:   05/02/22 68   04/30/22 62   04/29/22 64   04/06/22 63   04/04/22 65     Wt Readings from Last 5 Encounters:   05/02/22 74.8 kg (165 lb)   04/30/22 73.5 kg (162 lb)   04/29/22 75 kg (165 lb 6.4 oz)   04/06/22 76.8 kg (169 lb 4 oz)   04/04/22 75.5 kg  "(166 lb 8 oz)     BP Readings from Last 5 Encounters:   05/02/22 (!) 168/89   04/30/22 (!) 181/89   04/29/22 (!) 142/88   04/06/22 (!) 160/83   04/04/22 (!) 144/78       LABS & IMAGING                                                                                                                Recent available labs reviewed today.    Recent Labs   Lab Test 03/27/22  1108 02/23/22  0745 11/08/21  1428   CR 0.76 0.70 0.75   GFRESTIMATED >90 >90 >90     Recent Labs   Lab Test 02/23/22  0745 11/08/21  1428   AST 14 15   ALT 29 26   ALKPHOS 53 51     Recent Labs   Lab Test 03/27/22  1108 02/18/21  1034 10/20/17  1124   TSH 2.83 3.34 2.61       ALLERGY & IMMUNIZATIONS       Allergies   Allergen Reactions     Sulfamethoxazole-Trimethoprim Nausea       MEDICAL & SURGICAL HISTORY    Reviewed past medical and surgical history today.   Pregnant - NA.     Past Medical History:   Diagnosis Date     Alcohol withdrawal (H) 04/28/2015     Atrial flutter (H)      Depressive disorder      Ejection fraction < 50% 04/2015    Ejection fraction 45% per echo April 2015 (per [facility] records), possible alcohol or tachycardia induced cardiomyopathy. EF normalized on follow-up echo 2016     SANDRA (generalized anxiety disorder)      H/O atrioventricular quita ablation 12/2015     Hypertension, goal below 140/90      Tobacco abuse        MENTAL STATUS EXAM:     Alertness: {a:140237::\"alert \",\"oriented\"}  Appearance: {a:339463::\"adequately groomed\"}  Behavior/Demeanor: {b:343457::\"cooperative\",\"pleasant\",\"calm\"}, with {Desc; good/fair/poor:133427::\"good \"}eye contact   Speech: {s:852733::\"normal\",\"regular rate and rhythm\"}  Language: {l:635079::\"intact\",\"no problems\"}  Psychomotor: {p:184072::\"normal or unremarkable\"}  Mood: {m:661045}  Affect: {a:602879::\"full range\",\"appropriate\"}; {was:207589::\"was\"} congruent to mood; {was:650677::\"was\"} congruent to content  Thought Process/Associations: {t:389737::\"unremarkable\"}  Thought Content:  Reports " "{t:966879::\"none\"};  Denies {t:148008::\"suicidal ideation\",\"violent ideation\",\"delusions\"}  Perception:  Reports {p:479618::\"none\"};  Denies {p:720059::\"auditory hallucinations\",\"visual hallucinations\"}  Insight: {i:141787::\"intact\"}  Judgment: {j:351999::\"intact\"}  Cognition: {co}  Gait and Station: {UNREMARKABLE:125662::\"unremarkable\"}    RISK AND PROTECTIVE FACTORS     Static Risk Factors: sex and history of MH diagnoses and/or treatment     Dynamic Risk Factors: emotional distress, substance use, anxiety, agitation, chronic pain, mental health stigma and work related problems     Protective Factors: hope for the future, compliance with medication, future oriented, healthy intimate relationships, engaged in EBT, access to care as needed, motivation and readiness for change, reasons for living, effective coping strategies and displaying help seeking behavior     SAFETY ASSESSMENT      Based on review of above risk and protective factors and today's exam, pt is at low elevated risk of harm to self or others. He does not meet criteria for a 72 hr hold and remains appropriate for ongoing outpatient care. The patient convincingly denies suicidality today but endorses thoughts of wanting to be dead in the past two weeks due to anxiety. There was no deceit detected, and the patient presented in a manner that was believable. Local community safety resources printed and reviewed for patient to use if needed.     Recommended that patient call 911 or go to the local ED should there be a change in any of these risk factors.     DSM 5 DIAGNOSIS      296.22 (F32.1)  Major Depressive Disorder, Single Episode, Moderate _  300.01 (F41.0) Panic Disorder  300.02 (F41.1) Generalized Anxiety Disorder     DIFFERENTIAL DIAGNOSIS: illness anxiety disorder     Medical comorbidities impacting or contributing to clinical picture: None noted    ASSESSMENT AND PLAN      ASSESSMENT:  Munir Storey is a 56 year old White, male who " "presents for return visit with [facility] ([facility]) for medication management. ***    TREATMENT PLAN: Medication side effects and alternatives reviewed. Health promotion activities recommended and reviewed. All questions addressed. Education and counseling completed regarding risks and benefits of medications and psychotherapy options. [facility] Service model reviewed today. Recommend therapy for additional support. Safety plan reviewed as indicated.     MEDICATIONS:   -***    LABS/RADS:   -{RPLABORDER:438967::\"None at this time\"}    PATIENT STATUS:  [facility] offer care a specialty service for Primary Care Providers in the [facility] that seek to optimize psychotropic medications for unstable patients.  Once medications have been optimized, our providers discharge the patient back to the referring Primary Care Provider for ongoing medication management.  This type of system allows our providers to serve a high volume of patients.   -{mkcareplan:230074::\"Pt will be seen for continued medication stabilization in [facility].\"}    PSYCHOSOCIAL:   -***  -Follow up with primary care provider as planned or for acute medical concerns.    PSYCHOEDUCATION:  {RPEDUCATIONVISIT:449330::\"Medication side effects and alternatives reviewed. Health promotion activities recommended and reviewed today. All questions addressed. Education and counseling completed regarding risks and benefits of medications and psychotherapy options.  Consent provided by patient/guardian\",\"Call the psychiatric nurse line with medication questions or concerns at [phone].\",\"[facility] may be used to communicate with your provider, but this is not intended to be used for emergencies.\",\"Medlineplus.gov is information for patients.  It is run by the [employer] and it contains information about all disorders, diseases and all medications.  \"}    FOLLOW-UP: Follow up in *** " weeks    1. Continue all other treatments (including medications) per primary care provider and/or specialists.   2. To schedule individual or family therapy, call [facility] at [phone].   3. Follow up with primary care provider as planned or for acute medical concerns.  4. Call the psychiatric nurse line with medication questions or concerns at [phone] or [phone].  5. [email or web address] may be used to communicate with your care team, but this is not intended to be used for emergencies.    CRISIS RESOURCES:    1. Present to the Emergency Department as needed or call after hours crisis line at [phone] or [phone].   2. [place] Crisis Text Line: Text [place] to 792753.  3. Suicide LifeLine Chat: suicidepreICE Entertainment.org/chat/.  4. National Suicide Prevention Lifeline: 162.517.3207 (TTY: 643.565.3532). Call anytime for help.  (www.suicidepreventionlifeline.org)  5. National [place] [employer] Mental Illness (www.maritza.org): 568.687.1382 or 942-573-4834.  6. Mental Health Association (www.mentalhealth.org): 396.909.5616 or 085-333-2318.    ADMINISTRATIVE BILLING:    Time spent interviewing patient, reviewing referral documents, obtaining and reviewing outside records, communication with other health specialists, and preparing this report on today's date  Video/Phone Start Time: ***  Video/Phone End Time: ***    Signed:   Jaqueline Eden DNP, [id number]-BC  [facility] ([facility])

## 2022-05-03 NOTE — GROUP NOTE
Psychotherapy Group Note    PATIENT'S NAME: Munir Storey  MRN:   [id number]  :   1965  ACCT. NUMBER: [id number]  DATE OF SERVICE: 22  START TIME: 10:00 AM  END TIME: 10:50 AM  FACILITATOR: Maria Elena Torres LICSW  TOPIC: MH EBP Group: Self-Awareness  [facility] Adult Partial Hospitalization Program  TRACK: PHP 1    NUMBER OF PARTICIPANTS: 5    Summary of Group / Topics Discussed:  Self-Awareness: Values: Patients identified personal values by examining development of their current values and how their values influence their daily functioning and life choices. Patients explored the impact of their values on their thoughts, feelings, and actions. Patients discussed definition of personal values and how they develop and change over time. The goal is to help patients reconcile value conflicts and achieve balance and flexibility to improve mood and daily functioning.     Patient Session Goals / Objectives:    Examined development of values and impact of values on functioning    Identified and prioritized important values related to current well-being     Identified strategies to change or enhance values to positively impact symptoms    Assisted patients to find ways to adapt functioning to better fit their values                                        Service Modality:  Video Visit     Telemedicine Visit: The patient's condition can be safely assessed and treated via synchronous audio and visual telemedicine encounter.      Reason for Telemedicine Visit: Services only offered telehealth    Originating Site (Patient Location): Patient's home    Distant Site (Provider Location): Provider Remote Setting- Home Office    Consent:  The patient/guardian has verbally consented to: the potential risks and benefits of telemedicine (video visit) versus in person care; bill my insurance or make self-payment for services provided; and responsibility for payment of non-covered services.     Patient would  like the video invitation sent by:  My Chart    Mode of Communication:  Video Conference via Medical Zoom    As the provider I attest to compliance with applicable laws and regulations related to telemedicine.          Patient Participation / Response:  Minimally participated, only when prompted / asked.    Demonstrated understanding of topics discussed through group discussion and participation    Treatment Plan:  Patient has a current master individualized treatment plan.  See Epic treatment plan for more information.     --Patient will be discharged. Decided after meeting with Dr. Munguia at 2:00 for further assessment.      Maria Elena Torres, CHERELLESW

## 2022-05-03 NOTE — TELEPHONE ENCOUNTER
See Note on 5/3/22.     Patient is not a candidate for Covid treatment due to lack of symptoms.     Virtual visit scheduled on 5/4/22 with Dr. Morales to review his medications.     Deandra Sarmiento RN BSN  [facility]

## 2022-05-04 ENCOUNTER — TELEPHONE (OUTPATIENT)
Dept: FAMILY MEDICINE | Facility: CLINIC | Age: 57
End: 2022-05-04

## 2022-05-04 VITALS
SYSTOLIC BLOOD PRESSURE: 142 MMHG | WEIGHT: 165 LBS | RESPIRATION RATE: 16 BRPM | TEMPERATURE: 98.8 F | BODY MASS INDEX: 25.9 KG/M2 | OXYGEN SATURATION: 98 % | HEIGHT: 67 IN | HEART RATE: 69 BPM | DIASTOLIC BLOOD PRESSURE: 86 MMHG

## 2022-05-04 PROCEDURE — 250N000013 HC RX MED GY IP 250 OP 250 PS 637: Performed by: EMERGENCY MEDICINE

## 2022-05-04 PROCEDURE — 99203 OFFICE O/P NEW LOW 30 MIN: CPT | Mod: 95 | Performed by: PSYCHIATRY & NEUROLOGY

## 2022-05-04 PROCEDURE — 250N000011 HC RX IP 250 OP 636: Performed by: EMERGENCY MEDICINE

## 2022-05-04 RX ORDER — BUPROPION HYDROCHLORIDE 150 MG/1
150 TABLET ORAL EVERY MORNING
Qty: 4 TABLET | Refills: 0 | Status: SHIPPED | OUTPATIENT
Start: 2022-05-04 | End: 2022-05-09

## 2022-05-04 RX ORDER — ACETAMINOPHEN 500 MG
1000 TABLET ORAL ONCE
Status: COMPLETED | OUTPATIENT
Start: 2022-05-04 | End: 2022-05-04

## 2022-05-04 RX ORDER — ONDANSETRON 2 MG/ML
4 INJECTION INTRAMUSCULAR; INTRAVENOUS ONCE
Status: COMPLETED | OUTPATIENT
Start: 2022-05-04 | End: 2022-05-04

## 2022-05-04 RX ORDER — LORAZEPAM 0.5 MG/1
0.5 TABLET ORAL DAILY PRN
Status: DISCONTINUED | OUTPATIENT
Start: 2022-05-04 | End: 2022-05-04 | Stop reason: HOSPADM

## 2022-05-04 RX ADMIN — LORAZEPAM 0.5 MG: 0.5 TABLET ORAL at 06:16

## 2022-05-04 RX ADMIN — FLUOXETINE 20 MG: 20 CAPSULE ORAL at 12:47

## 2022-05-04 RX ADMIN — ONDANSETRON 4 MG: 2 INJECTION INTRAMUSCULAR; INTRAVENOUS at 09:20

## 2022-05-04 RX ADMIN — ACETAMINOPHEN 1000 MG: 500 TABLET, FILM COATED ORAL at 01:44

## 2022-05-04 NOTE — ED PROVIDER NOTES
EmPATH Unit - Psychiatric Consultation  [facility] Emergency Department    Munir Storey MRN: [id number]   Age: 56 year old YOB: 1965     History     Chief Complaint   Patient presents with     Psychiatric Evaluation     HPI  Munir Storey is a 56 year old male with history notable for major depressive disorder and generalized anxiety disorder who presents to the emergency department for evaluation of worsening depressed mood despite engagement in his outpatient care plan.  He had also reported experiencing suicidal thoughts although denied any active plan, intent, or desire for suicide.      He tested positive for COVID-19 which prevented his transfer to the EmPATH unit for psychiatric assessment.  I was consulted to aid with medication management.      The interview was conducted over the telephone with the patient.  He had also called his wife and placed her on speakerphone so that she could be involved in the interview.      He described experiencing various psychosocial stressors that have led to reemergence of mood and anxiety symptoms.  He recalls gaining a good response to Prozac sometime ago, at a dose of 20 mg daily which led to remission of his mood and anxiety symptoms.  He eventually came off of the medication however as symptoms reemerged, antidepressant medication was resumed.  He was started on Wellbutrin 7 or 8 months ago in hopes that it would have a more favorable side effect profile.  He recalls gaining no benefit from the medication despite dose increases.  BuSpar was later added in hopes of augmenting Wellbutrin without benefit.  More recently, Prozac was restarted in hopes of regaining the positive response he previously had.  The patient interprets gradual worsening of his depressive symptoms, development of vegetative symptoms and feelings of helplessness and hopelessness, anhedonia, with abrupt onset of suicidal thoughts.  Today, he and his wife are questioning whether  polypharmacy may be contributing to worsening of symptoms.  The patient also adds that day treatment programming was also a trigger which worsened his anxiety.  He would prefer to utilize one-to-one therapy as an alternative.  Today, he denies active thoughts of suicide and would feel safe discharging back home.  He expressed hope regarding his treatment plan and would be content following up with his current outpatient providers.  There was no indication of psychosis or homicidal ideation.    Past Medical History  Past Medical History:   Diagnosis Date     Alcohol withdrawal (H) 04/28/2015     Atrial flutter (H)      Depressive disorder      Ejection fraction < 50% 04/2015    Ejection fraction 45% per echo April 2015 (per [facility] records), possible alcohol or tachycardia induced cardiomyopathy. EF normalized on follow-up echo 2016     SANDRA (generalized anxiety disorder)      H/O atrioventricular quita ablation 12/2015     Hypertension, goal below 140/90      Tobacco abuse      Past Surgical History:   Procedure Laterality Date     CARDIAC SURGERY  07/06/2015    EP cardioversion     COLONOSCOPY  08/01/2013     COLONOSCOPY  4/6/2019    normal colonoscopy - repeat 10 years     HERNIA REPAIR       LAPAROSCOPIC RESECTION SMALL BOWEL  08/08/2013     SPINE SURGERY      cervical fusion      UPPER GI ENDOSCOPY  08/01/2013     amLODIPine (NORVASC) 10 MG tablet  buPROPion (WELLBUTRIN XL) 150 MG 24 hr tablet  famotidine (PEPCID) 20 MG tablet  FLUoxetine (PROZAC) 20 MG capsule  gabapentin (NEURONTIN) 300 MG capsule  lisinopril (ZESTRIL) 40 MG tablet  LORazepam (ATIVAN) 0.5 MG tablet  Misc Natural Products (T-RELIEF CBD+13 SL)  omeprazole (PRILOSEC) 20 MG DR capsule  pediatric electrolyte (PEDIALYTE) SOLN solution  atorvastatin (LIPITOR) 40 MG tablet  metoprolol succinate ER (TOPROL-XL) 25 MG 24 hr tablet      Allergies   Allergen Reactions     Sulfamethoxazole-Trimethoprim Nausea     Family History  Family History   Problem  "Relation Age of Onset     Diabetes Father      Depression Father      Depression Paternal Grandfather      Social History   Social History     Tobacco Use     Smoking status: Former Smoker     Packs/day: 0.50     Years: 20.00     Pack years: 10.00     Types: Cigarettes, Dip, chew, snus or snuff     Quit date: 2019     Years since quittin.7     Smokeless tobacco: Current User     Types: Chew   Vaping Use     Vaping Use: Never used   Substance Use Topics     Alcohol use: No     Comment: hx alcohol abuse, sober since      Drug use: No      Past medical history, past surgical history, medications, allergies, family history, and social history were reviewed with the patient. No additional pertinent items.       Review of Systems  A complete review of systems was performed with pertinent positives and negatives noted in the HPI, and all other systems negative.    Physical Examination   BP: (!) 143/92  Pulse: 68  Temp: 98.8  F (37.1  C)  Resp: 16  Height: 170.2 cm (5' 7\")  Weight: 74.8 kg (165 lb)  SpO2: 99 %      ED Course        Labs Ordered and Resulted from Time of ED Arrival to Time of ED Departure   CBC WITH PLATELETS AND DIFFERENTIAL - Abnormal       Result Value    WBC Count 3.6 (*)     RBC Count 4.59      Hemoglobin 14.2      Hematocrit 43.4      MCV 95      MCH 30.9      MCHC 32.7      RDW 12.2      Platelet Count 193      % Neutrophils 63      % Lymphocytes 23      % Monocytes 13      % Eosinophils 1      % Basophils 0      % Immature Granulocytes 0      NRBCs per 100 WBC 0      Absolute Neutrophils 2.2      Absolute Lymphocytes 0.8      Absolute Monocytes 0.5      Absolute Eosinophils 0.1      Absolute Basophils 0.0      Absolute Immature Granulocytes 0.0      Absolute NRBCs 0.0     COMPREHENSIVE METABOLIC PANEL - Normal    Sodium 135      Potassium 3.8      Chloride 103      Carbon Dioxide (CO2) 28      Anion Gap 4      Urea Nitrogen 17      Creatinine 0.81      Calcium 9.1      Glucose 99      " Alkaline Phosphatase 50      AST 13      ALT 25      Protein Total 8.0      Albumin 4.3      Bilirubin Total 0.4      GFR Estimate >90         Assessments & Plan (with Medical Decision Making)   Patient presenting with worsening depressed mood and suicidal ideation. Nursing notes reviewed noting no acute issues.     I have reviewed the assessment completed by the [facility].     Preliminary diagnosis:    ICD-10-CM    1. Suicidal ideation  R45.851    2. Generalized anxiety disorder with panic attacks  F41.1     F41.0    3. Major depressive disorder, recurrent episode, moderate (H)  F33.1         Treatment Plan:  -We discussed a treatment plan that involved minimizing polypharmacy and utilizing the antidepressant which previously aided in gaining full remission of symptoms.  His medication plan will involve tapering off of Wellbutrin by reducing the dose to 150 mg for 4 days then discontinuing.  BuSpar will be discontinued as it is currently at a low dose.  Prozac will be continued at a dose of 20 mg daily which was reported to be his prior effective dose.  He will continue to utilize Ativan 0.5 mg as needed for management of anxiety.  He was educated on the importance of conservative usage to minimize the likelihood of dependence and withdrawal.  -Utilize individual psychotherapy  -Resume outpatient medication management  -The patient may discharge home once determined to be medically stable.    --  Joshua Xie MD   [facility] EMERGENCY DEPT  EmPATH Unit  5/3/2022          Joshua Xie MD  05/04/22 [id number]

## 2022-05-04 NOTE — ED PROVIDER NOTES
[facility] ED Mental Health Handoff Note:       Brief HPI:  This is a 56 year old male signed out to me by Dr. Chaudhry.  See initial ED Provider note for full details of the presentation.    Evaluated by mental health: No. Patient is clinically sober and awaiting evaluation for disposition.    Safety concerns: At the time I received sign out, there were no safety concerns.    Hold Status:  Active Orders   N/A       ED Course:    Medications   LORazepam (ATIVAN) tablet 0.5 mg (has no administration in time range)   acetaminophen (TYLENOL) tablet 1,000 mg (1,000 mg Oral Given 5/4/22 0144)       Patient was evaluated by Nicole from the DEC. Patient with passive SI and worsened depression. Patients symptoms worsened since starting fluoxetine 3 weeks ago.  Patient has no plan or intent to act upon his passive suicidal thoughts per DEC evaluation.  Given his COVID-positive status inpatient mental health placement will be exceedingly difficult and and from the DEC feels that patient likely simply needs a safety plan and medication adjustment which the patient felt comfortable with.  Plan is for psychiatry consultation in the morning for consideration of medication adjustment and then reevaluation by DEC with development of final safety plan prior to discharge home.  Patient was signed out to Dr. Maravilla pending psychiatry consultation and DEC reevaluation.    There were no significant events during my shift.    Patient was signed out to the oncoming provider, Dr. Maravilla      Impression:    ICD-10-CM    1. Suicidal ideation  R45.851    2. Depression, unspecified depression type  F32.A    3. Anxiety  F41.9        Plan:    1. Awaiting mental health evaluation/recommendations.      RESULTS:   Results for orders placed or performed during the hospital encounter of 05/03/22 (from the past 24 hour(s))   CBC with platelets differential     Status: Abnormal    Collection Time: 05/03/22  8:25 PM    Narrative    The following orders  were created for panel order CBC with platelets differential.  Procedure                               Abnormality         Status                     ---------                               -----------         ------                     CBC with platelets and d...[[id number]]  Abnormal            Final result                 Please view results for these tests on the individual orders.   Comprehensive metabolic panel     Status: Normal    Collection Time: 05/03/22  8:25 PM   Result Value Ref Range    Sodium 135 133 - 144 mmol/L    Potassium 3.8 3.4 - 5.3 mmol/L    Chloride 103 94 - 109 mmol/L    Carbon Dioxide (CO2) 28 20 - 32 mmol/L    Anion Gap 4 3 - 14 mmol/L    Urea Nitrogen 17 7 - 30 mg/dL    Creatinine 0.81 0.66 - 1.25 mg/dL    Calcium 9.1 8.5 - 10.1 mg/dL    Glucose 99 70 - 99 mg/dL    Alkaline Phosphatase 50 40 - 150 U/L    AST 13 0 - 45 U/L    ALT 25 0 - 70 U/L    Protein Total 8.0 6.8 - 8.8 g/dL    Albumin 4.3 3.4 - 5.0 g/dL    Bilirubin Total 0.4 0.2 - 1.3 mg/dL    GFR Estimate >90 >60 mL/min/1.73m2   CBC with platelets and differential     Status: Abnormal    Collection Time: 05/03/22  8:25 PM   Result Value Ref Range    WBC Count 3.6 (L) 4.0 - 11.0 10e3/uL    RBC Count 4.59 4.40 - 5.90 10e6/uL    Hemoglobin 14.2 13.3 - 17.7 g/dL    Hematocrit 43.4 40.0 - 53.0 %    MCV 95 78 - 100 fL    MCH 30.9 26.5 - 33.0 pg    MCHC 32.7 31.5 - 36.5 g/dL    RDW 12.2 10.0 - 15.0 %    Platelet Count 193 150 - 450 10e3/uL    % Neutrophils 63 %    % Lymphocytes 23 %    % Monocytes 13 %    % Eosinophils 1 %    % Basophils 0 %    % Immature Granulocytes 0 %    NRBCs per 100 WBC 0 <1 /100    Absolute Neutrophils 2.2 1.6 - 8.3 10e3/uL    Absolute Lymphocytes 0.8 0.8 - 5.3 10e3/uL    Absolute Monocytes 0.5 0.0 - 1.3 10e3/uL    Absolute Eosinophils 0.1 0.0 - 0.7 10e3/uL    Absolute Basophils 0.0 0.0 - 0.2 10e3/uL    Absolute Immature Granulocytes 0.0 <=0.4 10e3/uL    Absolute NRBCs 0.0 10e3/uL             Andrew Rose MD          Andrew Rose MD  05/04/22 0613
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
## 2022-05-04 NOTE — ED PROVIDER NOTES
History     Chief Complaint:    Psychiatric Evaluation       HPI   Munir Storey is a 56 year old male who presents with concern of his mental health.  The patient states that his mental health is worsened in the last several weeks.  He thinks this was triggered by his stepson being diagnosed with intracranial neoplasm.  He states his stepson now is doing better after some treatment.  The patient states this significantly triggered his anxiety.  He has been experiencing suicidal ideation but no distinct suicidal plan.  He was started on Prozac and the dose was increased approximately 1 week ago.  He has not really noticed much improvement in his depression or suicidal thoughts but his anxiety has diminished somewhat.  Otherwise he states he takes Wellbutrin and gabapentin and BuSpar for his mental health and has been compliant with his medications.  He states he just recently finished a 10-day intensive outpatient treatments which he thought was not terribly helpful.  He was at this emergency department just yesterday and was discharged after mental health evaluation.  Incidentally tested positive for COVID-19.  He states he only has a runny nose in that regard.  He followed with a psychiatrist today and given that he is still not really improving and has ongoing suicidal thoughts and his mental health is not very well managed he was referred back to the emergency department for further assessment from a mental health standpoint.  The patient states he does not drink alcohol and has been sober for 8 years.  Denies drug abuse.  Otherwise he states he is only treated for hypertension and acid reflux.  No other complaints.    Allergies:  Sulfamethoxazole-Trimethoprim     Medications:    amLODIPine (NORVASC) 10 MG tablet  atorvastatin (LIPITOR) 40 MG tablet  buPROPion (WELLBUTRIN XL) 300 MG 24 hr tablet  busPIRone (BUSPAR) 10 MG tablet  famotidine (PEPCID) 20 MG tablet  FLUoxetine (PROZAC) 10 MG capsule  gabapentin  (NEURONTIN) 300 MG capsule  lisinopril (ZESTRIL) 40 MG tablet  LORazepam (ATIVAN) 0.5 MG tablet  metoprolol succinate ER (TOPROL-XL) 25 MG 24 hr tablet  Misc Natural Products (T-RELIEF CBD+13 SL)  omeprazole (PRILOSEC) 20 MG DR capsule  pediatric electrolyte (PEDIALYTE) SOLN solution      Past Medical History:    Past Medical History:   Diagnosis Date     Alcohol withdrawal (H) 04/28/2015     Atrial flutter (H)      Depressive disorder      Ejection fraction < 50% 04/2015     SANDRA (generalized anxiety disorder)      H/O atrioventricular quita ablation 12/2015     Hypertension, goal below 140/90      Tobacco abuse        Patient Active Problem List    Diagnosis Date Noted     MDD (major depressive disorder), recurrent episode, moderate (H) 04/19/2022     Priority: Medium     Panic disorder without agoraphobia 04/12/2022     Priority: Medium     Gastroesophageal reflux disease with esophagitis without hemorrhage 03/31/2022     Priority: Medium     Onychomycosis 11/11/2021     Priority: Medium     Tinea pedis of both feet 11/11/2021     Priority: Medium     Primary osteoarthritis of right hand 11/11/2021     Priority: Medium     Anhedonia 11/11/2021     Priority: Medium     Alcohol dependence in remission (H) 02/23/2021     Priority: Medium     Vitamin D deficiency 02/23/2021     Priority: Medium     Fatigue, unspecified type 02/23/2021     Priority: Medium     Chronic neck pain with history of cervical spinal surgery 02/18/2021     Priority: Medium     H/O atrial flutter 09/02/2019     Priority: Medium     Typical atrial flutter, new onset 04/28/2015, s/p cardioversion 7/2015, s/p atrial flutter ablation 12/2015         Seborrheic keratosis 08/14/2019     Priority: Medium     Moderate episode of recurrent major depressive disorder (H) 08/29/2018     Priority: Medium     Tobacco abuse 10/20/2017     Priority: Medium     SANDRA (generalized anxiety disorder) 10/20/2017     Priority: Medium     Alcohol abuse, in remission  "10/20/2017     Priority: Medium     October 20, 2017: sobriety x 2 years       Peyronie disease 08/26/2009     Priority: Medium     Hypertension, goal below 140/90 01/23/2009     Priority: Medium      Past Surgical History:    Past Surgical History:   Procedure Laterality Date     CARDIAC SURGERY  07/06/2015    EP cardioversion     COLONOSCOPY  08/01/2013     COLONOSCOPY  4/6/2019    normal colonoscopy - repeat 10 years     HERNIA REPAIR       LAPAROSCOPIC RESECTION SMALL BOWEL  08/08/2013     SPINE SURGERY      cervical fusion      UPPER GI ENDOSCOPY  08/01/2013      Family History:    family history includes Depression in his father and paternal grandfather; Diabetes in his father.    Social History:   reports that he quit smoking about 2 years ago. His smoking use included cigarettes and dip, chew, snus or snuff. He has a 10.00 pack-year smoking history. His smokeless tobacco use includes chew. He reports that he does not drink alcohol and does not use drugs.    PCP: Nasreen Martínez     Review of Systems  All systems otherwise negative or per HPI    Physical Exam     Patient Vitals for the past 24 hrs:   BP Temp Temp src Pulse Resp SpO2 Height Weight   05/03/22 1516 (!) 143/92 98.8  F (37.1  C) Temporal 68 16 99 % 1.702 m (5' 7\") 74.8 kg (165 lb)      Physical Exam  SKIN:  Warm, dry.  HEMATOLOGIC/IMMUNOLOGIC/LYMPHATIC:  No pallor.  EYES:  Conjunctivae normal.  CARDIOVASCULAR:  Regular rate and rhythm.  No murmur.  RESPIRATORY:  No respiratory distress, breath sounds equal and normal.  GASTROINTESTINAL:  Soft, nontender abdomen.  MUSCULOSKELETAL: Normal body habitus.  NEUROLOGIC:  Alert, conversant.  PSYCHIATRIC: Depressed mood, normal affect.  Normal eye contact.  No psychotic features.    Emergency Department Course     Imaging:    No orders to display        Laboratory:    Labs Ordered and Resulted from Time of ED Arrival to Time of ED Departure   CBC WITH PLATELETS AND DIFFERENTIAL - Abnormal       Result " Value    WBC Count 3.6 (*)     RBC Count 4.59      Hemoglobin 14.2      Hematocrit 43.4      MCV 95      MCH 30.9      MCHC 32.7      RDW 12.2      Platelet Count 193      % Neutrophils 63      % Lymphocytes 23      % Monocytes 13      % Eosinophils 1      % Basophils 0      % Immature Granulocytes 0      NRBCs per 100 WBC 0      Absolute Neutrophils 2.2      Absolute Lymphocytes 0.8      Absolute Monocytes 0.5      Absolute Eosinophils 0.1      Absolute Basophils 0.0      Absolute Immature Granulocytes 0.0      Absolute NRBCs 0.0     COMPREHENSIVE METABOLIC PANEL - Normal    Sodium 135      Potassium 3.8      Chloride 103      Carbon Dioxide (CO2) 28      Anion Gap 4      Urea Nitrogen 17      Creatinine 0.81      Calcium 9.1      Glucose 99      Alkaline Phosphatase 50      AST 13      ALT 25      Protein Total 8.0      Albumin 4.3      Bilirubin Total 0.4      GFR Estimate >90          Procedures:  None    Interventions:    Medications - No data to display     Emergency Department Course:  Past medical records, nursing notes, and vitals reviewed.  I performed an exam of the patient and obtained history, as documented above.  I rechecked the patient. Findings and plan explained to the patient.  Patient's disposition is yet pending.  Mental health assessment ongoing.  I signed the patient out to the oncoming overnight physician, Dr. Rose, to further manage the patient until disposition is arranged.    Impression & Plan      Medical Decision Making:  This patient returns to the ED concerned about ongoing suicidal ideation and struggles with depression and anxiety.  He was advised to return by psychiatrist given his struggles despite recent evaluation.  Incidentally tested positive for COVID just yesterday.  The patient's mental health evaluation is still underway.  Unclear what his disposition will be.  At shift change I signed the patient out to my overnight colleague to further manage his care and facilitate  disposition needs.      Diagnosis:    ICD-10-CM    1. Suicidal ideation  R45.851    2. Depression, unspecified depression type  F32.A    3. Anxiety  F41.9         Discharge Medications:  New Prescriptions    No medications on file        5/3/2022   Lj Chaudhry MD Moe, James Thomas, MD  05/03/22 [id number]

## 2022-05-04 NOTE — ED NOTES
[facility] Crisis Reassessment      Pt was first seen on 5/4/2022 by Stella Mirza LP; see the initial assessment note for details.      Patient Presentation    Initial ED presentation details: This is pt's third presentation to the ED since Saturday, 4/30/22 due to increased depression, anxiety and passive suicidal ideation.  Pt was at [facility] on Saturday, but subsequently has tested positive for COVID that last two times and could not transfer to [facility].  Pt continues to experience these ongoing symptoms and has presented to the ED as suggested by his IOP providers.    Current patient presentation: Pt was restless and anxious when writer met with him this morning.  He wants to walk around in the Behavioral Health area, however, he states the  won't let him because he has COVID.  He states he feels nauseous and anxious.  Pt is very frustrated that he continues to experience these symptoms of depression, anxiety and suicidal thoughts.  He feels as though the change in medication over the last few weeks has triggered these symptoms.  Pt is open to any treatment plan that will give him relief of his symptoms.    Changes observed since initial assessment: Pt is observed to be more on edge and irritable, though he remains cooperative and engaged with writer.        Risk of Harm    Is the patient experiencing current suicidal ideation: Yes. Passive wish to be dead without thoughts or plan.     Does the patient have thoughts of harming others? No      Mental Status Exam   Affect: Flat   Appearance: Appropriate    Attention Span/Concentration: Attentive?    Eye Contact: Engaged   Fund of Knowledge: Appropriate    Language /Speech Content: Fluent   Language /Speech Volume: Normal    Language /Speech Rate/Productions: Articulate    Recent Memory: Intact   Remote Memory: Intact   Mood: Anxious and Depressed    Orientation to Person: Yes    Orientation to Place: Yes   Orientation to Time of Day: Yes    Orientation to  "Date: Yes    Situation (Do they understand why they are here?): Yes    Psychomotor Behavior: Normal    Thought Content: Clear   Thought Form: Goal Directed and Intact       Additional Collateral Information   Writer spoke with pt's sister, Mariya, with whom the patient has been staying for the last 3 weeks due to the pt's wife being out of town.  Mariya states the goal for the patient is to have him back at home staying by himself and feeling safe to do so, \"on the road to recovery\".  She states the patient will think he's fine and then realize he can't be alone.  She thinks he needs to stay in the hospital so he's not alone and engage in some therapeutic activities.  Writer and Mariya discussed how the pt's COVID+ status prevents this and he would have to be isolated from others and could not benefit from that programming in the hospital at this time and could not go to an inpatient mental health unit.      Therapeutic Intervention  The following therapeutic methodologies were employed when working with the patient: Establishing rapport, Active listening, Establish a discharge plan and Safety planning. Patient response to intervention: engaged and cooperative.      Disposition  Recommended disposition: Individual Therapy and Medication Management      Reviewed case and recommendations with attending provider. Attending Name: Dr. Xie      Attending concurs with disposition: Yes      Patient concurs with disposition: Yes      Final disposition: Individual therapy  and Medication management.       Clinical Substantiation of Recommendations  Pt spoke with Dr. Xie who made a plan for medication management to target the reduction of depressive and anxiety symptoms.  Pt denies active plan for suicide and is future oriented and focused.  Pt has an immense amount of support.  Writer set up an appt with pt's outpatient medication management provider for 5/9/22 and pt has individual therapy already set up in the community. "  Pt will discharge and attend outpatient appointments as scheduled and follow their recommendations.        Assessment Details  Total duration spent on the patient case in minutes: .50 hrs     CPT code(s) utilized: 10425 - Psychotherapy (with patient) - 30 (16-37*) CATHLEEN Vivar       Aftercare Plan  If I am feeling unsafe or I am in a crisis, I will:   Contact my established care providers   Call the [place] Suicide Prevention Lifeline: [phone]   Go to the nearest emergency room   Call [phone]     Attend appointment with Jaqueline Eden (virtual) on 5/9/22 @ 8 am - instructions on appointment at in [facility].  Attend appointment 5/9/2022 7:00 AM Muara Laureano, PACO [facility] (virtual)  Instructions in [facility]       Treatment Plan:  -We discussed a treatment plan that involved minimizing polypharmacy and utilizing the antidepressant which previously aided in gaining full remission of symptoms.  His medication plan will involve tapering off of Wellbutrin by reducing the dose to 150 mg for 4 days then discontinuing.  BuSpar will be discontinued as it is currently at a low dose.  Prozac will be continued at a dose of 20 mg daily which was reported to be his prior effective dose.  He will continue to utilize Ativan 0.5 mg as needed for management of anxiety.  He was educated on the importance of conservative usage to minimize the likelihood of dependence and withdrawal.  -Utilize individual psychotherapy  -Resume outpatient medication management      I am feeling unsafe or I am in a crisis, I will: Talk to sister, wife,  brothers  Contact my established care providers  Call the [place] Suicide [facility] Lifeline: [phone]  Go to the nearest emergency room  Call [phone]    Warning signs that I or other people might notice when a crisis is  developing for me: Persistent worsening depression or intrusive  suicide thoughts with a plan or intent to act on the plan.    Things I am able to do  "on my own to cope or help me feel better:  Overall staying busy with groups or classes or staying busy with job.  Trying to staying mindful and grounded. Participate in Gardening or  taking nature walks.    Things that I am able to do with others to cope or help me better:  Spend quality time with family members. Enjoys going to work. He  looks forward to playing with dog or grandchildren and taking the  [employer] boat out.    Things I can use or do for distraction: Prayer, take a nature walk,  playing with dogs.    Changes I can make to support my mental health and wellness:  Continue with Day Treatment programming. Take all medications as  prescribed by Dr. Munguia.    Crisis Lines  Crisis Text Line  Text 555478  You will be connected with a trained live crisis counselor to provide support.    National Hope Line  1.800.SUICIDE [5049141]      Community Resources  Fast Tracker  Linking people to mental health and substance use disorder resources  fasttrackermn.org     [place] Mental Health Warm Line  Peer to peer support  Monday thru Saturday, 12 pm to 10 pm  493.666.1550 or 9.051.384.8777  Text \"Support\" to 73018    National [place] on Mental Illness (ADRIANA)  944.502.3082 or 1.888.ADRIANA.HELPS        Mental Health Apps  My3  https://myMedPropp.org/    VirtualHopeBox  https://Capptain.org/apps/virtual-hope-box/          "

## 2022-05-04 NOTE — TELEPHONE ENCOUNTER
Patient calling to let Dr. Morales know he was just released from [facility] at [facility]. Patient was scheduled to have virtual visit this morning unable to do it. Patient would like to talk to Dr. Morales only.

## 2022-05-04 NOTE — ED NOTES
Bed: [facility]  Expected date:   Expected time:   Means of arrival:   Comments:  Triage

## 2022-05-04 NOTE — ED NOTES
DEC called stating Dr Xie will be calling and speaking with pt about changing some of his medications. We will try to use the speaker phone so that wife may be involved as per the wishes of the pt. The [facility] pharmacist is aware.

## 2022-05-04 NOTE — DISCHARGE INSTRUCTIONS
Attend appointment with Jaqueline Eden (virtual) on 5/9/22 @ 8 am - instructions on appointment at in [facility].  Attend appointment 5/9/2022 7:00 AM Maura Laureano LICSW [facility] (virtual)  Instructions in [facility]       Treatment Plan:  -We discussed a treatment plan that involved minimizing polypharmacy and utilizing the antidepressant which previously aided in gaining full remission of symptoms.  His medication plan will involve tapering off of Wellbutrin by reducing the dose to 150 mg for 4 days then discontinuing.  BuSpar will be discontinued as it is currently at a low dose.  Prozac will be continued at a dose of 20 mg daily which was reported to be his prior effective dose.  He will continue to utilize Ativan 0.5 mg as needed for management of anxiety.  He was educated on the importance of conservative usage to minimize the likelihood of dependence and withdrawal.  -Utilize individual psychotherapy  -Resume outpatient medication management      I am feeling unsafe or I am in a crisis, I will: Talk to sister, wife,  brothers  Contact my established care providers  Call the National Suicide Prevention Lifeline: 889.741.7608  Go to the nearest emergency room  Call [phone]    Warning signs that I or other people might notice when a crisis is  developing for me: Persistent worsening depression or intrusive  suicide thoughts with a plan or intent to act on the plan.    Things I am able to do on my own to cope or help me feel better:  Overall staying busy with groups or classes or staying busy with job.  Trying to staying mindful and grounded. Participate in Gardening or  taking nature walks.    Things that I am able to do with others to cope or help me better:  Spend quality time with family members. Enjoys going to work. He  looks forward to playing with dog or grandchildren and taking the  [employer] boat out.    Things I can use or do for distraction: Prayer, take a nature walk,  playing with  dogs.    Changes I can make to support my mental health and wellness:  Continue with Day Treatment programming. Take all medications as  prescribed by Dr. Munguia.          Discharge Instructions  COVID-19    COVID-19 is the disease caused by a new coronavirus. The virus spreads from person-to-person primarily by droplets when an infected person coughs or sneezes and the droplets are then breathed in by another person.    Symptoms of COVID-19  Many people have no symptoms or mild symptoms.  Symptoms usually appear within a few days, but up to 14-days, after contact with a person with COVID-19.    A mild COVID-19 illness is like a cold and can have fever, cough, sneezing, sore throat, tiredness, headache, and muscle pain.    A moderate COVID-19 illness might include shortness of breath or pneumonia on a chest x-ray.    A severe COVID-19 illness causes significant breathing problems such as low oxygen levels or more serious pneumonia.  Some patients experience loss of taste or smell which is somewhat unique to COVID-19.      Isolation and Quarantine  Testing is recommended for any person with symptoms that could be COVID-19 and often for those exposed to COVID-19. The best way to stop the spread of the virus is to avoid contact with others.    A close contact exposure is being within 6 feet of someone with COVID for 15 minutes.    Isolation refers to sick people staying away from people who are not sick.    A person in quarantine is limiting activity because they were exposed and are waiting to see if they might become sick.    If you test positive for COVID and have no symptoms, you should stay home (isolation) for 5 full days after the day of the test. You should then wear a mask when around others for another 5 days.    If you test positive for COVID and have mild symptoms, you should stay home (isolation) for at least 5 days after your symptoms began. You can return to normal activities at that time, wearing a  mask when around others, for another 5 days as long as your symptoms are improving/resolving and you have been without a fever for 24 hours (without using fever-reducing medicine).    If you test positive for COVID and have more than mild symptoms, you should stay home (isolation) for at least 10 days after your symptoms began. You can return to normal activities at that time as long as your symptoms are improving and you have been without a fever for 24 hours (without using fever-reducing medicine).  For example, if you have a fever and cough for 6 days, you need to stay home 4 more days with no fever for a total of 10 days. Or, if you have a fever and cough for 10 days, you need to stay home one more day with no fever for a total of 11 days.    If you were exposed to COVID and are not vaccinated (or it has been more than six months from your Pfizer or Moderna vaccine or two months from J&J vaccine), you should stay home (quarantine) for 5 days and then wear a mask around others for 5 additional days. A COVID test at day 5 is recommended.    If you were exposed to COVID and are vaccinated (had a booster, had two shots of Pfizer or Moderna vaccine in the last five months, or had J&J vaccine within two months), you do not need to quarantine but should wear a mask around others for 10 days and get a COVID test on day 5.    If you have symptoms but a negative test, you should stay at home until you have mild/improving symptoms and are without fever for 24 hours, using the same judgment you would for when it is safe to return to work/school from strep throat, influenza, or the common cold. If you worsen, you should consider being re-evaluated.    If you are being tested for COVID because of symptoms and your test is pending, you should stay home until you know your test result.  More details on isolation and quarantine can be found on this website from the  CDC:  https://www.cdc.gov/coronavirus/2019-ncov/your-health/quarantine-isolation.html    If I have COVID, how should I protect myself and others?    Do not go to work or school. Have a friend or relative do your shopping. Do not use public transportation (bus, train) or ridesharing (Lyft, Uber).    Separate yourself from other people in your home. As much as possible, you should stay in one room and away from other people in your home. Also, use a separate bathroom, if possible. Avoid handling pets or other animals while sick.     Wear a facemask if you need to be around other people and cover your mouth and nose with a tissue when you cough or sneeze.     Avoid sharing personal household items. You should not share dishes, drinking glasses, forks/knives/spoons, towels, or bedding with other people in your home. After using these items, they should be washed with soap and water. Clean parts of your home that are touched often (doorknobs, faucets, countertops, etc.) daily.     Wash your hands often with soap and water for at least 20 seconds or use an alcohol-based hand  containing at least 60% alcohol.     Avoid touching your face.    Treat your symptoms. You can take Acetaminophen (Tylenol) to treat body aches and fever as needed for comfort. Ibuprofen (Advil or Motrin) can be used as well if you still have symptoms after taking Tylenol. Drink fluids. Rest.    Watch for worsening symptoms such as shortness of breath/difficulty breathing or very severe weakness.    Employers/workplaces are being asked by the Centers for Disease Control (CDC) to not request notes/documentation for you to return to work or prove that you were ill. You may choose to show your employer this paperwork. Also, repeat testing should not be required to return to work.    Exercise/Sports in rare cases, COVID could affect your heart in a way that makes exercise or participation in sports dangerous.  If you have a mild COVID illness  (fever, cough, sore throat, and similar symptoms but no difficulty breathing or abnormalities of the lung): After your COVID symptoms have resolved, wait 14-days before returning to activity.  If you have more than a mild illness (meaning that you have problems with your breathing or lungs) or if you participate in competitive or strenuous activity or have a history of heart disease: Please see your primary doctor/provider prior to return to activity/competition.    Antibody treatments are available for patients with mild to moderate COVID illness in order to prevent severe illness. In general, only patients with risk factors for severe illness are eligible for treatment. For more information, to see if you are eligible, and to find treatment, go to the [facility] Emergency Department of [facility]:    https://www.health.[facility].[place].us/diseases/coronavirus/mnrap.html     Return to the Emergency Department if:    If you are developing worsening breathing, shortness of breath, or feel worse you should seek medical attention.  If you are uncertain, contact your health care provider/clinic. If you need emergency medical attention, call 911 and tell them you have been ill.    I

## 2022-05-04 NOTE — PHARMACY-ADMISSION MEDICATION HISTORY
Pharmacy Medication History  Admission medication history interview status for the 5/3/2022  admission is complete. See EPIC admission navigator for prior to admission medications     Location of Interview: Patient room  Medication history sources: Patient, Surescripts and Care Everywhere    Significant changes made to the medication list:  Metoprolol is 12.5mg per pt, no longer taking Atorvastatin or Cyclobenzaprine per pt - Omeprazole is PRN ,     In the past week, patient estimated taking medication this percent of the time: greater than 90%    Additional medication history information:       Medication reconciliation completed by provider prior to medication history? No    Time spent in this activity: 15 min    Prior to Admission medications    Medication Sig Last Dose Taking? Auth Provider   amLODIPine (NORVASC) 10 MG tablet Take 1 tablet (10 mg) by mouth daily 5/3/2022 at Unknown time Yes Nasreen Martínez PA-C   buPROPion (WELLBUTRIN XL) 300 MG 24 hr tablet Take 1 tablet (300 mg) by mouth every morning 5/3/2022 at Unknown time Yes Nasreen Martínez PA-C   busPIRone (BUSPAR) 10 MG tablet Take 1 tablet (10 mg) by mouth 2 times daily 5/3/2022 at Unknown time Yes Kiara Clark APRN CNP   famotidine (PEPCID) 20 MG tablet Take 1 tablet (20 mg) by mouth 2 times daily as needed For stomach. Past Week at Unknown time Yes Magalis Morales MD   FLUoxetine (PROZAC) 20 MG capsule Take 20 mg by mouth daily 5/3/2022 at Unknown time Yes Unknown, Entered By History   gabapentin (NEURONTIN) 300 MG capsule Take 1 capsule (300 mg) by mouth in the morning and 1 capsule (300 mg) at noon and 1 capsule (300 mg) in the evening. For anxiety 5/3/2022 at Unknown time Yes Peggy Eden NP   lisinopril (ZESTRIL) 40 MG tablet Take 1 tablet (40 mg) by mouth daily 5/3/2022 at Unknown time Yes Nasreen Martínez PA-C   LORazepam (ATIVAN) 0.5 MG tablet Take 1 tablet (0.5 mg) by mouth daily as needed for anxiety Use sparingly for  panic 5/4/2022 at Unknown time Yes Ralph Monzon MD   [facility] Natural Products (T-RELIEF CBD+13 SL) Place 600 Units under the tongue daily as needed Hemp oil sublingual Past Week at Unknown time Yes Reported, Patient   omeprazole (PRILOSEC) 20 MG DR capsule TAKE 1 CAPSULE (20 MG) BY MOUTH DAILY (FOR HEARTBURN)  Patient taking differently: Take 20 mg by mouth daily as needed (for heartburn) Past Week at Unknown time Yes Nasreen Martínez PA-C   pediatric electrolyte (PEDIALYTE) SOLN solution Take 2.5 mLs by mouth daily 1/4 teaspoon Past Week at Unknown time Yes Reported, Patient   atorvastatin (LIPITOR) 40 MG tablet Take 1 tablet (40 mg) by mouth daily  Patient not taking: No sig reported Not Taking at Unknown time  Nasreen Martínez PA-C   metoprolol succinate ER (TOPROL-XL) 25 MG 24 hr tablet Take 1 tablet (25 mg) by mouth daily Monitor your blood pressure once weekly or if symptomatic. This medication can be increased if needed- let me know (<140/80)  Patient taking differently: Take 12.5 mg by mouth daily Monitor your blood pressure once weekly or if symptomatic. This medication can be increased if needed- let me know (<140/80)  Metoprolol is cut in half. States he is taking 12.5   Roopa Frazier NP       The information provided in this note is only as accurate as the sources available at the time of update(s)

## 2022-05-04 NOTE — CONSULTS
Triage and Transition - Consult and Liaison     Munir Storey  May 4, 2022      Psychiatry consult acknowledged.     Please see not from Dr. Xie on 5/4/22.     MARSHALL CAPPS [facility]  Triage and Transition - Consult and Liaison   [phone]

## 2022-05-05 ENCOUNTER — PATIENT OUTREACH (OUTPATIENT)
Dept: CARE COORDINATION | Facility: CLINIC | Age: 57
End: 2022-05-05
Payer: COMMERCIAL

## 2022-05-05 NOTE — TELEPHONE ENCOUNTER
Patient called again.  Asking for an alternative to Lorazepam.  Patient states that he heard about issues with med and addiction issues.  Please advise.

## 2022-05-05 NOTE — PROGRESS NOTES
[facility] PSYCHIATRIC SERVICE FOLLOW UP     Name:  Munir Storey  : 1965     Telemedicine Visit: The patient's condition can be safely assessed and treated via synchronous audio/visual telemedicine encounter.      Reason for Telemedicine Visit: COVID 19 pandemic and the social and physical recommendations by the CDC and MD.      Originating Site (Patient Location): Patient's home; pt verified their location for the duration of this appointment as address on record.    Distant Site (Provider Location): Provider Remote Setting    Consent:  The patient/guardian has verbally consented to: the potential risks and benefits of telemedicine (video or phone) versus in-person care; bill insurance or make self-payment for services provided; and responsibility for payment of non-covered services.     Mode of Communication:  [employer] video platform     As the treating provider, I attest to compliance with applicable laws and regulations related to telemedicine.  Chart documentation may have been completed with Dragon Voice Recognition software.     IDENTIFICATION     Patient is a 56 year old year old White, male  who presents for follow-up medication management with [facility].  Patient was initially referred by their PCP. Patient attended the session with wife and sister.  The [facility] psychiatry providers act as a specialty service for Primary Care Providers in the [facility] who seek to optimize medications for unstable patients.  Once medications have been optimized, [facility] providers discharge the patient back to the referring Primary Care Provider for ongoing medication management.  This type of system allows [facility] to serve a high volume of patients.      Patient care team: Patient Care Team:  Magalis Morales MD as PCP - General (Family Medicine)  Mauricio, Nasreen Craig PA-C as Assigned PCP  Vikki Durán LICSW as Lead Care Coordinator  Savana Doan as Community Health Worker  Sadi Shelley  "MD Syed as Assigned Heart and Vascular Provider  Roberto Cabral MD as Assigned Musculoskeletal Provider  Peggy Eden NP as Assigned Neuroscience Provider  Ralph Monzon MD as MD (Psychiatry)  Therapist: Antonio Ross at [facility]    INTERIM HISTORY   [facility]. At that time, the plan included increase gabapentin TID, add fluoxetine for anxiety.    Interim pt communication:  Started IOP, triggered anxiety. Presented to [facility] x 3, medications adjusted.     Available records were reviewed prior to visit.    HISTORY OF PRESENT ILLNESS     Per [facility] Maura Laureano during today's team-based visit: \"MH Update: depression got worse and had suicidal ideation. Went to [facility] and they made medication changes. Decreased the Wellbutrin and now completely off for the past 1-2 days. Stopped Buspar completely. ED MD did not say anything about gabapentin so patient called afterward and said okay to continue and check with provider on any changes. Patient cannot remember why he started taking this medication and questions if it does anything for him.  Mornings are the worst. Wake up and feel tired all morning. Get stomach aches and not sure if they are physical or triggered by mental issues. PCP prescribed stomach medicine and it seems to help. Not able to sleep so started OTC melatonin 3 mg last night and slept better. Still feeling anxious all day. Had a panic attack when his wife called unexpectedly Friday morning. Sudden fear or worry that something was wrong, surge of adrenaline through body. Told wife that panicking and needed to hang up. Took Lorazepam and helped to calm down. Called wife later and able to talk through what happened. Decided that wife will text before calling for unexpected times or text for patient to call her so has forewarning.  Trying to decrease morning use of Lorazepam as not want to take. \"Plowing through the day.\" Trying to " "stay busy because idle time seems to make the anxiety worse but then tired and no energy due to not sleeping more than 4 ours at a time. Needs an afternoon nap and that seems better.  Staying at sister's house at night and able to be alone at home some of the time.  Stressors: wife is still out of state, step-sons surgery went well but now concerns for blood clots.  Tx: IOP: learned about anxiety and how it impacts the brain/body. Not alone with having mental health issues. Started to learn acceptance and some good skills but felt triggered by group issues. Sister noted he looked more anxious after the sessions.  Seeing Antonio Ross with [facility].  Most Important: gabapentin doing anything?\"    ---Psychiatry Update---  Update: \"I just need to be able to sleep. I just want to believe in my heart and my head that we're really close.\" Pt was frustrated with going to [facility] because he couldn't go to [facility] because of his COVID+ result.   Mood/anxiety: Describes mood at \"30% without sleep during the day\" and feeling \"50-60% in the evening.\" Pt c/o \"mini attacks\" of anxiety where he gets hot flashes and feels overwhelmed with his sx for a brief episode.   Suicidal ideation:  Yes thoughts of death   PHQ9 score is 16 indicating moderately severe depression.  GAD7 score is  is  15 indicating severe anxiety.    Sleep: Pt describes impaired sleep, \"pretty sporadic.\" He sleeps about 4-5 hours/night. \"It's when I abruptly wake up at 2 in the morning and I can't get back to sleep because I got the stomach problem going. That's when my thoughts occur.\"   Pt took famotidine overnight and use melatonin last night and got about 6 hours of sleep last night.   Appetite: Low appetite at dinner due to anxiety. Doesn't normally eat breakfast and has gone many years without eating lunch, too. \"But I'm trying to change that\" and be more consistent with eating a few meals daily.     Medications: Wife concerned about " "medications and polypharmacy. Pt isn't sure if he feels any different with the increase gabapentin. \"I can't know whether it's helping or not.\"   Pt has not used lorazepam in two days. \"I'm pretty proud of myself.\" He recently was using it a few times daily but is trying to reduce his use.   \"I think I have confidence in the prozac. I'm not getting headaches from it anymore. It's the only thing that's worked for me.\"     Medical: Pt c/o abd px overnight. \"It's a stomachache that won't go away. It manifests as my anxiety I think.\" He used     SUBSTANCE USE HISTORY    Tobacco use: 3-4 pouches of chew/day  Caffeine:  Yes  1 cups/day of coffee + 2+ cups of decaf  Current alcohol:  Quit 2013  Current substance use: Denies  Past use alcohol/substance use: Denies    MEDICATIONS                                                                                              Current medications reviewed today and are noted below.   Current psychotropic medications:   Fluoxetine 20mg  Gabapentin 300mg TID  Lorazepam 0.5mg daily PRN    Past psychotropic medications:  Alprazolam  Duloxetine  Fluoxetine  Hydroxyzine  Lorazepam  Sertraline  Venlafaxine  Bupropion    Supplements:   See below      4/7/22 Lorazepam 0.5mg #10  4/1/22 Gabapentin 100mg #90  3/28/22 Lorazepam 0.5mg #10  3/22/22 Lorazepam 0.5mg #10  3/17/22 Hydrocodone 5mg #10  2/2/22 Lorazepam 0.5mg #20  1/3/22 Lorazepam 0.5mg #30  10/29/21 Lorazepam 0.5mg #30    Current Outpatient Medications   Medication Sig     amLODIPine (NORVASC) 10 MG tablet Take 1 tablet (10 mg) by mouth daily     atorvastatin (LIPITOR) 40 MG tablet Take 1 tablet (40 mg) by mouth daily (Patient not taking: No sig reported)     buPROPion (WELLBUTRIN XL) 150 MG 24 hr tablet Take 1 tablet (150 mg) by mouth every morning for 4 doses     buPROPion (WELLBUTRIN XL) 150 MG 24 hr tablet Take 1 tablet (150 mg) by mouth every morning Then stop     famotidine (PEPCID) 20 MG tablet Take 1 tablet (20 mg) by " mouth 2 times daily as needed For stomach.     FLUoxetine (PROZAC) 20 MG capsule Take 20 mg by mouth daily     gabapentin (NEURONTIN) 300 MG capsule Take 1 capsule (300 mg) by mouth in the morning and 1 capsule (300 mg) at noon and 1 capsule (300 mg) in the evening. For anxiety     lisinopril (ZESTRIL) 40 MG tablet Take 1 tablet (40 mg) by mouth daily     LORazepam (ATIVAN) 0.5 MG tablet Take 1 tablet (0.5 mg) by mouth daily as needed for anxiety Use sparingly for panic     metoprolol succinate ER (TOPROL-XL) 25 MG 24 hr tablet Take 1 tablet (25 mg) by mouth daily Monitor your blood pressure once weekly or if symptomatic. This medication can be increased if needed- let me know (<140/80) (Patient taking differently: Take 12.5 mg by mouth daily Monitor your blood pressure once weekly or if symptomatic. This medication can be increased if needed- let me know (<140/80)  Metoprolol is cut in half. States he is taking 12.5)     Misc Natural Products (T-RELIEF CBD+13 SL) Place 600 Units under the tongue daily as needed Hemp oil sublingual     omeprazole (PRILOSEC) 20 MG DR capsule TAKE 1 CAPSULE (20 MG) BY MOUTH DAILY (FOR HEARTBURN) (Patient taking differently: Take 20 mg by mouth daily as needed (for heartburn))     pediatric electrolyte (PEDIALYTE) SOLN solution Take 2.5 mLs by mouth daily 1/4 teaspoon     No current facility-administered medications for this visit.        VITALS   There were no vitals taken for this visit.    Pulse Readings from Last 5 Encounters:   05/04/22 69   05/02/22 68   04/30/22 62   04/29/22 64   04/06/22 63     Wt Readings from Last 5 Encounters:   05/03/22 74.8 kg (165 lb)   05/02/22 74.8 kg (165 lb)   04/30/22 73.5 kg (162 lb)   04/29/22 75 kg (165 lb 6.4 oz)   04/06/22 76.8 kg (169 lb 4 oz)     BP Readings from Last 5 Encounters:   05/04/22 (!) 142/86   05/02/22 (!) 168/89   04/30/22 (!) 181/89   04/29/22 (!) 142/88   04/06/22 (!) 160/83       LABS & IMAGING                                                                                                                 Recent available labs reviewed today.    Recent Labs   Lab Test 05/03/22 2025 03/27/22  1108 02/23/22  0745   CR 0.81 0.76 0.70   GFRESTIMATED >90 >90 >90     Recent Labs   Lab Test 05/03/22 2025 02/23/22  0745   AST 13 14   ALT 25 29   ALKPHOS 50 53     Recent Labs   Lab Test 03/27/22  1108 02/18/21  1034 10/20/17  1124   TSH 2.83 3.34 2.61       ALLERGY & IMMUNIZATIONS       Allergies   Allergen Reactions     Sulfamethoxazole-Trimethoprim Nausea     MEDICAL & SURGICAL HISTORY    Reviewed past medical and surgical history today.   Pregnant - NA.     Past Medical History:   Diagnosis Date     Alcohol withdrawal (H) 04/28/2015     Atrial flutter (H)      Depressive disorder      Ejection fraction < 50% 04/2015    Ejection fraction 45% per echo April 2015 (per [facility] records), possible alcohol or tachycardia induced cardiomyopathy. EF normalized on follow-up echo 2016     SANDRA (generalized anxiety disorder)      H/O atrioventricular quita ablation 12/2015     Hypertension, goal below 140/90      Tobacco abuse      MENTAL STATUS EXAM:     Alertness: alert  and oriented  Appearance: adequately groomed  Behavior/Demeanor: cooperative, pleasant and calm, with good eye contact   Speech: normal and regular rate and rhythm  Language: intact and no problems  Psychomotor: normal or unremarkable  Mood: anxious and worried  Affect: full range and appropriate; was congruent to mood; was congruent to content  Thought Process/Associations: unremarkable  Thought Content:  Reports suicidal ideation without plan; without intent [details in Interim History] and none;  Denies violent ideation and delusions  Perception:  Reports none;  Denies auditory hallucinations and visual hallucinations  Insight: intact  Judgment: intact  Cognition: does  appear grossly intact; formal cognitive testing was not done  Gait and Station: [facility]    RISK AND PROTECTIVE FACTORS      Static Risk Factors: sex and history of MH diagnoses and/or treatment     Dynamic Risk Factors: emotional distress, substance use, anxiety, agitation, chronic pain, mental health stigma and work related problems     Protective Factors: hope for the future, compliance with medication, future oriented, healthy intimate relationships, engaged in EBT, access to care as needed, motivation and readiness for change, reasons for living, effective coping strategies and displaying help seeking behavior     SAFETY ASSESSMENT      Based on review of above risk and protective factors and today's exam, pt is at low elevated risk of harm to self or others. He does not meet criteria for a 72 hr hold and remains appropriate for ongoing outpatient care. The patient convincingly denies suicidality today but endorses thoughts of wanting to be dead in the past two weeks due to anxiety. There was no deceit detected, and the patient presented in a manner that was believable. Local community safety resources printed and reviewed for patient to use if needed.     Recommended that patient call 911 or go to the local ED should there be a change in any of these risk factors.     DSM 5 DIAGNOSIS      296.22 (F32.1)  Major Depressive Disorder, Single Episode, Moderate _  300.01 (F41.0) Panic Disorder  300.02 (F41.1) Generalized Anxiety Disorder     DIFFERENTIAL DIAGNOSIS: illness anxiety disorder     Medical comorbidities impacting or contributing to clinical picture: None noted    ASSESSMENT AND PLAN      ASSESSMENT:  Munir Storey is a 56 year old White, male who presents for return visit with [facility] ([facility]) for medication management.   12 Apr 22: Pt with a history of anxiety and panic who presents with concerns of uncontrolled increased anxiety in the past several weeks in the context of psychosocial stressors. Pt has tried several antidepressants in the past but discontinues due to SEs, generally. He recalls  fluoxetine was helpful in 2021 but since restarting it 5 days ago has felt more anxious. We discussed decreasing to 10mg and restarting it, discussing how fluoxetine can increase anxiety in the short term. Recommended pt increase gabapentin for anxiety TID and use lorazepam as last-resort option vs BID. Pt's level of anxiety is so high during exam that we reviewed indications for IOP, which he is amenable to, and [facility] if needed. Pt denies SI but endorses recent morbid thoughts of death due to finding anxiety intolerable. He has been taking bupropion for years for anxiety and I doubt this is exacerbating sx now so will not make adjustments. Consider quetiapine for sleep and anxiety if needed.   5 May 22: Pt with a history of anxiety and panic who returns to clinic for follow up after presenting x 3 to ER for anxiety and depression evaluations in the context of psychosocial stressors. He is most concerned about insomnia affecting his ability to manage anxiety today, and about polypharmacy. We discussed short term intended use of lorazepam and gabapentin. He is agreeable to this recommendation. I did advise considering increasing fluoxetine to 30mg for anxiet, which he agrees to. We also discussed using melatonin and or hydroxyzine for insomnia overnight (and reviewed hydroxyzine as an option for anxiety PRN during the day, too). Pt is amenable to trying melatonin and hydroxyzine for sleep.   We will discuss follow up care at our next appointment in 2 weeks as I will be going on medical leave in June. Pt endorses thoughts of death without plan/intent.    TREATMENT PLAN: Medication side effects and alternatives reviewed. Health promotion activities recommended and reviewed. All questions addressed. Education and counseling completed regarding risks and benefits of medications and psychotherapy options. Collaborative Care Psychiatry Service model reviewed today. Recommend therapy for additional support. Safety plan  reviewed as indicated.     MEDICATIONS:   -continue GABAPENTIN 300mg TID  -continue LORAZEPAM 0.5mg daily prn for panic  -increase FLUOXETINE to 30mg daily  -start HYDROXYZINE 25-50mg TID PRN and up to 100mg at bedtime for insomnia    LABS/RADS:   -None at this time    PATIENT STATUS:  [facility] MD/DO/NP/PA providers offer care a specialty service for Primary Care Providers in the [facility] that seek to optimize psychotropic medications for unstable patients.  Once medications have been optimized, our providers discharge the patient back to the referring Primary Care Provider for ongoing medication management.  This type of system allows our providers to serve a high volume of patients.   -Pt will be seen for continued medication stabilization in [facility].    PSYCHOSOCIAL:   -Genesigt Testing ordered today  -Continue therapy outpt as scheduled  -Declines IOP as this was more distressing  -Follow up with primary care provider as planned or for acute medical concerns.    PSYCHOEDUCATION:  Medication side effects and alternatives reviewed. Health promotion activities recommended and reviewed today. All questions addressed. Education and counseling completed regarding risks and benefits of medications and psychotherapy options.  Consent provided by patient/guardian  Call the psychiatric nurse line with medication questions or concerns at [phone].  "[employer]"hart may be used to communicate with your provider, but this is not intended to be used for emergencies.  BENZODIAZEPINE:  discussion on how benzos work and the need to use them short term due to potential of anxiety getting.  This is a controlled substance with risk for abuse, need to keep in a safe keep place and cannot replace lost scripts.    Medlineplus.gov is information for patients.  It is run by the [employer] and it contains information about all disorders, diseases and all medications.      FOLLOW-UP: Follow up in 2 weeks    1. Continue all  other treatments (including medications) per primary care provider and/or specialists.   2. To schedule individual or family therapy, call [facility] at [phone].   3. Follow up with primary care provider as planned or for acute medical concerns.  4. Call the psychiatric nurse line with medication questions or concerns at [phone] or [phone].  5. MyChart may be used to communicate with your care team, but this is not intended to be used for emergencies.    CRISIS RESOURCES:    1. Present to the Emergency Department as needed or call after hours crisis line at [phone] or [phone].   2. [place] Crisis Text Line: Text [place] to 979808.  3. Suicide LifeLine Chat: suicidepreCoworksline.org/chat/.  4. National Suicide Prevention Lifeline: 450.803.1263 (TTY: 724.998.4601). Call anytime for help.  (www.suicidepreventionlifeline.org)  5. [place]ness (www.maritza.org): 888.667.9121 or 194-345-3726.  6. Mental Health Association (www.mentalhealth.org): 472.512.3322 or 754-991-3045.    ADMINISTRATIVE BILLING:    Time spent interviewing patient, reviewing referral documents, obtaining and reviewing outside records, communication with other health specialists, and preparing this report on today's date  Video/Phone Start Time: 0744  Video/Phone End Time: 0825    Signed:   Jaqueline Eden DNP, [id number]  Coll[facility] Care Psychiatry Service ([facility])

## 2022-05-05 NOTE — PROGRESS NOTES
Clinic Care Coordination Contact  [facility]: Post-Discharge Note  SITUATION                                                      Admission:  Pts third presentation to the ED since Saturday, 4/30/22 due to increased depression, anxiety and passive suicidal ideation.     He tested positive for COVID-19 which prevented his transfer to the [facility] unit for psychiatric assessment.            Discharge:     Per chart review pt will taper off of Wellbutrin by reducing the dose to 150 mg for 4 days then discontinuing.  BuSpar will be discontinued as it is currently at a low dose.  Prozac will be continued at a dose of 20 mg daily which was reported to be his prior effective dose.  He will continue to utilize Ativan 0.5 mg as needed for management of anxiety.          BACKGROUND                                                      Per hospital discharge summary and inpatient provider notes:    See above    ASSESSMENT      Enrollment  Primary Care Care Coordination Status: Enrolled  Outreach Frequency: monthly     Goals        3- Mental Health Management (pt-stated)       Goal Statement: I will take steps to manage anxiety    Date Goal set: 1/3/22  Barriers: financial and medical stressors  Strengths: motivated  Date to Achieve By: 3 months  Patient expressed understanding of goal: yes    Action steps to achieve this goal:  1. I will continue to take medication. I have been continuing to take my medications as prescribed. Continuous (MB)  2. I will establish with a therapist when I get better insurance. (Completed- pt established with  therapist and psychiatry)  3. I will contact  behavioral health intake at [phone] discuss higher level of care programs   4. I will utilize ED or crisis resources if needed    Goal Updated: 5/4/22                PLAN                                                      Outpatient Plan:       Pt will continue with Individual Therapy and Medication Management       Pt to call   intake line for an updated assessment regarding readiness for groups ([phone])    Primary care CC team will continue monthly outreach. CC SW to continue to monitor chart.       Future Appointments   Date Time Provider Department Center   5/9/2022  7:00 AM Maura Laureano LICSW [facility]   5/9/2022  8:00 AM Peggy Eden, JAZLYN [facility]   5/27/2022  7:30 AM Magalis Morales MD [facility]         For any urgent concerns, please contact our 24 hour nurse triage line: [phone] ([phone])         [facility]

## 2022-05-08 ASSESSMENT — PATIENT HEALTH QUESTIONNAIRE - PHQ9
SUM OF ALL RESPONSES TO PHQ QUESTIONS 1-9: 16
SUM OF ALL RESPONSES TO PHQ QUESTIONS 1-9: 16
10. IF YOU CHECKED OFF ANY PROBLEMS, HOW DIFFICULT HAVE THESE PROBLEMS MADE IT FOR YOU TO DO YOUR WORK, TAKE CARE OF THINGS AT HOME, OR GET ALONG WITH OTHER PEOPLE: VERY DIFFICULT

## 2022-05-08 ASSESSMENT — ANXIETY QUESTIONNAIRES
GAD7 TOTAL SCORE: 15
GAD7 TOTAL SCORE: 15
1. FEELING NERVOUS, ANXIOUS, OR ON EDGE: NEARLY EVERY DAY
7. FEELING AFRAID AS IF SOMETHING AWFUL MIGHT HAPPEN: MORE THAN HALF THE DAYS
4. TROUBLE RELAXING: MORE THAN HALF THE DAYS
3. WORRYING TOO MUCH ABOUT DIFFERENT THINGS: NEARLY EVERY DAY
7. FEELING AFRAID AS IF SOMETHING AWFUL MIGHT HAPPEN: MORE THAN HALF THE DAYS
2. NOT BEING ABLE TO STOP OR CONTROL WORRYING: NEARLY EVERY DAY
6. BECOMING EASILY ANNOYED OR IRRITABLE: SEVERAL DAYS
5. BEING SO RESTLESS THAT IT IS HARD TO SIT STILL: SEVERAL DAYS
GAD7 TOTAL SCORE: 15
8. IF YOU CHECKED OFF ANY PROBLEMS, HOW DIFFICULT HAVE THESE MADE IT FOR YOU TO DO YOUR WORK, TAKE CARE OF THINGS AT HOME, OR GET ALONG WITH OTHER PEOPLE?: SOMEWHAT DIFFICULT

## 2022-05-09 ENCOUNTER — VIRTUAL VISIT (OUTPATIENT)
Dept: PSYCHIATRY | Facility: CLINIC | Age: 57
End: 2022-05-09
Payer: COMMERCIAL

## 2022-05-09 ENCOUNTER — VIRTUAL VISIT (OUTPATIENT)
Dept: BEHAVIORAL HEALTH | Facility: CLINIC | Age: 57
End: 2022-05-09
Payer: COMMERCIAL

## 2022-05-09 DIAGNOSIS — F41.1 GAD (GENERALIZED ANXIETY DISORDER): Primary | ICD-10-CM

## 2022-05-09 DIAGNOSIS — F41.1 GAD (GENERALIZED ANXIETY DISORDER): ICD-10-CM

## 2022-05-09 DIAGNOSIS — F33.1 MDD (MAJOR DEPRESSIVE DISORDER), RECURRENT EPISODE, MODERATE (H): Primary | ICD-10-CM

## 2022-05-09 PROCEDURE — 90834 PSYTX W PT 45 MINUTES: CPT | Mod: 95 | Performed by: SOCIAL WORKER

## 2022-05-09 PROCEDURE — 99215 OFFICE O/P EST HI 40 MIN: CPT | Mod: 95 | Performed by: STUDENT IN AN ORGANIZED HEALTH CARE EDUCATION/TRAINING PROGRAM

## 2022-05-09 RX ORDER — LORAZEPAM 0.5 MG/1
.25-.5 TABLET ORAL DAILY PRN
Qty: 10 TABLET | Refills: 0 | Status: SHIPPED | OUTPATIENT
Start: 2022-05-09 | End: 2022-06-04

## 2022-05-09 RX ORDER — FLUOXETINE 10 MG/1
10 CAPSULE ORAL DAILY
Qty: 30 CAPSULE | Refills: 0 | Status: SHIPPED | OUTPATIENT
Start: 2022-05-09 | End: 2022-05-23

## 2022-05-09 RX ORDER — HYDROXYZINE HYDROCHLORIDE 50 MG/1
25-50 TABLET, FILM COATED ORAL 3 TIMES DAILY PRN
Qty: 60 TABLET | Refills: 0 | Status: SHIPPED | OUTPATIENT
Start: 2022-05-09 | End: 2022-08-16

## 2022-05-09 RX ORDER — GABAPENTIN 300 MG/1
300 CAPSULE ORAL 3 TIMES DAILY
Qty: 90 CAPSULE | Refills: 0 | Status: SHIPPED | OUTPATIENT
Start: 2022-05-09 | End: 2022-05-23

## 2022-05-09 ASSESSMENT — PATIENT HEALTH QUESTIONNAIRE - PHQ9: SUM OF ALL RESPONSES TO PHQ QUESTIONS 1-9: 16

## 2022-05-09 ASSESSMENT — ANXIETY QUESTIONNAIRES: GAD7 TOTAL SCORE: 15

## 2022-05-09 NOTE — PROGRESS NOTES
[facility] Collaborative Care Psychiatry Service  May 9, 2022      Behavioral Health Clinician Progress Note    Patient Name: Munir Storey           Service Type:  Individual      Service Location:   [employer] / Email (patient reached)     Session Start Time: 07:05 am  Session End Time: 07:44 am      Session Length: 38 - 52      Attendees: Patient, Spouse / Significant Other and sister     Service Modality:  Video Visit:      Provider verified identity through the following two step process.  Patient provided:  Patient was verified at admission/transfer    Telemedicine Visit: The patient's condition can be safely assessed and treated via synchronous audio and visual telemedicine encounter.      Reason for Telemedicine Visit: Services only offered telehealth    Originating Site (Patient Location): Patient's other sister's home    Distant Site (Provider Location): Provider Remote Setting- Home Office    Consent:  The patient/guardian has verbally consented to: the potential risks and benefits of telemedicine (video visit) versus in person care; bill my insurance or make self-payment for services provided; and responsibility for payment of non-covered services.     Patient would like the video invitation sent by:  My Chart    Mode of Communication:  Video Conference via [employer]    As the provider I attest to compliance with applicable laws and regulations related to telemedicine.    Visit Activities (Refresh list every visit): [facility] Only    Diagnostic Assessment Date: 04/12/2022  Treatment Plan Review Date: pending  See Flowsheets for today's PHQ-9 and SANDRA-7 results  Previous PHQ-9:   PHQ-9 SCORE 4/6/2022 4/28/2022 5/8/2022   PHQ-9 Total Score MyChart - 5 (Mild depression) 16 (Moderately severe depression)   PHQ-9 Total Score 8 5 16   Some encounter information is confidential and restricted. Go to Review Flowsheets activity to see all data.     Previous SANDRA-7:   SANDRA-7 SCORE 5/8/2022 5/8/2022 5/8/2022   Total Score  - - 15 (severe anxiety)   Total Score 15 15 15   Some encounter information is confidential and restricted. Go to Review Flowsheets activity to see all data.       BANG LEVEL:  No flowsheet data found.    DATA  Extended Session (60+ minutes): No  Interactive Complexity: No  Crisis: No  [facility] Patient: No    Treatment Objective(s) Addressed in This Session:  Target Behavior(s): anxiety and depression    Depressed Mood: Increase interest, engagement, and pleasure in doing things  Decrease frequency and intensity of feeling down, depressed, hopeless  Improve quantity and quality of night time sleep / decrease daytime naps  Anxiety: will experience a reduction in anxiety     Current Stressors / Issues:   Update: Patient reports his depression continued to worsen and he started to have more intense suicidal thoughts.  He went to [facility] ED and met with a psychiatrist that ended up making medication changes but was not admitted to an inpatient unit.  They decreased the Wellbutrin and patient has been completely off this for the past 1-2 days.  He stopped Buspar completely. The psychiatrist did not say anything about gabapentin so patient called afterward and the doctor advised he can continue with the medication and follow up with his provider for any changes.  Patient admits he cannot remember why he even started taking this in the first place and questions if it is doing anything for his mental health.  Patient describes that mornings are the worst for him. He is not sleeping well, only 4 hours at a time, and will wake up and feel tired all morning.  He also wakes up with stomachaches and is not sure if they are physical or triggered by his anxiety. PCP prescribed stomach medicine and it seems to help. Not able to sleep so started OTC melatonin 3 mg last night and slept better. Still feeling anxious all day. Had a panic attack when his wife called unexpectedly Friday morning. Sudden fear or worry that something was  "wrong, surge of adrenaline through body. Told wife that panicking and needed to hang up. Took Lorazepam and helped to calm down. Called wife later and able to talk through what happened. Decided that wife will text before calling for unexpected times or text for patient to call her so has forewarning.  Appetite is off and on throughout the day, worried about weight loss even with eating more carbs. Losing muscle tone.  [facility] advised this is typically not due to medication but instead that patient has changed something with his physical practices.  Patient admits that he is not doing a lot of the activities that he used to and this could be why he is losing muscle tone.  Trying to decrease morning use of Lorazepam as not want to take. \"Plowing through the day.\" Trying to stay busy because idle time seems to make the anxiety worse but then tired and no energy due to not sleeping more than 4 ours at a time. Needs an afternoon nap and that seems better.  Staying at sister's house at night and able to be alone at home some of the time.  Using grounding and mindfulness to try to get through anxious moments, staying busy and distractions.  [facility] validated patient's continued distress.  [facility] pointed out patient being a creature of habit and needing to maintain as much normalcy for himself right now because there is so much unknown.  [facility] encouraged patient to continue using his therapy and the skills that he did learn through IOP.    Stressors: wife is still out of state, step-sons surgery went well but now concerns for blood clots. Unknown when wife will come home and patient knows that medication cannot improve that stress. Worried about wife's stress levels     Tx: IOP: learned about anxiety and how it impacts the brain/body. Not alone with having mental health issues. Started to learn acceptance and some good skills but felt triggered by group issues. Sister noted he looked more anxious after the sessions.  Does not plan to " return to [facility].  Seeing Antonio Ross with [facility] in person for weekly sessions. Going well. Recommended patient follow-up on medication needs so things can feel easier.  Antonio suggested doing exposure therapy in order for patient to work towards remaining at home alone at night.  [facility] pointed out that just the idea creates a lot of anxiety for the patient and he may not be ready to do this.  [facility] also processed patient not knowing when his wife will return home and allowing himself more time to focus on daytime stabilization and improving his sleep in a place where he does feel safe.    Most Important: gabapentin doing anything? Medication not helping so went back to old medications. Wife is concerned about how many medications he is on and being able to decrease down number of meds. Gene Sight testing? Medication options for sleep (non habit forming).    Progress on Treatment Objective(s) / Homework:  N/A-session spent establishing for need    Motivational Interviewing    MI Intervention: Expressed Empathy/Understanding, Supported Autonomy, Collaboration, Evocation, Reflections: simple and complex, Change talk (evoked) and Reframe     Change Talk Expressed by the Patient: Desire to change Reasons to change Need to change    Provider Response to Change Talk: E - Evoked more info from patient about behavior change    Also provided psychoeducation about behavioral health condition, symptoms, and treatment options    Care Plan review completed: No    Medication Review:  Changes to psychiatric medications, see updated Medication List in EPIC.     Medication Compliance:  Yes    Changes in Health Issues:   Yes: Sleep disturbance, Associated Psychological Distress  Weight / dietary issues, Associated Psychological Distress    Chemical Use Review:   Substance Use: Chemical use reviewed, no active concerns identified      Tobacco Use: No change in amount of tobacco use since last session.  Patient declined  discussion at this time    Assessment: Current Emotional / Mental Status (status of significant symptoms):  Risk status (Self / Other harm or suicidal ideation)  Patient has had a history of suicidal ideation: Patient reports suicidal ideation that comes and goes.  He denies he has a plan or intent.  He denies any prior attempts  Patient denies current fears or concerns for personal safety.  Patient reports the following current or recent suicidal ideation or behaviors: Patient reports an increase in suicidal thoughts but these have since decreased.  Patient denies any plans or intent at this time.  Patient denies current or recent homicidal ideation or behaviors.  Patient denies current or recent self injurious behavior or ideation.  Patient denies other safety concerns.  A safety and risk management plan has not been developed at this time, however patient was encouraged to call [place] [phone] should there be a change in any of these risk factors.    Appearance:   Appropriate   Eye Contact:   Good   Psychomotor Behavior: Normal   Attitude:   Cooperative   Orientation:   All  Speech   Rate / Production: Normal    Volume:  Normal   Mood:    Anxious  Depressed   Affect:    Appropriate   Thought Content:  Clear   Thought Form:  Coherent  Logical   Insight:    Good     Diagnoses:  1. MDD (major depressive disorder), recurrent episode, moderate (H)    2. SANDRA (generalized anxiety disorder)      Collateral Reports Completed:  Communicated with: CCPS Team    Plan: (Homework, other):  Patient was given information about behavioral services and encouraged to schedule a follow up appointment with the clinic [facility] as needed.  He was also given information about mental health symptoms and treatment options .  Patient is connected with an individual therapist and denies any other mental health needs at this time.  CD Recommendations: No indications of CD issues.     Maura Laureano, PACO  May 9, 2022  Answers for HPI/ROS  submitted by the patient on 5/8/2022  If you checked off any problems, how difficult have these problems made it for you to do your work, take care of things at home, or get along with other people?: Very difficult  PHQ9 TOTAL SCORE: 16  SANDRA 7 TOTAL SCORE: 15

## 2022-05-09 NOTE — NURSING NOTE
Patient denies any changes since echeck-in regarding medication and allergies and states all information entered during echeck-in remains accurate.    Crystal WYANE

## 2022-05-09 NOTE — PATIENT INSTRUCTIONS
Increase fluoxetine to 30mg daily for anxiety  Continue gabapentin 300mg three times day for anxiety  Continue lorazepam 0.25-0.5mg daily only if needed for panic - use sparingly  Start hydroxyzine 25-50mg up to three times daily if you need for anxiety or up to 100 at bedtime for insomnia. It is OK to take 25-50mg if you wake up in the middle of the night with insomnia.   I ordered a [employer] test for you today. They will overnight you a kit within 48-72 hours.  Call [phone] or [phone] to schedule follow up with me in 2 weeks.

## 2022-05-09 NOTE — PROGRESS NOTES
Munir is a 56 year old who is being evaluated via a billable video visit.      How would you like to obtain your AVS? MyChart  If the video visit is dropped, the invitation should be resent by: Send to e-mail at: [email or web address]  Will anyone else be joining your video visit? Yes: [email or web address]. How would they like to receive their invitation? Send to e-mail at: [email or web address]      Sister Mariya and wife Ariane will join visit.    [email or web address]    Crystal Cardenas VF

## 2022-05-11 ENCOUNTER — TELEPHONE (OUTPATIENT)
Dept: BEHAVIORAL HEALTH | Facility: CLINIC | Age: 57
End: 2022-05-11
Payer: COMMERCIAL

## 2022-05-11 NOTE — TELEPHONE ENCOUNTER
Writer called Pt today about his interest in the program following PHP and his hospitalization.  He is declining interest in the ADT program - said groups made him more anxious.  Will remove him from the waiting list at present time.  He did state that he is feeling better and thanked the PHP team for their help.

## 2022-05-18 ENCOUNTER — PATIENT OUTREACH (OUTPATIENT)
Dept: CARE COORDINATION | Facility: CLINIC | Age: 57
End: 2022-05-18

## 2022-05-18 ENCOUNTER — PATIENT OUTREACH (OUTPATIENT)
Dept: NURSING | Facility: CLINIC | Age: 57
End: 2022-05-18
Payer: COMMERCIAL

## 2022-05-18 NOTE — PROGRESS NOTES
Clinic Care Coordination Contact    Situation: Patient chart reviewed by care coordinator.    Background:     Assessment: Pt had virtual visit with psychiatry and [facility] on 5/9 and    recommended follow up in 2 weeks. This appt is scheduled for 5/23/22.    Pt spoke to  intake on 5/11 and declined PHP and ADT programs, stating anxiety with group treatment.       Goals        COMPLETED: 1- Financial Wellbeing (pt-stated)       I have accomplished getting health insurance established. I have decided not to apply for [facility].     Personal Plan   If anything changes with our income I will call the [facility] Billing office to see if I may qualify for [facility] care.                 COMPLETED: 2- Medical (pt-stated)       I have accomplished following up with Cardiology and other services.    Personal Plan  If I have any questions for the cardiologist I will call them directly.                COMPLETED: 3- Mental Health Management (pt-stated)       Goal Statement: I will take steps to manage anxiety    Date Goal set: 1/3/22  Barriers: financial and medical stressors  Strengths: motivated  Date to Achieve By: 3 months  Patient expressed understanding of goal: yes    Action steps to achieve this goal:  1. I will continue to take medication. I have been continuing to take my medications as prescribed. Continuous (MB)  2. I will establish with a therapist when I get better insurance. (Completed- pt established with  psychiatry and [facility])  3. I will contact  behavioral health intake at [phone] discuss higher level of care programs (completed- declined)  4. I will utilize ED or crisis resources if needed    Goal Updated: 5/18/22              Plan/Recommendations:  Pt has completed care coordination goals.  CC SW to review chart in one month.  CHW outreach in 2 months

## 2022-05-18 NOTE — PROGRESS NOTES
Clinic Care Coordination Contact    Community Health Worker Follow Up    Care Gaps:     Health Maintenance Due   Topic Date Due     Pneumococcal Vaccine: Pediatrics (0 to 5 Years) and At-Risk Patients (6 to 64 Years) (1 - PCV) Never done     ZOSTER IMMUNIZATION (1 of 2) Never done     COVID-19 Vaccine (4 - Booster for Moderna series) 04/17/2022       Postponed to next CHW outreach     Goals: CHW called patient. CHW started talking about CCC goal and phone was disconnected. CHW called patient back LMTCB.     CHW Plan: CHW will follow up with patient in 1 week on 5/26/22.    Savana Doan  Community Health Worker  [facility] Care Coordination   Office: [phone]

## 2022-05-20 NOTE — PROGRESS NOTES
Munir Storey  is being evaluated via a billable video visit.      How would you like to obtain your AVS? Telebit  For the video visit, send the invitation by: Send to e-mail at: [email or web address]  Will anyone else be joining your video visit? Yenny WAYNE        [facility] PSYCHIATRY SERVICE FOLLOW-UP     Name:  Munir Storey  : 1965     Telemedicine Visit: The patient's condition can be safely assessed and treated via synchronous audio/visual telemedicine encounter.    Consent:  The patient/guardian has verbally consented to: the potential risks and benefits of telemedicine (video or phone) versus in-person care; bill insurance or make self-payment for services provided; and responsibility for payment of non-covered services.     Reason for Telemedicine Visit: COVID 19 pandemic and the social and physical recommendations by the CDC and [facility].      Originating Site (Patient Location): Patient's home; pt verified their location for the duration of this appointment as address on record.    Distant Site (Provider Location): Provider Remote Setting    Mode of Communication:  [employer] video platform     As the treating provider, I attest to compliance with applicable laws and regulations related to telemedicine.  Chart documentation may have been completed with Dragon Voice Recognition software.     IDENTIFICATION     Patient is a 56 year old year old White, male  who presents for follow-up medication management with [facility].  Patient was initially referred by their PCP. Patient attended the session alone.     Patient care team: Patient Care Team:  Magalis Morales MD as PCP - General (Family Medicine)  Nasreen Martínez PA-C as Assigned PCP  Vikki Durán LICSW as Lead Care Coordinator  Savana Doan as Community Health Worker  Sadi Shelley MD as Assigned Heart and Vascular Provider  Roberto Cabral MD as Assigned Musculoskeletal Provider  Peggy Eden,  "NP as Assigned Neuroscience Provider  Ralph Monzon MD as MD (Psychiatry)    INTERIM HISTORY   Pt was last seen in [facility] for follow-up on 9 May 22. At that time, the plan included increase fluoxetine to 30mg, start hydroxyzine PRN.    Interim pt communication:  req clarification on medications    Available records were reviewed prior to visit.    HISTORY OF PRESENT ILLNESS     Per [facility] Maura Laureano during today's team-based visit: \"MH Update: \"fair to middlin.\" Trying for improvement but still struggling. Did not sleep well last night because he kept waking up every few hours. Sleep has been good overall. Wife is home and have been going out to do things. Mood is \"lot better.\" Has been able to be alone at the house and do things on his own without feeling panic and despair. Feels that seeing stressors improve are helping with his mood. Therapy and medications are helping. Trying to stay busy with work and distractions. Confidence is increasing, not needing Lorazepam as often. Panic attacks have stopped but still feel highly anxious. Has noticed that anxiety seems to build during the day and trying to use skills learned to manage. Week seems to be easier due to structure of work and weekends get overwhelming with everything he wants to do. \"There is a happy megan that wants to come out but he can't. Sometimes he peeks out but there are still challenges.\" Not getting usual enjoyment and not able to break through. Feeling weight lifted (stress) but still feeling empty. Barrier feels like a chemical imbalance- get tired easily and moments of hopelessness/empty. Redirected self with distraction. Going through daily routine and encouraging self to go through the motions. Appetite is improved.  SI: stopped. Moments of thinking about the word \"suicide\" but not thinking about or considering for himself. Feeling safe.  [facility] discussed pressure and prompting self to not be able to do everything. Encouraged contined " "medication, therapy and working on own life using skills.     Stressors: wife is back home but has to leave again in two weeks for work trip.     Tx: Seeing Antoniorodney Ross with [facility] in person for weekly sessions. Continuing to go well.     Most Important: medication increase to help with continued depression/anxiety? Wanting \"enjoyment.\"\"    ---Psychiatry Update---  Mood/anxiety: Pt thinks he is close to his baseline and is \"functioning. I'm finding that my anxiety is different.\" He has not been having panic attacks since his wife returned from [place]. He does endorse ongoing physical sx of anxiety like flushing, palps and tachycardia, nausea. \"I'm trying to cope through that.\"   Pt describes problems with procrastination. \"Paying bills feels like a chore. But I'm getting up, showering, doing daily things.\" He doesn't think his energy and motivation is where it used to be.\" He says the procrastination is normal/at baseline. He wonders if he could feel better overall. \"I'm pretty content with how I'm feeling.\"   Suicidal ideation:  No   PHQ2 score is 2  GAD2 score is 2    Sleep: \"I'm sleeping like a baby.\" Pt uses melatonin at night but often doesn't feel drowsy or ready to sleep.     Medications: Used lorazepam over the weekend for the first time in about a week a few days ago. Pt denies problems with gabapentin or fluoxetine for anxiety. He has not used hydroxyzine because he wasn't sure how to take it. He says the sig is not on the bottle.   Pt says both his nephew and son are on venlafaxine for anxiety. \"With the genetics the way they are, if it's working on them then there's a good chance it'll work on me, right?\" He tried venlafaxine for about 6 months in 2020 and discontinued it due to drowsiness.     Medical: No new medical concerns     SUBSTANCE USE HISTORY    Tobacco use: 3-4 pouches of chew/day  Caffeine:  Yes  1 cups/day of coffee + 2+ cups of decaf  Current alcohol:  Quit 2013  Current " substance use: Denies  Past use alcohol/substance use: Denies    MEDICATIONS                                                                                              Current medications reviewed today and are noted below.   Current psychotropic medications:   Fluoxetine 30mg  Gabapentin 300mg TID  Lorazepam 0.5mg daily PRN - using about 1x/week  Hydroxyzine 25-50mg TID PRN and up to 100mg at bedtime PRN     Past psychotropic medications:  Alprazolam  Duloxetine  Fluoxetine  Hydroxyzine  Lorazepam  Sertraline  Venlafaxine  Bupropion     Supplements:   See below       5/9/22 Gabapentin 300mg #90  4/23/22 Lorazepam 0.5mg #30  4/7/22 Lorazepam 0.5mg #10  4/1/22 Gabapentin 100mg #90    Current Outpatient Medications   Medication Sig     amLODIPine (NORVASC) 10 MG tablet Take 1 tablet (10 mg) by mouth daily     atorvastatin (LIPITOR) 40 MG tablet Take 1 tablet (40 mg) by mouth daily (Patient not taking: No sig reported)     famotidine (PEPCID) 20 MG tablet Take 1 tablet (20 mg) by mouth 2 times daily as needed For stomach.     FLUoxetine (PROZAC) 10 MG capsule Take 1 capsule (10 mg) by mouth daily In addition to 20mg capsule for a total of 30mg daily for anxiety     FLUoxetine (PROZAC) 20 MG capsule Take 1 capsule (20 mg) by mouth daily In addition to 10mg capsule for a total of 30mg daily for anxiety.     gabapentin (NEURONTIN) 300 MG capsule Take 1 capsule (300 mg) by mouth 3 times daily For anxiety     hydrOXYzine (ATARAX) 50 MG tablet Take 0.5-1 tablets (25-50 mg) by mouth 3 times daily as needed for anxiety And up to 100mg at bedtime for insomnia. OK to take 50mg overnight if you wake up overnight.     lisinopril (ZESTRIL) 40 MG tablet Take 1 tablet (40 mg) by mouth daily     LORazepam (ATIVAN) 0.5 MG tablet Take 0.5-1 tablets (0.25-0.5 mg) by mouth daily as needed for anxiety Use sparingly for panic     metoprolol succinate ER (TOPROL-XL) 25 MG 24 hr tablet Take 1 tablet (25 mg) by mouth daily Monitor your  blood pressure once weekly or if symptomatic. This medication can be increased if needed- let me know (<140/80) (Patient taking differently: Take 12.5 mg by mouth daily Monitor your blood pressure once weekly or if symptomatic. This medication can be increased if needed- let me know (<140/80)  Metoprolol is cut in half. States he is taking 12.5)     Misc Natural Products (T-RELIEF CBD+13 SL) Place 600 Units under the tongue daily as needed Hemp oil sublingual     omeprazole (PRILOSEC) 20 MG DR capsule TAKE 1 CAPSULE (20 MG) BY MOUTH DAILY (FOR HEARTBURN) (Patient taking differently: Take 20 mg by mouth daily as needed (for heartburn))     pediatric electrolyte (PEDIALYTE) SOLN solution Take 2.5 mLs by mouth daily 1/4 teaspoon     No current facility-administered medications for this visit.      VITALS   There were no vitals taken for this visit.    Pulse Readings from Last 5 Encounters:   05/04/22 69   05/02/22 68   04/30/22 62   04/29/22 64   04/06/22 63     Wt Readings from Last 5 Encounters:   05/03/22 74.8 kg (165 lb)   05/02/22 74.8 kg (165 lb)   04/30/22 73.5 kg (162 lb)   04/29/22 75 kg (165 lb 6.4 oz)   04/06/22 76.8 kg (169 lb 4 oz)     BP Readings from Last 5 Encounters:   05/04/22 (!) 142/86   05/02/22 (!) 168/89   04/30/22 (!) 181/89   04/29/22 (!) 142/88   04/06/22 (!) 160/83     LABS & IMAGING                                                                                                                Recent available labs reviewed today.    Recent Labs   Lab Test 05/03/22 2025 03/27/22  1108 02/23/22  0745   CR 0.81 0.76 0.70   GFRESTIMATED >90 >90 >90     Recent Labs   Lab Test 05/03/22 2025 02/23/22  0745   AST 13 14   ALT 25 29   ALKPHOS 50 53     Recent Labs   Lab Test 03/27/22  1108 02/18/21  1034 10/20/17  1124   TSH 2.83 3.34 2.61       ALLERGY & IMMUNIZATIONS       Allergies   Allergen Reactions     Sulfamethoxazole-Trimethoprim Nausea       MEDICAL & SURGICAL HISTORY    Reviewed past  medical and surgical history today.   Pregnant - NA.     Past Medical History:   Diagnosis Date     Alcohol withdrawal (H) 04/28/2015     Atrial flutter (H)      Depressive disorder      Ejection fraction < 50% 04/2015    Ejection fraction 45% per echo April 2015 (per [facility] records), possible alcohol or tachycardia induced cardiomyopathy. EF normalized on follow-up echo 2016     SANDRA (generalized anxiety disorder)      H/O atrioventricular quita ablation 12/2015     Hypertension, goal below 140/90      Tobacco abuse        MENTAL STATUS EXAM:     Alertness: alert  and oriented  Appearance: adequately groomed  Behavior/Demeanor: cooperative, pleasant and calm, with good eye contact   Speech: normal and regular rate and rhythm  Language: intact and no problems  Psychomotor: normal or unremarkable  Mood: anxious and worried  Affect: full range and appropriate; was congruent to mood; was congruent to content  Thought Process/Associations: unremarkable  Thought Content:  Reports none;  Denies suicidal ideation, violent ideation and delusions  Perception:  Reports none;  Denies auditory hallucinations and visual hallucinations  Insight: intact  Judgment: intact  Cognition: does  appear grossly intact; formal cognitive testing was not done  Gait and Station: [place]     RISK AND PROTECTIVE FACTORS     Static Risk Factors: sex and history of MH diagnoses and/or treatment     Dynamic Risk Factors: emotional distress, substance use, anxiety, agitation, chronic pain, mental health stigma and work related problems     Protective Factors: hope for the future, compliance with medication, future oriented, healthy intimate relationships, engaged in EBT, access to care as needed, motivation and readiness for change, reasons for living, effective coping strategies and displaying help seeking behavior     SAFETY ASSESSMENT      Based on review of above risk and protective factors and today's exam, pt is at low elevated risk of harm to  self or others. He does not meet criteria for a 72 hr hold and remains appropriate for ongoing outpatient care. The patient convincingly denies suicidality today but endorses fleeting thoughts of death without plan/intent. There was no deceit detected, and the patient presented in a manner that was believable. Local community safety resources printed and reviewed for patient to use if needed.     Recommended that patient call 911 or go to the local ED should there be a change in any of these risk factors.     DSM 5 DIAGNOSIS      296.22 (F32.1)  Major Depressive Disorder, Single Episode, Moderate _  300.01 (F41.0) Panic Disorder  300.02 (F41.1) Generalized Anxiety Disorder     DIFFERENTIAL DIAGNOSIS: illness anxiety disorder     Medical comorbidities impacting or contributing to clinical picture: None noted    ASSESSMENT AND PLAN      ASSESSMENT:  Munir Storey is a 56 year old White, male who presents for return visit with [facility] ([facility]) for medication management.   9 May 22: Pt with a history of anxiety and panic who returns to clinic for follow up after presenting x 3 to ER for anxiety and depression evaluations in the context of psychosocial stressors. He is most concerned about insomnia affecting his ability to manage anxiety today, and about polypharmacy. We discussed short term intended use of lorazepam and gabapentin. He is agreeable to this recommendation. I did advise considering increasing fluoxetine to 30mg for anxiet, which he agrees to. We also discussed using melatonin and or hydroxyzine for insomnia overnight (and reviewed hydroxyzine as an option for anxiety PRN during the day, too). Pt is amenable to trying melatonin and hydroxyzine for sleep.   We will discuss follow up care at our next appointment in 2 weeks as I will be going on medical leave in June. Pt endorses thoughts of death without plan/intent.  23 May 22: Pt with hx of anxiety and panic who returns to clinic  with improved anxiety since his wife returned from [place]. We discussed pt's baseline anxiety and his current level of anxiety, which is higher than baseline. We discussed increasing fluoxetine to 40mg to maximize the effect on his anxiety and while pt was initially agreeable, he became worried about SEs so opted to continue at 30mg for now with flexibility to increase to 40mg in a few weeks. Also discussed changing gabapentin to BID dosing as pt is missing the third dose but not sure if it is even needed for anxiety management. We discussed considering gabapentin taper but he is not interested in that yet.  Recommended referral to [facility] for 2-4 appointment of med stabilization before returning to PCP. Pt agreeable. May consider switching back to venlafaxine if fluoxetine ineffective as a few family members are stable on venlafaxine for anxiety.   Denies safety concerns today. Fleeting thoughts of death without plan/intent.    TREATMENT PLAN: Medication side effects and alternatives reviewed. Health promotion activities recommended and reviewed. All questions addressed. Education and counseling completed regarding risks and benefits of medications and psychotherapy options. [facility] model reviewed today. Recommend therapy for additional support. Safety plan reviewed as indicated.     MEDICATIONS:   -decrease GABAPENTIN 300mg BID  -continue LORAZEPAM 0.5mg daily prn for panic  -continue FLUOXETINE 30mg daily x 2 weeks then increase to 40mg (pt very anxious about dose increase so offering extra 10mg caps if he would like to try increasing the dose)  -continue HYDROXYZINE 25-50mg TID PRN and up to 100mg at bedtime for insomnia    LABS/RADS:   -None at this time    PATIENT STATUS:  [facility] MD/DO/NP/PA providers offer care a specialty service for Primary Care Providers in the [facility] that seek to optimize psychotropic medications for unstable patients.  Once medications have been  optimized, our providers discharge the patient back to the referring Primary Care Provider for ongoing medication management.  This type of system allows our providers to serve a high volume of patients.   -Pt will be referred to [facility] for 2-4 more medication stabilization appointments before being returned to PCP.     PSYCHOSOCIAL:   -Refer to [facility]  -Follow up with therapy  -Follow up with primary care provider as planned or for acute medical concerns.    PSYCHOEDUCATION:  Medication side effects and alternatives reviewed. Health promotion activities recommended and reviewed today. All questions addressed. Education and counseling completed regarding risks and benefits of medications and psychotherapy options.  Consent provided by patient/guardian  Call the psychiatric nurse line with medication questions or concerns at [phone].  [employer] may be used to communicate with your provider, but this is not intended to be used for emergencies.  BLACK BOX WARNING: Discussed the Food and Drug Administration (FDA) requires that all antidepressants carry a warning that some children, adolescents and young adults may be at increased risk of suicide when taking antidepressants. Anyone taking an antidepressant should be watched closely for worsening depression or unusual behavior especially in the first few weeks after starting an SSRI. Keep in mind, antidepressants are more likely to reduce suicide risk in the long run by improving mood.   SLEEP HYGIENE: establish a sleep routine, limit screen time 1 hour prior to bed, use bed for sleep only, take sleep/medications on time (including sleepy time tea, trazadone or herbal treatments such as melatonin), aroma therapy, limit caffeine/sugar, yoga, guided imagery, stretch, meditation, limit naps to 20 minutes, make a temperature change in the room, white noise, be mindful of slowing down breathing, take a warm bath/shower, frequently wash sheets, and  journaling.   Medlineplus.gov is information for patients.  It is run by the [employer] and it contains information about all disorders, diseases and all medications.      FOLLOW-UP: Follow up within 6 weeks with [facility] for medication stabilization    1. Continue all other treatments (including medications) per primary care provider and/or specialists.   2. To schedule individual or family therapy, call [facility] at [phone].   3. Follow up with primary care provider as planned or for acute medical concerns.  4. Call the psychiatric nurse line with medication questions or concerns at [phone] or [phone].  5. [employer] may be used to communicate with your care team, but this is not intended to be used for emergencies.    CRISIS RESOURCES:    1. Present to the Emergency Department as needed or call after hours crisis line at [phone] or [phone].   2. [place] Crisis Text Line: Text [place] to 016227.  3. Suicide LifeLine Chat: suicidepreventionRelevare Pharmaceuticalsline.org/chat/.  4. National Suicide Prevention Lifeline: 765.556.8719 (TTY: 365.406.4778). Call anytime for help.  (www.suicidepreventionlifeline.org)  5. National [employer] on Mental Illness (www.maritza.org): 738.652.6800 or 645-753-8372.  6. Mental Health Association (www.mentalhealth.org): 306.157.2316 or 764-965-1423.    ADMINISTRATIVE BILLING:    Time spent interviewing patient, reviewing referral documents, obtaining and reviewing outside records, communication with other health specialists, and preparing this report on today's date  Video/Phone Start Time: 0737  Video/Phone End Time: 0815    Signed:   Jaqueline Eden DNP, [id number]  [facility] ([facility])

## 2022-05-23 ENCOUNTER — VIRTUAL VISIT (OUTPATIENT)
Dept: PSYCHIATRY | Facility: CLINIC | Age: 57
End: 2022-05-23
Payer: COMMERCIAL

## 2022-05-23 ENCOUNTER — VIRTUAL VISIT (OUTPATIENT)
Dept: BEHAVIORAL HEALTH | Facility: CLINIC | Age: 57
End: 2022-05-23
Payer: COMMERCIAL

## 2022-05-23 ENCOUNTER — TELEPHONE (OUTPATIENT)
Dept: BEHAVIORAL HEALTH | Facility: CLINIC | Age: 57
End: 2022-05-23

## 2022-05-23 DIAGNOSIS — F41.1 GAD (GENERALIZED ANXIETY DISORDER): ICD-10-CM

## 2022-05-23 DIAGNOSIS — F41.0 PANIC ATTACK: ICD-10-CM

## 2022-05-23 DIAGNOSIS — F41.1 GAD (GENERALIZED ANXIETY DISORDER): Primary | ICD-10-CM

## 2022-05-23 DIAGNOSIS — F33.1 MDD (MAJOR DEPRESSIVE DISORDER), RECURRENT EPISODE, MODERATE (H): Primary | ICD-10-CM

## 2022-05-23 DIAGNOSIS — F33.1 MODERATE EPISODE OF RECURRENT MAJOR DEPRESSIVE DISORDER (H): ICD-10-CM

## 2022-05-23 PROCEDURE — 90832 PSYTX W PT 30 MINUTES: CPT | Mod: 95 | Performed by: SOCIAL WORKER

## 2022-05-23 PROCEDURE — 99214 OFFICE O/P EST MOD 30 MIN: CPT | Mod: 95 | Performed by: STUDENT IN AN ORGANIZED HEALTH CARE EDUCATION/TRAINING PROGRAM

## 2022-05-23 RX ORDER — GABAPENTIN 300 MG/1
300 CAPSULE ORAL 2 TIMES DAILY
Qty: 60 CAPSULE | Refills: 1 | Status: SHIPPED | OUTPATIENT
Start: 2022-05-23 | End: 2022-06-13

## 2022-05-23 RX ORDER — FLUOXETINE 40 MG/1
40 CAPSULE ORAL DAILY
Qty: 30 CAPSULE | Refills: 1 | Status: SHIPPED | OUTPATIENT
Start: 2022-05-23 | End: 2022-05-23

## 2022-05-23 RX ORDER — FLUOXETINE 10 MG/1
10 CAPSULE ORAL DAILY
Qty: 60 CAPSULE | Refills: 0 | Status: SHIPPED | OUTPATIENT
Start: 2022-05-23 | End: 2022-06-12 | Stop reason: DRUGHIGH

## 2022-05-23 NOTE — NURSING NOTE
Patient denies any changes since echeck-in regarding medication and allergies and states all information entered during echeck-in remains accurate.    Crystal WAYNE

## 2022-05-23 NOTE — PATIENT INSTRUCTIONS
Decrease gabapentin to 300mg twice daily for anxiety. Discuss tapering off this with Emma when you follow up with her if you are still interested in that.  Continue fluoxetine 30mg daily for 2 weeks. You can increase to 40mg if/when you are ready by taking a 20mg cap and two 10mg caps daily. I would not expect you to experience side effects associated with a dose increase based on how you've responded to fluoxetine thus far but sometimes people do notice headaches when increasing the dose.   Continue hydroxyzine 25-50mg up to three times daily if needed for anxiety and 50-100mg at bedtime if needed for insomnia.   I have referred you to [facility] for 2-4 more medication stabilization appointments with my colleague Emma Martinez. Their office will call you to get scheduled within the next 6 weeks. If you are not called to schedule this, please call [phone] to follow up.

## 2022-05-23 NOTE — PROGRESS NOTES
Assessment & Plan   (F41.1) SANDRA (generalized anxiety disorder)  (primary encounter diagnosis)  Comment: Overall, patient is made significant provement.  Followed by psychiatry and participating in counseling.  Currently on SSRI therapy and gabapentin.  Using occasional nighttime hydroxyzine.  He does note situational stress, using a small amount of lorazepam as needed.  Reviewed benzodiazepine usage, 10 to 30 tablets/month of the last several months.  He feels less need to use this medication but appreciates having this medication on hand.  Will give an additional 10 tablets.  Plan: LORazepam (ATIVAN) 0.5 MG tablet        Continue current medications and treatment.  Reviewed that long-term, we would like to have him off lorazepam.    (F10.21) Alcohol dependence in remission (H)  Comment: He states he is maintaining sobriety.  Plan: Continue current cares.    (I10) Hypertension, goal below 140/90  Comment: On 2 drug regimen, ACE inhibitor amlodipine.  Initial reading high, repeat blood check within normal limit.  Plan: Continue current medications.          Patient Instructions   Overall, you're functioning better.     You've made great progress. Give it time.     Stay on the fluoxetine, or Prozac, at 30 mg per day.     I agree with simplifying your medications.     I'll refill # 10 lorazepam.     Use the hydroxyzine as needed for sleep.     I'm okay with medical marijuana.    Follow up appointment in 3 months       Return in about 3 months (around 8/27/2022) for anxiety follow up.    Magalis Morales MD  [facility] TESSA Amaral is a 56 year old who presents for the following health issues     History of Present Illness       Mental Health Follow-up:  Patient presents to follow-up on Depression & Anxiety.Patient's depression since last visit has been:  Better  The patient is not having other symptoms associated with depression.  Patient's anxiety since last visit has been:  Better  The  patient is not having other symptoms associated with anxiety.  Any significant life events: other  Patient is not feeling anxious or having panic attacks.  Patient has no concerns about alcohol or drug use.    Hypertension: He presents for follow up of hypertension.  He does not check blood pressure  regularly outside of the clinic. Outside blood pressures have been over 140/90. He follows a low salt diet.      Today's PHQ-9         PHQ-9 Total Score: 3    PHQ-9 Q9 Thoughts of better off dead/self-harm past 2 weeks :   Not at all    How difficult have these problems made it for you to do your work, take care of things at home, or get along with other people: Somewhat difficult  Today's SANDRA-7 Score: 7         Social History     Tobacco Use     Smoking status: Former Smoker     Packs/day: 0.50     Years: 20.00     Pack years: 10.00     Types: Cigarettes, Dip, chew, snus or snuff     Quit date: 2019     Years since quittin.7     Smokeless tobacco: Current User     Types: Chew   Vaping Use     Vaping Use: Never used   Substance Use Topics     Alcohol use: No     Comment: hx alcohol abuse, sober since      Drug use: No     PHQ 2022   PHQ-9 Total Score 5 16 3   Q9: Thoughts of better off dead/self-harm past 2 weeks Several days Several days Not at all   F/U: Thoughts of suicide or self-harm Yes Yes -   F/U: Self harm-plan Yes No -   F/U: Self-harm action No No -   F/U: Safety concerns No Yes -     SANDRA-7 SCORE 2022   Total Score - 15 (severe anxiety) 7 (mild anxiety)   Total Score 15 15 7     Last PHQ-9 2022   1.  Little interest or pleasure in doing things 1   2.  Feeling down, depressed, or hopeless 1   3.  Trouble falling or staying asleep, or sleeping too much 0   4.  Feeling tired or having little energy 1   5.  Poor appetite or overeating 0   6.  Feeling bad about yourself 0   7.  Trouble concentrating 0   8.  Moving slowly or restless 0   Q9: Thoughts  of better off dead/self-harm past 2 weeks 0   PHQ-9 Total Score 3   Difficulty at work, home, or with people -   In the past two weeks have you had thoughts of suicide or self harm? -   Do you have concerns about your personal safety or the safety of others? -   In the past 2 weeks have you thought about a plan or had intention to harm yourself? -   In the past 2 weeks have you acted on these thoughts in any way? -     SANDRA-7  5/26/2022   1. Feeling nervous, anxious, or on edge 1   2. Not being able to stop or control worrying 1   3. Worrying too much about different things 1   4. Trouble relaxing 1   5. Being so restless that it is hard to sit still 1   6. Becoming easily annoyed or irritable 1   7. Feeling afraid, as if something awful might happen 1   SANDRA-7 Total Score 7   If you checked any problems, how difficult have they made it for you to do your work, take care of things at home, or get along with other people? -             Review of Systems   CONSTITUTIONAL: NEGATIVE for fever, chills, change in weight  ENT/MOUTH: NEGATIVE for ear, mouth and throat problems  RESP: NEGATIVE for significant cough or SOB  CV: NEGATIVE for chest pain, palpitations or peripheral edema  PSYCHIATRIC: feeling less anxious.       Objective    /82 (BP Location: Left arm, Cuff Size: Adult Large)   Pulse 69   Temp 97.7  F (36.5  C) (Tympanic)   Wt 75.3 kg (166 lb)   SpO2 99%   BMI 26.00 kg/m    Body mass index is 26 kg/m .  Physical Exam   GENERAL: Healthy, alert and no distress  EYES: Eyes grossly normal to inspection, conjunctivae and sclerae normal  RESP: Lungs clear to auscultation - no rales, rhonchi or wheezes  CV: Regular rate and rhythm, normal S1 S2, no murmur  MS: No gross musculoskeletal defects noted, no edema  NEURO: Normal strength and tone, mentation intact and speech normal  PSYCH: Mentation appears normal, affect normal/bright     Magalis Morales MD

## 2022-05-23 NOTE — Clinical Note
Pt with anxiety and panic episodes after wife left the state unexpectedly for a few weeks. He is working towards getting back to baseline with meds and therapy but needs a few more appointments. Goals include continued PRN use of lorazepam vs daily/frequently, tapering off gapapentin, and evaluating effectiveness of fluoxetine over time.

## 2022-05-23 NOTE — TELEPHONE ENCOUNTER
Mental Health &Addiction (MH&A)Transition Clinic (TC):     NURSING Post-Consultation Referral Review  _________________________________________    This RN has consulted with provider & this Medication Management referral to the Transition Clinic is deemed   [x] Appropriate  [] Inappropriate     Based on the following additional information gathered: Patient is a Peggy Eden Patient.  Emma Martinez to bridge care during her absence.     Contact w/ patient/parent: This RN phoned this patient.  Patient requesting video visit with patient in 2 weeks as instructed by Peggy Eden.  Patient scheduled for 6/13/22 One hour video visit at 0800 hours.      Chico Woodward RN on May 23, 2022 at 12:58 PM

## 2022-05-23 NOTE — PROGRESS NOTES
[facility] Collaborative Care Psychiatry Service  May 23, 2022      Behavioral Health Clinician Progress Note    Patient Name: Munir Storey           Service Type:  Individual      Service Location:   [employer] / Email (patient reached)     Session Start Time: 07:03 am  Session End Time: 07:33 am      Session Length: 16 - 37      Attendees: Patient     Service Modality:  Video Visit:      Provider verified identity through the following two step process.  Patient provided:  Patient was verified at admission/transfer    Telemedicine Visit: The patient's condition can be safely assessed and treated via synchronous audio and visual telemedicine encounter.      Reason for Telemedicine Visit: Services only offered telehealth    Originating Site (Patient Location): Patient's home    Distant Site (Provider Location): Provider Remote Setting- Home Office    Consent:  The patient/guardian has verbally consented to: the potential risks and benefits of telemedicine (video visit) versus in person care; bill my insurance or make self-payment for services provided; and responsibility for payment of non-covered services.     Patient would like the video invitation sent by:  My Chart    Mode of Communication:  Video Conference via [employer]    As the provider I attest to compliance with applicable laws and regulations related to telemedicine.    Visit Activities (Refresh list every visit): [facility] Only    Diagnostic Assessment Date: 04/12/2022  Treatment Plan Review Date: due by fourth visit  See Flowsheets for today's PHQ-9 and SANDRA-7 results  Previous PHQ-9:   PHQ-9 SCORE 4/6/2022 4/28/2022 5/8/2022   PHQ-9 Total Score MyChart - 5 (Mild depression) 16 (Moderately severe depression)   PHQ-9 Total Score 8 5 16   Some encounter information is confidential and restricted. Go to Review Flowsheets activity to see all data.     Previous SANDRA-7:   SANDRA-7 SCORE 5/8/2022 5/8/2022 5/8/2022   Total Score - - 15 (severe anxiety)   Total Score 15  "15 15   Some encounter information is confidential and restricted. Go to Review Flowsheets activity to see all data.       BANG LEVEL:  No flowsheet data found.    DATA  Extended Session (60+ minutes): No  Interactive Complexity: No  Crisis: No  [facility] Patient: No    Treatment Objective(s) Addressed in This Session:  Target Behavior(s): anxiety and depression    Depressed Mood: Increase interest, engagement, and pleasure in doing things  Decrease frequency and intensity of feeling down, depressed, hopeless  Improve quantity and quality of night time sleep / decrease daytime naps  Anxiety: will experience a reduction in anxiety     Current Stressors / Issues:  Patient presents with an improved affect.  He is more open and talkative compared to prior sessions.   Update: \"fair to middlin.\"  Patient indicates that his wife has returned home and he is working towards improvement but still running into some barriers.  Patient indicates he did not sleep well last night because he kept waking up every few hours.  His sleep has been good overall.  Mood is \"lot better.\" Has been able to be alone at the house and do things on his own without feeling panic and despair.  Patient recognizes that his stressors improving are helping with his mood. Therapy and medications are helping. Trying to stay busy with work and distractions. Confidence is increasing, not needing Lorazepam as often. Panic attacks have stopped but still has moments of feeling highly anxious.  He has noticed that anxiety seems to build during the day and is trying to use skills learned to manage. Week seems to be easier due to structure of work and weekends get overwhelming with everything he wants to do. \"There is a happy megan that wants to come out but he can't. Sometimes he peeks out but there are still challenges.\"  Patient describes that he is not feeling his usual enjoyment that he used to and is not sure how to break through that.  He recognizes that a " "weight has been lifted off his shoulders but he is still feeling empty inside.  Patient questions if this could be a chemical imbalance.  Patient describes getting tired easily and having moments of hopelessness/empty.  He is sometimes able to redirect himself with distraction.  Patient finds it helpful to go through his daily routine and to have his \"normal\" consistently. Appetite is improved.  SI: stopped. Moments of thinking about the word \"suicide\" but not thinking about or considering for himself. Feeling safe.  [facility] praised patient for his progress and insight.  [facility] processed patient noticing that he is sometimes putting pressure on himself and instead recognizing that he cannot do everything.  [facility] discussed the value in taking things step by step and working on simplification.  [facility] provided encouragement the patient is on the right track and needs to allow both his medication and himself time to continue working towards improvement.  [facility] also cautioned about not comparing to his past self and instead being open to the things that he is learning now.    Stressors: wife is back home but has to leave again in two weeks for work trip.    Tx: Seeing Antonio Ross with [facility] in person for weekly sessions. Continuing to go well.    Most Important: medication increase to help with continued depression/anxiety? Wanting \"enjoyment.\"    Progress on Treatment Objective(s) / Homework:  Satisfactory progress - ACTION (Actively working towards change); Intervened by reinforcing change plan / affirming steps taken    Motivational Interviewing    MI Intervention: Expressed Empathy/Understanding, Supported Autonomy, Collaboration, Evocation, Reflections: simple and complex, Change talk (evoked) and Reframe     Change Talk Expressed by the Patient: Desire to change Reasons to change Need to change Committment to change Activation Taking steps    Provider Response to Change Talk: E - Evoked more info from " patient about behavior change, A - Affirmed patient's thoughts, decisions, or attempts at behavior change, R - Reflected patient's change talk and S - Summarized patient's change talk statements    Also provided psychoeducation about behavioral health condition, symptoms, and treatment options    Care Plan review completed: No    Medication Review:  Changes to psychiatric medications, see updated Medication List in EPIC.     Medication Compliance:  Yes    Changes in Health Issues:   None reported    Chemical Use Review:   Substance Use: Chemical use reviewed, no active concerns identified      Tobacco Use: No change in amount of tobacco use since last session.  Patient declined discussion at this time    Assessment: Current Emotional / Mental Status (status of significant symptoms):  Risk status (Self / Other harm or suicidal ideation)  Patient has had a history of suicidal ideation: Patient reports suicidal ideation that comes and goes.  He denies he has a plan or intent.  He denies any prior attempts  Patient denies current fears or concerns for personal safety.  Patient denies current or recent suicidal ideation or behaviors.  Patient denies current or recent homicidal ideation or behaviors.  Patient denies current or recent self injurious behavior or ideation.  Patient denies other safety concerns.  A safety and risk management plan has not been developed at this time, however patient was encouraged to call [facility] should there be a change in any of these risk factors.    Appearance:   Appropriate   Eye Contact:   Good   Psychomotor Behavior: Normal   Attitude:   Cooperative   Orientation:   All  Speech   Rate / Production: Normal    Volume:  Normal   Mood:    Anxious  Depressed   Affect:    Appropriate   Thought Content:  Clear   Thought Form:  Coherent  Logical   Insight:    Good     Diagnoses:  1. MDD (major depressive disorder), recurrent episode, moderate (H)    2. SANDRA (generalized anxiety  disorder)      Collateral Reports Completed:  Communicated with: [facility] Team    Plan: (Homework, other):  Patient was given information about behavioral services and encouraged to schedule a follow up appointment with the clinic [facility] [place] as needed.  He was also given information about mental health symptoms and treatment options .  Patient is connected with an individual therapist and denies any other mental health needs at this time.  CD Recommendations: No indications of CD issues.     Maura Laureano, PACO  May 23, 2022

## 2022-05-25 ENCOUNTER — TELEPHONE (OUTPATIENT)
Dept: PHARMACY | Facility: CLINIC | Age: 57
End: 2022-05-25
Payer: COMMERCIAL

## 2022-05-25 NOTE — TELEPHONE ENCOUNTER
See patient is coming in to see me next me to discuss [facility] testing, called [facility] and they have not received the sample.  Patient was called and he reports this was decided not to do this, he would still like to keep the MTM appointment to discuss medications.

## 2022-05-26 ASSESSMENT — ANXIETY QUESTIONNAIRES
GAD7 TOTAL SCORE: 7
8. IF YOU CHECKED OFF ANY PROBLEMS, HOW DIFFICULT HAVE THESE MADE IT FOR YOU TO DO YOUR WORK, TAKE CARE OF THINGS AT HOME, OR GET ALONG WITH OTHER PEOPLE?: SOMEWHAT DIFFICULT
7. FEELING AFRAID AS IF SOMETHING AWFUL MIGHT HAPPEN: SEVERAL DAYS
3. WORRYING TOO MUCH ABOUT DIFFERENT THINGS: SEVERAL DAYS
6. BECOMING EASILY ANNOYED OR IRRITABLE: SEVERAL DAYS
4. TROUBLE RELAXING: SEVERAL DAYS
GAD7 TOTAL SCORE: 7
GAD7 TOTAL SCORE: 7
7. FEELING AFRAID AS IF SOMETHING AWFUL MIGHT HAPPEN: SEVERAL DAYS
5. BEING SO RESTLESS THAT IT IS HARD TO SIT STILL: SEVERAL DAYS
1. FEELING NERVOUS, ANXIOUS, OR ON EDGE: SEVERAL DAYS
2. NOT BEING ABLE TO STOP OR CONTROL WORRYING: SEVERAL DAYS

## 2022-05-26 ASSESSMENT — PATIENT HEALTH QUESTIONNAIRE - PHQ9
10. IF YOU CHECKED OFF ANY PROBLEMS, HOW DIFFICULT HAVE THESE PROBLEMS MADE IT FOR YOU TO DO YOUR WORK, TAKE CARE OF THINGS AT HOME, OR GET ALONG WITH OTHER PEOPLE: SOMEWHAT DIFFICULT
SUM OF ALL RESPONSES TO PHQ QUESTIONS 1-9: 3
SUM OF ALL RESPONSES TO PHQ QUESTIONS 1-9: 3

## 2022-05-27 ENCOUNTER — OFFICE VISIT (OUTPATIENT)
Dept: FAMILY MEDICINE | Facility: CLINIC | Age: 57
End: 2022-05-27
Payer: COMMERCIAL

## 2022-05-27 VITALS
TEMPERATURE: 97.7 F | WEIGHT: 166 LBS | HEART RATE: 69 BPM | OXYGEN SATURATION: 99 % | SYSTOLIC BLOOD PRESSURE: 134 MMHG | BODY MASS INDEX: 26 KG/M2 | DIASTOLIC BLOOD PRESSURE: 82 MMHG

## 2022-05-27 DIAGNOSIS — F10.21 ALCOHOL DEPENDENCE IN REMISSION (H): ICD-10-CM

## 2022-05-27 DIAGNOSIS — I10 HYPERTENSION, GOAL BELOW 140/90: Chronic | ICD-10-CM

## 2022-05-27 DIAGNOSIS — F41.1 GAD (GENERALIZED ANXIETY DISORDER): Primary | ICD-10-CM

## 2022-05-27 PROCEDURE — 99214 OFFICE O/P EST MOD 30 MIN: CPT | Performed by: FAMILY MEDICINE

## 2022-05-27 RX ORDER — LORAZEPAM 0.5 MG/1
0.5 TABLET ORAL EVERY 6 HOURS PRN
Qty: 10 TABLET | Refills: 0 | Status: SHIPPED | OUTPATIENT
Start: 2022-05-27 | End: 2022-06-03 | Stop reason: ALTCHOICE

## 2022-05-27 ASSESSMENT — PATIENT HEALTH QUESTIONNAIRE - PHQ9
10. IF YOU CHECKED OFF ANY PROBLEMS, HOW DIFFICULT HAVE THESE PROBLEMS MADE IT FOR YOU TO DO YOUR WORK, TAKE CARE OF THINGS AT HOME, OR GET ALONG WITH OTHER PEOPLE: SOMEWHAT DIFFICULT
SUM OF ALL RESPONSES TO PHQ QUESTIONS 1-9: 3

## 2022-05-27 ASSESSMENT — PAIN SCALES - GENERAL: PAINLEVEL: NO PAIN (0)

## 2022-05-27 ASSESSMENT — ANXIETY QUESTIONNAIRES: GAD7 TOTAL SCORE: 7

## 2022-05-27 NOTE — PATIENT INSTRUCTIONS
Overall, you're functioning better.     You've made great progress. Give it time.     Stay on the fluoxetine, or Prozac, at 30 mg per day.     I agree with simplifying your medications.     I'll refill # 10 lorazepam.     Use the hydroxyzine as needed for sleep.     I'm okay with medical marijuana.    Follow up appointment in 3 months

## 2022-05-29 ENCOUNTER — APPOINTMENT (OUTPATIENT)
Dept: CT IMAGING | Facility: CLINIC | Age: 57
End: 2022-05-29
Attending: EMERGENCY MEDICINE
Payer: COMMERCIAL

## 2022-05-29 ENCOUNTER — HOSPITAL ENCOUNTER (EMERGENCY)
Facility: CLINIC | Age: 57
Discharge: HOME | End: 2022-05-29
Attending: EMERGENCY MEDICINE | Admitting: EMERGENCY MEDICINE
Payer: COMMERCIAL

## 2022-05-29 ENCOUNTER — NURSE TRIAGE (OUTPATIENT)
Dept: NURSING | Facility: CLINIC | Age: 57
End: 2022-05-29
Payer: COMMERCIAL

## 2022-05-29 ENCOUNTER — APPOINTMENT (OUTPATIENT)
Dept: MRI IMAGING | Facility: CLINIC | Age: 57
End: 2022-05-29
Attending: EMERGENCY MEDICINE
Payer: COMMERCIAL

## 2022-05-29 VITALS
HEART RATE: 61 BPM | OXYGEN SATURATION: 99 % | DIASTOLIC BLOOD PRESSURE: 98 MMHG | BODY MASS INDEX: 24.86 KG/M2 | HEIGHT: 68 IN | WEIGHT: 164 LBS | SYSTOLIC BLOOD PRESSURE: 173 MMHG | RESPIRATION RATE: 8 BRPM

## 2022-05-29 DIAGNOSIS — R42 DIZZINESS: ICD-10-CM

## 2022-05-29 LAB
ALBUMIN UR-MCNC: NEGATIVE MG/DL
ANION GAP SERPL CALCULATED.3IONS-SCNC: 6 MMOL/L (ref 3–14)
APPEARANCE UR: CLEAR
APTT PPP: 26 SECONDS (ref 22–38)
BACTERIA #/AREA URNS HPF: ABNORMAL /HPF
BASOPHILS # BLD AUTO: 0 10E3/UL (ref 0–0.2)
BASOPHILS NFR BLD AUTO: 0 %
BILIRUB UR QL STRIP: NEGATIVE
BUN SERPL-MCNC: 24 MG/DL (ref 7–30)
CALCIUM SERPL-MCNC: 9.1 MG/DL (ref 8.5–10.1)
CHLORIDE BLD-SCNC: 110 MMOL/L (ref 94–109)
CO2 SERPL-SCNC: 26 MMOL/L (ref 20–32)
COLOR UR AUTO: YELLOW
CREAT SERPL-MCNC: 0.76 MG/DL (ref 0.66–1.25)
EOSINOPHIL # BLD AUTO: 0.1 10E3/UL (ref 0–0.7)
EOSINOPHIL NFR BLD AUTO: 1 %
ERYTHROCYTE [DISTWIDTH] IN BLOOD BY AUTOMATED COUNT: 12.9 % (ref 10–15)
ETHANOL SERPL-MCNC: <0.01 G/DL
GFR SERPL CREATININE-BSD FRML MDRD: >90 ML/MIN/1.73M2
GLUCOSE BLD-MCNC: 126 MG/DL (ref 70–99)
GLUCOSE BLDC GLUCOMTR-MCNC: 112 MG/DL (ref 70–99)
GLUCOSE UR STRIP-MCNC: NEGATIVE MG/DL
HCT VFR BLD AUTO: 42.6 % (ref 40–53)
HGB BLD-MCNC: 13.9 G/DL (ref 13.3–17.7)
HGB UR QL STRIP: NEGATIVE
HOLD SPECIMEN: NORMAL
IMM GRANULOCYTES # BLD: 0 10E3/UL
IMM GRANULOCYTES NFR BLD: 1 %
INR PPP: 0.97 (ref 0.85–1.15)
KETONES UR STRIP-MCNC: 5 MG/DL
LEUKOCYTE ESTERASE UR QL STRIP: NEGATIVE
LYMPHOCYTES # BLD AUTO: 1.2 10E3/UL (ref 0.8–5.3)
LYMPHOCYTES NFR BLD AUTO: 15 %
MCH RBC QN AUTO: 31.1 PG (ref 26.5–33)
MCHC RBC AUTO-ENTMCNC: 32.6 G/DL (ref 31.5–36.5)
MCV RBC AUTO: 95 FL (ref 78–100)
MONOCYTES # BLD AUTO: 0.5 10E3/UL (ref 0–1.3)
MONOCYTES NFR BLD AUTO: 6 %
MUCOUS THREADS #/AREA URNS LPF: PRESENT /LPF
NEUTROPHILS # BLD AUTO: 6.2 10E3/UL (ref 1.6–8.3)
NEUTROPHILS NFR BLD AUTO: 77 %
NITRATE UR QL: NEGATIVE
NRBC # BLD AUTO: 0 10E3/UL
NRBC BLD AUTO-RTO: 0 /100
PH UR STRIP: 6 [PH] (ref 5–7)
PLATELET # BLD AUTO: 263 10E3/UL (ref 150–450)
POTASSIUM BLD-SCNC: 4.2 MMOL/L (ref 3.4–5.3)
RBC # BLD AUTO: 4.47 10E6/UL (ref 4.4–5.9)
RBC URINE: 1 /HPF
SODIUM SERPL-SCNC: 142 MMOL/L (ref 133–144)
SP GR UR STRIP: 1.05 (ref 1–1.03)
TROPONIN I SERPL HS-MCNC: 5 NG/L
UROBILINOGEN UR STRIP-MCNC: NORMAL MG/DL
WBC # BLD AUTO: 8 10E3/UL (ref 4–11)
WBC URINE: <1 /HPF

## 2022-05-29 PROCEDURE — 85025 COMPLETE CBC W/AUTO DIFF WBC: CPT | Performed by: EMERGENCY MEDICINE

## 2022-05-29 PROCEDURE — 255N000002 HC RX 255 OP 636: Performed by: EMERGENCY MEDICINE

## 2022-05-29 PROCEDURE — G0425 INPT/ED TELECONSULT30: HCPCS | Mod: G0 | Performed by: STUDENT IN AN ORGANIZED HEALTH CARE EDUCATION/TRAINING PROGRAM

## 2022-05-29 PROCEDURE — 36415 COLL VENOUS BLD VENIPUNCTURE: CPT | Performed by: EMERGENCY MEDICINE

## 2022-05-29 PROCEDURE — 85730 THROMBOPLASTIN TIME PARTIAL: CPT | Performed by: EMERGENCY MEDICINE

## 2022-05-29 PROCEDURE — 250N000009 HC RX 250: Performed by: EMERGENCY MEDICINE

## 2022-05-29 PROCEDURE — 84484 ASSAY OF TROPONIN QUANT: CPT | Performed by: EMERGENCY MEDICINE

## 2022-05-29 PROCEDURE — 70450 CT HEAD/BRAIN W/O DYE: CPT

## 2022-05-29 PROCEDURE — 70496 CT ANGIOGRAPHY HEAD: CPT

## 2022-05-29 PROCEDURE — 81001 URINALYSIS AUTO W/SCOPE: CPT | Performed by: EMERGENCY MEDICINE

## 2022-05-29 PROCEDURE — 80048 BASIC METABOLIC PNL TOTAL CA: CPT | Performed by: EMERGENCY MEDICINE

## 2022-05-29 PROCEDURE — 70553 MRI BRAIN STEM W/O & W/DYE: CPT

## 2022-05-29 PROCEDURE — A9585 GADOBUTROL INJECTION: HCPCS | Performed by: EMERGENCY MEDICINE

## 2022-05-29 PROCEDURE — 82077 ASSAY SPEC XCP UR&BREATH IA: CPT | Performed by: EMERGENCY MEDICINE

## 2022-05-29 PROCEDURE — 85610 PROTHROMBIN TIME: CPT | Performed by: EMERGENCY MEDICINE

## 2022-05-29 PROCEDURE — 93010 ELECTROCARDIOGRAM REPORT: CPT | Performed by: EMERGENCY MEDICINE

## 2022-05-29 PROCEDURE — 99285 EMERGENCY DEPT VISIT HI MDM: CPT | Mod: 25 | Performed by: EMERGENCY MEDICINE

## 2022-05-29 PROCEDURE — 96374 THER/PROPH/DIAG INJ IV PUSH: CPT | Mod: 59 | Performed by: EMERGENCY MEDICINE

## 2022-05-29 PROCEDURE — 250N000011 HC RX IP 250 OP 636: Performed by: EMERGENCY MEDICINE

## 2022-05-29 PROCEDURE — 93005 ELECTROCARDIOGRAM TRACING: CPT | Performed by: EMERGENCY MEDICINE

## 2022-05-29 RX ORDER — GADOBUTROL 604.72 MG/ML
9 INJECTION INTRAVENOUS ONCE
Status: COMPLETED | OUTPATIENT
Start: 2022-05-29 | End: 2022-05-29

## 2022-05-29 RX ORDER — FLUOXETINE 40 MG/1
1 CAPSULE ORAL DAILY
COMMUNITY
Start: 2022-05-23 | End: 2022-07-01

## 2022-05-29 RX ORDER — DIAZEPAM 10 MG/2ML
5 INJECTION, SOLUTION INTRAMUSCULAR; INTRAVENOUS ONCE
Status: COMPLETED | OUTPATIENT
Start: 2022-05-29 | End: 2022-05-29

## 2022-05-29 RX ORDER — IOPAMIDOL 755 MG/ML
70 INJECTION, SOLUTION INTRAVASCULAR ONCE
Status: COMPLETED | OUTPATIENT
Start: 2022-05-29 | End: 2022-05-29

## 2022-05-29 RX ORDER — MECLIZINE HCL 12.5 MG 12.5 MG/1
12.5 TABLET ORAL 4 TIMES DAILY PRN
Qty: 30 TABLET | Refills: 0 | Status: SHIPPED | OUTPATIENT
Start: 2022-05-29 | End: 2022-12-16

## 2022-05-29 RX ADMIN — GADOBUTROL 9 ML: 604.72 INJECTION INTRAVENOUS at 13:37

## 2022-05-29 RX ADMIN — SODIUM CHLORIDE 100 ML: 9 INJECTION, SOLUTION INTRAVENOUS at 08:57

## 2022-05-29 RX ADMIN — IOPAMIDOL 70 ML: 755 INJECTION, SOLUTION INTRAVENOUS at 08:56

## 2022-05-29 RX ADMIN — DIAZEPAM 5 MG: 5 INJECTION, SOLUTION INTRAMUSCULAR; INTRAVENOUS at 13:29

## 2022-05-29 NOTE — DISCHARGE INSTRUCTIONS
Return if symptoms worsen or new symptoms develop.  Drink plenty of fluids follow-up with neurology next available.  Take meclizine as directed this appears to be a peripheral issue which could be vertigo.  If worsening symptoms visual changes focal numbness weakness in any EXTR primary speech difficulty or other symptoms present please return for further evaluation and care.

## 2022-05-29 NOTE — ED PROVIDER NOTES
History     Chief Complaint   Patient presents with     Dizziness     HPI  Munir Storey is a 56 year old male with past medical history significant for hypertension anxiety disorder history atrial flutter history of previous alcohol abuse fatigue GERD and depression who presents to the emergency department complaining of dizziness and ataxia.  Patient states he woke up in his normal state of health and shortly after this around 630  this morning he began feeling off balance.  Room is not spinning and he is not spinning but feels slightly drunk and has not been drinking.  Has trouble walking a bit denies any recent trauma.  He has not had a headache.  Denies any vision changes.  He has not had any speech difficulty.  He denies any neck pain.  He has not had any chest pain or shortness of breath.  He denies any abdominal pain or back pain.  He has not had focal numbness weakness any extremity.  He has not had bowel or bladder dysfunction denies any urinary symptoms.  He states some movements make this worse but it is present almost all the time.    Allergies:  Allergies   Allergen Reactions     Sulfamethoxazole-Trimethoprim Nausea       Problem List:    Patient Active Problem List    Diagnosis Date Noted     MDD (major depressive disorder), recurrent episode, moderate (H) 04/19/2022     Priority: Medium     Panic disorder without agoraphobia 04/12/2022     Priority: Medium     Gastroesophageal reflux disease with esophagitis without hemorrhage 03/31/2022     Priority: Medium     Onychomycosis 11/11/2021     Priority: Medium     Tinea pedis of both feet 11/11/2021     Priority: Medium     Primary osteoarthritis of right hand 11/11/2021     Priority: Medium     Anhedonia 11/11/2021     Priority: Medium     Alcohol dependence in remission (H) 02/23/2021     Priority: Medium     Vitamin D deficiency 02/23/2021     Priority: Medium     Fatigue, unspecified type 02/23/2021     Priority: Medium     Chronic neck pain with  history of cervical spinal surgery 2021     Priority: Medium     H/O atrial flutter 2019     Priority: Medium     Typical atrial flutter, new onset 2015, s/p cardioversion 2015, s/p atrial flutter ablation 2015         Seborrheic keratosis 2019     Priority: Medium     Moderate episode of recurrent major depressive disorder (H) 2018     Priority: Medium     Tobacco abuse 10/20/2017     Priority: Medium     SANDRA (generalized anxiety disorder) 10/20/2017     Priority: Medium     Alcohol abuse, in remission 10/20/2017     Priority: Medium     2017: sobriety x 2 years       Peyronie disease 2009     Priority: Medium     Hypertension, goal below 140/90 2009     Priority: Medium        Past Medical History:    Past Medical History:   Diagnosis Date     Alcohol withdrawal (H) 2015     Atrial flutter (H)      Depressive disorder      Ejection fraction < 50% 2015     SANDRA (generalized anxiety disorder)      H/O atrioventricular quita ablation 2015     Hypertension, goal below 140/90      Tobacco abuse        Past Surgical History:    Past Surgical History:   Procedure Laterality Date     CARDIAC SURGERY  2015    EP cardioversion     COLONOSCOPY  2013     COLONOSCOPY  2019    normal colonoscopy - repeat 10 years     HERNIA REPAIR       LAPAROSCOPIC RESECTION SMALL BOWEL  2013     SPINE SURGERY      cervical fusion      UPPER GI ENDOSCOPY  2013       Family History:    Family History   Problem Relation Age of Onset     Diabetes Father      Depression Father      Depression Paternal Grandfather        Social History:  Marital Status:   [2]  Social History     Tobacco Use     Smoking status: Former Smoker     Packs/day: 0.50     Years: 20.00     Pack years: 10.00     Types: Cigarettes, Dip, chew, snus or snuff     Quit date: 2019     Years since quittin.7     Smokeless tobacco: Current User     Types: Chew   Vaping  "Use     Vaping Use: Never used   Substance Use Topics     Alcohol use: No     Comment: hx alcohol abuse, sober since 2015     Drug use: No        Medications:    amLODIPine (NORVASC) 10 MG tablet  atorvastatin (LIPITOR) 40 MG tablet  famotidine (PEPCID) 20 MG tablet  FLUoxetine (PROZAC) 10 MG capsule  FLUoxetine (PROZAC) 20 MG capsule  gabapentin (NEURONTIN) 300 MG capsule  hydrOXYzine (ATARAX) 50 MG tablet  lisinopril (ZESTRIL) 40 MG tablet  LORazepam (ATIVAN) 0.5 MG tablet  LORazepam (ATIVAN) 0.5 MG tablet  metoprolol succinate ER (TOPROL-XL) 25 MG 24 hr tablet  Misc Natural Products (T-RELIEF CBD+13 SL)  omeprazole (PRILOSEC) 20 MG DR capsule  pediatric electrolyte (PEDIALYTE) SOLN solution          Review of Systems  All systems reviewed and other than pertinent positives and negatives in HPI all other systems are negative.  Physical Exam   Height: 171.5 cm (5' 7.5\")  Weight: 74.4 kg (164 lb)      Physical Exam  Vitals and nursing note reviewed.   Constitutional:       General: He is not in acute distress.     Appearance: Normal appearance. He is not ill-appearing, toxic-appearing or diaphoretic.   HENT:      Head: Normocephalic and atraumatic.      Nose: Nose normal.      Mouth/Throat:      Mouth: Mucous membranes are moist.      Pharynx: Oropharynx is clear.   Eyes:      Extraocular Movements: Extraocular movements intact.      Conjunctiva/sclera: Conjunctivae normal.      Pupils: Pupils are equal, round, and reactive to light.      Comments: No visual field deficits able to read fine print without difficulty.   Cardiovascular:      Rate and Rhythm: Normal rate and regular rhythm.      Pulses: Normal pulses.      Heart sounds: Normal heart sounds.   Pulmonary:      Effort: Pulmonary effort is normal.      Breath sounds: Normal breath sounds. No wheezing or rhonchi.   Chest:      Chest wall: No tenderness.   Abdominal:      General: Abdomen is flat. There is no distension.      Palpations: Abdomen is soft.      " Tenderness: There is no abdominal tenderness. There is no right CVA tenderness or left CVA tenderness.   Musculoskeletal:         General: No swelling or tenderness. Normal range of motion.      Cervical back: Normal range of motion and neck supple.      Right lower leg: No edema.      Left lower leg: No edema.   Skin:     General: Skin is warm and dry.      Capillary Refill: Capillary refill takes less than 2 seconds.   Neurological:      General: No focal deficit present.      Mental Status: He is alert and oriented to person, place, and time. Mental status is at baseline.      Cranial Nerves: No cranial nerve deficit.      Sensory: No sensory deficit.      Motor: No weakness.      Coordination: Coordination normal.      Deep Tendon Reflexes: Reflexes normal.      Comments: No drift in upper or lower extremities.  Cranial nerves intact.  When patient was stood after CTs were done he felt a little off balance but was able to stand without difficulty and take a couple steps.   Psychiatric:         Mood and Affect: Mood normal.         ED Course                 Procedures              EKG Interpretation:      Interpreted by Jonah Anglin MD  Rhythm: normal sinus   Rate: Normal  Axis: Normal  Ectopy: none  Conduction: normal  ST Segments/ T Waves: No ST-T wave changes  Q Waves: none  Comparison to prior: Unchanged from 3/27/22    Clinical Impression: Normal sinus rhythm with no ST-T wave abnormalities.    Critical Care time:  was 40  minutes for this patient excluding procedures.  For management of tier 2 stroke code   The patient has stroke symptoms:         ED Stroke specific documentation           NIHSS PDF     Patient last known well time: 6am  ED Provider first to bedside at: Upon arrival  CT Results received at: 9 AM    Thrombolytics:   Not given due to:   - minor/isolated/quickly resolving symptoms    If treating with thrombolytics: Ensure SBP<180 and DBP<105 prior to treatment with thrombolytics.   Administering thrombolytics after treatment with IV labetalol, hydralazine, or nicardipine is reasonable once BP control is established.    Endovascular Retrieval:  Not initiated due to absence of proximal vessel occlusion    National Institutes of Health Stroke Scale (Baseline)  Time Performed: Upon arrival     Score    Level of consciousness: (0)   Alert, keenly responsive    LOC questions: (0)   Answers both questions correctly    LOC commands: (0)   Performs both tasks correctly    Best gaze: (0)   Normal    Visual: (0)   No visual loss    Facial palsy: (0)   Normal symmetrical movements    Motor arm (left): (0)   No drift    Motor arm (right): (0)   No drift    Motor leg (left): (0)   No drift    Motor leg (right): (0)   No drift    Limb ataxia: (0)   Absent    Sensory: (0)   Normal- no sensory loss    Best language: (0)   Normal- no aphasia    Dysarthria: (0)   Normal    Extinction and inattention: (0)   No abnormality        Total Score:  0        Stroke Mimics were considered (including migraine headache, seizure disorder, hypoglycemia (or hyperglycemia), head or spinal trauma, CNS infection, Toxin ingestion and shock state (e.g. sepsis) .        National Institutes of Health Stroke Scale  Time Performed: 1130  Total Score: 0 (unchanged from last stroke score)              Results for orders placed or performed during the hospital encounter of 05/29/22 (from the past 24 hour(s))   Glucose by meter   Result Value Ref Range    GLUCOSE BY METER POCT 112 (H) 70 - 99 mg/dL   [facility] Draw    Narrative    The following orders were created for panel order [facility] Draw.  Procedure                               Abnormality         Status                     ---------                               -----------         ------                     Extra Blue Top Tube[[id number]]                              Final result               Extra Red Top Tube[[id number]]                               Final result                Extra Green Top (Lithium...[[id number]]                      Final result               Extra Purple Top Tube[[id number]]                            Final result                 Please view results for these tests on the individual orders.   Extra Blue Top Tube   Result Value Ref Range    Hold Specimen JIC    Extra Red Top Tube   Result Value Ref Range    Hold Specimen JIC    Extra Green Top (Lithium Heparin) Tube   Result Value Ref Range    Hold Specimen JIC    Extra Purple Top Tube   Result Value Ref Range    Hold Specimen JIC    CBC with Platelets & Differential    Narrative    The following orders were created for panel order CBC with Platelets & Differential.  Procedure                               Abnormality         Status                     ---------                               -----------         ------                     CBC with platelets and d...[[id number]]                      Final result                 Please view results for these tests on the individual orders.   Basic metabolic panel   Result Value Ref Range    Sodium 142 133 - 144 mmol/L    Potassium 4.2 3.4 - 5.3 mmol/L    Chloride 110 (H) 94 - 109 mmol/L    Carbon Dioxide (CO2) 26 20 - 32 mmol/L    Anion Gap 6 3 - 14 mmol/L    Urea Nitrogen 24 7 - 30 mg/dL    Creatinine 0.76 0.66 - 1.25 mg/dL    Calcium 9.1 8.5 - 10.1 mg/dL    Glucose 126 (H) 70 - 99 mg/dL    GFR Estimate >90 >60 mL/min/1.73m2   INR   Result Value Ref Range    INR 0.97 0.85 - 1.15   Partial thromboplastin time   Result Value Ref Range    aPTT 26 22 - 38 Seconds   Troponin I   Result Value Ref Range    Troponin I High Sensitivity 5 <79 ng/L   Ethyl Alcohol Level   Result Value Ref Range    Alcohol ethyl <0.01 <=0.01 g/dL   CBC with platelets and differential   Result Value Ref Range    WBC Count 8.0 4.0 - 11.0 10e3/uL    RBC Count 4.47 4.40 - 5.90 10e6/uL    Hemoglobin 13.9 13.3 - 17.7 g/dL    Hematocrit 42.6 40.0 - 53.0 %    MCV 95 78 - 100 fL    MCH 31.1 26.5 - 33.0 pg     MCHC 32.6 31.5 - 36.5 g/dL    RDW 12.9 10.0 - 15.0 %    Platelet Count 263 150 - 450 10e3/uL    % Neutrophils 77 %    % Lymphocytes 15 %    % Monocytes 6 %    % Eosinophils 1 %    % Basophils 0 %    % Immature Granulocytes 1 %    NRBCs per 100 WBC 0 <1 /100    Absolute Neutrophils 6.2 1.6 - 8.3 10e3/uL    Absolute Lymphocytes 1.2 0.8 - 5.3 10e3/uL    Absolute Monocytes 0.5 0.0 - 1.3 10e3/uL    Absolute Eosinophils 0.1 0.0 - 0.7 10e3/uL    Absolute Basophils 0.0 0.0 - 0.2 10e3/uL    Absolute Immature Granulocytes 0.0 <=0.4 10e3/uL    Absolute NRBCs 0.0 10e3/uL   CT Head w/o Contrast    Narrative    EXAM: CT HEAD WITHOUT CONTRAST  LOCATION: [facility]  DATE/TIME: 05/29/2022, 8:50 AM    INDICATION: Code Stroke to evaluate for potential thrombolysis and thrombectomy. PLEASE READ IMMEDIATELY.  COMPARISON: None.  TECHNIQUE: Routine CT Head without IV contrast. Multiplanar reformats. Dose reduction techniques were used.    FINDINGS:  INTRACRANIAL CONTENTS: No intracranial hemorrhage or abnormal extra-axial fluid collection. Possible small focus of recent lacunar infarction within the left subthalamic region (series 3, image 11). No mass effect, midline shift or herniation. Ventricles   are not enlarged out of proportion to the cerebral sulci.    VISUALIZED ORBITS/SINUSES/MASTOIDS: No intraorbital abnormality. No paranasal sinus mucosal disease. No middle ear or mastoid effusion.    BONES/SOFT TISSUES: No acute abnormality.      Impression    IMPRESSION:  1.  Possible small focus of recent ischemic lacunar infarction in the left subthalamic region. If clinically indicated, consider MRI for further evaluation.  2.  No intracranial hemorrhage.    Findings were discussed with Dr. Anglin by myself at 9:02 AM on 05/29/2022.     CTA Head Neck with Contrast    Narrative    EXAM: CTA  HEAD NECK WITH CONTRAST  LOCATION: [facility]  DATE/TIME: 05/29/2022, 8:56 AM    INDICATION:  Code Stroke to evaluate for potential thrombolysis and thrombectomy.  PLEASE READ IMMEDIATELY.  COMPARISON: None.  CONTRAST: 70 mL Isovue 370.  TECHNIQUE: Head and neck CT angiogram with IV contrast. Axial helical CT images of the head and neck vessels obtained during the arterial phase of intravenous contrast administration. Axial 2D reconstructed images and multiplanar 3D MIP reconstructed   images of the head and neck vessels were performed by the technologist. Dose reduction techniques were used. All stenosis measurements made according to NASCET criteria unless otherwise specified.    FINDINGS:   HEAD CTA:  ANTERIOR CIRCULATION: No stenosis/occlusion, aneurysm, or high-flow vascular malformation. Developmentally hypoplastic left A1 anterior cerebral artery segment.    POSTERIOR CIRCULATION: Patent major arterial branches. Mild multifocal stenoses of the left posterior cerebral artery P2 segment. The vertebral arteries, basilar artery and right posterior cerebral artery branches are widely patent. Dominant right and   smaller left vertebral artery contribute to a normal basilar artery.     DURAL VENOUS SINUSES: Expected enhancement of the major dural venous sinuses.    NECK CTA:  RIGHT CAROTID: Approximately 15-25% stenosis of the proximal right internal carotid artery due to mixed calcified and fibrofatty atherosclerotic plaque. Fibrofatty atherosclerotic plaque component measures up to 3 mm in maximal thickness. No focal plaque   ulceration. No evidence of dissection.    LEFT CAROTID: Approximately 35-45% stenosis of the proximal left internal carotid artery due to mixed calcified and predominantly fibrofatty atherosclerotic plaque. Fibrofatty plaque component measures up to 3 mm in maximal thickness. No focal plaque   ulceration or evidence of dissection.    VERTEBRAL ARTERIES: No focal stenosis or dissection. Balanced vertebral arteries.    AORTIC ARCH: Classic aortic arch anatomy with no significant  stenosis at the origin of the great vessels.    NONVASCULAR STRUCTURES: Multilevel cervical spondylosis.      Impression    IMPRESSION:     HEAD CTA:   1.  Patent major intracranial arteries. No large vessel occlusion.  2.  Mild multifocal stenoses of the left posterior cerebral artery P2 segment.  3.  Widely patent intracranial anterior circulation and right posterior circulation.    NECK CTA:  1.  Patent major cervical arteries. No large vessel occlusion or evidence of dissection.  2.  Approximately 35-45% stenosis of the proximal left internal carotid artery.  3.  Approximately 15-25% stenosis of the proximal right internal carotid artery.  4.  Widely patent vertebral arteries.    Findings were discussed with Dr. Anglin by myself at 9:15 AM 05/29/2022.     UA with Microscopic reflex to Culture    Specimen: Urine, Clean Catch   Result Value Ref Range    Color Urine Yellow Colorless, Straw, Light Yellow, Yellow    Appearance Urine Clear Clear    Glucose Urine Negative Negative mg/dL    Bilirubin Urine Negative Negative    Ketones Urine 5  (A) Negative mg/dL    Specific Gravity Urine 1.046 (H) 1.003 - 1.035    Blood Urine Negative Negative    pH Urine 6.0 5.0 - 7.0    Protein Albumin Urine Negative Negative mg/dL    Urobilinogen Urine Normal Normal, 2.0 mg/dL    Nitrite Urine Negative Negative    Leukocyte Esterase Urine Negative Negative    Bacteria Urine Few (A) None Seen /HPF    Mucus Urine Present (A) None Seen /LPF    RBC Urine 1 <=2 /HPF    WBC Urine <1 <=5 /HPF    Narrative    Urine Culture not indicated   MR Brain w/o & w Contrast    Narrative    EXAM: MR BRAIN W/O and W CONTRAST  LOCATION: [facility]  DATE/TIME: 5/29/2022 1:29 PM    INDICATION: Stroke, follow up Dizziness, non-specific  COMPARISON: CTA 05/29/2022  CONTRAST: 7 mL Gadavist  TECHNIQUE: Routine multiplanar multisequence head MRI without and with intravenous contrast.    FINDINGS:  INTRACRANIAL CONTENTS: No acute or  subacute infarct. No mass, acute hemorrhage, or extra-axial fluid collections. Scattered nonspecific T2/FLAIR hyperintensities within the cerebral white matter most consistent with mild chronic microvascular ischemic   change. Normal ventricles and sulci. Normal position of the cerebellar tonsils. No pathologic contrast enhancement.    SELLA: No abnormality accounting for technique.    OSSEOUS STRUCTURES/SOFT TISSUES: Normal marrow signal. The major intracranial vascular flow voids are maintained.     ORBITS: No abnormality accounting for technique.     SINUSES/MASTOIDS: Right mucosal thickening involving ethmoid septa. No middle ear or mastoid effusion.       Impression    IMPRESSION:  1.  No acute intracranial process.  2.  Minor presumed microvascular ischemic change.       Medications   iopamidol (ISOVUE-370) solution 70 mL (has no administration in time range)   sodium chloride 0.9 % bag 500mL for CT scan flush use (has no administration in time range)       Assessments & Plan (with Medical Decision Making) records were reviewed.  Stroke code tier 2 was called due to the patient's ataxia.  Patient was sent to CT scan for CT scan of the head and CTA of head and neck.  Patient was discussed with Dr. Ellison with stroke neurology team.  EKG was unremarkable.  CBC and basic metabolic panel unremarkable.  PT PTT within normal limits.  Troponin within normal limits UA with 5 ketones patient given IV fluids.  CT scan of the head with possible small focus of recent ischemic lacunar infarction in the left subthalamic region consider MRI.  No intracranial hemorrhage.  CTA of the head with no large vessel occlusion.  Mild multifocal stenosis of left posterior cerebral artery P2 segment.  Widely patent intracranial anterior circulation and right posterior circulation.  CTA of the neck with patent major cervical vessels.  Approximately 35 to 45% stenosis of the proximal left internal carotid artery and approximately 15 to 25%  stenosis of the proximal right internal carotid artery.  Widely patent vertebral arteries.  Findings were discussed in detail with Dr. Ellison.  She had evaluated the patient via telemedicine and felt that the patient's symptoms did significantly improved and that she did not feel tPA was warranted.  Things were de-escalated and an MRI scan was obtained.  Unfortunately MRI scan was not available for several hours.  MRI scan revealed no acute intracranial process.  There is minor presumed microvascular ischemic change.  I again discussed the case with Dr. Ellison with stroke neurology and she felt that with his presentation and findings that this was more than likely peripheral type vertigo.  He had been given a small dose of Valium and was feeling much better.  He could stand and ambulate without significant ataxia.  I am referring the patient to neurology for further evaluation and care per Dr. Ellison and will have patient return immediately if any further worsening symptoms.  Patient feels comfortable with this plan.     I have reviewed the nursing notes.    I have reviewed the findings, diagnosis, plan and need for follow up with the patient.       New Prescriptions    No medications on file       Final diagnoses:   Dizziness       5/29/2022   [facility] EMERGENCY DEPT     Jonah Anglin MD  05/29/22 [id number]

## 2022-05-29 NOTE — CONSULTS
"[facility]    Stroke Consult Note    Reason for Consult: Stroke Code     Chief Complaint: Dizziness      HPI  Munir Storey is a 56 year old male who presents as a stroke code for feeling of imbalanced dysequilibrium type dizziness. He was normal when he awoke at 5, then around 6 am he noted symptom onset. It was making it difficult for him to walk due to feeling like he was leaning to the left, but it improved significantly, spontaneously. He thinks he is 90% back to normal at the time of examination. It is worst when he stands, closes his eyes, and shakes his head back and forth.    Imaging Findings  CT no hemorrhage or definite early ischemic changes  CTA no LVO    Intravenous Thrombolysis  Not given due to:   - minor/isolated/quickly resolving symptoms    Endovascular Treatment  Not initiated due to absence of proximal vessel occlusion    Impression   Dysequilibrium- etiology indeterminate, cannot exclude cerebral ischemia, alternatives are BPPV, labrynthitis, Meniere's, others    Recommendations  Brain MRI- if positive for stroke, admit for workup. If negative, discharge with symptomatic management.    Patient Follow-up    - final recommendation pending work-up    Thank you for this consult. We will continue to follow.     Linn Ellison MD  Vascular Neurology  To page me or covering stroke neurology team member, click here: AMCOM  Choose \"On Call\" tab at top, then search dropdown box for \"Neurology Adult\", select location, press Enter, then look for stroke/neuro ICU/Telestroke.  ______________________________________________________    Clinically Significant Risk Factors Present on Admission                     Past Medical History   Past Medical History:   Diagnosis Date    Alcohol withdrawal (H) 04/28/2015    Atrial flutter (H)     Depressive disorder     Ejection fraction < 50% 04/2015    Ejection fraction 45% per echo April 2015 (per [facility] records), possible alcohol or " tachycardia induced cardiomyopathy. EF normalized on follow-up echo 2016    SANDRA (generalized anxiety disorder)     H/O atrioventricular quita ablation 12/2015    Hypertension, goal below 140/90     Tobacco abuse      Past Surgical History   Past Surgical History:   Procedure Laterality Date    CARDIAC SURGERY  07/06/2015    EP cardioversion    COLONOSCOPY  08/01/2013    COLONOSCOPY  4/6/2019    normal colonoscopy - repeat 10 years    HERNIA REPAIR      LAPAROSCOPIC RESECTION SMALL BOWEL  08/08/2013    SPINE SURGERY      cervical fusion     UPPER GI ENDOSCOPY  08/01/2013     Medications   Home Meds  Prior to Admission medications    Medication Sig Start Date End Date Taking? Authorizing Provider   amLODIPine (NORVASC) 10 MG tablet Take 1 tablet (10 mg) by mouth daily 11/11/21   Nasreen Martínez PA-C   atorvastatin (LIPITOR) 40 MG tablet Take 1 tablet (40 mg) by mouth daily  Patient not taking: No sig reported 1/6/22   Nasreen Martínez PA-C   famotidine (PEPCID) 20 MG tablet Take 1 tablet (20 mg) by mouth 2 times daily as needed For stomach. 4/29/22   Magalis Morales MD   FLUoxetine (PROZAC) 10 MG capsule Take 1 capsule (10 mg) by mouth daily In addition to 20mg capsule for a total of 30mg daily for anxiety. OK to increase to 20mg daily for a total of 40mg after 2 weeks. 5/23/22   Peggy Eden NP   FLUoxetine (PROZAC) 20 MG capsule Take 1 capsule (20 mg) by mouth daily 5/23/22   Peggy Eden NP   gabapentin (NEURONTIN) 300 MG capsule Take 1 capsule (300 mg) by mouth 2 times daily For anxiety 5/23/22 7/22/22  Peggy Eden NP   hydrOXYzine (ATARAX) 50 MG tablet Take 0.5-1 tablets (25-50 mg) by mouth 3 times daily as needed for anxiety And up to 100mg at bedtime for insomnia. OK to take 50mg overnight if you wake up overnight. 5/9/22   Peggy Eden NP   lisinopril (ZESTRIL) 40 MG tablet Take 1 tablet (40 mg) by mouth daily 2/16/22   Nasreen Martínez PA-C   LORazepam (ATIVAN) 0.5 MG  tablet Take 1 tablet (0.5 mg) by mouth every 6 hours as needed for anxiety 22   Magalis Morales MD   LORazepam (ATIVAN) 0.5 MG tablet Take 0.5-1 tablets (0.25-0.5 mg) by mouth daily as needed for anxiety Use sparingly for panic 22   Peggy Eden, NP   metoprolol succinate ER (TOPROL-XL) 25 MG 24 hr tablet Take 1 tablet (25 mg) by mouth daily Monitor your blood pressure once weekly or if symptomatic. This medication can be increased if needed- let me know (<140/80)  Patient taking differently: Take 12.5 mg by mouth daily Monitor your blood pressure once weekly or if symptomatic. This medication can be increased if needed- let me know (<140/80)  Metoprolol is cut in half. States he is taking 12.5 3/22/22   Roopa Frazier NP   Misc Natural Products (T-RELIEF CBD+13 SL) Place 600 Units under the tongue daily as needed Hemp oil sublingual    Reported, Patient   omeprazole (PRILOSEC) 20 MG DR capsule TAKE 1 CAPSULE (20 MG) BY MOUTH DAILY (FOR HEARTBURN) 22   Nasreen Martínez PA-C   pediatric electrolyte (PEDIALYTE) SOLN solution Take 2.5 mLs by mouth daily  teaspoon    Reported, Patient   busPIRone (BUSPAR) 10 MG tablet Take 1 tablet (10 mg) by mouth 2 times daily 22  Kiara Clark M, APRN CNP       Scheduled Meds      Infusion Meds      PRN Meds      Allergies   Allergies   Allergen Reactions    Sulfamethoxazole-Trimethoprim Nausea     Family History   Family History   Problem Relation Age of Onset    Diabetes Father     Depression Father     Depression Paternal Grandfather      Social History   Social History     Tobacco Use    Smoking status: Former Smoker     Packs/day: 0.50     Years: 20.00     Pack years: 10.00     Types: Cigarettes, Dip, chew, snus or snuff     Quit date: 2019     Years since quittin.7    Smokeless tobacco: Current User     Types: Chew   Vaping Use    Vaping Use: Never used   Substance Use Topics    Alcohol use: No     Comment: hx alcohol abuse, sober  since 2015    Drug use: No       Review of Systems   The 10 point Review of Systems is negative other than noted in the HPI or here.        PHYSICAL EXAMINATION  Pulse:  [65-79] 65  Resp:  [29] 29  BP: (180-194)/() 180/95  SpO2:  [99 %-100 %] 100 %     Neuro Exam  Mental Status:  alert, oriented x 3, follows commands, speech clear and fluent, naming and repetition normal  Cranial Nerves:  visual fields intact (tested by nurse), EOMI with normal smooth pursuit, facial sensation intact and symmetric (tested by nurse), facial movements symmetric, hearing not formally tested but intact to conversation, no dysarthria, shoulder shrug equal bilaterally, tongue protrusion midline  Motor:  no abnormal movements, able to move all limbs antigravity spontaneously with no signs of hemiparesis observed, no pronator drift  Reflexes:  unable to test (telestroke)  Sensory:  light touch sensation intact and symmetric throughout upper and lower extremities (assessed by nurse), no extinction on double simultaneous stimulation (assessed by nurse)  Coordination:  normal finger-to-nose and heel-to-shin bilaterally without dysmetria, rapid alternating movements symmetric  Station/Gait:   cautious, no truncal or axial titubation with sitting or standing unassisted, no visible lateropulsion    Dysphagia Screen  Per Nursing    Stroke Scales    NIHSS  1a. Level of Consciousness 0-->Alert, keenly responsive   1b. LOC Questions 0-->Answers both questions correctly   1c. LOC Commands 0-->Performs both tasks correctly   2.   Best Gaze 0-->Normal   3.   Visual 0-->No visual loss   4.   Facial Palsy 0-->Normal symmetrical movements   5a. Motor Arm, Left 0-->No drift, limb holds 90 (or 45) degrees for full 10 secs   5b. Motor Arm, Right 0-->No drift, limb holds 90 (or 45) degrees for full 10 secs   6a. Motor Leg, Left 0-->No drift, leg holds 30 degree position for full 5 secs   6b. Motor Leg, right 0-->No drift, leg holds 30 degree position for  full 5 secs   7.   Limb Ataxia 0-->Absent   8.   Sensory 0-->Normal, no sensory loss   9.   Best Language 0-->No aphasia, normal   10. Dysarthria 0-->Normal   11. Extinction and Inattention  0-->No abnormality   Total 0 (05/29/22 0946)       Modified Dundee Score (Pre-morbid)  0-No deficits    Imaging  I personally reviewed all imaging; relevant findings per HPI.     Lab Results Data   CBC  Recent Labs   Lab 05/29/22  0847   WBC 8.0   RBC 4.47   HGB 13.9   HCT 42.6        Basic Metabolic Panel    Recent Labs   Lab 05/29/22  0847 05/29/22  0843     --    POTASSIUM 4.2  --    CHLORIDE 110*  --    CO2 26  --    BUN 24  --    CR 0.76  --    * 112*   BERNARDA 9.1  --      Liver Panel  No results for input(s): PROTTOTAL, ALBUMIN, BILITOTAL, ALKPHOS, AST, ALT, BILIDIRECT in the last 168 hours.  INR    Recent Labs   Lab Test 05/29/22  0847   INR 0.97      Lipid Profile    Recent Labs   Lab Test 02/23/22  0745 02/18/21  1034 08/14/19  0744   CHOL 132 210* 170   HDL 47 43 39*   LDL 75 138* 104*   TRIG 49 144 135     A1C    Recent Labs   Lab Test 10/20/17  1124   A1C 5.6     Troponin I    Recent Labs   Lab 05/29/22  0847   TROPONINIS 5          Stroke Code Data Data   Stroke Code Data  (for stroke code with tele)  Stroke code activated 05/29/22   0849   First stroke provider response 05/29/22   0903   Video start time 05/29/22   0929   Video end time 05/29/22   0943   Last known normal 05/29/22   0559   Time of discovery  (or onset of symptoms)  05/29/22   0600   Head CT read by Stroke Neuro Dr/Provider 05/29/22   0905   Was stroke code de-escalated? Yes 05/29/22 0947           Telestroke Service Details  Type of service telemedicine diagnostic assessment of acute neurological changes   Reason telemedicine is appropriate patient requires assessment with a specialist for diagnosis and treatment of neurological symptoms   Mode of transmission secure interactive audio and video communication per Cherry    Originating site (patient location) [facility]    Distant site (provider location) Provider remote site       I have personally spent a total of 62 minutes providing critical care services supervising this patient's stroke code activation.  I personally reviewed all lab values and radiology images.  I evaluated and directed care for the patient's critical condition.

## 2022-05-29 NOTE — ED TRIAGE NOTES
"Pt woke up feeling ok at 0530, 0630 pt felt \"drunk or off balance\". No weakness, no headache. Pt reports dealing with anxiety, he took 0.5mg lorazepam prior to coming.      Triage Assessment     Row Name 05/29/22 0842       Triage Assessment (Adult)    Airway WDL WDL       Cognitive/Neuro/Behavioral WDL    Cognitive/Neuro/Behavioral WDL X    Level of Consciousness alert    Arousal Level opens eyes spontaneously    Orientation oriented x 4    Speech clear    Mood/Behavior cooperative;calm       Pupils (CN II)    Pupil PERRLA yes              "

## 2022-05-29 NOTE — TELEPHONE ENCOUNTER
"Triage Call:     Pt woke up at 5am today feeling nauseated, lightheadedness, and balance is \"off\"  When he moves his head around he feels \"wishy washy\"   He reports that he does not need assistance standing    Pt denies weakness or numbness of one side of his body or chest pain  No recent bleeding  Pt reports being well-hydrated    Pt reports he has a hx of suffering from anxiety, so he took a lorazepam, but that has not helped his sx, so he is thinking it is caused by something else and called FNA    Pt has not had a hx of balance issues. This is new for patient.     Pt is with his wife and she is able to drive patient to be seen.     Disposition: ED. Pt plans to go to the ED via a ride from his wife. Pt was given care advice.     Reason for Disposition    [1] Dizziness is main symptom AND [2] NO spinning sensation (i.e., vertigo)    [1] Dizziness (vertigo) present now AND [2] one or more stroke risk factors (i.e., hypertension, diabetes, prior stroke/TIA, heart attack)  (Exception: prior physician evaluation for this AND no different/worse than usual)    Additional Information    Negative: [1] Weakness (i.e., paralysis, loss of muscle strength) of the face, arm or leg on one side of the body AND [2] sudden onset AND [3] present now    Negative: [1] Numbness (i.e., loss of sensation) of the face, arm or leg on one side of the body AND [2] sudden onset AND [3] present now    Negative: [1] Loss of speech or garbled speech AND [2] sudden onset AND [3] present now    Negative: Sounds like a life-threatening emergency to the triager    Negative: Difficult to awaken or acting confused (e.g., disoriented, slurred speech)    Negative: Followed a head injury    Negative: Followed an ear injury    Negative: Localized weakness or numbness is main symptom    Negative: Dizziness relates to riding in a car, going to an amusement park, etc.    Negative: Severe difficulty breathing (e.g., struggling for each breath, speaks in " "single words)    Negative: [1] Difficulty breathing or swallowing AND [2] started suddenly after medicine, an allergic food or bee sting    Negative: Shock suspected (e.g., cold/pale/clammy skin, too weak to stand, low BP, rapid pulse)    Negative: Difficult to awaken or acting confused (e.g., disoriented, slurred speech)    Negative: [1] Weakness (i.e., paralysis, loss of muscle strength) of the face, arm or leg on one side of the body AND [2] sudden onset AND [3] present now    Negative: [1] Numbness (i.e., loss of sensation) of the face, arm or leg on one side of the body AND [2] sudden onset AND [3] present now    Negative: [1] Loss of speech or garbled speech AND [2] sudden onset AND [3] present now    Negative: Overdose (accidental or intentional) of medications    Negative: [1] Fainted > 15 minutes ago AND [2] still feels too weak or dizzy to stand    Negative: Heart beating < 50 beats per minute OR > 140 beats per minute    Negative: Sounds like a life-threatening emergency to the triager    Negative: Chest pain    Negative: Rectal bleeding, bloody stool, or tarry-black stool    Negative: [1] Vomiting AND [2] contains red blood or black (\"coffee ground\") material    Negative: Vomiting is main symptom    Negative: Diarrhea is main symptom    Negative: Headache is main symptom    Negative: Patient states that he/she is having an anxiety/panic attack    Negative: Dizziness from low blood sugar (i.e., < 60 mg/dl or 3.5 mmol/l)    Negative: Dizziness is described as a spinning sensation (i.e., vertigo)    Negative: Heat exhaustion suspected (i.e., dehydration from heat exposure)    Negative: Difficulty breathing    Negative: SEVERE dizziness (e.g., unable to stand, requires support to walk, feels like passing out now)    Negative: Extra heart beats OR irregular heart beating  (i.e., \"palpitations\")    Negative: [1] Drinking very little AND [2] dehydration suspected (e.g., no urine > 12 hours, very dry mouth, very " lightheaded)    Negative: Patient sounds very sick or weak to the triager    Negative: SEVERE dizziness (vertigo) (e.g., unable to walk without assistance)    Negative: [1] Dizziness caused by heat exposure, sudden standing, or poor fluid intake AND [2] no improvement after 2 hours of rest and fluids    Negative: [1] Fever > 103 F (39.4 C) AND [2] not able to get the fever down using Fever Care Advice    Negative: [1] Fever > 101 F (38.3 C) AND [2] age > 60    Negative: [1] Fever > 100.0 F (37.8 C) AND [2] bedridden (e.g., nursing home patient, CVA, chronic illness, recovering from surgery)    Negative: [1] Fever > 100.0 F (37.8 C) AND [2] diabetes mellitus or weak immune system (e.g., HIV positive, cancer chemo, splenectomy, organ transplant, chronic steroids)    Protocols used: DIZZINESS - VERTIGO-A-, DIZZINESS - [id number]    Leticia Rivera RN  [facility] Nurse Advisor 8:24 AM 5/29/2022

## 2022-05-31 ENCOUNTER — PATIENT OUTREACH (OUTPATIENT)
Dept: CARE COORDINATION | Facility: CLINIC | Age: 57
End: 2022-05-31
Payer: COMMERCIAL

## 2022-05-31 NOTE — PROGRESS NOTES
Clinic Care Coordination Contact    Situation: Patient chart reviewed by care coordinator.    Background: Pt on maintenance with care coordination, no active goals    Assessment: Pt presented to ED 5/29 and was worked up for stroke. Determined to likely be vertigo. Pt was discharge home.    Plan/Recommendations: Pt has follow up with MTM today. Per ED notes, pts neurologist Dr. Ellison was consulted. Pt to continue to follow with neurology.

## 2022-06-02 ENCOUNTER — TELEPHONE (OUTPATIENT)
Dept: FAMILY MEDICINE | Facility: CLINIC | Age: 57
End: 2022-06-02
Payer: COMMERCIAL

## 2022-06-02 NOTE — TELEPHONE ENCOUNTER
Patients BP has been very high. Requesting to speak with nurse. Anxiety increased. Please call patient.

## 2022-06-02 NOTE — TELEPHONE ENCOUNTER
Called patient his BP is currently 174/91. He is reporting that he feels his anxiety is getting worse recently but thinks his increased blood pressures are causing his anxiety to be worse. Denies blurry vision, severe headache, chest pain or shortness of breath.     He would like to be seen for this before the weekend. He is aware that his PCP is not in clinic this week. Assisted with setting up an appointment tomorrow. Patient is also reaching out to his psychiatrist today as well. Nurse advised to call back with questions or concerns and to seek care in the ER for severe or worsening symptoms. Patient verbalized understanding.     Nasreen Montoya RN

## 2022-06-03 ENCOUNTER — OFFICE VISIT (OUTPATIENT)
Dept: FAMILY MEDICINE | Facility: CLINIC | Age: 57
End: 2022-06-03
Payer: COMMERCIAL

## 2022-06-03 VITALS
BODY MASS INDEX: 25.8 KG/M2 | OXYGEN SATURATION: 100 % | SYSTOLIC BLOOD PRESSURE: 142 MMHG | TEMPERATURE: 97.8 F | HEART RATE: 64 BPM | DIASTOLIC BLOOD PRESSURE: 82 MMHG | WEIGHT: 167.2 LBS | RESPIRATION RATE: 14 BRPM

## 2022-06-03 DIAGNOSIS — I10 HYPERTENSION, GOAL BELOW 140/90: ICD-10-CM

## 2022-06-03 DIAGNOSIS — F41.0 PANIC DISORDER WITHOUT AGORAPHOBIA: ICD-10-CM

## 2022-06-03 DIAGNOSIS — F41.1 GAD (GENERALIZED ANXIETY DISORDER): Primary | ICD-10-CM

## 2022-06-03 PROCEDURE — 99215 OFFICE O/P EST HI 40 MIN: CPT | Performed by: PHYSICIAN ASSISTANT

## 2022-06-03 PROCEDURE — 96127 BRIEF EMOTIONAL/BEHAV ASSMT: CPT | Mod: 59 | Performed by: PHYSICIAN ASSISTANT

## 2022-06-03 RX ORDER — HYDROCHLOROTHIAZIDE 12.5 MG/1
25 TABLET ORAL DAILY
COMMUNITY
Start: 2022-06-03 | End: 2022-08-10

## 2022-06-03 ASSESSMENT — PATIENT HEALTH QUESTIONNAIRE - PHQ9
5. POOR APPETITE OR OVEREATING: SEVERAL DAYS
SUM OF ALL RESPONSES TO PHQ QUESTIONS 1-9: 7

## 2022-06-03 ASSESSMENT — ANXIETY QUESTIONNAIRES
5. BEING SO RESTLESS THAT IT IS HARD TO SIT STILL: SEVERAL DAYS
6. BECOMING EASILY ANNOYED OR IRRITABLE: SEVERAL DAYS
7. FEELING AFRAID AS IF SOMETHING AWFUL MIGHT HAPPEN: MORE THAN HALF THE DAYS
GAD7 TOTAL SCORE: 10
GAD7 TOTAL SCORE: 10
2. NOT BEING ABLE TO STOP OR CONTROL WORRYING: MORE THAN HALF THE DAYS
IF YOU CHECKED OFF ANY PROBLEMS ON THIS QUESTIONNAIRE, HOW DIFFICULT HAVE THESE PROBLEMS MADE IT FOR YOU TO DO YOUR WORK, TAKE CARE OF THINGS AT HOME, OR GET ALONG WITH OTHER PEOPLE: SOMEWHAT DIFFICULT
1. FEELING NERVOUS, ANXIOUS, OR ON EDGE: MORE THAN HALF THE DAYS
3. WORRYING TOO MUCH ABOUT DIFFERENT THINGS: SEVERAL DAYS

## 2022-06-03 ASSESSMENT — PAIN SCALES - GENERAL: PAINLEVEL: NO PAIN (0)

## 2022-06-03 NOTE — PATIENT INSTRUCTIONS
Shirley Amaral,    Thank you for allowing [facility] to manage your care.    Continue the 40mg daily of fluoxetine. Add hydrochlorothiazide 12.5 mg daily.  See myself or Dr. Morales in 1 month for a recheck of your symptoms, blood work and and blood pressure.  Give yourself a break from checking your blood pressure for the next 2 weeks and then begin checking it once daily and record it. If your blood pressure is greater than or equal to 140/90mm Hg more than half of the time, please contact us.    Drink 8-10 glasses of fluid daily to stay well-hydrated.    Try Gr[employer]s coffee pouches (or similar coffee brand) mixed with or instead of tobacco pouches. Get these on [employer] or at [employer]    If you have any questions or concerns, please feel free to call us at [phone]    Sincerely,    Johnny Cordova PA-C    Did you know?      You can schedule a video visit for follow-up appointments as well as future appointments for certain conditions.  Please see the below link.     [email or web address]    If you have not already done so,  I encourage you to sign up for [employer] ([email or web address]).  This will allow you to review your results, securely communicate with a provider, and schedule virtual visits as well.

## 2022-06-03 NOTE — PROGRESS NOTES
Assessment & Plan   Problem List Items Addressed This Visit        Circulatory    Hypertension, goal below 140/90 (Chronic)       Behavioral    SANDRA (generalized anxiety disorder) - Primary (Chronic)    Panic disorder without agoraphobia         56-year-old who has had worsening of his anxiety lately.  He was increased to 40 mg of fluoxetine yesterday.  Checking his blood pressure makes him anxious and he has had hypertensive readings frequently at home recently and he is hypertensive today.  He was on 12.5 mg of HCTZ previously and he will restart this today.  He will begin checking his blood pressure in 2 weeks and report to us if it is greater than 140/90 mmHg.  We discussed tobacco cessation and he will try to use coffee packets instead of chewing tobacco.  He understands that this may not only reduce his anxiety but also help with his blood pressure.  He contracts for safety verbally no follow-up with myself or Dr. Morales in 1 month for recheck.    Complete history and physical exam as below. AF with normal VS except for elevated bp, which they will monitor at home and contact us if >140/90mmHg greater than 50% of the time.    DDx and Dx discussed with and explained to the pt to their satisfaction.  All questions were answered at this time. Pt expressed understanding of and agreement with this dx, tx, and plan. No further workup warranted and standard medication warnings given. I have given the patient a list of pertinent indications for re-evaluation. Will go to the Emergency Department if symptoms worsen or new concerning symptoms arise. Patient left in no apparent distress.     43 minutes spent on the date of the encounter doing chart review, history and exam, documentation and further activities per the note     See Patient Instructions    Return in about 1 month (around 7/3/2022) for a recheck of your symptoms or sooner as needed.    KENNETH Elder  [facility] Arthur    Munir is a 56 year old who presents for the following health issues     HPI     Home BP machine BP was 169/96  Hypertension Follow-up      Do you check your blood pressure regularly outside of the clinic? Yes     Are you following a low salt diet? Yes    Are your blood pressures ever more than 140 on the top number (systolic) OR more   than 90 on the bottom number (diastolic), for example 140/90? Yes    Anxiety Follow-Up    How are you doing with your anxiety since your last visit? Worsened     Are you having other symptoms that might be associated with anxiety? Yes:  high BP, wife leaving for a conference    Have you had a significant life event? No     Are you feeling depressed? Yes:  increased lately    Do you have any concerns with your use of alcohol or other drugs? No  Started 40mg fluoxetine two days ago.      Social History     Tobacco Use     Smoking status: Former Smoker     Packs/day: 0.50     Years: 20.00     Pack years: 10.00     Types: Cigarettes, Dip, chew, snus or snuff     Quit date: 2019     Years since quittin.7     Smokeless tobacco: Current User     Types: Chew   Vaping Use     Vaping Use: Never used   Substance Use Topics     Alcohol use: No     Comment: hx alcohol abuse, sober since      Drug use: No     SANDRA-7 SCORE 2022 2022 6/3/2022   Total Score 15 (severe anxiety) 7 (mild anxiety) -   Total Score 15 7 10   Some encounter information is confidential and restricted. Go to Review [employer] activity to see all data.     PHQ 2022 2022 6/3/2022   PHQ-9 Total Score 16 3 7   Q9: Thoughts of better off dead/self-harm past 2 weeks Several days Not at all Not at all   F/U: Thoughts of suicide or self-harm Yes - -   F/U: Self harm-plan No - -   F/U: Self-harm action No - -   F/U: Safety concerns Yes - -   Some encounter information is confidential and restricted. Go to [employer] activity to see all data.     Last PHQ-9 6/3/2022   1.  Little interest or pleasure  in doing things 1   2.  Feeling down, depressed, or hopeless 2   3.  Trouble falling or staying asleep, or sleeping too much 0   4.  Feeling tired or having little energy 1   5.  Poor appetite or overeating 1   6.  Feeling bad about yourself 1   7.  Trouble concentrating 1   8.  Moving slowly or restless 0   Q9: Thoughts of better off dead/self-harm past 2 weeks 0   PHQ-9 Total Score 7   Difficulty at work, home, or with people Somewhat difficult   In the past two weeks have you had thoughts of suicide or self harm? -   Do you have concerns about your personal safety or the safety of others? -   In the past 2 weeks have you thought about a plan or had intention to harm yourself? -   In the past 2 weeks have you acted on these thoughts in any way? -   Some encounter information is confidential and restricted. Go to [employer] activity to see all data.     SANDRA-7  6/3/2022   1. Feeling nervous, anxious, or on edge 2   2. Not being able to stop or control worrying 2   3. Worrying too much about different things 1   4. Trouble relaxing 1   5. Being so restless that it is hard to sit still 1   6. Becoming easily annoyed or irritable 1   7. Feeling afraid, as if something awful might happen 2   SANDRA-7 Total Score 10   If you checked any problems, how difficult have they made it for you to do your work, take care of things at home, or get along with other people? Somewhat difficult   Some encounter information is confidential and restricted. Go to [employer] activity to see all data.     Review of Systems   Constitutional, HEENT, cardiovascular, pulmonary, gi and gu systems are negative, except as otherwise noted.      Objective    BP (!) 142/82   Pulse 64   Temp 97.8  F (36.6  C) (Tympanic)   Resp 14   Wt 75.8 kg (167 lb 3.2 oz)   SpO2 100%   BMI 25.80 kg/m    Body mass index is 25.8 kg/m .  Physical Exam  Vitals and nursing note reviewed.   Constitutional:       General: He is not in acute distress.      Appearance: He is not ill-appearing or diaphoretic.   HENT:      Head: Normocephalic and atraumatic.      Mouth/Throat:      Mouth: Mucous membranes are moist.   Eyes:      Conjunctiva/sclera: Conjunctivae normal.   Cardiovascular:      Rate and Rhythm: Normal rate and regular rhythm.      Heart sounds: Normal heart sounds. No murmur heard.    No friction rub. No gallop.   Pulmonary:      Effort: Pulmonary effort is normal. No respiratory distress.      Breath sounds: Normal breath sounds. No stridor. No wheezing, rhonchi or rales.   Skin:     General: Skin is warm and dry.   Neurological:      General: No focal deficit present.      Mental Status: He is alert. Mental status is at baseline.   Psychiatric:         Mood and Affect: Mood normal.         Behavior: Behavior normal.

## 2022-06-04 ENCOUNTER — NURSE TRIAGE (OUTPATIENT)
Dept: FAMILY MEDICINE | Facility: CLINIC | Age: 57
End: 2022-06-04
Payer: COMMERCIAL

## 2022-06-04 ENCOUNTER — TELEPHONE (OUTPATIENT)
Dept: NURSING | Facility: CLINIC | Age: 57
End: 2022-06-04
Payer: COMMERCIAL

## 2022-06-04 DIAGNOSIS — F41.1 GAD (GENERALIZED ANXIETY DISORDER): ICD-10-CM

## 2022-06-04 NOTE — TELEPHONE ENCOUNTER
Clinic Action Needed:Yes, please call patient [phone] (home)     Reason for Call:  Patient is requesting refill of Ativan as soon as possible.   Stating Dr Velásquez's prescribed on 5/27/22, 10 tablets.   Patient reporting pharmacy did not have prescription.    Patient reporting he has 1 tablet remaining from previous prescription.     Last office visit 6/3/22.    LORazepam (ATIVAN) 0.5 MG tablet (Discontinued) 10 tablet 0 5/27/2022 6/3/2022 --   Sig - Route: Take 1 tablet (0.5 mg) by mouth every 6 hours as needed for anxiety - Oral   Patient not taking: Reported on 6/3/2022        Sent to pharmacy as: LORazepam 0.5 MG Oral Tablet (ATIVAN)   Class: E-Prescribe   Reason for Discontinue: Alternate therapy       Message routed to Care Team/PCP.    Silke Elias RN  [facility]        Reason for Disposition    [1] Caller requesting a NON-URGENT new prescription or refill AND [2] triager unable to refill per unit policy    Additional Information    Negative: Drug overdose and triager unable to answer question    Negative: Caller requesting information unrelated to medicine    Negative: Caller requesting a prescription for Strep throat and has a positive culture result    Negative: Rash while taking a medication or within 3 days of stopping it    Negative: Immunization reaction suspected    Negative: [1] Asthma and [2] having symptoms of asthma (cough, wheezing, etc.)    Negative: [1] Influenza symptoms AND [2] anti-viral med prescription request, such as Tamiflu    Negative: [1] Symptom of illness (e.g., headache, abdominal pain, earache, vomiting) AND [2] more than mild    Negative: MORE THAN A DOUBLE DOSE of a prescription or over-the-counter (OTC) drug    Negative: [1] DOUBLE DOSE (an extra dose or lesser amount) of over-the-counter (OTC) drug AND [2] any symptoms (e.g., dizziness, nausea, pain, sleepiness)    Negative: [1] DOUBLE DOSE (an extra dose or lesser amount) of prescription drug AND [2] any  "symptoms (e.g., dizziness, nausea, pain, sleepiness)    Negative: Took another person's prescription drug    Negative: [1] DOUBLE DOSE (an extra dose or lesser amount) of prescription drug AND [2] NO symptoms (Exception: a double dose of antibiotics)    Negative: Diabetes drug error or overdose (e.g., took wrong type of insulin or took extra dose)    Negative: [1] Request for URGENT new prescription or refill of \"essential\" medication (i.e., likelihood of harm to patient if not taken) AND [2] triager unable to fill per unit policy    Negative: [1] Prescription not at pharmacy AND [2] was prescribed by PCP recently    Negative: [1] Pharmacy calling with prescription questions AND [2] triager unable to answer question    Negative: [1] Caller has URGENT medication question about med that PCP or specialist prescribed AND [2] triager unable to answer question    Negative: [1] Caller has NON-URGENT medication question about med that PCP prescribed AND [2] triager unable to answer question    Protocols used: MEDICATION QUESTION CALL-A-AH      "

## 2022-06-06 RX ORDER — LORAZEPAM 0.5 MG/1
.25-.5 TABLET ORAL DAILY PRN
Qty: 10 TABLET | Refills: 0 | Status: SHIPPED | OUTPATIENT
Start: 2022-06-06 | End: 2022-06-13

## 2022-06-13 ENCOUNTER — VIRTUAL VISIT (OUTPATIENT)
Dept: BEHAVIORAL HEALTH | Facility: CLINIC | Age: 57
End: 2022-06-13
Attending: PSYCHIATRY & NEUROLOGY
Payer: COMMERCIAL

## 2022-06-13 DIAGNOSIS — F17.220 TOBACCO DEPENDENCE DUE TO CHEWING TOBACCO: ICD-10-CM

## 2022-06-13 DIAGNOSIS — F41.1 GAD (GENERALIZED ANXIETY DISORDER): Primary | ICD-10-CM

## 2022-06-13 DIAGNOSIS — F32.0 CURRENT MILD EPISODE OF MAJOR DEPRESSIVE DISORDER WITHOUT PRIOR EPISODE (H): ICD-10-CM

## 2022-06-13 DIAGNOSIS — F41.0 PANIC DISORDER WITHOUT AGORAPHOBIA: ICD-10-CM

## 2022-06-13 PROCEDURE — 99205 OFFICE O/P NEW HI 60 MIN: CPT | Mod: 95 | Performed by: NURSE PRACTITIONER

## 2022-06-13 RX ORDER — LORAZEPAM 0.5 MG/1
.25-.5 TABLET ORAL DAILY PRN
Qty: 15 TABLET | Refills: 0 | Status: SHIPPED | OUTPATIENT
Start: 2022-06-13 | End: 2022-07-01

## 2022-06-13 RX ORDER — GABAPENTIN 300 MG/1
600 CAPSULE ORAL DAILY
Qty: 60 CAPSULE | Refills: 1 | COMMUNITY
Start: 2022-06-13 | End: 2022-07-01

## 2022-06-13 ASSESSMENT — ANXIETY QUESTIONNAIRES
1. FEELING NERVOUS, ANXIOUS, OR ON EDGE: NEARLY EVERY DAY
2. NOT BEING ABLE TO STOP OR CONTROL WORRYING: NEARLY EVERY DAY
8. IF YOU CHECKED OFF ANY PROBLEMS, HOW DIFFICULT HAVE THESE MADE IT FOR YOU TO DO YOUR WORK, TAKE CARE OF THINGS AT HOME, OR GET ALONG WITH OTHER PEOPLE?: SOMEWHAT DIFFICULT
4. TROUBLE RELAXING: SEVERAL DAYS
7. FEELING AFRAID AS IF SOMETHING AWFUL MIGHT HAPPEN: NEARLY EVERY DAY
3. WORRYING TOO MUCH ABOUT DIFFERENT THINGS: NEARLY EVERY DAY
6. BECOMING EASILY ANNOYED OR IRRITABLE: SEVERAL DAYS
GAD7 TOTAL SCORE: 15
GAD7 TOTAL SCORE: 15
7. FEELING AFRAID AS IF SOMETHING AWFUL MIGHT HAPPEN: NEARLY EVERY DAY
GAD7 TOTAL SCORE: 15
5. BEING SO RESTLESS THAT IT IS HARD TO SIT STILL: SEVERAL DAYS

## 2022-06-13 ASSESSMENT — PATIENT HEALTH QUESTIONNAIRE - PHQ9
10. IF YOU CHECKED OFF ANY PROBLEMS, HOW DIFFICULT HAVE THESE PROBLEMS MADE IT FOR YOU TO DO YOUR WORK, TAKE CARE OF THINGS AT HOME, OR GET ALONG WITH OTHER PEOPLE: SOMEWHAT DIFFICULT
SUM OF ALL RESPONSES TO PHQ QUESTIONS 1-9: 7
SUM OF ALL RESPONSES TO PHQ QUESTIONS 1-9: 7

## 2022-06-13 NOTE — PATIENT INSTRUCTIONS
Start:  Gabapentin/Neurontin 600 mg (2 caps) daily    Nicotine 2 mg gum, 1 piece every hour as needed for tobacco dependence    Continue:  Fluoxetine/Prozac 40 mg daily     Hydroxyzine/Atarax:  -25 to 50 mg (1/2 to 1 tab) up to 3x per day as needed for anxiety  -100 mg (2 tabs) at bedtime for sleep  -50 mg (1 tab) if waking up in the middle of the night    Lorazepam .25 to .5 mg (1/2 to 1 tab) per day as needed for anxiety;   #15

## 2022-06-13 NOTE — PROGRESS NOTES
"MH&A TC   NURSING Post-Appointment Chart-check:    Correct pharmacy verified with patient and updated in chart? [x] yes []no    Charge captured ? [] yes  [x] no    Medications ordered this visit were e-scribed.  Verified by order class [x] yes  [] no    List Medications:Lorazepam 0.5mg, nicorette gum  Medication changes or discontinuations were communicated to patient's pharmacy: [] yes  [x] no    UA collected [] yes  [] no  [x] n/a-virtual     Future appointment was made: [] yes  [x] no  [] n/a    Dictation completed at time of chart check: [x] yes  [] no    I have checked the documentation for today s encounters and the above information has been reviewed and completed.      Trinidad Tracy, [place] on June 13, 2022 at 1:19 PM        This video/telephone visit will be conducted virtually between you and your provider. We have found that certain health care needs can be provided without the need for an in-person physical exam. This service lets us provide the care you need with a video /telephone conversation. If a prescription is necessary we can send it directly to your pharmacy. If lab work is needed we can place an order for that and you can then stop by our lab to have the test done at a later time.\"   Just as we bill insurance for in-person visits, we also bill insurance for video/telephone visits. If you have questions about your insurance coverage, we recommend that you speak with your insurance company.      Patient/Parent has given verbal consent for video/Telephone visit? Yes    Patient would like the video visit invitation sent by:.Williams, if unable to connect call [phone]        Patient verified allergies, medications and pharmacy via phone. Patient states they are ready for visit.              [facility] ([facility]):     Provides Patient Support While Waiting to Access Programmatic and Outpatient MH&A Care and Provides Select Crisis Assessment Services " "    INTAKE  ____________________________________________________    \"The [facility] is a temporary psychiatry service that helps to bridge the time to your next appointment. It is not intended to be a long-term service and you are expected to attend your scheduled appointment with your next provider.\"  [x] Patient/Parent verbalized understanding    If you need support between appointments, please call [phone] and let them know you're seen by [facility]. You may also send a [employer] message to reach us.    General-     Most pressing MH needs at this time:  Anxiety, panic attacks getting worse, has some depression, refill Ativan    Any physical health conditions or diagnoses we should be aware of or that are impacting you: Anxiety worsening, panic attacks, refill lorazepam        Medications-     Injectable medications currently prescribed: No   If yes, do you need an appointment for the next injection:     Any Controlled Substances that you are prescribed:  Lorazepam 0.5mg PRN      Primary care provider: Magalis Morales MD        SANDRA-7 scores:    SANDRA-7 SCORE 6/3/2022 6/13/2022   Total Score - 15 (severe anxiety)   Total Score 10 15       PHQ-9 scores:   PHQ-9 SCORE 6/3/2022 6/13/2022   PHQ-9 Total Score MyChart - 7 (Mild depression)   PHQ-9 Total Score 7 7           Anything the provider should be aware of for today's appointment: No    New (awaiting) Mental health provider or next programming: Yes,will return to primary care provider    Date of scheduled apt: DAVY Tracy CMA on June 13, 2022 at 7:53 AM     "

## 2022-06-30 RX ORDER — HYDROXYZINE HYDROCHLORIDE 50 MG/1
25-50 TABLET, FILM COATED ORAL 3 TIMES DAILY PRN
Qty: 60 TABLET | Refills: 0 | Status: CANCELLED | OUTPATIENT
Start: 2022-06-30

## 2022-06-30 NOTE — PROGRESS NOTES
" This video/telephone visit will be conducted virtually between you and your provider. We have found that certain health care needs can be provided without the need for an in-person physical exam. This service lets us provide the care you need with a video /telephone conversation. If a prescription is necessary we can send it directly to your pharmacy. If lab work is needed we can place an order for that and you can then stop by our lab to have the test done at a later time.\"   Just as we bill insurance for in-person visits, we also bill insurance for video/telephone visits. If you have questions about your insurance coverage, we recommend that you speak with your insurance company.      Patient/Parent has given verbal consent for video/Telephone visit? yes    Patient would like the video visit invitation sent by: Williams, if unable to connect call [phone]    Patient verified allergies, medications and pharmacy via phone. Patient states they are ready for visit.     [facility]iction [facility] ([facility]):     Provides Patient Support While Waiting to Access Programmatic and Outpatient MH&A Care and Provides Select Crisis Assessment Services     FOLLOW-UP VISIT  ____________________________________________      \"The [facility] is a temporary psychiatry service that helps to bridge the time to your next appointment. It is not intended to be a long-term service and you are expected to attend your scheduled appointment with your next provider.\"  [x] Patient/Parent verbalized understanding    If you need support between appointments, please call [phone] and let them know you're seen by [facility]. You may also send a [employer] message to reach us.    General-     Most pressing needs at this time: Still struggling with anxiety     Mental Health currently: as good as he expected to be    Any changes to your physical health: none reported         Medications-     Injectable " medications currently prescribed: None    If yes, do you need an appointment for the next injection: NA    Any Controlled Substances that you are prescribed: Gabapentin and Ativan    Any refills you are aware that you'll need soon: Ativan and Prozac - pended for provider       Primary care provider: Magalis Morales MD        SANDRA-7 scores:  13  SANDRA-7 SCORE 6/3/2022 6/13/2022   Total Score - 15 (severe anxiety)   Total Score 10 15       PHQ-9 scores: 5  PHQ-9 SCORE 6/3/2022 6/13/2022   PHQ-9 Total Score MyChart - 7 (Mild depression)   PHQ-9 Total Score 7 7         Anything the provider should be aware of for today's appointment: Pt said he quit taking Buspar    New (awaiting) Mental health provider or next programming: pt Kendra Eden    Date of scheduled apt: DAVY Singh on June 30, 2022 at 4:16 PM     .MH&[facility] TC   NURSING Post-Appointment Chart-check:    Correct pharmacy verified with patient and updated in chart? [x] yes []no    Charge captured ? [] yes  [x] no    Medications ordered this visit were e-scribed.  Verified by order class [x] yes  [] no    List Medications:  LORazepam (ATIVAN) 0.5 MG tablet  FLUoxetine (PROZAC) 20 & 40 MG capsule    Medication changes or discontinuations were communicated to patient's pharmacy: [] yes  [x] no    UA collected [] yes  [] no  [x] n/a-virtual     Future appointment was made: [] yes  [x] no  [] n/a    Dictation completed at time of chart check: [] yes  [x] no    I have checked the documentation for today s encounters and the above information has been reviewed and completed.      Katlyn Singh on July 1, 2022 at 9:38 AM

## 2022-07-01 ENCOUNTER — OFFICE VISIT (OUTPATIENT)
Dept: FAMILY MEDICINE | Facility: CLINIC | Age: 57
End: 2022-07-01
Payer: COMMERCIAL

## 2022-07-01 ENCOUNTER — PATIENT OUTREACH (OUTPATIENT)
Dept: CARE COORDINATION | Facility: CLINIC | Age: 57
End: 2022-07-01

## 2022-07-01 ENCOUNTER — VIRTUAL VISIT (OUTPATIENT)
Dept: BEHAVIORAL HEALTH | Facility: CLINIC | Age: 57
End: 2022-07-01
Attending: PSYCHIATRY & NEUROLOGY
Payer: COMMERCIAL

## 2022-07-01 VITALS
BODY MASS INDEX: 25.43 KG/M2 | HEART RATE: 60 BPM | TEMPERATURE: 97.9 F | OXYGEN SATURATION: 99 % | DIASTOLIC BLOOD PRESSURE: 74 MMHG | SYSTOLIC BLOOD PRESSURE: 150 MMHG | RESPIRATION RATE: 14 BRPM | WEIGHT: 164.8 LBS

## 2022-07-01 DIAGNOSIS — M54.2 CHRONIC NECK PAIN: ICD-10-CM

## 2022-07-01 DIAGNOSIS — G89.29 CHRONIC NECK PAIN: ICD-10-CM

## 2022-07-01 DIAGNOSIS — I10 HYPERTENSION, GOAL BELOW 140/90: Primary | ICD-10-CM

## 2022-07-01 DIAGNOSIS — F17.220 TOBACCO DEPENDENCE DUE TO CHEWING TOBACCO: ICD-10-CM

## 2022-07-01 DIAGNOSIS — F41.1 GAD (GENERALIZED ANXIETY DISORDER): Primary | ICD-10-CM

## 2022-07-01 DIAGNOSIS — F32.0 CURRENT MILD EPISODE OF MAJOR DEPRESSIVE DISORDER WITHOUT PRIOR EPISODE (H): ICD-10-CM

## 2022-07-01 DIAGNOSIS — K92.1 BLACK STOOL: ICD-10-CM

## 2022-07-01 DIAGNOSIS — F41.0 PANIC DISORDER WITHOUT AGORAPHOBIA: ICD-10-CM

## 2022-07-01 LAB
ANION GAP SERPL CALCULATED.3IONS-SCNC: 5 MMOL/L (ref 3–14)
BUN SERPL-MCNC: 18 MG/DL (ref 7–30)
CALCIUM SERPL-MCNC: 9.3 MG/DL (ref 8.5–10.1)
CHLORIDE BLD-SCNC: 102 MMOL/L (ref 94–109)
CO2 SERPL-SCNC: 28 MMOL/L (ref 20–32)
CREAT SERPL-MCNC: 0.72 MG/DL (ref 0.66–1.25)
ERYTHROCYTE [DISTWIDTH] IN BLOOD BY AUTOMATED COUNT: 12.5 % (ref 10–15)
GFR SERPL CREATININE-BSD FRML MDRD: >90 ML/MIN/1.73M2
GLUCOSE BLD-MCNC: 102 MG/DL (ref 70–99)
HCT VFR BLD AUTO: 37.9 % (ref 40–53)
HGB BLD-MCNC: 12.7 G/DL (ref 13.3–17.7)
MCH RBC QN AUTO: 31.6 PG (ref 26.5–33)
MCHC RBC AUTO-ENTMCNC: 33.5 G/DL (ref 31.5–36.5)
MCV RBC AUTO: 94 FL (ref 78–100)
PLATELET # BLD AUTO: 205 10E3/UL (ref 150–450)
POTASSIUM BLD-SCNC: 4 MMOL/L (ref 3.4–5.3)
RBC # BLD AUTO: 4.02 10E6/UL (ref 4.4–5.9)
SODIUM SERPL-SCNC: 135 MMOL/L (ref 133–144)
WBC # BLD AUTO: 5.3 10E3/UL (ref 4–11)

## 2022-07-01 PROCEDURE — 36415 COLL VENOUS BLD VENIPUNCTURE: CPT | Performed by: PHYSICIAN ASSISTANT

## 2022-07-01 PROCEDURE — 99215 OFFICE O/P EST HI 40 MIN: CPT | Mod: 95 | Performed by: NURSE PRACTITIONER

## 2022-07-01 PROCEDURE — 80048 BASIC METABOLIC PNL TOTAL CA: CPT | Performed by: PHYSICIAN ASSISTANT

## 2022-07-01 PROCEDURE — 99215 OFFICE O/P EST HI 40 MIN: CPT | Performed by: PHYSICIAN ASSISTANT

## 2022-07-01 PROCEDURE — 85027 COMPLETE CBC AUTOMATED: CPT | Performed by: PHYSICIAN ASSISTANT

## 2022-07-01 PROCEDURE — 96127 BRIEF EMOTIONAL/BEHAV ASSMT: CPT | Performed by: PHYSICIAN ASSISTANT

## 2022-07-01 RX ORDER — LORAZEPAM 0.5 MG/1
.25-.5 TABLET ORAL DAILY PRN
Qty: 20 TABLET | Refills: 0 | Status: SHIPPED | OUTPATIENT
Start: 2022-07-01 | End: 2022-07-22 | Stop reason: DRUGHIGH

## 2022-07-01 RX ORDER — FLUOXETINE 40 MG/1
40 CAPSULE ORAL DAILY
Qty: 30 CAPSULE | Refills: 1 | Status: SHIPPED | OUTPATIENT
Start: 2022-07-01 | End: 2022-08-16

## 2022-07-01 RX ORDER — GABAPENTIN 300 MG/1
300 CAPSULE ORAL 2 TIMES DAILY
Qty: 60 CAPSULE | Refills: 1 | COMMUNITY
Start: 2022-07-01 | End: 2022-08-16

## 2022-07-01 RX ORDER — FLUOXETINE 40 MG/1
40 CAPSULE ORAL DAILY
Status: CANCELLED | OUTPATIENT
Start: 2022-07-01

## 2022-07-01 ASSESSMENT — ANXIETY QUESTIONNAIRES
1. FEELING NERVOUS, ANXIOUS, OR ON EDGE: NEARLY EVERY DAY
GAD7 TOTAL SCORE: 15
6. BECOMING EASILY ANNOYED OR IRRITABLE: SEVERAL DAYS
4. TROUBLE RELAXING: NOT AT ALL
2. NOT BEING ABLE TO STOP OR CONTROL WORRYING: NEARLY EVERY DAY
3. WORRYING TOO MUCH ABOUT DIFFERENT THINGS: NEARLY EVERY DAY
5. BEING SO RESTLESS THAT IT IS HARD TO SIT STILL: NOT AT ALL
GAD7 TOTAL SCORE: 13
GAD7 TOTAL SCORE: 13
IF YOU CHECKED OFF ANY PROBLEMS ON THIS QUESTIONNAIRE, HOW DIFFICULT HAVE THESE PROBLEMS MADE IT FOR YOU TO DO YOUR WORK, TAKE CARE OF THINGS AT HOME, OR GET ALONG WITH OTHER PEOPLE: SOMEWHAT DIFFICULT
7. FEELING AFRAID AS IF SOMETHING AWFUL MIGHT HAPPEN: NEARLY EVERY DAY
6. BECOMING EASILY ANNOYED OR IRRITABLE: SEVERAL DAYS
7. FEELING AFRAID AS IF SOMETHING AWFUL MIGHT HAPPEN: NEARLY EVERY DAY
3. WORRYING TOO MUCH ABOUT DIFFERENT THINGS: NEARLY EVERY DAY
2. NOT BEING ABLE TO STOP OR CONTROL WORRYING: NEARLY EVERY DAY
8. IF YOU CHECKED OFF ANY PROBLEMS, HOW DIFFICULT HAVE THESE MADE IT FOR YOU TO DO YOUR WORK, TAKE CARE OF THINGS AT HOME, OR GET ALONG WITH OTHER PEOPLE?: SOMEWHAT DIFFICULT
5. BEING SO RESTLESS THAT IT IS HARD TO SIT STILL: SEVERAL DAYS
GAD7 TOTAL SCORE: 15
1. FEELING NERVOUS, ANXIOUS, OR ON EDGE: NEARLY EVERY DAY
7. FEELING AFRAID AS IF SOMETHING AWFUL MIGHT HAPPEN: NEARLY EVERY DAY
GAD7 TOTAL SCORE: 13

## 2022-07-01 ASSESSMENT — PATIENT HEALTH QUESTIONNAIRE - PHQ9
SUM OF ALL RESPONSES TO PHQ QUESTIONS 1-9: 5
5. POOR APPETITE OR OVEREATING: SEVERAL DAYS

## 2022-07-01 ASSESSMENT — PAIN SCALES - GENERAL: PAINLEVEL: MILD PAIN (2)

## 2022-07-01 NOTE — PATIENT INSTRUCTIONS
Start:  Fluoxetine/Prozac 40 mg + 20 mg daily; TDD = 60 mg    Gabapentin 300 mg twice daily; TDD = 600 mg    Continue:  Hydroxyzine/Atarax:  -25 to 50 mg (1/2 to 1 tab) up to 3x per day as needed for anxiety  -100 mg (2 tabs) at bedtime for sleep  -50 mg (1 tab) if waking up in the middle of the night     Lorazepam .25 to .5 mg (1/2 to 1 tab) per day as needed for anxiety; #20

## 2022-07-01 NOTE — PROGRESS NOTES
Assessment & Plan   Problem List Items Addressed This Visit        Circulatory    Hypertension, goal below 140/90 - Primary (Chronic)    Relevant Orders    Basic metabolic panel  (Ca, Cl, CO2, Creat, Gluc, K, Na, BUN)      Other Visit Diagnoses     Chronic neck pain        Relevant Orders    Spine Referral    Black stool        Relevant Orders    CBC with platelets (Completed)         Ongoing anxiety/depression now being managed by psychiatry with an increase in fluoxetine made today. Denies self harm and verbally contracts for safety. BP elevated today and he is tolerating hydrochlorothiazide well. Will increase to 25mg daily. Chem pending. CBC shows a drop in Hgb, but he denies current melena and hematochezia. Low suspicion for GI bleed. Continues to chew tobacco. Will refer to spine for chronic neck pain. S/p cervical spine surgery years ago. No weakness in UEs or other red flag symptoms. Follow up in one month for a recheck.    Complete history and physical exam as below. AF with normal VS except for elevated bp, which they will monitor at home and contact us if >140/90mmHg greater than 50% of the time.    DDx and Dx discussed with and explained to the pt to their satisfaction.  All questions were answered at this time. Pt expressed understanding of and agreement with this dx, tx, and plan. No further workup warranted and standard medication warnings given. I have given the patient a list of pertinent indications for re-evaluation. Will go to the Emergency Department if symptoms worsen or new concerning symptoms arise. Patient left in no apparent distress.     43 minutes spent on the date of the encounter doing chart review, history and exam, documentation and further activities per the note     See Patient Instructions    Return in about 1 month (around 8/1/2022) for a recheck with your primary care provider or myself, or call 911/go to an ER anytime if worsening.    KENNETH Elder  [facility]  CLINIC TESSA Amaral is a 57 year old presenting for the following health issues:  Depression, Anxiety, and Hypertension      HPI     Hypertension Follow-up      Do you check your blood pressure regularly outside of the clinic? No     Are you following a low salt diet? Yes    Are your blood pressures ever more than 140 on the top number (systolic) OR more   than 90 on the bottom number (diastolic), for example 140/90? N/A    Depression and Anxiety Follow-Up    How are you doing with your depression since your last visit? No change    How are you doing with your anxiety since your last visit?  No change    Are you having other symptoms that might be associated with depression or anxiety? No    Have you had a significant life event? No     Do you have any concerns with your use of alcohol or other drugs? No    Fluoxetine increased to 60mg today by MH provider. Has follow up in 2 weeks. Also has had recent vertigo and had to go to the [place] ER one month ago. Intermittent room spinning dizziness since then. One episode of black stool one month ago. Normal stools since then.    Social History     Tobacco Use     Smoking status: Former Smoker     Packs/day: 0.50     Years: 20.00     Pack years: 10.00     Types: Cigarettes, Dip, chew, snus or snuff     Quit date: 2019     Years since quittin.8     Smokeless tobacco: Current User     Types: Chew   Vaping Use     Vaping Use: Never used   Substance Use Topics     Alcohol use: No     Comment: hx alcohol abuse, sober since      Drug use: No     PHQ 2022   PHQ-9 Total Score 7 5 5   Q9: Thoughts of better off dead/self-harm past 2 weeks Not at all Not at all Not at all   F/U: Thoughts of suicide or self-harm - - -   F/U: Self harm-plan - - -   F/U: Self-harm action - - -   F/U: Safety concerns - - -     SANDRA-7 SCORE 2022   Total Score 15 (severe anxiety) - 13 (moderate anxiety)   Total Score 15 13 15      Last PHQ-9 7/1/2022   1.  Little interest or pleasure in doing things 1   2.  Feeling down, depressed, or hopeless 1   3.  Trouble falling or staying asleep, or sleeping too much 0   4.  Feeling tired or having little energy 2   5.  Poor appetite or overeating 0   6.  Feeling bad about yourself 1   7.  Trouble concentrating 0   8.  Moving slowly or restless 0   Q9: Thoughts of better off dead/self-harm past 2 weeks 0   PHQ-9 Total Score 5   Difficulty at work, home, or with people Somewhat difficult   In the past two weeks have you had thoughts of suicide or self harm? -   Do you have concerns about your personal safety or the safety of others? -   In the past 2 weeks have you thought about a plan or had intention to harm yourself? -   In the past 2 weeks have you acted on these thoughts in any way? -     SANDRA-7  7/1/2022   1. Feeling nervous, anxious, or on edge 3   2. Not being able to stop or control worrying 3   3. Worrying too much about different things 3   4. Trouble relaxing 1   5. Being so restless that it is hard to sit still 1   6. Becoming easily annoyed or irritable 1   7. Feeling afraid, as if something awful might happen 3   SANDRA-7 Total Score 15   If you checked any problems, how difficult have they made it for you to do your work, take care of things at home, or get along with other people? Somewhat difficult       Suicide Assessment Five-step Evaluation and Treatment (SAFE-T)    How many servings of fruits and vegetables do you eat daily?  2-3    On average, how many sweetened beverages do you drink each day (Examples: soda, juice, sweet tea, etc.  Do NOT count diet or artificially sweetened beverages)?   0    How many days per week do you exercise enough to make your heart beat faster? 5, active at work    How many minutes a day do you exercise enough to make your heart beat faster? 60 or more    How many days per week do you miss taking your medication? 0    Patient says he was diagnosed with  Vertigo. He was told a doctor could fix it. Went to [facility] 5/29/22      Review of Systems   Constitutional, HEENT, cardiovascular, pulmonary, gi and gu systems are negative, except as otherwise noted.      Objective    BP (!) 150/74   Pulse 60   Temp 97.9  F (36.6  C) (Tympanic)   Resp 14   Wt 74.8 kg (164 lb 12.8 oz)   SpO2 99%   BMI 25.43 kg/m    Body mass index is 25.43 kg/m .  Physical Exam  Vitals and nursing note reviewed.   Constitutional:       General: He is not in acute distress.     Appearance: He is not ill-appearing or diaphoretic.   HENT:      Head: Normocephalic and atraumatic.      Mouth/Throat:      Mouth: Mucous membranes are moist.   Eyes:      Conjunctiva/sclera: Conjunctivae normal.   Cardiovascular:      Rate and Rhythm: Normal rate and regular rhythm.      Heart sounds: Normal heart sounds. No murmur heard.    No friction rub. No gallop.   Pulmonary:      Effort: Pulmonary effort is normal. No respiratory distress.      Breath sounds: Normal breath sounds. No stridor. No wheezing, rhonchi or rales.   Abdominal:      General: Bowel sounds are normal. There is no distension.      Palpations: Abdomen is soft. There is no mass.      Tenderness: There is no abdominal tenderness. There is no guarding or rebound.      Hernia: No hernia is present.   Skin:     General: Skin is warm and dry.   Neurological:      General: No focal deficit present.      Mental Status: He is alert. Mental status is at baseline.   Psychiatric:         Mood and Affect: Mood normal.         Behavior: Behavior normal.          Results for orders placed or performed in visit on 07/01/22   CBC with platelets     Status: Abnormal   Result Value Ref Range    WBC Count 5.3 4.0 - 11.0 10e3/uL    RBC Count 4.02 (L) 4.40 - 5.90 10e6/uL    Hemoglobin 12.7 (L) 13.3 - 17.7 g/dL    Hematocrit 37.9 (L) 40.0 - 53.0 %    MCV 94 78 - 100 fL    MCH 31.6 26.5 - 33.0 pg    MCHC 33.5 31.5 - 36.5 g/dL    RDW 12.5 10.0 - 15.0 %     Platelet Count 205 150 - 450 10e3/uL                   .  ..

## 2022-07-01 NOTE — PATIENT INSTRUCTIONS
Shirley Amaral,    Thank you for allowing [facility] to manage your care.    Increase your hydrochlorothiazide to 25mg daily (2 tablets).Your blood pressure was high today. Get a blood pressure cuff for use at home. Take and record your blood pressure daily for 2 weeks. If your blood pressure is greater than or equal to 140/90mm Hg more than half of the time, please schedule an appointment with us for a recheck.    If you develop worsening/changing symptoms at any time, please call 911 or go to the emergency department for evaluation.    I ordered some lab work, please go to the laboratory to get your studies.    Drink 8-10 glasses of fluid daily to stay well-hydrated.    See if they have decaf grinds coffee packets to chew.    Try the Epley maneuver at home. You can look this up on YouTube.    Please allow 1-2 business days for our office to contact you in regards to your laboratory/radiological studies.  If not done so, I encourage you to login into [email or web address] [email or web address] to review your results as well.     If you have any questions or concerns, please feel free to call us at [phone]    Sincerely,    Johnyn Cordova PA-C    Did you know?      You can schedule a video visit for follow-up appointments as well as future appointments for certain conditions.  Please see the below link.     [email or web address]    If you have not already done so,  I encourage you to sign up for [employer] ([email or web address]).  This will allow you to review your results, securely communicate with a provider, and schedule virtual visits as well.

## 2022-07-01 NOTE — PROGRESS NOTES
Clinic Care Coordination Contact    Assessment: Patient has health coverage and is well connected with Primary and Speciality providers, including Cardiology and Behavioral Health.  Patient has continued to follow the plan of care    Enrollment status: Graduated     Plan: No further outreaches at this time.  Patient will continue to follow the plan of care.  If new needs arise a new Care Coordination referral may be placed.  FYI to PCP

## 2022-07-01 NOTE — PROGRESS NOTES
[facility]      Mental Health & Addiction Service Line    Transition Clinic: Psychiatry Progress Note  Medication Management                VISIT INFORMATION      Date:  2022       Number:  -2nd      Referral source:  -[facility]      Patient Identifying Information:  Legal name: Munir Storey  Preferred name: Munir  : 1965  Preferred pronouns: He/him      Participants:   -Patient  -Provider  -Spouse: Ariane, during 2nd half of the appointment        Telehealth visit details:  Type of service:   Video  Patient location:   At home  Provider Location: [facility]  Platform utilized: TournEase    Start time: 8:04 am  End time:  8:47 am          INTERIM HX      -It's triggering to go to our [facility] ... makes me anxious, worried something is going to happen to me health-wise  -Even though I know there is a hospital not to far away from our property which can be reached easily, I'm still fearful a major health event will  occur and I won't get the needed medical care in time     -However still plan on going to the [facility] this     -Trying to find a new therapist ... my wife is giving better support/suggestions than the therapist is  -All the therapist does is tell me to use and apply my coping skills ... which I am already trying to do          PSYCHIATRIC ROS      Sleep:  -Ended up with a vivid/bad dream when I took Hydroxyzine + Melatonin     -Took the Hydroxyzine by itself one night and woke up in the middle of the night    -Averaging 7 to 7.5 hours     -Yesterday woke up with a panic attack: stressed about: work (self employed), [place]      Mood/Anxiety:    -See PHQ-9 score    -See SANDRA-7 score    -Continue to feel physically drained, like I just did a lot of work after having a panic attack        Suicidal ideations:  -Denies passive or active thoughts at this time        SIB:  -Denies engaging in self harm         Side  effects:  -Gabapentin as a single dosage of 600 mg = dizziness, lightheadedness  ... therefore changed to 300 mg TID           PSYCHIATRIC HISTORY      Suicide attempts:  -None reported      Inpatient hospitalizations:  -None reported      ECT:  -None reported        Medication trials:     SSRIs:  -Zoloft     SNRIs:  -Cymbalta  -Effexor 75 mg     Benzodiazepines:  -Xanax         CURRENT SUBSTANCE USE      Alcohol:  -Quit in 2013     Recreational Drugs:  -None reported  -Considering using hemp THC     Medical Marijuana:  -None reported      Cigarettes per day:  -None reported      Chewing tobacco:  -3 to 4 pouches of chew/day  -Trying to cut back     Vaping:  -None reported      Caffeine intake per day:  -1 regular coffee + multiple cups of decaff         MEDICAL HISTORY    -The problem list was reviewed via the EMR prior to the appointment    -The patient denies any concerning physical and or medical symptoms during the interviewing process          MEDICATIONS      Current Outpatient Medications:      amLODIPine (NORVASC) 10 MG tablet, Take 1 tablet (10 mg) by mouth daily, Disp: 90 tablet, Rfl: 1     famotidine (PEPCID) 20 MG tablet, Take 1 tablet (20 mg) by mouth 2 times daily as needed For stomach., Disp: 180 tablet, Rfl: 1     FLUoxetine (PROZAC) 40 MG capsule, Take 1 capsule by mouth daily, Disp: , Rfl:      gabapentin (NEURONTIN) 300 MG capsule, Take 2 capsules (600 mg) by mouth daily For anxiety, Disp: 60 capsule, Rfl: 1     hydrochlorothiazide (HYDRODIURIL) 12.5 MG tablet, Take 1 tablet (12.5 mg) by mouth daily, Disp: , Rfl:      hydrOXYzine (ATARAX) 50 MG tablet, Take 0.5-1 tablets (25-50 mg) by mouth 3 times daily as needed for anxiety And up to 100mg at bedtime for insomnia. OK to take 50mg overnight if you wake up overnight., Disp: 60 tablet, Rfl: 0     lisinopril (ZESTRIL) 40 MG tablet, Take 1 tablet (40 mg) by mouth daily, Disp: 90 tablet, Rfl: 1     LORazepam (ATIVAN) 0.5 MG tablet, Take 0.5-1 tablets  (0.25-0.5 mg) by mouth daily as needed (severe anxiety or panic attacks), Disp: 15 tablet, Rfl: 0     meclizine (ANTIVERT) 12.5 MG tablet, Take 1 tablet (12.5 mg) by mouth 4 times daily as needed for dizziness, Disp: 30 tablet, Rfl: 0     metoprolol succinate ER (TOPROL-XL) 25 MG 24 hr tablet, Take 1 tablet (25 mg) by mouth daily Monitor your blood pressure once weekly or if symptomatic. This medication can be increased if needed- let me know (<140/80) (Patient taking differently: Take 12.5 mg by mouth daily Monitor your blood pressure once weekly or if symptomatic. This medication can be increased if needed- let me know (<140/80)  Metoprolol is cut in half. States he is taking 12.5), Disp: 90 tablet, Rfl: 1     Misc Natural Products (T-RELIEF CBD+13 SL), Place 600 Units under the tongue daily as needed Hemp oil sublingual, Disp: , Rfl:      nicotine (NICORETTE) 2 MG gum, Place 1 each (2 mg) inside cheek every hour as needed for other (for chewing tobacco), Disp: 100 each, Rfl: 1     omeprazole (PRILOSEC) 20 MG DR capsule, TAKE 1 CAPSULE (20 MG) BY MOUTH DAILY (FOR HEARTBURN), Disp: 30 capsule, Rfl: 0     pediatric electrolyte (PEDIALYTE) SOLN solution, Take 1.25 mLs by mouth daily = 1/4 teaspoon, Disp: , Rfl:       If a controlled substance is prescribed during today's appointment:    -The [place] Prescription Monitoring Program has been reviewed and there are no current concerns with: diversionary activity, early refill requests, and or obtaining the medication from multiple providers.        VITALS    BP Readings from Last 3 Encounters:   06/03/22 (!) 142/82   05/29/22 (!) 173/98   05/27/22 134/82       Pulse Readings from Last 3 Encounters:   06/03/22 64   05/29/22 61   05/27/22 69       Wt Readings from Last 3 Encounters:   06/03/22 75.8 kg (167 lb 3.2 oz)   05/29/22 74.4 kg (164 lb)   05/27/22 75.3 kg (166 lb)         LABS    The following labs were reviewed prior to or during the appointment:  -None          SCALES    PHQ 6/3/2022 6/13/2022 7/1/2022   PHQ-9 Total Score 7 7 5   Q9: Thoughts of better off dead/self-harm past 2 weeks Not at all Not at all Not at all   F/U: Thoughts of suicide or self-harm - - -   F/U: Self harm-plan - - -   F/U: Self-harm action - - -   F/U: Safety concerns - - -        SANDRA-7 SCORE 6/3/2022 6/13/2022 7/1/2022   Total Score - 15 (severe anxiety) 13 (moderate anxiety)   Total Score 10 15 13       Answers for HPI/ROS submitted by the patient on 7/1/2022  PHQ9 TOTAL SCORE: 5  SANDRA 7 TOTAL SCORE: 13        MENTAL STATUS EXAMINATION    Appearance: Adequately Groomed, Attire Appropriate for the Season  General Behavior:  Cooperative, Direct Eye Contact  Speech: Fluent, Normal rate and volume; Mildly excessive  Musculoskeletal:    -Gait not observed during t.h. visit  -No facial tics/tremors observed   -Motor coordination is grossly intact   Mood: Anxious, Frustrated  Affect: Congruent with mood  Attention: Intact  Orientation:  Person, Place, Time, Situation  Thought Associations:  Intact  Thought Content: Reality based   Thought Processes: Organized, Normal rate  Memory: No overt impairment  Language: Intact  Judgement: Good  Insight: Fair to Good        ASSESSMENT/CLINICAL IMPRESSIONS    Summary:    Munir Storey is a 58 y/o male with a history of: SANDRA, Panic Disorder MDD, and Alcohol abuse (in remission since 2013).     Has been working with TRISTAN Eden DNP, PMJENNIFERP-BC, [facility] clinician (now on a MYRNA) since 4/12/2022 to re-stabilize exacerbations of anxiety and as   well as depressive symptoms.    Established care at the [facility] on 6/13/2022 for bridging/management of psychotropics with the goal of returning to [facility] or establishing long term outpatient psychiatric services (if necessary).    -------------------------    During today's interview Munir Gresham has improved mood and he's not nearly as depressed, down, or dysphoric as in the winter + spring/2022.   However, he  continues to experience   moderate to high levels of anxiety, feeling stressed/overwhelmed, and having catastrophic thoughts he will have a: MI, stroke, or some other serious life threatening event and he won't receive medical care in time to keep him alive.   These thoughts are occurring even though he modified Gabapentin dosing from 600 to 900 mg/day.    Panic attacks are still impair functioning, causing Munir to feel like he's gone through cement or done physically intense work after going through them.   He states having difficulty recovering from the panic episode, it's like adrenaline is still coursing through his body and then he can't settle down.      Instructed to further increase Prozac from 40 to 60 mg, and reduce Gabapentin to 300 mg twice daily (currently is taking 300 mg three times per day consistently, although it can   sometimes be hard to remember mid-day dosing).    Also suggested trying to sometimes use Lorazepam prn preventatively.    For example, he [place], therefore will try taking the Lorazepam daily for a 3 to 5 days as a means to avoid escalation to a full blown panic attack.    He/his wife inquire about using hemp/THC.  Outlined being unable to speak to drug interactions given variable strengths/dosages if these types of products are used, however writer is still willing to prescribe psychotropics, regardless.         Munir also asks about cross tapering onto Effexor (per chart review has previously used, however dosage was only 75 mg in 2020) if the higher dosage of Prozac doesn't reduce anxiety symptoms.   The SNRI is effective for his son and some other family members, therefore he would like to consider.   Discussed this can be an option,  however reviewed Effexor is more   prone to increasing blood pressure as an adverse effect (he will be meeting with a PCP later today to discuss htn + other concerns).      Munir denies suicidal ideations during the interview, is able to commit to safety, and is forward thinking throughout the interview.          DSM-V and or working diagnosis:      1. SANDRA     2. Panic Disorder without Agoraphobia     3. MDD, mild to moderate, single episode     4. Tobacco dependence due to chewing tobacco           SAFETY EVALUATION:     Suicidal ideations:  -denies  Homicidal ideations:  -denies  Access to guns:   -yes; locked  Protective and mitigating factors:  -spouse  -no prior attempts  -engaged in outpatient mental health services   Risk factors:  -male, age  -hx of ALEX (currently in remission)  -recent escalation in anxiety, mental health symptoms  Risk assessment:  -low to medium        TREATMENT PLAN      Medications:  Start:  Fluoxetine/Prozac 40 mg + 20 mg daily; TDD = 60 mg  (increased from 40 mg/day)    Gabapentin 300 mg twice daily; TDD = 600 mg    Continue:  Hydroxyzine/Atarax:  -25 to 50 mg (1/2 to 1 tab) up to 3x per day as needed for anxiety  -100 mg (2 tabs) at bedtime for sleep  -50 mg (1 tab) if waking up in the middle of the night     Lorazepam .25 to .5 mg (1/2 to 1 tab) per day as needed for anxiety; #20          Labs:  -None obtained          Therapy:  -Continue individual therapy         Non-pharmacological modalities:  -Did not discuss        Return to Clinic or Referrals:  -Please follow up at the [facility] Clinic for medication management in:  2 to 3 weeks                Total time:  49 minutes per:    -Review of EMR  -Appointment time   -Documentation          Emma CHANDRA-CNP, [facility]-BC    --------------------------------------------------------------------------------------------------------------------------        TREATMENT RISK STATEMENT    The risks, benefits, alternatives, and potential adverse effects have been explained and are understood by the patient.  The patient agrees to the treatment plan with their ability to do so.      The patient knows to call  the clinic: [phone]  for any problems or concerns until the next psychiatry visit, regardless if it is within or outside of the [facility] system.     If unable to reach clinic staff (via phone call or medical messaging) during the normal business hours: 8:00 am to 4:30 pm then it is recommended accessing the nearest: emergency department, urgent care facility, or utilizing local (varies based on county of residence) and national crisis #'s or text messaging services for immediate assistance.          --------------------------------------------------------------------------------------------------------------------------        If applicable the following has been discussed with the patient, parent/guardian, and or attending family member during the appointment:      1. Risks of polypharmacy and possible drug interactions with current medication list + common OTC products, herbs, and supplements.    Moving forward, it is suggested to intermittently check-in with a clinic or retail pharmacist whenever new medications or OTC/h/s are consumed.    2. Recommendation to adhere to CDC guidelines as it relates alcohol consumption.  If taking benzodiazepines, you should abstain from alcohol intake due to increased risks of CNS and respiratory depression, as well as psychomotor impairment.    3. If possible, it is recommended to avoid concurrent use of prescribed:  opioids  +  benzodiazepines due to increased risks of CNS and respiratory depression, as well as the increased risk of overdose.     4. Recommendation to minimize and or abstain from THC use (unless the pt. is prescribed medical marijuana).    5. Recommendation to abstain from illicit substances including but not limited to the following: heroin, street fentanyl, cocaine, methamphetamines, and bath salts.    6. Do not take opioids, stimulants, and or other prescription medications unless they are specifically prescribed for you.    7. Recommendation to  abstain from tobacco/smoking, alcohol, THC, and all illicit substances if trying to become or are pregnant.    8. Black Box Warnings associated with the prescribed psychotropic(s).    9. Potential adverse effects of antipsychotics including but not limited to the following: weight gain, metabolic syndrome, EPS/Tardive Dyskinesias.    10. Potential CV and neurological adverse effects of stimulants including but not limited to the following:  sudden death, MI, stroke, HTN, cardiomyopathy (long term use) as well as seizures.

## 2022-07-01 NOTE — LETTER
[facility] COORDINATION  [facility]  [address]   [place] [address]    July 1, 2022    Munir Storey  [address]  [place] [address]    Dear Munir,  Your Care Team congratulates you on your journey to maintain wellness. This document will help guide you on your journey to maintain a healthy lifestyle.  You can use this to help you overcome any barriers you may encounter.  If you should have any questions or concerns, you can contact the members of your Care Team or contact your Primary Care Clinic for assistance.     Health Maintenance  Health Maintenance Reviewed:      My Access Plan  Medical Emergency 911   Primary Clinic Line [facility] - [phone]   24 Hour Appointment Line [phone] or  [phone]) (toll-free)   24 Hour Nurse Line [phone] (toll-free)   Preferred Urgent Care     Preferred Hospital     Preferred Pharmacy [facility] - [place], [place] - [address]     Behavioral Health Crisis Line The National Suicide Prevention Lifeline at 1-784.255.6865 or 911     My Care Team Members  Patient Care Team       Relationship Specialty Notifications Start End    Magalis Morales MD PCP - General Family Medicine  5/5/22     Phone: [phone] Fax: [phone]         [address] [place] [address]    Nasreen Martínez PA-C Assigned PCP   1/31/21     Phone: [phone] Fax: [phone]         [facility] [address] [address] [address]    Sadi Shelley MD Assigned Heart and Vascular Provider   2/20/22     Phone: [phone] Pager: [phone] Fax: [phone]        [facility] [address] [place] [address]    Roberto Cabral MD Assigned Musculoskeletal Provider   3/27/22     Phone: [phone] Fax: [phone] [address] [address]    Peggy Eden NP Assigned  Neuroscience Provider   4/17/22     Phone: [phone] Fax: [phone]         [address] [place] [address]    Ralph Monzon MD MD Psychiatry Admissions 5/2/22     Phone: [phone] Fax: [phone] [address] [facility] [address]    Betty Rivers, [facility] Pharmacist Pharmacist  5/25/22     Phone: [phone] [address] [place] [address]    Emma Martinez, APRN CNP Assigned Behavioral Health Provider   6/18/22     Phone: [phone] Fax: [phone]         [address] [place] [address]               Goals        COMPLETED: 1- Financial Wellbeing (pt-stated)       I have accomplished getting health insurance established. I have decided not to apply for [facility].     Personal Plan   If anything changes with our income I will call the [facility] Billing office to see if I may qualify for [place] care.                 COMPLETED: 2- Medical (pt-stated)       I have accomplished following up with Cardiology and other services.    Personal Plan  If I have any questions for the cardiologist I will call them directly.                COMPLETED: 3- Mental Health Management (pt-stated)       Goal Statement: I will take steps to manage anxiety    Date Goal set: 1/3/22  Barriers: financial and medical stressors  Strengths: motivated  Date to Achieve By: 3 months  Patient expressed understanding of goal: yes    Action steps to achieve this goal:  1. I will continue to take medication. I have been continuing to take my medications as prescribed. Continuous (MB)  2. I will establish with a therapist when I get better insurance. (Completed- pt established with  psychiatry and [facility])  3. I will contact  behavioral health intake at [phone] discuss higher level of care programs (completed- declined)  4. I will utilize ED or crisis resources if needed    Goal Updated: 5/18/22               Advance Care Plans/Directives Type:       We notice that you do not have an Advance Directive on file. Upon completion of your Health Care Directive, please bring a copy with you to your next office visit.    It has been your Clinic Care Team's pleasure to work with you on your goals.    Regards,  Your Clinic Care Team

## 2022-07-05 ENCOUNTER — TELEPHONE (OUTPATIENT)
Dept: FAMILY MEDICINE | Facility: CLINIC | Age: 57
End: 2022-07-05

## 2022-07-05 DIAGNOSIS — K21.00 GASTROESOPHAGEAL REFLUX DISEASE WITH ESOPHAGITIS WITHOUT HEMORRHAGE: ICD-10-CM

## 2022-07-13 ENCOUNTER — TELEPHONE (OUTPATIENT)
Dept: BEHAVIORAL HEALTH | Facility: CLINIC | Age: 57
End: 2022-07-13

## 2022-07-13 NOTE — TELEPHONE ENCOUNTER
Writer scheduled patient return appointment for 08/16/22 @ 1:30 pm. Patient has questions regarding side affects about medication. Feels like he is going backwards. Side affects are increased anxiety . Writer will route to tc rn pool for call back to patient.    Charissa Kellogg  07/13/22  1040

## 2022-07-13 NOTE — TELEPHONE ENCOUNTER
ROSLYN RN attempted to call this patient back as requested.  There was no answer.  TC RN LVM for patient to call [phone].  Awaiting call back.  ROSLYN RN also sent this patient also sent this patient a  Real Time Content communication.

## 2022-07-14 ENCOUNTER — TELEPHONE (OUTPATIENT)
Dept: FAMILY MEDICINE | Facility: CLINIC | Age: 57
End: 2022-07-14

## 2022-07-14 NOTE — TELEPHONE ENCOUNTER
Patient called to discuss his lab results from 7/1/22.   I reviewed the result note from Johnny COOPER with patient. He verbalized a good understanding. He said he was feeling anxious about the flagged results and needed more reassurance.     Patient scheduled follow up Anxiety/BP check with PCP.   Future Office Visit:   Next 5 appointments (look out 90 days)    Aug 17, 2022  5:00 PM  (Arrive by 4:40 PM)  Provider Visit with Magalis Morales MD  [facility] ([facility] ) [address] [address] [address]  [phone]         Patient requesting that Dr. Morales review the lab results on 7/1/22 . He has seen Johnny Cordova for the past few visits and wanted to be sure Dr. Morales would also review and give some feedback.    Deandra Sarmiento RN BSN  [facility]

## 2022-07-21 ENCOUNTER — TELEPHONE (OUTPATIENT)
Dept: BEHAVIORAL HEALTH | Facility: CLINIC | Age: 57
End: 2022-07-21

## 2022-07-21 DIAGNOSIS — F41.1 GAD (GENERALIZED ANXIETY DISORDER): Primary | ICD-10-CM

## 2022-07-22 ENCOUNTER — TELEPHONE (OUTPATIENT)
Dept: BEHAVIORAL HEALTH | Facility: CLINIC | Age: 57
End: 2022-07-22

## 2022-07-22 RX ORDER — DIAZEPAM 2 MG
2 TABLET ORAL DAILY
Qty: 30 TABLET | Refills: 0 | Status: SHIPPED | OUTPATIENT
Start: 2022-07-22 | End: 2022-08-16

## 2022-07-22 NOTE — TELEPHONE ENCOUNTER
Reason for call:  Other   Patient called regarding (reason for call): call back  Additional comments: Please call patient as they are out of medication - wesley called yesterday but pt missed call    Phone number to reach patient:  Cell number on file:    Telephone Information:   Mobile [phone]       Best Time:  asap    Can we leave a detailed message on this number?  NO    Travel screening: Negative

## 2022-08-10 ENCOUNTER — TELEPHONE (OUTPATIENT)
Dept: FAMILY MEDICINE | Facility: CLINIC | Age: 57
End: 2022-08-10

## 2022-08-10 DIAGNOSIS — I10 HYPERTENSION, GOAL BELOW 140/90: Primary | ICD-10-CM

## 2022-08-10 RX ORDER — HYDROCHLOROTHIAZIDE 25 MG/1
37.5 TABLET ORAL DAILY
Qty: 135 TABLET | Refills: 0 | Status: SHIPPED | OUTPATIENT
Start: 2022-08-10 | End: 2022-08-11

## 2022-08-10 NOTE — TELEPHONE ENCOUNTER
Left message on voice mail for patient to call clinic.   [phone]    Deandra Sarmiento RN BSN  [facility]

## 2022-08-10 NOTE — TELEPHONE ENCOUNTER
Patient notified and he voiced understanding and agreement.  He does want to keep appt tomorrow.  Lisa Lundberg RN  [facility]

## 2022-08-10 NOTE — TELEPHONE ENCOUNTER
Patient was last seen by Johnny COOPER on 7/1/22.     AVS states: Return in about 1 month (around 8/1/2022) for a recheck with your primary care provider or myself, or call 911/go to an ER anytime if worsening.    Patient called today because he took his last dose of Hydrochlorothiazide (HYDRODIURIL) 12.5 MG tablet . He said he has been taking 2 tablets to=25 mg.     The pharmacy gave patient 3 pills (iesha fill) and he took 2 pills yesterday and 1 today. He has no medication for tomorrow.     Patient reports that while he has been taking the Hydrochlorothiazide 25 mg dose his BP's have been in the range of: 149-158/78-89.  He wonders if he may need another dose adjustment.       Future Office Visit:   Next 5 appointments (look out 90 days)    Aug 11, 2022  8:00 AM  (Arrive by 7:40 AM)  Provider Visit with KENNETH [place]  [facility] [place] ([facility] ) [address]  [place] [address] [phone]           BP Readings from Last 3 Encounters:   07/01/22 (!) 150/74   06/03/22 (!) 142/82   05/29/22 (!) 173/98     Patient is coming in early tomorrow and said he would plan to  his prescription right after that.     Deandra Sarmiento RN BSN  [facility]

## 2022-08-10 NOTE — TELEPHONE ENCOUNTER
Will increase dose to 37.5mg of hydrochlorothiazide. Patient needs to see primary (preferred) or myself in 4-6 weeks for a recheck and labs. F2F preferred, but virtual ok. Please help facilitate an appt. Thanks.

## 2022-08-11 ENCOUNTER — OFFICE VISIT (OUTPATIENT)
Dept: FAMILY MEDICINE | Facility: CLINIC | Age: 57
End: 2022-08-11
Payer: COMMERCIAL

## 2022-08-11 VITALS
RESPIRATION RATE: 14 BRPM | BODY MASS INDEX: 24.41 KG/M2 | TEMPERATURE: 96.9 F | OXYGEN SATURATION: 99 % | HEART RATE: 73 BPM | WEIGHT: 158.2 LBS | DIASTOLIC BLOOD PRESSURE: 84 MMHG | SYSTOLIC BLOOD PRESSURE: 144 MMHG

## 2022-08-11 DIAGNOSIS — I10 HYPERTENSION, GOAL BELOW 140/90: Primary | ICD-10-CM

## 2022-08-11 DIAGNOSIS — F41.1 GAD (GENERALIZED ANXIETY DISORDER): ICD-10-CM

## 2022-08-11 PROCEDURE — 99214 OFFICE O/P EST MOD 30 MIN: CPT | Performed by: PHYSICIAN ASSISTANT

## 2022-08-11 RX ORDER — LISINOPRIL AND HYDROCHLOROTHIAZIDE 20; 25 MG/1; MG/1
2 TABLET ORAL DAILY
Qty: 180 TABLET | Refills: 0 | Status: SHIPPED | OUTPATIENT
Start: 2022-08-11 | End: 2023-02-15

## 2022-08-11 ASSESSMENT — ANXIETY QUESTIONNAIRES
3. WORRYING TOO MUCH ABOUT DIFFERENT THINGS: MORE THAN HALF THE DAYS
GAD7 TOTAL SCORE: 12
2. NOT BEING ABLE TO STOP OR CONTROL WORRYING: MORE THAN HALF THE DAYS
6. BECOMING EASILY ANNOYED OR IRRITABLE: SEVERAL DAYS
7. FEELING AFRAID AS IF SOMETHING AWFUL MIGHT HAPPEN: NEARLY EVERY DAY
5. BEING SO RESTLESS THAT IT IS HARD TO SIT STILL: SEVERAL DAYS
GAD7 TOTAL SCORE: 12
IF YOU CHECKED OFF ANY PROBLEMS ON THIS QUESTIONNAIRE, HOW DIFFICULT HAVE THESE PROBLEMS MADE IT FOR YOU TO DO YOUR WORK, TAKE CARE OF THINGS AT HOME, OR GET ALONG WITH OTHER PEOPLE: SOMEWHAT DIFFICULT
1. FEELING NERVOUS, ANXIOUS, OR ON EDGE: MORE THAN HALF THE DAYS

## 2022-08-11 ASSESSMENT — PATIENT HEALTH QUESTIONNAIRE - PHQ9
5. POOR APPETITE OR OVEREATING: SEVERAL DAYS
SUM OF ALL RESPONSES TO PHQ QUESTIONS 1-9: 5

## 2022-08-11 ASSESSMENT — PAIN SCALES - GENERAL: PAINLEVEL: NO PAIN (0)

## 2022-08-11 NOTE — PROGRESS NOTES
Assessment & Plan   Problem List Items Addressed This Visit        Circulatory    Hypertension, goal below 140/90 - Primary (Chronic)    Relevant Medications    lisinopril-hydrochlorothiazide (ZESTORETIC) 20-25 MG tablet    Other Relevant Orders    Med Therapy Management Referral       Behavioral    SANDRA (generalized anxiety disorder) (Chronic)         Here with concern over ongoing elevated BP readings at home. On average he is >140/90. Misses the occasional hydrochlorothiazide dose. Will replace individual lisinopril and hydrochlorothiazide Rx with Zestoretic to encourage adherence. Will also order MTM to review antihypertensives as he is on several agents. He would also like pharmacy to review his anxiety meds. Has a psychiatrist. Did not tolerate higher dose of fluoxetine.    Complete history and physical exam as below. AF with normal VS except for elevated bp, which they will monitor at home and contact us if >140/90mmHg greater than 50% of the time.    DDx and Dx discussed with and explained to the pt to their satisfaction.  All questions were answered at this time. Pt expressed understanding of and agreement with this dx, tx, and plan. No further workup warranted and standard medication warnings given. I have given the patient a list of pertinent indications for re-evaluation. Will go to the Emergency Department if symptoms worsen or new concerning symptoms arise. Patient left in no apparent distress.     Prescription drug management  34 minutes spent on the date of the encounter doing chart review, history and exam, documentation and further activities per the note     See Patient Instructions    Return in about 1 month (around 9/11/2022) for for a lab appt to check your electrolytes..    KENNETH Elder  [facility] TESSA Amaral is a 57 year old presenting for the following health issues:  Hypertension and Anxiety      History of Present Illness       Hypertension: He  presents for follow up of hypertension.  He does check blood pressure  regularly outside of the clinic. Outside blood pressures have been over 140/90. He follows a low salt diet.       Beet and garlic smoothie daily.   Hypertension Follow-up      Do you check your blood pressure regularly outside of the clinic? Yes     Are you following a low salt diet? Yes    Are your blood pressures ever more than 140 on the top number (systolic) OR more   than 90 on the bottom number (diastolic), for example 140/90? Yes    Anxiety Follow-Up    How are you doing with your anxiety since your last visit? No change    Are you having other symptoms that might be associated with anxiety? Yes:  blood pressure is causing anxiety    Have you had a significant life event? No     Are you feeling depressed? No    Do you have any concerns with your use of alcohol or other drugs? No    Social History     Tobacco Use     Smoking status: Former Smoker     Packs/day: 0.50     Years: 20.00     Pack years: 10.00     Types: Cigarettes, Dip, chew, snus or snuff     Quit date: 2019     Years since quittin.9     Smokeless tobacco: Current User     Types: Chew   Vaping Use     Vaping Use: Never used   Substance Use Topics     Alcohol use: No     Comment: hx alcohol abuse, sober since      Drug use: No     SANDRA-7 SCORE 2022   Total Score 15 (severe anxiety) 13 (moderate anxiety) -   Total Score - 15 12   Some encounter information is confidential and restricted. Go to Review [email or web address] activity to see all data.     PHQ 6/3/2022 2022 2022   PHQ-9 Total Score 7 5 5   Q9: Thoughts of better off dead/self-harm past 2 weeks Not at all Not at all Not at all   F/U: Thoughts of suicide or self-harm - - -   F/U: Self harm-plan - - -   F/U: Self-harm action - - -   F/U: Safety concerns - - -   Some encounter information is confidential and restricted. Go to Review Flowsheets activity to see all data.       How many  servings of fruits and vegetables do you eat daily?  2-3    On average, how many sweetened beverages do you drink each day (Examples: soda, juice, sweet tea, etc.  Do NOT count diet or artificially sweetened beverages)?   0    How many days per week do you exercise enough to make your heart beat faster? Active at work    How many minutes a day do you exercise enough to make your heart beat faster? 60 or more    How many days per week do you miss taking your medication? 0    Review of Systems   Constitutional, HEENT, cardiovascular, pulmonary, gi and gu systems are negative, except as otherwise noted.      Objective    BP (!) 144/84   Pulse 73   Temp 96.9  F (36.1  C) (Tympanic)   Resp 14   Wt 71.8 kg (158 lb 3.2 oz)   SpO2 99%   BMI 24.41 kg/m    Body mass index is 24.41 kg/m .  Physical Exam  Vitals and nursing note reviewed.   Constitutional:       General: He is not in acute distress.     Appearance: He is not ill-appearing or diaphoretic.   HENT:      Head: Normocephalic and atraumatic.      Mouth/Throat:      Mouth: Mucous membranes are moist.   Eyes:      Conjunctiva/sclera: Conjunctivae normal.   Cardiovascular:      Rate and Rhythm: Normal rate and regular rhythm.      Heart sounds: Normal heart sounds. No murmur heard.    No friction rub. No gallop.   Pulmonary:      Effort: Pulmonary effort is normal. No respiratory distress.      Breath sounds: Normal breath sounds. No stridor. No wheezing, rhonchi or rales.   Skin:     General: Skin is warm and dry.   Neurological:      General: No focal deficit present.      Mental Status: He is alert. Mental status is at baseline.   Psychiatric:         Mood and Affect: Mood normal.         Behavior: Behavior normal.                  .  ..

## 2022-08-11 NOTE — PATIENT INSTRUCTIONS
Shirley Amaral,    Thank you for allowing [facility] to manage your care.    Your blood pressure was high today.  Take and record your blood pressure daily for 2 weeks. If your blood pressure is greater than or equal to 140/90mm Hg more than half of the time, please let us know.    I sent your prescriptions to your pharmacy.    I made a pharmacy medical therapy management referral, they will be calling in approximately 1 week to set up your appointment.  If you do not hear from them, please call the specialty number on your after visit summary.     Drink 8-10 glasses of fluid daily to stay well-hydrated.    If you have any questions or concerns, please feel free to call us at [phone]    Sincerely,    Johnny Cordova PA-C    Did you know?      You can schedule a video visit for follow-up appointments as well as future appointments for certain conditions.  Please see the below link.     [email or web address]    If you have not already done so,  I encourage you to sign up for [email or web address]).  This will allow you to review your results, securely communicate with a provider, and schedule virtual visits as well.

## 2022-08-15 DIAGNOSIS — I10 HYPERTENSION, GOAL BELOW 140/90: ICD-10-CM

## 2022-08-16 ENCOUNTER — VIRTUAL VISIT (OUTPATIENT)
Dept: BEHAVIORAL HEALTH | Facility: CLINIC | Age: 57
End: 2022-08-16
Payer: COMMERCIAL

## 2022-08-16 DIAGNOSIS — F41.1 GAD (GENERALIZED ANXIETY DISORDER): ICD-10-CM

## 2022-08-16 DIAGNOSIS — F41.0 PANIC DISORDER WITHOUT AGORAPHOBIA: ICD-10-CM

## 2022-08-16 DIAGNOSIS — F32.0 CURRENT MILD EPISODE OF MAJOR DEPRESSIVE DISORDER WITHOUT PRIOR EPISODE (H): ICD-10-CM

## 2022-08-16 PROCEDURE — 99215 OFFICE O/P EST HI 40 MIN: CPT | Mod: 95 | Performed by: NURSE PRACTITIONER

## 2022-08-16 RX ORDER — FLUOXETINE 40 MG/1
40 CAPSULE ORAL DAILY
Qty: 90 CAPSULE | Refills: 0 | Status: SHIPPED | OUTPATIENT
Start: 2022-08-16 | End: 2022-12-16

## 2022-08-16 RX ORDER — GABAPENTIN 300 MG/1
300 CAPSULE ORAL 2 TIMES DAILY
Qty: 180 CAPSULE | Refills: 0 | Status: SHIPPED | OUTPATIENT
Start: 2022-08-16 | End: 2023-01-02

## 2022-08-16 RX ORDER — DIAZEPAM 2 MG
2 TABLET ORAL DAILY
Qty: 90 TABLET | Refills: 0 | Status: SHIPPED | OUTPATIENT
Start: 2022-08-21 | End: 2022-11-29

## 2022-08-16 RX ORDER — LORAZEPAM 0.5 MG/1
0.5 TABLET ORAL DAILY PRN
Qty: 7 TABLET | Refills: 0 | Status: SHIPPED | OUTPATIENT
Start: 2022-08-16 | End: 2022-10-05

## 2022-08-16 NOTE — PROGRESS NOTES
"[facility]      Mental Health & Addiction Service Line    [facility] Clinic: Psychiatry Progress Note  Medication Management                Charts/documentation read prior to the appointment:  -2022            VISIT INFORMATION      Date:  2022       Number:  -3rd      Referral source:  -[facility]      Patient Identifying Information:  Legal name: Munir Storey  Preferred name: Munir  : 1965  Preferred pronouns: He/him      Participants:   -Patient  -Provider        Telehealth visit details:  Type of service:   Video  Patient location:   At home  Provider Location: [facility]  Platform utilized: [employer]    Start time: 1:40 pm  End time:  2:18 pm          INTERIM HX      -Went to the [facility] for  but the weather wasn't good  -Made it through this event    -Feeling physically better which is making me less anxious ... however only back to 80%  -Bought $300.00 worth of fishing gear of [employer], that's one way I know I\"m getting better = more interested in my hobbies  -Planned a fishing trip with a friend which initially made me anxious but was able to work through it = didn't result in a melt down or a panic attack  -Taking the grand-kids out fishin,7,4, and 2 y/o    -Trying to find a therapist but still looking  -The last therapist Antonio just told me to deep breath as a coping mechanism which I already know how to do  -Going to [facility]          PSYCHIATRIC ROS      Sleep:  -Decent  -Able to get back to sleep even though I woke up early anxious  -Previously I would have been up for the day perseverating      Mood/Anxiety:  -See PHQ-9 score    -See SANDRA-7 score        Suicidal ideations:  -Denies passive or active thoughts at this time        SIB:  -Denies engaging in self harm         Side effects:  -Prozac 60 mg = headaches and felt over-medicated        PSYCHIATRIC HISTORY    Suicide attempts:  -None reported      Inpatient " hospitalizations:  -None reported      ECT:  -None reported        Medication trials:     SSRIs:  -Prozac 60 mg: headaches and felt over medicated  -Zoloft     SNRIs:  -Cymbalta  -Effexor 75 mg    Other anxiolytics:  -Gabapentin 600 mg: can't tolerate as a single dosage = dizziness, lightheadedness    -Hydroxyzine 25 to 150 prn for anxiety, sleep = minimally effective       Benzodiazepines:  -Xanax           MEDICAL HISTORY    -The problem list was reviewed via the EMR prior to the appointment    -The patient denies any concerning physical and or medical symptoms during the interviewing process          MEDICATIONS      Current Outpatient Medications:      amLODIPine (NORVASC) 10 MG tablet, Take 1 tablet (10 mg) by mouth daily, Disp: 90 tablet, Rfl: 1     diazepam (VALIUM) 2 MG tablet, Take 1 tablet (2 mg) by mouth daily, Disp: 30 tablet, Rfl: 0     famotidine (PEPCID) 20 MG tablet, Take 1 tablet (20 mg) by mouth 2 times daily as needed For stomach. (Patient not taking: No sig reported), Disp: 180 tablet, Rfl: 1     FLUoxetine (PROZAC) 40 MG capsule, Take 1 capsule (40 mg) by mouth daily In addition to 20 mg for ttl: 60 mg/day, Disp: 30 capsule, Rfl: 1     gabapentin (NEURONTIN) 300 MG capsule, Take 1 capsule (300 mg) by mouth 2 times daily For anxiety, Disp: 60 capsule, Rfl: 1     hydrOXYzine (ATARAX) 50 MG tablet, Take 0.5-1 tablets (25-50 mg) by mouth 3 times daily as needed for anxiety And up to 100mg at bedtime for insomnia. OK to take 50mg overnight if you wake up overnight. (Patient not taking: Reported on 8/11/2022), Disp: 60 tablet, Rfl: 0     lisinopril-hydrochlorothiazide (ZESTORETIC) 20-25 MG tablet, Take 2 tablets by mouth daily, Disp: 180 tablet, Rfl: 0     meclizine (ANTIVERT) 12.5 MG tablet, Take 1 tablet (12.5 mg) by mouth 4 times daily as needed for dizziness (Patient not taking: Reported on 8/11/2022), Disp: 30 tablet, Rfl: 0     metoprolol succinate ER (TOPROL-XL) 25 MG 24 hr tablet, Take 1  tablet (25 mg) by mouth daily Monitor your blood pressure once weekly or if symptomatic. This medication can be increased if needed- let me know (<140/80) (Patient taking differently: Take 12.5 mg by mouth daily Monitor your blood pressure once weekly or if symptomatic. This medication can be increased if needed- let me know (<140/80)  Metoprolol is cut in half. States he is taking 12.5), Disp: 90 tablet, Rfl: 1     Misc Natural Products (T-RELIEF CBD+13 SL), Place 600 Units under the tongue daily as needed Hemp oil sublingual (Patient not taking: No sig reported), Disp: , Rfl:      nicotine (NICORETTE) 2 MG gum, Place 1 each (2 mg) inside cheek every hour as needed for other (for chewing tobacco) (Patient not taking: Reported on 8/11/2022), Disp: 100 each, Rfl: 1     omeprazole (PRILOSEC) 20 MG DR capsule, Take 1 capsule (20 mg) by mouth daily (for heartburn) (Patient not taking: Reported on 8/11/2022), Disp: 90 capsule, Rfl: 1     pediatric electrolyte (PEDIALYTE) SOLN solution, Take 1.25 mLs by mouth daily = 1/4 teaspoon, Disp: , Rfl:       If a controlled substance is prescribed during today's appointment:    -The [place] Prescription Monitoring Program has been reviewed and there are no current concerns with: diversionary activity, early refill requests, and or obtaining the medication from multiple providers.        VITALS    BP Readings from Last 3 Encounters:   08/11/22 (!) 144/84   07/01/22 (!) 150/74   06/03/22 (!) 142/82       Pulse Readings from Last 3 Encounters:   08/11/22 73   07/01/22 60   06/03/22 64       Wt Readings from Last 3 Encounters:   08/11/22 71.8 kg (158 lb 3.2 oz)   07/01/22 74.8 kg (164 lb 12.8 oz)   06/03/22 75.8 kg (167 lb 3.2 oz)         LABS    The following labs were reviewed prior to or during the appointment:  -7/1/2022        SCALES    PHQ 7/1/2022 7/1/2022 8/11/2022   PHQ-9 Total Score 5 5 5   Q9: Thoughts of better off dead/self-harm past 2 weeks Not at all Not at all Not at  all   F/U: Thoughts of suicide or self-harm - - -   F/U: Self harm-plan - - -   F/U: Self-harm action - - -   F/U: Safety concerns - - -        SANDRA-7 SCORE 7/1/2022 7/1/2022 8/11/2022   Total Score - 13 (moderate anxiety) -   Total Score 13 15 12               MENTAL STATUS EXAMINATION      Appearance: Adequately Groomed, Attire Appropriate for the Season  General Behavior:  Cooperative, Direct Eye Contact  Speech: Fluent, Normal rate and volume; Mildly excessive  Musculoskeletal:    -Gait not observed during t.h. visit  -No facial tics/tremors observed   -Motor coordination is grossly intact   Mood: It's getting better ... not as   Affect: Appropriate to Content of Speech and Circumstances   Attention: Intact  Orientation:  Person, Place, Time, Situation  Thought Associations:  Intact  Thought Content: Reality based   Thought Processes: Organized, Normal rate  Memory: No overt impairment  Language: Intact  Judgement: Good  Insight: Good        ASSESSMENT/CLINICAL IMPRESSIONS    Summary:    Munir Storey is a 58 y/o male with a history of: SANDRA, Panic Disorder MDD, and Alcohol abuse (in remission since 2013).     Has been working with TRISTAN Eden DNP, PMHNP-BC, [facility] clinician (now on a MYRNA) since 4/12/2022 to re-stabilize exacerbations of anxiety and as   well as depressive symptoms.     Establ[facility] care at the [facility] on 6/13/2022 and then followed up on 7/1/2022 for bridging/management of psychotropics with the goal of returning to PCP or establishing long term outpatient psychiatric services (if necessary).    ----------------------    Munir summarizes he didn't tolerate Prozac 60 mg (headaches and it felt like it was to much) therefore he decreased it back to 40 mg per day.   Also he couldn't remember to take the Gabapentin 300 mg TID so it taking it BID in the AM + dinnertime.    Mood is doing reasonably well, is becoming interested in engaging in hobbies again + likes gardening.  Also states he is going to  "try and start exercising a bit more.      Notes baseline threshold of daily anxiety has reduced on Valium 1 mg (script says 2 mg, however he has been cutting them in half).    Comments it doesn't really feel like it's doing much but \"I know that it's helping because I haven't been experiencing panic attacks\" since starting it.   Suggested taking the full 2 mg of the Valium.    Although Munir hasn't been experiencing panic attacks, he comments it would be helpful if he still had a small amount of Lorazepam (provide #7), since they work quickly and it feels reassuring to just have them around, even though \"I probably won't need them.\"     Overall, functionality has improved and Munir rates being back to 80%.   He continues to have thoughts surrounding his health and risks given family hx, however he isn't spending as much time/energy or isn't as fearful a major health event  (such as a heart attack, PE, stroke, etc.) will occur.       He is also able to verbalize several non-pharmacological ways he is managing stress levels while looking for a new therapist, as a means to continue to process and deal with the triggers \"I know I have\".    Denies suicidal ideations/thoughts of harm towards others and is forward thinking throughout.      DSM-V and or working diagnosis:      1. SANDRA     2. Panic Disorder without Agoraphobia     3. MDD, mild to moderate, single episode     4. Tobacco dependence due to chewing tobacco               SAFETY EVALUATION:     Suicidal ideations:  -denies  Homicidal ideations:  -denies  Access to guns:   -yes; locked  Protective and mitigating factors:  -spouse  -no prior attempts  -engaged in outpatient mental health services   Risk factors:  -male, age  -hx of ALEX (currently in remission)  Risk assessment:  -low to medium       TREATMENT PLAN      Medications:  Start:  Diazepam 2 mg (1 tab) daily;  increase from 1 mg    8/21/2022, #90 MUST LAST 90 DAYS    Lorazepam .5 mg (1 tab) per day as needed " for severe anxiety, panic attacks;  #7      Continue:  Fluoxetine/Prozac 40 mg daily     Gabapentin 300 mg twice daily; TDD = 600 mg              Labs:  -None obtained          Therapy:  -As able per ability to find a new therapist        Non-pharmacological modalities:  -Continue: gardening, fishing, and being active        Return to Clinic or Referrals:  -Please follow up at the [facility] for medication management in:   4 weeks                Total time:  47 minutes per:    -Review of EMR  -Appointment time   -Documentation            Emma MONTALVO [facility]    --------------------------------------------------------------------------------------------------------------------------        TREATMENT RISK STATEMENT    The risks, benefits, alternatives, and potential adverse effects have been explained and are understood by the patient.  The patient agrees to the treatment plan with their ability to do so.      The patient knows to call the clinic: [phone]  for any problems or concerns until the next psychiatry visit, regardless if it is within or outside of the [facility] system.     If unable to reach clinic staff (via phone call or medical messaging) during the normal business hours: 8:00 am to 4:30 pm then it is recommended accessing the nearest: emergency department, urgent care facility, or utilizing local (varies based on county of residence) and national crisis #'s or text messaging services for immediate assistance.          --------------------------------------------------------------------------------------------------------------------------        If applicable the following has been discussed with the patient, parent/guardian, and or attending family member during the appointment:      1. Risks of polypharmacy and possible drug interactions with current medication list + common OTC products, herbs, and supplements.    Moving forward, it is suggested to intermittently check-in  with a clinic or retail pharmacist whenever new medications or OTC/h/s are consumed.    2. Recommendation to adhere to CDC guidelines as it relates alcohol consumption.  If taking benzodiazepines, you should abstain from alcohol intake due to increased risks of CNS and respiratory depression, as well as psychomotor impairment.    3. If possible, it is recommended to avoid concurrent use of prescribed:  opioids  +  benzodiazepines due to increased risks of CNS and respiratory depression, as well as the increased risk of overdose.     4. Recommendation to minimize and or abstain from THC use (unless the pt. is prescribed medical marijuana).    5. Recommendation to abstain from illicit substances including but not limited to the following: heroin, street fentanyl, cocaine, methamphetamines, and bath salts.    6. Do not take opioids, stimulants, and or other prescription medications unless they are specifically prescribed for you.    7. Recommendation to abstain from tobacco/smoking, alcohol, THC, and all illicit substances if trying to become or are pregnant.    8. Black Box Warnings associated with the prescribed psychotropic(s).    9. Potential adverse effects of antipsychotics including but not limited to the following: weight gain, metabolic syndrome, EPS/Tardive Dyskinesias.    10. Potential CV and neurological adverse effects of stimulants including but not limited to the following:  sudden death, MI, stroke, HTN, cardiomyopathy (long term use) as well as seizures.

## 2022-08-16 NOTE — PROGRESS NOTES
" This video/telephone visit will be conducted virtually between you and your provider. We have found that certain health care needs can be provided without the need for an in-person physical exam. This service lets us provide the care you need with a video /telephone conversation. If a prescription is necessary we can send it directly to your pharmacy. If lab work is needed we can place an order for that and you can then stop by our lab to have the test done at a later time.\"   Just as we bill insurance for in-person visits, we also bill insurance for video/telephone visits. If you have questions about your insurance coverage, we recommend that you speak with your insurance company.      Patient/Parent has given verbal consent for video/Telephone visit? yes    Patient would like the video visit invitation sent by: Nimbus Datamarnie    Patient verified allergies, medications and pharmacy via phone. Patient states they are ready for visit.     Mental Health &Addiction ([facility]&A)[facility] (TC):     Provides Patient Support While Waiting to Access Programmatic and Outpatient MH&A Care and Provides [facility] Crisis Assessment Services     FOLLOW-UP VISIT  ____________________________________________      \"The [facility] is a temporary psychiatry service that helps to bridge the time to your next appointment. It is not intended to be a long-term service and you are expected to attend your scheduled appointment with your next provider.\"  [x] Patient/Parent verbalized understanding    If you need support between appointments, please call [phone] and let them know you're seen by [facility] Psychiat[facility]. You may also send a FilterSure message to reach us.    General-     Most pressing needs at this time: talk about meds     Mental Health currently: \" way better at 80 percent\"    Any changes to your physical health: denies         Medications-     Injectable medications currently prescribed: none    If yes, do you need an " "appointment for the next injection: NA     Any Controlled Substances that you are prescribed: Gabapentin and Diazepam    Any refills you are aware that you'll need soon: Gabapentin and Diazepam       Primary care provider: Magalis Morales MD        SANDRA-7 scores:  12  ( completed on 8/11)  SANDRA-7 SCORE 7/1/2022 8/11/2022   Total Score 13 (moderate anxiety) -   Total Score 15 12       PHQ-9 scores: 5 ( completed on 8/11)  PHQ-9 SCORE 7/1/2022 8/11/2022   PHQ-9 Total Score MyChart 5 (Mild depression) -   PHQ-9 Total Score 5 5         Anything the provider should be aware of for today's appointment: Went down to Prozac 40 mg. Pt c/o of \" I can't find a good therapist\"     New (awaiting) Mental health provider or next programming: pt danielle Eden    Date of scheduled apt: DAVY Singh on August 16, 2022 at 1:21 PM   "

## 2022-08-16 NOTE — PATIENT INSTRUCTIONS
Start:  Diazepam 2 mg (1 tab) daily;  increase from 1 mg    8/21/2022, #90 MUST LAST 90 DAYS    Lorazepam .5 mg (1 tab) per day as needed for severe anxiety, panic attacks;  #7      Continue:  Fluoxetine/Prozac 40 mg daily     Gabapentin 300 mg twice daily; TDD = 600 mg

## 2022-08-17 RX ORDER — HYDROCHLOROTHIAZIDE 12.5 MG/1
12.5 TABLET ORAL DAILY
Qty: 90 TABLET | Refills: 1 | OUTPATIENT
Start: 2022-08-17

## 2022-08-17 NOTE — TELEPHONE ENCOUNTER
Combination pill sent to pharmacy 8/11/22. Note to pharmacy informing them of this.     Nasreen Montoya RN

## 2022-09-06 ENCOUNTER — TELEPHONE (OUTPATIENT)
Dept: FAMILY MEDICINE | Facility: CLINIC | Age: 57
End: 2022-09-06

## 2022-09-06 NOTE — TELEPHONE ENCOUNTER
MTM referral from: [facility] visit (referral by provider)    MTM referral outreach attempt #2 on September 6, 2022 at 9:49 AM      Outcome: Patient not reachable after several attempts, will route to [facility] Pharmacist/Provider as an FYI.  [facility] scheduling number is [phone].  Thank you for the referral.    Modesta Cohn [facility]

## 2022-09-29 DIAGNOSIS — F41.1 GAD (GENERALIZED ANXIETY DISORDER): ICD-10-CM

## 2022-09-29 DIAGNOSIS — F41.0 PANIC DISORDER WITHOUT AGORAPHOBIA: ICD-10-CM

## 2022-09-29 RX ORDER — LORAZEPAM 0.5 MG/1
0.5 TABLET ORAL DAILY PRN
Qty: 7 TABLET | Refills: 0 | Status: CANCELLED | OUTPATIENT
Start: 2022-09-29

## 2022-09-29 NOTE — TELEPHONE ENCOUNTER
"Patient calling and requesting a refill on \"pended\" medication.    ROSLYN Guerrero  [facility]    "

## 2022-10-04 ENCOUNTER — MYC REFILL (OUTPATIENT)
Dept: BEHAVIORAL HEALTH | Facility: CLINIC | Age: 57
End: 2022-10-04

## 2022-10-04 DIAGNOSIS — F41.1 GAD (GENERALIZED ANXIETY DISORDER): ICD-10-CM

## 2022-10-04 DIAGNOSIS — F41.0 PANIC DISORDER WITHOUT AGORAPHOBIA: ICD-10-CM

## 2022-10-05 RX ORDER — LORAZEPAM 0.5 MG/1
0.5 TABLET ORAL DAILY PRN
Qty: 7 TABLET | Refills: 0 | OUTPATIENT
Start: 2022-10-05

## 2022-10-05 RX ORDER — LORAZEPAM 0.5 MG/1
0.5 TABLET ORAL DAILY PRN
Qty: 7 TABLET | Refills: 0 | Status: SHIPPED | OUTPATIENT
Start: 2022-10-05 | End: 2022-11-25

## 2022-10-05 NOTE — TELEPHONE ENCOUNTER
ROSLYN RN received a refill request from [facility] for Ativan 0.5 mg. This refill request was denied.  This is a patient of Jaqueline Eden.  Emma Martinez bridged her psychiatric care during Jaqueline Eden's MYRNA.  Jaqueline Eden has returned from her MYRNA. Refill requests for psychiatric medications should be routed to Jaqueline Eden.  ROSLYN RN spoke to [facility] Pharmacist who will correct this in their system and reach out to the patient.  ROSLYN RN attempted to phone this patient.  There was no answer.  Unable to leave voice message.

## 2022-10-18 DIAGNOSIS — I10 HYPERTENSION, GOAL BELOW 140/90: ICD-10-CM

## 2022-10-19 RX ORDER — AMLODIPINE BESYLATE 10 MG/1
10 TABLET ORAL DAILY
Qty: 90 TABLET | Refills: 1 | Status: SHIPPED | OUTPATIENT
Start: 2022-10-19 | End: 2023-04-17

## 2022-11-25 ENCOUNTER — TELEPHONE (OUTPATIENT)
Dept: FAMILY MEDICINE | Facility: CLINIC | Age: 57
End: 2022-11-25

## 2022-11-25 ENCOUNTER — TELEPHONE (OUTPATIENT)
Dept: PSYCHIATRY | Facility: CLINIC | Age: 57
End: 2022-11-25

## 2022-11-25 DIAGNOSIS — F41.0 PANIC DISORDER WITHOUT AGORAPHOBIA: ICD-10-CM

## 2022-11-25 DIAGNOSIS — F41.1 GAD (GENERALIZED ANXIETY DISORDER): ICD-10-CM

## 2022-11-25 RX ORDER — LORAZEPAM 0.5 MG/1
0.5 TABLET ORAL DAILY PRN
Qty: 7 TABLET | Refills: 0 | Status: SHIPPED | OUTPATIENT
Start: 2022-11-25 | End: 2023-01-13

## 2022-11-25 NOTE — TELEPHONE ENCOUNTER
Reason for call:  Medication   If this is a refill request, has the caller requested the refill from the pharmacy already? Yes  Will the patient be using a [facility]? No  Name of the pharmacy and phone number for the current request:   [id number] [place] [facility], [place] - [address] [place] [address] DRIVE  Name of the medication requested: LORazepam (ATIVAN) 0.5 MG tablet    Other request: pt also wants to talk with nursing re: inc Anxiety.   Reports inc B/P not sure if that is causing the anxiety or vise versa. '  Did kendal follow up but is out of med's would like to re-fill today.     Phone number to reach patient:  Home number on file [phone] (home)    Best Time:  any    Can we leave a detailed message on this number?  YES    Travel screening: Not Applicable

## 2022-11-25 NOTE — TELEPHONE ENCOUNTER
Patient requesting Lorazepam refill for increased anxiety causing his blood pressure to go up; having tingling and scratchy sensation in eyes.    Highest blood pressure currently 160/82; has been normally 130's/80's. Feels more tired than usual.    Denies pain, headaches, lightheadedness.    Scheduled appointment for BP/anxiety follow-up this Monday (11/28/2022).      Informed patient if he should experience any worsening symptoms (diastolic >100, severe headache, chest pain, lightheadedness, blurred vision) to go to emergency room and not wait for an appointment.    Patient verbalized agreement and understanding.  Jeri Dominguez RN

## 2022-11-25 NOTE — TELEPHONE ENCOUNTER
RN attempted to reach patient for follow up as requested. No answer. RN left message referring to [employer] message     ...........    Date of Last Office Visit:8/16/22 with RICK   5/23/22 with Karey  Date of Next Office Visit: 12/1/22 with KAREY  No shows since last visit: 0  Cancellations since last visit: 0    Disp Refills Start End COURTNEY    LORazepam (ATIVAN) 0.5 MG tablet 7 tablet 0 10/5/2022  No   Sig - Route: Take 1 tablet (0.5 mg) by mouth daily as needed for anxiety (or severe panic attacks) Please schedule follow-up with Jaqueline Eden for future refills - Oral          Review of [place] ?:   Medication last filled date: 10/5/22 Qty filled: 7  Other controlled substance on [place] ?: yes  If yes, is this a new medication?: NO  If yes, name of medication:  and date filled:     Lapse in medication adherence greater than 5 days?: NO PRN    Medication refill request verified as identical to current order?: YES  Result of Last DAM, VPA, Li+ Level, CBC, or Carbamazepine Level (at or since last visit): NA    Last visit treatment plan: Medications:  Start:  Diazepam 2 mg (1 tab) daily;  increase from 1 mg     8/21/2022, #90 MUST LAST 90 DAYS     Lorazepam .5 mg (1 tab) per day as needed for severe anxiety, panic attacks;  #7        Continue:  Fluoxetine/Prozac 40 mg daily     Gabapentin 300 mg twice daily; TDD = 600 mg      Return to Clinic or Referrals:  -[facility] in:   4 weeks             []Medication refilled per  Medication Refill in Ambulatory Care  policy.  [x]Medication unable to be refilled by RN due to criteria not met as indicated below:    []Eligibility - not seen in the last year   []Supervision - no future appointment   [x]Compliance - no shows, cancellations or lapse in therapy   []Verification - order discrepancy   [x]Controlled medication   [x]Medication not included in policy   []90-day supply request   []Other

## 2022-11-28 ENCOUNTER — OFFICE VISIT (OUTPATIENT)
Dept: FAMILY MEDICINE | Facility: CLINIC | Age: 57
End: 2022-11-28
Payer: COMMERCIAL

## 2022-11-28 VITALS
BODY MASS INDEX: 27.15 KG/M2 | HEART RATE: 59 BPM | TEMPERATURE: 97.6 F | HEIGHT: 67 IN | SYSTOLIC BLOOD PRESSURE: 162 MMHG | DIASTOLIC BLOOD PRESSURE: 84 MMHG | OXYGEN SATURATION: 96 % | WEIGHT: 173 LBS

## 2022-11-28 DIAGNOSIS — F33.1 MDD (MAJOR DEPRESSIVE DISORDER), RECURRENT EPISODE, MODERATE (H): ICD-10-CM

## 2022-11-28 DIAGNOSIS — R09.81 NASAL CONGESTION: ICD-10-CM

## 2022-11-28 DIAGNOSIS — F41.1 GAD (GENERALIZED ANXIETY DISORDER): Chronic | ICD-10-CM

## 2022-11-28 DIAGNOSIS — I10 HYPERTENSION, GOAL BELOW 140/90: Primary | Chronic | ICD-10-CM

## 2022-11-28 PROCEDURE — 99214 OFFICE O/P EST MOD 30 MIN: CPT | Performed by: PHYSICIAN ASSISTANT

## 2022-11-28 RX ORDER — FLUTICASONE PROPIONATE 50 MCG
1 SPRAY, SUSPENSION (ML) NASAL DAILY
Qty: 16 G | Refills: 0 | Status: SHIPPED | OUTPATIENT
Start: 2022-11-28 | End: 2023-01-26

## 2022-11-28 ASSESSMENT — ENCOUNTER SYMPTOMS
PARESTHESIAS: 0
WEAKNESS: 0
FEVER: 0
SHORTNESS OF BREATH: 0
HEADACHES: 0
NERVOUS/ANXIOUS: 1
SLEEP DISTURBANCE: 1
COUGH: 0
NUMBNESS: 0
LIGHT-HEADEDNESS: 1
RHINORRHEA: 0

## 2022-11-28 ASSESSMENT — ANXIETY QUESTIONNAIRES
7. FEELING AFRAID AS IF SOMETHING AWFUL MIGHT HAPPEN: MORE THAN HALF THE DAYS
3. WORRYING TOO MUCH ABOUT DIFFERENT THINGS: NEARLY EVERY DAY
GAD7 TOTAL SCORE: 11
8. IF YOU CHECKED OFF ANY PROBLEMS, HOW DIFFICULT HAVE THESE MADE IT FOR YOU TO DO YOUR WORK, TAKE CARE OF THINGS AT HOME, OR GET ALONG WITH OTHER PEOPLE?: SOMEWHAT DIFFICULT
7. FEELING AFRAID AS IF SOMETHING AWFUL MIGHT HAPPEN: MORE THAN HALF THE DAYS
5. BEING SO RESTLESS THAT IT IS HARD TO SIT STILL: NOT AT ALL
GAD7 TOTAL SCORE: 11
6. BECOMING EASILY ANNOYED OR IRRITABLE: NOT AT ALL
4. TROUBLE RELAXING: NOT AT ALL
GAD7 TOTAL SCORE: 11
IF YOU CHECKED OFF ANY PROBLEMS ON THIS QUESTIONNAIRE, HOW DIFFICULT HAVE THESE PROBLEMS MADE IT FOR YOU TO DO YOUR WORK, TAKE CARE OF THINGS AT HOME, OR GET ALONG WITH OTHER PEOPLE: SOMEWHAT DIFFICULT
2. NOT BEING ABLE TO STOP OR CONTROL WORRYING: NEARLY EVERY DAY
1. FEELING NERVOUS, ANXIOUS, OR ON EDGE: NEARLY EVERY DAY

## 2022-11-28 ASSESSMENT — PATIENT HEALTH QUESTIONNAIRE - PHQ9
SUM OF ALL RESPONSES TO PHQ QUESTIONS 1-9: 6
10. IF YOU CHECKED OFF ANY PROBLEMS, HOW DIFFICULT HAVE THESE PROBLEMS MADE IT FOR YOU TO DO YOUR WORK, TAKE CARE OF THINGS AT HOME, OR GET ALONG WITH OTHER PEOPLE: SOMEWHAT DIFFICULT
SUM OF ALL RESPONSES TO PHQ QUESTIONS 1-9: 6

## 2022-11-28 ASSESSMENT — PAIN SCALES - GENERAL: PAINLEVEL: NO PAIN (0)

## 2022-11-28 NOTE — PATIENT INSTRUCTIONS
I am concerned that your anxiety has causing an increase in your blood pressure.  Please continue to make healthy lifestyle changes and continue your blood pressure meds as prescribed.  You can check your blood pressure 2 times per week, while calm and at rest, with your arm at heart level and bring your log for review at your next visit with Dr. Morales.    For depression and anxiety, please continue with your scheduled appointment on Thursday with your psychiatrist.  Additionally, do not miss your counseling visit next week.  Please let them know your concerns about your ongoing depression and anxiety.    For nasal congestion you have been prescribed Flonase.  This will help with nasal congestion that flares up seasonally due to weather changes.  Additionally, please complete your sleep study as planned.  Improving your sleep can also help with depression and anxiety.    Please follow-up with Dr. Morales as scheduled.  Additionally, I have added crisis information below in case you are ever in need of talking to someone urgently.      In an emergency--call 911  Don't leave the person alone. Anyone who is at imminent risk of suicide needs psychiatric services right away. The person must be constantly watched, and never left out of sight. Call 911 or a 24-hour suicide crisis hotline. You can search for this online. You can also take the person to the nearest hospital emergency room.     Don't keep it a secret and don't wait  Call a mental health clinic or a licensed mental health professional in your area right away. This may be a psychiatrist, clinical psychologist, psychiatric or licensed clinical , marriage and family counselor, or clergy. If you don't know how to contact such professionals and there is an immediate risk, call 911. Tell them you need help for a person who is thinking about suicide.     Resources  National Suicide Prevention Cbpqvlwr749-585-4634  (618-102-FZTZ)www.suicidepreventionlifeline.org  National Suicide Flgcffs964-530-8107 (800-SUICIDE)  National [place] of Mental Vtztoh463-367-4753ose.[facility].nih.gov  National [place] on Mental Zfrmnas926-466-2231fia.maritza.org  Mental Health Omodjfg147-836-3485vya.[facility].org

## 2022-12-01 ENCOUNTER — VIRTUAL VISIT (OUTPATIENT)
Dept: PSYCHIATRY | Facility: CLINIC | Age: 57
End: 2022-12-01
Payer: COMMERCIAL

## 2022-12-01 ENCOUNTER — VIRTUAL VISIT (OUTPATIENT)
Dept: BEHAVIORAL HEALTH | Facility: CLINIC | Age: 57
End: 2022-12-01
Payer: COMMERCIAL

## 2022-12-01 DIAGNOSIS — F33.1 MODERATE EPISODE OF RECURRENT MAJOR DEPRESSIVE DISORDER (H): ICD-10-CM

## 2022-12-01 DIAGNOSIS — F41.1 GAD (GENERALIZED ANXIETY DISORDER): Primary | ICD-10-CM

## 2022-12-01 DIAGNOSIS — F41.0 PANIC DISORDER WITHOUT AGORAPHOBIA: ICD-10-CM

## 2022-12-01 DIAGNOSIS — F41.1 GAD (GENERALIZED ANXIETY DISORDER): ICD-10-CM

## 2022-12-01 DIAGNOSIS — F45.21 ILLNESS ANXIETY DISORDER: Primary | ICD-10-CM

## 2022-12-01 PROCEDURE — 99215 OFFICE O/P EST HI 40 MIN: CPT | Mod: 95 | Performed by: STUDENT IN AN ORGANIZED HEALTH CARE EDUCATION/TRAINING PROGRAM

## 2022-12-01 PROCEDURE — 90832 PSYTX W PT 30 MINUTES: CPT | Mod: 95 | Performed by: SOCIAL WORKER

## 2022-12-01 ASSESSMENT — PATIENT HEALTH QUESTIONNAIRE - PHQ9
SUM OF ALL RESPONSES TO PHQ QUESTIONS 1-9: 7
SUM OF ALL RESPONSES TO PHQ QUESTIONS 1-9: 7
10. IF YOU CHECKED OFF ANY PROBLEMS, HOW DIFFICULT HAVE THESE PROBLEMS MADE IT FOR YOU TO DO YOUR WORK, TAKE CARE OF THINGS AT HOME, OR GET ALONG WITH OTHER PEOPLE: SOMEWHAT DIFFICULT
SUM OF ALL RESPONSES TO PHQ QUESTIONS 1-9: 7
SUM OF ALL RESPONSES TO PHQ QUESTIONS 1-9: 7
10. IF YOU CHECKED OFF ANY PROBLEMS, HOW DIFFICULT HAVE THESE PROBLEMS MADE IT FOR YOU TO DO YOUR WORK, TAKE CARE OF THINGS AT HOME, OR GET ALONG WITH OTHER PEOPLE: SOMEWHAT DIFFICULT

## 2022-12-01 NOTE — PROGRESS NOTES
[facility]  December 1, 2022      Behavioral Health Clinician Progress Note    Patient Name: Munir Storey           Service Type:  Individual      Service Location:   MyChart / Email (patient reached)     Session Start Time: 14:00  Session End Time: 14:37      Session Length: 16 - 37      Attendees: Patient and wife, Deonte     Service Modality:  Video Visit:      Provider verified identity through the following two step process.  Patient provided:  Patient is known previously to provider and Patient was verified at admission/transfer    Telemedicine Visit: The patient's condition can be safely assessed and treated via synchronous audio and visual telemedicine encounter.      Reason for Telemedicine Visit: Services only offered telehealth    Originating Site (Patient Location): Patient's home    Distant Site (Provider Location): Provider Remote Setting- Home Office    Consent:  The patient/guardian has verbally consented to: the potential risks and benefits of telemedicine (video visit) versus in person care; bill my insurance or make self-payment for services provided; and responsibility for payment of non-covered services.     Patient would like the video invitation sent by:  My Chart    Mode of Communication:  Video Conference via well    As the provider I attest to compliance with applicable laws and regulations related to telemedicine.    Visit Activities (Refresh list every visit): [facility] Only    Diagnostic Assessment Date: 04/12/2022  Treatment Plan Review Date: Due by 03/01/2023  See Flowsheets for today's PHQ-9 and SANDRA-7 results  Previous PHQ-9:   PHQ-9 SCORE 7/1/2022 8/11/2022 11/28/2022   PHQ-9 Total Score [facility] 5 (Mild depression) - 6 (Mild depression)   PHQ-9 Total Score 5 5 6   Some encounter information is confidential and restricted. Go to Review Flowsheets activity to see all data.     Previous SANDRA-7:   SANDRA-7 SCORE 7/1/2022 8/11/2022 11/28/2022  "  Total Score 13 (moderate anxiety) - 11 (moderate anxiety)   Total Score 15 12 11   Some encounter information is confidential and restricted. Go to Review Flowsheets activity to see all data.       BANG LEVEL:  No flowsheet data found.    DATA  Extended Session (60+ minutes): No  Interactive Complexity: No  Crisis: No  H Patient: No    Treatment Objective(s) Addressed in This Session:  Target Behavior(s): anxiety and depression    Depressed Mood: Increase interest, engagement, and pleasure in doing things  Decrease frequency and intensity of feeling down, depressed, hopeless  Improve quantity and quality of night time sleep / decrease daytime naps  Anxiety: will experience a reduction in anxiety     Current Stressors / Issues:   Update: \"ok.\" \"Functioning through life but not really happy.\" Is able to feel positive emotions but not feeling really \"excited or elated.\" Still having anxiety and worrisome thinking but happening less often and not as acute has before. Is using PRN medication but not as often and able to cut in half. Has been getting stressed over little things, like the grandkids being there for Thanksgiving. Not feeling like himself and worried he can't participate like he would want. Reflects on how he used to be and now feels he is too anxious and worried. Blood pressure continues to be high but overall his health has been fine.  Was taking his reading three times a day and PCP recently told him to stop and not take as often. PCP feels he is medically stable and not overly concerned. Patient does feel that he has seen improvement in his anxiety since getting diagnosed 6 months ago.  He is having some areas of life that he is not worried about and cannot relax more.  Patient questions how long it will take before he is able to feel relief in all areas of life.  Patient does express interest that he has a lack of energy or motivation to go do things, even things that he typically enjoys.  Patient's " wife expresses concerns that patient does seem to fixate on things but is unable to communicate why he has fears or worries.  Patient does withdraw and is not participating in a lot of things that he used to.  SI: denies. Not have in months.  [facility] validated patient for his ongoing distress.  [facility] processed patient starting to pay attention to rational and irrational fears and provided education about this.  [facility] also reminded patient about anxiety typically coming from a fear of the unknown or feeling a lack of control.  Patient identified that he has been trying to work on confidence in his therapy and needs to work on this more.  [facility] assisted patient in identifying ways that he can try to confront his irrational fears by focusing on things that are within his control and preparing a backup plan to use if needed.  [facility] also discussed patient trying to motivate himself to do things that he enjoys as he would see improvement once he is engaged in these positive activities.  [facility] normalized that working on mental health and seeing improvement can take a long time and encouraged patient to continue in his current treatment as it is helping.    Stressors: household, family, not doing things he typically enjoys    Tx: switched to seeing Yina with [facility] in person for weekly sessions. [place] stuck with his prior therapist, Antonio. Working on health changes and triggers. Ongoing fears of something possibly happening to him.    Most Important: continue working on anxiety and depression    Progress on Treatment Objective(s) / Homework:  Satisfactory progress - ACTION (Actively working towards change); Intervened by reinforcing change plan / affirming steps taken    Motivational Interviewing    MI Intervention: Expressed Empathy/Understanding, Supported Autonomy, Collaboration, Evocation, Reflections: simple and complex, Change talk (evoked) and Reframe     Change Talk Expressed by the Patient: Desire to change  Ability to change Reasons to change Need to change Committment to change Activation Taking steps    Provider Response to Change Talk: E - Evoked more info from patient about behavior change, A - Affirmed patient's thoughts, decisions, or attempts at behavior change, R - Reflected patient's change talk and S - Summarized patient's change talk statements    Also provided psychoeducation about behavioral health condition, symptoms, and treatment options    Care Plan review completed: Yes    Medication Review:  No changes to current psychiatric medication(s)    Medication Compliance:  Yes    Changes in Health Issues:   Yes: Blood pressure, Associated Psychological Distress    Chemical Use Review:   Substance Use: Chemical use reviewed, no active concerns identified      Tobacco Use: No change in amount of tobacco use since last session.  Patient declined discussion at this time    Assessment: Current Emotional / Mental Status (status of significant symptoms):  Risk status (Self / Other harm or suicidal ideation)  Patient has had a history of suicidal ideation: Patient reports suicidal ideation that comes and goes.  He denies he has a plan or intent.  He denies any prior attempts  Patient denies current fears or concerns for personal safety.  Patient denies current or recent suicidal ideation or behaviors.  Patient denies current or recent homicidal ideation or behaviors.  Patient denies current or recent self injurious behavior or ideation.  Patient denies other safety concerns.  A safety and risk management plan has not been developed at this time, however patient was encouraged to call [place] [phone] should there be a change in any of these risk factors.    Appearance:   Appropriate   Eye Contact:   Good   Psychomotor Behavior: Normal   Attitude:   Cooperative   Orientation:   All  Speech   Rate / Production: Normal    Volume:  Normal   Mood:    Anxious  Depressed   Affect:    Appropriate   Thought  Content:  Clear   Thought Form:  Coherent  Logical   Insight:    Good     Diagnoses:  1. SANDRA (generalized anxiety disorder)    2. Panic disorder without agoraphobia    3. Moderate episode of recurrent major depressive disorder (H)      Collateral Reports Completed:  Communicated with: [facility] Team    Plan: (Homework, other):  Patient was given information about behavioral services and encouraged to schedule a follow up appointment with the clinic [facility] as needed.  He was also given information about mental health symptoms and treatment options .  Patient is connected with an individual therapist and denies any other mental health needs at this time.  CD Recommendations: No indications of CD issues.                                               Individual Treatment Plan    Patient's Name: Munir Storey  YOB: 1965    Date of Creation: 12/01/2022  Date Treatment Plan Last Reviewed/Revised: Pending    DSM5 Diagnoses: 296.32 (F33.1) Major Depressive Disorder, Recurrent Episode, Moderate _ and With anxious distress or 300.01 (F41.0) Panic Disorder  300.02 (F41.1) Generalized Anxiety Disorder  Psychosocial / Contextual Factors: Patient lives with his wife and adult children.  Patient is working.  Patient does have social supports.  PROMIS (reviewed every 90 days): 20    Referral / Collaboration:  Patient is connected with an individual therapist and declines any other needs at this time.    Anticipated number of session for this episode of care: 3-6 sessions  Anticipation frequency of session: Monthly  Anticipated Duration of each session: 16-37 minutes  Treatment plan will be reviewed in 90 days or when goals have been changed.       MeasurableTreatment Goal(s) related to diagnosis / functional impairment(s)  Goal 1: Patient will report continued improvement in his anxiety and depression.    I will know I've met my goal when I feel more like my old self.      Objective #A (Patient Action)    Patient will  identify Rational and irrational fears / thoughts that contribute to feeling anxious.  Status: New - Date: 12/01/2022     Intervention(s)  Therapist will teach emotional recognition/identification. CBT for anxiety.    Objective #B  Patient will use cognitive strategies identified in therapy to challenge anxious thoughts.  Status: New - Date: 12/01/2022     Intervention(s)  Therapist will teach emotional regulation skills. CBT for anxiety skills.    Objective #C  Patient will Increase interest, engagement, and pleasure in doing things  Identify negative self-talk and behaviors: challenge core beliefs, myths, and actions.  Status: New - Date: 12/01/2022     Intervention(s)  Therapist will assign homework For patient to use activation and reconnect with interests and supports.    Patient has reviewed and agreed to the above plan.      PACO Willson  December 1, 2022

## 2022-12-01 NOTE — PROGRESS NOTES
Munir Storey  is being evaluated via a billable video visit.      How would you like to obtain your AVS? [employer]  For the video visit, send the invitation by: Send to e-mail at: [email or web address]  Will anyone else be joining your video visit? Yenny Rorené ROSANA        [facility] PSYCHIATRY SERVICE FOLLOW-UP     Name:  Munir Storey  : 1965     Telemedicine Visit: The patient's condition can be safely assessed and treated via synchronous audio and visual telemedicine encounter.      Reason for Telemedicine Visit: COVID 19 pandemic and the social and physical recommendations by the CDC and [facility].      Originating Site (Patient Location): Patient's home     Distant Site (Provider Location): Provider Remote Setting     Consent:  The patient/guardian has verbally consented to: the potential risks and benefits of telemedicine (video visit or phone) versus in person care; bill my insurance or make self-payment for services provided; and responsibility for payment of non-covered services.     Mode of Communication:  [employer] video platform      As the provider I attest to compliance with applicable laws and regulations related to telemedicine.    IDENTIFICATION     Patient is a 57 year old year old White, male  who presents for follow-up medication management with [facility].  Patient was initially referred by their PCP. Patient attended the session alone.     Patient care team: Patient Care Team:  Magalis Morales MD as PCP - General (Family Medicine)  Nasreen Martínez PA-C as Assigned PCP  Sadi Shelley MD as Assigned Heart and Vascular Provider  Roberto Cabral MD as Assigned Musculoskeletal Provider  Peggy Eden NP as Assigned Neuroscience Provider  Ralph Monzon MD as MD (Psychiatry)  Betty Rivers [facility] as Pharmacist (Pharmacist)  Emma Martinez APRN CNP as Assigned Behavioral Health Provider    INTERIM HISTORY   Pt was last seen in [facility] for  "follow-up on 5/23/22. At that time, the plan included decrease gabapentin to BID, continue lorazepam, fluoxetine 40mg, hydroxyzine 25-50mg TID PRN, 100mg at bedtime PRN.    Interim pt communication:  Seen in TC June-Sept    Available records were reviewed prior to visit. TC 6/13/22 - changes incl. gabapentin changed to 600mg daily, nicotine gum q1h prn  TC 7/1/22 - changes incl increaseing fluoxetine to 60mg, change gabapentin to 300mg BID  TC 8/16/22 - changes incl start diazepam 2mg daily, continue other medications    HISTORY OF PRESENT ILLNESS     Per [facility] Maura Laureano during today's team-based visit: \"MH Update: \"ok.\" \"Functioning through life but not really happy.\" Is able to feel positive emotions but not feeling really \"excited or elated.\" Still having anxiety and worrisome thinking but happening less often and not as acute has before. Is using PRN medication but not as often and able to cut in half. Has been getting stressed over little things, like the grandkids being there for Thanksgiving. Not feeling like himself and worried he can't participate like he would want. Reflects on how he used to be and now feels he is too anxious and worried. Blood pressure continues to be high but overall his health has been fine.  Was taking his reading three times a day and PCP recently told him to stop and not take as often. She feels he is stable and not overly concerned. Does feel he has had improvement since getting diagnosed six months ago. Some areas that he is not having anxiety and fears. Lack of energy to go do things that he used to enjoy.   Irrational and rational fears  SI: denies. Not have in months.     Stressors: household, family, not doing things he typically enjoys  Tx: switched to seeing Yina with [facility] in person for weekly sessions. [place] stuck with his prior therapist, Antonio. Working on health changes and triggers. Ongoing fears of something possibly happening to him.     Most " "Important: continue working on anxiety and depression\"    ---Psychiatry Update---  Update: Ruminating. Afraid of what happens if he doesn't have lorazepam as a rescue despite taking diazepam daily. Talking with new therapist and working on confidence.   Wants to load up the dogs and go hunting but can't. \"Irrational fears.\" Worried \"that something was gonna happen, healthwise.\" Afraid to leave the house. Pt is avoiding situations that might increase his risk of injury or death. He knows he will die. He thinks there is a good change he   Wife - Wants to do something, can't do it. Will ask him why and he has a hard time pinpointing that why. Wonders if he is afraid to identify the fear. \"Once he names it then he has to deal with it.\"   Pt says the only time he doesn't have these paralyzing worries or fears is when he is at work. He feels he can focus on the current moment and needs and get things done. He says he needs some wins outside of work. He feels like he is not making meaningful enough progress where he feels a win.   Mood/anxiety: Pt with overarching anhedonia - no enjoyment with skiing, hunting, physical intimacy, dogs. \"I don't enjoy anything.\" He thinks this anhedonia started within the past few years. Worse 7-8 months ago. Does endorse feeling depressed.  Suicidal ideation:  No   PHQ9 score is 7 indicating mild depression.   GAD7 score is is 11 indicating moderate anxiety.    Medications: Pt is hesitant to make medication changes in case it makes him feel worse. Tolerating all medications well. Not taking hydroxyzine bc not effective.     Medical: Pt thinks his anxiety is r/t health. He is afraid to die, afraid to have a heart attack. He is afraid to have another panic attack. \"I don't feel like I have the confidence or the drive or the gumption...\" He is afraid of being sick long term. \"Sometimes it feels like I am dying now. This anxiety and depression sometimes feels like I'm dying due to it. I'm not " "living life and enjoying life.\"     7 item Nhan Index for Illness Anxiety Disorder  1. No  2. Yes  3. Yes  4. Yes  5. Yes  6. Yes  7. Yes    SUBSTANCE USE HISTORY    Tobacco use: 3-4 pouches of chew/day  Caffeine:  Yes  1 cups/day of coffee + 2+ cups of decaf  Current alcohol:  Quit 2013  Current substance use: Denies  Past use alcohol/substance use: Denies    MEDICATIONS                                                                                              Current medications reviewed today and are noted below.   Current psychotropic medications:   Fluoxetine 40mg  Diazepam 2mg daily  Gabapentin 300mg daily  Lorazepam 0.5mg daily PRN - cutting in half and using 0-2 per week.   Hydroxyzine 25-50mg TID PRN and up to 100mg at bedtime PRN - not taking     Past psychotropic medications:  Alprazolam  Duloxetine  Fluoxetine  Hydroxyzine  Lorazepam  Sertraline  Venlafaxine  Bupropion     Supplements:   See below      11/25/22 Lorazepam 0.5mg #7  10/25/22 Gabapentin 300mg #180  10/5/22 Lorazepam 0.5mg #7  9/2/22 Diazepam 2mg #90  8/16/22 Gabapentin 300mg #60  8/16/22 Lorazepam 0.5mg #7    Current Outpatient Medications   Medication Sig     amLODIPine (NORVASC) 10 MG tablet Take 1 tablet (10 mg) by mouth daily     diazepam (VALIUM) 2 MG tablet Take 1 tablet (2 mg) by mouth daily     famotidine (PEPCID) 20 MG tablet Take 1 tablet (20 mg) by mouth 2 times daily as needed For stomach. (Patient not taking: Reported on 7/1/2022)     FLUoxetine (PROZAC) 40 MG capsule Take 1 capsule (40 mg) by mouth daily     fluticasone (FLONASE) 50 MCG/ACT nasal spray Spray 1 spray into both nostrils daily     gabapentin (NEURONTIN) 300 MG capsule Take 1 capsule (300 mg) by mouth 2 times daily     lisinopril-hydrochlorothiazide (ZESTORETIC) 20-25 MG tablet Take 2 tablets by mouth daily     LORazepam (ATIVAN) 0.5 MG tablet Take 1 tablet (0.5 mg) by mouth daily as needed for anxiety (or severe panic attacks)     meclizine (ANTIVERT) " 12.5 MG tablet Take 1 tablet (12.5 mg) by mouth 4 times daily as needed for dizziness (Patient not taking: Reported on 8/11/2022)     metoprolol succinate ER (TOPROL XL) 25 MG 24 hr tablet Take 1 tablet (25 mg) by mouth daily     Misc Natural Products (T-RELIEF CBD+13 SL) Place 600 Units under the tongue daily as needed Hemp oil sublingual (Patient not taking: Reported on 7/1/2022)     nicotine (NICORETTE) 2 MG gum Place 1 each (2 mg) inside cheek every hour as needed for other (for chewing tobacco) (Patient not taking: Reported on 8/11/2022)     NONFORMULARY CBD gummi and oil     omeprazole (PRILOSEC) 20 MG DR capsule Take 1 capsule (20 mg) by mouth daily (for heartburn) (Patient not taking: Reported on 8/11/2022)     pediatric electrolyte (PEDIALYTE) SOLN solution Take 1.25 mLs by mouth daily = 1/4 teaspoon (Patient not taking: Reported on 11/28/2022)     No current facility-administered medications for this visit.        VITALS   There were no vitals taken for this visit.    Pulse Readings from Last 5 Encounters:   11/28/22 59   08/11/22 73   07/01/22 60   06/03/22 64   05/29/22 61     Wt Readings from Last 5 Encounters:   11/28/22 78.5 kg (173 lb)   08/11/22 71.8 kg (158 lb 3.2 oz)   07/01/22 74.8 kg (164 lb 12.8 oz)   06/03/22 75.8 kg (167 lb 3.2 oz)   05/29/22 74.4 kg (164 lb)     BP Readings from Last 5 Encounters:   11/28/22 (!) 162/84   08/11/22 (!) 144/84   07/01/22 (!) 150/74   06/03/22 (!) 142/82   05/29/22 (!) 173/98       LABS & IMAGING                                                                                                                Recent available labs reviewed today.    Recent Labs   Lab Test 07/01/22  1117 05/29/22  0847 05/03/22 2025   CR 0.72 0.76 0.81   GFRESTIMATED >90 >90 >90     Recent Labs   Lab Test 05/03/22 2025 02/23/22  0745   AST 13 14   ALT 25 29   ALKPHOS 50 53     Recent Labs   Lab Test 03/27/22  1108 02/18/21  1034 10/20/17  1124   TSH 2.83 3.34 2.61       ALLERGY &  IMMUNIZATIONS       Allergies   Allergen Reactions     Sulfamethoxazole-Trimethoprim Nausea       MEDICAL & SURGICAL HISTORY    Reviewed past medical and surgical history today.   Pregnant - NA.     Past Medical History:   Diagnosis Date     Alcohol withdrawal (H) 04/28/2015     Atrial flutter (H)      Depressive disorder      Ejection fraction < 50% 04/2015    Ejection fraction 45% per echo April 2015 (per [facility] records), possible alcohol or tachycardia induced cardiomyopathy. EF normalized on follow-up echo 2016     SANDRA (generalized anxiety disorder)      H/O atrioventricular quita ablation 12/2015     Hypertension, goal below 140/90      Tobacco abuse        MENTAL STATUS EXAM:     Alertness: alert  and oriented  Appearance: adequately groomed  Behavior/Demeanor: cooperative, pleasant and calm, with good eye contact   Speech: normal and regular rate and rhythm  Language: intact and no problems  Psychomotor: normal or unremarkable  Mood: anxious and worried  Affect: full range and appropriate; was congruent to mood; was congruent to content  Thought Process/Associations: unremarkable  Thought Content:  Reports none;  Denies suicidal ideation, violent ideation and delusions  Perception:  Reports none;  Denies auditory hallucinations and visual hallucinations  Insight: intact  Judgment: intact  Cognition: does  appear grossly intact; formal cognitive testing was not done  Gait and Station: [place]     RISK AND PROTECTIVE FACTORS     Static Risk Factors: sex and history of MH diagnoses and/or treatment     Dynamic Risk Factors: emotional distress, substance use, anxiety, agitation, chronic pain, mental health stigma and work related problems     Protective Factors: hope for the future, compliance with medication, future oriented, healthy intimate relationships, engaged in EBT, access to care as needed, motivation and readiness for change, reasons for living, effective coping strategies and displaying help seeking  behavior     SAFETY ASSESSMENT      Based on review of above risk and protective factors and today's exam, pt is at low elevated risk of harm to self or others. He does not meet criteria for a 72 hr hold and remains appropriate for ongoing outpatient care. The patient convincingly denies suicidality today but endorses fleeting thoughts of death without plan/intent. There was no deceit detected, and the patient presented in a manner that was believable. Local community safety resources printed and reviewed for patient to use if needed.     Recommended that patient call 911 or go to the local ED should there be a change in any of these risk factors.     DSM 5 DIAGNOSIS      296.22 (F32.1)  Major Depressive Disorder, Single Episode, Moderate _  300.01 (F41.0) Panic Disorder  300.02 (F41.1) Generalized Anxiety Disorder     DIFFERENTIAL DIAGNOSIS: illness anxiety disorder     Medical comorbidities impacting or contributing to clinical picture: None noted    ASSESSMENT AND PLAN      ASSESSMENT:  Munir Storey is a 57 year old White, male who presents for return visit with [facility] ([facility]) for medication management. Pt with depression and illness anxiety disorder who returns after being seen in  while I was on MYRNA. Pt thinks anxiety is more managed with daily diazepam but still has paralyzing worry about his health or something happening so he does not engage in previous hobbies. He also reports problematic anhedonia the past 7-8 months worsening over time. He is a bit hesitant about medication changes today in case they cause him to regress/become more anxious, which is understandable. We discussed exploring meditation and mindfulness skills as he appears to do better when focusing on the present moment (ex at work). He is amenable to this today. We discussed considering switching to venlafaxine as he previously found it helpful. He was concerned due to dose dependent HTN risk but in 2020 did  not experience that. He denies SI today. Advised messaging me in 2 weeks with an update re: meditation.     TREATMENT PLAN: Medication side effects and alternatives reviewed. Health promotion activities recommended and reviewed. All questions addressed. Education and counseling completed regarding risks and benefits of medications and psychotherapy options. [facility] model reviewed today. Recommend therapy for additional support. Safety plan reviewed as indicated.     MEDICATIONS:   -continue GABAPENTIN 300mg daily  -continue LORAZEPAM 0.5mg daily prn for panic  -continue FLUOXETINE 40mg  -continue DIAZEPAM 2mg daily  -stop HYDROXYZINE 25-50mg     LABS/RADS:   -None at this time    PATIENT STATUS:  [facility] offer care a specialty service for Primary Care Providers in the [facility] that seek to optimize psychotropic medications for unstable patients.  Once medications have been optimized, our providers discharge the patient back to the referring Primary Care Provider for ongoing medication management.  This type of system allows our providers to serve a high volume of patients.   -Pt will be seen for continued medication stabilization in [facility].    PSYCHOSOCIAL:   -will explore mindfulness skills/meditation   -continue therapy  -Follow up with primary care provider as planned or for acute medical concerns.    PSYCHOEDUCATION:  Medication side effects and alternatives reviewed. Health promotion activities recommended and reviewed today. All questions addressed. Education and counseling completed regarding risks and benefits of medications and psychotherapy options.  Consent provided by patient/guardian  Call the psychiatric nurse line with medication questions or concerns at [phone].  [employer]hart may be used to communicate with your provider, but this is not intended to be used for emergencies.  BLACK BOX WARNING: Discussed the Food and Drug Administration (FDA)  requires that all antidepressants carry a warning that some children, adolescents and young adults may be at increased risk of suicide when taking antidepressants. Anyone taking an antidepressant should be watched closely for worsening depression or unusual behavior especially in the first few weeks after starting an SSRI. Keep in mind, antidepressants are more likely to reduce suicide risk in the long run by improving mood.   BENZODIAZEPINE:  discussion on how benzos work and the need to use them short term due to potential of anxiety getting.  This is a controlled substance with risk for abuse, need to keep in a safe keep place and cannot replace lost scripts.    Medlineplus.gov is information for patients.  It is run by the [employer] and it contains information about all disorders, diseases and all medications.      FOLLOW-UP: Follow up in 4 weeks    1. Continue all other treatments (including medications) per primary care provider and/or specialists.   2. To schedule individual or family therapy, call [facility] at [phone].   3. Follow up with primary care provider as planned or for acute medical concerns.  4. Call the psychiatric nurse line with medication questions or concerns at [phone] or [phone].  5. eelusion may be used to communicate with your care team, but this is not intended to be used for emergencies.    CRISIS RESOURCES:    1. Present to the Emergency Department as needed or call after hours crisis line at [phone] or [phone].   2. [place] Crisis Text Line: Text [place] to 583662.  3. Suicide LifeLine Chat: suicidepreventionlifeline.org/chat/.  4. National Suicide Prevention Lifeline: 169.500.5819 (TTY: 117.452.7363). Call anytime for help.  (www.suicidepreventionlifeline.org)  5. National [place] on Mental Illness (www.maritza.org): 811.971.7049 or 854-541-9387.  6. Mental Health Association (www.mentalhealth.org): 820.862.7132 or  942.483.9558.    ADMINISTRATIVE BILLING:    Time spent interviewing patient, reviewing referral documents, obtaining and reviewing outside records, communication with other health specialists, and preparing this report on today's date  Video/Phone Start Time: 1442  Video/Phone End Time: 1530    Signed:   Jaqueline Eden DNP, [id number]-BC  [facility] ([facility])

## 2022-12-01 NOTE — NURSING NOTE
Patient declined individual allergy and medication review by support staff because pt verified during echeckin.  Crystal Cardenas VF

## 2022-12-01 NOTE — PATIENT INSTRUCTIONS
Thank you for our time together today in [facility] provides brief psychiatric medication stabilization to patients referred by their Primary Care Providers. Patients are typically seen in [facility] for up to 4 appointments and then referred back to the PCP for ongoing refills unless longer term medication management by a specialist is indicated. If I believe you will benefit from long-term psychiatric care I will discuss this with you. If you are interested in seeing a psychiatrist or psychiatric nurse practitioner long-term, please send me a message in eTapestry so we can refer you appropriately.     Treatment Plan:  Continue fluoxetine 40mg daily. Consider switching back to venlafaxine.   Continue diazepam 2mg daily  Continue lorazepam 0.25-0.5mg daily if needed for panic  Stop hydroxyzine   Continue gabapentin 300mg  Here is a video on why meditation works and some evidence based meditation ideas: https://[employer].kompany/how-meditation-works-and-science-based-effective-meditations/  Call [phone] to schedule follow up with me in 4 weeks.  You can call the above number to make appointments, leave a message with our nursing team, and inquire about any mental health referrals I have placed.

## 2022-12-16 ENCOUNTER — OFFICE VISIT (OUTPATIENT)
Dept: FAMILY MEDICINE | Facility: CLINIC | Age: 57
End: 2022-12-16
Payer: COMMERCIAL

## 2022-12-16 VITALS
HEART RATE: 66 BPM | WEIGHT: 174.6 LBS | OXYGEN SATURATION: 99 % | RESPIRATION RATE: 16 BRPM | TEMPERATURE: 98.1 F | DIASTOLIC BLOOD PRESSURE: 78 MMHG | HEIGHT: 67 IN | SYSTOLIC BLOOD PRESSURE: 134 MMHG | BODY MASS INDEX: 27.4 KG/M2

## 2022-12-16 DIAGNOSIS — F10.21 ALCOHOL DEPENDENCE IN REMISSION (H): ICD-10-CM

## 2022-12-16 DIAGNOSIS — F41.0 PANIC DISORDER WITHOUT AGORAPHOBIA: ICD-10-CM

## 2022-12-16 DIAGNOSIS — Z23 HIGH PRIORITY FOR 2019-NCOV VACCINE: ICD-10-CM

## 2022-12-16 DIAGNOSIS — Z23 FLU VACCINE NEED: ICD-10-CM

## 2022-12-16 DIAGNOSIS — Z98.1 S/P CERVICAL SPINAL FUSION: ICD-10-CM

## 2022-12-16 DIAGNOSIS — M54.12 CERVICAL RADICULOPATHY: ICD-10-CM

## 2022-12-16 DIAGNOSIS — I10 HYPERTENSION, GOAL BELOW 140/90: Primary | Chronic | ICD-10-CM

## 2022-12-16 DIAGNOSIS — F32.0 CURRENT MILD EPISODE OF MAJOR DEPRESSIVE DISORDER WITHOUT PRIOR EPISODE (H): ICD-10-CM

## 2022-12-16 DIAGNOSIS — F41.1 GAD (GENERALIZED ANXIETY DISORDER): ICD-10-CM

## 2022-12-16 PROCEDURE — 90682 RIV4 VACC RECOMBINANT DNA IM: CPT | Performed by: FAMILY MEDICINE

## 2022-12-16 PROCEDURE — 90471 IMMUNIZATION ADMIN: CPT | Performed by: FAMILY MEDICINE

## 2022-12-16 PROCEDURE — 0124A COVID-19 VACCINE BIVALENT BOOSTER 12+ (PFIZER): CPT | Performed by: FAMILY MEDICINE

## 2022-12-16 PROCEDURE — 99214 OFFICE O/P EST MOD 30 MIN: CPT | Mod: 25 | Performed by: FAMILY MEDICINE

## 2022-12-16 PROCEDURE — 91312 COVID-19 VACCINE BIVALENT BOOSTER 12+ (PFIZER): CPT | Performed by: FAMILY MEDICINE

## 2022-12-16 RX ORDER — FLUOXETINE 40 MG/1
40 CAPSULE ORAL DAILY
Qty: 90 CAPSULE | Refills: 1 | Status: SHIPPED | OUTPATIENT
Start: 2022-12-16 | End: 2023-01-13

## 2022-12-16 ASSESSMENT — ANXIETY QUESTIONNAIRES
4. TROUBLE RELAXING: NOT AT ALL
2. NOT BEING ABLE TO STOP OR CONTROL WORRYING: MORE THAN HALF THE DAYS
6. BECOMING EASILY ANNOYED OR IRRITABLE: NOT AT ALL
3. WORRYING TOO MUCH ABOUT DIFFERENT THINGS: MORE THAN HALF THE DAYS
IF YOU CHECKED OFF ANY PROBLEMS ON THIS QUESTIONNAIRE, HOW DIFFICULT HAVE THESE PROBLEMS MADE IT FOR YOU TO DO YOUR WORK, TAKE CARE OF THINGS AT HOME, OR GET ALONG WITH OTHER PEOPLE: SOMEWHAT DIFFICULT
7. FEELING AFRAID AS IF SOMETHING AWFUL MIGHT HAPPEN: MORE THAN HALF THE DAYS
7. FEELING AFRAID AS IF SOMETHING AWFUL MIGHT HAPPEN: MORE THAN HALF THE DAYS
8. IF YOU CHECKED OFF ANY PROBLEMS, HOW DIFFICULT HAVE THESE MADE IT FOR YOU TO DO YOUR WORK, TAKE CARE OF THINGS AT HOME, OR GET ALONG WITH OTHER PEOPLE?: SOMEWHAT DIFFICULT
GAD7 TOTAL SCORE: 8
GAD7 TOTAL SCORE: 8
1. FEELING NERVOUS, ANXIOUS, OR ON EDGE: MORE THAN HALF THE DAYS
5. BEING SO RESTLESS THAT IT IS HARD TO SIT STILL: NOT AT ALL
GAD7 TOTAL SCORE: 8

## 2022-12-16 ASSESSMENT — PATIENT HEALTH QUESTIONNAIRE - PHQ9
SUM OF ALL RESPONSES TO PHQ QUESTIONS 1-9: 5
SUM OF ALL RESPONSES TO PHQ QUESTIONS 1-9: 5
10. IF YOU CHECKED OFF ANY PROBLEMS, HOW DIFFICULT HAVE THESE PROBLEMS MADE IT FOR YOU TO DO YOUR WORK, TAKE CARE OF THINGS AT HOME, OR GET ALONG WITH OTHER PEOPLE: SOMEWHAT DIFFICULT

## 2022-12-16 NOTE — PATIENT INSTRUCTIONS
Check with insurance about seeing a spine surgeon to check on your neck. Let me know. [facility] will call you to coordinate your care as prescribed by your provider. If you don't hear from a representative within 2 business days, please call [phone].    Overall, you look better.     No change in medications.     Keep up with the therapy.     Flu and covid booster.     Follow up appointment in 3 months.

## 2022-12-16 NOTE — PROGRESS NOTES
Assessment & Plan     (I10) Hypertension, goal below 140/90  (primary encounter diagnosis)  Comment: Within guidelines using 4 drug regimen.  Excellent kidney function  Plan: Continue current medications.    (F41.1) SANDRA (generalized anxiety disorder)  Comment: Followed by psychiatry, doing reasonably well with SSRI therapy, beta-blocker, counseling therapy, and as needed benzodiazepines.  Patient seems less anxious than prior visits.  Plan: FLUoxetine (PROZAC) 40 MG capsule        Continue current management.    (F41.0) Panic disorder without agoraphobia  Comment: See above.  Plan: FLUoxetine (PROZAC) 40 MG capsule            (F32.0) Current mild episode of major depressive disorder without prior episode (H)  Comment: See above.  Plan: FLUoxetine (PROZAC) 40 MG capsule        (F10.21) Alcohol dependence in remission (H)  Comment: Patient states he is maintaining sobriety.  Plan: Monitor.    (M54.12) Cervical radiculopathy  Comment: History of cervical fusion, C5-6/20 5 years ago.  Intermittent left hand numbness, will refer to surgery for consultation to evaluate the integrity of effusion.  No acute red flag symptoms noted today.  Plan: Spine  Referral            (Z98.1) S/P cervical spinal fusion  Comment: See above.  Plan: Spine  Referral          (Z23) High priority for 2019-nCoV vaccine  Plan: COVID-19 VACCINE BIVALENT BOOSTER 12+ (PFIZER)      (Z23) Flu vaccine need  Plan: INFLUENZA VACCINE 50-64 OR 18-64 W/EGG ALLERGY         (FLUBLOK)      No results found for any visits on 12/16/22.     Patient Instructions   Check with insurance about seeing a spine surgeon to check on your neck. Let me know. [facility] will call you to coordinate your care as prescribed by your provider. If you don't hear from a representative within 2 business days, please call [phone].    Overall, you look better.     No change in medications.     Keep up with the therapy.     Flu and covid booster.  "    Follow up appointment in 3 months.       BMI:   Estimated body mass index is 27.18 kg/m  as calculated from the following:    Height as of this encounter: 1.707 m (5' 7.21\").    Weight as of this encounter: 79.2 kg (174 lb 9.6 oz).   Weight management plan: Discussed healthy diet and exercise guidelines    GENERAL: Healthy, alert and no distress  EYES: Eyes grossly normal to inspection, conjunctivae and sclerae normal  RESP: Lungs clear to auscultation - no rales, rhonchi or wheezes  CV: Regular rate and rhythm, normal S1 S2, no murmur  MS: No gross musculoskeletal defects noted, no edema  NEURO: Normal strength and tone, mentation intact and speech normal  PSYCH: Mentation appears normal, affect normal/bright     Return in about 3 months (around 3/16/2023).    Magalis Morales MD  [facility] TESSA Amaral is a 57 year old, presenting for the following health issues:  Anxiety and Hypertension      History of Present Illness       Mental Health Follow-up:  Patient presents to follow-up on Depression & Anxiety.Patient's depression since last visit has been:  Medium  The patient is not having other symptoms associated with depression.  Patient's anxiety since last visit has been:  Medium  The patient is not having other symptoms associated with anxiety.  Any significant life events: No  Patient is feeling anxious or having panic attacks.  Patient has no concerns about alcohol or drug use.    Hypertension: He presents for follow up of hypertension.  He does not check blood pressure  regularly outside of the clinic. Outside blood pressures have been over 140/90. He follows a low salt diet.      Today's PHQ-9         PHQ-9 Total Score: 5    PHQ-9 Q9 Thoughts of better off dead/self-harm past 2 weeks :   Not at all    How difficult have these problems made it for you to do your work, take care of things at home, or get along with other people: Somewhat difficult  Today's SANDRA-7 Score: 8   " "      Also addressed the following:       Hypertension, goal below 140/90  SANDRA (generalized anxiety disorder)  Panic disorder without agoraphobia  Current mild episode of major depressive disorder without prior episode (H)  Alcohol dependence in remission (H)  Cervical radiculopathy  S/P cervical spinal fusion  High priority for 2019-nCoV vaccine  Flu vaccine need     Review of Systems   CONSTITUTIONAL: NEGATIVE for fever, chills, change in weight  ENT/MOUTH: NEGATIVE for ear, mouth and throat problems  RESP: NEGATIVE for significant cough or SOB  CV: NEGATIVE for chest pain, palpitations or peripheral edema  PSYCHIATRIC: ongoing anxiety and anhedonia.      Objective    /78   Pulse 66   Temp 98.1  F (36.7  C) (Temporal)   Resp 16   Ht 1.707 m (5' 7.21\")   Wt 79.2 kg (174 lb 9.6 oz)   SpO2 99%   BMI 27.18 kg/m    Body mass index is 27.18 kg/m .  Physical Exam   GENERAL: Healthy, alert and no distress  EYES: Eyes grossly normal to inspection, conjunctivae and sclerae normal  RESP: Lungs clear to auscultation - no rales, rhonchi or wheezes  CV: Regular rate and rhythm, normal S1 S2, no murmur  MS: No gross musculoskeletal defects noted, no edema  NEURO: Normal strength and tone, mentation intact and speech normal  PSYCH: Appears somewhat anxious but overall doing well.    Magalis Morales MD       "

## 2023-01-02 ENCOUNTER — VIRTUAL VISIT (OUTPATIENT)
Dept: PSYCHIATRY | Facility: CLINIC | Age: 58
End: 2023-01-02
Payer: COMMERCIAL

## 2023-01-02 ENCOUNTER — TELEPHONE (OUTPATIENT)
Dept: FAMILY MEDICINE | Facility: CLINIC | Age: 58
End: 2023-01-02
Payer: COMMERCIAL

## 2023-01-02 ENCOUNTER — VIRTUAL VISIT (OUTPATIENT)
Dept: BEHAVIORAL HEALTH | Facility: CLINIC | Age: 58
End: 2023-01-02
Payer: COMMERCIAL

## 2023-01-02 DIAGNOSIS — F33.1 MODERATE EPISODE OF RECURRENT MAJOR DEPRESSIVE DISORDER (H): Primary | ICD-10-CM

## 2023-01-02 DIAGNOSIS — F41.0 PANIC DISORDER WITHOUT AGORAPHOBIA: ICD-10-CM

## 2023-01-02 DIAGNOSIS — Z53.9 ERRONEOUS ENCOUNTER--DISREGARD: Primary | ICD-10-CM

## 2023-01-02 DIAGNOSIS — F41.1 GAD (GENERALIZED ANXIETY DISORDER): ICD-10-CM

## 2023-01-02 DIAGNOSIS — F41.0 PANIC ATTACK: ICD-10-CM

## 2023-01-02 DIAGNOSIS — F45.21 ILLNESS ANXIETY DISORDER: ICD-10-CM

## 2023-01-02 DIAGNOSIS — F41.1 GAD (GENERALIZED ANXIETY DISORDER): Chronic | ICD-10-CM

## 2023-01-02 PROCEDURE — 90832 PSYTX W PT 30 MINUTES: CPT | Mod: 95 | Performed by: SOCIAL WORKER

## 2023-01-02 PROCEDURE — 99215 OFFICE O/P EST HI 40 MIN: CPT | Mod: 95 | Performed by: STUDENT IN AN ORGANIZED HEALTH CARE EDUCATION/TRAINING PROGRAM

## 2023-01-02 RX ORDER — ARIPIPRAZOLE 2 MG/1
2 TABLET ORAL DAILY
Qty: 30 TABLET | Refills: 0 | Status: SHIPPED | OUTPATIENT
Start: 2023-01-02 | End: 2023-01-13

## 2023-01-02 ASSESSMENT — ANXIETY QUESTIONNAIRES
8. IF YOU CHECKED OFF ANY PROBLEMS, HOW DIFFICULT HAVE THESE MADE IT FOR YOU TO DO YOUR WORK, TAKE CARE OF THINGS AT HOME, OR GET ALONG WITH OTHER PEOPLE?: SOMEWHAT DIFFICULT
7. FEELING AFRAID AS IF SOMETHING AWFUL MIGHT HAPPEN: NEARLY EVERY DAY
5. BEING SO RESTLESS THAT IT IS HARD TO SIT STILL: SEVERAL DAYS
GAD7 TOTAL SCORE: 15
1. FEELING NERVOUS, ANXIOUS, OR ON EDGE: NEARLY EVERY DAY
8. IF YOU CHECKED OFF ANY PROBLEMS, HOW DIFFICULT HAVE THESE MADE IT FOR YOU TO DO YOUR WORK, TAKE CARE OF THINGS AT HOME, OR GET ALONG WITH OTHER PEOPLE?: SOMEWHAT DIFFICULT
1. FEELING NERVOUS, ANXIOUS, OR ON EDGE: NEARLY EVERY DAY
GAD7 TOTAL SCORE: 15
6. BECOMING EASILY ANNOYED OR IRRITABLE: SEVERAL DAYS
IF YOU CHECKED OFF ANY PROBLEMS ON THIS QUESTIONNAIRE, HOW DIFFICULT HAVE THESE PROBLEMS MADE IT FOR YOU TO DO YOUR WORK, TAKE CARE OF THINGS AT HOME, OR GET ALONG WITH OTHER PEOPLE: SOMEWHAT DIFFICULT
GAD7 TOTAL SCORE: 15
GAD7 TOTAL SCORE: 15
3. WORRYING TOO MUCH ABOUT DIFFERENT THINGS: NEARLY EVERY DAY
IF YOU CHECKED OFF ANY PROBLEMS ON THIS QUESTIONNAIRE, HOW DIFFICULT HAVE THESE PROBLEMS MADE IT FOR YOU TO DO YOUR WORK, TAKE CARE OF THINGS AT HOME, OR GET ALONG WITH OTHER PEOPLE: SOMEWHAT DIFFICULT
GAD7 TOTAL SCORE: 15
4. TROUBLE RELAXING: SEVERAL DAYS
8. IF YOU CHECKED OFF ANY PROBLEMS, HOW DIFFICULT HAVE THESE MADE IT FOR YOU TO DO YOUR WORK, TAKE CARE OF THINGS AT HOME, OR GET ALONG WITH OTHER PEOPLE?: SOMEWHAT DIFFICULT
7. FEELING AFRAID AS IF SOMETHING AWFUL MIGHT HAPPEN: NEARLY EVERY DAY
6. BECOMING EASILY ANNOYED OR IRRITABLE: SEVERAL DAYS
IF YOU CHECKED OFF ANY PROBLEMS ON THIS QUESTIONNAIRE, HOW DIFFICULT HAVE THESE PROBLEMS MADE IT FOR YOU TO DO YOUR WORK, TAKE CARE OF THINGS AT HOME, OR GET ALONG WITH OTHER PEOPLE: SOMEWHAT DIFFICULT
7. FEELING AFRAID AS IF SOMETHING AWFUL MIGHT HAPPEN: NEARLY EVERY DAY
GAD7 TOTAL SCORE: 15
6. BECOMING EASILY ANNOYED OR IRRITABLE: SEVERAL DAYS
1. FEELING NERVOUS, ANXIOUS, OR ON EDGE: NEARLY EVERY DAY
5. BEING SO RESTLESS THAT IT IS HARD TO SIT STILL: SEVERAL DAYS
2. NOT BEING ABLE TO STOP OR CONTROL WORRYING: NEARLY EVERY DAY
4. TROUBLE RELAXING: SEVERAL DAYS
5. BEING SO RESTLESS THAT IT IS HARD TO SIT STILL: SEVERAL DAYS
2. NOT BEING ABLE TO STOP OR CONTROL WORRYING: NEARLY EVERY DAY
2. NOT BEING ABLE TO STOP OR CONTROL WORRYING: NEARLY EVERY DAY
7. FEELING AFRAID AS IF SOMETHING AWFUL MIGHT HAPPEN: NEARLY EVERY DAY
GAD7 TOTAL SCORE: 15
4. TROUBLE RELAXING: SEVERAL DAYS
GAD7 TOTAL SCORE: 15
GAD7 TOTAL SCORE: 15

## 2023-01-02 ASSESSMENT — PATIENT HEALTH QUESTIONNAIRE - PHQ9
SUM OF ALL RESPONSES TO PHQ QUESTIONS 1-9: 6
10. IF YOU CHECKED OFF ANY PROBLEMS, HOW DIFFICULT HAVE THESE PROBLEMS MADE IT FOR YOU TO DO YOUR WORK, TAKE CARE OF THINGS AT HOME, OR GET ALONG WITH OTHER PEOPLE: SOMEWHAT DIFFICULT
10. IF YOU CHECKED OFF ANY PROBLEMS, HOW DIFFICULT HAVE THESE PROBLEMS MADE IT FOR YOU TO DO YOUR WORK, TAKE CARE OF THINGS AT HOME, OR GET ALONG WITH OTHER PEOPLE: SOMEWHAT DIFFICULT
SUM OF ALL RESPONSES TO PHQ QUESTIONS 1-9: 6

## 2023-01-02 NOTE — PROGRESS NOTES
"[facility]iatry [facility]  January 2, 2023      Behavioral Health Clinician Progress Note    Patient Name: Munir Storey           Service Type:  Individual      Service Location:   MyChart / Email (patient reached)     Session Start Time: 13:05  Session End Time: 13:38      Session Length: 16 - 37      Attendees: Patient      Service Modality:  Phone Visit:      Provider verified identity through the following two step process.  Patient provided:  Patient is known previously to provider and Patient was verified at admission/transfer    Telemedicine Visit: The patient's condition can be safely assessed and treated via synchronous audio and visual telemedicine encounter.      Reason for Telemedicine Visit: Services only offered telehealth    Originating Site (Patient Location): Patient's home    Distant Site (Provider Location): Provider Remote Setting- Home Office    Consent:  The patient/guardian has verbally consented to: the potential risks and benefits of telemedicine (video visit) versus in person care; bill my insurance or make self-payment for services provided; and responsibility for payment of non-covered services.     The patient has been notified of the following:      \"We have found that certain health care needs can be provided without the need for a face to face visit.  This service lets us provide the care you need with a phone conversation.       I will have full access to your [facility] medical record during this entire phone call.   I will be taking notes for your medical record.      Since this is like an office visit, we will bill your insurance company for this service.       There are potential benefits and risks of telephone visits (e.g. limits to patient confidentiality) that differ from in-person visits.?  Confidentiality still applies for telephone services, and nobody will record the visit.  It is important to be in a quiet, private space that is " "free of distractions (including cell phone or other devices) during the visit.??      If during the course of the call I believe a telephone visit is not appropriate, you will not be charged for this service\"     Consent has been obtained for this service by care team member: Yes     Visit Activities (Refresh list every visit): [facility] [place] Only    Diagnostic Assessment Date: 04/12/2022  Treatment Plan Review Date: Due by 03/01/2023  See Flowsheets for today's PHQ-9 and SANDRA-7 results  Previous PHQ-9:   PHQ-9 SCORE 12/16/2022 1/2/2023 1/2/2023   PHQ-9 Total Score MyChart 5 (Mild depression) - 6 (Mild depression)   PHQ-9 Total Score 5 6 6   Some encounter information is confidential and restricted. Go to Review Flowsheets activity to see all data.     Previous SANDRA-7:   SANDRA-7 SCORE 1/2/2023 1/2/2023 1/2/2023   Total Score - - 15 (severe anxiety)   Total Score 15 15 15   Some encounter information is confidential and restricted. Go to Review Flowsheets activity to see all data.       BANG LEVEL:  No flowsheet data found.    DATA  Extended Session (60+ minutes): No  Interactive Complexity: No  Crisis: No  [facility] Patient: No    Treatment Objective(s) Addressed in This Session:  Target Behavior(s): anxiety and depression    Depressed Mood: Increase interest, engagement, and pleasure in doing things  Decrease frequency and intensity of feeling down, depressed, hopeless  Improve quantity and quality of night time sleep / decrease daytime naps  Anxiety: will experience a reduction in anxiety     Current Stressors / Issues:  Patient's computer was performing an update and he could not access the virtual visit so it was switched to a phone call.  Patient presents with a distressed affect but it does seem improved compared to previous sessions.     Update: \"not much improvment.\" Still \"functioning\" but on a roller coaster. Able to get up, follow routine and go to work. Work is slow due to time of year so trying to stay busy with other " "things around the house and yard. Feels tired and no energy/motivation. \"Still feel the cloud over me.\" Hard to force self to spend time with friends and family for holiday events. Feeling disconnected from enjoying or being present. \"Robotic.\" Gets frustrated and feels there should be something more. Wants to see improvement in the new year. Wondering if it is time to change the medication as he has been taking it since March/April with very little improvement. Did see some improvement from when he first started but not really so much now. Patient indicates he has been trying to use the different therapeutic skills that he has learned including focusing on rational very irrational thoughts and grounding skills.  Patient has found that driving around is a good way for him to ground and feel connected.  [facility] praised patient for his progress and being willing to try things.  [facility] validated that patient continues to feel distress and the frustration that comes with knowing the process can take a long time.  [facility] encouraged patient to talk with the prescriber today about looking at making a medication change as patient does feel that he has given it a chance.  [facility] processed patient feeling a disconnect and trying to use tactile grounding and breathing in the moment to reconnect for both anxiety and depression.  [facility] provided encouragement for patient to continue to connect with things that could help him therapeutically.  [facility] discussed patient reaching out to [facility] for fabiana based therapy and this information was sent through [employer].  [facility] also discussed patient potentially being referred for genetic testing.    Stressors: same- busy household, family, not doing things he typically enjoys    Tx: not able to see therapist Yina at [facility] as she is on medical leave from a fall. Able to see a different one and scheduled on the 5th but virtual. Seeing Bahai  that sees him " voluntarily to discuss fabiana based needs. [facility] [place] discussed potential IOP and patient advises that he would appreciate meeting with an individual therapist on an intense level but does not want any group participation.    Etoh: denies  Substance: denies  Nicotine: reduced, chew. 2 pouches a day and herbal replacement.  Caffeine: switched to 2-3 cups of de-caff overall but still has half-caff 1-2 cups a day. Does hydrate with bottles of water.    Most Important: change medications? Gene site testing?    Progress on Treatment Objective(s) / Homework:  Minimal progress - ACTION (Actively working towards change); Intervened by reinforcing change plan / affirming steps taken    Motivational Interviewing    MI Intervention: Expressed Empathy/Understanding, Supported Autonomy, Collaboration, Evocation, Reflections: simple and complex, Change talk (evoked) and Reframe     Change Talk Expressed by the Patient: Desire to change Ability to change Reasons to change Need to change Committment to change Activation Taking steps    Provider Response to Change Talk: E - Evoked more info from patient about behavior change, A - Affirmed patient's thoughts, decisions, or attempts at behavior change, R - Reflected patient's change talk and S - Summarized patient's change talk statements    Also provided psychoeducation about behavioral health condition, symptoms, and treatment options    Care Plan review completed: No    Medication Review:  No changes to current psychiatric medication(s)    Medication Compliance:  Yes    Changes in Health Issues:   Yes: Blood pressure, Associated Psychological Distress    Chemical Use Review:   Substance Use: Chemical use reviewed, no active concerns identified      Tobacco Use: No change in amount of tobacco use since last session.  Patient declined discussion at this time    Assessment: Current Emotional / Mental Status (status of significant symptoms):  Risk status (Self / Other harm or suicidal  ideation)  Patient has had a history of suicidal ideation: Patient reports suicidal ideation that comes and goes.  He denies he has a plan or intent.  He denies any prior attempts  Patient denies current fears or concerns for personal safety.  Patient denies current or recent suicidal ideation or behaviors.  Patient denies current or recent homicidal ideation or behaviors.  Patient denies current or recent self injurious behavior or ideation.  Patient denies other safety concerns.  A safety and risk management plan has not been developed at this time, however patient was encouraged to call [place] [phone] should there be a change in any of these risk factors.    Appearance:   Unable to assess over phone   Eye Contact:   Unable to assess over phone   Psychomotor Behavior: Normal   Attitude:   Cooperative   Orientation:   All  Speech   Rate / Production: Normal    Volume:  Normal   Mood:    Anxious  Depressed   Affect:    Appropriate   Thought Content:  Clear   Thought Form:  Coherent  Logical   Insight:    Good     Diagnoses:  1. Moderate episode of recurrent major depressive disorder (H)    2. SANDRA (generalized anxiety disorder)    3. Panic disorder without agoraphobia      Collateral Reports Completed:  Communicated with: [facility] Team    Plan: (Homework, other):  Patient was given information about behavioral services and encouraged to schedule a follow up appointment with the clinic [facility] as needed.  He was also given information about mental health symptoms and treatment options .  Patient's current therapist is on a leave of absence so patient was provided with other resources to look into for therapy.  CD Recommendations: No indications of CD issues.                                               Individual Treatment Plan    Patient's Name: Munir Storey  YOB: 1965    Date of Creation: 12/01/2022  Date Treatment Plan Last Reviewed/Revised: Pending    DSM5 Diagnoses: 296.32 (F33.1) Major Depressive  Disorder, Recurrent Episode, Moderate _ and With anxious distress or 300.01 (F41.0) Panic Disorder  300.02 (F41.1) Generalized Anxiety Disorder  Psychosocial / Contextual Factors: Patient lives with his wife and adult children.  Patient is working.  Patient does have social supports.  PROMIS (reviewed every 90 days): 20    Referral / Collaboration:  Patient is connected with an individual therapist and declines any other needs at this time.    Anticipated number of session for this episode of care: 3-6 sessions  Anticipation frequency of session: Monthly  Anticipated Duration of each session: 16-37 minutes  Treatment plan will be reviewed in 90 days or when goals have been changed.       MeasurableTreatment Goal(s) related to diagnosis / functional impairment(s)  Goal 1: Patient will report continued improvement in his anxiety and depression.    I will know I've met my goal when I feel more like my old self.      Objective #A (Patient Action)    Patient will identify Rational and irrational fears / thoughts that contribute to feeling anxious.  Status: New - Date: 12/01/2022     Intervention(s)  Therapist will teach emotional recognition/identification. CBT for anxiety.    Objective #B  Patient will use cognitive strategies identified in therapy to challenge anxious thoughts.  Status: New - Date: 12/01/2022     Intervention(s)  Therapist will teach emotional regulation skills. CBT for anxiety skills.    Objective #C  Patient will Increase interest, engagement, and pleasure in doing things  Identify negative self-talk and behaviors: challenge core beliefs, myths, and actions.  Status: New - Date: 12/01/2022     Intervention(s)  Therapist will assign homework For patient to use activation and reconnect with interests and supports.    Patient has reviewed and agreed to the above plan.      PACO Willson  January 2, 2023  Answers for HPI/ROS submitted by the patient on 1/2/2023  If you checked off any problems,  how difficult have these problems made it for you to do your work, take care of things at home, or get along with other people?: Somewhat difficult  PHQ9 TOTAL SCORE: 6  SANDRA 7 TOTAL SCORE: 15

## 2023-01-02 NOTE — PROGRESS NOTES
Munir is a 57 year old who is being evaluated via a billable telephone visit.      What phone number would you like to be contacted at? [phone]   How would you like to obtain your AVS? Williams Cardenas VF

## 2023-01-02 NOTE — PATIENT INSTRUCTIONS
Treatment Plan:  I re-ordered [employer] testing for you. Please contact Customer Service at [phone] or [email or web address].  Continue fluoxetine 40mg daily for anxiety/mood.  Continue diazepam 1mg daily (1/2 tab of 2mg tab)  Hold gabapentin 300mg daily.  Start aripiprazole 2mg daily   Continue lorazepam 0.5mg daily if needed for panic.  Call [phone] to schedule follow up with me on Jan 13th.  You can call the above number to make appointments, leave a message with our nursing team, and inquire about any mental health referrals I have placed.

## 2023-01-02 NOTE — TELEPHONE ENCOUNTER
Patient Quality Outreach    Patient is due for the following:   Depression  -  PHQ-9 needed  IVD  -  Aspirin and Statin      Topic Date Due     Hepatitis B Vaccine (1 of 3 - 3-dose series) Never done     Pneumococcal Vaccine (1 - PCV) Never done     Zoster (Shingles) Vaccine (1 of 2) Never done       Type of outreach:    Routed to provider for review  Phq9 completed 1/2/23    Questions for provider review:    Please advise ASA and Statin use. Patient failing IVD list.    Yasmin Rey  Chart routed to Provider.

## 2023-01-02 NOTE — PROGRESS NOTES
Munir is a 57 year old who is being evaluated via a billable telephone visit.      What phone number would you like to be contacted at? [phone]   How would you like to obtain your AVS? Williams Cardenas VF

## 2023-01-02 NOTE — PROGRESS NOTES
"  [facility] PSYCHIATRY SERVICE FOLLOW-UP     Name:  Munir Storey  : 1965     Telemedicine Visit: The patient's condition can be safely assessed and treated via synchronous audio and visual telemedicine encounter.      Reason for Telemedicine Visit: COVID 19 pandemic and the social and physical recommendations by the CDC and MD.      Originating Site (Patient Location): Patient's home     Distant Site (Provider Location): Provider Remote Setting     Consent:  The patient/guardian has verbally consented to: the potential risks and benefits of telemedicine (video visit or phone) versus in person care; bill my insurance or make self-payment for services provided; and responsibility for payment of non-covered services.     Mode of Communication:  Number 100 video platform      As the provider I attest to compliance with applicable laws and regulations related to telemedicine.    IDENTIFICATION     Patient is a 57 year old year old White, male  who presents for follow-up medication management with [facility].  Patient was initially referred by their PCP. Patient attended the session alone.     Patient care team: Patient Care Team:  Magalis Morales MD as PCP - General (Family Medicine)  Sadi Shelley MD as Assigned Heart and Vascular Provider  Roberto Cabral MD as Assigned Musculoskeletal Provider  Peggy Eden NP as Assigned Neuroscience Provider  Ralph Monzon MD as MD (Psychiatry)  Betty Rivers [facility] as Pharmacist (Pharmacist)  Emma Martinez APRN CNP as Assigned Behavioral Health Provider  Magalis Morales MD as Assigned PCP    INTERIM HISTORY   Pt was last seen in [facility] for follow[facility]up on 1 Dec 22. At that time, the plan included stop hydroxyzine.    Interim pt communication:  none    Available records were reviewed prior to visit.    HISTORY OF PRESENT ILLNESS     Per [facility] Maura Laureano during today's team-based visit: \"MH Update: \"not much improvment.\" Still " "\"functioning\" but on a roller coaster. Able to get up, follow routine and go to work. Work is slow due to time of year so trying to stay busy with other things around the house and yard. Feels tired and no energy/motivation. \"Still feel the cloud over me.\" Hard to force self to spend time with friends and family for holiday events. Feeling disconnected from enjoying or being present. \"Robotic.\" Gets frustrated and feels there should be something more. Wants to see improvement in the new year. Wondering if it is time to change the medication as he has been taking it since March/April with very little improvement. Did see some improvement but not really doing much now. Using therapy skills and still feeling limited. Grounding while driving.  Discussed rational and irrational- working on. [facility]- call and schedule for fabiana based therapy. Practice grounding to manage disconnect.   Genetic testing.   Stressors: household, family, not doing things he typically enjoys  Tx: not able to see therapist Yina at [facility] as she is on medical leave from a fall. Able to see a different one and scheduled on the 5th but virtual. Seeing [facility]  that sees him voluntarily to discuss fabiana based needs. [facility] to send information through [employer] [facility] Discussed IOP and not want group.  Etoh: denies  Substance: denies  Nicotine: reduced, chew. 2 pouches a day and herbal replacement.  Caffeine: switched to 2-3 cups of de-caff overall but still has half-caff 1-2 cups a day. Does hydrate with bottles of water.  Most Important: change medications? Gene site testing\"    ---Psychiatry Update---  Pt feels unable to go to [place] this weekend. \"Something bad is going to happen. I feel like my health is going to fail. I'll be away from my house, my dog.\"   Pt perceives something \"always pops up.\" He has heartburn or indigestion or chest pain and worries that he might die or something might happen.   Pt have " no desire to go to a [employer] game or take his wife on a date because he's worried something will happen to him. He is trying to work through potential fears of death and life after death with his [facility] counselor at his [facility].     Pt describes low energy, low motivation, sadness, low mood, anhedonia. He feels well supported with his therapist, [facility], his [facility] counselor. He wonders about medicine adjustments.   Suicidal ideation:  No   PHQ9 score is 6 indicating mild depression.   GAD7 score is  is  15 indicating severe anxiety.    Medications: Pt has been taking 1/2 tab diazepam because he doesn't want to be on it or dependent on it long term.   Pt perceives fluoxetine is helping his anxiety. It is not affecting his depression.     Medical: Referred to spine surgeon re: neck px    MEDICATIONS                                                                                              Current medications reviewed today and are noted below.   Current psychotropic medications:   Fluoxetine 40mg  Diazepam 2mg daily - taking 1mg daily  Gabapentin 300mg daily  Lorazepam 0.5mg daily PRN - cutting in half and using 0-2 per week.      Past psychotropic medications:  Alprazolam  Duloxetine  Fluoxetine  Hydroxyzine  Lorazepam  Sertraline  Venlafaxine  Bupropion     Supplements:   See below       11/25/22 Lorazepam 0.5mg #7  10/25/22 Gabapentin 300mg #180  10/5/22 Lorazepam 0.5mg #7    Current Outpatient Medications   Medication Sig     amLODIPine (NORVASC) 10 MG tablet Take 1 tablet (10 mg) by mouth daily     diazepam (VALIUM) 2 MG tablet Take 1 tablet (2 mg) by mouth daily     FLUoxetine (PROZAC) 40 MG capsule Take 1 capsule (40 mg) by mouth daily     fluticasone (FLONASE) 50 MCG/ACT nasal spray Spray 1 spray into both nostrils daily     gabapentin (NEURONTIN) 300 MG capsule Take 1 capsule (300 mg) by mouth 2 times daily     lisinopril-hydrochlorothiazide (ZESTORETIC) 20-25 MG tablet Take 2 tablets by mouth daily      LORazepam (ATIVAN) 0.5 MG tablet Take 1 tablet (0.5 mg) by mouth daily as needed for anxiety (or severe panic attacks)     metoprolol succinate ER (TOPROL XL) 25 MG 24 hr tablet Take 1 tablet (25 mg) by mouth daily     Misc Natural Products (T-RELIEF CBD+13 SL) Place 600 Units under the tongue daily as needed Hemp oil sublingual     NONFORMULARY CBD gummi and oil     pediatric electrolyte (PEDIALYTE) SOLN solution Take 1.25 mLs by mouth daily = 1/4 teaspoon     No current facility-administered medications for this visit.        VITALS   There were no vitals taken for this visit.    Pulse Readings from Last 5 Encounters:   12/16/22 66   11/28/22 59   08/11/22 73   07/01/22 60   06/03/22 64     Wt Readings from Last 5 Encounters:   12/16/22 79.2 kg (174 lb 9.6 oz)   11/28/22 78.5 kg (173 lb)   08/11/22 71.8 kg (158 lb 3.2 oz)   07/01/22 74.8 kg (164 lb 12.8 oz)   06/03/22 75.8 kg (167 lb 3.2 oz)     BP Readings from Last 5 Encounters:   12/16/22 134/78   11/28/22 (!) 162/84   08/11/22 (!) 144/84   07/01/22 (!) 150/74   06/03/22 (!) 142/82       LABS & IMAGING                                                                                                                Recent available labs reviewed today.    Recent Labs   Lab Test 07/01/22  1117 05/29/22  0847 05/03/22 2025   CR 0.72 0.76 0.81   GFRESTIMATED >90 >90 >90     Recent Labs   Lab Test 05/03/22 2025 02/23/22  0745   AST 13 14   ALT 25 29   ALKPHOS 50 53     Recent Labs   Lab Test 03/27/22  1108 02/18/21  1034 10/20/17  1124   TSH 2.83 3.34 2.61       ALLERGY & IMMUNIZATIONS       Allergies   Allergen Reactions     Sulfamethoxazole-Trimethoprim Nausea       MEDICAL & SURGICAL HISTORY    Reviewed past medical and surgical history today.   Pregnant - NA.     Past Medical History:   Diagnosis Date     Alcohol withdrawal (H) 04/28/2015     Atrial flutter (H)      Depressive disorder      Ejection fraction < 50% 04/2015    Ejection fraction 45% per echo April  2015 (per [facility] records), possible alcohol or tachycardia induced cardiomyopathy. EF normalized on follow-up echo 2016     SANDRA (generalized anxiety disorder)      H/O atrioventricular quita ablation 12/2015     Hypertension, goal below 140/90      Tobacco abuse        MENTAL STATUS EXAM:     Alertness: alert  and oriented  Appearance: adequately groomed  Behavior/Demeanor: cooperative, pleasant and calm, with good eye contact   Speech: normal and regular rate and rhythm  Language: intact and no problems  Psychomotor: normal or unremarkable  Mood: anxious and worried  Affect: full range and appropriate; was congruent to mood; was congruent to content  Thought Process/Associations: unremarkable  Thought Content:  Reports none;  Denies suicidal ideation, violent ideation and delusions  Perception:  Reports none;  Denies auditory hallucinations and visual hallucinations  Insight: intact  Judgment: intact  Cognition: does  appear grossly intact; formal cognitive testing was not done  Gait and Station: [place]     RISK AND PROTECTIVE FACTORS     Static Risk Factors: sex and history of MH diagnoses and/or treatment     Dynamic Risk Factors: emotional distress, substance use, anxiety, agitation, chronic pain, mental health stigma and work related problems     Protective Factors: hope for the future, compliance with medication, future oriented, healthy intimate relationships, engaged in EBT, access to care as needed, motivation and readiness for change, reasons for living, effective coping strategies and displaying help seeking behavior     SAFETY ASSESSMENT      Based on review of above risk and protective factors and today's exam, pt is at low elevated risk of harm to self or others. He does not meet criteria for a 72 hr hold and remains appropriate for ongoing outpatient care. The patient convincingly denies suicidality today but endorses fleeting thoughts of death without plan/intent. There was no deceit detected, and the  patient presented in a manner that was believable. Local community safety resources printed and reviewed for patient to use if needed.     Recommended that patient call 911 or go to the local ED should there be a change in any of these risk factors.     DSM 5 DIAGNOSIS      296.22 (F32.1)  Major Depressive Disorder, Single Episode, Moderate _  300.01 (F41.0) Panic Disorder  Illness anxiety disorder     DIFFERENTIAL DIAGNOSIS: SANDRA     Medical comorbidities impacting or contributing to clinical picture: None noted     ASSESSMENT AND PLAN      ASSESSMENT:  Munir Storey is a 57 year old White, male who presents for return visit with [facility] ([facility] for medication management.   1 Dec 22: Pt with depression and illness anxiety disorder who returns after being seen in  while I was on MYRNA. Pt thinks anxiety is more managed with daily diazepam but still has paralyzing worry about his health or something happening so he does not engage in previous hobbies. He also reports problematic anhedonia the past 7-8 months worsening over time. He is a bit hesitant about medication changes today in case they cause him to regress/become more anxious, which is understandable. We discussed exploring meditation and mindfulness skills as he appears to do better when focusing on the present moment (ex at work). He is amenable to this today. We discussed considering switching to venlafaxine as he previously found it helpful. He was concerned due to dose dependent HTN risk but in 2020 did not experience that. He denies SI today. Advised messaging me in 2 weeks with an update re: meditation.   2 Jan 23: Pt with depression, sandra, and illness anxiety disorder who returns to clinic c/o persisting depressive sx like anhedonia, low energy, low motivation, sad mood. We discussed genesight testing today after I reviewed having ordered it in May 2022; pt does feel ready to do this now. I did suggest he consider augmenting  with aripiprazole for antidepressant effect as he finds fluoxetine helpful for anxiety but not for mood. He's scared to change from fluoxetine to another medicine but open to the idea that it may be beneficial for his recovery, [place]olynn. Pending genesight results. He denies SI/hi/A/VH but verbalizes feeling trapped by his hypochondriasis.   He will likely benefit from long term psychiatry but I am comfortable keeping him on my panel for now.     TREATMENT PLAN: Medication side effects and alternatives reviewed. Health promotion activities recommended and reviewed. All questions addressed. Education and counseling completed regarding risks and benefits of medications and psychotherapy options. [facility] model reviewed today. Recommend therapy for additional support. Safety plan reviewed as indicated.     MEDICATIONS:   -hold GABAPENTIN 300mg daily due to limited perceived effect  -start ARIPIPRAZOLE 2mg daily; if feeling helpful, increase to 4mg daily after 5 days  -continue LORAZEPAM 0.5mg daily prn for panic  -continue FLUOXETINE 40mg  -continue DIAZEPAM 1mg daily (pt cutting in half)     LABS/RADS:   -reviewed July 2022 labs    PATIENT STATUS:  [facility] MD/DO/NP/PA providers offer care a specialty service for Primary Care Providers in the [facility] that seek to optimize psychotropic medications for unstable patients.  Once medications have been optimized, our providers discharge the patient back to the referring Primary Care Provider for ongoing medication management.  This type of system allows our providers to serve a high volume of patients.   -Pt will be seen for continued medication stabilization in [facility].    PSYCHOSOCIAL:   -reviewed order for Genesight in May 2022; re-ordered and pt agrees  -Follow up with primary care provider as planned or for acute medical concerns.    PSYCHOEDUCATION:  Medication side effects and alternatives reviewed. Health promotion activities recommended and  reviewed today. All questions addressed. Education and counseling completed regarding risks and benefits of medications and psychotherapy options.  Consent provided by patient/guardian  Call the psychiatric nurse line with medication questions or concerns at [phone].  DailyWorthhart may be used to communicate with your provider, but this is not intended to be used for emergencies.  BLACK BOX WARNING: Discussed the Food and Drug Administration (FDA) requires that all antidepressants carry a warning that some children, adolescents and young adults may be at increased risk of suicide when taking antidepressants. Anyone taking an antidepressant should be watched closely for worsening depression or unusual behavior especially in the first few weeks after starting an SSRI. Keep in mind, antidepressants are more likely to reduce suicide risk in the long run by improving mood.   BENZODIAZEPINE:  discussion on how benzos work and the need to use them short term due to potential of anxiety getting.  This is a controlled substance with risk for abuse, need to keep in a safe keep place and cannot replace lost scripts.    Medlineplus.gov is information for patients.  It is run by the [employer] and it contains information about all disorders, diseases and all medications.      FOLLOW-UP: Follow up in 2 weeks    1. Continue all other treatments (including medications) per primary care provider and/or specialists.   2. To schedule individual or family therapy, call [facility] at [phone].   3. Follow up with primary care provider as planned or for acute medical concerns.  4. Call the psychiatric nurse line with medication questions or concerns at [phone] or [phone].  5. DailyWorthhart may be used to communicate with your care team, but this is not intended to be used for emergencies.    CRISIS RESOURCES:    1. Present to the Emergency Department as needed or call after hours crisis line at  [phone] or [phone].   2. [place] Crisis Text Line: Text [place] to 347107.  3. Suicide LifeLine Chat: suicidepreLocallerline.org/chat/.  4. National Suicide Prevention Lifeline: 276.188.9439 (TTY: 435.603.6072). Call anytime for help.  (www.suicidepreventionlifeline.org)  5. National [place] on Mental Illness (www.maritza.org): 286.870.3912 or 078-832-6790.  6. Mental Health Association (www.mentalhealth.org): 191.349.9454 or 133-268-4795.    ADMINISTRATIVE BILLING:    Time spent interviewing patient, reviewing referral documents, obtaining and reviewing outside records, communication with other health specialists, and preparing this report on today's date  Video/Phone Start Time: 1350  Video/Phone End Time: 1440    Signed:   Jaqueline Eden DNP, [id number]  [facility] ([facility])

## 2023-01-11 ENCOUNTER — TELEPHONE (OUTPATIENT)
Dept: BEHAVIORAL HEALTH | Facility: CLINIC | Age: 58
End: 2023-01-11

## 2023-01-11 ENCOUNTER — TRANSFERRED RECORDS (OUTPATIENT)
Dept: HEALTH INFORMATION MANAGEMENT | Facility: CLINIC | Age: 58
End: 2023-01-11

## 2023-01-11 NOTE — TELEPHONE ENCOUNTER
[facility] contacted patient regarding his appointment this Friday, 01/13/2023, and advised that the appointment time will be changed to 1 PM in order to accommodate extra time for the patient and his needs.  Patient was in agreement with this.  No other needs indicated at this time.    Germain Willson  [facility] [place] [facility]

## 2023-01-13 ENCOUNTER — VIRTUAL VISIT (OUTPATIENT)
Dept: BEHAVIORAL HEALTH | Facility: CLINIC | Age: 58
End: 2023-01-13
Payer: COMMERCIAL

## 2023-01-13 ENCOUNTER — VIRTUAL VISIT (OUTPATIENT)
Dept: PSYCHIATRY | Facility: CLINIC | Age: 58
End: 2023-01-13
Payer: COMMERCIAL

## 2023-01-13 DIAGNOSIS — F41.1 GAD (GENERALIZED ANXIETY DISORDER): ICD-10-CM

## 2023-01-13 DIAGNOSIS — F33.1 MODERATE EPISODE OF RECURRENT MAJOR DEPRESSIVE DISORDER (H): ICD-10-CM

## 2023-01-13 DIAGNOSIS — F41.0 PANIC DISORDER WITHOUT AGORAPHOBIA: Primary | ICD-10-CM

## 2023-01-13 DIAGNOSIS — F45.21 ILLNESS ANXIETY DISORDER: Primary | ICD-10-CM

## 2023-01-13 DIAGNOSIS — F41.0 PANIC ATTACK: ICD-10-CM

## 2023-01-13 PROCEDURE — 90832 PSYTX W PT 30 MINUTES: CPT | Mod: 95 | Performed by: SOCIAL WORKER

## 2023-01-13 PROCEDURE — 99215 OFFICE O/P EST HI 40 MIN: CPT | Mod: 95 | Performed by: STUDENT IN AN ORGANIZED HEALTH CARE EDUCATION/TRAINING PROGRAM

## 2023-01-13 RX ORDER — VENLAFAXINE HYDROCHLORIDE 37.5 MG/1
CAPSULE, EXTENDED RELEASE ORAL
Qty: 98 CAPSULE | Refills: 0 | Status: SHIPPED | OUTPATIENT
Start: 2023-01-13 | End: 2023-02-14

## 2023-01-13 RX ORDER — DIAZEPAM 2 MG
1 TABLET ORAL DAILY
Qty: 15 TABLET | Refills: 0 | Status: SHIPPED | OUTPATIENT
Start: 2023-01-13 | End: 2023-02-14

## 2023-01-13 RX ORDER — ARIPIPRAZOLE 2 MG/1
2 TABLET ORAL DAILY
Qty: 30 TABLET | Refills: 0 | Status: SHIPPED | OUTPATIENT
Start: 2023-01-13 | End: 2023-02-14

## 2023-01-13 RX ORDER — LORAZEPAM 0.5 MG/1
.25-.5 TABLET ORAL DAILY PRN
Qty: 7 TABLET | Refills: 0 | Status: SHIPPED | OUTPATIENT
Start: 2023-01-13 | End: 2023-03-16

## 2023-01-13 ASSESSMENT — PATIENT HEALTH QUESTIONNAIRE - PHQ9
SUM OF ALL RESPONSES TO PHQ QUESTIONS 1-9: 7

## 2023-01-13 NOTE — PROGRESS NOTES
[facility] PSYCHIATRY SERVICE FOLLOW-UP     Name:  Munir Storey  : 1965     Telemedicine Visit: The patient's condition can be safely assessed and treated via synchronous audio and visual telemedicine encounter.      Reason for Telemedicine Visit: COVID 19 pandemic and the social and physical recommendations by the CDC and MD.      Originating Site (Patient Location): Patient's home     Distant Site (Provider Location): Provider Remote Setting     Consent:  The patient/guardian has verbally consented to: the potential risks and benefits of telemedicine (video visit or phone) versus in person care; bill my insurance or make self-payment for services provided; and responsibility for payment of non-covered services.     Mode of Communication:  [employer] video platform      As the provider I attest to compliance with applicable laws and regulations related to telemedicine.    IDENTIFICATION     Patient is a 57 year old year old White, male  who presents for follow-up medication management with [facility].  Patient was initially referred by their PCP. Patient attended the session alone.     Patient care team: Patient Care Team:  Magalis Morales MD as PCP - General (Family Medicine)  Sadi Shelley MD as Assigned Heart and Vascular Provider  Robreto Cabral MD as Assigned Musculoskeletal Provider  Peggy Eden NP as Assigned Neuroscience Provider  Ralph Monzon MD as MD (Psychiatry)  Betty Rivers [facility] as Pharmacist (Pharmacist)  Emma Martinez APRN CNP as Assigned Behavioral Health Provider  Magalis Morales MD as Assigned PCP    INTERIM HISTORY   Pt was last seen in [facility] for follow-up on . At that time, the plan included genesight testing, continue fluoxetine 40mg, continue diazepam 1mg daily, start aripiprazole 2mg daily, hold gabapentin 300mg daily.    Interim pt communication:  Reached out to pt re: genesight vs PGx in house; not read    Available  "records were reviewed prior to visit.    HISTORY OF PRESENT ILLNESS     Per [facility] Maura Laureano during today's team-based visit: \"MH Update: \"anxiety is better.\" Blood pressure has been stable, feeling physically better (no chest pressure or pain). Does have a cold but none of the physical symptoms that he had with anxiety. Not thinking as much about the anxiety because his body feels more relaxed. Is waking up around 2 am every night. Not anxious when he wakes up. Able to fall back asleep for another hour or two. Sometimes gets back to sleep again. Averages 6-8 hours a night. Feels tired during the day but not exhausted, not need to nap like before. Does notice some irritability when something stressful happens. \"Blew up\" at brother but felt it was deserved but out of character. Realizes it was warranted/not upset about it. Learning how to communicate and worried he may not be doing it effectively.  I feel statements. Boundaries.  [facility] praised patient for his progress and being willing to try things.  [facility] validated that patient continues to feel distress and the frustration that comes with knowing the process can take a long time.  [facility] encouraged patient to talk with the prescriber today about looking at making a medication change as patient does feel that he has given it a chance.  [facility] processed patient feeling a disconnect and trying to use tactile grounding and breathing in the moment to reconnect for both anxiety and depression.  [facility] provided encouragement for patient to continue to connect with things that could help him therapeutically.  [facility] discussed patient reaching out to [facility] for [place] based therapy and this information was sent through [employer].  [facility] also discussed patient potentially being referred for genetic testing.  Stressors: same- busy household, family, not doing things he typically enjoys  Tx: still looking to connect with a new therapist. Discussed [facility] or " "[facility]. [facility] has been helpful.  Etoh: denies  Substance: denies  Nicotine: continues to reduce, chew. 1 pouch a day and herbal replacement. Sometimes uses nicotine gum.  Caffeine: down to 2 cups of half-caff and then de-caff. More water.  Most Important: completed Gene Site testing. Anxiety is improving. Start taking 2 Abilify? (Only taking 1). Leg cramps- side effect? And not sleeping well.\"    ---Psychiatry Update---  \"It's going pretty well. I feel like there's less anxiety. Slamming doors don't make me jump anymore.\" He doesn't feel as \"edgy\" but he does report feeling some brain fog. Pt says that he thinks his overall mood and outlook seem \"different.\" He returned to work and started aripiprazole so he thinks a lot has changed in two weeks and that both could be having a positive effect on his mood.   Pt says today is the first appointment he has not felt nervous or anxious for. He is feeling more \"emotional. It feels good to cry.\" He wants to switch to another medicine using [employer] to inform changes.     Pt says he and his wife don't talk about pt's episode a year ago where he panicked while she was out of town and asked her to leave visiting her son and fly back to [place]. \"It was devastating that I couldn't be there for her and she couldn't be there for me.\" He still feels a lot of guilt about his not being able to support her while she needed to support her son. \"We're always going to have that scar.\" He recalls that he was blaming her son Dickson for \"everything.\" He never blamed Ariane (wife). \"I was sad. I was selfish. Poor me, you know?\"     Suicidal ideation:  No   PHQ9 score is 7 indicating mild depression.   GAD7 score is  is  15 indicating severe anxiety.    Medication side effects: Pt c/o some brain fog. He has been taking aripiprazole in the evening after he gets home from work. He has noticed some muscle cramping in one leg in the past two weeks. He isn't sure if this is due to " "going back to work and using kneepads or if this is from starting aripiprazole.     Sleep: Pt with restless sleep since starting aripiprazole.     Medical: \"Phsyically I feel good.\" He says no problems with BP (HTN, controlled with medicine). He has a cold. \"In reality it could be pneumonia. Who knows? My mind is not racing to the emergency room or death or anything like that.\"    MEDICATIONS                                                                                              Current medications reviewed today and are noted below.   Current psychotropic medications:   Fluoxetine 40mg  Diazepam 1mg daily  Aripiprazole 2mg daily  Lorazepam 0.5mg daily PRN - cutting in half and using 0-2 per week.      Past psychotropic medications:  Alprazolam  Duloxetine  Fluoxetine  Hydroxyzine  Lorazepam  Sertraline  Venlafaxine  Bupropion  Gabapentin     Supplements:   See below       12/14/22 Diazepam 2mg #30  11/30/22 Diazepam 2mg #5  11/25/22 Lorazepam 0.5mg #7  10/25/22 Gabapentin 300mg #180  10/5/22 Lorazepam 0.5mg #7    Current Outpatient Medications   Medication Sig     amLODIPine (NORVASC) 10 MG tablet Take 1 tablet (10 mg) by mouth daily     ARIPiprazole (ABILIFY) 2 MG tablet Take 1 tablet (2 mg) by mouth daily For depression boost     diazepam (VALIUM) 2 MG tablet Take 1 tablet (2 mg) by mouth daily     FLUoxetine (PROZAC) 40 MG capsule Take 1 capsule (40 mg) by mouth daily     fluticasone (FLONASE) 50 MCG/ACT nasal spray Spray 1 spray into both nostrils daily     lisinopril-hydrochlorothiazide (ZESTORETIC) 20-25 MG tablet Take 2 tablets by mouth daily     LORazepam (ATIVAN) 0.5 MG tablet Take 1 tablet (0.5 mg) by mouth daily as needed for anxiety (or severe panic attacks)     metoprolol succinate ER (TOPROL XL) 25 MG 24 hr tablet Take 1 tablet (25 mg) by mouth daily     Misc Natural Products (T-RELIEF CBD+13 SL) Place 600 Units under the tongue daily as needed Hemp oil sublingual     NONFORMULARY CBD gummi " and oil     pediatric electrolyte (PEDIALYTE) SOLN solution Take 1.25 mLs by mouth daily = 1/4 teaspoon     No current facility-administered medications for this visit.        VITALS   There were no vitals taken for this visit.    Pulse Readings from Last 5 Encounters:   12/16/22 66   11/28/22 59   08/11/22 73   07/01/22 60   06/03/22 64     Wt Readings from Last 5 Encounters:   12/16/22 79.2 kg (174 lb 9.6 oz)   11/28/22 78.5 kg (173 lb)   08/11/22 71.8 kg (158 lb 3.2 oz)   07/01/22 74.8 kg (164 lb 12.8 oz)   06/03/22 75.8 kg (167 lb 3.2 oz)     BP Readings from Last 5 Encounters:   12/16/22 134/78   11/28/22 (!) 162/84   08/11/22 (!) 144/84   07/01/22 (!) 150/74   06/03/22 (!) 142/82       LABS & IMAGING                                                                                                                Recent available labs reviewed today.    Recent Labs   Lab Test 07/01/22  1117 05/29/22  0847 05/03/22 2025   CR 0.72 0.76 0.81   GFRESTIMATED >90 >90 >90     Recent Labs   Lab Test 05/03/22 2025 02/23/22  0745   AST 13 14   ALT 25 29   ALKPHOS 50 53     Recent Labs   Lab Test 03/27/22  1108 02/18/21  1034 10/20/17  1124   TSH 2.83 3.34 2.61       ALLERGY & IMMUNIZATIONS       Allergies   Allergen Reactions     Sulfamethoxazole-Trimethoprim Nausea       MEDICAL & SURGICAL HISTORY    Reviewed past medical and surgical history today.   Pregnant - NA.     Past Medical History:   Diagnosis Date     Alcohol withdrawal (H) 04/28/2015     Atrial flutter (H)      Depressive disorder      Ejection fraction < 50% 04/2015    Ejection fraction 45% per echo April 2015 (per [facility] records), possible alcohol or tachycardia induced cardiomyopathy. EF normalized on follow-up echo 2016     SANDRA (generalized anxiety disorder)      H/O atrioventricular quita ablation 12/2015     Hypertension, goal below 140/90      Tobacco abuse        MENTAL STATUS EXAM:     Alertness: alert  and oriented  Appearance: adequately  groomed  Behavior/Demeanor: cooperative, pleasant and calm, with good eye contact   Speech: normal and regular rate and rhythm  Language: intact and no problems  Psychomotor: normal or unremarkable  Mood: improving, depressed  Affect: full range and appropriate; was congruent to mood; was congruent to content  Thought Process/Associations: unremarkable  Thought Content:  Reports none;  Denies suicidal ideation, violent ideation and delusions  Perception:  Reports none;  Denies auditory hallucinations and visual hallucinations  Insight: intact  Judgment: intact  Cognition: does  appear grossly intact; formal cognitive testing was not done  Gait and Station: [facility]     RISK AND PROTECTIVE FACTORS     Static Risk Factors: sex and history of MH diagnoses and/or treatment     Dynamic Risk Factors: emotional distress, substance use, anxiety, agitation, chronic pain, mental health stigma and work related problems     Protective Factors: hope for the future, compliance with medication, future oriented, healthy intimate relationships, engaged in EBT, access to care as needed, motivation and readiness for change, reasons for living, effective coping strategies and displaying help seeking behavior     SAFETY ASSESSMENT      Based on review of above risk and protective factors and today's exam, pt is at low elevated risk of harm to self or others. He does not meet criteria for a 72 hr hold and remains appropriate for ongoing outpatient care. The patient convincingly denies suicidality today but endorses fleeting thoughts of death without plan/intent. There was no deceit detected, and the patient presented in a manner that was believable. Local community safety resources printed and reviewed for patient to use if needed.     Recommended that patient call 911 or go to the local ED should there be a change in any of these risk factors.     DSM 5 DIAGNOSIS      Major Depressive Disorder, Single Episode, Moderate _  Panic  Disorder  Illness anxiety disorder     DIFFERENTIAL DIAGNOSIS: SANDRA     Medical comorbidities impacting or contributing to clinical picture: None noted    ASSESSMENT AND PLAN      ASSESSMENT:  Munir Storey is a 57 year old White, male who presents for return visit with [facility] ([facility] for medication management.   2 Jan 23: Pt with depression, sandra, and illness anxiety disorder who returns to clinic c/o persisting depressive sx like anhedonia, low energy, low motivation, sad mood. We discussed genesight testing today after I reviewed having ordered it in May 2022; pt does feel ready to do this now. I did suggest he consider augmenting with aripiprazole for antidepressant effect as he finds fluoxetine helpful for anxiety but not for mood. He's scared to change from fluoxetine to another medicine but open to the idea that it may be beneficial for his recovery, [place]olynn. Pending genesight results. He denies SI/hi/A/VH but verbalizes feeling trapped by his hypochondriasis.   He will likely benefit from long term psychiatry but I am comfortable keeping him on my panel for now.   13 Jan 23: Pt with depression, sandra, panic and illness anxiety disorder who returns to clinic reporting improving mood and anxiety since starting aripiprazole. He also started work back up a few weeks ago so isn't sure if either or both are contributing to mood improvement but he is feeling hopeful about this adjustment. We reviewed his genesight results today and discussed antidepressants, primarily. He is interested in switching back to venlafaxine; we have discussed this on and off for months and seeing that he metabolizes this as expected is encouraging for pt. We discussed r/b/se switching; pt to stop fluoxetine and start slow ramp up of venlafaxine. He does have long term goals to taper off diazepam; I agree this is a good idea. Pt denies safety concerns today. Tearful intermittently.     TREATMENT PLAN: Medication side  effects and alternatives reviewed. Health promotion activities recommended and reviewed. All questions addressed. Education and counseling completed regarding risks and benefits of medications and psychotherapy options. [facility] model reviewed today. Recommend therapy for additional support. Safety plan reviewed as indicated.     MEDICATIONS:   -continue ARIPIPRAZOLE 2mg daily  -continue LORAZEPAM 0.5mg daily prn for panic  -stop FLUOXETINE  -continue DIAZEPAM 1mg daily (pt cutting in half)   -start VENLAFAXINE XR 37.5mg x 7 days then 75mg x 7 days then 112.5mg x 7 days then 150mg daily    LABS/RADS:   -reviewed July 2022 results    PATIENT STATUS:  [facility] MD/DO/NP/PA providers offer care a specialty service for Primary Care Providers in the [facility] that seek to optimize psychotropic medications for unstable patients.  Once medications have been optimized, our providers discharge the patient back to the referring Primary Care Provider for ongoing medication management.  This type of system allows our providers to serve a high volume of patients.   -Pt will be seen for continued medication stabilization in [facility].    PSYCHOSOCIAL:   -find new therapist  -Follow up with primary care provider as planned or for acute medical concerns.    PSYCHOEDUCATION:  Medication side effects and alternatives reviewed. Health promotion activities recommended and reviewed today. All questions addressed. Education and counseling completed regarding risks and benefits of medications and psychotherapy options.  Consent provided by patient/guardian  Call the psychiatric nurse line with medication questions or concerns at [phone].  MyChart may be used to communicate with your provider, but this is not intended to be used for emergencies.  BLACK BOX WARNING: Discussed the Food and Drug Administration (FDA) requires that all antidepressants carry a warning that some children, adolescents and young adults  may be at increased risk of suicide when taking antidepressants. Anyone taking an antidepressant should be watched closely for worsening depression or unusual behavior especially in the first few weeks after starting an SSRI. Keep in mind, antidepressants are more likely to reduce suicide risk in the long run by improving mood.   BENZODIAZEPINE:  discussion on how benzos work and the need to use them short term due to potential of anxiety getting.  This is a controlled substance with risk for abuse, need to keep in a safe keep place and cannot replace lost scripts.    FIRST GENERATION ANTIPSYCHOTIC/ SECOND GENERATION ANTIPSYCHOTIC USE:  Atypical need for cardiometabolic monitoring with medication- B/P, weight, blood sugar, cholesterol.  Need to monitor for abnormal movements taught  Medlineplus.gov is information for patients.  It is run by the [employer] and it contains information about all disorders, diseases and all medications.      FOLLOW-UP: Follow up in 4 weeks    1. Continue all other treatments (including medications) per primary care provider and/or specialists.   2. To schedule individual or family therapy, call [facility] at [phone].   3. Follow up with primary care provider as planned or for acute medical concerns.  4. Call the psychiatric nurse line with medication questions or concerns at [phone] or [phone].  5. [employer] may be used to communicate with your care team, but this is not intended to be used for emergencies.    CRISIS RESOURCES:    1. Present to the Emergency Department as needed or call after hours crisis line at [phone] or [phone].   2. [place] Crisis Text Line: Text [place] to 460263.  3. Suicide LifeLine Chat: suicidepreventionlifeline.org/chat/.  4. National Suicide Prevention Lifeline: 954.238.1281 (TTY: 167.603.3322). Call anytime for help.  (www.suicidepreventionlifeline.org)  5. National [place] on Mental Illness  (www.maritza.org): 184-027-5170 or 907-388-7837.  6. Mental Health Association (www.mentalhealth.org): 589.689.3966 or 969-333-2917.    ADMINISTRATIVE BILLING:    Time spent interviewing patient, reviewing referral documents, obtaining and reviewing outside records, communication with other health specialists, and preparing this report on today's date  Video/Phone Start Time: 1340  Video/Phone End Time: 1430    Signed:   Jaqueline Eden DNP, [id number]  [facility] ([facility])

## 2023-01-13 NOTE — NURSING NOTE
Pt stated he was out of Prozac.    Pt stated he eliminated grapefruit, and the blueberry / beet smoothie.    Crystal Cardenas VF

## 2023-01-13 NOTE — PROGRESS NOTES
[facility]  January 13, 2023      Behavioral Health Clinician Progress Note    Patient Name: Munir Storey           Service Type:  Individual      Service Location:   [place] / Email (patient reached)     Session Start Time: 13:01  Session End Time: 13:38      Session Length: 16 - 37      Attendees: Patient      Service Modality:  Video Visit:     Telemedicine Visit: The patient's condition can be safely assessed and treated via synchronous audio and visual telemedicine encounter.      Reason for Telemedicine Visit: Services only offered telehealth    Originating Site (Patient Location): Patient's home        Distant Location (provider location):  Off-site    Consent:  The patient/guardian has verbally consented to: the potential risks and benefits of telemedicine (video visit) versus in person care; bill my insurance or make self-payment for services provided; and responsibility for payment of non-covered services.     Mode of Communication:  Video Conference via Pirq    As the provider I attest to compliance with applicable laws and regulations related to telemedicine.    Visit Activities (Refresh list every visit): [facility] Only    Diagnostic Assessment Date: 04/12/2022  Treatment Plan Review Date: Due by 03/01/2023  See Flowsheets for today's PHQ-9 and SANDRA-7 results  Previous PHQ-9:   PHQ-9 SCORE 1/2/2023 1/13/2023 1/13/2023   PHQ-9 Total Score MyChart 6 (Mild depression) - 7 (Mild depression)   PHQ-9 Total Score 6 7 7   Some encounter information is confidential and restricted. Go to Review Flowsheets activity to see all data.     Previous SANDRA-7:   SANDRA-7 SCORE 1/2/2023 1/2/2023 1/2/2023   Total Score - - 15 (severe anxiety)   Total Score 15 15 15   Some encounter information is confidential and restricted. Go to Review Flowsheets activity to see all data.       BANG LEVEL:  No flowsheet data found.    DATA  Extended Session (60+ minutes):  "No  Interactive Complexity: No  Crisis: No  [facility] Patient: No    Treatment Objective(s) Addressed in This Session:  Target Behavior(s): anxiety and depression    Depressed Mood: Increase interest, engagement, and pleasure in doing things  Decrease frequency and intensity of feeling down, depressed, hopeless  Improve quantity and quality of night time sleep / decrease daytime naps  Anxiety: will experience a reduction in anxiety     Current Stressors / Issues:  Patient presents with an improved affect.     Update: \"anxiety is better.\" Blood pressure has been stable, feeling physically better (no chest pressure or pain). Does have a cold but none of the physical symptoms that he had with anxiety. Not thinking as much about the anxiety because his body feels more relaxed. Is waking up around 2 am every night. Not anxious when he wakes up. Able to fall back asleep for another hour or two. Sometimes gets back to sleep again. Averages 6-8 hours a night. Feels tired during the day but not exhausted, not need to nap like before. Does notice some irritability when something stressful happens. \"Blew up\" at brother but felt it was deserved but out of character. Realizes it was warranted/not upset about it. Learning how to communicate and worried he may not be doing it effectively.  Patient has noticed other episodes of irritability and is not sure if this is related to the medication or not.  [facility] praised patient for his progress and insight.  [facility] provided education about boundaries and how patient can interact with others, including his brother, by knowing what he is okay with and what he is not.  [facility] reviewed \"I feel\" statements and had patient practice how they might be helpful for him.  [facility] pointed out that I feel statements are more about the person using them and less about the emotional reaction of the other person.  [facility] discussed how they can be empowering and also help improve communication.  [facility] processed patient " continuing to work on his mental health by looking at things from another angle instead of trying to focus on the entire picture.  [facility] encouraged patient to continue making small steps and strides in improving his overall mental health.    Stressors: Negative interaction with his brother, trying to reconnect with things he used to enjoy    Tx: still looking to connect with a new therapist. Discussed [facility] or [facility] due to patient's interest in reconnecting with his Rastafari values. [facility] has been helpful.    Etoh: denies  Substance: denies  Nicotine: continues to reduce, chew. 1 pouch a day and herbal replacement. Sometimes uses nicotine gum.  Caffeine: down to 2 cups of half-caff and then de-caff. More water.    Most Important: completed Gene Site testing. Anxiety is improving. Start taking 2 Abilify? (Only taking 1). Leg cramps- side effect? And not sleeping well.    Progress on Treatment Objective(s) / Homework:  Satisfactory progress - ACTION (Actively working towards change); Intervened by reinforcing change plan / affirming steps taken    Motivational Interviewing    MI Intervention: Expressed Empathy/Understanding, Supported Autonomy, Collaboration, Evocation, Reflections: simple and complex, Change talk (evoked) and Reframe     Change Talk Expressed by the Patient: Desire to change Ability to change Reasons to change Need to change Committment to change Activation Taking steps    Provider Response to Change Talk: E - Evoked more info from patient about behavior change, A - Affirmed patient's thoughts, decisions, or attempts at behavior change, R - Reflected patient's change talk and S - Summarized patient's change talk statements    Also provided psychoeducation about behavioral health condition, symptoms, and treatment options    Care Plan review completed: No    Medication Review:  No changes to current psychiatric medication(s)    Medication Compliance:  Yes    Changes  in Health Issues:   None reported    Chemical Use Review:   Substance Use: Chemical use reviewed, no active concerns identified      Tobacco Use: Yes, decrease.  Patient reports frequency of use Down to 1 pouch a day. Provided encouragement to quit   Provided support and affirmation for steps taken towards quiting     Assessment: Current Emotional / Mental Status (status of significant symptoms):  Risk status (Self / Other harm or suicidal ideation)  Patient has had a history of suicidal ideation: Patient reports suicidal ideation that comes and goes.  He denies he has a plan or intent.  He denies any prior attempts  Patient denies current fears or concerns for personal safety.  Patient denies current or recent suicidal ideation or behaviors.  Patient denies current or recent homicidal ideation or behaviors.  Patient denies current or recent self injurious behavior or ideation.  Patient denies other safety concerns.  A safety and risk management plan has not been developed at this time, however patient was encouraged to call [place] [phone] should there be a change in any of these risk factors.    Appearance:   Appropriate   Eye Contact:   Good   Psychomotor Behavior: Normal   Attitude:   Cooperative   Orientation:   All  Speech   Rate / Production: Normal    Volume:  Normal   Mood:    Anxious Improving  Affect:    Appropriate   Thought Content:  Clear   Thought Form:  Coherent  Logical   Insight:    Good     Diagnoses:  1. Panic disorder without agoraphobia    2. SANDRA (generalized anxiety disorder)    3. Moderate episode of recurrent major depressive disorder (H)      Collateral Reports Completed:  Communicated with: [facility] Team    Plan: (Homework, other):  Patient was given information about behavioral services and encouraged to schedule a follow up appointment with the clinic [facility] as needed.  He was also given information about mental health symptoms and treatment options .  Patient has been unable to connect  with his original therapist and will schedule with a [facility] based therapist.  CD Recommendations: No indications of CD issues.                                               Individual Treatment Plan    Patient's Name: Munir Storey  YOB: 1965    Date of Creation: 12/01/2022  Date Treatment Plan Last Reviewed/Revised: Pending    DSM5 Diagnoses: 296.32 (F33.1) Major Depressive Disorder, Recurrent Episode, Moderate _ and With anxious distress or 300.01 (F41.0) Panic Disorder  300.02 (F41.1) Generalized Anxiety Disorder  Psychosocial / Contextual Factors: Patient lives with his wife and adult children.  Patient is working.  Patient does have social supports.  PROMIS (reviewed every 90 days): 20    Referral / Collaboration:  Patient is connected with an individual therapist and declines any other needs at this time.    Anticipated number of session for this episode of care: 3-6 sessions  Anticipation frequency of session: Monthly  Anticipated Duration of each session: 16-37 minutes  Treatment plan will be reviewed in 90 days or when goals have been changed.       MeasurableTreatment Goal(s) related to diagnosis / functional impairment(s)  Goal 1: Patient will report continued improvement in his anxiety and depression.    I will know I've met my goal when I feel more like my old self.      Objective #A (Patient Action)    Patient will identify Rational and irrational fears / thoughts that contribute to feeling anxious.  Status: New - Date: 12/01/2022     Intervention(s)  Therapist will teach emotional recognition/identification. CBT for anxiety.    Objective #B  Patient will use cognitive strategies identified in therapy to challenge anxious thoughts.  Status: New - Date: 12/01/2022     Intervention(s)  Therapist will teach emotional regulation skills. CBT for anxiety skills.    Objective #C  Patient will Increase interest, engagement, and pleasure in doing things  Identify negative self-talk and  behaviors: challenge core beliefs, myths, and actions.  Status: New - Date: 12/01/2022     Intervention(s)  Therapist will assign homework For patient to use activation and reconnect with interests and supports.    Patient has reviewed and agreed to the above plan.      PACO Willson  January 13, 2023    Answers for HPI/ROS submitted by the patient on 1/13/2023  PHQ9 TOTAL SCORE: 7

## 2023-01-13 NOTE — PATIENT INSTRUCTIONS
Thank you for our time together today in [facility] provides brief psychiatric medication stabilization to patients referred by their Primary Care Providers. Patients are typically seen in [facility] for up to 4 appointments and then referred back to the PCP for ongoing refills unless longer term medication management by a specialist is indicated. If I believe you will benefit from long-term psychiatric care I will discuss this with you. If you are interested in seeing a psychiatrist or psychiatric nurse practitioner long-term, please send me a message in [employer] so we can refer you appropriately.     Treatment Plan:  Continue aripiprazole 2mg daily  Continue diazepam 1mg daily  Continue lorazepam 0.25mg-0.5mg if needed for panic  STOP FLUOXETINE  Start venlafaxine XR 37.5mg daily x 7 days then increase to 75mg daily x 7 days then increase to 150mg daily for depression and anxiety  Call [phone] to schedule follow up with me in 4 weeks.  You can call the above number to make appointments, leave a message with our nursing team, and inquire about any mental health referrals I have placed.

## 2023-01-26 ENCOUNTER — TELEPHONE (OUTPATIENT)
Dept: PSYCHIATRY | Facility: CLINIC | Age: 58
End: 2023-01-26
Payer: COMMERCIAL

## 2023-01-26 DIAGNOSIS — R09.81 NASAL CONGESTION: ICD-10-CM

## 2023-01-26 RX ORDER — FLUTICASONE PROPIONATE 50 MCG
1 SPRAY, SUSPENSION (ML) NASAL DAILY
Qty: 16 G | Refills: 0 | Status: SHIPPED | OUTPATIENT
Start: 2023-01-26

## 2023-01-26 NOTE — TELEPHONE ENCOUNTER
Returned call to Munir and he noted the following:    I was just going to make sure that my medication was ordered as my last dose is tomorrow.     RN reminded patient to send over his refill request by contacting his pharmacy. He agreed and said he will do so.

## 2023-01-26 NOTE — TELEPHONE ENCOUNTER
Reason for call:  Other   Patient called regarding (reason for call): call back and prescription  Additional comments: PT scheduled with Nory Eden for 02/14/23. Pt also requesting a callback to discuss medications with care team prior to appt.     Phone number to reach patient:  Home number on file [phone] (home)    Best Time:  asap    Can we leave a detailed message on this number?  YES    Travel screening: Not Applicable

## 2023-02-01 PROBLEM — F33.1 MODERATE EPISODE OF RECURRENT MAJOR DEPRESSIVE DISORDER (H): Chronic | Status: ACTIVE | Noted: 2018-08-29

## 2023-02-14 ENCOUNTER — VIRTUAL VISIT (OUTPATIENT)
Dept: PSYCHIATRY | Facility: CLINIC | Age: 58
End: 2023-02-14
Payer: COMMERCIAL

## 2023-02-14 ENCOUNTER — VIRTUAL VISIT (OUTPATIENT)
Dept: BEHAVIORAL HEALTH | Facility: CLINIC | Age: 58
End: 2023-02-14
Payer: COMMERCIAL

## 2023-02-14 DIAGNOSIS — F45.21 ILLNESS ANXIETY DISORDER: Primary | ICD-10-CM

## 2023-02-14 DIAGNOSIS — F41.1 GAD (GENERALIZED ANXIETY DISORDER): ICD-10-CM

## 2023-02-14 DIAGNOSIS — F41.0 PANIC ATTACK: ICD-10-CM

## 2023-02-14 DIAGNOSIS — F33.1 MODERATE EPISODE OF RECURRENT MAJOR DEPRESSIVE DISORDER (H): ICD-10-CM

## 2023-02-14 DIAGNOSIS — F41.0 PANIC DISORDER WITHOUT AGORAPHOBIA: Primary | ICD-10-CM

## 2023-02-14 PROCEDURE — 99214 OFFICE O/P EST MOD 30 MIN: CPT | Mod: VID | Performed by: STUDENT IN AN ORGANIZED HEALTH CARE EDUCATION/TRAINING PROGRAM

## 2023-02-14 PROCEDURE — 90832 PSYTX W PT 30 MINUTES: CPT | Mod: VID | Performed by: SOCIAL WORKER

## 2023-02-14 RX ORDER — VENLAFAXINE HYDROCHLORIDE 37.5 MG/1
CAPSULE, EXTENDED RELEASE ORAL
Qty: 74 CAPSULE | Refills: 0 | Status: SHIPPED | OUTPATIENT
Start: 2023-02-14 | End: 2023-03-16

## 2023-02-14 RX ORDER — DIAZEPAM 2 MG
1 TABLET ORAL DAILY
Qty: 15 TABLET | Refills: 0 | Status: SHIPPED | OUTPATIENT
Start: 2023-02-14 | End: 2023-03-16

## 2023-02-14 RX ORDER — VENLAFAXINE HYDROCHLORIDE 150 MG/1
150 CAPSULE, EXTENDED RELEASE ORAL DAILY
Qty: 90 CAPSULE | Refills: 0 | Status: SHIPPED | OUTPATIENT
Start: 2023-02-14 | End: 2023-02-22

## 2023-02-14 ASSESSMENT — PATIENT HEALTH QUESTIONNAIRE - PHQ9
SUM OF ALL RESPONSES TO PHQ QUESTIONS 1-9: 4

## 2023-02-14 NOTE — PROGRESS NOTES
[facility] PSYCHIATRY SERVICE FOLLOW-UP     Name:  Munir Storey  : 1965     Telemedicine Visit: The patient's condition can be safely assessed and treated via synchronous audio and visual telemedicine encounter.      Reason for Telemedicine Visit: COVID 19 pandemic and the social and physical recommendations by the CDC and MD.      Originating Site (Patient Location): Patient's home     Distant Site (Provider Location): Provider Remote Setting     Consent:  The patient/guardian has verbally consented to: the potential risks and benefits of telemedicine (video visit or phone) versus in person care; bill my insurance or make self-payment for services provided; and responsibility for payment of non-covered services.     Mode of Communication:  GRAYL video platform      As the provider I attest to compliance with applicable laws and regulations related to telemedicine.    IDENTIFICATION     Patient is a 57 year old year old White, male  who presents for follow-up medication management with [facility].  Patient was initially referred by their PCP. Patient attended the session alone.     Patient care team: Patient Care Team:  Magalis Morales MD as PCP - General (Family Medicine)  Sadi Shelley MD as Assigned Heart and Vascular Provider  Roberto Cabral MD as Assigned Musculoskeletal Provider  Peggy Eden NP as Assigned Neuroscience Provider  Ralph Monzon MD as MD (Psychiatry)  Betty Rivers [facility] as Pharmacist (Pharmacist)  Emma Martinez APRN CNP as Assigned Behavioral Health Provider  Magalis Morales MD as Assigned PCP    INTERIM HISTORY   Pt was last seen in [facility] for follow-up on . At that time, the plan included stop fluoxetine, start venlafaxine, continue diazepam, continue aripiprazole.    Interim pt communication:  none    Available records were reviewed prior to visit.    HISTORY OF PRESENT ILLNESS     Per [facility] Maura Laureano during today's  "team-based visit: \"Patient presents with an improved affect.     MH Update: \"fair to middlin.\" Two weeks felt really positive and this lasted for about 2 weeks, and then the past couple of weeks felt like a gradual decline. Feeling \"tired,\" not sleeping as well at night. Gets tired at night and falls asleep on the couch at 8 pm and then gets up and wants to start working at 4 am. More tired at the end of the day, not have the usual energy to do things like before. Is getting enough sleep but worries about the schedule being different. Completed first two stages of sleep study and waiting on results. Feels like he is 85% better but not 100%. Felt closer to 100% two weeks ago when felt really good. Finished Prozac about one month ago and stopped Abilify two weeks ago due to leg cramps. Not sure why he felt good two weeks ago. Not sure what 100% would look like other than comparing himself to the past. Primarily worried about sleep. Has been staying busy. Has been attending [employer] and practicing fabiana, helpful. Going for drives to clear head and manage anxiety, praying and meditating. Open to finding practices that could help. Anxiety with any injuries or perceived medical/health issues. Not use lorazepam in 2 months, diazapam 1/2 pill in morning.   Processed idea of 100% and accepting 85% as stable place. Create routines to help with feeling being connected. Tracking mood on a daily basis.     Stressors: usual financial stressors     Tx: still looking to connect with a Restorationism therapist. Called places and too far to drive or accept telehealth. [facility] [place] to look at additional referrals.     Etoh: denies  Substance: denies  Nicotine: increased to 2 pouch a day and herbal replacement. Sometimes uses nicotine gum.  Caffeine: same, 2 cups of half-caff and then de-caff. More water.     Most Important: check on medications, seeing improvement but wanting more. Substitute for Abilify? At max for Effexor?\"    ---Psychiatry " "Update---  Pt today doesn't feel as \"good as I did two weeks ago\". He felt like he was at 95% when he switched from fluoxetine to venlafaxine. He decided to stop aripiprazole due to leg cramping that he correlated with aripiprazole.   He says he is at 85%. \"I'm not bad by any means but I'm not as good as I was two weeks ago. I was hoping I could stay there for the rest of my life.\" He's not sure if it's due to tapering off fluoxetine or stopping aripiprazole.     Discussed responsibilities he has been able to manage over the past year. Six months ago, \"I was able to do them but not ith the same pep in my step\" as now. Pt went to [place] for a short trip and felt anxious the day before he left but didn't feel panicked. He hasn't used lorazepam in 2 months. \"I'm trying to get rid of the diazepam at some point.\"    Pt's sister thinks pt is \"out and about more. Have a good attitude.\" Pt says his sister perceives pt is more improved compared to his wife's perceptions. \"She notices I'm doing a lot better.\"     His biggest goal: \"go out of town without worrying\". He would love to be able to travel with his wife. He recalls being able to travel in the past without panic. \"I would just call it the fear of flying when actually it was part of my anxiety which never got diagnosed.\"   Goal for feeling 95%: improving sleep (\"my habits are so horseshit\").     Pt says he feels like himself \"most of the time.\" He feels like \"I got a lot of normalcy back. We just need to polish it a little bit.\"     Suicidal ideation:  No   PHQ9 score is 4 indicating minimal depression.  GAD2 score is 2 indicating subthreshold anxiety    Medication side effects: Leg cramping worsened with ongoing aripiprazole so pt stopped it.   After starting venlafaxine: pt noticed he would be falling asleep around 2030 and waking up at 0400. He was also having problems waking overnight at 0100/0200 and unable to sleep. He is glad he can fall back asleep overnight. "     Sleep: Working on wrapping up sleep study. Pt noticed his sleeping pattern changed when he restarted venlafaxine.     Medical: No new medical concerns    MEDICATIONS                                                                                              Current medications reviewed today and are noted below.   Current psychotropic medications:   Venlafaxine XR 150mg   Diazepam 1mg daily  Lorazepam 0.5mg daily PRN - hasn't used in a few months     Past psychotropic medications:  Alprazolam  Aripiprazole  Duloxetine  Fluoxetine  Hydroxyzine  Lorazepam  Sertraline  Venlafaxine  Bupropion  Gabapentin     Supplements:   See below       12/14/22 Diazepam 2mg #30    Current Outpatient Medications   Medication Sig     amLODIPine (NORVASC) 10 MG tablet Take 1 tablet (10 mg) by mouth daily     diazepam (VALIUM) 2 MG tablet Take 0.5 tablets (1 mg) by mouth daily For anxiety     lisinopril-hydrochlorothiazide (ZESTORETIC) 20-25 MG tablet Take 2 tablets by mouth daily     LORazepam (ATIVAN) 0.5 MG tablet Take 0.5-1 tablets (0.25-0.5 mg) by mouth daily as needed for anxiety (or severe panic attacks)     metoprolol succinate ER (TOPROL XL) 25 MG 24 hr tablet Take 1 tablet (25 mg) by mouth daily     Misc Natural Products (T-RELIEF CBD+13 SL) Place 600 Units under the tongue daily as needed Hemp oil sublingual     NONFORMULARY CBD gummi and oil     pediatric electrolyte (PEDIALYTE) SOLN solution Take 1.25 mLs by mouth daily = 1/4 teaspoon     venlafaxine (EFFEXOR XR) 37.5 MG 24 hr capsule Take 1 capsule (37.5 mg) by mouth daily for 7 days, THEN 2 capsules (75 mg) daily for 7 days, THEN 3 capsules (112.5 mg) daily for 7 days, THEN 4 capsules (150 mg) daily for 14 days.     ARIPiprazole (ABILIFY) 2 MG tablet Take 1 tablet (2 mg) by mouth daily For depression boost (Patient not taking: Reported on 2/14/2023)     fluticasone (FLONASE) 50 MCG/ACT nasal spray Spray 1 spray into both nostrils daily (Patient not taking: Reported  on 2/14/2023)     No current facility-administered medications for this visit.        VITALS   There were no vitals taken for this visit.    Pulse Readings from Last 5 Encounters:   12/16/22 66   11/28/22 59   08/11/22 73   07/01/22 60   06/03/22 64     Wt Readings from Last 5 Encounters:   12/16/22 79.2 kg (174 lb 9.6 oz)   11/28/22 78.5 kg (173 lb)   08/11/22 71.8 kg (158 lb 3.2 oz)   07/01/22 74.8 kg (164 lb 12.8 oz)   06/03/22 75.8 kg (167 lb 3.2 oz)     BP Readings from Last 5 Encounters:   12/16/22 134/78   11/28/22 (!) 162/84   08/11/22 (!) 144/84   07/01/22 (!) 150/74   06/03/22 (!) 142/82       LABS & IMAGING                                                                                                                Recent available labs reviewed today.    Recent Labs   Lab Test 07/01/22  1117 05/29/22  0847 05/03/22 2025   CR 0.72 0.76 0.81   GFRESTIMATED >90 >90 >90     Recent Labs   Lab Test 05/03/22 2025 02/23/22  0745   AST 13 14   ALT 25 29   ALKPHOS 50 53     Recent Labs   Lab Test 03/27/22  1108 02/18/21  1034 10/20/17  1124   TSH 2.83 3.34 2.61       ALLERGY & IMMUNIZATIONS       Allergies   Allergen Reactions     Sulfamethoxazole-Trimethoprim Nausea     MEDICAL & SURGICAL HISTORY    Reviewed past medical and surgical history today.   Pregnant - NA.     Past Medical History:   Diagnosis Date     Alcohol withdrawal (H) 04/28/2015     Atrial flutter (H)      Depressive disorder      Ejection fraction < 50% 04/2015    Ejection fraction 45% per echo April 2015 (per [facility] records), possible alcohol or tachycardia induced cardiomyopathy. EF normalized on follow-up echo 2016     SANDRA (generalized anxiety disorder)      H/O atrioventricular quita ablation 12/2015     Hypertension, goal below 140/90      Tobacco abuse      MENTAL STATUS EXAM:     Alertness: alert  and oriented  Appearance: adequately groomed  Behavior/Demeanor: cooperative, pleasant and calm, with good eye contact   Speech: normal and  regular rate and rhythm  Language: intact and no problems  Psychomotor: normal or unremarkable  Mood: improving, reports good  Affect: full range and appropriate; was congruent to mood; was congruent to content  Thought Process/Associations: unremarkable  Thought Content:  Reports none;  Denies suicidal ideation, violent ideation and delusions  Perception:  Reports none;  Denies auditory hallucinations and visual hallucinations  Insight: intact  Judgment: intact  Cognition: does  appear grossly intact; formal cognitive testing was not done  Gait and Station: [facility]     RISK AND PROTECTIVE FACTORS     Static Risk Factors: sex and history of MH diagnoses and/or treatment     Dynamic Risk Factors: emotional distress, substance use, anxiety, agitation, chronic pain, mental health stigma and work related problems     Protective Factors: hope for the future, compliance with medication, future oriented, healthy intimate relationships, engaged in EBT, access to care as needed, motivation and readiness for change, reasons for living, effective coping strategies and displaying help seeking behavior     SAFETY ASSESSMENT      Based on review of above risk and protective factors and today's exam, pt is at low elevated risk of harm to self or others. He does not meet criteria for a 72 hr hold and remains appropriate for ongoing outpatient care. The patient convincingly denies suicidality today but endorses fleeting thoughts of death without plan/intent. There was no deceit detected, and the patient presented in a manner that was believable. Local community safety resources printed and reviewed for patient to use if needed.     Recommended that patient call 911 or go to the local ED should there be a change in any of these risk factors.     DSM 5 DIAGNOSIS      Major Depressive Disorder, Single Episode, Moderate _  Panic Disorder  Illness anxiety disorder     DIFFERENTIAL DIAGNOSIS: SANDRA     Medical comorbidities impacting or  contributing to clinical picture: None noted    ASSESSMENT AND PLAN      ASSESSMENT:  Munir Storey is a 57 year old White, male who presents for return visit with [facility] ([facility] for medication management.   13 Jan 23: Pt with depression, laron, panic and illness anxiety disorder who returns to clinic reporting improving mood and anxiety since starting aripiprazole. He also started work back up a few weeks ago so isn't sure if either or both are contributing to mood improvement but he is feeling hopeful about this adjustment. We reviewed his Pikimalight results today and discussed antidepressants, primarily. He is interested in switching back to venlafaxine; we have discussed this on and off for months and seeing that he metabolizes this as expected is encouraging for pt. We discussed r/b/se switching; pt to stop fluoxetine and start slow ramp up of venlafaxine. He does have long term goals to taper off diazepam; I agree this is a good idea. Pt denies safety concerns today. Tearful intermittently.   14 Feb 23: Pt with hx of illness anxiety disorder, LARON, panic, and depression returns to clinic reporting feeling 85% back to his baseline from about a year ago. He wants to feel 95% back to baseline. We discussed increasing venlafaxine, which he is amenable to. He asks about psychedelics for anxiety/depression and I advised looking into psychedelic assisted therapy vs an unsupported trip. I did note that there is not a great pool of research looking at possible intx between psilocybin and psychotropics. We also discussed nootropics for anxiety support and discusssed ashwagandha BID for anxiety. Pt is interested in considering this; reviewed r/b/se. Pt denies SI or safety concerns. I advised RTC in 4-6 weeks. He would benefit from therapy.     TREATMENT PLAN: Medication side effects and alternatives reviewed. Health promotion activities recommended and reviewed. All questions addressed.  Education and counseling completed regarding risks and benefits of medications and psychotherapy options. [facility] model reviewed today. Recommend therapy for additional support. Safety plan reviewed as indicated.     MEDICATIONS:   -stop ARIPIPRAZOLE 2mg daily  -continue LORAZEPAM 0.5mg daily prn for panic  -continue DIAZEPAM 1mg daily  -increase VENLAFAXINE .5mg daily x 2 weeks then increase to 225mg  -consider ASHWAGANDHA 125mg BID for anxiety    LABS/RADS:   -None at this time    PATIENT STATUS:  [facility] MD/DO/NP/PA providers offer care a specialty service for Primary Care Providers in the [facility] that seek to optimize psychotropic medications for unstable patients.  Once medications have been optimized, our providers discharge the patient back to the referring Primary Care Provider for ongoing medication management.  This type of system allows our providers to serve a high volume of patients.   -Pt will be seen for continued medication stabilization in [facility].    PSYCHOSOCIAL:   -[facility] to support finding therapist  -Follow up with primary care provider as planned or for acute medical concerns.    PSYCHOEDUCATION:  Medication side effects and alternatives reviewed. Health promotion activities recommended and reviewed today. All questions addressed. Education and counseling completed regarding risks and benefits of medications and psychotherapy options.  Consent provided by patient/guardian  Call the psychiatric nurse line with medication questions or concerns at [phone].  MyChart may be used to communicate with your provider, but this is not intended to be used for emergencies.  BLACK BOX WARNING: Discussed the Food and Drug Administration (FDA) requires that all antidepressants carry a warning that some children, adolescents and young adults may be at increased risk of suicide when taking antidepressants. Anyone taking an antidepressant should be watched closely for  worsening depression or unusual behavior especially in the first few weeks after starting an SSRI. Keep in mind, antidepressants are more likely to reduce suicide risk in the long run by improving mood.   BENZODIAZEPINE:  discussion on how benzos work and the need to use them short term due to potential of anxiety getting.  This is a controlled substance with risk for abuse, need to keep in a safe keep place and cannot replace lost scripts.    Medlineplus.gov is information for patients.  It is run by the [employer] and it contains information about all disorders, diseases and all medications.      FOLLOW-UP: Follow up in 4-6 weeks    1. Continue all other treatments (including medications) per primary care provider and/or specialists.   2. To schedule individual or family therapy, call [facility] at [phone].   3. Follow up with primary care provider as planned or for acute medical concerns.  4. Call the psychiatric nurse line with medication questions or concerns at [phone] or [phone].  5. [employer] may be used to communicate with your care team, but this is not intended to be used for emergencies.    CRISIS RESOURCES:    1. Present to the Emergency Department as needed or call after hours crisis line at [phone] or [phone].   2. [place] Crisis Text Line: Text [place] to 250310.  3. Suicide LifeLine Chat: suicidepreventionlifeline.org/chat/.  4. National Suicide Prevention Lifeline: 847.800.6186 (TTY: 737.144.8138). Call anytime for help.  (www.suicidepreventionlifeline.org)  5. National [place] [place] on Mental Illness (www.maritza.org): 637.274.5328 or 066-626-4932.  6. Mental Health Association (www.mentalhealth.org): 410.922.4833 or 329-274-8541.    ADMINISTRATIVE BILLING:    Time spent interviewing patient, reviewing referral documents, obtaining and reviewing outside records, communication with other health specialists, and preparing this report on today's  date  Video/Phone Start Time: 1403  Video/Phone End Time: 1449    Signed:   Jaqueline Eden DNP, [id number]  [facility] ([facility])

## 2023-02-14 NOTE — PROGRESS NOTES
[facility]  February 14, 2023      Behavioral Health Clinician Progress Note    Patient Name: Munir Storey           Service Type:  Individual      Service Location:   [place] / Email (patient reached)     Session Start Time: 13:32  Session End Time: 13:58      Session Length: 16 - 37      Attendees: Patient      Service Modality:  Video Visit:     Telemedicine Visit: The patient's condition can be safely assessed and treated via synchronous audio and visual telemedicine encounter.      Reason for Telemedicine Visit: Services only offered telehealth    Originating Site (Patient Location): Patient's home        Distant Location (provider location):  Off-site    Consent:  The patient/guardian has verbally consented to: the potential risks and benefits of telemedicine (video visit) versus in person care; bill my insurance or make self-payment for services provided; and responsibility for payment of non-covered services.     Mode of Communication:  Video Conference via [employer]    As the provider I attest to compliance with applicable laws and regulations related to telemedicine.    Visit Activities (Refresh list every visit): [facility] Only    Diagnostic Assessment Date: 04/12/2022  Treatment Plan Review Date: Due by 03/01/2023  See Flowsheets for today's PHQ-9 and SANDRA-7 results  Previous PHQ-9:   PHQ-9 SCORE 1/13/2023 2/14/2023 2/14/2023   PHQ-9 Total Score MyChart 7 (Mild depression) - 4 (Minimal depression)   PHQ-9 Total Score 7 4 4   Some encounter information is confidential and restricted. Go to Review Flowsheets activity to see all data.     Previous SANDRA-7:   SANDRA-7 SCORE 1/2/2023 1/2/2023 1/2/2023   Total Score - - 15 (severe anxiety)   Total Score 15 15 15   Some encounter information is confidential and restricted. Go to Review Flowsheets activity to see all data.       BANG LEVEL:  No flowsheet data found.    DATA  Extended Session (60+ minutes):  "No  Interactive Complexity: No  Crisis: No  [facility] Patient: No    Treatment Objective(s) Addressed in This Session:  Target Behavior(s): anxiety and depression    Depressed Mood: Increase interest, engagement, and pleasure in doing things  Decrease frequency and intensity of feeling down, depressed, hopeless  Improve quantity and quality of night time sleep / decrease daytime naps  Anxiety: will experience a reduction in anxiety     Current Stressors / Issues:   Update: \"fair to middlin.\" Two weeks felt really positive and this lasted for about 2 weeks, and then the past couple of weeks felt like a gradual decline. Feeling \"tired,\" not sleeping as well at night. Gets tired at night and falls asleep on the couch at 8 pm and then gets up and wants to start working at 4 am. More tired at the end of the day, not have the usual energy to do things like before. Is getting enough sleep but worries about the schedule being different. Completed first two stages of sleep study and waiting on results. Feels like he is 85% better but not 100%. Felt closer to 100% two weeks ago when felt really good. Finished Prozac about one month ago and stopped Abilify two weeks ago due to leg cramps. Not sure why he felt good two weeks ago. Not sure what 100% would look like other than comparing himself to the past. Primarily worried about sleep. Has been staying busy. Has been attending [employer] and practicing fabiana, helpful. Going for drives to clear head and manage anxiety, praying and meditating. Open to finding practices that could help. Anxiety with any injuries or perceived medical/health issues. Not use lorazepam in 2 months, diazepam 1/2 pill in morning.   [facility] praised patient for his continued progress and assisted him in being able to recognize that he is making progress.  [facility] processed the idea of 100% being unrealistic and patient acknowledges this.  [facility] discussed 85% actually being stable and patient being able to enjoy the " progress that he has made.  [facility] discussed patient continuing to work on creating routines to help him feel connected and goal driven.  [facility] also suggested that patient keep track of his mood on a positive, negative or neutral standpoint each day and then be able to look back and notice any patterns.  [facility] emphasized the importance of patient connecting with counseling, especially Ca[employer] counseling based on patient's desires.    Stressors: usual financial stressors    Tx: still looking to connect with a Ca[employer]ism therapist. Called places and too far to drive or accept telehealth. [facility] to look at additional referrals.    Etoh: denies  Substance: denies  Nicotine: increased to 2 pouch a day and herbal replacement. Sometimes uses nicotine gum.  Caffeine: same, 2 cups of half-caff and then de-caff. More water.    Most Important: check on medications, seeing improvement but wanting more. Substitute for Abilify? At max for Effexor?    Progress on Treatment Objective(s) / Homework:  Satisfactory progress - ACTION (Actively working towards change); Intervened by reinforcing change plan / affirming steps taken    Motivational Interviewing    MI Intervention: Expressed Empathy/Understanding, Supported Autonomy, Collaboration, Evocation, Reflections: simple and complex, Change talk (evoked) and Reframe     Change Talk Expressed by the Patient: Desire to change Ability to change Reasons to change Need to change Committment to change Activation Taking steps    Provider Response to Change Talk: E - Evoked more info from patient about behavior change, A - Affirmed patient's thoughts, decisions, or attempts at behavior change, R - Reflected patient's change talk and S - Summarized patient's change talk statements    Also provided psychoeducation about behavioral health condition, symptoms, and treatment options    Care Plan review completed: No    Medication Review:  Changes to psychiatric medications, see updated Medication List in  EPIC.     Medication Compliance:  Yes    Changes in Health Issues:   None reported    Chemical Use Review:   Substance Use: Chemical use reviewed, no active concerns identified      Tobacco Use: Yes, increase.  Patient reports frequency of use Back up to 2 pouches of chew a day. Provided encouragement to quit   Provided support and affirmation for steps taken towards quiting     Assessment: Current Emotional / Mental Status (status of significant symptoms):  Risk status (Self / Other harm or suicidal ideation)  Patient has had a history of suicidal ideation: Patient reports suicidal ideation that comes and goes.  He denies he has a plan or intent.  He denies any prior attempts  Patient denies current fears or concerns for personal safety.  Patient denies current or recent suicidal ideation or behaviors.  Patient denies current or recent homicidal ideation or behaviors.  Patient denies current or recent self injurious behavior or ideation.  Patient denies other safety concerns.  A safety and risk management plan has not been developed at this time, however patient was encouraged to call [facility] should there be a change in any of these risk factors.    Appearance:   Appropriate   Eye Contact:   Good   Psychomotor Behavior: Normal   Attitude:   Cooperative   Orientation:   All  Speech   Rate / Production: Normal    Volume:  Normal   Mood:    Anxious Improving  Affect:    Appropriate   Thought Content:  Clear   Thought Form:  Coherent  Logical   Insight:    Good     Diagnoses:  1. Panic disorder without agoraphobia    2. Moderate episode of recurrent major depressive disorder (H)      Collateral Reports Completed:  Communicated with: [facility]PS Team    Plan: (Homework, other):  Patient was given information about behavioral services and encouraged to schedule a follow up appointment with the clinic [facility] as needed.  He was also given information about mental health symptoms and treatment options .  Patient will  continue to work to find a new therapist.  CD Recommendations: No indications of CD issues.                                               Individual Treatment Plan    Patient's Name: Munir Storey  YOB: 1965    Date of Creation: 12/01/2022  Date Treatment Plan Last Reviewed/Revised: Pending    DSM5 Diagnoses: 296.32 (F33.1) Major Depressive Disorder, Recurrent Episode, Moderate _ and With anxious distress or 300.01 (F41.0) Panic Disorder  300.02 (F41.1) Generalized Anxiety Disorder  Psychosocial / Contextual Factors: Patient lives with his wife and adult children.  Patient is working.  Patient does have social supports.  PROMIS (reviewed every 90 days): 20    Referral / Collaboration:  Patient is connected with an individual therapist and declines any other needs at this time.    Anticipated number of session for this episode of care: 3-6 sessions  Anticipation frequency of session: Monthly  Anticipated Duration of each session: 16-37 minutes  Treatment plan will be reviewed in 90 days or when goals have been changed.       MeasurableTreatment Goal(s) related to diagnosis / functional impairment(s)  Goal 1: Patient will report continued improvement in his anxiety and depression.    I will know I've met my goal when I feel more like my old self.      Objective #A (Patient Action)    Patient will identify Rational and irrational fears / thoughts that contribute to feeling anxious.  Status: New - Date: 12/01/2022     Intervention(s)  Therapist will teach emotional recognition/identification. CBT for anxiety.    Objective #B  Patient will use cognitive strategies identified in therapy to challenge anxious thoughts.  Status: New - Date: 12/01/2022     Intervention(s)  Therapist will teach emotional regulation skills. CBT for anxiety skills.    Objective #C  Patient will Increase interest, engagement, and pleasure in doing things  Identify negative self-talk and behaviors: challenge core beliefs, myths, and  actions.  Status: New - Date: 12/01/2022     Intervention(s)  Therapist will assign homework For patient to use activation and reconnect with interests and supports.    Patient has reviewed and agreed to the above plan.      PACO Willson  February 14 , 2023      Answers for HPI/ROS submitted by the patient on 2/14/2023  PHQ9 TOTAL SCORE: 4

## 2023-02-14 NOTE — PATIENT INSTRUCTIONS
Treatment Plan:  Increase venlafaxine XR to 187.5mg (150mg cap + 37.5mg cap)  x 14 days then increase to 225mg (150mg cap + two 37.5mg caps) daily   Continue diazepam 1mg daily  Consider ashwagandha 125mg twice daily for anxiety   Nature Made on Amazon is generally affordable: https://a.co/d/0432m8o  Psychedelics (ketamine, psilocybin) - I would not do these on your own but would suggest a guided psychotherapy experience. Here are some clinics to look into:  [facility]: https://www.intero-psychedelic.com/  [facility]: https://[place].iit-[place].[facility]Cyte/healing/ketamine-assisted-psychotherapy/  [facility]: https://www.[facility].com/  [facility]: https://www.[facility].com/ketamine-assisted-psychotherapy  Call [phone] to schedule follow up with me in 4-6 weeks.  You can call the above number to make appointments, leave a message with our nursing team, and inquire about any mental health referrals I have placed.

## 2023-02-14 NOTE — NURSING NOTE
Is the patient currently in the state of [place]? YES    Visit mode:VIDEO    If the visit is dropped, the patient can be reconnected by: VIDEO VISIT: Send to e-mail at: [email or web address]    Will anyone else be joining the visit? NO      How would you like to obtain your AVS? MyChart    Are changes needed to the allergy or medication list? NO    Comments or concerns regarding today's visit: none reported to ion WAYNE

## 2023-02-15 DIAGNOSIS — I10 HYPERTENSION, GOAL BELOW 140/90: ICD-10-CM

## 2023-02-15 RX ORDER — LISINOPRIL AND HYDROCHLOROTHIAZIDE 20; 25 MG/1; MG/1
2 TABLET ORAL DAILY
Qty: 180 TABLET | Refills: 0 | Status: SHIPPED | OUTPATIENT
Start: 2023-02-15 | End: 2023-02-22

## 2023-02-21 ENCOUNTER — TELEPHONE (OUTPATIENT)
Dept: PSYCHIATRY | Facility: CLINIC | Age: 58
End: 2023-02-21
Payer: COMMERCIAL

## 2023-02-21 NOTE — TELEPHONE ENCOUNTER
Reason for call:  Other   Patient called regarding (reason for call): call back and prescription  Additional comments:     Pt requesting a callback regarding his : venlafaxine (EFFEXOR XR)  He advised he believes this is causing headaches and wants to discuss care.     Phone number to reach patient:  Home number on file [phone] (home)    Best Time:  asap    Can we leave a detailed message on this number?  YES    Travel screening: Not Applicable

## 2023-02-22 ENCOUNTER — OFFICE VISIT (OUTPATIENT)
Dept: FAMILY MEDICINE | Facility: CLINIC | Age: 58
End: 2023-02-22
Payer: COMMERCIAL

## 2023-02-22 VITALS
OXYGEN SATURATION: 100 % | WEIGHT: 185 LBS | DIASTOLIC BLOOD PRESSURE: 82 MMHG | HEIGHT: 67 IN | HEART RATE: 88 BPM | RESPIRATION RATE: 16 BRPM | SYSTOLIC BLOOD PRESSURE: 132 MMHG | TEMPERATURE: 97.5 F | BODY MASS INDEX: 29.03 KG/M2

## 2023-02-22 DIAGNOSIS — F33.1 MODERATE EPISODE OF RECURRENT MAJOR DEPRESSIVE DISORDER (H): Chronic | ICD-10-CM

## 2023-02-22 DIAGNOSIS — I10 HYPERTENSION, GOAL BELOW 140/90: Primary | ICD-10-CM

## 2023-02-22 DIAGNOSIS — F41.1 GAD (GENERALIZED ANXIETY DISORDER): Chronic | ICD-10-CM

## 2023-02-22 DIAGNOSIS — F10.21 ALCOHOL DEPENDENCE IN REMISSION (H): ICD-10-CM

## 2023-02-22 PROCEDURE — 99214 OFFICE O/P EST MOD 30 MIN: CPT | Performed by: FAMILY MEDICINE

## 2023-02-22 RX ORDER — LISINOPRIL AND HYDROCHLOROTHIAZIDE 12.5; 2 MG/1; MG/1
1 TABLET ORAL DAILY
Qty: 90 TABLET | Refills: 1 | Status: SHIPPED | OUTPATIENT
Start: 2023-02-22 | End: 2023-08-21

## 2023-02-22 RX ORDER — LISINOPRIL AND HYDROCHLOROTHIAZIDE 20; 25 MG/1; MG/1
1 TABLET ORAL DAILY
Qty: 90 TABLET | Refills: 1 | COMMUNITY
Start: 2023-02-22 | End: 2023-02-22

## 2023-02-22 RX ORDER — FLUOXETINE 10 MG/1
10 CAPSULE ORAL DAILY
Qty: 90 CAPSULE | Refills: 0 | Status: SHIPPED | OUTPATIENT
Start: 2023-02-22 | End: 2023-04-11

## 2023-02-22 NOTE — PATIENT INSTRUCTIONS
For blood pressure, let's change the combination lisinopril/hydrochlorothiazide 20/25 mg to 20/12.5 mg. I'll send in a new prescription.     For now, stay on the metoprolol and amlodipine.     For now, keep the other medications the same.     Keep the venlafaxine to 112.5 mg daily.     Add 10 mg of Prozac daily.     See what the psychiatrist has to say.

## 2023-02-22 NOTE — PROGRESS NOTES
"  Assessment & Plan     (I10) Hypertension, goal below 140/90  (primary encounter diagnosis)  Comment: Improved blood pressure readings, but patient states he feels off.  He may be experiencing might dehydration secondary to higher dose \"hydrochlorothiazide.  Will decrease dose of hydrochlorothiazide to 12.5 mg from 25 mg daily, continue on his other medications, lisinopril, metoprolol, and amlodipine.  Plan: lisinopril-hydrochlorothiazide (ZESTORETIC)         20-12.5 MG tablet, DISCONTINUED:         lisinopril-hydrochlorothiazide (ZESTORETIC)         20-25 MG tablet        He is to update me if symptoms worsen, may need to decrease the dose of his amlodipine.    (F41.1) SANDRA (generalized anxiety disorder)  Comment: Ongoing, overall improved from last year but  still symptomatic.  Plan: Monitor.    (F33.1) Moderate episode of recurrent major depressive disorder (H)  Comment: Patient has complicated history, also followed by psychiatry.  He currently feels like he is doing reasonably well, but had a brief time where he was transitioning from fluoxetine to venlafaxine.  He felt during this stretch she felt like himself.  With the recent increase in the venlafaxine dose, he is noted disturbing dreams and nightmares.  He may need a combination of fluoxetine and venlafaxine at a lower dose however to prevent serotonin syndrome.  Advised the patient to contact psychiatry but would consider venlafaxine 112.5 mg daily +10 mg of Prozac daily and see how he responds to this combination.  Plan: FLUoxetine (PROZAC) 10 MG capsule            (F10.21) Alcohol dependence in remission (H)  Comment: Appears to be maintaining sobriety.  Plan: Monitor.    Patient Instructions   For blood pressure, let's change the combination lisinopril/hydrochlorothiazide 20/25 mg to 20/12.5 mg. I'll send in a new prescription.     For now, stay on the metoprolol and amlodipine.     For now, keep the other medications the same.     Keep the venlafaxine " to 112.5 mg daily.     Add 10 mg of Prozac daily.     See what the psychiatrist has to say.         Return in about 3 months (around 5/22/2023).    Magalis Morales MD  [facility] TESSA Amaral is a 57 year old, presenting for the following health issues:  Recheck Medication        History of Present Illness       Reason for visit:  Med check    He eats 0-1 servings of fruits and vegetables daily.He consumes 0 sweetened beverage(s) daily.He exercises with enough effort to increase his heart rate 10 to 19 minutes per day.  He exercises with enough effort to increase his heart rate 3 or less days per week.   He is taking medications regularly.       Hypertension Follow-up  Patient reports he has been taking the Lisnopril-Hydrochlorothiazide correctly, did not read instructions after medication change and has been taking one tablet daily instead of the recommended two.     Do you check your blood pressure regularly outside of the clinic? Yes     Are you following a low salt diet? No    Are your blood pressures ever more than 140 on the top number (systolic) OR more   than 90 on the bottom number (diastolic), for example 140/90? Yes, usually within normal range around 130 but occasionally over 140/90. Pt also reports episodes of feeling lightheaded when his bps are within range.     Depression and Anxiety Follow-Up    How are you doing with your depression since your last visit? Fluctuating, up and down since medication changes.     How are you doing with your anxiety since your last visit?  Same as above     Are you having other symptoms that might be associated with depression or anxiety? Yes:  fatigue and nightmares(possibly related to new med)    Have you had a significant life event? OTHER: new medication     Do you have any concerns with your use of alcohol or other drugs? No    Social History     Tobacco Use     Smoking status: Former     Packs/day: 0.50     Years: 20.00     Pack  "years: 10.00     Types: Cigarettes, Dip, chew, snus or snuff     Quit date: 8/18/2019     Years since quitting: 3.5     Smokeless tobacco: Current     Types: Chew   Vaping Use     Vaping Use: Never used   Substance Use Topics     Alcohol use: No     Comment: hx alcohol abuse, sober since 2015     Drug use: No     PHQ 1/13/2023 2/14/2023 2/14/2023   PHQ-9 Total Score 7 4 4   Q9: Thoughts of better off dead/self-harm past 2 weeks Not at all Not at all Not at all   F/U: Thoughts of suicide or self-harm - - -   F/U: Self harm-plan - - -   F/U: Self-harm action - - -   F/U: Safety concerns - - -     SANDRA-7 SCORE 1/2/2023 1/2/2023 1/2/2023   Total Score - - 15 (severe anxiety)   Total Score 15 15 15           Review of Systems   Constitutional, HEENT, cardiovascular, pulmonary, gi and gu systems are negative, except as otherwise noted.      Objective    /82   Pulse 88   Temp 97.5  F (36.4  C) (Tympanic)   Resp 16   Ht 1.707 m (5' 7.21\")   Wt 83.9 kg (185 lb)   SpO2 100%   BMI 28.79 kg/m    Body mass index is 28.79 kg/m .  Physical Exam   GENERAL: Healthy, alert and no distress  EYES: Eyes grossly normal to inspection, conjunctivae and sclerae normal  RESP: Lungs clear to auscultation - no rales, rhonchi or wheezes  CV: Regular rate and rhythm, normal S1 S2, no murmur  MS: No gross musculoskeletal defects noted, no edema  NEURO: Normal strength and tone, mentation intact and speech normal  PSYCH: Mentation appears normal, affect normal/bright     Magalis Morales MD             "

## 2023-02-22 NOTE — TELEPHONE ENCOUNTER
Spoke to the patient and he said as he was increasing the dose of venlafaxine he started getting bad headaches and nightmares at night. Even at the 150mg dose. He only took the 112.5mg today and his headache is better. He doesn't think the increased dose help his mood at all either. His blood pressure is also jumping around and is higher. He is going to his primary MD today about that. He is wondering about just staying on the 112.5mg or even 75mg or should he come off of it and try something else?  Last visit 2/14/23:  We reviewed his Biotz results today and discussed antidepressants, primarily. He is interested in switching back to venlafaxine; we have discussed this on and off for months and seeing that he metabolizes this as expected is encouraging for pt. We discussed r/b/se switching; pt to stop fluoxetine and start slow ramp up of venlafaxine    Miguelina Sharp RN on 2/22/2023 at 9:55 AM

## 2023-02-23 NOTE — TELEPHONE ENCOUNTER
Per provider: Please let Munir know to stay at 112.5mg daily and we can see if his BP remains the same.     Called patient a relayed message. He saw his primary yesterday and they tweaked the lisinopril. The PCP also mentioned to him about using a lower dose of venlafaxine and prozac together. I told him to stick with the venlafaxine 112.5mg for now and let us know in a few weeks how he was doing.   Miguelina Sharp RN on 2/23/2023 at 11:04 AM

## 2023-03-14 ENCOUNTER — OFFICE VISIT (OUTPATIENT)
Dept: FAMILY MEDICINE | Facility: CLINIC | Age: 58
End: 2023-03-14
Payer: COMMERCIAL

## 2023-03-14 VITALS
BODY MASS INDEX: 28.73 KG/M2 | DIASTOLIC BLOOD PRESSURE: 86 MMHG | OXYGEN SATURATION: 100 % | TEMPERATURE: 98.3 F | HEIGHT: 68 IN | RESPIRATION RATE: 16 BRPM | SYSTOLIC BLOOD PRESSURE: 146 MMHG | WEIGHT: 189.6 LBS | HEART RATE: 85 BPM

## 2023-03-14 DIAGNOSIS — F41.1 GAD (GENERALIZED ANXIETY DISORDER): ICD-10-CM

## 2023-03-14 DIAGNOSIS — Z12.11 COLON CANCER SCREENING: ICD-10-CM

## 2023-03-14 DIAGNOSIS — Z00.00 ROUTINE GENERAL MEDICAL EXAMINATION AT A HEALTH CARE FACILITY: Primary | ICD-10-CM

## 2023-03-14 DIAGNOSIS — I10 HYPERTENSION, GOAL BELOW 140/90: ICD-10-CM

## 2023-03-14 DIAGNOSIS — F10.21 ALCOHOL DEPENDENCE IN REMISSION (H): ICD-10-CM

## 2023-03-14 DIAGNOSIS — M25.522 CHRONIC ELBOW PAIN, LEFT: ICD-10-CM

## 2023-03-14 DIAGNOSIS — Z87.891 PERSONAL HISTORY OF TOBACCO USE: ICD-10-CM

## 2023-03-14 DIAGNOSIS — G89.29 CHRONIC ELBOW PAIN, LEFT: ICD-10-CM

## 2023-03-14 DIAGNOSIS — Z12.5 SCREENING FOR PROSTATE CANCER: ICD-10-CM

## 2023-03-14 DIAGNOSIS — R73.09 ABNORMAL GLUCOSE: ICD-10-CM

## 2023-03-14 DIAGNOSIS — E78.5 HYPERLIPIDEMIA LDL GOAL <130: ICD-10-CM

## 2023-03-14 LAB
ALBUMIN SERPL-MCNC: 4.1 G/DL (ref 3.4–5)
ALP SERPL-CCNC: 60 U/L (ref 40–150)
ALT SERPL W P-5'-P-CCNC: 25 U/L (ref 0–70)
ANION GAP SERPL CALCULATED.3IONS-SCNC: 7 MMOL/L (ref 3–14)
AST SERPL W P-5'-P-CCNC: 10 U/L (ref 0–45)
BILIRUB SERPL-MCNC: 0.5 MG/DL (ref 0.2–1.3)
BUN SERPL-MCNC: 22 MG/DL (ref 7–30)
CALCIUM SERPL-MCNC: 9.3 MG/DL (ref 8.5–10.1)
CHLORIDE BLD-SCNC: 104 MMOL/L (ref 94–109)
CHOLEST SERPL-MCNC: 226 MG/DL
CO2 SERPL-SCNC: 26 MMOL/L (ref 20–32)
CREAT SERPL-MCNC: 0.73 MG/DL (ref 0.66–1.25)
ERYTHROCYTE [DISTWIDTH] IN BLOOD BY AUTOMATED COUNT: 12.9 % (ref 10–15)
FASTING STATUS PATIENT QL REPORTED: YES
GFR SERPL CREATININE-BSD FRML MDRD: >90 ML/MIN/1.73M2
GLUCOSE BLD-MCNC: 106 MG/DL (ref 70–99)
HBA1C MFR BLD: 5.5 % (ref 0–5.6)
HCT VFR BLD AUTO: 44 % (ref 40–53)
HDLC SERPL-MCNC: 50 MG/DL
HGB BLD-MCNC: 14.7 G/DL (ref 13.3–17.7)
LDLC SERPL CALC-MCNC: 157 MG/DL
MCH RBC QN AUTO: 31.6 PG (ref 26.5–33)
MCHC RBC AUTO-ENTMCNC: 33.4 G/DL (ref 31.5–36.5)
MCV RBC AUTO: 95 FL (ref 78–100)
NONHDLC SERPL-MCNC: 176 MG/DL
PLATELET # BLD AUTO: 204 10E3/UL (ref 150–450)
POTASSIUM BLD-SCNC: 4.3 MMOL/L (ref 3.4–5.3)
PROT SERPL-MCNC: 7.8 G/DL (ref 6.8–8.8)
PSA SERPL-MCNC: 0.58 UG/L (ref 0–4)
RBC # BLD AUTO: 4.65 10E6/UL (ref 4.4–5.9)
SODIUM SERPL-SCNC: 137 MMOL/L (ref 133–144)
TRIGL SERPL-MCNC: 94 MG/DL
WBC # BLD AUTO: 6 10E3/UL (ref 4–11)

## 2023-03-14 PROCEDURE — 80053 COMPREHEN METABOLIC PANEL: CPT | Performed by: FAMILY MEDICINE

## 2023-03-14 PROCEDURE — 85027 COMPLETE CBC AUTOMATED: CPT | Performed by: FAMILY MEDICINE

## 2023-03-14 PROCEDURE — 99396 PREV VISIT EST AGE 40-64: CPT | Performed by: FAMILY MEDICINE

## 2023-03-14 PROCEDURE — G0103 PSA SCREENING: HCPCS | Performed by: FAMILY MEDICINE

## 2023-03-14 PROCEDURE — 83036 HEMOGLOBIN GLYCOSYLATED A1C: CPT | Performed by: FAMILY MEDICINE

## 2023-03-14 PROCEDURE — 36415 COLL VENOUS BLD VENIPUNCTURE: CPT | Performed by: FAMILY MEDICINE

## 2023-03-14 PROCEDURE — 99213 OFFICE O/P EST LOW 20 MIN: CPT | Mod: 25 | Performed by: FAMILY MEDICINE

## 2023-03-14 PROCEDURE — 80061 LIPID PANEL: CPT | Performed by: FAMILY MEDICINE

## 2023-03-14 ASSESSMENT — ENCOUNTER SYMPTOMS
WEAKNESS: 0
PALPITATIONS: 0
HEMATOCHEZIA: 0
HEMATURIA: 0
DYSURIA: 0
CONSTIPATION: 0
FEVER: 0
NAUSEA: 0
PARESTHESIAS: 1
EYE PAIN: 1
CHILLS: 0
JOINT SWELLING: 0
MYALGIAS: 0
HEADACHES: 1
DIZZINESS: 0
DIARRHEA: 0
FREQUENCY: 1
NERVOUS/ANXIOUS: 1
SHORTNESS OF BREATH: 0
HEARTBURN: 0
ABDOMINAL PAIN: 0
ARTHRALGIAS: 1
COUGH: 1
SORE THROAT: 0

## 2023-03-14 ASSESSMENT — PATIENT HEALTH QUESTIONNAIRE - PHQ9
SUM OF ALL RESPONSES TO PHQ QUESTIONS 1-9: 7
10. IF YOU CHECKED OFF ANY PROBLEMS, HOW DIFFICULT HAVE THESE PROBLEMS MADE IT FOR YOU TO DO YOUR WORK, TAKE CARE OF THINGS AT HOME, OR GET ALONG WITH OTHER PEOPLE: SOMEWHAT DIFFICULT
SUM OF ALL RESPONSES TO PHQ QUESTIONS 1-9: 7

## 2023-03-14 ASSESSMENT — PAIN SCALES - GENERAL: PAINLEVEL: NO PAIN (0)

## 2023-03-14 NOTE — PATIENT INSTRUCTIONS
Preventive Health Recommendations  Male Ages 50 - 64    Good luck with the anxiety. It's a tough time of year.     Blood tests today.     No change in medications.     Call about the lung scan: [phone].    Stop by any pharmacy for the shingles shot.     Clinic visit in 3 months for blood pressure check.     I think most of your symptoms are related to anxiety.     Time for a colonoscopy: [facility] will call you to coordinate your care as prescribed by the provider.  If you don t hear from a representative within 2 business days, please call [phone].    For the elbow, see an orthopedic doctor: [facility]  will call you to coordinate your care as prescribed by your provider. A representative will call you within 2 business days to help you schedule your appointment, or you may contact the  Representative at: [phone].    Yearly exam:             See your health care provider every year in order to  o   Review health changes.   o   Discuss preventive care.    o   Review your medicines if your doctor has prescribed any.   Have a cholesterol test every 5 years, or more frequently if you are at risk for high cholesterol/heart disease.   Have a diabetes test (fasting glucose) every three years. If you are at risk for diabetes, you should have this test more often.   Have a colonoscopy at age 50, or have a yearly FIT test (stool test). These exams will check for colon cancer.    Talk with your health care provider about whether or not a prostate cancer screening test (PSA) is right for you.  You should be tested each year for STDs (sexually transmitted diseases), if you re at risk.     Shots: Get a flu shot each year. Get a tetanus shot every 10 years.     Nutrition:  Eat at least 5 servings of fruits and vegetables daily.   Eat whole-grain bread, whole-wheat pasta and brown rice instead of white grains and rice.   Get adequate Calcium and Vitamin D.      Lifestyle  Exercise for at least 150 minutes a week (30 minutes a day, 5 days a week). This will help you control your weight and prevent disease.   Limit alcohol to one drink per day.   No smoking.   Wear sunscreen to prevent skin cancer.   See your dentist every six months for an exam and cleaning.   See your eye doctor every 1 to 2 years.

## 2023-03-14 NOTE — PROGRESS NOTES
SUBJECTIVE:   CC: Munir is an 57 year old who presents for preventative health visit.   Patient has been advised of split billing requirements and indicates understanding: Yes  Healthy Habits:     Getting at least 3 servings of Calcium per day:  Yes    Bi-annual eye exam:  NO    Dental care twice a year:  NO    Sleep apnea or symptoms of sleep apnea:  Daytime drowsiness    Diet:  Other    Frequency of exercise:  1 day/week    Duration of exercise:  Less than 15 minutes    Taking medications regularly:  Yes    Medication side effects:  Other    PHQ-2 Total Score: 4    Additional concerns today:  Yes    Ability to successfully perform activities of daily living: Yes, no assistance needed  Home safety:  none identified   Hearing impairment: Yes,         {additional problems to add   1) left elbow pain.  2) lingering cough x 3 wks.  3) Headaches      Today's PHQ-2 Score:   PHQ-2 ( 1999 [employer]) 3/14/2023   Q1: Little interest or pleasure in doing things -   Q2: Feeling down, depressed or hopeless -   PHQ-2 Score -   PHQ-2 Total Score (12-17 Years)- Positive if 3 or more points; Administer PHQ-A if positive -   Q1: Little interest or pleasure in doing things More than half the days   Q2: Feeling down, depressed or hopeless More than half the days   PHQ-2 Score 4           Social History     Tobacco Use     Smoking status: Former     Packs/day: 0.50     Years: 20.00     Pack years: 10.00     Types: Cigarettes, Dip, chew, snus or snuff     Quit date: 8/18/2019     Years since quitting: 3.5     Smokeless tobacco: Current     Types: Chew   Substance Use Topics     Alcohol use: No     Comment: hx alcohol abuse, sober since 2015         Alcohol Use 4/28/2022   Prescreen: >3 drinks/day or >7 drinks/week? Not Applicable       Last PSA:   PSA   Date Value Ref Range Status   08/14/2019 0.52 0 - 4 ug/L Final     Comment:     Assay Method:  Chemiluminescence using Siemens Vista analyzer       Reviewed orders with patient. Reviewed  "health maintenance and updated orders accordingly - Yes  Labs reviewed in EPIC    Reviewed and updated as needed this visit by clinical staff   Tobacco  Allergies  Meds              Reviewed and updated as needed this visit by Provider      Review of Systems   Constitutional: Negative for chills and fever.   HENT: Positive for congestion. Negative for ear pain, hearing loss and sore throat.    Eyes: Positive for pain. Negative for visual disturbance.   Respiratory: Positive for cough. Negative for shortness of breath.    Cardiovascular: Negative for chest pain, palpitations and peripheral edema.   Gastrointestinal: Negative for abdominal pain, constipation, diarrhea, heartburn, hematochezia and nausea.   Genitourinary: Positive for frequency. Negative for dysuria, genital sores, hematuria, impotence, penile discharge and urgency.   Musculoskeletal: Positive for arthralgias. Negative for joint swelling and myalgias.   Skin: Negative for rash.   Neurological: Positive for headaches and paresthesias. Negative for dizziness and weakness.   Psychiatric/Behavioral: Positive for mood changes. The patient is nervous/anxious.        OBJECTIVE:   BP (!) 162/88   Pulse 85   Temp 98.3  F (36.8  C) (Tympanic)   Resp 16   Ht 1.715 m (5' 7.52\")   Wt 86 kg (189 lb 9.6 oz)   SpO2 100%   BMI 29.24 kg/m      Physical Exam  GENERAL: Healthy, alert and no distress  EYES: Eyes grossly normal to inspection, conjunctivae and sclerae normal  RESP: Lungs clear to auscultation - no rales, rhonchi or wheezes  CV: Regular rate and rhythm, normal S1 S2, no murmur  MS: No gross musculoskeletal defects noted, no edema  NEURO: Normal strength and tone, mentation intact and speech normal  PSYCH: Mentation appears normal, affect normal/bright     Diagnostic Test Results:  Labs reviewed in Epic  Results for orders placed or performed in visit on 03/14/23 (from the past 24 hour(s))   PSA, screen   Result Value Ref Range    Prostate Specific " Antigen Screen 0.58 0.00 - 4.00 ug/L   Hemoglobin A1c   Result Value Ref Range    Hemoglobin A1C 5.5 0.0 - 5.6 %   Lipid panel reflex to direct LDL Fasting   Result Value Ref Range    Cholesterol 226 (H) <200 mg/dL    Triglycerides 94 <150 mg/dL    Direct Measure HDL 50 >=40 mg/dL    LDL Cholesterol Calculated 157 (H) <=100 mg/dL    Non HDL Cholesterol 176 (H) <130 mg/dL    Patient Fasting > 8hrs? Yes     Narrative    Cholesterol  Desirable:  <200 mg/dL    Triglycerides  Normal:  Less than 150 mg/dL  Borderline High:  150-199 mg/dL  High:  200-499 mg/dL  Very High:  Greater than or equal to 500 mg/dL    Direct Measure HDL  Female:  Greater than or equal to 50 mg/dL   Male:  Greater than or equal to 40 mg/dL    LDL Cholesterol  Desirable:  <100mg/dL  Above Desirable:  100-129 mg/dL   Borderline High:  130-159 mg/dL   High:  160-189 mg/dL   Very High:  >= 190 mg/dL    Non HDL Cholesterol  Desirable:  130 mg/dL  Above Desirable:  130-159 mg/dL  Borderline High:  160-189 mg/dL  High:  190-219 mg/dL  Very High:  Greater than or equal to 220 mg/dL   CBC with platelets   Result Value Ref Range    WBC Count 6.0 4.0 - 11.0 10e3/uL    RBC Count 4.65 4.40 - 5.90 10e6/uL    Hemoglobin 14.7 13.3 - 17.7 g/dL    Hematocrit 44.0 40.0 - 53.0 %    MCV 95 78 - 100 fL    MCH 31.6 26.5 - 33.0 pg    MCHC 33.4 31.5 - 36.5 g/dL    RDW 12.9 10.0 - 15.0 %    Platelet Count 204 150 - 450 10e3/uL   Comprehensive metabolic panel (BMP + Alb, Alk Phos, ALT, AST, Total. Bili, TP)   Result Value Ref Range    Sodium 137 133 - 144 mmol/L    Potassium 4.3 3.4 - 5.3 mmol/L    Chloride 104 94 - 109 mmol/L    Carbon Dioxide (CO2) 26 20 - 32 mmol/L    Anion Gap 7 3 - 14 mmol/L    Urea Nitrogen 22 7 - 30 mg/dL    Creatinine 0.73 0.66 - 1.25 mg/dL    Calcium 9.3 8.5 - 10.1 mg/dL    Glucose 106 (H) 70 - 99 mg/dL    Alkaline Phosphatase 60 40 - 150 U/L    AST 10 0 - 45 U/L    ALT 25 0 - 70 U/L    Protein Total 7.8 6.8 - 8.8 g/dL    Albumin 4.1 3.4 - 5.0 g/dL     Bilirubin Total 0.5 0.2 - 1.3 mg/dL    GFR Estimate >90 >60 mL/min/1.73m2       ASSESSMENT/PLAN:   (Z00.00) Routine general medical examination at a health care facility  (primary encounter diagnosis)  Comment:  Reviewed the need for periodic healthcare exams and screenings.   Plan: advised yearly check ups.     (F10.21) Alcohol dependence in remission (H)  Comment: Maintaining sobriety.  Excellent liver function.  Plan: Comprehensive metabolic panel (BMP + Alb, Alk         Phos, ALT, AST, Total. Bili, TP), CBC with         platelets        Monitor.    (F41.1) SANDRA (generalized anxiety disorder)  Comment: Ongoing symptoms, unclear if the increased venlafaxine has been helpful.  Plan: Advise follow-up with a psychiatrist.    (I10) Hypertension, goal below 140/90  Comment: Elevated, may be secondary to anxiety.  Plan: Comprehensive metabolic panel (BMP + Alb, Alk         Phos, ALT, AST, Total. Bili, TP)        Reviewed lifestyle, no change in medications, follow-up in 3 months.    (E78.5) Hyperlipidemia LDL goal <130  Comment: no currently on statin therapy.  LDL slightly elevated but acceptable panel.  Plan: Lipid panel reflex to direct LDL Fasting        At some point will consider statin therapy.    (R73.09) Abnormal glucose  Comment: no signs of diabetes.   Lab Results   Component Value Date    A1C 5.5 03/14/2023    A1C 5.6 10/20/2017        Plan: Hemoglobin A1c           (Z12.5) Screening for prostate cancer  Comment: Reassuringly low PSA.  Plan: PSA, screen, CANCELED: PSA, screen        Repeat yearly until age 70.    (M25.522,  G89.29) Chronic elbow pain, left  Comment: will refer.   Plan: Orthopedic  Referral      (Z87.891) Personal history of tobacco use  Plan: CT Chest Lung Cancer Scrn Low Dose wo      (Z12.11) Colon cancer screening  Plan: Colonoscopy Screening  Referral        Patient has been advised of split billing requirements and indicates understanding: Yes      COUNSELING:    Reviewed preventive health counseling, as reflected in patient instructions       Regular exercise       Healthy diet/nutrition       Vision screening       Hearing screening       Alcohol Use        Colorectal cancer screening       Prostate cancer screening       Consider lung cancer screening for ages 55-80 years (77 for Medicare) and 20 pack-year smoking history         He reports that he quit smoking about 3 years ago. His smoking use included cigarettes and dip, chew, snus or snuff. He has a 10.00 pack-year smoking history. His smokeless tobacco use includes chew.        Magalis Morales MD  [facility]  Answers for HPI/ROS submitted by the patient on 3/14/2023  If you checked off any problems, how difficult have these problems made it for you to do your work, take care of things at home, or get along with other people?: Somewhat difficult  PHQ9 TOTAL SCORE: 7

## 2023-03-14 NOTE — LETTER
March 16, 2023      Munir Storey  [address] [place] [address]        Munir,     Your recent tests show that you have normal kidney and liver function.  Also, there is no signs of prostate cancer or diabetes.  There is no signs of anemia.     Your cholesterol is elevated.  I would recommend starting a cholesterol-lowering medication.  I will send in a prescription for medication called rosuvastatin or Crestor.  It is taken daily.  The main side effect is muscle aches.       Please call, or use [employer], with any questions or concerns.       Resulted Orders   PSA, screen   Result Value Ref Range    Prostate Specific Antigen Screen 0.58 0.00 - 4.00 ug/L   Hemoglobin A1c   Result Value Ref Range    Hemoglobin A1C 5.5 0.0 - 5.6 %      Comment:      Normal <5.7%   Prediabetes 5.7-6.4%    Diabetes 6.5% or higher     Note: Adopted from ADA consensus guidelines.   Lipid panel reflex to direct LDL Fasting   Result Value Ref Range    Cholesterol 226 (H) <200 mg/dL    Triglycerides 94 <150 mg/dL    Direct Measure HDL 50 >=40 mg/dL    LDL Cholesterol Calculated 157 (H) <=100 mg/dL    Non HDL Cholesterol 176 (H) <130 mg/dL    Patient Fasting > 8hrs? Yes     Narrative    Cholesterol  Desirable:  <200 mg/dL    Triglycerides  Normal:  Less than 150 mg/dL  Borderline High:  150-199 mg/dL  High:  200-499 mg/dL  Very High:  Greater than or equal to 500 mg/dL    Direct Measure HDL  Female:  Greater than or equal to 50 mg/dL   Male:  Greater than or equal to 40 mg/dL    LDL Cholesterol  Desirable:  <100mg/dL  Above Desirable:  100-129 mg/dL   Borderline High:  130-159 mg/dL   High:  160-189 mg/dL   Very High:  >= 190 mg/dL    Non HDL Cholesterol  Desirable:  130 mg/dL  Above Desirable:  130-159 mg/dL  Borderline High:  160-189 mg/dL  High:  190-219 mg/dL  Very High:  Greater than or equal to 220 mg/dL   CBC with platelets   Result Value Ref Range    WBC Count 6.0 4.0 - 11.0 10e3/uL    RBC Count 4.65 4.40 - 5.90 10e6/uL     Hemoglobin 14.7 13.3 - 17.7 g/dL    Hematocrit 44.0 40.0 - 53.0 %    MCV 95 78 - 100 fL    MCH 31.6 26.5 - 33.0 pg    MCHC 33.4 31.5 - 36.5 g/dL    RDW 12.9 10.0 - 15.0 %    Platelet Count 204 150 - 450 10e3/uL   Comprehensive metabolic panel (BMP + Alb, Alk Phos, ALT, AST, Total. Bili, TP)   Result Value Ref Range    Sodium 137 133 - 144 mmol/L    Potassium 4.3 3.4 - 5.3 mmol/L    Chloride 104 94 - 109 mmol/L    Carbon Dioxide (CO2) 26 20 - 32 mmol/L    Anion Gap 7 3 - 14 mmol/L    Urea Nitrogen 22 7 - 30 mg/dL    Creatinine 0.73 0.66 - 1.25 mg/dL    Calcium 9.3 8.5 - 10.1 mg/dL    Glucose 106 (H) 70 - 99 mg/dL    Alkaline Phosphatase 60 40 - 150 U/L    AST 10 0 - 45 U/L    ALT 25 0 - 70 U/L    Protein Total 7.8 6.8 - 8.8 g/dL    Albumin 4.1 3.4 - 5.0 g/dL    Bilirubin Total 0.5 0.2 - 1.3 mg/dL    GFR Estimate >90 >60 mL/min/1.73m2      Comment:      eGFR calculated using 2021 CKD-EPI equation.       If you have any questions or concerns, please call the clinic at the number listed above.       Sincerely,      Magalis Morales MD

## 2023-03-15 ENCOUNTER — ANCILLARY PROCEDURE (OUTPATIENT)
Dept: CT IMAGING | Facility: CLINIC | Age: 58
End: 2023-03-15
Attending: FAMILY MEDICINE
Payer: COMMERCIAL

## 2023-03-15 DIAGNOSIS — Z87.891 PERSONAL HISTORY OF TOBACCO USE: ICD-10-CM

## 2023-03-15 PROCEDURE — 71271 CT THORAX LUNG CANCER SCR C-: CPT | Mod: TC | Performed by: RADIOLOGY

## 2023-03-16 ENCOUNTER — VIRTUAL VISIT (OUTPATIENT)
Dept: PSYCHIATRY | Facility: CLINIC | Age: 58
End: 2023-03-16
Payer: COMMERCIAL

## 2023-03-16 ENCOUNTER — VIRTUAL VISIT (OUTPATIENT)
Dept: BEHAVIORAL HEALTH | Facility: CLINIC | Age: 58
End: 2023-03-16
Payer: COMMERCIAL

## 2023-03-16 DIAGNOSIS — F41.1 GAD (GENERALIZED ANXIETY DISORDER): ICD-10-CM

## 2023-03-16 DIAGNOSIS — F45.21 ILLNESS ANXIETY DISORDER: Primary | ICD-10-CM

## 2023-03-16 DIAGNOSIS — F41.0 PANIC DISORDER WITHOUT AGORAPHOBIA: Primary | ICD-10-CM

## 2023-03-16 DIAGNOSIS — F33.1 MODERATE EPISODE OF RECURRENT MAJOR DEPRESSIVE DISORDER (H): ICD-10-CM

## 2023-03-16 DIAGNOSIS — F41.0 PANIC DISORDER WITHOUT AGORAPHOBIA: ICD-10-CM

## 2023-03-16 PROCEDURE — 90832 PSYTX W PT 30 MINUTES: CPT | Mod: VID | Performed by: SOCIAL WORKER

## 2023-03-16 PROCEDURE — 99214 OFFICE O/P EST MOD 30 MIN: CPT | Mod: VID | Performed by: STUDENT IN AN ORGANIZED HEALTH CARE EDUCATION/TRAINING PROGRAM

## 2023-03-16 RX ORDER — VENLAFAXINE HYDROCHLORIDE 37.5 MG/1
37.5 CAPSULE, EXTENDED RELEASE ORAL DAILY
Qty: 30 CAPSULE | Refills: 1 | Status: SHIPPED | OUTPATIENT
Start: 2023-03-16 | End: 2023-04-11

## 2023-03-16 RX ORDER — LORAZEPAM 0.5 MG/1
.25-.5 TABLET ORAL DAILY PRN
Qty: 7 TABLET | Refills: 0 | Status: SHIPPED | OUTPATIENT
Start: 2023-03-16 | End: 2023-05-18

## 2023-03-16 RX ORDER — ROSUVASTATIN CALCIUM 20 MG/1
20 TABLET, COATED ORAL DAILY
Qty: 90 TABLET | Refills: 3 | Status: SHIPPED | OUTPATIENT
Start: 2023-03-16

## 2023-03-16 RX ORDER — DIAZEPAM 2 MG
1 TABLET ORAL DAILY
Qty: 15 TABLET | Refills: 0 | Status: SHIPPED | OUTPATIENT
Start: 2023-03-16 | End: 2023-04-11

## 2023-03-16 ASSESSMENT — ANXIETY QUESTIONNAIRES
5. BEING SO RESTLESS THAT IT IS HARD TO SIT STILL: NOT AT ALL
GAD7 TOTAL SCORE: 8
4. TROUBLE RELAXING: NOT AT ALL
1. FEELING NERVOUS, ANXIOUS, OR ON EDGE: MORE THAN HALF THE DAYS
GAD7 TOTAL SCORE: 8
7. FEELING AFRAID AS IF SOMETHING AWFUL MIGHT HAPPEN: MORE THAN HALF THE DAYS
IF YOU CHECKED OFF ANY PROBLEMS ON THIS QUESTIONNAIRE, HOW DIFFICULT HAVE THESE PROBLEMS MADE IT FOR YOU TO DO YOUR WORK, TAKE CARE OF THINGS AT HOME, OR GET ALONG WITH OTHER PEOPLE: SOMEWHAT DIFFICULT
6. BECOMING EASILY ANNOYED OR IRRITABLE: NOT AT ALL
4. TROUBLE RELAXING: NOT AT ALL
1. FEELING NERVOUS, ANXIOUS, OR ON EDGE: MORE THAN HALF THE DAYS
2. NOT BEING ABLE TO STOP OR CONTROL WORRYING: MORE THAN HALF THE DAYS
6. BECOMING EASILY ANNOYED OR IRRITABLE: NOT AT ALL
8. IF YOU CHECKED OFF ANY PROBLEMS, HOW DIFFICULT HAVE THESE MADE IT FOR YOU TO DO YOUR WORK, TAKE CARE OF THINGS AT HOME, OR GET ALONG WITH OTHER PEOPLE?: SOMEWHAT DIFFICULT
8. IF YOU CHECKED OFF ANY PROBLEMS, HOW DIFFICULT HAVE THESE MADE IT FOR YOU TO DO YOUR WORK, TAKE CARE OF THINGS AT HOME, OR GET ALONG WITH OTHER PEOPLE?: SOMEWHAT DIFFICULT
3. WORRYING TOO MUCH ABOUT DIFFERENT THINGS: MORE THAN HALF THE DAYS
7. FEELING AFRAID AS IF SOMETHING AWFUL MIGHT HAPPEN: MORE THAN HALF THE DAYS
GAD7 TOTAL SCORE: 8
7. FEELING AFRAID AS IF SOMETHING AWFUL MIGHT HAPPEN: MORE THAN HALF THE DAYS
IF YOU CHECKED OFF ANY PROBLEMS ON THIS QUESTIONNAIRE, HOW DIFFICULT HAVE THESE PROBLEMS MADE IT FOR YOU TO DO YOUR WORK, TAKE CARE OF THINGS AT HOME, OR GET ALONG WITH OTHER PEOPLE: SOMEWHAT DIFFICULT
5. BEING SO RESTLESS THAT IT IS HARD TO SIT STILL: NOT AT ALL
7. FEELING AFRAID AS IF SOMETHING AWFUL MIGHT HAPPEN: MORE THAN HALF THE DAYS
GAD7 TOTAL SCORE: 8
2. NOT BEING ABLE TO STOP OR CONTROL WORRYING: MORE THAN HALF THE DAYS
3. WORRYING TOO MUCH ABOUT DIFFERENT THINGS: MORE THAN HALF THE DAYS

## 2023-03-16 ASSESSMENT — PATIENT HEALTH QUESTIONNAIRE - PHQ9
SUM OF ALL RESPONSES TO PHQ QUESTIONS 1-9: 6

## 2023-03-16 NOTE — NURSING NOTE
Is the patient currently in the state of [place]? YES    Visit mode:VIDEO    If the visit is dropped, the patient can be reconnected by: VIDEO VISIT: Send to e-mail at: [email or web address]    Will anyone else be joining the visit? NO      How would you like to obtain your AVS? MyChart    Are changes needed to the allergy or medication list? NO    Reason for visit: return    Crystal WAYNE

## 2023-03-16 NOTE — PROGRESS NOTES
[facility]  March 16, 2023      Behavioral Health Clinician Progress Note    Patient Name: Munir Storey           Service Type:  Individual      Service Location:   MyChart / Email (patient reached)     Session Start Time: 13:30  Session End Time: 13:58      Session Length: 16 - 37      Attendees: Patient      Service Modality:  Video Visit:     Telemedicine Visit: The patient's condition can be safely assessed and treated via synchronous audio and visual telemedicine encounter.      Reason for Telemedicine Visit: Services only offered telehealth    Originating Site (Patient Location): Patient's home        Distant Location (provider location):  Off-site    Consent:  The patient/guardian has verbally consented to: the potential risks and benefits of telemedicine (video visit) versus in person care; bill my insurance or make self-payment for services provided; and responsibility for payment of non-covered services.     Mode of Communication:  Video Conference via [employer]    As the provider I attest to compliance with applicable laws and regulations related to telemedicine.    Visit Activities (Refresh list every visit): [facility] Only    Diagnostic Assessment Date: 04/12/2022  Treatment Plan Review Date: Completed 03/16/2023, next due by 06/16/2023  See Flowsheets for today's PHQ-9 and SANDRA-7 results  Previous PHQ-9:   PHQ-9 SCORE 3/14/2023 3/16/2023 3/16/2023   PHQ-9 Total Score MyChart 7 (Mild depression) - 6 (Mild depression)   PHQ-9 Total Score 7 6 6   Some encounter information is confidential and restricted. Go to Review Flowsheets activity to see all data.     Previous SANDRA-7:   SANDRA-7 SCORE 1/2/2023 3/16/2023 3/16/2023   Total Score 15 (severe anxiety) - 8 (mild anxiety)   Total Score 15 8 8   Some encounter information is confidential and restricted. Go to Review Flowsheets activity to see all data.       BANG LEVEL:  No flowsheet data  "found.    DATA  Extended Session (60+ minutes): No  Interactive Complexity: No  Crisis: No  [facility] Patient: No    Treatment Objective(s) Addressed in This Session:  Target Behavior(s): anxiety and depression    Depressed Mood: Increase interest, engagement, and pleasure in doing things  Decrease frequency and intensity of feeling down, depressed, hopeless  Improve quantity and quality of night time sleep / decrease daytime naps  Anxiety: will experience a reduction in anxiety     Current Stressors / Issues:   Update: \"horse shit.\" \"New medication is not working.\" Increased dose to 150 mg Effexor and noticed blood pressure has been high, headaches and eyes feel pressured. Decreased dose to 75 mg and still having side effects. Tried to go to a wedding over the weekend and was \"sick\" the whole time. Not having panic attacks but not feeling well overall. Mood has been \"drab\" and having cabin fever about the weather. Feeling tired, lazy and bored. Trying to stay busy with projects but not really have a lot to do and has a hard time getting started.  Attending [employer] and reading bible when he can. Trying to take naps/rest and watch television or listen to radio to relax. Trying to get out socially at local stores, restaurants, pub. Not tracking days but feels like not having a \"negative\" day in the past few months. Using PRN medication every 3-4 weeks.  [facility] validated patient for his distress.  [facility] pointed out patient continuing to struggle with allowing himself time for relaxation and finding balance with doing this.  [facility] processed patient focusing on things within his control and the idea of fabiana.  [facility] pointed out that patient acknowledges that this cycle happens for him every year and how to be patient with himself and time through the process.  [facility] processed some of patient's social anxiety and that he feels like a \"outcast\" when others are drinking and he is not.  [facility] discussed how patient could make a change in his " thinking in order to have a different experience.    Stressors: not having any work right now (normal for this time of year)    Tx: scheduled to see Ximena at [facility] on April 20th. Has been talking to his  in the mean time, and has been helpful.     Etoh: denies. Sober. Drinks non-alcoholic things at pub or dinner.  Substance: denies  Nicotine: same, 2 pouches a day and herbal replacement. Sometimes uses nicotine gum.  Caffeine: same, 2 cups of half-caff and then de-caff.    Most Important: side effects, medications. Not want to be as sedated when needs to take PRN medication.    Progress on Treatment Objective(s) / Homework:  Satisfactory progress - ACTION (Actively working towards change); Intervened by reinforcing change plan / affirming steps taken    Motivational Interviewing    MI Intervention: Expressed Empathy/Understanding, Supported Autonomy, Collaboration, Evocation, Reflections: simple and complex, Change talk (evoked) and Reframe     Change Talk Expressed by the Patient: Desire to change Ability to change Reasons to change Need to change Committment to change Activation Taking steps    Provider Response to Change Talk: E - Evoked more info from patient about behavior change, A - Affirmed patient's thoughts, decisions, or attempts at behavior change, R - Reflected patient's change talk and S - Summarized patient's change talk statements    Also provided psychoeducation about behavioral health condition, symptoms, and treatment options    Care Plan review completed: Yes    Medication Review:  Changes to psychiatric medications, see updated Medication List in EPIC.     Medication Compliance:  Yes Patient decreased his dose to 75 mg    Changes in Health Issues:   None reported    Chemical Use Review:   Substance Use: Chemical use reviewed, no active concerns identified      Tobacco Use: No change in amount of tobacco use since last session.  Patient declined discussion at this  time    Assessment: Current Emotional / Mental Status (status of significant symptoms):  Risk status (Self / Other harm or suicidal ideation)  Patient has had a history of suicidal ideation: Patient reports suicidal ideation that comes and goes.  He denies he has a plan or intent.  He denies any prior attempts  Patient denies current fears or concerns for personal safety.  Patient denies current or recent suicidal ideation or behaviors.  Patient denies current or recent homicidal ideation or behaviors.  Patient denies current or recent self injurious behavior or ideation.  Patient denies other safety concerns.  A safety and risk management plan has not been developed at this time, however patient was encouraged to call [place] [phone] should there be a change in any of these risk factors.    Appearance:   Appropriate   Eye Contact:   Good   Psychomotor Behavior: Normal   Attitude:   Cooperative   Orientation:   All  Speech   Rate / Production: Normal    Volume:  Normal   Mood:    Anxious   Affect:    Appropriate   Thought Content:  Clear   Thought Form:  Coherent  Logical   Insight:    Good     Diagnoses:  1. Panic disorder without agoraphobia    2. Moderate episode of recurrent major depressive disorder (H)      Collateral Reports Completed:  Communicated with: [facility] Team    Plan: (Homework, other):  Patient was given information about behavioral services and encouraged to schedule a follow up appointment with the clinic [facility] as needed.  He was also given information about mental health symptoms and treatment options .  Patient is scheduled to start individual therapy in April 2023.   CD Recommendations: No indications of CD issues.                                               Individual Treatment Plan    Patient's Name: Munir Storey  YOB: 1965    Date of Creation: 12/01/2022  Date Treatment Plan Last Reviewed/Revised: 03/16/2023    DSM5 Diagnoses: 296.32 (F33.1) Major Depressive Disorder,  Recurrent Episode, Moderate _ and With anxious distress or 300.01 (F41.0) Panic Disorder  300.02 (F41.1) Generalized Anxiety Disorder  Psychosocial / Contextual Factors: Patient lives with his wife and adult children.  Patient owns a personal business. Patient does have social supports.  PROMIS (reviewed every 90 days): 20    Referral / Collaboration:  Patient is connected with an individual therapist and declines any other needs at this time.    Anticipated number of session for this episode of care: 3-6 sessions  Anticipation frequency of session: Monthly  Anticipated Duration of each session: 16-37 minutes  Treatment plan will be reviewed in 90 days or when goals have been changed.       MeasurableTreatment Goal(s) related to diagnosis / functional impairment(s)  Goal 1: Patient will report continued improvement in his anxiety and depression.    I will know I've met my goal when I feel more like my old self.      Objective #A (Patient Action)    Patient will identify Rational and irrational fears / thoughts that contribute to feeling anxious.  Status: Continued - Date(s): 03/16/2023    Intervention(s)  Therapist will teach emotional recognition/identification. CBT for anxiety.    Objective #B  Patient will use cognitive strategies identified in therapy to challenge anxious thoughts.  Status: Continued - Date(s): 03/16/2023    Intervention(s)  Therapist will teach emotional regulation skills. CBT for anxiety skills.    Objective #C  Patient will Increase interest, engagement, and pleasure in doing things  Identify negative self-talk and behaviors: challenge core beliefs, myths, and actions.  Status: Continued - Date(s): 03/16/2023    Intervention(s)  Therapist will assign homework For patient to use activation and reconnect with interests and supports.    Patient has reviewed and agreed to the above plan.      PACO Willson  March 16, 2023      Answers for HPI/ROS submitted by the patient on  3/16/2023  PHQ9 TOTAL SCORE: 6  SANDRA 7 TOTAL SCORE: 8

## 2023-03-16 NOTE — PATIENT INSTRUCTIONS
Thank you for our time together today in [facility] provides brief psychiatric medication stabilization to patients referred by their Primary Care Providers. Patients are typically seen in [facility] for up to 4 appointments and then referred back to the PCP for ongoing refills unless longer term medication management by a specialist is indicated. If I believe you will benefit from long-term psychiatric care I will discuss this with you. If you are interested in seeing a psychiatrist or psychiatric nurse practitioner long-term, please send me a message in TelePharm so we can refer you appropriately.     Treatment Plan:  Decrease venlafaxine XR to 37.5mg daily - stop if headaches continue after 2 weeks  Continue fluoxetine 10mg daily  Continue diazepam 1mg daily  Continue lorazepam 0.25-0.5mg daily if needed for panic  Start Omega 3/EPA 1000mg+ daily for mood/anxiety (check that your total EPA is <1000mg daily):  [employer]: https://a.co/d/61Ee2ji  [employer]: https://a.co/d/iA1mIu1  [employer]: https://a.co/d/88awTyG  [employer]: https://a.co/d/pE6DRsw  Call [phone] to schedule follow up with me in 3-4 weeks.  You can call the above number to make appointments, leave a message with our nursing team, and inquire about any mental health referrals I have placed.    As an extra - you might enjoy [employer] by Dr. Edenilson Mccray.

## 2023-03-16 NOTE — PROGRESS NOTES
Formerly [facility] PSYCHIATRY SERVICE FOLLOW-UP     Name:  Munir Storey  : 1965     Telemedicine Visit: The patient's condition can be safely assessed and treated via synchronous audio and visual telemedicine encounter.      Reason for Telemedicine Visit: COVID 19 pandemic and the social and physical recommendations by the CDC and MD.      Originating Site (Patient Location): Patient's home     Distant Site (Provider Location): Provider Remote Setting     Consent:  The patient/guardian has verbally consented to: the potential risks and benefits of telemedicine (video visit or phone) versus in person care; bill my insurance or make self-payment for services provided; and responsibility for payment of non-covered services.     Mode of Communication:  [employer] video platform      As the provider I attest to compliance with applicable laws and regulations related to telemedicine.    IDENTIFICATION     Patient is a 57 year old year old White, male  who presents for follow-up medication management with [facility].  Patient was initially referred by their PCP. Patient attended the session alone.     Patient care team: Patient Care Team:  Magalis Morales MD as PCP - General (Family Medicine)  Sadi Shelley MD as Assigned Heart and Vascular Provider  Roberto Cabral MD as Assigned Musculoskeletal Provider  Peggy Eden NP as Assigned Neuroscience Provider  Ralph Monzon MD as MD (Psychiatry)  Betty Rivers [facility] as Pharmacist (Pharmacist)  Emma Martinez APRN CNP as Assigned Behavioral Health Provider  Magalis Morales MD as Assigned PCP    INTERIM HISTORY   Pt was last seen in [facility] for follow-up on . At that time, the plan included increase venlafaxine up to 225mg.    Interim pt communication:  venlafaxine intolerable, advised to decrease to 112.5mg then pt decreased to 75mg on his own    Available records were reviewed prior to visit.    HISTORY OF PRESENT  "ILLNESS     Per [facility] Maura Laureano during today's team-based visit: \"MH Update: \"horse shit.\" \"New medication is not working.\" Increased dose to 150 mg Effexor and noticed blood pressure has been high, headaches and eyes feel pressured. Decreased dose to 75 mg and still having side effects. Tried to go to a wedding over the weekend and was sick the whole time. Not having panic attacks but not feeling well overall. Mood has been \"drab\" and having cabin fever about the weather. Feeling tired, lazy and bored. Trying to stay busy with projects but not really have a lot to do and has a hard time getting started.  Attending [employer] and reading bible when he can. Trying to take naps/rest and watch television or listen to radio to relax. Trying to get out socially at local stores, restaurants, pub. Not tracking days but feels like not having a \"negative\" day in the past few months. Using PRN medication every 3-4 weeks.  Relaxation. Focus on things within control. Isa that things will be okay. Idea of tracking days to see where there are positive days happening. Processed social anxiety and feeling concerned about everyone else drinking, \"outcast.\"  Stressors: not having any work right now (normal for this time of year)  Tx: scheduled to see Ximena at [facility] on April 20th. Has been talking to his  in the mean time, and has been helpful.   Etoh: denies. Sober. Drinks non-alcoholic things at pub or dinner.  Substance: denies  Nicotine: same, 2 pouches a day and herbal replacement. Sometimes uses nicotine gum.  Caffeine: same, 2 cups of half-caff and then de-caff.  Most Important: side effects, medications. Not want to be as sedated when needs to take PRN medication.\"    ---Psychiatry Update---  Pt is finding venlafaxine intolerable. He isn't having panic like he was a year ago but finds himself having more low energy, headaches, HTN. He reduced the dose to 75mg daily on his own and still notices mild headache. " "\"It's a headache but it's not a headache and my eyes are scratchy.\"   He continues to think about psychedelic assisted therapy.     He has been sleeping poorly. He last remembers sleeping well after he tapered off fluoxetine. \"It seems like I started feeling better which doesn't make sense to me.\" He's thought about that but worries \"what if I don't have a crutch anymore?\" He's afraid of panic and high anxiety like he had in 2022.\" He says he has historically done ok without medicine \"until some type of event\" occurs to trigger an anxiety spiral. Last panic attack 9mo ago.    Pt says his thoughts aren't \"spiraling. I'm not waking up in the middle of the night and having negative thoughts.\"     PCP started fluoxetine at their annual physical with the thought that low dose fluoxetine and venlafaxine combined might provide pt relief from depression and anxiety since pt last perceived best mood/anxiety with initial cross taper from fluoxetine to venlafaxine.     Last appt pt perceived he was 85% back to his old baseline. Today pt perceives he is at 70-75% of baseline. \"It's just the headaches and the blood pressure, I guess. I wasn't happy with how I felt this weekend.\"    Suicidal ideation:  No   PHQ9 score is 6 indicating mild depression.   GAD7 score is is 8 indicating mild anxiety.     Medication side effects: headaches, HTN    Medical: Had annual physical with Dr. Morales and was advised his general health problems are related to anxiety. Recently dx with APARNA at [facility] and reportedly needs to get a CT of his head done before the doc will discuss interventions. There is concerns about his sinuses or \"nasal passage\".     MEDICATIONS                                                                                              Current medications reviewed today and are noted below.   Current psychotropic medications:   Venlafaxine XR 225mg - decreased to 75mg; pt wants to discontinue this  Fluoxetine " 10mg  Diazepam 1mg daily    Past psychotropic medications:  Alprazolam  Aripiprazole  Duloxetine  Fluoxetine  Hydroxyzine  Lorazepam  Sertraline  Venlafaxine  Bupropion  Gabapentin    Supplements:   See below      2/14/23 Diazepam 2mg #15    Current Outpatient Medications   Medication Sig     amLODIPine (NORVASC) 10 MG tablet Take 1 tablet (10 mg) by mouth daily     diazepam (VALIUM) 2 MG tablet Take 0.5 tablets (1 mg) by mouth daily For anxiety     FLUoxetine (PROZAC) 10 MG capsule Take 1 capsule (10 mg) by mouth daily     fluticasone (FLONASE) 50 MCG/ACT nasal spray Spray 1 spray into both nostrils daily     lisinopril-hydrochlorothiazide (ZESTORETIC) 20-12.5 MG tablet Take 1 tablet by mouth daily For blood pressure.     LORazepam (ATIVAN) 0.5 MG tablet Take 0.5-1 tablets (0.25-0.5 mg) by mouth daily as needed for anxiety (or severe panic attacks)     metoprolol succinate ER (TOPROL XL) 25 MG 24 hr tablet Take 1 tablet (25 mg) by mouth daily     Misc Natural Products (T-RELIEF CBD+13 SL) Place 600 Units under the tongue daily as needed Hemp oil sublingual     NONFORMULARY CBD gummi and oil     pediatric electrolyte (PEDIALYTE) SOLN solution Take 1.25 mLs by mouth daily = 1/4 teaspoon     rosuvastatin (CRESTOR) 20 MG tablet Take 1 tablet (20 mg) by mouth daily For cholesterol.     venlafaxine (EFFEXOR XR) 37.5 MG 24 hr capsule Take 1 capsule (37.5 mg) by mouth daily for 14 days, THEN 2 capsules (75 mg) daily for 30 days. Take 37.5mg by mouth in addition to 150mg daily x 2 weeks (tota 187.5mg) then increase to 75mg in addition to 150mg daily (total 225mg)     No current facility-administered medications for this visit.      VITALS   There were no vitals taken for this visit.    Pulse Readings from Last 5 Encounters:   03/14/23 85   02/22/23 88   12/16/22 66   11/28/22 59   08/11/22 73     Wt Readings from Last 5 Encounters:   03/14/23 86 kg (189 lb 9.6 oz)   02/22/23 83.9 kg (185 lb)   12/16/22 79.2 kg (174 lb  9.6 oz)   11/28/22 78.5 kg (173 lb)   08/11/22 71.8 kg (158 lb 3.2 oz)     BP Readings from Last 5 Encounters:   03/14/23 (!) 146/86   02/22/23 132/82   12/16/22 134/78   11/28/22 (!) 162/84   08/11/22 (!) 144/84     LABS & IMAGING                                                                                                                Recent available labs reviewed today.    Recent Labs   Lab Test 03/14/23  0859 07/01/22  1117 05/29/22  0847   CR 0.73 0.72 0.76   GFRESTIMATED >90 >90 >90     Recent Labs   Lab Test 03/14/23  0859 05/03/22  2025   AST 10 13   ALT 25 25   ALKPHOS 60 50     Recent Labs   Lab Test 03/27/22  1108 02/18/21  1034 10/20/17  1124   TSH 2.83 3.34 2.61       ALLERGY & IMMUNIZATIONS       Allergies   Allergen Reactions     Sulfamethoxazole-Trimethoprim Nausea       MEDICAL & SURGICAL HISTORY    Reviewed past medical and surgical history today.   Pregnant - NA.     Past Medical History:   Diagnosis Date     Alcohol withdrawal (H) 04/28/2015     Atrial flutter (H)      Depressive disorder      Ejection fraction < 50% 04/2015    Ejection fraction 45% per echo April 2015 (per [facility] records), possible alcohol or tachycardia induced cardiomyopathy. EF normalized on follow-up echo 2016     SANDRA (generalized anxiety disorder)      H/O atrioventricular quita ablation 12/2015     Hypertension, goal below 140/90      Tobacco abuse        MENTAL STATUS EXAM:     Alertness: alert  and oriented  Appearance: adequately groomed  Behavior/Demeanor: cooperative, pleasant and calm, with good eye contact   Speech: normal and regular rate and rhythm  Language: intact and no problems  Psychomotor: normal or unremarkable  Mood: improving, reports still somewhat depressed  Affect: full range and appropriate; was congruent to mood; was congruent to content  Thought Process/Associations: unremarkable  Thought Content:  Reports none;  Denies suicidal ideation, violent ideation and delusions  Perception:   Reports none;  Denies auditory hallucinations and visual hallucinations  Insight: intact  Judgment: intact  Cognition: does  appear grossly intact; formal cognitive testing was not done  Gait and Station: [facility]     RISK AND PROTECTIVE FACTORS     Static Risk Factors: sex and history of MH diagnoses and/or treatment     Dynamic Risk Factors: emotional distress, substance use, anxiety, agitation, chronic pain, mental health stigma and work related problems     Protective Factors: hope for the future, compliance with medication, future oriented, healthy intimate relationships, engaged in EBT, access to care as needed, motivation and readiness for change, reasons for living, effective coping strategies and displaying help seeking behavior     SAFETY ASSESSMENT      Based on review of above risk and protective factors and today's exam, pt is at low elevated risk of harm to self or others. He does not meet criteria for a 72 hr hold and remains appropriate for ongoing outpatient care. The patient convincingly denies suicidality today but endorses fleeting thoughts of death without plan/intent. There was no deceit detected, and the patient presented in a manner that was believable. Local community safety resources printed and reviewed for patient to use if needed.     Recommended that patient call 911 or go to the local ED should there be a change in any of these risk factors.     DSM 5 DIAGNOSIS      Major Depressive Disorder, Single Episode, Moderate _  Panic Disorder  Illness anxiety disorder     DIFFERENTIAL DIAGNOSIS: SANDRA     Medical comorbidities impacting or contributing to clinical picture: None noted    ASSESSMENT AND PLAN      ASSESSMENT:  Munir Storey is a 57 year old White, male who presents for return visit with [facility] ([facility]) for medication management.   14 Feb 23: Pt with hx of illness anxiety disorder, SANDRA, panic, and depression returns to clinic reporting feeling 85% back to  his baseline from about a year ago. He wants to feel 95% back to baseline. We discussed increasing venlafaxine, which he is amenable to. He asks about psychedelics for anxiety/depression and I advised looking into psychedelic assisted therapy vs an unsupported trip. I did note that there is not a great pool of research looking at possible intx between psilocybin and psychotropics. We also discussed nootropics for anxiety support and discusssed ashwagandha BID for anxiety. Pt is interested in considering this; reviewed r/b/se. Pt denies SI or safety concerns. I advised RTC in 4-6 weeks. He would benefit from therapy.   Today 16 Mar 23: Pt with hx of illness anxiety disorder, SANDRA, panic, and depression returns to clinic reporting ongoing depressive sx. He c/o worsening headaches and HTN with venlafaxine. He had decreased to 75mg daily and continues to have HEADACHE and HTN. His PCP added low dose fluoxetine. We discussed further reducing venlafaxine today and then re-eval SE burden. We can consider switch to escitalopram at next appt if dose decrease doesn't resolve SE and manage his mood. Discussed adding high dose EPA for mood and anxiety adjunct. Pt agrees. He discussed plans to engage with therapy after feeling convicted in [employer] when the topic was about whether people really want to feel better. He denies SI and agrees to RTC in 3-4 weeks.     TREATMENT PLAN: Medication side effects and alternatives reviewed. Health promotion activities recommended and reviewed. All questions addressed. Education and counseling completed regarding risks and benefits of medications and psychotherapy options. [facility] model reviewed today. Recommend therapy for additional support. Safety plan reviewed as indicated.     MEDICATIONS:   -continue LORAZEPAM 0.5mg daily prn for panic  -continue DIAZEPAM 1mg daily  -decrease VENLAFAXINE XR to 37.5mg   -continue FLUOXETINE 10mg daily  -start EPA/OMEGA  3    LABS/RADS:   -None at this time    PATIENT STATUS:  [facility] providers offer care a specialty service for [facility] Providers [facility] that seek to optimize psychotropic medications for unstable patients.  Once medications have been optimized, our providers discharge the patient back to the referring Primary Care Provider for ongoing medication management.  This type of system allows our providers to serve a high volume of patients.   -Pt will be seen for continued medication stabilization in [facility].    PSYCHOSOCIAL:   -starts therapy in April  -Follow up with primary care provider as planned or for acute medical concerns.    PSYCHOEDUCATION:  Medication side effects and alternatives reviewed. Health promotion activities recommended and reviewed today. All questions addressed. Education and counseling completed regarding risks and benefits of medications and psychotherapy options.  Consent provided by patient/guardian  Call the psychiatric nurse line with medication questions or concerns at [phone].  Tintrihart may be used to communicate with your provider, but this is not intended to be used for emergencies.  BLACK BOX WARNING: Discussed the Food and Drug Administration (FDA) requires that all antidepressants carry a warning that some children, adolescents and young adults may be at increased risk of suicide when taking antidepressants. Anyone taking an antidepressant should be watched closely for worsening depression or unusual behavior especially in the first few weeks after starting an SSRI. Keep in mind, antidepressants are more likely to reduce suicide risk in the long run by improving mood.   BENZODIAZEPINE:  discussion on how benzos work and the need to use them short term due to potential of anxiety getting.  This is a controlled substance with risk for abuse, need to keep in a safe keep place and cannot replace lost scripts.    Medlineplus.gov is information for patients.  It  is run by the [employer] and it contains information about all disorders, diseases and all medications.      FOLLOW-UP: Follow up in 3-4 weeks    1. Continue all other treatments (including medications) per primary care provider and/or specialists.   2. To schedule individual or family therapy, call [facility] at [phone].   3. Follow up with primary care provider as planned or for acute medical concerns.  4. Call the psychiatric nurse line with medication questions or concerns at [phone] or [phone].  5. [employer] may be used to communicate with your care team, but this is not intended to be used for emergencies.    CRISIS RESOURCES:    1. Present to the Emergency Department as needed or call after hours crisis line at [phone] or [phone].   2. [place] Crisis Text Line: Text [place] to 076374.  3. Suicide LifeLine Chat: suicidepreBizenline.org/chat/.  4. National Suicide Prevention Lifeline: 529.926.6284 (TTY: 257.654.1921). Call anytime for help.  (www.suicidepreventionlifeline.org)  5. National [place] on Mental Illness (www.maritza.org): 198.986.6346 or 612-719-3956.  6. Mental Health Association (www.mentalhealth.org): 408.948.2134 or 395-974-8540.    ADMINISTRATIVE BILLING:    Time spent interviewing patient, reviewing referral documents, obtaining and reviewing outside records, communication with other health specialists, and preparing this report on today's date  Video/Phone Start Time: 1400  Video/Phone End Time: 0610    Signed:   Jaqueline Eden DNP, [id number]  [facility] ([facility])

## 2023-03-29 DIAGNOSIS — I10 HYPERTENSION, GOAL BELOW 140/90: ICD-10-CM

## 2023-03-29 RX ORDER — METOPROLOL SUCCINATE 25 MG/1
TABLET, EXTENDED RELEASE ORAL
Qty: 90 TABLET | Refills: 1 | Status: SHIPPED | OUTPATIENT
Start: 2023-03-29

## 2023-03-31 ENCOUNTER — TELEPHONE (OUTPATIENT)
Dept: SURGERY | Facility: CLINIC | Age: 58
End: 2023-03-31
Payer: COMMERCIAL

## 2023-03-31 ENCOUNTER — HOSPITAL ENCOUNTER (OUTPATIENT)
Facility: CLINIC | Age: 58
End: 2023-03-31
Attending: SURGERY | Admitting: SURGERY
Payer: COMMERCIAL

## 2023-03-31 DIAGNOSIS — Z12.11 SPECIAL SCREENING FOR MALIGNANT NEOPLASMS, COLON: Primary | ICD-10-CM

## 2023-03-31 NOTE — TELEPHONE ENCOUNTER
Screening Questions  BLUE  KIND OF PREP RED  LOCATION [review exclusion criteria] GREEN  SEDATION TYPE        y Are you active on mychart?       Andrew Ordering/Referring Provider?        ucare What type of coverage do you have?      n Have you had a positive covid test in the last 14 days?     29.24 1. BMI  [BMI 40+ - review exclusion criteria]    y  2. Are you able to give consent for your medical care? [IF NO,RN REVIEW]          n  3. Are you taking any prescription pain medications on a routine schedule   (ex narcotics: oxycodone, roxicodone, oxycontin,  and percocet)? [RN Review]        n  3a. EXTENDED PREP What kind of prescription?     n 4. Do you have any chemical dependencies such as alcohol, street drugs, or methadone?        **If yes 3- 5 , please schedule with MAC sedation.**          IF YES TO ANY 6 - 10 - HOSPITAL SETTING ONLY.     n 6.   Do you need assistance transferring?     n 7.   Have you had a heart or lung transplant?    n 8.   Are you currently on dialysis?   n 9.   Do you use daily home oxygen?   n 10. Do you take nitroglycerin?   10a. n If yes, how often?     11. [FEMALES]  n Are you currently pregnant?    11a. n If yes, how many weeks? [ Greater than 12 weeks, OR NEEDED]    n 12. Do you have Pulmonary Hypertension? *NEED PAC APPT AT [facility] w/ MAC*     n 13. [review exclusion criteria]  Do you have any implantable devices in your body (pacemaker, defib, LVAD)?    n 14. In the past 6 months, have you had any heart related issues including cardiomyopathy or heart attack?     14a. n If yes, did it require cardiac stenting if so when?     n 15. Have you had a stroke or Transient ischemic attack (TIA - aka  mini stroke ) within 6 months?      y 16. Do you have mod to severe Obstructive Sleep Apnea?  [Hospital only]    n 17. Do you have SEVERE AND UNCONTROLLED asthma? *NEED PAC APPT AT [facility] w/MAC*     18. Are you currently taking any blood thinners?     18a. No. Continue to 19.   18b. Yes/no  "Blood Thinner: No [CONTINUE TO #19]    n 19. Do you take the medication Phentermine?    19a. If yes, \"Hold for 7 days before procedure.  Please consult your prescribing provider if you have questions about holding this medication.\"     n  20. Do you have chronic kidney disease?      n  21. Do you have a diagnosis of diabetes?     n  22. On a regular basis do you go 3-5 days between bowel movements?     y 23. Preferred LOCAL Pharmacy for Pre Prescription    [ LIST ONLY ONE PHARMACY]     [facility] [address] [place] [address]        - CLOSING REMINDERS -    Informed patient they will need an adult    Cannot take any type of public or medical transportation alone    Conscious Sedation- Needs  for 6 hours after the procedure       MAC/General-Needs  for 24 hours after procedure    Pre-Procedure Covid test to be completed [[facility] PCR Testing Required]    Confirmed Nurse will call to complete assessment       - SCHEDULING DETAILS -  n Hospital Setting Required? If yes, what is the exclusion?: jared Sesay  Surgeon    7/14/23  Date of Procedure  Lower Endoscopy [Colonoscopy]  Type of Procedure Scheduled  [facility]- If you answer yes to questions #8, #20, #21 [  pts ]Which Colonoscopy Prep was Sent?     general Sedation Type     n PAC / Pre-op Required                 "

## 2023-04-03 ENCOUNTER — TELEPHONE (OUTPATIENT)
Dept: PSYCHIATRY | Facility: CLINIC | Age: 58
End: 2023-04-03
Payer: COMMERCIAL

## 2023-04-03 NOTE — TELEPHONE ENCOUNTER
"Reason for call:  Other     Patient called regarding (reason for call): call back    Additional comments: Pt requests a call back, states that their medication isn't working and they feel they are \"going backwards\"    Phone number to reach patient:  Home number on file [phone] (home)    Best Time:  ASAP    Can we leave a detailed message on this number?  YES    Travel screening: Not Applicable    "

## 2023-04-04 NOTE — TELEPHONE ENCOUNTER
Per provider: Please advise Munir that we should watch and wait and discuss changes on the 11th. If this is a physical health problem (a viral illness, etc), I would expect it to resolve by then.   He is ok with waiting.    Miguelina Sharp RN on 4/4/2023 at 10:55 AM

## 2023-04-04 NOTE — TELEPHONE ENCOUNTER
Patient called and said he had 1 day, the day he called that was not good. But he doesn't know if he was getting sick, allergies or what. The past 2 days have been better. He said his headaches come and go, today he doesn't have any h/a or itchy eyes. He has not had any panic attacks and hasn't needed the Lorazepam. It's just the anxiety but he says he's not sure again if it's him having sinus issues or the weather etc. He's been sleeping a lot during the day which isn't normal for him.   He is just wondering if you wanted to make any changes before the appt on 4/11/23 or wait until then? He is fine either way. He said he is about 75-80% of his normal.     Last visit on 3/16/23:   -continue LORAZEPAM 0.5mg daily prn for panic  -continue DIAZEPAM 1mg daily  -decrease VENLAFAXINE XR to 37.5mg   -continue FLUOXETINE 10mg daily  -start EPA/OMEGA 3  . We can consider switch to escitalopram at next appt if dose decrease doesn't resolve SE and manage his mood    Miguelina Sharp RN on 4/4/2023 at 10:13 AM

## 2023-04-07 ENCOUNTER — ANCILLARY PROCEDURE (OUTPATIENT)
Dept: GENERAL RADIOLOGY | Facility: CLINIC | Age: 58
End: 2023-04-07
Attending: FAMILY MEDICINE
Payer: COMMERCIAL

## 2023-04-07 ENCOUNTER — OFFICE VISIT (OUTPATIENT)
Dept: ORTHOPEDICS | Facility: CLINIC | Age: 58
End: 2023-04-07
Attending: FAMILY MEDICINE
Payer: COMMERCIAL

## 2023-04-07 VITALS
DIASTOLIC BLOOD PRESSURE: 104 MMHG | SYSTOLIC BLOOD PRESSURE: 161 MMHG | HEART RATE: 75 BPM | BODY MASS INDEX: 29.15 KG/M2 | WEIGHT: 189 LBS

## 2023-04-07 DIAGNOSIS — M77.02 MEDIAL EPICONDYLITIS OF ELBOW, LEFT: ICD-10-CM

## 2023-04-07 DIAGNOSIS — M25.522 CHRONIC ELBOW PAIN, LEFT: Primary | ICD-10-CM

## 2023-04-07 DIAGNOSIS — M19.029 ELBOW ARTHRITIS: ICD-10-CM

## 2023-04-07 DIAGNOSIS — G89.29 CHRONIC ELBOW PAIN, LEFT: Primary | ICD-10-CM

## 2023-04-07 DIAGNOSIS — G89.29 CHRONIC ELBOW PAIN, LEFT: ICD-10-CM

## 2023-04-07 DIAGNOSIS — M25.522 CHRONIC ELBOW PAIN, LEFT: ICD-10-CM

## 2023-04-07 PROCEDURE — 73080 X-RAY EXAM OF ELBOW: CPT | Mod: TC | Performed by: RADIOLOGY

## 2023-04-07 PROCEDURE — 99243 OFF/OP CNSLTJ NEW/EST LOW 30: CPT | Performed by: FAMILY MEDICINE

## 2023-04-07 NOTE — LETTER
2023         RE: Munir Storey  [address] [place] [address]        Dear Colleague,    Thank you for referring your patient, Munir Storey, to the [facility] [place]. Please see a copy of my visit note below.    Munir Storey  :  1965  DOS: 2023  MRN: [id number]    Sports Medicine Clinic Visit    PCP: Magalis Morales    Munir Storey is a 57 year old Right hand dominant male who is seen in consultation at the request of  Magalis Morales M.D. presenting with Left Elbow Pain.    Injury: Gradual onset of pain over the past 1 year.  Pain located over left elbow, nonradiating.  Reports intermittent radiating, pain to moving.  Additional Features:  Positive: pain over medial elbow joint.  Symptoms are better with Nothing.  Symptoms are worse with: extension, flexion and limited extension.  Other evaluation and/or treatments so far consists of: Nothing.  Recent imaging completed: No recent imaging completed.  No prior History of related problems.    Social History: Remodeling and construction, [employer]    Review of Systems  Musculoskeletal: as above  Remainder of review of systems is negative including constitutional, CV, pulmonary, GI, Skin and Neurologic except as noted in HPI or medical history.    Past Medical History:   Diagnosis Date     Alcohol withdrawal (H) 2015     Atrial flutter (H)      Depressive disorder      Ejection fraction < 50% 2015    Ejection fraction 45% per echo 2015 (per [facility] records), possible alcohol or tachycardia induced cardiomyopathy. EF normalized on follow-up echo      SANDRA (generalized anxiety disorder)      H/O atrioventricular quita ablation 2015     Hypertension, goal below 140/90      Tobacco abuse      Past Surgical History:   Procedure Laterality Date     CARDIAC SURGERY  2015    EP cardioversion     COLONOSCOPY  2013     COLONOSCOPY  2019    normal colonoscopy - repeat 10 years      HERNIA REPAIR       LAPAROSCOPIC RESECTION SMALL BOWEL  08/08/2013     SPINE SURGERY      cervical fusion      UPPER GI ENDOSCOPY  08/01/2013     Family History   Problem Relation Age of Onset     Diabetes Father      Depression Father      Depression Paternal Grandfather        Objective  BP (!) 161/104   Pulse 75   Wt 85.7 kg (189 lb)   BMI 29.15 kg/m        General: healthy, alert and in no distress      HEENT: no scleral icterus or conjunctival erythema     Skin: no suspicious lesions or rash. No jaundice.     CV: regular rhythm by palpation, 2+ distal pulses, no pedal edema      Resp: normal respiratory effort without conversational dyspnea     Psych: normal mood and affect      Gait: nonantalgic, appropriate coordination and balance     Neuro: normal light touch sensory exam of the extremities. Motor strength as noted below       Right Elbow exam  Inspection: no swelling  Tender: medial epicondyle mild and posterior joint line mild and very mild common flexor tendon  Non-tender: lateral epicondyle and common extensor tendon, flexor and extensor muscle of forearm, supracondylar notch, olecranon bursa, distal bicep tendon and radial head/neck  Range of Motion: lacks 5-10 deg terminal flexion and extension, not painful today in terminal ROM  Strength: elbow strength full and pain with resisted wrist extension and supination  Special tests: normal stability, normal valgus stress, normal varus stress, ulnar Tinel's negative, no pain with resisted middle finger extension, no pain with resisted wrist flexion:       Radiology:  Recent Results (from the past 744 hour(s))   CT Chest Lung Cancer Scrn Low Dose wo    Narrative    CT CHEST LUNG CANCER SCREEN LOW DOSE WITHOUT  3/15/2023 2:10 PM    HISTORY:  Personal history of tobacco use 57-year-old male meeting  USPSTF high risk criteria for lung screening.    TECHNIQUE:  Scans obtained from the apices through the diaphragm  without IV contrast. Low dose CT chest  "technique was utilized.  Radiation dose for this scan was reduced using automated exposure  control, adjustment of the mA and/or kV according to patient size, or  iterative reconstruction technique.    COMPARISON:  12/27/2021    FINDINGS:  Juxtapleural 3 mm right middle lobe pulmonary nodule series  3, image 175 is similar to prior. Nearby 2 mm nodule is on series 3,  image 172. No new or enlarging suspicious pulmonary nodules or masses.  Lungs: Negative for consolidation, pleural effusion, or pneumothorax.    Additional findings: Degenerative changes in the spine. Mild to  moderate coronary artery calcification.       Impression    IMPRESSION:   1. ACR Assessment Category:  Lung-RADS Category 2. Benign appearance  or behavior. Recommendation: Continue annual screening, if clinically  relevant (please order exam code IMG 2290). .   2. Significant Incidental Finding(s):  Category S: Yes. coronary  artery calcium moderate or severe      Download the \"LungRADS Assessment Categories\" table at this site:   http://www.acr.org/Quality-Safety/Resources/LungRADS    TOMASZ JORGE MD         SYSTEM ID:  [id number]     XR Elbow LT G/E 3 vw    Narrative    XR ELBOW LEFT G/E 3 VIEWS 4/7/2023 9:17 AM    HISTORY: Chronic elbow pain, left; Chronic elbow pain, left    COMPARISON: None.      Impression    IMPRESSION: No fracture or effusion. Mild degenerative changes  throughout the elbow. No definite evidence of loose bodies.    HEATHER VARGAS MD         SYSTEM ID:  [id number]       Assessment:  1. Chronic elbow pain, left    2. Elbow arthritis    3. Medial epicondylitis of elbow, left        Plan:  Discussed the assessment with the patient.  Follow up: prn based on progress  No clear joint effusion based on exam, no concerning signs of infection, gout or inflammatory arthritis  Clinical and radiographic signs of DJD in the left elbow, impacting terminal ROM  If pain flares could consider CSI, defer for now  Milder medial " epicondyle sx, reviewed activity modification strategies  Oral Tylenol and topical Voltaren gel reviewed as safe OTC options, reviewed safe dosing strategies  .RICE reviewed  OT options reviewed  Can consider wrist brace use if the medial epicondyle pain worsens  Compression brace options reviewed, tubigrip provided today, reviewed use strategies, plan for using with high demand activity  XR images independently visualized and reviewed with patient today in clinic  We discussed modified progressive pain-free activity as tolerated  Home handouts provided and supportive care reviewed  All questions were answered today  Contact us with additional questions or concerns  Signs and sx of concern reviewed    Thanks very much for sending this nice gentleman to us, I will keep you updated with his progress      Osbaldo Summers DO, PAULA  Sports Medicine Physician  [facility]          Disclaimer: This note consists of symbols derived from keyboarding, dictation and/or voice recognition software. As a result, there may be errors in the script that have gone undetected. Please consider this when interpreting information found in this chart.      Again, thank you for allowing me to participate in the care of your patient.        Sincerely,        Osbaldo Summers DO

## 2023-04-07 NOTE — PROGRESS NOTES
Munir Storey  :  1965  DOS: 2023  MRN: [id number]    Sports Medicine Clinic Visit    PCP: Magalis Morales    Munir Storey is a 57 year old Right hand dominant male who is seen in consultation at the request of  Magalis Morales M.D. presenting with Left Elbow Pain.    Injury: Gradual onset of pain over the past 1 year.  Pain located over left elbow, nonradiating.  Reports intermittent radiating, pain to moving.  Additional Features:  Positive: pain over medial elbow joint.  Symptoms are better with Nothing.  Symptoms are worse with: extension, flexion and limited extension.  Other evaluation and/or treatments so far consists of: Nothing.  Recent imaging completed: No recent imaging completed.  No prior History of related problems.    Social History: Remodeling and construction, [employer]    Review of Systems  Musculoskeletal: as above  Remainder of review of systems is negative including constitutional, CV, pulmonary, GI, Skin and Neurologic except as noted in HPI or medical history.    Past Medical History:   Diagnosis Date     Alcohol withdrawal (H) 2015     Atrial flutter (H)      Depressive disorder      Ejection fraction < 50% 2015    Ejection fraction 45% per echo 2015 (per [facility] records), possible alcohol or tachycardia induced cardiomyopathy. EF normalized on follow-up echo      SANDRA (generalized anxiety disorder)      H/O atrioventricular quita ablation 2015     Hypertension, goal below 140/90      Tobacco abuse      Past Surgical History:   Procedure Laterality Date     CARDIAC SURGERY  2015    EP cardioversion     COLONOSCOPY  2013     COLONOSCOPY  2019    normal colonoscopy - repeat 10 years     HERNIA REPAIR       LAPAROSCOPIC RESECTION SMALL BOWEL  2013     SPINE SURGERY      cervical fusion      UPPER GI ENDOSCOPY  2013     Family History   Problem Relation Age of Onset     Diabetes Father      Depression Father      Depression  Paternal Grandfather        Objective  BP (!) 161/104   Pulse 75   Wt 85.7 kg (189 lb)   BMI 29.15 kg/m        General: healthy, alert and in no distress      HEENT: no scleral icterus or conjunctival erythema     Skin: no suspicious lesions or rash. No jaundice.     CV: regular rhythm by palpation, 2+ distal pulses, no pedal edema      Resp: normal respiratory effort without conversational dyspnea     Psych: normal mood and affect      Gait: nonantalgic, appropriate coordination and balance     Neuro: normal light touch sensory exam of the extremities. Motor strength as noted below       Right Elbow exam  Inspection: no swelling  Tender: medial epicondyle mild and posterior joint line mild and very mild common flexor tendon  Non-tender: lateral epicondyle and common extensor tendon, flexor and extensor muscle of forearm, supracondylar notch, olecranon bursa, distal bicep tendon and radial head/neck  Range of Motion: lacks 5-10 deg terminal flexion and extension, not painful today in terminal ROM  Strength: elbow strength full and pain with resisted wrist extension and supination  Special tests: normal stability, normal valgus stress, normal varus stress, ulnar Tinel's negative, no pain with resisted middle finger extension, no pain with resisted wrist flexion:       Radiology:  Recent Results (from the past 744 hour(s))   CT Chest Lung Cancer Scrn Low Dose wo    Narrative    CT CHEST LUNG CANCER SCREEN LOW DOSE WITHOUT  3/15/2023 2:10 PM    HISTORY:  Personal history of tobacco use 57-year-old male meeting  USPSTF high risk criteria for lung screening.    TECHNIQUE:  Scans obtained from the apices through the diaphragm  without IV contrast. Low dose CT chest technique was utilized.  Radiation dose for this scan was reduced using automated exposure  control, adjustment of the mA and/or kV according to patient size, or  iterative reconstruction technique.    COMPARISON:  12/27/2021    FINDINGS:  Juxtapleural 3 mm  "right middle lobe pulmonary nodule series  3, image 175 is similar to prior. Nearby 2 mm nodule is on series 3,  image 172. No new or enlarging suspicious pulmonary nodules or masses.  Lungs: Negative for consolidation, pleural effusion, or pneumothorax.    Additional findings: Degenerative changes in the spine. Mild to  moderate coronary artery calcification.       Impression    IMPRESSION:   1. ACR Assessment Category:  Lung-RADS Category 2. Benign appearance  or behavior. Recommendation: Continue annual screening, if clinically  relevant (please order exam code IMG 2290). .   2. Significant Incidental Finding(s):  Category S: Yes. coronary  artery calcium moderate or severe      Download the \"LungRADS Assessment Categories\" table at this site:   http://www.acr.org/Quality-Safety/Resources/LungRADS    TOMASZ JORGE MD         SYSTEM ID:  [id number]     XR Elbow LT G/E 3 vw    Narrative    XR ELBOW LEFT G/E 3 VIEWS 4/7/2023 9:17 AM    HISTORY: Chronic elbow pain, left; Chronic elbow pain, left    COMPARISON: None.      Impression    IMPRESSION: No fracture or effusion. Mild degenerative changes  throughout the elbow. No definite evidence of loose bodies.    HEATHER VARGAS MD         SYSTEM ID:  [id number]       Assessment:  1. Chronic elbow pain, left    2. Elbow arthritis    3. Medial epicondylitis of elbow, left        Plan:  Discussed the assessment with the patient.  Follow up: prn based on progress  No clear joint effusion based on exam, no concerning signs of infection, gout or inflammatory arthritis  Clinical and radiographic signs of DJD in the left elbow, impacting terminal ROM  If pain flares could consider CSI, defer for now  Milder medial epicondyle sx, reviewed activity modification strategies  Oral Tylenol and topical Voltaren gel reviewed as safe OTC options, reviewed safe dosing strategies  .ROSLYN reviewed  OT options reviewed  Can consider wrist brace use if the medial epicondyle pain " worsens  Compression brace options reviewed, tubigrip provided today, reviewed use strategies, plan for using with high demand activity  XR images independently visualized and reviewed with patient today in clinic  We discussed modified progressive pain-free activity as tolerated  Home handouts provided and supportive care reviewed  All questions were answered today  Contact us with additional questions or concerns  Signs and sx of concern reviewed    Thanks very much for sending this nice gentleman to us, I will keep you updated with his progress      Osbaldo Summers DO, [facility]  Sports Medicine Physician  [facility]          Disclaimer: This note consists of symbols derived from keyboarding, dictation and/or voice recognition software. As a result, there may be errors in the script that have gone undetected. Please consider this when interpreting information found in this chart.

## 2023-04-11 ENCOUNTER — VIRTUAL VISIT (OUTPATIENT)
Dept: BEHAVIORAL HEALTH | Facility: CLINIC | Age: 58
End: 2023-04-11
Payer: COMMERCIAL

## 2023-04-11 ENCOUNTER — VIRTUAL VISIT (OUTPATIENT)
Dept: PSYCHIATRY | Facility: CLINIC | Age: 58
End: 2023-04-11
Payer: COMMERCIAL

## 2023-04-11 DIAGNOSIS — F41.1 GAD (GENERALIZED ANXIETY DISORDER): ICD-10-CM

## 2023-04-11 DIAGNOSIS — F45.21 ILLNESS ANXIETY DISORDER: ICD-10-CM

## 2023-04-11 DIAGNOSIS — F41.0 PANIC DISORDER WITHOUT AGORAPHOBIA: Primary | ICD-10-CM

## 2023-04-11 DIAGNOSIS — F33.1 MODERATE EPISODE OF RECURRENT MAJOR DEPRESSIVE DISORDER (H): ICD-10-CM

## 2023-04-11 DIAGNOSIS — F33.1 MODERATE EPISODE OF RECURRENT MAJOR DEPRESSIVE DISORDER (H): Primary | ICD-10-CM

## 2023-04-11 PROCEDURE — 90832 PSYTX W PT 30 MINUTES: CPT | Mod: VID | Performed by: SOCIAL WORKER

## 2023-04-11 PROCEDURE — 99215 OFFICE O/P EST HI 40 MIN: CPT | Mod: VID | Performed by: STUDENT IN AN ORGANIZED HEALTH CARE EDUCATION/TRAINING PROGRAM

## 2023-04-11 RX ORDER — ESCITALOPRAM OXALATE 5 MG/1
TABLET ORAL
Qty: 67 TABLET | Refills: 0 | Status: SHIPPED | OUTPATIENT
Start: 2023-04-11 | End: 2023-05-18

## 2023-04-11 RX ORDER — DIAZEPAM 2 MG
1 TABLET ORAL DAILY
Qty: 15 TABLET | Refills: 0 | Status: SHIPPED | OUTPATIENT
Start: 2023-04-15 | End: 2023-05-18

## 2023-04-11 NOTE — NURSING NOTE
Is the patient currently in the state of [place]? YES    Visit mode:VIDEO    If the visit is dropped, the patient can be reconnected by: VIDEO VISIT: Send to e-mail at: [email or web address]    Will anyone else be joining the visit? NO      How would you like to obtain your AVS? MyChart    Are changes needed to the allergy or medication list? NO    Reason for visit: return patient    Care team has reviewed attendance agreement with patient. Patient advised that two failed appointments within 6 months may lead to termination of current episode of care.      Crystal Cardenas VF

## 2023-04-11 NOTE — PATIENT INSTRUCTIONS
Jimmy Amaral,    Thank you for our time together today in Collaborative Care Psychiatry Service ([facility]). CCPS provides brief psychiatric medication stabilization to patients referred by their Primary Care Providers. Patients are typically seen in CCPS for up to 4 appointments and then referred back to the PCP for ongoing refills unless longer term medication management by a specialist is indicated. If I believe you will benefit from long-term psychiatric care I will discuss this with you. If you are interested in seeing a psychiatrist or psychiatric nurse practitioner long-term, please send me a message in [facility] so we can refer you appropriately.     Treatment Plan:  Stop venlafaxine  Stop fluoxetine  Start escitalopram 5mg at bedtime x 7 nights then increase to 10mg at bedtime   Continue EPA 1000mg  Start curcumin 500mg-600mg twice daily for mood/anxiety/inflammation  Call [phone] to schedule follow up with me in 4 weeks.  You can call the above number to make appointments, leave a message with our nursing team, and inquire about any mental health referrals I have placed.  Please call your pharmacy to request a refill of your medicines listed above if needed between appointments.     CORAZON Leroy  Collaborative Care Psychiatry Service  [facility]

## 2023-04-11 NOTE — PROGRESS NOTES
[facility]  April 11, 2023      Behavioral Health Clinician Progress Note    Patient Name: Munir Storey           Service Type:  Individual      Service Location:   MyChart / Email (patient reached)     Session Start Time: 14:05  Session End Time: 14:30      Session Length: 16 - 37      Attendees: Patient      Service Modality:  Video Visit:     Telemedicine Visit: The patient's condition can be safely assessed and treated via synchronous audio and visual telemedicine encounter.      Reason for Telemedicine Visit: Services only offered telehealth    Originating Site (Patient Location): Patient's home        Distant Location (provider location):  Off-site    Consent:  The patient/guardian has verbally consented to: the potential risks and benefits of telemedicine (video visit) versus in person care; bill my insurance or make self-payment for services provided; and responsibility for payment of non-covered services.     Mode of Communication:  Video Conference via [employer]    As the provider I attest to compliance with applicable laws and regulations related to telemedicine.    Visit Activities (Refresh list every visit): [facility] Only    Diagnostic Assessment Date: 04/12/2022  Treatment Plan Review Date: Completed 03/16/2023, next due by 06/16/2023  See Flowsheets for today's PHQ-9 and SANDRA-7 results  Previous PHQ-9:       2/14/2023     1:10 PM 3/14/2023     8:16 AM 3/16/2023    12:56 PM   PHQ-9 SCORE   PHQ-9 Total Score [facility] 4 (Minimal depression) 7 (Mild depression) 6 (Mild depression)   PHQ-9 Total Score 4    4 7 6    6     Previous SANDRA-7:       12/16/2022    10:48 AM 1/2/2023     9:28 AM 3/16/2023    12:57 PM   SANDRA-7 SCORE   Total Score 8 (mild anxiety) 15 (severe anxiety) 8 (mild anxiety)   Total Score 8 15    15    15 8    8       BANG LEVEL:       View : No data to display.                DATA  Extended Session (60+ minutes): No  Interactive Complexity:  "No  Crisis: No  [facility] Patient: No    Treatment Objective(s) Addressed in This Session:  Target Behavior(s): anxiety and depression    Depressed Mood: Increase interest, engagement, and pleasure in doing things  Decrease frequency and intensity of feeling down, depressed, hopeless  Improve quantity and quality of night time sleep / decrease daytime naps  Anxiety: will experience a reduction in anxiety     Current Stressors / Issues:   Update: \"fair to middlin'.\" Medication does not seem to be doing anything. Not have any energy and needs naps to get through the day. Feeling frustrated. Feels like he is surviving but not thriving. Health issues have improved but still feel lethargic. Anxiety increases when things are outside the norm or trying to do new things. Tried to spend time with family for Easter and felt highly anxious the whole time and just wanted to get back home. Did not need to use PRN medication but still not feeling \"happy.\" Wants to be \"jovial\" and have a good time but feels that he can't. Does recognize that he did have some enjoyment but was hoping for more. Feel like less of a chore and exhausting. Dx sleep apnea 3 weeks ago and has an appointment pending to check physical scans and look at treatment options. Sleep varies. Not as motivated with work but is a good distraction/routine. Felt better for two weeks a month+ ago and hopeful to get back there. Too tired to spend time with family and grandchildren. Anniversary of brain tumor and finger injury, and doing okay.  [facility] validated patient for his continued distress.  [facility] pointed out that patient is making progress within his anxiety and how to acknowledge that.  [facility] pointed out patient not needing to use his as needed medication and also reporting that most of his days were neutral to positive, with only 1 significant bad day.  [facility] processed patient continuing to use small steps and activation to continue with motivation.  [facility] reminded patient " about focusing on things within his control and making healthy choices with his lifestyle including diet and exercise.  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    Stressors: fatigue all the time    Tx: scheduled to see Ximena at [facility] on April 20th. Has been talking to his  in the mean time, and has been helpful.     Etoh: denies. Sober. Drinks non-alcoholic things at pub or dinner.  Substance: denies  Nicotine: same, 2 pouches a day and herbal replacement. Sometimes uses nicotine gum.  Caffeine: same, 2 cups of half-caff and then de-caff.    Most Important: feeling tired all the time, still anxious    Progress on Treatment Objective(s) / Homework:  Satisfactory progress - ACTION (Actively working towards change); Intervened by reinforcing change plan / affirming steps taken    Motivational Interviewing    MI Intervention: Expressed Empathy/Understanding, Supported Autonomy, Collaboration, Evocation, Reflections: simple and complex, Change talk (evoked) and Reframe     Change Talk Expressed by the Patient: Desire to change Ability to change Reasons to change Need to change Committment to change Activation Taking steps    Provider Response to Change Talk: E - Evoked more info from patient about behavior change, A - Affirmed patient's thoughts, decisions, or attempts at behavior change, R - Reflected patient's change talk and S - Summarized patient's change talk statements    Also provided psychoeducation about behavioral health condition, symptoms, and treatment options    Care Plan review completed: No    Medication Review:  No changes to current psychiatric medication(s)    Medication Compliance:  Yes    Changes in Health Issues:   Yes: Sleep disturbance, Associated Psychological Distress    Chemical Use Review:   Substance Use: Chemical use reviewed, no active concerns identified      Tobacco Use: No change in amount of  tobacco use since last session.  Patient declined discussion at this time    Assessment: Current Emotional / Mental Status (status of significant symptoms):  Risk status (Self / Other harm or suicidal ideation)  Patient has had a history of suicidal ideation: Patient reports suicidal ideation that comes and goes.  He denies he has a plan or intent.  He denies any prior attempts  Patient denies current fears or concerns for personal safety.  Patient denies current or recent suicidal ideation or behaviors.  Patient denies current or recent homicidal ideation or behaviors.  Patient denies current or recent self injurious behavior or ideation.  Patient denies other safety concerns.  A safety and risk management plan has not been developed at this time, however patient was encouraged to call [place] [phone] should there be a change in any of these risk factors.    Appearance:   Appropriate   Eye Contact:   Good   Psychomotor Behavior: Normal   Attitude:   Cooperative   Orientation:   All  Speech   Rate / Production: Normal    Volume:  Normal   Mood:    Anxious   Affect:    Appropriate   Thought Content:  Clear   Thought Form:  Coherent  Logical   Insight:    Good     Diagnoses:  1. Panic disorder without agoraphobia    2. Moderate episode of recurrent major depressive disorder (H)    3. SANDRA (generalized anxiety disorder)      Collateral Reports Completed:  Communicated with: [facility] Team    Plan: (Homework, other):  Patient was given information about behavioral services and encouraged to schedule a follow up appointment with the clinic [facility] as needed.  He was also given information about mental health symptoms and treatment options .  Patient is scheduled to start individual therapy in April 2023.   CD Recommendations: No indications of CD issues.                                               Individual Treatment Plan    Patient's Name: Munir Storey  YOB: 1965    Date of Creation: 12/01/2022  Date  Treatment Plan Last Reviewed/Revised: 03/16/2023    DSM5 Diagnoses: 296.32 (F33.1) Major Depressive Disorder, Recurrent Episode, Moderate _ and With anxious distress or 300.01 (F41.0) Panic Disorder  300.02 (F41.1) Generalized Anxiety Disorder  Psychosocial / Contextual Factors: Patient lives with his wife and adult children.  Patient owns a personal business. Patient does have social supports.  PROMIS (reviewed every 90 days): 20    Referral / Collaboration:  Patient is connected with an individual therapist and declines any other needs at this time.    Anticipated number of session for this episode of care: 3-6 sessions  Anticipation frequency of session: Monthly  Anticipated Duration of each session: 16-37 minutes  Treatment plan will be reviewed in 90 days or when goals have been changed.       MeasurableTreatment Goal(s) related to diagnosis / functional impairment(s)  Goal 1: Patient will report continued improvement in his anxiety and depression.    I will know I've met my goal when I feel more like my old self.      Objective #A (Patient Action)    Patient will identify Rational and irrational fears / thoughts that contribute to feeling anxious.  Status: Continued - Date(s): 03/16/2023    Intervention(s)  Therapist will teach emotional recognition/identification. CBT for anxiety.    Objective #B  Patient will use cognitive strategies identified in therapy to challenge anxious thoughts.  Status: Continued - Date(s): 03/16/2023    Intervention(s)  Therapist will teach emotional regulation skills. CBT for anxiety skills.    Objective #C  Patient will Increase interest, engagement, and pleasure in doing things  Identify negative self-talk and behaviors: challenge core beliefs, myths, and actions.  Status: Continued - Date(s): 03/16/2023    Intervention(s)  Therapist will assign homework For patient to use activation and reconnect with interests and supports.    Patient has reviewed and agreed to the above  plan.      Maura Laureano, [facility]  April 11, 2023

## 2023-04-11 NOTE — PROGRESS NOTES
"Virtual Visit Details    Type of service:  Video Visit     Originating Location (pt. Location): Home    Distant Location (provider location):  off-site  Platform used for Video Visit: [facility] PSYCHIATRY SERVICE FOLLOW-UP     Name:  Munir Storey  : 1965     Patient is a 57 year old year old White, male  who presents for follow-up medication management with [facility].  Patient was initially referred by their PCP. Patient attended the session alone.     Patient care team: Patient Care Team:  Magalis Morales MD as PCP - General (Family Medicine)  Sadi Shelley MD as Assigned Heart and Vascular Provider  Roberto Cabral MD as Assigned Musculoskeletal Provider  Peggy Eden NP as Assigned Neuroscience Provider  Ralph Monzon MD as MD (Psychiatry)  Betty Rivers [facility] as Pharmacist (Pharmacist)  Emma Martinez APRN CNP as Assigned Behavioral Health Provider  Magalis Morales MD as Assigned PCP    INTERIM HISTORY   Pt was last seen in [facility] for follow-up on 16 Mar 23. At that time, the plan included decrease venlafaxine to 37.5mg daily, continue fluoxetine 10mg, diazepam 1mg, lorazepam 0.25-0.5mg daily PRN, start fish oil EPA >1000mg.    Interim pt communication:  Call 4/3/23 - HEADACHE intermittently. Anxiety up and down. Hypersomnia.    Available records were reviewed prior to visit.    HISTORY OF PRESENT ILLNESS     Per [facility] Maura Laureano during today's team-based visit: \"MH Update: \"fair to middlin'.\" Medication does not seem to be doing anything. Not have any energy and needs naps to get through the day. Feeling frustrated. Feels like he is surviving but not thriving. Health issues have improved but still feel lethargic. Anxiety increases when things are outside the norm or trying to do new things. Tried to spend time with family for Margaret and felt highly anxious the whole time and just wanted to get back home. Did not need to use PRN " "medication but still not feeling \"happy.\" Wants to be \"jovial\" and have a good time but feels that he can't. Does recognize that he did have some enjoyment but was hoping for more. Feel like less of a chore and exhausting. Dx sleep apnea 3 weeks ago and has an appointment pending to check physical scans and look at treatment options. Sleep varies. Not as motivated with work but is a good distraction/routine. Felt better for two weeks a month+ ago and hopeful to get back there. Too tired to spend time with family and grandchildren. Anniversary of brain tumor and finger injury, and doing okay.  [facility] validated distress and pointed out that patient is making some progress. Continuing activation to avoid boredom. Tracking days and noticing he has several good days, other than napping. Did have one really bad day without a trigger. Lifestyle- diet and exercise.  Stressors: fatigue all the time  Tx: scheduled to see Ximena at [facility] on April 20th. Has been talking to his  in the mean time, and has been helpful.   Etoh: denies. Sober. Drinks non-alcoholic things at pub or dinner.  Substance: denies  Nicotine: same, 2 pouches a day and herbal replacement. Sometimes uses nicotine gum.  Caffeine: same, 2 cups of half-caff and then de-caff.  Most Important: feeling tired all the time, still anxious\"    ---Psychiatry Update---  \"I don't have really bad days. I have bad days but seems like it comes and goes.\" Pt says mornings are good but by the afternoon he feels exhausted and needs to nap most days.   Denies panic attacks. \"It's like groundhog day. We're not getting over that hump and I'm trying to use all my skills that I know of.\"   Pt hasn't been working; \"I'm bored off my gourd.\" Business is slower. He does hope do manage some things around the house now that the weather is improving.     Suicidal ideation:  No   PHQ2 score is 2 indicating minimal depression.  GAD2 score is 2 indicating subthreshold " anxiety    Medication side effects: Headaches resolved. No SE from current medications.     Sleep: APARNA dx. Impaired sleep as a result. Pt c/o feeling tired and fatigued during the day.    Medical: Waiting on head scan to look at sinuses.    MEDICATIONS                                                                                              Current medications reviewed today and are noted below.   Current psychotropic medications:   Venlafaxine XR 37.5mg  Fluoxetine 10mg  Diazepam 1mg daily  Lorazepam 0.25-0.5mg daily PRN for panic     Past psychotropic medications:  Alprazolam  Aripiprazole  Duloxetine  Fluoxetine  Hydroxyzine  Lorazepam  Sertraline  Venlafaxine  Bupropion  Gabapentin     Supplements:   See below       2/14/23 Diazepam 2mg #15    Current Outpatient Medications   Medication Sig     amLODIPine (NORVASC) 10 MG tablet Take 1 tablet (10 mg) by mouth daily     diazepam (VALIUM) 2 MG tablet Take 0.5 tablets (1 mg) by mouth daily For anxiety     FLUoxetine (PROZAC) 10 MG capsule Take 1 capsule (10 mg) by mouth daily     fluticasone (FLONASE) 50 MCG/ACT nasal spray Spray 1 spray into both nostrils daily (Patient not taking: Reported on 3/16/2023)     lisinopril-hydrochlorothiazide (ZESTORETIC) 20-12.5 MG tablet Take 1 tablet by mouth daily For blood pressure.     LORazepam (ATIVAN) 0.5 MG tablet Take 0.5-1 tablets (0.25-0.5 mg) by mouth daily as needed for anxiety (or severe panic attacks)     metoprolol succinate ER (TOPROL XL) 25 MG 24 hr tablet TAKE 1 TABLET BY MOUTH EVERY DAY     [facility] (T-RELIEF CBD+13 SL) Place 600 Units under the tongue daily as needed Hemp oil sublingual     NONFORMULARY CBD gummi and oil     pediatric electrolyte (PEDIALYTE) SOLN solution Take 1.25 mLs by mouth daily = 1/4 teaspoon     rosuvastatin (CRESTOR) 20 MG tablet Take 1 tablet (20 mg) by mouth daily For cholesterol. (Patient not taking: Reported on 3/16/2023)     venlafaxine (EFFEXOR XR) 37.5 MG 24 hr  capsule Take 1 capsule (37.5 mg) by mouth daily     No current facility-administered medications for this visit.        VITALS   There were no vitals taken for this visit.    Pulse Readings from Last 5 Encounters:   04/07/23 75   03/14/23 85   02/22/23 88   12/16/22 66   11/28/22 59     Wt Readings from Last 5 Encounters:   04/07/23 85.7 kg (189 lb)   03/14/23 86 kg (189 lb 9.6 oz)   02/22/23 83.9 kg (185 lb)   12/16/22 79.2 kg (174 lb 9.6 oz)   11/28/22 78.5 kg (173 lb)     BP Readings from Last 5 Encounters:   04/07/23 (!) 161/104   03/14/23 (!) 146/86   02/22/23 132/82   12/16/22 134/78   11/28/22 (!) 162/84       LABS & IMAGING                                                                                                                Recent available labs reviewed today.    Recent Labs   Lab Test 03/14/23  0859 07/01/22  1117 05/29/22  0847   CR 0.73 0.72 0.76   GFRESTIMATED >90 >90 >90     Recent Labs   Lab Test 03/14/23  0859 05/03/22  2025   AST 10 13   ALT 25 25   ALKPHOS 60 50     Recent Labs   Lab Test 03/27/22  1108 02/18/21  1034 10/20/17  1124   TSH 2.83 3.34 2.61       ALLERGY & IMMUNIZATIONS       Allergies   Allergen Reactions     Sulfamethoxazole-Trimethoprim Nausea       MEDICAL & SURGICAL HISTORY    Reviewed past medical and surgical history today.   Pregnant - NA.     Past Medical History:   Diagnosis Date     Alcohol withdrawal (H) 04/28/2015     Atrial flutter (H)      Depressive disorder      Ejection fraction < 50% 04/2015    Ejection fraction 45% per echo April 2015 (per [facility] records), possible alcohol or tachycardia induced cardiomyopathy. EF normalized on follow-up echo 2016     SANDRA (generalized anxiety disorder)      H/O atrioventricular quita ablation 12/2015     Hypertension, goal below 140/90      Tobacco abuse        MENTAL STATUS EXAM:     Alertness: alert  and oriented  Appearance: adequately groomed  Behavior/Demeanor: cooperative, pleasant and calm, with good eye contact    Speech: normal and regular rate and rhythm  Language: intact and no problems  Psychomotor: normal or unremarkable  Mood: depressed, anxious  Affect: restricted and appropriate; was congruent to mood; was congruent to content  Thought Process/Associations: unremarkable  Thought Content:  Reports none;  Denies suicidal ideation, violent ideation and delusions  Perception:  Reports none;  Denies auditory hallucinations and visual hallucinations  Insight: intact  Judgment: intact  Cognition: does  appear grossly intact; formal cognitive testing was not done  Gait and Station: [facility]     RISK AND PROTECTIVE FACTORS     Static Risk Factors: sex and history of MH diagnoses and/or treatment     Dynamic Risk Factors: emotional distress, substance use, anxiety, agitation, chronic pain, mental health stigma and work related problems     Protective Factors: hope for the future, compliance with medication, future oriented, healthy intimate relationships, engaged in EBT, access to care as needed, motivation and readiness for change, reasons for living, effective coping strategies and displaying help seeking behavior     SAFETY ASSESSMENT      Based on review of above risk and protective factors and today's exam, pt is at low elevated risk of harm to self or others. He does not meet criteria for a 72 hr hold and remains appropriate for ongoing outpatient care. The patient convincingly denies suicidality today but endorses fleeting thoughts of death without plan/intent. There was no deceit detected, and the patient presented in a manner that was believable. Local community safety resources printed and reviewed for patient to use if needed.     Recommended that patient call 911 or go to the local ED should there be a change in any of these risk factors.     DSM 5 DIAGNOSIS      Major Depressive Disorder, Single Episode, Moderate _  Panic Disorder  Illness anxiety disorder     DIFFERENTIAL DIAGNOSIS: SANDRA     Medical comorbidities  impacting or contributing to clinical picture: None noted    ASSESSMENT AND PLAN      ASSESSMENT:  Munir Storey is a 57 year old White, male who presents for return visit with [facility] ([facility]) for medication management.   16 Mar 23: Pt with hx of illness anxiety disorder, SANDRA, panic, and depression returns to clinic reporting ongoing depressive sx. He c/o worsening headaches and HTN with venlafaxine. He had decreased to 75mg daily and continues to have HEADACHE and HTN. His PCP added low dose fluoxetine. We discussed further reducing venlafaxine today and then re-eval SE burden. We can consider switch to escitalopram at next appt if dose decrease doesn't resolve SE and manage his mood. Discussed adding high dose EPA for mood and anxiety adjunct. Pt agrees. He discussed plans to engage with therapy after feeling convicted in [employer] when the topic was about whether people really want to feel better. He denies SI and agrees to RTC in 3-4 weeks.   Today 11 Apr 23: Pt with hx of illness anxiety disorder, SANDRA, panic, and depression returns to clinic reporting ongoing low mood. He continues to grieve that he is not the person he used to be and he wants to be back in that mental state. He denies problems with anxiety recently. We discussed changing SSRI/SNRI to escitalopram as discussed last appt which he is agreeable to do. We also discussed his partial keto diet and he has not seen improvement in mood with partial keto. We also discussed curcumin for mood/anxiety/inflammation as he is concerned about inflammation contributing to his mood/anxiety concerns. This is reasonable evidence to support the use of curcumin in conjunction with antidepressant treatment; pt agrees to this supp trial. Minimal r/b/se reviewed. Pt denies risk and agrees to return in 4 weeks.     TREATMENT PLAN: Medication side effects and alternatives reviewed. Health promotion activities recommended and reviewed. All  questions addressed. Education and counseling completed regarding risks and benefits of medications and psychotherapy options. [facility] model reviewed today. Recommend therapy for additional support. Safety plan reviewed as indicated.     MEDICATIONS:   -start ESCITALOPRAM 5mg at bedtime x 7 nights then increase to 10mg nightly  -continue LORAZEPAM 0.5mg daily prn for panic  -continue DIAZEPAM 1mg daily  -stop VENLAFAXINE XR to 37.5mg   -stop FLUOXETINE 10mg daily  -continue EPA/OMEGA 3  -start CURCUMIN 500-600mg twice daily for mood/anxiety/inflammation    LABS/RADS:   -None at this time and Last Labs Obtained: 3/14/23, reviewed    PATIENT STATUS:  [facility] MD/DO/NP/PA providers offer care a specialty service for Primary Care Providers in the [facility] that seek to optimize psychotropic medications for unstable patients.  Once medications have been optimized, our providers discharge the patient back to the referring Primary Care Provider for ongoing medication management.  This type of system allows our providers to serve a high volume of patients.   -Pt will be seen for continued medication stabilization in [facility].    PSYCHOSOCIAL:   -consider therapy  -Follow up with primary care provider as planned or for acute medical concerns.    PSYCHOEDUCATION:  Medication side effects and alternatives reviewed. Health promotion activities recommended and reviewed today. All questions addressed. Education and counseling completed regarding risks and benefits of medications and psychotherapy options.  Consent provided by patient/guardian  Call the psychiatric nurse line with medication questions or concerns at [phone].  MyChart may be used to communicate with your provider, but this is not intended to be used for emergencies.  BLACK BOX WARNING: Discussed the Food and Drug Administration (FDA) requires that all antidepressants carry a warning that some children, adolescents and young adults  may be at increased risk of suicide when taking antidepressants. Anyone taking an antidepressant should be watched closely for worsening depression or unusual behavior especially in the first few weeks after starting an SSRI. Keep in mind, antidepressants are more likely to reduce suicide risk in the long run by improving mood.   BENZODIAZEPINE:  discussion on how benzos work and the need to use them short term due to potential of anxiety getting.  This is a controlled substance with risk for abuse, need to keep in a safe keep place and cannot replace lost scripts.    Medlineplus.gov is information for patients.  It is run by the [employer] and it contains information about all disorders, diseases and all medications.      FOLLOW-UP: Follow up in 4-5 weeks    1. Continue all other treatments (including medications) per primary care provider and/or specialists.   2. To schedule individual or family therapy, call [facility] at [phone].   3. Follow up with primary care provider as planned or for acute medical concerns.  4. Call the psychiatric nurse line with medication questions or concerns at [phone] or [phone].  5. [employer] may be used to communicate with your care team, but this is not intended to be used for emergencies.    CRISIS RESOURCES:    1. Present to the Emergency Department as needed or call after hours crisis line at [phone] or [phone].   2. [place] Crisis Text Line: Text [place] to 168720.  3. Suicide LifeLine Chat: suicidepreventionlifeline.org/chat/.  4. National Suicide Prevention Lifeline: 663.733.9262 (TTY: 841.251.3852). Call anytime for help.  (www.suicidepreventionlifeline.org)  5. National [place] on Mental Illness (www.maritza.org): 296.565.5955 or 390-569-0543.  6. Mental Health Association (www.mentalhealth.org): 341.932.7494 or 520-000-7396.    ADMINISTRATIVE BILLING:    Time spent interviewing patient, reviewing referral documents,  obtaining and reviewing outside records, communication with other health specialists, and preparing this report on today's date    Signed:   Jaqueline Eden DNP, PMHNP-BC  [facility] ([facility])

## 2023-04-17 DIAGNOSIS — I10 HYPERTENSION, GOAL BELOW 140/90: ICD-10-CM

## 2023-04-17 RX ORDER — AMLODIPINE BESYLATE 10 MG/1
10 TABLET ORAL DAILY
Qty: 90 TABLET | Refills: 1 | Status: SHIPPED | OUTPATIENT
Start: 2023-04-17 | End: 2023-11-08

## 2023-05-18 DIAGNOSIS — F41.1 GAD (GENERALIZED ANXIETY DISORDER): ICD-10-CM

## 2023-05-18 RX ORDER — LORAZEPAM 0.5 MG/1
.25-.5 TABLET ORAL DAILY PRN
Qty: 7 TABLET | Refills: 0 | Status: SHIPPED | OUTPATIENT
Start: 2023-05-18 | End: 2023-09-25

## 2023-05-18 RX ORDER — DIAZEPAM 2 MG
1 TABLET ORAL DAILY
Qty: 15 TABLET | Refills: 0 | Status: SHIPPED | OUTPATIENT
Start: 2023-05-18 | End: 2023-06-22

## 2023-05-18 NOTE — TELEPHONE ENCOUNTER
Provider Peggy Eden CNP indicated in this patients last visit note on 4/11/23  that a future/return appointment was indicated in 4-5 weeks.    [facility] please call this patient to schedule a future appointment with Peggy Eden CNP

## 2023-05-18 NOTE — TELEPHONE ENCOUNTER
Date of Last Office Visit: 4/11/23  Date of Next Office Visit: None scheduled.  RN notified A to call this patient and schedule a future appointment with Peggy Eden CNP  No shows since last visit: no  Cancellations since last visit: no    Medication requested: diazepam (VALIUM) 2 MG tablet Date last ordered: 4/15/23 Qty: 15 Refills: 0    Medication requested: LORazepam (ATIVAN) 0.5 MG tablet Date last ordered: 3/16/23  Qty: 7 Refills: 0  Review of [place] ?: yes  Medication last filled date:  Qty filled: 15  Other controlled substance on [place] ?: yes  If yes, is this a new medication?: no  If yes, name of medication: na and date filled: na    Lapse in medication adherence greater than 5 days?: Unknown  If yes, call patient and gather details: RN attempted to phone the patient.  There was no answer.  RN did not leave a message.    Medication refill request verified as identical to current order?: yes  Result of Last DAM, VPA, Li+ Level, CBC, or Carbamazepine Level (at or since last visit): N/A    Last visit treatment plan:     Treatment Plan:  1. Stop venlafaxine  2. Stop fluoxetine  3. Start escitalopram 5mg at bedtime x 7 nights then increase to 10mg at bedtime   4. Continue EPA 1000mg  5. Start curcumin 500mg-600mg twice daily for mood/anxiety/inflammation  6. Call [phone] to schedule follow up with me in 4 weeks.  a. You can call the above number to make appointments, leave a message with our nursing team, and inquire about any mental health referrals I have placed.  7. Please call your pharmacy to request a refill of your medicines listed above if needed between appointments.     -continue LORAZEPAM 0.5mg daily prn for panic  continue DIAZEPAM 1mg daily     CORAZON Leroy  [facility] Psychiatry Service  [facility]    []Medication refilled per  Medication Refill in Ambulatory Care  policy.  [x]Medication unable to be refilled by RN due to criteria not met as indicated below:     []Eligibility - not seen in the last year   [x]Supervision - no future appointment   []Compliance - no shows, cancellations or lapse in therapy   []Verification - order discrepancy   [x]Controlled medication   [x]Medication not included in policy   []90-day supply request   []Other

## 2023-05-18 NOTE — TELEPHONE ENCOUNTER
Reason for call:  Medication   If this is a refill request, has the caller requested the refill from the pharmacy already? Yes  Will the patient be using a [facility]? No  Name of the pharmacy and phone number for the current request: [facility]    Name of the medication requested: Diazepam, Lorazepam    Other request: Patient is out. Sent high priority    Phone number to reach patient:  Home number on file [phone] (home)    Best Time:  ASAP    Can we leave a detailed message on this number?  YES    Travel screening: Not Applicable

## 2023-06-22 ENCOUNTER — VIRTUAL VISIT (OUTPATIENT)
Dept: BEHAVIORAL HEALTH | Facility: CLINIC | Age: 58
End: 2023-06-22
Payer: COMMERCIAL

## 2023-06-22 ENCOUNTER — VIRTUAL VISIT (OUTPATIENT)
Dept: PSYCHIATRY | Facility: CLINIC | Age: 58
End: 2023-06-22
Payer: COMMERCIAL

## 2023-06-22 DIAGNOSIS — F41.1 GAD (GENERALIZED ANXIETY DISORDER): Primary | ICD-10-CM

## 2023-06-22 DIAGNOSIS — F45.21 ILLNESS ANXIETY DISORDER: ICD-10-CM

## 2023-06-22 DIAGNOSIS — F33.0 MAJOR DEPRESSIVE DISORDER, RECURRENT EPISODE, MILD (H): ICD-10-CM

## 2023-06-22 DIAGNOSIS — F33.1 MODERATE EPISODE OF RECURRENT MAJOR DEPRESSIVE DISORDER (H): ICD-10-CM

## 2023-06-22 PROCEDURE — 90832 PSYTX W PT 30 MINUTES: CPT | Mod: VID | Performed by: SOCIAL WORKER

## 2023-06-22 PROCEDURE — 99215 OFFICE O/P EST HI 40 MIN: CPT | Mod: VID | Performed by: STUDENT IN AN ORGANIZED HEALTH CARE EDUCATION/TRAINING PROGRAM

## 2023-06-22 RX ORDER — DIAZEPAM 2 MG
1 TABLET ORAL DAILY
Qty: 15 TABLET | Refills: 2 | Status: SHIPPED | OUTPATIENT
Start: 2023-06-22 | End: 2023-08-08

## 2023-06-22 NOTE — PATIENT INSTRUCTIONS
Jimmy Amaral,    Thank you for our time together today in Collaborative Care Psychiatry Service (CCPS). CCPS provides brief psychiatric medication stabilization to patients referred by their Primary Care Providers. Patients are typically seen in CCPS for up to 4 appointments and then referred back to the PCP for ongoing refills unless longer term medication management by a specialist is indicated. If I believe you will benefit from long-term psychiatric care I will discuss this with you. If you are interested in seeing a psychiatrist or psychiatric nurse practitioner long-term, please send me a message in [facility] so we can refer you appropriately.     Treatment Plan:  [facility] directory: [email or web address]  -restart ESCITALOPRAM 2.5mg daily x 7 days then increase to 5mg daily  -continue LORAZEPAM 0.5mg daily prn for panic  -continue DIAZEPAM 1mg daily  Follow up in 4-6 weeks or sooner if needed.  You can call [phone] to make appointments, leave a message with our nursing team, and inquire about any mental health referrals I have placed.  Please call your pharmacy to request a refill of your medicines listed above if needed between appointments.     CORAZON Leroy  Collaborative Care Psychiatry Service  [facility]   Patient Education   Collaborative Care Psychiatry Service  What to Expect  Here's what to expect from your Collaborative Care Psychiatry Service (CCPS).   About CCPS  CCPS means 2 people work together to help you get better. You'll meet with a behavioral health clinician and a psychiatric doctor. A behavioral health clinician helps people with mental health problems by talking with them. A psychiatric doctor helps people by giving them medicine.  How it works  At every visit, you'll see the behavioral health clinician (BHC) first. They'll talk with you about how you're doing and teach you how to feel better.  "  Then you'll see the psychiatric doctor. This doctor can help you deal with troubling thoughts and feelings by giving you medicine. They'll make sure you know the plan for your care.   CCPS usually takes 3 to 6 visits. If you need more visits, we may have you start seeing a different psychiatric doctor for ongoing care.  If you have any questions or concerns, we'll be glad to talk with you.  About visits  Be open  At your visits, please talk openly about your problems. It may feel hard, but it's the best way for us to help you.  Cancelling visits  If you can't come to your visit, please call us right away at [phone]. If you don't cancel at least 24 hours (1 full day) before your visit, that's \"late cancellation.\"  Being late to visits  Being very late is the same as not showing up. You will be a \"no show\" if:  Your appointment starts with a [facility], and you're more than 15 minutes late for a 30-minute (half hour) visit. This will also cancel your appointment with the psychiatric doctor.  Your appointment is with a psychiatric doctor only, and you're more than 15 minutes late for a 30-minute (half hour) visit.  Your appointment is with a psychiatric doctor only, and you're more than 30 minutes late for a 60-minute (full hour) visit.  If you cancel late or don't show up 2 times within 6 months, we may end your care.   Getting help between visits  If you need help between visits, you can call us Monday to Friday from 8 a.m. to 4:30 p.m. at [phone].  Emergency care  Call 911 or go to the nearest emergency department if your life or someone else's life is in danger.  Call 278 anytime to reach the national Suicide and Crisis hotline.  Medicine refills  To refill your medicine, call your pharmacy. You can also call [facility]'s Behavioral Access at [phone], Monday to Friday, 8 a.m. to 4:30 p.m. It can take 1 to 3 business days to get a refill.   Forms, letters, and tests  You may have papers to " fill out, like FMLA, short-term disability, and workability. We can help you with these forms at your visits, but you must have an appointment. You may need more than 1 visit for this, to be in an intensive therapy program, or both.  Before we can give you medicine for ADHD, we may refer you to get tested for it or confirm it another way.  We may not be able to give you an emotional support animal letter.  We don't do mental health checks ordered by the court.   We don't do mental health testing, but we can refer you to get tested.   Thank you for choosing us for your care.  For informational purposes only. Not to replace the advice of your health care provider. Copyright   2022 [facility]. All rights reserved. agreement24 avtal24 [id number] - 12/22.

## 2023-06-22 NOTE — PROGRESS NOTES
[facility] Psychiatry Services [facility]  June 22, 2023      Behavioral Health Clinician Progress Note    Patient Name: Munir Storey           Service Type:  Individual      Service Location:   [facility] / Email (patient reached)     Session Start Time: 13:34  Session End Time: 13:55      Session Length: 16 - 37      Attendees: Patient      Service Modality:  Video Visit:     Telemedicine Visit: The patient's condition can be safely assessed and treated via synchronous audio and visual telemedicine encounter.      Reason for Telemedicine Visit: Services only offered telehealth    Originating Site (Patient Location): Patient's home        Distant Location (provider location):  Off-site    Consent:  The patient/guardian has verbally consented to: the potential risks and benefits of telemedicine (video visit) versus in person care; bill my insurance or make self-payment for services provided; and responsibility for payment of non-covered services.     Mode of Communication:  Video Conference via Mobile Experience    As the provider I attest to compliance with applicable laws and regulations related to telemedicine.    Visit Activities (Refresh list every visit): [facility] Only    Diagnostic Assessment Date: 04/12/2022  Treatment Plan Review Date: Completed 06/22/2023, next due by 09/22/2023 See Flowsheets for today's PHQ-9 and SANDRA-7 results  Previous PHQ-9:       2/14/2023     1:10 PM 3/14/2023     8:16 AM 3/16/2023    12:56 PM   PHQ-9 SCORE   PHQ-9 Total Score [facility] 4 (Minimal depression) 7 (Mild depression) 6 (Mild depression)   PHQ-9 Total Score 4    4 7 6    6     Previous SANDRA-7:       12/16/2022    10:48 AM 1/2/2023     9:28 AM 3/16/2023    12:57 PM   SANDRA-7 SCORE   Total Score 8 (mild anxiety) 15 (severe anxiety) 8 (mild anxiety)   Total Score 8 15    15    15 8    8       BANG LEVEL:       No data to display                DATA  Extended Session (60+ minutes): No  Interactive Complexity: No  Crisis:  "No  [facility] Patient: No    Treatment Objective(s) Addressed in This Session:  Target Behavior(s): anxiety and depression    Depressed Mood: Increase interest, engagement, and pleasure in doing things  Decrease frequency and intensity of feeling down, depressed, hopeless  Improve quantity and quality of night time sleep / decrease daytime naps  Anxiety: will experience a reduction in anxiety     Current Stressors / Issues:   Update: \"good.\" Has been busy with work projects, yard work, projects around the house, etc. Has learned to prioritize to help with healthy distraction. Mood has been hit or miss; one week he feels really good and then another week he feels tired/fatigued. Needs naps during those weeks. Overall his mood has been a \"7/10.\" No panic attacks. Not needing to take the lorazepam- only 1 to 2 times in past two months. Still needs diazepam and takes that daily. Has been forgetting to take the escitalopram most nights. Forgets and falls asleep. Seems to cause head pains when he does take it. Only takes two times per week. Is using a CPAP and helps with sleep. Not necessarily feel depressed. Does get anxious thoughts about being alone and possibly health scares. Binges on candy and feels worse afterward.  [facility] pointed out the continued improvement that patient is making and having realistic expectations with his goals.  [facility] explored reasons for patient's fatigue and patient identified that it is most likely related to his sleep and adjusting to his CPAP.  [facility] discussed patient trying to practice with his CPAP when he is still awake.  [facility] also reinforced patient spending time with family and making positive connections with others.  [facility] pointed out that patient is having positive emotions.  [facility] advised that she will not be meeting with patient at follow-up visits and encouraged him to establish with a new therapist through the resources discussed.    Stressors: episodes of fatigue    Tx[facility]. Saw a couple times and decided to not go back. Wants L[facility] day therapist and not able to find within his insurance network or only virtual. Wants in person. Discussed options to try.    Etoh: denies. Sober. Drinks non-alcoholic things at pub or dinner.  Substance: denies  Nicotine: same, 2 pouches a day and herbal replacement.  Caffeine: same, 2 cups of half-caff and then de-caff.    Most Important: some weeks of feeling tired and needs naps. Not taking Lexapro every night- switch to the morning or stop taking?    Treatment plan updated    Progress on Treatment Objective(s) / Homework:  Satisfactory progress - ACTION (Actively working towards change); Intervened by reinforcing change plan / affirming steps taken    Motivational Interviewing    MI Intervention: Expressed Empathy/Understanding, Supported Autonomy, Collaboration, Evocation, Reflections: simple and complex, Change talk (evoked) and Reframe     Change Talk Expressed by the Patient: Desire to change Ability to change Reasons to change Need to change Committment to change Activation Taking steps    Provider Response to Change Talk: E - Evoked more info from patient about behavior change, A - Affirmed patient's thoughts, decisions, or attempts at behavior change, R - Reflected patient's change talk and S - Summarized patient's change talk statements    Also provided psychoeducation about behavioral health condition, symptoms, and treatment options    Care Plan review completed: Yes    Medication Review:  No changes to current psychiatric medication(s)    Medication Compliance:  Yes patient admits that he has been forgetting to take his Lexapro and only gets it a couple times a week.    Changes in Health Issues:   Yes: Sleep disturbance, Associated Psychological Distress    Chemical Use Review:   Substance Use: Chemical use reviewed, no active concerns identified      Tobacco Use: No change in amount of tobacco use since last  session.  Patient declined discussion at this time    Assessment: Current Emotional / Mental Status (status of significant symptoms):  Risk status (Self / Other harm or suicidal ideation)  Patient has had a history of suicidal ideation: Patient reports suicidal ideation that comes and goes.  He denies he has a plan or intent.  He denies any prior attempts  Patient denies current fears or concerns for personal safety.  Patient denies current or recent suicidal ideation or behaviors.  Patient denies current or recent homicidal ideation or behaviors.  Patient denies current or recent self injurious behavior or ideation.  Patient denies other safety concerns.  A safety and risk management plan has not been developed at this time, however patient was encouraged to call [place] [phone] should there be a change in any of these risk factors.    Appearance:   Appropriate   Eye Contact:   Good   Psychomotor Behavior: Normal   Attitude:   Cooperative   Orientation:   All  Speech   Rate / Production: Normal    Volume:  Normal   Mood:    Anxious   Affect:    Appropriate   Thought Content:  Clear   Thought Form:  Coherent  Logical   Insight:    Good     Diagnoses:  1. SANDRA (generalized anxiety disorder)    2. Moderate episode of recurrent major depressive disorder (H)      Collateral Reports Completed:  Communicated with: CCPS Team    Plan: (Homework, other):  Patient was given information about behavioral services and encouraged to schedule a follow up appointment with the clinic [facility] as needed.  He was also given information about mental health symptoms and treatment options .  Patient stopped seeing his therapist and was provided with other resources to follow-up on scheduling.  CD Recommendations: No indications of CD issues.                                               Individual Treatment Plan    Patient's Name: Munir Storey  YOB: 1965    Date of Creation: 12/01/2022  Date Treatment Plan Last  Reviewed/Revised: 06/22/2023    DSM5 Diagnoses: 296.32 (F33.1) Major Depressive Disorder, Recurrent Episode, Moderate _ and With anxious distress or 300.01 (F41.0) Panic Disorder  300.02 (F41.1) Generalized Anxiety Disorder  Psychosocial / Contextual Factors: Patient lives with his wife and adult children.  Patient owns a personal business. Patient does have social supports.  PROMIS (reviewed every 90 days): 20    Referral / Collaboration:  Patient is connected with an individual therapist and declines any other needs at this time.    Anticipated number of session for this episode of care: 3-6 sessions  Anticipation frequency of session: Monthly  Anticipated Duration of each session: 16-37 minutes  Treatment plan will be reviewed in 90 days or when goals have been changed.       MeasurableTreatment Goal(s) related to diagnosis / functional impairment(s)  Goal 1: Patient will report continued improvement in his anxiety and depression.    I will know I've met my goal when I feel more like my old self.      Objective #A (Patient Action)    Patient will identify Rational and irrational fears / thoughts that contribute to feeling anxious.  Status: Continued - Date(s): 06/22/2023    Intervention(s)  Therapist will teach emotional recognition/identification. CBT for anxiety.    Objective #B  Patient will use cognitive strategies identified in therapy to challenge anxious thoughts.  Status: Continued - Date(s): 06/22/2023    Intervention(s)  Therapist will teach emotional regulation skills. CBT for anxiety skills.    Objective #C  Patient will Increase interest, engagement, and pleasure in doing things  Identify negative self-talk and behaviors: challenge core beliefs, myths, and actions.  Status: Continued - Date(s): 06/22/2023    Intervention(s)  Therapist will assign homework For patient to use activation and reconnect with interests and supports.    Patient has reviewed and agreed to the above plan.      Maura Laureano,  LIC  June 22, 2023

## 2023-06-22 NOTE — PROGRESS NOTES
"Virtual Visit Details    Type of service:  Video Visit     Originating Location (pt. Location): Home    Distant Location (provider location):  Off-site  Platform used for Video Visit: Vanessa      ADMINISTRATIVE BILLING:    Time spent interviewing patient, reviewing referral documents, obtaining and reviewing outside records, communication with other health specialists, and preparing this report on today's date  Video start: 140  Video stop: 7632    Total time: 42 mins      [facility] PSYCHIATRY SERVICE FOLLOW-UP     Name:  Munir Storey  : 1965     Patient is a 58 year old year old White, male  who presents for follow-up medication management with [facility].  Patient was initially referred by their PCP. Patient attended the session alone.     Patient care team: Patient Care Team:  Magalis Morales MD as PCP - General (Family Medicine)  Sadi Shelley MD as Assigned Heart and Vascular Provider  Peggy Eden NP as Assigned Neuroscience Provider  Ralph Monzon MD as MD (Psychiatry)  Betty Rivers [facility] as Pharmacist (Pharmacist)  Emma Martinez, CORAZON CNP as Assigned Behavioral Health Provider  Magalis Morales MD as Assigned PCP  Osbaldo Summers DO as Assigned Musculoskeletal Provider    INTERIM HISTORY   Pt was last seen in [facility] for follow-up on . At that time, the plan included   -start ESCITALOPRAM 5mg at bedtime x 7 nights then increase to 10mg nightly  -continue LORAZEPAM 0.5mg daily prn for panic  -continue DIAZEPAM 1mg daily  -stop VENLAFAXINE XR to 37.5mg   -stop FLUOXETINE 10mg daily  -continue EPA/OMEGA 3  -start CURCUMIN 500-600mg twice daily for mood/anxiety/inflammation.    Interim pt communication:  none    Available records were reviewed prior to visit.    HISTORY OF PRESENT ILLNESS     Per [facility] Maura Laureano during today's team-based visit: \"MH Update: \"good.\" Has been busy with work projects, yard work, projects around the house, " "etc. Has learned to prioritize to help with healthy distraction. Mood has been hit or miss; one week he feels really good and then another week he feels tired/fatigued. Needs naps during those weeks. Overall his mood has been a \"7/10.\" No panic attacks. Not needing to take the lorazepam- only 1 to 2 times in past two months. Still needs diazepam and takes that daily. Has been forgetting to take the escitalopram most nights. Forgets and falls asleep. Seems to cause head pains when he does take it. Only takes two times per week. Is using a CPAP and helps with sleep. Not necessarily feel depressed. Does get anxious thoughts about being alone and possibly health scares. Binges on candy and feels worse afterward.  Exploring reasons for fatigue, most likely sleep and adjusting to CPAP. Practice with CPAP. Time with family, noticing connections and positive emotions.  Stressors: episodes of fatigue  Tx: [facility] at [facility] on April 20th. Saw a couple times and decided to not go back. Wants [facility] therapist and not able to find within his insurance network or only virtual. Wants in person. Discussed options to try.  Etoh: denies. Sober. Drinks non-alcoholic things at pub or dinner.  Substance: denies  Nicotine: same, 2 pouches a day and herbal replacement.  Caffeine: same, 2 cups of half-caff and then de-caff.  Most Important: some weeks of feeling tired and needs naps. Not taking Lexapro every night- switch to the morning or stop taking?\"    ---Psychiatry Update---  Pt says he is \"skating by.\" He says \"no real issues.\" He says he's not been \"diligent\" with my meds. He wonders about switching escitalopram to the mornings since he forgets to take it at night.   Pt takes metoprolol, lisinopril-hctz, amlodipine, diazepam daily. He hasn't been needing to take lorazepam more than 1-2x in the past few months.     Pt thinks social engagement and work is going well. He says his confidence going out on the boat by himself is " "still a tom. \"Traveling is still an issue.\" He has been able to go camping in [place] with his wife but still doesn't feel comfortable going alone.     Pt says he feels a \"regression in my thoughts maybe? I do feel myself going the wrong way. Just more negative thoughts I guess.\" He says \"they're not bad. They're feelings and emotions and thoughts I didn't have 2 weeks ago, 3 weeks ago.\" He feels this is due to lapse on medicine. He says weather, his garden, Yazidi, exercise are all helping keep his mood boosted above baseline.     Suicidal ideation:  No   PHQ2 score is 2 indicating minimal depression.  GAD7 score is not completed today    Medication side effects: no problems with current medicines. Interested in re-started     Sleep: Daytime fatigue. Recently started using CPAP. Difficulty adjusting to CPAP and trying to get comfortable with it during the day.   Appetite: Binge eating candy for a few days \"and then I realize what's going on.\"     Medical: Recently started using a CPAP for APARNA. \"I'm arabella paranoid about it being dirty so I ordered a  so I can clean it every day.\"     MEDICATIONS                                                                                              Current medications reviewed today and are noted below.   Current psychotropic medications:   ESCITALOPRAM 10mg nightly - pt has not been taking as rx.  LORAZEPAM 0.5mg daily prn for panic  DIAZEPAM 1mg daily    Past psychotropic medications:  Alprazolam  Aripiprazole  Duloxetine  Fluoxetine  Hydroxyzine  Lorazepam  Sertraline  Venlafaxine  Bupropion  Gabapentin    Supplements:   EPA/OMEGA 3  CURCUMIN 500-600mg twice daily for mood/anxiety/inflammation.      5/18/23 Diazepam 2mg #15  5/18/23 Lorazepam 0.5mg #7  4/11/23 Diazepam 2mg #30  2/14/23 Diazepam 2mg #30    Current Outpatient Medications   Medication Sig     amLODIPine (NORVASC) 10 MG tablet TAKE 1 TABLET (10 MG) BY MOUTH DAILY.     diazepam (VALIUM) 2 MG tablet " Take 0.5 tablets (1 mg) by mouth daily For anxiety     escitalopram (LEXAPRO) 10 MG tablet Take 1 tablet (10 mg) by mouth At Bedtime for 30 days     fluticasone (FLONASE) 50 MCG/ACT nasal spray Spray 1 spray into both nostrils daily     lisinopril-hydrochlorothiazide (ZESTORETIC) 20-12.5 MG tablet Take 1 tablet by mouth daily For blood pressure.     LORazepam (ATIVAN) 0.5 MG tablet Take 0.5-1 tablets (0.25-0.5 mg) by mouth daily as needed for anxiety (or severe panic attacks)     metoprolol succinate ER (TOPROL XL) 25 MG 24 hr tablet TAKE 1 TABLET BY MOUTH EVERY DAY     [facility] Natural Products (T-RELIEF CBD+13 SL) Place 600 Units under the tongue daily as needed Hemp oil sublingual     NONFORMULARY CBD gummi and oil     pediatric electrolyte (PEDIALYTE) SOLN solution Take 1.25 mLs by mouth daily = 1/4 teaspoon     rosuvastatin (CRESTOR) 20 MG tablet Take 1 tablet (20 mg) by mouth daily For cholesterol. (Patient not taking: Reported on 3/16/2023)     No current facility-administered medications for this visit.        VITALS   There were no vitals taken for this visit.    Pulse Readings from Last 5 Encounters:   04/07/23 75   03/14/23 85   02/22/23 88   12/16/22 66   11/28/22 59     Wt Readings from Last 5 Encounters:   04/07/23 85.7 kg (189 lb)   03/14/23 86 kg (189 lb 9.6 oz)   02/22/23 83.9 kg (185 lb)   12/16/22 79.2 kg (174 lb 9.6 oz)   11/28/22 78.5 kg (173 lb)     BP Readings from Last 5 Encounters:   04/07/23 (!) 161/104   03/14/23 (!) 146/86   02/22/23 132/82   12/16/22 134/78   11/28/22 (!) 162/84       LABS & IMAGING                                                                                                                Recent available labs reviewed today.    Recent Labs   Lab Test 03/14/23  0859 07/01/22  1117 05/29/22  0847   CR 0.73 0.72 0.76   GFRESTIMATED >90 >90 >90     Recent Labs   Lab Test 03/14/23  0859 05/03/22 2025   AST 10 13   ALT 25 25   ALKPHOS 60 50     Recent Labs   Lab Test  03/27/22  1108 02/18/21  1034 10/20/17  1124   TSH 2.83 3.34 2.61       ALLERGY & IMMUNIZATIONS       Allergies   Allergen Reactions     Sulfamethoxazole-Trimethoprim Nausea       MEDICAL & SURGICAL HISTORY    Reviewed past medical and surgical history today.   Pregnant - NA.     Past Medical History:   Diagnosis Date     Alcohol withdrawal (H) 04/28/2015     Atrial flutter (H)      Depressive disorder      Ejection fraction < 50% 04/2015    Ejection fraction 45% per echo April 2015 (per [facility] records), possible alcohol or tachycardia induced cardiomyopathy. EF normalized on follow-up echo 2016     SANDRA (generalized anxiety disorder)      H/O atrioventricular quita ablation 12/2015     Hypertension, goal below 140/90      Tobacco abuse        MENTAL STATUS EXAM:     Alertness: alert  and oriented  Appearance: adequately groomed  Behavior/Demeanor: cooperative, pleasant and calm, with good eye contact   Speech: normal and regular rate and rhythm  Language: intact and no problems  Psychomotor: normal or unremarkable  Mood: euthymic but endorses some anxiety  Affect: improving, appropriate; was congruent to mood; was congruent to content  Thought Process/Associations: unremarkable  Thought Content:  Reports none;  Denies suicidal ideation, violent ideation and delusions  Perception:  Reports none;  Denies auditory hallucinations and visual hallucinations  Insight: intact  Judgment: intact  Cognition: does  appear grossly intact; formal cognitive testing was not done  Gait and Station: [place]     RISK AND PROTECTIVE FACTORS     Static Risk Factors: sex and history of MH diagnoses and/or treatment     Dynamic Risk Factors: emotional distress, substance use, anxiety, agitation, chronic pain, mental health stigma and work related problems     Protective Factors: hope for the future, compliance with medication, future oriented, healthy intimate relationships, engaged in EBT, access to care as needed, motivation and readiness  for change, reasons for living, effective coping strategies and displaying help seeking behavior     SAFETY ASSESSMENT      Based on review of above risk and protective factors and today's exam, pt is at low elevated risk of harm to self or others. He does not meet criteria for a 72 hr hold and remains appropriate for ongoing outpatient care. The patient convincingly denies suicidality today but endorses fleeting thoughts of death without plan/intent. There was no deceit detected, and the patient presented in a manner that was believable. Local community safety resources printed and reviewed for patient to use if needed.     Recommended that patient call 911 or go to the local ED should there be a change in any of these risk factors.     DSM 5 DIAGNOSIS      Major Depressive Disorder, Single Episode, Moderate _  Panic Disorder  Illness anxiety disorder     DIFFERENTIAL DIAGNOSIS: SANDRA     Medical comorbidities impacting or contributing to clinical picture: None noted     ASSESSMENT AND PLAN       ASSESSMENT:  Munir Storey is a 57 year old White, male who presents for return visit with [facility] ([facility]) for medication management.   11 Apr 23: Pt with hx of illness anxiety disorder, SANDRA, panic, and depression returns to clinic reporting ongoing low mood. He continues to grieve that he is not the person he used to be and he wants to be back in that mental state. He denies problems with anxiety recently. We discussed changing SSRI/SNRI to escitalopram as discussed last appt which he is agreeable to do. We also discussed his partial keto diet and he has not seen improvement in mood with partial keto. We also discussed curcumin for mood/anxiety/inflammation as he is concerned about inflammation contributing to his mood/anxiety concerns. This is reasonable evidence to support the use of curcumin in conjunction with antidepressant treatment; pt agrees to this supp trial. Minimal r/b/se reviewed.  Pt denies risk and agrees to return in 4 weeks.   Today 22 June 23: Pt with hx of illness anxiety disorder, SANDRA, panic, and depression returns to clinic reporting more engagement with the natural world as the weather has improved. He does feel more stable overall but continues to experience periods of negative thinking. He hasn't been taking escitalopram the past few months due to forgetting at night. He is interested in a trial of taking it in the morning for optimizing mood/anxiety and I do think this is reasonable as his affect is improving but not back to baseline. Recommended starting at subtherapeutic dose to adjust before ramping up. He agrees to this plan. Discussed upcoming transition out of this dept; pt can likely return to PCP for ongoing refills. Will discuss next visit.      TREATMENT PLAN: Medication side effects and alternatives reviewed. Health promotion activities recommended and reviewed. All questions addressed. Education and counseling completed regarding risks and benefits of medications and psychotherapy options. [facility] model reviewed today. Recommend therapy for additional support. Safety plan reviewed as indicated.     MEDICATIONS:   -restart ESCITALOPRAM 2.5mg daily x 7 days then increase to 5mg daily  -continue LORAZEPAM 0.5mg daily prn for panic  -continue DIAZEPAM 1mg daily    SUPPLEMENTS:  -continue EPA/OMEGA 3  -continue CURCUMIN 500-600mg twice daily for mood/anxiety/inflammation     LABS/RADS:   -None at this time and Last Labs Obtained: 3/14/23, reviewed    PATIENT STATUS:  [facility] MD/DO/NP/PA providers offer care a specialty service for Primary Care Providers in the [facility] that seek to optimize psychotropic medications for unstable patients.  Once medications have been optimized, our providers discharge the patient back to the referring Primary Care Provider for ongoing medication management.  This type of system allows our providers to serve a  high volume of patients.   -Pt will be seen for continued medication stabilization in [facility].    PSYCHOSOCIAL:   -continue spending time outside, in garden  -continue supplements  -find Worship counselor  -Follow up with primary care provider as planned or for acute medical concerns.    PSYCHOEDUCATION:  Medication side effects and alternatives reviewed. Health promotion activities recommended and reviewed today. All questions addressed. Education and counseling completed regarding risks and benefits of medications and psychotherapy options.  Consent provided by patient/guardian  Call the psychiatric nurse line with medication questions or concerns at [phone].  The Donut Huthart may be used to communicate with your provider, but this is not intended to be used for emergencies.  BLACK BOX WARNING: Discussed the Food and Drug Administration (FDA) requires that all antidepressants carry a warning that some children, adolescents and young adults may be at increased risk of suicide when taking antidepressants. Anyone taking an antidepressant should be watched closely for worsening depression or unusual behavior especially in the first few weeks after starting an SSRI. Keep in mind, antidepressants are more likely to reduce suicide risk in the long run by improving mood.   BENZODIAZEPINE:  discussion on how benzos work and the need to use them short term due to potential of anxiety getting.  This is a controlled substance with risk for abuse, need to keep in a safe keep place and cannot replace lost scripts.    Medlineplus.gov is information for patients.  It is run by the National Library of Medicine and it contains information about all disorders, diseases and all medications.      FOLLOW-UP: Follow up in 4-6 weeks    1. Continue all other treatments (including medications) per primary care provider and/or specialists.   2. To schedule individual or family therapy, call [facility] at [phone].    3. Follow up with primary care provider as planned or for acute medical concerns.  4. Call the psychiatric nurse line with medication questions or concerns at [phone] or [phone].  5. [email or web address] may be used to communicate with your care team, but this is not intended to be used for emergencies.    CRISIS RESOURCES:    1. Present to the Emergency Department as needed or call after hours crisis line at [phone] or [phone].   2. [place] Crisis Text Line: Text [place] to 787603.  3. Suicide LifeLine Chat: suicidepreuuzuche.comline.org/chat/.  4. National Suicide Prevention Lifeline: 886.853.1435 (TTY: 136.603.5192). Call anytime for help.  (www.suicidepreventionlifeline.org)  5. National [place] on Mental Illness (www.maritza.org): 739.440.6069 or 188-180-6154.  6. Mental Health Association (www.mentalhealth.org): 369.175.6794 or 904-494-5201.    Signed:   Jaqueline Eden DNP, PMHNP-BC  [facility] ([facility])

## 2023-06-22 NOTE — NURSING NOTE
Is the patient currently in the state of [place]? YES    Visit mode:VIDEO    If the visit is dropped, the patient can be reconnected by: VIDEO VISIT: Send to e-mail at: [email or web address]    Will anyone else be joining the visit? NO      How would you like to obtain your AVS? MyChart    Are changes needed to the allergy or medication list? NO    Reason for visit: MARISOL Cardenas VF

## 2023-06-30 RX ORDER — BISACODYL 5 MG/1
TABLET, DELAYED RELEASE ORAL
Qty: 4 TABLET | Refills: 0 | Status: SHIPPED | OUTPATIENT
Start: 2023-06-30 | End: 2023-11-01 | Stop reason: HOSPADM

## 2023-08-08 ENCOUNTER — TELEPHONE (OUTPATIENT)
Dept: FAMILY MEDICINE | Facility: CLINIC | Age: 58
End: 2023-08-08

## 2023-08-08 ENCOUNTER — VIRTUAL VISIT (OUTPATIENT)
Dept: PSYCHIATRY | Facility: CLINIC | Age: 58
End: 2023-08-08
Payer: COMMERCIAL

## 2023-08-08 DIAGNOSIS — F41.1 GAD (GENERALIZED ANXIETY DISORDER): ICD-10-CM

## 2023-08-08 DIAGNOSIS — F45.21 ILLNESS ANXIETY DISORDER: ICD-10-CM

## 2023-08-08 DIAGNOSIS — F33.1 MODERATE EPISODE OF RECURRENT MAJOR DEPRESSIVE DISORDER (H): Primary | ICD-10-CM

## 2023-08-08 PROCEDURE — 99215 OFFICE O/P EST HI 40 MIN: CPT | Mod: 95 | Performed by: STUDENT IN AN ORGANIZED HEALTH CARE EDUCATION/TRAINING PROGRAM

## 2023-08-08 RX ORDER — DIAZEPAM 2 MG
1 TABLET ORAL DAILY
Qty: 15 TABLET | Refills: 2 | Status: SHIPPED | OUTPATIENT
Start: 2023-08-21 | End: 2023-11-15

## 2023-08-08 NOTE — PROGRESS NOTES
Virtual Visit Details    Type of service:  Video Visit     Originating Location (pt. Location): Home    Distant Location (provider location):  Off-site  Platform used for Video Visit: EZ2CAD      ADMINISTRATIVE BILLING:    Time spent interviewing patient, reviewing referral documents, obtaining and reviewing outside records, communication with other health specialists, and preparing this report on today's date  Video start: 8741  Video stop: 6987    Total time: 49 mins      [facility] PSYCHIATRIC SERVICE FOLLOW UP     Name:  Munir Storey  : 1965     Telemedicine Visit: The patient's condition can be safely assessed and treated via synchronous audio and visual telemedicine encounter.      Reason for Telemedicine Visit: COVID 19 pandemic and the social and physical recommendations by the CDC and [facility].      Consent:  The patient/guardian has verbally consented to: the potential risks and benefits of telemedicine (video visit or phone) versus in person care; bill my insurance or make self-payment for services provided; and responsibility for payment of non-covered services.     As the provider I attest to compliance with applicable laws and regulations related to telemedicine.    IDENTIFICATION     Patient is a 58 year old year old White, male  who presents for follow-up medication management with [facility].  Patient was initially referred by their PCP. Patient attended the session alone.   The [facility] psychiatry providers act as a specialty service for Primary Care Providers in the [facility] who seek to optimize medications for unstable patients.  Once medications have been optimized, [facility] providers discharge the patient back to the referring Primary Care Provider for ongoing medication management.  This type of system allows [facility] to serve a high volume of patients.      Patient care team: Patient Care Team:  Magalis Morales MD as PCP - General (Family Medicine)  Sadi Shelley MD  "as Assigned Heart and Vascular Provider  Peggy Eden NP as Assigned Neuroscience Provider  Ralph Monzon MD as MD (Psychiatry)  [facility] as Pharmacist (Pharmacist)  Emma Martinez APRN CNP as Assigned Behavioral Health Provider  Magalis Morales MD as Assigned PCP  Osbaldo Summers DO as Assigned Musculoskeletal Provider    INTERIM HISTORY   Pt was last seen in [facility] for follow-up on 22 June 23. At that time, the plan included   MEDICATIONS:   -restart ESCITALOPRAM 2.5mg daily x 7 days then increase to 5mg daily  -continue LORAZEPAM 0.5mg daily prn for panic  -continue DIAZEPAM 1mg daily     SUPPLEMENTS:  -continue EPA/OMEGA 3  -continue CURCUMIN 500-600mg twice daily for mood/anxiety/inflammation.    Interim pt communication:  none    Available records were reviewed prior to visit.    HISTORY OF PRESENT ILLNESS     Currently: Pt says \"the Lexapro is garbage. I'm done with it.\" He's had headaches and feeling \"goofy, weird.\" \"I'm not happy with where I'm at and I can't figure out why.\" He says he's 65-70% well with 100% being best. He says he's \"able to function\" and that's why he's better than 50%. He thought he was 80-90% four months ago. \"My anxiety is decent. I'm just feeling lethargic. Things are not as easy.\" He's napping quite a bit. \"I don't know if it's physical, mental. But whatever I'm doing it's not working.\"   He's spending time taking care of the yard and gardening. They just brought home 7 chickens and he's working on taking care of them.   He's able to go golfing during the day \"but then I'll feel spent and need an hour nap.\" He thinks this started with the new medicines. \"It's hard to put it all together.\" He describes a \"lack of drive or energy to tackle something at 7, 7:30.\" He's not able to keep doing things until 9pm anymore.     Suicidal ideation:  No   PHQ2 score is 2 indicating minimal depression.  GAD2 score is  2 indicating subclinical " "anxiety    Appetite: \"Decent\"    Medications: Did not tolerate escitalopram.     Medical: Pt is not sleeping well. He's not adjusting to the CPAP and has an appt with sleep med tomorrow. He perceives it was waking him up overnight. \"I was all stuffed up. It feels like I have a sinus infection.\"     SUBSTANCE USE HISTORY    Tobacco use: 2 pouches/day  Caffeine:  Yes  2 cups/day of coffee  Current alcohol:  sober  Current substance use: CBD gummies and oil  Past use alcohol/substance use: NA    MEDICATIONS                                                                                              Current medications reviewed today and are noted below.   Current psychotropic medications:   MEDICATIONS:   ESCITALOPRAM 5mg daily - not taking; D/C a few days ago  LORAZEPAM 0.5mg daily prn for panic  DIAZEPAM 1mg daily    Past psychotropic medications:  Alprazolam  Aripiprazole  Duloxetine  Escitalopram  Fluoxetine  Hydroxyzine  Lorazepam  Sertraline  Venlafaxine  Bupropion  Gabapentin     Supplements:   EPA/OMEGA 3  CURCUMIN 500-600mg twice daily for mood/anxiety/inflammation.       5/18/23 Diazepam 2mg #15  5/18/23 Lorazepam 0.5mg #7    Current Outpatient Medications   Medication Sig    amLODIPine (NORVASC) 10 MG tablet TAKE 1 TABLET (10 MG) BY MOUTH DAILY.    bisacodyl (DULCOLAX) 5 MG EC tablet Take 2 tablets at 3 pm the day before your procedure. If your procedure is before 11 am, take 2 additional tablets at 11 pm. If your procedure is after 11 am, take 2 additional tablets at 6 am. For additional instructions refer to your colonoscopy prep instructions.    diazepam (VALIUM) 2 MG tablet Take 0.5 tablets (1 mg) by mouth daily For anxiety    escitalopram (LEXAPRO) 10 MG tablet Take 1 tablet (10 mg) by mouth At Bedtime for 30 days    fluticasone (FLONASE) 50 MCG/ACT nasal spray Spray 1 spray into both nostrils daily    lisinopril-hydrochlorothiazide (ZESTORETIC) 20-12.5 MG tablet Take 1 tablet by mouth daily For " blood pressure.    LORazepam (ATIVAN) 0.5 MG tablet Take 0.5-1 tablets (0.25-0.5 mg) by mouth daily as needed for anxiety (or severe panic attacks)    metoprolol succinate ER (TOPROL XL) 25 MG 24 hr tablet TAKE 1 TABLET BY MOUTH EVERY DAY    [facility] Products (T-RELIEF CBD+13 SL) Place 600 Units under the tongue daily as needed Hemp oil sublingual    NONFORMULARY CBD gummi and oil    pediatric electrolyte (PEDIALYTE) SOLN solution Take 1.25 mLs by mouth daily = 1/4 teaspoon (Patient not taking: Reported on 6/22/2023)    polyethylene glycol (GOLYTELY) 236 g suspension The night before the exam at 6 pm drink an 8-ounce glass every 15 minutes until the jug is half empty. If you arrive before 11 AM: Drink the other half of the Golytely jug at 11 PM night before procedure. If you arrive after 11 AM: Drink the other half of the Golytely jug at 6 AM day of procedure. For additional instructions refer to your colonoscopy prep instructions.    rosuvastatin (CRESTOR) 20 MG tablet Take 1 tablet (20 mg) by mouth daily For cholesterol. (Patient not taking: Reported on 3/16/2023)     No current facility-administered medications for this visit.        VITALS   There were no vitals taken for this visit.    Pulse Readings from Last 5 Encounters:   04/07/23 75   03/14/23 85   02/22/23 88   12/16/22 66   11/28/22 59     Wt Readings from Last 5 Encounters:   04/07/23 85.7 kg (189 lb)   03/14/23 86 kg (189 lb 9.6 oz)   02/22/23 83.9 kg (185 lb)   12/16/22 79.2 kg (174 lb 9.6 oz)   11/28/22 78.5 kg (173 lb)     BP Readings from Last 5 Encounters:   04/07/23 (!) 161/104   03/14/23 (!) 146/86   02/22/23 132/82   12/16/22 134/78   11/28/22 (!) 162/84       LABS & IMAGING                                                                                                                Recent available labs reviewed today.    Recent Labs   Lab Test 03/14/23  0859 07/01/22  1117 05/29/22  0847   CR 0.73 0.72 0.76   GFRESTIMATED >90 >90 >90      Recent Labs   Lab Test 03/14/23  0859 05/03/22  2025   AST 10 13   ALT 25 25   ALKPHOS 60 50     Recent Labs   Lab Test 03/27/22  1108 02/18/21  1034 10/20/17  1124   TSH 2.83 3.34 2.61       ALLERGY & IMMUNIZATIONS       Allergies   Allergen Reactions    Sulfamethoxazole-Trimethoprim Nausea       MEDICAL & SURGICAL HISTORY    Reviewed past medical and surgical history today.   Pregnant - Yes and No: No     Past Medical History:   Diagnosis Date    Alcohol withdrawal (H) 04/28/2015    Atrial flutter (H)     Depressive disorder     Ejection fraction < 50% 04/2015    Ejection fraction 45% per echo April 2015 (per [facility] records), possible alcohol or tachycardia induced cardiomyopathy. EF normalized on follow-up echo 2016    SANDRA (generalized anxiety disorder)     H/O atrioventricular quita ablation 12/2015    Hypertension, goal below 140/90     Tobacco abuse        MENTAL STATUS EXAM:     Alertness: alert  and oriented  Appearance: adequately groomed  Behavior/Demeanor: cooperative, pleasant and calm, with good eye contact   Speech: normal and regular rate and rhythm  Language: intact and no problems  Psychomotor: normal or unremarkable  Mood: euthymic but endorses some anxiety  Affect: improving, appropriate; was congruent to mood; was congruent to content  Thought Process/Associations: unremarkable  Thought Content:  Reports none;  Denies suicidal ideation, violent ideation and delusions  Perception:  Reports none;  Denies auditory hallucinations and visual hallucinations  Insight: intact  Judgment: intact  Cognition: does  appear grossly intact; formal cognitive testing was not done  Gait and Station: [place]     RISK AND PROTECTIVE FACTORS     Static Risk Factors: sex and history of MH diagnoses and/or treatment     Dynamic Risk Factors: emotional distress, substance use, anxiety, agitation, chronic pain, mental health stigma and work related problems     Protective Factors: hope for the future, compliance with  medication, future oriented, healthy intimate relationships, engaged in EBT, access to care as needed, motivation and readiness for change, reasons for living, effective coping strategies and displaying help seeking behavior     SAFETY ASSESSMENT      Based on review of above risk and protective factors and today's exam, pt is at low elevated risk of harm to self or others. He does not meet criteria for a 72 hr hold and remains appropriate for ongoing outpatient care. The patient convincingly denies suicidality today but endorses fleeting thoughts of death without plan/intent. There was no deceit detected, and the patient presented in a manner that was believable. Local community safety resources printed and reviewed for patient to use if needed.     Recommended that patient call 911 or go to the local ED should there be a change in any of these risk factors.     DSM 5 DIAGNOSIS      Major Depressive Disorder, Single Episode, Moderate _  Panic Disorder  Illness anxiety disorder     DIFFERENTIAL DIAGNOSIS: SANDRA     Medical comorbidities impacting or contributing to clinical picture: None noted     ASSESSMENT AND PLAN       ASSESSMENT:  Munir Storey is a 57 year old White, male who presents for return visit with [facility] Service ([facility]) for medication management.   22 June 23: Pt with hx of illness anxiety disorder, SANDRA, panic, and depression returns to clinic reporting more engagement with the natural world as the weather has improved. He does feel more stable overall but continues to experience periods of negative thinking. He hasn't been taking escitalopram the past few months due to forgetting at night. He is interested in a trial of taking it in the morning for optimizing mood/anxiety and I do think this is reasonable as his affect is improving but not back to baseline. Recommended starting at subtherapeutic dose to adjust before ramping up. He agrees to this plan. Discussed upcoming  transition out of this dept; pt can likely return to PCP for ongoing refills. Will discuss next visit.   Today 8 Aug 23: Pt with hx of illness anxiety disorder, SANDRA, panic, and depression returns with reports of depressive sx including low energy, napping, low motivation, low mood. We discussed consideration of newer antidepressant like vilazodone vs trial of TCA for his depression. Pt is trepidacious about TCA trial due to hx of A Flutter. He's not sure about another antidepressant trial quite yet. We discussed giving him a little time to let escitalopram metabolize out since he feels like it affected him physically and he still feels like he's experirence D/C symptoms. Discussed briefly CAMs for depression and I would consider starting SAMe for him - 400mg daily for 4 weeks before re-evaluation. Pt will look into this. Discussed referring to long term psychiatry given his ongoing depressive sx and likely need for chronic MH support; pt agrees to referral. Will add TC referral if needed. Pt denies SI today and will transition to panel.      TREATMENT PLAN: Medication side effects and alternatives reviewed. Health promotion activities recommended and reviewed. All questions addressed. Education and counseling completed regarding risks and benefits of medications and psychotherapy options. Collaborative Care Psychiatry Service model reviewed today. Recommend therapy for additional support. Safety plan reviewed as indicated.     MEDICATIONS:   -continue LORAZEPAM 0.5mg daily prn for panic  -continue DIAZEPAM 1mg daily     SUPPLEMENTS:  -continue EPA/OMEGA 3  -continue CURCUMIN 500-600mg twice daily for mood/anxiety/inflammation    -discussed evidence for SAMe supplementation - https://pubmed.ncbi.nlm.nih.gov/64346781/  -discussed TRYPTOPHAN and 5HTP supps; limited evidence for these    LABS/RADS:   - reviewed 3/14/23 labs    PATIENT STATUS:  [facility] MD/DO/NP/PA providers offer care a specialty service for Primary Care  Providers in the [facility] that seek to optimize psychotropic medications for unstable patients.  Once medications have been optimized, our providers discharge the patient back to the referring Primary Care Provider for ongoing medication management.  This type of system allows our providers to serve a high volume of patients.   - referred to long term psychiatry 8/8/23    PSYCHOSOCIAL:   -Follow up with primary care provider as planned or for acute medical concerns.    PSYCHOEDUCATION:  Medication side effects and alternatives reviewed. Health promotion activities recommended and reviewed today. All questions addressed. Education and counseling completed regarding risks and benefits of medications and psychotherapy options.  Consent provided by patient/guardian  Call the psychiatric nurse line with medication questions or concerns at [phone].  [employer] ([employer])hart may be used to communicate with your provider, but this is not intended to be used for emergencies.  BENZODIAZEPINE:  discussion on how benzos work and the need to use them short term due to potential of anxiety getting.  This is a controlled substance with risk for abuse, need to keep in a safe keep place and cannot replace lost scripts.    Medlineplus.gov is information for patients.  It is run by the National Library of Medicine and it contains information about all disorders, diseases and all medications.      FOLLOW-UP: Follow up in 4-8 weeks with long term psychiatry and TC in the interim     Continue all other treatments (including medications) per primary care provider and/or specialists.   To schedule individual or family therapy, call [facility] at [phone].   Follow up with primary care provider as planned or for acute medical concerns.  Call the psychiatric nurse line with medication questions or concerns at [phone] or [phone].  [employer] ([employer]hart may be used to communicate with your care team, but this is not intended to be  used for emergencies.    CRISIS RESOURCES:    Present to the Emergency Department as needed or call after hours crisis line at [phone] or [phone].   [place] Crisis Text Line: Text [place] to 612698.  Suicide LifeLine Chat: suicidepreCARGOBRline.org/chat/.  National Suicide Prevention Lifeline: 362.246.7503 (TTY: 336.986.4379). Call anytime for help.  (www.suicidepreventionlifeline.org)  National [place] on Mental Illness (www.maritza.org): 828-733-0661 or 843-843-3543.  Mental Health Association (www.mentalhealth.org): 647.377.3724 or 329-014-8532.      Signed:   Jaqueline Eden DNP, PMHNP-BC  Collaborative Care Psychiatry Service ([facility])

## 2023-08-08 NOTE — PATIENT INSTRUCTIONS
Jimmy Amaral,    Treatment Plan:  -continue LORAZEPAM 0.5mg daily prn for panic  -continue DIAZEPAM 1mg daily  -consider SAIDA-e 400mg daily for depressed mood; must be in a blister pack  I am referring you to long term psychiatry. You will be called to schedule an intake within a few business days.  Follow up in 4-8 weeks or sooner if needed.  You can call [phone] to make appointments, leave a message with our nursing team, and inquire about any mental health referrals I have placed.  Please call your pharmacy to request a refill of your medicines listed above if needed between appointments.     CORAZON Leroy  Collaborative Care Psychiatry Service  [facility]   Patient Education   Collaborative Care Psychiatry Service  What to Expect  Here's what to expect from your Collaborative Care Psychiatry Service ([facility]PS).   About [facility]  CCPS means 2 people work together to help you get better. You'll meet with a behavioral health clinician and a psychiatric doctor. A behavioral health clinician helps people with mental health problems by talking with them. A psychiatric doctor helps people by giving them medicine.  How it works  At every visit, you'll see the behavioral health clinician (BHC) first. They'll talk with you about how you're doing and teach you how to feel better.   Then you'll see the psychiatric doctor. This doctor can help you deal with troubling thoughts and feelings by giving you medicine. They'll make sure you know the plan for your care.   CCPS usually takes 3 to 6 visits. If you need more visits, we may have you start seeing a different psychiatric doctor for ongoing care.  If you have any questions or concerns, we'll be glad to talk with you.  About visits  Be open  At your visits, please talk openly about your problems. It may feel hard, but it's the best way for us to help you.  Cancelling visits  If you can't come to your visit, please call us right away at [phone]. If you don't  "cancel at least 24 hours (1 full day) before your visit, that's \"late cancellation.\"  Being late to visits  Being very late is the same as not showing up. You will be a \"no show\" if:  Your appointment starts with a C, and you're more than 15 minutes late for a 30-minute (half hour) visit. This will also cancel your appointment with the psychiatric doctor.  Your appointment is with a psychiatric doctor only, and you're more than 15 minutes late for a 30-minute (half hour) visit.  Your appointment is with a psychiatric doctor only, and you're more than 30 minutes late for a 60-minute (full hour) visit.  If you cancel late or don't show up 2 times within 6 months, we may end your care.   Getting help between visits  If you need help between visits, you can call us Monday to Friday from 8 a.m. to 4:30 p.m. at [phone].  Emergency care  Call 911 or go to the nearest emergency department if your life or someone else's life is in danger.  Call 698 anytime to reach the national Suicide and Crisis hotline.  Medicine refills  To refill your medicine, call your pharmacy. You can also call [facility]'s Behavioral Access at [phone], Monday to Friday, 8 a.m. to 4:30 p.m. It can take 1 to 3 business days to get a refill.   Forms, letters, and tests  You may have papers to fill out, like FMLA, short-term disability, and workability. We can help you with these forms at your visits, but you must have an appointment. You may need more than 1 visit for this, to be in an intensive therapy program, or both.  Before we can give you medicine for ADHD, we may refer you to get tested for it or confirm it another way.  We may not be able to give you an emotional support animal letter.  We don't do mental health checks ordered by the court.   We don't do mental health testing, but we can refer you to get tested.   Thank you for choosing us for your care.  For informational purposes only. Not to replace the advice of your " health care provider. Copyright   2022 [facility]. All rights reserved. [facility] (PRN) [id number] - 12/22.

## 2023-08-08 NOTE — TELEPHONE ENCOUNTER
Reason for Call:  Appointment Request    Patient requesting this type of appt: Chronic Diease Management/Medication/Follow-Up    Requested provider: Magalis Morales    Reason patient unable to be scheduled: Not within requested timeframe    When does patient want to be seen/preferred time: 3-7 days    Comments: Pt would like call back from care team if pcp can see pt for med check bp follow up this week. No current openings. Please call pt back to discuss.    Could we send this information to you in [employer] or would you prefer to receive a phone call?:   Patient would prefer a phone call   Okay to leave a detailed message?: Yes at Cell number on file:    Telephone Information:   Mobile [phone]       Call taken on 8/8/2023 at 3:48 PM by Kaitlin Clay

## 2023-08-08 NOTE — NURSING NOTE
Is the patient currently in the state of [place]? YES    Visit mode:VIDEO    If the visit is dropped, the patient can be reconnected by: VIDEO VISIT: Send to e-mail at: [email or web address]    Will anyone else be joining the visit? NO      How would you like to obtain your AVS? MyChart    Are changes needed to the allergy or medication list? NO    Patient verified allergies and meds during e-checkin.    Reason for visit: RECHECK    Crystal WAYNE

## 2023-08-09 NOTE — TELEPHONE ENCOUNTER
Left message to contact clinic and when contacted please advise that provider is out until the end of the month into September.

## 2023-08-10 ENCOUNTER — OFFICE VISIT (OUTPATIENT)
Dept: FAMILY MEDICINE | Facility: CLINIC | Age: 58
End: 2023-08-10
Payer: COMMERCIAL

## 2023-08-10 VITALS
HEART RATE: 72 BPM | RESPIRATION RATE: 16 BRPM | OXYGEN SATURATION: 99 % | TEMPERATURE: 98.6 F | HEIGHT: 69 IN | DIASTOLIC BLOOD PRESSURE: 70 MMHG | WEIGHT: 185.2 LBS | SYSTOLIC BLOOD PRESSURE: 136 MMHG | BODY MASS INDEX: 27.43 KG/M2

## 2023-08-10 DIAGNOSIS — I10 HYPERTENSION, GOAL BELOW 140/90: Chronic | ICD-10-CM

## 2023-08-10 DIAGNOSIS — H57.9 ITCH OF EYE: Primary | ICD-10-CM

## 2023-08-10 DIAGNOSIS — H81.11 BENIGN PAROXYSMAL POSITIONAL VERTIGO OF RIGHT EAR: ICD-10-CM

## 2023-08-10 PROCEDURE — 99214 OFFICE O/P EST MOD 30 MIN: CPT | Performed by: PHYSICIAN ASSISTANT

## 2023-08-10 RX ORDER — MECLIZINE HYDROCHLORIDE 25 MG/1
12.5-25 TABLET ORAL 3 TIMES DAILY PRN
Qty: 30 TABLET | Refills: 0 | Status: SHIPPED | OUTPATIENT
Start: 2023-08-10

## 2023-08-10 RX ORDER — OLOPATADINE HYDROCHLORIDE 1 MG/ML
1 SOLUTION/ DROPS OPHTHALMIC 2 TIMES DAILY
Qty: 5 ML | Refills: 0 | Status: SHIPPED | OUTPATIENT
Start: 2023-08-10

## 2023-08-10 ASSESSMENT — PATIENT HEALTH QUESTIONNAIRE - PHQ9
SUM OF ALL RESPONSES TO PHQ QUESTIONS 1-9: 5
SUM OF ALL RESPONSES TO PHQ QUESTIONS 1-9: 5
10. IF YOU CHECKED OFF ANY PROBLEMS, HOW DIFFICULT HAVE THESE PROBLEMS MADE IT FOR YOU TO DO YOUR WORK, TAKE CARE OF THINGS AT HOME, OR GET ALONG WITH OTHER PEOPLE: NOT DIFFICULT AT ALL

## 2023-08-10 ASSESSMENT — PAIN SCALES - GENERAL: PAINLEVEL: NO PAIN (0)

## 2023-08-10 NOTE — PATIENT INSTRUCTIONS
Shirley Amaral,    Thank you for allowing [facility] to manage your care.    You most likely have benign peripheral vertigo. Try the meclizine and physical therapy for your symptoms.     If you develop worsening/changing symptoms at any time, please call 911 or go to the emergency department for evaluation as we discussed.    Your blood pressure was great today. That said, take and record your blood pressure once daily for 2 weeks. If your blood pressure is greater than or equal to 140/90mm Hg on average, please contact us. Start taking the metoprolol at night.    I sent your prescriptions to your pharmacy.    For dizziness, please use meclizine as prescribed. Do not use this medication while driving, operating machinery, with other sedating medications, or while drinking alcohol as it will make you drowsy.    I made a referral to PT and an eye provider. They will be calling in approximately 1 week to set up your appointment.  If you do not hear from them, please call the specialty number on your after visit summary.     Please allow 1-2 business days for our office to contact you in regards to your laboratory/radiological studies.  If not done so, I encourage you to login into [email or web address] ([email or web address] to review your results as well.     If you have any questions or concerns, please feel free to call us at [phone]    Sincerely,    Johnny Cordova PA-C    Did you know?      You can schedule a video visit for follow-up appointments as well as future appointments for certain conditions.  Please see the below link.     [email or web address]    If you have not already done so,  I encourage you to sign up for [email or web address] ([email or web address]).  This will allow you to review your results, securely communicate with a provider, and schedule virtual visits as well.

## 2023-08-10 NOTE — PROGRESS NOTES
"  Assessment & Plan   Problem List Items Addressed This Visit          Circulatory    Hypertension, goal below 140/90 (Chronic)     Other Visit Diagnoses       Itch of eye    -  Primary    Relevant Medications    olopatadine (PATANOL) 0.1 % ophthalmic solution    Other Relevant Orders    Adult Eye  Referral    Benign paroxysmal positional vertigo of right ear        Relevant Medications    meclizine (ANTIVERT) 25 MG tablet    Other Relevant Orders    Physical Therapy Referral           Likely BPPV. Asymptomatic until he has head movement and Marek-Hallpike positive on right. Will trial meclizine and PT. Benign neuro exam. Low suspicion for other causes. Itching eyes likely from allergic conjunctivitis. Will trial Patanol and otc antihistamines. Eye referral for routine exam placed. Waiting on Psychiatry appt. Not taking any daily anxiolytics besides benzos currently. Contracts for safety verbally.    Complete history and physical exam as below. Afebrile with normal vital signs. He will take and record his blood pressure  daily for 2 weeks and contacts us if pressure is greater than or equal to 140/90mm Hg on average.    DDx and Dx discussed with and explained to the pt to their satisfaction.  All questions were answered at this time. Pt expressed understanding of and agreement with this dx, tx, and plan. No further workup warranted and standard medication warnings given. I have given the patient a list of pertinent indications for re-evaluation. Will go to the Emergency Department if symptoms worsen or new concerning symptoms arise. Patient left in no apparent distress.     Prescription drug management  36 minutes spent by me on the date of the encounter doing chart review, history and exam, documentation and further activities per the note       BMI:   Estimated body mass index is 27.75 kg/m  as calculated from the following:    Height as of this encounter: 1.74 m (5' 8.5\").    Weight as of this encounter: " "84 kg (185 lb 3.2 oz).       See Patient Instructions    KENNETH Elder  [facility] TESSA Amaral is a 58 year old, presenting for the following health issues:  Recheck Medication        8/10/2023     9:40 AM   Additional Questions   Roomed by Afsaneh   Accompanied by n/a   Patient would like to discuss blood pressure and blood pressure medications. He would also like to discuss depression as this has been getting worse. He has been getting headaches and feeling unbalanced. He has been using an OTC vertigo medication.     Lexapro was too strong. Waiting for long term psychiatry appt. Does not     History of Present Illness       Mental Health Follow-up:  Patient presents to follow-up on Depression.Patient's depression since last visit has been:  Bad  The patient is not having other symptoms associated with depression.      Any significant life events: job concerns and financial concerns  Patient is not feeling anxious or having panic attacks.  Patient has no concerns about alcohol or drug use.    Hypertension: He presents for follow up of hypertension.  He does not check blood pressure  regularly outside of the clinic. Outside blood pressures have been over 140/90. He follows a low salt diet.     Headaches:   Since the patient's last clinic visit, headaches are: improved  The patient is getting headaches:  No migraie  He is able to do normal daily activities when he has a migraine.  The patient is taking the following rescue/relief medications:  No rescue/relief medications   Patient states \"I get no relief\" from the rescue/relief medications.   The patient is taking the following medications to prevent migraines:  No medications to prevent migraines  In the past 4 weeks, the patient has gone to an Urgent Care or Emergency Room 0 times times due to headaches.    He eats 0-1 servings of fruits and vegetables daily.He consumes 0 sweetened beverage(s) daily.He exercises with enough " "effort to increase his heart rate 9 or less minutes per day.  He exercises with enough effort to increase his heart rate 3 or less days per week.   He is taking medications regularly.     Review of Systems   Constitutional, HEENT, cardiovascular, pulmonary, gi and gu systems are negative, except as otherwise noted.      Objective    /70   Pulse 72   Temp 98.6  F (37  C) (Temporal)   Resp 16   Ht 1.74 m (5' 8.5\")   Wt 84 kg (185 lb 3.2 oz)   SpO2 99%   BMI 27.75 kg/m    Body mass index is 27.75 kg/m .  Physical Exam  Vitals and nursing note reviewed.   Constitutional:       General: He is not in acute distress.     Appearance: He is not ill-appearing or diaphoretic.   HENT:      Head: Normocephalic and atraumatic.      Right Ear: Tympanic membrane, ear canal and external ear normal.      Left Ear: Tympanic membrane, ear canal and external ear normal.      Ears:      Comments: Cerumen bilaterally without impaction. Marek Hallpike + on right, - on left.     Nose: Nose normal.      Mouth/Throat:      Mouth: Mucous membranes are moist.   Eyes:      General: No scleral icterus.        Right eye: No discharge.         Left eye: No discharge.      Extraocular Movements: Extraocular movements intact.      Conjunctiva/sclera: Conjunctivae normal.      Pupils: Pupils are equal, round, and reactive to light.   Cardiovascular:      Rate and Rhythm: Normal rate and regular rhythm.      Heart sounds: Normal heart sounds. No murmur heard.     No friction rub. No gallop.   Pulmonary:      Effort: Pulmonary effort is normal. No respiratory distress.      Breath sounds: Normal breath sounds. No stridor. No wheezing, rhonchi or rales.   Skin:     General: Skin is warm and dry.   Neurological:      General: No focal deficit present.      Mental Status: He is alert. Mental status is at baseline.      Comments: Negative finger-nose-finger, heel-to-shin and pronator drift.  Face symmetric.  Speech clear. CN 2-12 intact. Normal " strength and sensation in face and upper/lower extremities bilaterally.   Psychiatric:         Mood and Affect: Mood normal.         Behavior: Behavior normal.                   Answers submitted by the patient for this visit:  Patient Health Questionnaire (Submitted on 8/10/2023)  If you checked off any problems, how difficult have these problems made it for you to do your work, take care of things at home, or get along with other people?: Not difficult at all  PHQ9 TOTAL SCORE: 5

## 2023-08-19 DIAGNOSIS — I10 HYPERTENSION, GOAL BELOW 140/90: ICD-10-CM

## 2023-08-21 RX ORDER — LISINOPRIL AND HYDROCHLOROTHIAZIDE 12.5; 2 MG/1; MG/1
1 TABLET ORAL DAILY
Qty: 90 TABLET | Refills: 0 | Status: SHIPPED | OUTPATIENT
Start: 2023-08-21 | End: 2023-11-20

## 2023-09-15 DIAGNOSIS — F41.1 GAD (GENERALIZED ANXIETY DISORDER): ICD-10-CM

## 2023-09-15 NOTE — TELEPHONE ENCOUNTER
"Patient stated he stopped his Lexapro 3 weeks ago because he was having headaches. They have since resolved. He declines feeling depressed. He stated he is feeling more fatigued. His anxiety and panic is managed.     RN reviewed patients Psychiatry notes from Peggy Eden (who is a collaborative care psychiatrist). She referred patient to long term psychiatry and Transitional care in the mean time. RN educated on this. Patient is aware and stated he doesn't believe he needs a psychiatrist.     8/8/23 Psychiatry note plan below  \"FOLLOW-UP: Follow up in 4-8 weeks with long term psychiatry and TC in the interim      Continue all other treatments (including medications) per primary care provider and/or specialists.   To schedule individual or family therapy, call [facility] at [phone].   Follow up with primary care provider as planned or for acute medical concerns.  Call the psychiatric nurse line with medication questions or concerns at [phone] or [phone].  Linkpasst may be used to communicate with your care team, but this is not intended to be used for emergencies\"    RN educated that transitional care typically has fairly quick appointments. He verbalized understanding and took down the phone number to call if needed but he prefers to see Dr. Morales to manage his mental health medications. He stated he has been seeing Dr. Morales for some time and he knows him best. I did update patient that Dr. Morales is going on a Sabbatical from October - January. He verbalized good understanding and still requests to see Dr. Morales    RN educated on EMPATH unit for immediate concerns. RN gave phone number to follow up with Psychiatry if he prefers.   He verbalized good understanding and will wait to hear back if   1) Dr. Morales is okay managing his mental health and   2) Can get him in for an appointment to be seen prior to Dr. Morales sabbatical     Dr. Morales, please advise.     Robyn Mac RN " on 9/15/2023 at 9:47 AM

## 2023-09-21 ENCOUNTER — TELEPHONE (OUTPATIENT)
Dept: PSYCHIATRY | Facility: CLINIC | Age: 58
End: 2023-09-21

## 2023-09-21 NOTE — TELEPHONE ENCOUNTER
Reason for Call:  Other call back and prescription    Detailed comments: Patient reached out looking for a pyshciatrist to refill his medications. According to Karey's transfer plan this patient was supposed to be referred to . Patient is completely out of medication so patient is asking to speak with a care member.    -Patient was also transferred to     Phone Number Patient can be reached at: Cell number on file:    Telephone Information:   Mobile [phone]       Best Time: ASAP    Can we leave a detailed message on this number? YES    Call taken on 9/21/2023 at 3:06 PM by Julia Solorzano

## 2023-09-21 NOTE — TELEPHONE ENCOUNTER
Patient was referred by Peggy Eden to Long term psychiatry. He was never called so hasn't set up the appt yet.   He is not doing well and says he needs to be put on an antidepressant. He was on lexapro but was discontinued due to side effects. I told him that I would have A call him to set up a long term psych provider appt and also reach out to TC clinic to see if we can get him seen soon.     He doesn't have insurance until October 1st.     Miguelina Sharp RN on 9/21/2023 at 4:01 PM

## 2023-09-22 NOTE — TELEPHONE ENCOUNTER
Mental Health &Addiction (MH&A)Transition Clinic (TC):     Provides Patient Support While Waiting to Access Programmatic and Outpatient MH&A Care and Provides [facility] Crisis Assessment Services     NURSING Referral Review  _________________________________________    This RN has reviewed this Medication Management referral to the Transition Clinic and deemed the referral   [] Appropriate   [] Inappropriate   [x]Consulting x. x Tier 2. Former Jean NAVARRO patient. Patient has no insurance. Patient does not have next level of care scheduled. Patient has Long term psychiatry referral dated 8/8/23 in place by Jean NAVARRO.    Based on the following criteria:    Pt has a psychiatric provider (or pending plan) in place for future prescribing: No - [facility] calling to schedule patient with Long term psychiatry.    Timeframe until pt's scheduled psychiatry appointment is less than 6 months: No     Pt takes psychiatric medications: Yes:     Current Medications     diazepam (VALIUM) 2 MG tablet Take 0.5 tablets (1 mg) by mouth daily For anxiety  LORazepam (ATIVAN) 0.5 MG tablet Take 0.5-1 tablets (0.25-0.5 mg) by mouth daily as needed for anxiety (or severe panic attacks)  Misc Natural Products (T-RELIEF CBD+13 SL) Place 600 Units under the tongue daily as needed Hemp oil sublingual  NONFORMULARY CBD gummi and oil      Pt's goals seem to align with this temporary service: Yes: patient requesting Medication Management bridging. Patient has Long term psychiatry referral in place.     Any additional pertinent information regarding this referral: Per Jean NAVARRO careplan on 8/8/23.    FOLLOW-UP: Follow up in 4-8 weeks with long term psychiatry and TC in the interim      Continue all other treatments (including medications) per primary care provider and/or specialists.   To schedule individual or family therapy, call [facility] at [phone].   Follow up with primary care provider as planned or for  acute medical concerns.  Call the psychiatric nurse line with medication questions or concerns at [phone] or [phone].  BlackArrowhart may be used to communicate with your care team, but this is not intended to be used for emergencies.     CRISIS RESOURCES:     Present to the Emergency Department as needed or call after hours crisis line at [phone] or [phone].   [place] Crisis Text Line: Text [place] to 815366.  Suicide LifeLine Chat: suicidepreAdvitech.org/chat/.  [place] Suicide Prevention Lifeline: 685.327.7473 (TTY: 570.631.9468). Call anytime for help.  (www.suicidepreventionlifeline.org)  National [place] on Mental Illness (www.maritza.org): 997.468.5560 or 181-788-2127.  Mental Health Association (www.mentalhealth.org): 360.109.3532 or 445-494-5406.        Signed:   Jaqueline Eden DNP, [facility]  Collaborative Care Psychiatry Service (CCPS)      Initial contact w/ patient/parent: TC Coordinator to contact this patient/patients guardian to schedule a New Person Visit with TC Provider Emma Martinez CNP once patient approved by Transition Clinic on referral review by Dr. Sorto and Emma Martinez CNP.       Additional Scheduling Instructions for Transition Clinic Coordinator:   TC Coordinators:  This is a Medication Management only Referral.        Please call the patient at [phone] (home). Please schedule a NewPerson appointment with TC Provider Emma Martinez as soon as possible or as indicated by the patient once patient is approved following consultation with Dr. Sorto and Emma Martinez CNP.       TC Coordinator, please inform this (patient/ parent/guardian/facility staff member) as to the purpose and benefit of the TC.      The Transition Clinic is a Temporary Service that helps to bridge the time to your next appointment.  It is not intended to be a long-term service and you are expected to attend your scheduled appointment with your next provider.      Patient/Parent/ Facility Staff  Member verbalized understanding     If you need support between appointments, please call [phone] and let them know you're seen by Transition Clinic Psychiatry.  You may also send a [facility] message to reach us.          RN Signature Moncho Lyons RN on 9/22/2023 at 9:32 AM

## 2023-09-25 RX ORDER — LORAZEPAM 0.5 MG/1
.25-.5 TABLET ORAL DAILY PRN
Qty: 7 TABLET | Refills: 1 | Status: SHIPPED | OUTPATIENT
Start: 2023-09-25 | End: 2023-10-25

## 2023-09-25 NOTE — CONFIDENTIAL NOTE
I don't have any available appointment before my sabbatical. Please update the patient and see if needs any medication refills. Thank you.

## 2023-09-25 NOTE — TELEPHONE ENCOUNTER
Transition Clinic RN following up on Transition Clinic referral.    RN called patient Munir Storey at [phone] (home)  and explained Transition Clinic bridging service and need to have Long term psychiatry. Patient stated that he is interested in medication management and would like assistance scheduling Long term psychiatry.    RN noted max attempts made by [facility] to reach patient per chart. RN called [facility] with patient on the phone to get him scheduled with Long term psychiatry. RN reached staff Moises at [facility] who stated he would call patient to schedule patient with Long term psychiatry.

## 2023-09-25 NOTE — TELEPHONE ENCOUNTER
Magalis Morales MD     LW    9/25/23  5:13 PM  Unsigned Note        I don't have any available appointment before my sabbatical. Please update the patient and see if needs any medication refills. Thank you.         Called pt with pcp response above. Pt stated that he needs a refill of medication below for SANDRA (generalized anxiety disorder).       Disp Refills Start End COURTNEY    LORazepam (ATIVAN) 0.5 MG tablet 7 tablet 0 5/18/2023  No   Sig - Route: Take 0.5-1 tablets (0.25-0.5 mg) by mouth daily as needed for anxiety (or severe panic attacks) - Oral   Sent to pharmacy as: LORazepam 0.5 MG Oral Tablet (ATIVAN)   Class: E-Prescribe   Order: [id number]   E-Prescribing Status: Receipt confirmed by pharmacy (5/18/2023  4:03 PM CDT)     Gabby Jackson RN on 9/25/2023 at 5:38 PM

## 2023-09-26 NOTE — TELEPHONE ENCOUNTER
Transition Clinic RN following up on whether [facility] staff scheduled patient with Long term psychiatry.     RN notes no future appointment scheduled with psychiatry.     RN routed encounter as high priority to [facility] for scheduling.

## 2023-10-05 RX ORDER — PROCHLORPERAZINE MALEATE 10 MG
10 TABLET ORAL EVERY 6 HOURS PRN
Status: CANCELLED | OUTPATIENT
Start: 2023-10-05

## 2023-10-05 RX ORDER — NALOXONE HYDROCHLORIDE 0.4 MG/ML
0.4 INJECTION, SOLUTION INTRAMUSCULAR; INTRAVENOUS; SUBCUTANEOUS
Status: CANCELLED | OUTPATIENT
Start: 2023-10-05

## 2023-10-05 RX ORDER — ONDANSETRON 4 MG/1
4 TABLET, ORALLY DISINTEGRATING ORAL EVERY 6 HOURS PRN
Status: CANCELLED | OUTPATIENT
Start: 2023-10-05

## 2023-10-05 RX ORDER — NALOXONE HYDROCHLORIDE 0.4 MG/ML
0.2 INJECTION, SOLUTION INTRAMUSCULAR; INTRAVENOUS; SUBCUTANEOUS
Status: CANCELLED | OUTPATIENT
Start: 2023-10-05

## 2023-10-05 RX ORDER — SODIUM CHLORIDE, SODIUM LACTATE, POTASSIUM CHLORIDE, CALCIUM CHLORIDE 600; 310; 30; 20 MG/100ML; MG/100ML; MG/100ML; MG/100ML
INJECTION, SOLUTION INTRAVENOUS CONTINUOUS
Status: CANCELLED | OUTPATIENT
Start: 2023-10-05

## 2023-10-05 RX ORDER — LIDOCAINE 40 MG/G
CREAM TOPICAL
Status: CANCELLED | OUTPATIENT
Start: 2023-10-05

## 2023-10-05 RX ORDER — FLUMAZENIL 0.1 MG/ML
0.2 INJECTION, SOLUTION INTRAVENOUS
Status: CANCELLED | OUTPATIENT
Start: 2023-10-05 | End: 2023-10-06

## 2023-10-05 RX ORDER — ONDANSETRON 2 MG/ML
4 INJECTION INTRAMUSCULAR; INTRAVENOUS EVERY 6 HOURS PRN
Status: CANCELLED | OUTPATIENT
Start: 2023-10-05

## 2023-10-09 ENCOUNTER — ANESTHESIA EVENT (OUTPATIENT)
Dept: SURGERY | Facility: CLINIC | Age: 58
End: 2023-10-09
Payer: COMMERCIAL

## 2023-10-09 ASSESSMENT — LIFESTYLE VARIABLES: TOBACCO_USE: 1

## 2023-10-09 ASSESSMENT — ENCOUNTER SYMPTOMS: DYSRHYTHMIAS: 1

## 2023-10-09 NOTE — ANESTHESIA PREPROCEDURE EVALUATION
Anesthesia Pre-Procedure Evaluation    Patient: Munir Storey   MRN: [id number] : 1965        Procedure : Procedure(s):  Colonoscopy          Past Medical History:   Diagnosis Date    Alcohol withdrawal (H) 2015    Atrial flutter (H)     Depressive disorder     Ejection fraction < 50% 2015    Ejection fraction 45% per echo 2015 (per [facility] records), possible alcohol or tachycardia induced cardiomyopathy. EF normalized on follow-up echo     SANDRA (generalized anxiety disorder)     H/O atrioventricular quita ablation 2015    Hypertension, goal below 140/90     Tobacco abuse       Past Surgical History:   Procedure Laterality Date    CARDIAC SURGERY  2015    EP cardioversion    COLONOSCOPY  2013    COLONOSCOPY  2019    normal colonoscopy - repeat 10 years    HERNIA REPAIR      LAPAROSCOPIC RESECTION SMALL BOWEL  2013    SPINE SURGERY      cervical fusion     UPPER GI ENDOSCOPY  2013      Allergies   Allergen Reactions    Sulfamethoxazole-Trimethoprim Nausea      Social History     Tobacco Use    Smoking status: Former     Packs/day: 0.50     Years: 20.00     Pack years: 10.00     Types: Cigarettes, Dip, chew, snus or snuff     Quit date: 2019     Years since quittin.1    Smokeless tobacco: Current     Types: Chew   Substance Use Topics    Alcohol use: No     Comment: hx alcohol abuse, sober since       Wt Readings from Last 1 Encounters:   08/10/23 84 kg (185 lb 3.2 oz)        Anesthesia Evaluation   Pt has had prior anesthetic. Type: General and MAC.        ROS/MED HX  ENT/Pulmonary:     (+)                tobacco use, Past use,                      Neurologic:       Cardiovascular:     (+)  hypertension- -   -  - -                        dysrhythmias, a-flutter,        Previous cardiac testing   Echo: Date: 3/22 Results:  Interpretation Summary  1. Above average exercise capacity, 91% max HR achieved.  2. The patient exhibited no chest pain during  exercise.  3. 1mm diffuse upsloping ST segments with exercise.  4. Rest echo: Normal left ventricular function and wall motion at rest. The  visual ejection fraction is estimated at 55-60%.  5. Stress echo: This was a normal stress echocardiogram with no evidence of  stress-induced ischemia. The visual ejection fraction is 65-70%.     No previous stress for comparison.  ______________________________________________________________________________  Stress  The patient exercised 10:00.  The patient did not exhibit any symptoms during exercise.  Exercise was stopped due to fatigue.  There was a borderline hypertensive BP response to exercise.  The patient exhibited no chest pain during exercise.  A treadmill exercise test according to the Derek protocol was performed.  Target Heart Rate was achieved.  1mm diffuse upsloping ST segments with exercise.  This was a normal stress echocardiogram with no evidence of stress-induced  ischemia.  The visual ejection fraction is 65-70%.  Normal resting wall motion and no stress-induced wall motion abnormality.     Baseline  Resting ECG is normal.  The patient is in normal sinus rhythm.  Occasional premature atrial contractions.  Normal left ventricular function and wall motion at rest.  The visual ejection fraction is estimated at 55-60%.       Stress Test:  Date: Results:    ECG Reviewed:  Date: 5/22 Results:  Sinus  Rhythm   WITHIN NORMAL LIMITS  Cath:  Date: Results:      METS/Exercise Tolerance:     Hematologic:       Musculoskeletal:       GI/Hepatic:       Renal/Genitourinary:       Endo:       Psychiatric/Substance Use:     (+) psychiatric history depression alcohol abuse      Infectious Disease:       Malignancy:       Other:               OUTSIDE LABS:  CBC:   Lab Results   Component Value Date    WBC 6.0 03/14/2023    WBC 5.3 07/01/2022    HGB 14.7 03/14/2023    HGB 12.7 (L) 07/01/2022    HCT 44.0 03/14/2023    HCT 37.9 (L) 07/01/2022     03/14/2023      07/01/2022     BMP:   Lab Results   Component Value Date     03/14/2023     07/01/2022    POTASSIUM 4.3 03/14/2023    POTASSIUM 4.0 07/01/2022    CHLORIDE 104 03/14/2023    CHLORIDE 102 07/01/2022    CO2 26 03/14/2023    CO2 28 07/01/2022    BUN 22 03/14/2023    BUN 18 07/01/2022    CR 0.73 03/14/2023    CR 0.72 07/01/2022     (H) 03/14/2023     (H) 07/01/2022     COAGS:   Lab Results   Component Value Date    PTT 26 05/29/2022    INR 0.97 05/29/2022     POC: No results found for: BGM, HCG, HCGS  HEPATIC:   Lab Results   Component Value Date    ALBUMIN 4.1 03/14/2023    PROTTOTAL 7.8 03/14/2023    ALT 25 03/14/2023    AST 10 03/14/2023    ALKPHOS 60 03/14/2023    BILITOTAL 0.5 03/14/2023     OTHER:   Lab Results   Component Value Date    LACT 1.4 04/02/2020    A1C 5.5 03/14/2023    BERNARDA 9.3 03/14/2023    LIPASE 70 (L) 11/08/2021    TSH 2.83 03/27/2022               Jeromy Montgomery, APRN CRNA

## 2023-10-10 ENCOUNTER — ANESTHESIA (OUTPATIENT)
Dept: SURGERY | Facility: CLINIC | Age: 58
End: 2023-10-10
Payer: COMMERCIAL

## 2023-10-23 ENCOUNTER — HOSPITAL ENCOUNTER (EMERGENCY)
Facility: CLINIC | Age: 58
Discharge: HOME | End: 2023-10-23
Attending: EMERGENCY MEDICINE | Admitting: EMERGENCY MEDICINE
Payer: COMMERCIAL

## 2023-10-23 ENCOUNTER — NURSE TRIAGE (OUTPATIENT)
Dept: FAMILY MEDICINE | Facility: CLINIC | Age: 58
End: 2023-10-23

## 2023-10-23 VITALS
WEIGHT: 195 LBS | OXYGEN SATURATION: 99 % | HEIGHT: 68 IN | DIASTOLIC BLOOD PRESSURE: 92 MMHG | SYSTOLIC BLOOD PRESSURE: 120 MMHG | BODY MASS INDEX: 29.55 KG/M2 | HEART RATE: 64 BPM | TEMPERATURE: 98 F | RESPIRATION RATE: 18 BRPM

## 2023-10-23 DIAGNOSIS — I10 ASYMPTOMATIC HYPERTENSION: ICD-10-CM

## 2023-10-23 LAB
ANION GAP SERPL CALCULATED.3IONS-SCNC: 15 MMOL/L (ref 7–15)
BUN SERPL-MCNC: 12.4 MG/DL (ref 8–23)
CALCIUM SERPL-MCNC: 9.4 MG/DL (ref 8.8–10.2)
CHLORIDE SERPL-SCNC: 97 MMOL/L (ref 98–107)
CREAT SERPL-MCNC: 0.8 MG/DL (ref 0.67–1.17)
DEPRECATED HCO3 PLAS-SCNC: 23 MMOL/L (ref 22–29)
EGFRCR SERPLBLD CKD-EPI 2021: >90 ML/MIN/1.73M2
ERYTHROCYTE [DISTWIDTH] IN BLOOD BY AUTOMATED COUNT: 12.5 % (ref 10–15)
GLUCOSE SERPL-MCNC: 89 MG/DL (ref 70–99)
HCT VFR BLD AUTO: 41.5 % (ref 40–53)
HGB BLD-MCNC: 14 G/DL (ref 13.3–17.7)
MCH RBC QN AUTO: 31 PG (ref 26.5–33)
MCHC RBC AUTO-ENTMCNC: 33.7 G/DL (ref 31.5–36.5)
MCV RBC AUTO: 92 FL (ref 78–100)
PLATELET # BLD AUTO: 195 10E3/UL (ref 150–450)
POTASSIUM SERPL-SCNC: 3.9 MMOL/L (ref 3.4–5.3)
RBC # BLD AUTO: 4.52 10E6/UL (ref 4.4–5.9)
SODIUM SERPL-SCNC: 135 MMOL/L (ref 135–145)
WBC # BLD AUTO: 6.1 10E3/UL (ref 4–11)

## 2023-10-23 PROCEDURE — 82310 ASSAY OF CALCIUM: CPT | Performed by: EMERGENCY MEDICINE

## 2023-10-23 PROCEDURE — 36415 COLL VENOUS BLD VENIPUNCTURE: CPT | Performed by: EMERGENCY MEDICINE

## 2023-10-23 PROCEDURE — 99283 EMERGENCY DEPT VISIT LOW MDM: CPT | Performed by: EMERGENCY MEDICINE

## 2023-10-23 PROCEDURE — 85027 COMPLETE CBC AUTOMATED: CPT | Performed by: EMERGENCY MEDICINE

## 2023-10-23 ASSESSMENT — ACTIVITIES OF DAILY LIVING (ADL)
ADLS_ACUITY_SCORE: 35
ADLS_ACUITY_SCORE: 33

## 2023-10-23 NOTE — DISCHARGE INSTRUCTIONS
Your blood pressure is elevated today, but not to the point that we want to start any new medicines to bring it down further.  Stop taking the beet supplement to see if this has an effect on your blood pressure.  Continue taking your new antidepressant and talk to your psychiatrist to determine if any changes need to be made.  Follow-up with your regular doctor to have your blood pressure rechecked and to see if any medication images may be needed at that time.  Come back to the ER if you have any of the symptoms we talked about including chest pain, shortness of breath, weakness in one part of the body more than another, confusion, difficulty speaking, or belly/back pain.

## 2023-10-23 NOTE — ED TRIAGE NOTES
Pt concerned of blood pressure readings at home 200/100's.  Pt denies blurred vision, headache, numbness or pain.      Triage Assessment (Adult)       Row Name 10/23/23 1454          Triage Assessment    Airway WDL WDL        Respiratory WDL    Respiratory WDL WDL        Cardiac WDL    Cardiac WDL X

## 2023-10-23 NOTE — ED PROVIDER NOTES
[facility] EMERGENCY DEPT  PHYSICIAN NOTE    MRN: [id number]    FINAL IMPRESSION     1. Asymptomatic hypertension          ED COURSE & MDM     Patient presented for hypertension. Initial vital signs notable for hypertension.  He has no signs or symptoms concerning for hypertensive emergency such as CVA, hypertensive encephalopathy, acute CHF, ACS, or aortic dissection, and labs show no sign of TYRONE or MAHA.  I suspect hypertension is due to either changes in his antidepressant regimen, the addition of a beet supplement, or combination of the two due to the timing of these medication changes and symptom onset.  Patient advised to discontinue the beet supplement for now and to follow-up with his PCP and psychiatrist for recommendations regarding the antidepressant.  His blood pressure is not elevated to the point that I believe it is wise to increase his antihypertensive regimen at this time since the inciting factor may resolve and leave him hypotensive in that scenario.  Patient discharged in stable condition. Return precautions provided. All questions answered.      ===================================================================    HPI     Munir Storey is a 58 year old male hypertension, atrial flutter, and anxiety presenting with hypertension. Patient states he has been having pain in his eyes for the past week, so he started taking his blood pressure and found it to be elevated in the 160s to 180s systolic.  Today the blood pressure was 200/100 and he was more concerned.  He has been compliant with his antihypertensive medications and has not had any dose changes in at least several months.  He does note that a week ago he started a new antidepressant after a month of washout, as well as starting a beet supplement at the same time.  He denies chest pain, dyspnea, fever, URI symptoms, nausea, vomiting, abdominal pain, urinary symptoms, constipation, diarrhea.      ROS  All other ROS  "negative.    Problem list, medications, allergies, PMH, PSH, family history, and social history reviewed and updated as able in Epic.      PHYSICAL EXAM     Vitals:    10/23/23 1454 10/23/23 1601   BP: (!) 178/81 (!) 120/92   Pulse: 71 64   Resp: 18    Temp: 98  F (36.7  C)    TempSrc: Tympanic    SpO2: 99% 99%   Weight: 88.5 kg (195 lb)    Height: 1.727 m (5' 8\")         Constitutional: Alert, no acute distress.  HENT: Normocephalic, atraumatic.  Eyes: Sclera anicteric, EOMI.  CV: Normal rate, regular rhythm. Peripheral pulses intact.  Pulm: Non-labored respirations, CTAB.  Abdomen: Soft, non-tender.  MSK: No major deformities. Neck supple.  Neuro: Oriented to person, place, and time. Normal speech. No focal deficits.  Skin: Warm and dry, no rash.  Psych: Cooperative, able to follow commands. Intact attention.      TESTING   All testing reviewed and independently interpreted.    EKG  None    LABS  Labs Ordered and Resulted from Time of ED Arrival to Time of ED Departure   BASIC METABOLIC PANEL - Abnormal       Result Value    Sodium 135      Potassium 3.9      Chloride 97 (*)     Carbon Dioxide (CO2) 23      Anion Gap 15      Urea Nitrogen 12.4      Creatinine 0.80      GFR Estimate >90      Calcium 9.4      Glucose 89     CBC WITH PLATELETS - Normal    WBC Count 6.1      RBC Count 4.52      Hemoglobin 14.0      Hematocrit 41.5      MCV 92      MCH 31.0      MCHC 33.7      RDW 12.5      Platelet Count 195         IMAGING  No orders to display              Jamal Lizarraga MD  10/23/23 1722    "

## 2023-10-23 NOTE — TELEPHONE ENCOUNTER
"Nurse Triage SBAR    Is this a 2nd Level Triage? NO    Situation: Patient reports high BP for the last week    Background: He has hypertension 176/95 and uses lisinopril-hydrochlorothiazide (ZESTORETIC) 20-12.5 MG tablet , amLODIPine (NORVASC) 10 MG tablet , metoprolol succinate ER (TOPROL XL) 25 MG 24 hr tablet     Assessment: He stated his BP has been elevated for the last week. He reports mild headache 1/10 and the corners of his eyes are sore. He declines visual changes, chest pain, SOB, feelings of \"whooshing\" in chest area. Declines missing any medication doses. He stated he can take a lorazepam and feels better but he does not want to do this. His BP was 176/95 prior to call to this RN. As RN assessed patient, I asked him to recheck BP with feet flat on the floor and to remain quiet. During this time he stated his BP was 201/101. He then became very anxious and stated he will go to the ER. RN educated not to drive to the ER. He stated his daughter would take him.      Protocol Recommended Disposition:   Go To Office Now, See in Office Today    Recommendation:   RN educated on when to dial 911. He verbalized understanding.     He will present to the ER for hypertension.      Encouraged him to call back to follow up with PCP after ER visit.     Does the patient meet one of the following criteria for ADS visit consideration? No      Reason for Disposition   Patient wants to be seen   Systolic BP >= 200 OR Diastolic >= 120 and having NO cardiac or neurologic symptoms    Additional Information   Negative: Sounds like a life-threatening emergency to the triager   Negative: Symptom is main concern (e.g., headache, chest pain)   Negative: Low blood pressure is main concern   Negative: Systolic BP >= 160 OR Diastolic >= 100, and any cardiac (e.g., breathing difficulty, chest pain) or neurologic symptoms (e.g., new-onset blurred or double vision)   Negative: Pregnant 20 or more weeks (or postpartum < 6 weeks) with new " "hand or face swelling   Negative: Pregnant 20 or more weeks (or postpartum < 6 weeks) and Systolic BP >= 160 OR Diastolic >= 110   Negative: Patient sounds very sick or weak to the triager   Negative: Systolic BP >= 180 OR Diastolic >= 110    Answer Assessment - Initial Assessment Questions  1. BLOOD PRESSURE: \"What is the blood pressure?\" \"Did you take at least two measurements 5 minutes apart?\"      176/95   Has not taken a 2nd one.   2. ONSET: \"When did you take your blood pressure?\"      Took a few minutes prior to call     3. HOW: \"How did you take your blood pressure?\" (e.g., automatic home BP monitor, visiting nurse)      Automatic BP cuff     4. HISTORY: \"Do you have a history of high blood pressure?\"      Yes   5. MEDICINES: \"Are you taking any medicines for blood pressure?\" \"Have you missed any doses recently?\"      He is on hypertension medication, metoprolol , amlodipine, lisinopril HZTZ   6. OTHER SYMPTOMS: \"Do you have any symptoms?\" (e.g., blurred vision, chest pain, difficulty breathing, headache, weakness)      Headache 1/10, corner of eyes are sore. No other symptoms   7. PREGNANCY: \"Is there any chance you are pregnant?\" \"When was your last menstrual period?\"      NO    Protocols used: Blood Pressure - High-A-OH    "

## 2023-10-25 ENCOUNTER — OFFICE VISIT (OUTPATIENT)
Dept: FAMILY MEDICINE | Facility: CLINIC | Age: 58
End: 2023-10-25
Payer: COMMERCIAL

## 2023-10-25 VITALS
SYSTOLIC BLOOD PRESSURE: 144 MMHG | DIASTOLIC BLOOD PRESSURE: 80 MMHG | OXYGEN SATURATION: 100 % | BODY MASS INDEX: 28.37 KG/M2 | RESPIRATION RATE: 18 BRPM | TEMPERATURE: 98.7 F | HEART RATE: 98 BPM | WEIGHT: 187.2 LBS | HEIGHT: 68 IN

## 2023-10-25 DIAGNOSIS — F41.1 GAD (GENERALIZED ANXIETY DISORDER): Primary | ICD-10-CM

## 2023-10-25 PROCEDURE — 99213 OFFICE O/P EST LOW 20 MIN: CPT | Performed by: PHYSICIAN ASSISTANT

## 2023-10-25 RX ORDER — ESCITALOPRAM OXALATE 5 MG/1
5 TABLET ORAL
COMMUNITY
Start: 2023-10-19 | End: 2024-01-18

## 2023-10-25 RX ORDER — LISINOPRIL 20 MG/1
10 TABLET ORAL DAILY
COMMUNITY
Start: 2023-10-25 | End: 2023-11-20

## 2023-10-25 RX ORDER — HYDROXYZINE PAMOATE 25 MG/1
25-50 CAPSULE ORAL 4 TIMES DAILY PRN
Qty: 90 CAPSULE | Refills: 0 | Status: SHIPPED | OUTPATIENT
Start: 2023-10-25

## 2023-10-25 RX ORDER — LORAZEPAM 0.5 MG/1
.25-.5 TABLET ORAL DAILY PRN
Qty: 15 TABLET | Refills: 1 | Status: SHIPPED | OUTPATIENT
Start: 2023-10-25 | End: 2024-01-18

## 2023-10-25 ASSESSMENT — PAIN SCALES - GENERAL: PAINLEVEL: NO PAIN (0)

## 2023-10-25 NOTE — PROGRESS NOTES
"  Assessment & Plan   Problem List Items Addressed This Visit          Behavioral    SANDRA (generalized anxiety disorder) - Primary (Chronic)    Relevant Medications    escitalopram (LEXAPRO) 5 MG tablet    LORazepam (ATIVAN) 0.5 MG tablet    hydrOXYzine (VISTARIL) 25 MG capsule      Patient is here for ED follow-up.  Had a visit 2 days ago for hypertensive readings at home.  He is mildly hypertensive today in the 140s over 80s.  We will refill his lorazepam, which she has been using sparingly for breakthrough symptoms as well as giving him hydroxyzine as a trial.  He will also add 10 mg of lisinopril to his Zestoretic to see if his blood pressure trends into the normal range.  Although anxiety likely plays a role in his high blood pressure readings, I encouraged him to take daily blood pressure readings and follow-up with me in 2 weeks.  He declined tobacco cessation at this point.  Reviewed the DASH diet.  Low suspicion for endorgan damage.  Follow-up as needed in the meantime.    Complete history and physical exam as below. Afebrile with normal vital signs except for elevated bp, which they will monitor at home and contact us if >140/90mmHg on average.    DDx and Dx discussed with and explained to the pt to their satisfaction.  All questions were answered at this time. Pt expressed understanding of and agreement with this dx, tx, and plan. No further workup warranted and standard medication warnings given. I have given the patient a list of pertinent indications for re-evaluation. Will go to the Emergency Department if symptoms worsen or new concerning symptoms arise. Patient left in no apparent distress.     Prescription drug management     MED REC REQUIRED  Post Medication Reconciliation Status: discharge medications reconciled, continue medications without change  BMI:   Estimated body mass index is 28.87 kg/m  as calculated from the following:    Height as of this encounter: 1.715 m (5' 7.52\").    Weight as of " this encounter: 84.9 kg (187 lb 3.2 oz).     See Patient Instructions    KENNETH Elder  [facility] TESSA Amaral is a 58 year old, presenting for the following health issues:  Hospital F/U (Hypertension/)      10/25/2023     8:05 AM   Additional Questions   Roomed by Isa   Accompanied by Self       HPI   Hospital Follow-up Visit:    Hospital/Nursing Home/IP Rehab Facility: [facility]  Date of Admission: 10/23/23  Date of Discharge: 10/23/23  Reason(s) for Admission: Asymptomatic hypertention    Was your hospitalization related to COVID-19? No   Problems taking medications regularly:  None  Medication changes since discharge: None  Problems adhering to non-medication therapy:  None    Summary of hospitalization:  [facility] discharge summary reviewed .  [facility] pressure.  Work-up was reassuring and it was felt that his elevated blood pressure could be due to a recent switch in antidepressant to escitalopram and/or a beet supplement.  Diagnostic Tests/Treatments reviewed.  Follow up needed: bp mgmt and psych follow up  Other Healthcare Providers Involved in Patient s Care:         Specialist appointment - as above  Update since discharge: improved. Started Lexapro about a week ago. Went to ER when BP was 200/100. Feels hypersensitive skin around his eyes bilaterally when his bp is elevated. Has been taking medications as prescribed aside from his metoprolol which he has only been taking half of as this leaves him feeling fatigued with poor exercise tolerance.    Plan of care communicated with patient     Review of Systems   Constitutional, HEENT, cardiovascular, pulmonary, gi and gu systems are negative, except as otherwise noted.      Objective    BP (!) 144/80   Pulse 98   Temp 98.7  F (37.1  C) (Temporal)   Resp 18   Ht 1.715 m (5'  "7.52\")   Wt 84.9 kg (187 lb 3.2 oz)   SpO2 100%   BMI 28.87 kg/m    Body mass index is 28.87 kg/m .  Physical Exam  Vitals and nursing note reviewed.   Constitutional:       General: He is not in acute distress.     Appearance: He is not ill-appearing or diaphoretic.   HENT:      Head: Normocephalic and atraumatic.      Mouth/Throat:      Mouth: Mucous membranes are moist.   Eyes:      Conjunctiva/sclera: Conjunctivae normal.   Cardiovascular:      Rate and Rhythm: Normal rate and regular rhythm.      Heart sounds: Normal heart sounds. No murmur heard.     No friction rub. No gallop.   Pulmonary:      Effort: Pulmonary effort is normal. No respiratory distress.      Breath sounds: Normal breath sounds. No stridor. No wheezing, rhonchi or rales.   Skin:     General: Skin is warm and dry.   Neurological:      General: No focal deficit present.      Mental Status: He is alert. Mental status is at baseline.   Psychiatric:         Behavior: Behavior normal.      Comments: anxious                      "

## 2023-10-25 NOTE — PATIENT INSTRUCTIONS
Shirley Amaral,    Thank you for allowing [facility] to manage your care.    Your blood pressure was high today. Get a blood pressure cuff for use at home. Take and record your blood pressure twice daily for 2 weeks. If your blood pressure is greater than or equal to 140/90mm Hg on average, please contact us.    If you develop worsening/changing symptoms at any time, please call 911 or go to the emergency department for evaluation as we discussed.    I sent your prescriptions to your pharmacy.    For anxiety not controlled by escitalopram, please use lorazepam and/or hydroxyzine as prescribed. Do not use these medications while driving, operating machinery, with other sedating medications, or while drinking alcohol as it will make you drowsy.    Drink 8-10 glasses of fluid daily to stay well-hydrated.    If you have any questions or concerns, please feel free to call us at [phone]    Sincerely,    Johnny Cordova PA-C    Did you know?      You can schedule a video visit for follow-up appointments as well as future appointments for certain conditions.  Please see the below link.     [email or web address]    If you have not already done so,  I encourage you to sign up for [facility] [email or web address]  This will allow you to review your results, securely communicate with a provider, and schedule virtual visits as well.

## 2023-11-04 DIAGNOSIS — I10 HYPERTENSION, GOAL BELOW 140/90: ICD-10-CM

## 2023-11-08 RX ORDER — AMLODIPINE BESYLATE 10 MG/1
10 TABLET ORAL DAILY
Qty: 90 TABLET | Refills: 0 | Status: SHIPPED | OUTPATIENT
Start: 2023-11-08 | End: 2023-12-18

## 2023-11-15 ENCOUNTER — VIRTUAL VISIT (OUTPATIENT)
Dept: PSYCHIATRY | Facility: CLINIC | Age: 58
End: 2023-11-15
Payer: COMMERCIAL

## 2023-11-15 DIAGNOSIS — F41.1 GAD (GENERALIZED ANXIETY DISORDER): Chronic | ICD-10-CM

## 2023-11-15 DIAGNOSIS — F33.1 MODERATE EPISODE OF RECURRENT MAJOR DEPRESSIVE DISORDER (H): Primary | Chronic | ICD-10-CM

## 2023-11-15 DIAGNOSIS — F41.0 PANIC ATTACK: ICD-10-CM

## 2023-11-15 DIAGNOSIS — F10.11 ALCOHOL ABUSE, IN REMISSION: ICD-10-CM

## 2023-11-15 PROCEDURE — 99215 OFFICE O/P EST HI 40 MIN: CPT | Mod: VID | Performed by: NURSE PRACTITIONER

## 2023-11-15 RX ORDER — DIAZEPAM 2 MG
1 TABLET ORAL DAILY
Qty: 15 TABLET | Refills: 0 | Status: SHIPPED | OUTPATIENT
Start: 2023-11-17 | End: 2023-12-20

## 2023-11-15 ASSESSMENT — ANXIETY QUESTIONNAIRES
4. TROUBLE RELAXING: SEVERAL DAYS
6. BECOMING EASILY ANNOYED OR IRRITABLE: SEVERAL DAYS
2. NOT BEING ABLE TO STOP OR CONTROL WORRYING: MORE THAN HALF THE DAYS
IF YOU CHECKED OFF ANY PROBLEMS ON THIS QUESTIONNAIRE, HOW DIFFICULT HAVE THESE PROBLEMS MADE IT FOR YOU TO DO YOUR WORK, TAKE CARE OF THINGS AT HOME, OR GET ALONG WITH OTHER PEOPLE: SOMEWHAT DIFFICULT
5. BEING SO RESTLESS THAT IT IS HARD TO SIT STILL: SEVERAL DAYS
GAD7 TOTAL SCORE: 12
7. FEELING AFRAID AS IF SOMETHING AWFUL MIGHT HAPPEN: MORE THAN HALF THE DAYS
3. WORRYING TOO MUCH ABOUT DIFFERENT THINGS: NEARLY EVERY DAY
GAD7 TOTAL SCORE: 12
1. FEELING NERVOUS, ANXIOUS, OR ON EDGE: MORE THAN HALF THE DAYS

## 2023-11-15 ASSESSMENT — PAIN SCALES - GENERAL: PAINLEVEL: NO PAIN (0)
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
## 2023-11-15 NOTE — PROGRESS NOTES
Virtual Visit Details    Type of service:  Video Visit     Originating Location (pt. Location): Home    Distant Location (provider location):  Off-site  Platform used for Video Visit: TalkSession  Answers submitted by the patient for this visit:  Patient Health Questionnaire (Submitted on 11/15/2023)  If you checked off any problems, how difficult have these problems made it for you to do your work, take care of things at home, or get along with other people?: Somewhat difficult  PHQ9 TOTAL SCORE: 5  SANDRA-7 (Submitted on 11/15/2023)  SANDRA 7 TOTAL SCORE: 12

## 2023-11-15 NOTE — NURSING NOTE
Is the patient currently in the state of [place]? YES    Visit mode:VIDEO    If the visit is dropped, the patient can be reconnected by: VIDEO VISIT: Text to cell phone:   Telephone Information:   Mobile [phone]    and VIDEO VISIT: Send to e-mail at: [email or web address]    Will anyone else be joining the visit? NO  (If patient encounters technical issues they should call [phone]    How would you like to obtain your AVS? MyChart    Are changes needed to the allergy or medication list? No    Reason for visit: Consult    Jeff CAMPOVERDE

## 2023-11-15 NOTE — PATIENT INSTRUCTIONS
"Patient Education   The [facility]  What to Expect  Here's what to expect in the [facility] Psych[facility] Program.   About the program  You'll be meeting with a psychiatric doctor to check your mental health. A psychiatric doctor helps you deal with troubling thoughts and feelings by giving you medicine. They'll make sure you know the plan for your care. You may see them for a long time. When you're feeling better, they may refer you back to seeing your family doctor.   If you have any questions, we'll be glad to talk to you.  About visits  Be open  At your visits, please talk openly about your problems. It may feel hard, but it's the best way for us to help you.  Cancelling visits  If you can't come to your visit, please call us right away at [phone]. If you don't cancel at least 24 hours (1 full day) before your visit, that's \"late cancellation.\"  Not showing up for your visits  Being very late is the same as not showing up. You'll be a \"no show\" if:  You're more than 15 minutes late for a 30-minute (half hour) visit.  You're more than 30 minutes late for a 60-minute (full hour) visit.  If you cancel late or don't show up 2 times within 6 months, we may end your care.  Getting help between visits  If you need help between visits, you can call us Monday to Friday from 8 a.m. to 4:30 p.m. at [phone].  Emergency care  Call 911 or go to the nearest emergency department if your life or someone else's life is in danger.  Call 988 anytime to reach the national Suicide and Crisis hotline.  Medicine refills  To refill your medicine, call your pharmacy. You can also call [facility]'s [facility] at [phone], Monday to Friday, 8 a.m. to 4:30 p.m. It can take 1 to 3 business days to get a refill.   Forms, letters, and tests  You may have papers to fill out, like FMLA, short-term disability, and workability. We can help you with these forms at your visits, but you must have an " appointment. You may need more than 1 visit for this, to be in an intensive therapy program, or both.  Before we can give you medicine for ADHD, we may refer you to get tested for it or confirm it another way.  We may not be able to give you an emotional support animal letter.  We don't do mental health checks ordered by the court.   We don't do mental health testing, but we can refer you to get tested.   Thank you for choosing us for your care.  For informational purposes only. Not to replace the advice of your health care provider. Copyright   2022 [facility]. All rights reserved. [employer] 015297 - 12/22.     1.Patient will take the medications as prescribed.   Medications: Take Lexapro 10 mg daily for depression and anxiety  Continue Valium 0.5 tablet (1 mg) daily for anxiety  Continue Ativan 0,5 -1 tablet (0.25 -0.5 mg) daily as needed for increased anxiety or panic attack  Patient will not stop taking medications or adjust them without consulting with the provider.  2.Patient will call with any problems between visits.  3.Patient will go to the emergency room if not feeling safe , unable to function in the community, or if suicidal, homicidal or hearing voices or having paranoia.  4.Patient will abstain from drugs and alcohol./Pt denies use .  5.Patient will not drive if sedated on medications or under influence of any substance.   6.Patient will not mix psychiatric medications with drugs and alcohol.   7.Patient will watch his diet and exercise.  8.Patient will see non psychiatric providers for non psychiatric disorders.  9. Next appointment in 6 weeks

## 2023-11-18 DIAGNOSIS — I10 HYPERTENSION, GOAL BELOW 140/90: ICD-10-CM

## 2023-11-20 ENCOUNTER — TELEPHONE (OUTPATIENT)
Dept: FAMILY MEDICINE | Facility: CLINIC | Age: 58
End: 2023-11-20

## 2023-11-20 ENCOUNTER — OFFICE VISIT (OUTPATIENT)
Dept: FAMILY MEDICINE | Facility: CLINIC | Age: 58
End: 2023-11-20
Payer: COMMERCIAL

## 2023-11-20 VITALS
HEART RATE: 83 BPM | RESPIRATION RATE: 20 BRPM | WEIGHT: 188.8 LBS | SYSTOLIC BLOOD PRESSURE: 144 MMHG | OXYGEN SATURATION: 100 % | HEIGHT: 68 IN | BODY MASS INDEX: 28.61 KG/M2 | DIASTOLIC BLOOD PRESSURE: 74 MMHG | TEMPERATURE: 98.8 F

## 2023-11-20 DIAGNOSIS — I10 HYPERTENSION, GOAL BELOW 140/90: Primary | ICD-10-CM

## 2023-11-20 DIAGNOSIS — F41.1 GAD (GENERALIZED ANXIETY DISORDER): Chronic | ICD-10-CM

## 2023-11-20 PROCEDURE — 99214 OFFICE O/P EST MOD 30 MIN: CPT | Performed by: PHYSICIAN ASSISTANT

## 2023-11-20 RX ORDER — LISINOPRIL AND HYDROCHLOROTHIAZIDE 12.5; 2 MG/1; MG/1
1 TABLET ORAL DAILY
Qty: 90 TABLET | Refills: 0 | OUTPATIENT
Start: 2023-11-20

## 2023-11-20 RX ORDER — LISINOPRIL AND HYDROCHLOROTHIAZIDE 12.5; 2 MG/1; MG/1
2 TABLET ORAL DAILY
Qty: 180 TABLET | Refills: 1 | Status: SHIPPED | OUTPATIENT
Start: 2023-11-20 | End: 2024-05-28

## 2023-11-20 ASSESSMENT — PAIN SCALES - GENERAL: PAINLEVEL: NO PAIN (0)

## 2023-11-20 NOTE — TELEPHONE ENCOUNTER
Reason for Call:  Appointment Request    Patient requesting this type of appt: Chronic Diease Management/Medication/Follow-Up    Requested provider:  Art    Reason patient unable to be scheduled: Not within requested timeframe    When does patient want to be seen/preferred time: Same day    Comments: rash on face, also looking to check Blood pressure and meds    Could we send this information to you [facility] or would you prefer to receive a phone call?:   Patient would prefer a phone call   Okay to leave a detailed message?: Yes at Home number on file [phone] (home)    Call taken on 11/20/2023 at 8:20 AM by Belinda Amezcua

## 2023-11-20 NOTE — PROGRESS NOTES
"  Assessment & Plan   Problem List Items Addressed This Visit          Circulatory    Hypertension, goal below 140/90 - Primary (Chronic)    Relevant Medications    lisinopril-hydrochlorothiazide (ZESTORETIC) 20-12.5 MG tablet    Other Relevant Orders    Basic metabolic panel  (Ca, Cl, CO2, Creat, Gluc, K, Na, BUN)       Behavioral    SANDRA (generalized anxiety disorder) (Chronic)      Bp continues to be slightly elevated and he is quite resistant to check this at home. Low suspicion for end organ damage. Will increase Zestoretic and recheck BMP in ~4 weeks. He will continue to work with his psychiatrist and follow up with us if he develops any other symptoms or is not tolerating the increase in antihypertensive.    Complete history and physical exam as below. Afebrile with normal vital signs except for elevated bp, which they will monitor at home and contact us if >140/90mmHg on average..    DDx and Dx discussed with and explained to the pt to their satisfaction.  All questions were answered at this time. Pt expressed understanding of and agreement with this dx, tx, and plan. No further workup warranted and standard medication warnings given. I have given the patient a list of pertinent indications for re-evaluation. Will go to the Emergency Department if symptoms worsen or new concerning symptoms arise. Patient left in no apparent distress.       Ordering of each unique test  Prescription drug management     BMI:   Estimated body mass index is 29.13 kg/m  as calculated from the following:    Height as of this encounter: 1.715 m (5' 7.5\").    Weight as of this encounter: 85.6 kg (188 lb 12.8 oz).     See Patient Instructions    KENNETH Elder  [facility] TESSA Amaral is a 58 year old, presenting for the following health issues:  RECHECK (Rash on face/No better)      11/20/2023     2:30 PM   Additional Questions   Roomed by Janice Sanchez CMA   Accompanied by None         " "11/20/2023     2:30 PM   Patient Reported Additional Medications   Patient reports taking the following new medications none       History of Present Illness       Mental Health Follow-up:  Patient presents to follow-up on Depression & Anxiety.Patient's depression since last visit has been:  Medium  The patient is having other symptoms associated with depression.  Patient's anxiety since last visit has been:  Medium  The patient is having other symptoms associated with anxiety.  Any significant life events: health concerns  Patient is feeling anxious or having panic attacks.  Patient has no concerns about alcohol or drug use.    Hypertension: He presents for follow up of hypertension.  He does not check blood pressure  regularly outside of the clinic. Outside blood pressures have been over 140/90. He follows a low salt diet.     He eats 0-1 servings of fruits and vegetables daily.He consumes 0 sweetened beverage(s) daily.He exercises with enough effort to increase his heart rate 9 or less minutes per day.  He exercises with enough effort to increase his heart rate 3 or less days per week.   He is taking medications regularly.       Review of Systems   Constitutional, HEENT, cardiovascular, pulmonary, gi and gu systems are negative, except as otherwise noted.      Objective    Pulse 83   Temp 98.8  F (37.1  C) (Temporal)   Resp 20   Ht 1.715 m (5' 7.5\")   Wt 85.6 kg (188 lb 12.8 oz)   SpO2 100%   BMI 29.13 kg/m    Body mass index is 29.13 kg/m .  Physical Exam  Vitals and nursing note reviewed.   Constitutional:       General: He is not in acute distress.     Appearance: He is not ill-appearing or diaphoretic.   HENT:      Head: Normocephalic and atraumatic.      Mouth/Throat:      Mouth: Mucous membranes are moist.   Eyes:      Conjunctiva/sclera: Conjunctivae normal.   Cardiovascular:      Rate and Rhythm: Normal rate and regular rhythm.      Heart sounds: Normal heart sounds. No murmur heard.     No friction " rub. No gallop.      Comments: 2+ symmetric radial/PT pulses. No LE edema or tenderness.  Pulmonary:      Effort: Pulmonary effort is normal. No respiratory distress.      Breath sounds: Normal breath sounds. No stridor. No wheezing, rhonchi or rales.   Skin:     General: Skin is warm and dry.   Neurological:      General: No focal deficit present.      Mental Status: He is alert. Mental status is at baseline.   Psychiatric:         Behavior: Behavior normal.      Comments: Anxious.

## 2023-11-20 NOTE — PATIENT INSTRUCTIONS
Shirley Amaral,    Thank you for allowing [facility] to manage your care.    If you develop worsening/changing symptoms at any time, please be seen in clinic/urgent care or call 911/go to the emergency department for evaluation as we discussed.    I ordered some lab work. Please call [address] [place] [address] or your local [facility] to schedule a lab only visit for bloodwork in 2-4 weeks.    I sent your prescriptions to your pharmacy.    Please allow 1-2 business days for our office to contact you in regards to your laboratory/radiological studies.  If not done so, I encourage you to login into [employer] ([email or web address] to review your results as well.     Drink 8-10 glasses of fluid daily to stay well-hydrated.    Your blood pressure was high today. Get a blood pressure cuff for use at home. Take and record your blood pressure twice daily for 2 weeks. If your blood pressure is greater than or equal to 140/90mm Hg on average, please contact us.    If you have any questions or concerns, please feel free to call us at [phone]    Sincerely,    Johnny Cordova PA-C    Did you know?      You can schedule a video visit for follow-up appointments as well as future appointments for certain conditions.  Please see the below link.     [email or web address]    If you have not already done so,  I encourage you to sign up for [employer] ([email or web address]).  This will allow you to review your results, securely communicate with a provider, and schedule virtual visits as well.

## 2023-12-18 ENCOUNTER — OFFICE VISIT (OUTPATIENT)
Dept: FAMILY MEDICINE | Facility: CLINIC | Age: 58
End: 2023-12-18
Payer: COMMERCIAL

## 2023-12-18 VITALS
TEMPERATURE: 97.9 F | HEIGHT: 68 IN | WEIGHT: 191.4 LBS | OXYGEN SATURATION: 96 % | BODY MASS INDEX: 29.01 KG/M2 | HEART RATE: 69 BPM | SYSTOLIC BLOOD PRESSURE: 144 MMHG | RESPIRATION RATE: 20 BRPM | DIASTOLIC BLOOD PRESSURE: 64 MMHG

## 2023-12-18 DIAGNOSIS — K42.9 UMBILICAL HERNIA WITHOUT OBSTRUCTION AND WITHOUT GANGRENE: ICD-10-CM

## 2023-12-18 DIAGNOSIS — R35.0 URINARY FREQUENCY: ICD-10-CM

## 2023-12-18 DIAGNOSIS — Z12.11 SCREEN FOR COLON CANCER: ICD-10-CM

## 2023-12-18 DIAGNOSIS — I10 HYPERTENSION, GOAL BELOW 140/90: Primary | ICD-10-CM

## 2023-12-18 DIAGNOSIS — Z12.5 SCREENING FOR PROSTATE CANCER: ICD-10-CM

## 2023-12-18 LAB
ALBUMIN UR-MCNC: NEGATIVE MG/DL
APPEARANCE UR: CLEAR
BILIRUB UR QL STRIP: NEGATIVE
COLOR UR AUTO: YELLOW
ERYTHROCYTE [DISTWIDTH] IN BLOOD BY AUTOMATED COUNT: 12.5 % (ref 10–15)
GLUCOSE UR STRIP-MCNC: NEGATIVE MG/DL
HBA1C MFR BLD: 5.6 % (ref 0–5.6)
HCT VFR BLD AUTO: 43.8 % (ref 40–53)
HGB BLD-MCNC: 14.5 G/DL (ref 13.3–17.7)
HGB UR QL STRIP: NEGATIVE
KETONES UR STRIP-MCNC: NEGATIVE MG/DL
LEUKOCYTE ESTERASE UR QL STRIP: NEGATIVE
MCH RBC QN AUTO: 31.7 PG (ref 26.5–33)
MCHC RBC AUTO-ENTMCNC: 33.1 G/DL (ref 31.5–36.5)
MCV RBC AUTO: 96 FL (ref 78–100)
NITRATE UR QL: NEGATIVE
PH UR STRIP: 6.5 [PH] (ref 5–7)
PLATELET # BLD AUTO: 228 10E3/UL (ref 150–450)
RBC # BLD AUTO: 4.58 10E6/UL (ref 4.4–5.9)
SP GR UR STRIP: 1.01 (ref 1–1.03)
UROBILINOGEN UR STRIP-ACNC: 0.2 E.U./DL
WBC # BLD AUTO: 6.8 10E3/UL (ref 4–11)

## 2023-12-18 PROCEDURE — 90471 IMMUNIZATION ADMIN: CPT | Performed by: PHYSICIAN ASSISTANT

## 2023-12-18 PROCEDURE — 81003 URINALYSIS AUTO W/O SCOPE: CPT | Performed by: PHYSICIAN ASSISTANT

## 2023-12-18 PROCEDURE — G0103 PSA SCREENING: HCPCS | Performed by: PHYSICIAN ASSISTANT

## 2023-12-18 PROCEDURE — 99214 OFFICE O/P EST MOD 30 MIN: CPT | Mod: 25 | Performed by: PHYSICIAN ASSISTANT

## 2023-12-18 PROCEDURE — 90682 RIV4 VACC RECOMBINANT DNA IM: CPT | Performed by: PHYSICIAN ASSISTANT

## 2023-12-18 PROCEDURE — 36415 COLL VENOUS BLD VENIPUNCTURE: CPT | Performed by: PHYSICIAN ASSISTANT

## 2023-12-18 PROCEDURE — 85027 COMPLETE CBC AUTOMATED: CPT | Performed by: PHYSICIAN ASSISTANT

## 2023-12-18 PROCEDURE — 80053 COMPREHEN METABOLIC PANEL: CPT | Performed by: PHYSICIAN ASSISTANT

## 2023-12-18 PROCEDURE — 83036 HEMOGLOBIN GLYCOSYLATED A1C: CPT | Performed by: PHYSICIAN ASSISTANT

## 2023-12-18 ASSESSMENT — PAIN SCALES - GENERAL: PAINLEVEL: NO PAIN (0)

## 2023-12-18 NOTE — PROGRESS NOTES
Assessment & Plan   Problem List Items Addressed This Visit          Circulatory    Hypertension, goal below 140/90 - Primary (Chronic)    Relevant Orders    Comprehensive metabolic panel (BMP + Alb, Alk Phos, ALT, AST, Total. Bili, TP) (Completed)     Other Visit Diagnoses       Screen for colon cancer        Relevant Orders    Colonoscopy Screening  Referral    Screening for prostate cancer        Relevant Orders    PSA, screen (Completed)    Urinary frequency        Relevant Orders    PSA, screen (Completed)    Hemoglobin A1c (Completed)    UA Macroscopic with reflex to Microscopic and Culture - Lab Collect (Completed)    CBC with platelets (Completed)    Umbilical hernia without obstruction and without gangrene        Relevant Orders    Adult General Surg Referral           Non-strangulated, non-entrapped umbilical hernia. Will refer to surgery for evaluation. Red flag symptoms discussed. Urinary frequency from unknown etiology. No signs of UTI on UA. PSA and A1C reassuring. Could be BPH vs chronic prostatitis. He will discuss with his primary at upcoming appt. Tolerating antihypertensives well, but recently discontinued metoprolol on his own. Mild fatigue with this med. He will restart 12.5mg Toprol as he tolerates this well. Feels his bp is much improved from recent months, but he is mildly hypertensive today.     Complete history and physical exam as below. Afebrile with normal vital except for elevated bp, which they will monitor at home and contact us if >140/90mmHg on average.    DDx and Dx discussed with and explained to the pt to their satisfaction.  All questions were answered at this time. Pt expressed understanding of and agreement with this dx, tx, and plan. No further workup warranted and standard medication warnings given. I have given the patient a list of pertinent indications for re-evaluation. Will go to the Emergency Department if symptoms worsen or new concerning symptoms arise.  "Patient left in no apparent distress.     Ordering of each unique test  Prescription drug management  35 minutes spent by me on the date of the encounter doing chart review, history and exam, documentation and further activities per the note     BMI:   Estimated body mass index is 28.91 kg/m  as calculated from the following:    Height as of this encounter: 1.733 m (5' 8.23\").    Weight as of this encounter: 86.8 kg (191 lb 6.4 oz).     See Patient Instructions    KENNETH Elder  [facility] TESSA Amaral is a 58 year old, presenting for the following health issues:  Hypertension, Abdominal Pain (/), and UTI (/)        12/18/2023     1:06 PM   Additional Questions   Roomed by Afsaneh   Accompanied by none       History of Present Illness       Reason for visit:  Abdomnalpain  Symptom onset:  3-7 days ago  Symptom intensity:  Mild  Symptom progression:  Staying the same  Had these symptoms before:  No    He eats 2-3 servings of fruits and vegetables daily.He consumes 0 sweetened beverage(s) daily.He exercises with enough effort to increase his heart rate 9 or less minutes per day.  He exercises with enough effort to increase his heart rate 3 or less days per week.   He is taking medications regularly.     Genitourinary - Male  Onset/Duration:   Description:   Dysuria (painful urination): No}  Hematuria (blood in urine): No  Frequency: YES  Waking at night to urinate: No  Hesitancy (delay in urine): No  Retention (unable to empty): No  Decrease in urinary flow: No  Incontinence: No  Progression of Symptoms:  same  Accompanying Signs & Symptoms:  Fever: No  Back/Flank pain: YES- sore  Urethral discharge: No  Testicle lumps/masses/pain: No  Nausea and/or vomiting: No  Abdominal pain: YES  History:   History of frequent UTI s: No  History of kidney stones: No  History of hernias: YES- at birth   Personal or Family history of Prostate problems: YES- father   Sexually active: " "YES  Precipitating or alleviating factors: None  Therapies tried and outcome: none  Hypertension Follow-up    Do you check your blood pressure regularly outside of the clinic? No   Are you following a low salt diet? Yes  Are your blood pressures ever more than 140 on the top number (systolic) OR more   than 90 on the bottom number (diastolic), for example 140/90? Yes    Quit chewing tobacco ~5 days ago. Using gum, Zyn for replacement. Pooping slowed down but now has returned to normal.     Noticed uncomfortable lump near belly button that is painful to touch at times. S/p bowel resection. No bloody or black stools. Wanting a screening colonoscopy.  Stopped metoprolol     Review of Systems   Constitutional, HEENT, cardiovascular, pulmonary, gi and gu systems are negative, except as otherwise noted.      Objective    BP (!) 144/64   Pulse 69   Temp 97.9  F (36.6  C) (Tympanic)   Resp 20   Ht 1.733 m (5' 8.23\")   Wt 86.8 kg (191 lb 6.4 oz)   SpO2 96%   BMI 28.91 kg/m    Body mass index is 28.91 kg/m .  Physical Exam  Vitals and nursing note reviewed.   Constitutional:       General: He is not in acute distress.     Appearance: He is not ill-appearing or diaphoretic.   HENT:      Head: Normocephalic and atraumatic.      Mouth/Throat:      Mouth: Mucous membranes are moist.   Eyes:      Conjunctiva/sclera: Conjunctivae normal.   Cardiovascular:      Rate and Rhythm: Normal rate and regular rhythm.      Heart sounds: Normal heart sounds. No murmur heard.     No friction rub. No gallop.   Pulmonary:      Effort: Pulmonary effort is normal. No respiratory distress.      Breath sounds: Normal breath sounds. No stridor. No wheezing, rhonchi or rales.   Abdominal:      General: Bowel sounds are normal. There is no distension.      Palpations: Abdomen is soft. There is no mass.      Tenderness: There is no abdominal tenderness. There is no guarding or rebound.      Hernia: A hernia (~2cm non-tender and reducible hernia.) " is present.   Skin:     General: Skin is warm and dry.   Neurological:      General: No focal deficit present.      Mental Status: He is alert. Mental status is at baseline.   Psychiatric:         Mood and Affect: Mood normal.      Comments: Anxious.          Results for orders placed or performed in visit on 12/18/23   PSA, screen     Status: Normal   Result Value Ref Range    Prostate Specific Antigen Screen 0.48 0.00 - 4.50 ng/mL    Narrative    This result is obtained using the Roche Elecsys total PSA method on the theodore e801 immunoassay analyzer. Results obtained with different assay methods or kits cannot be used interchangeably.   Comprehensive metabolic panel (BMP + Alb, Alk Phos, ALT, AST, Total. Bili, TP)     Status: Abnormal   Result Value Ref Range    Sodium 135 135 - 145 mmol/L    Potassium 4.5 3.4 - 5.3 mmol/L    Carbon Dioxide (CO2) 27 22 - 29 mmol/L    Anion Gap 12 7 - 15 mmol/L    Urea Nitrogen 19.4 8.0 - 23.0 mg/dL    Creatinine 0.79 0.67 - 1.17 mg/dL    GFR Estimate >90 >60 mL/min/1.73m2    Calcium 9.5 8.8 - 10.2 mg/dL    Chloride 96 (L) 98 - 107 mmol/L    Glucose 83 70 - 99 mg/dL    Alkaline Phosphatase 55 40 - 150 U/L    AST 24 0 - 45 U/L    ALT 27 0 - 70 U/L    Protein Total 7.7 6.4 - 8.3 g/dL    Albumin 4.8 3.5 - 5.2 g/dL    Bilirubin Total 0.4 <=1.2 mg/dL   Hemoglobin A1c     Status: Normal   Result Value Ref Range    Hemoglobin A1C 5.6 0.0 - 5.6 %   UA Macroscopic with reflex to Microscopic and Culture - Lab Collect     Status: Normal    Specimen: Urine, Clean Catch   Result Value Ref Range    Color Urine Yellow Colorless, Straw, Light Yellow, Yellow    Appearance Urine Clear Clear    Glucose Urine Negative Negative mg/dL    Bilirubin Urine Negative Negative    Ketones Urine Negative Negative mg/dL    Specific Gravity Urine 1.010 1.003 - 1.035    Blood Urine Negative Negative    pH Urine 6.5 5.0 - 7.0    Protein Albumin Urine Negative Negative mg/dL    Urobilinogen Urine 0.2 0.2, 1.0 E.U./dL     Nitrite Urine Negative Negative    Leukocyte Esterase Urine Negative Negative    Narrative    Microscopic not indicated   CBC with platelets     Status: Normal   Result Value Ref Range    WBC Count 6.8 4.0 - 11.0 10e3/uL    RBC Count 4.58 4.40 - 5.90 10e6/uL    Hemoglobin 14.5 13.3 - 17.7 g/dL    Hematocrit 43.8 40.0 - 53.0 %    MCV 96 78 - 100 fL    MCH 31.7 26.5 - 33.0 pg    MCHC 33.1 31.5 - 36.5 g/dL    RDW 12.5 10.0 - 15.0 %    Platelet Count 228 150 - 450 10e3/uL

## 2023-12-18 NOTE — PATIENT INSTRUCTIONS
Shirley Amaral,    Thank you for allowing [facility] to manage your care.    Start taking half of your metoprolol tablet daily. See Andrew in the next 3 months for a recheck.     If you develop worsening/changing symptoms at any time, please be seen in clinic/urgent care or call 911/go to the emergency department for evaluation.    I ordered some lab work. Please go to the laboratory to get your studies.    I'll send more blood pressure med refills to your pharmacy after I see your labs.    I made a referral to surgery and for a colonoscopy. Please call the specialty number on your after visit summary.     Please allow 1-2 business days for our office to contact you in regards to your laboratory/radiological studies.  If not done so, I encourage you to login into [email or web address] ([email or web address] to review your results as well.     Your blood pressure was high today. Get a blood pressure cuff for use at home. Take and record your blood pressure twice daily for 2 weeks. If your blood pressure is greater than or equal to 140/90mm Hg on average, please contact us.    If you have any questions or concerns, please feel free to call us at [phone]    Sincerely,    Johnny Cordova PA-C    Did you know?      You can schedule a video visit for follow-up appointments as well as future appointments for certain conditions.  Please see the below link.     [email or web address]    If you have not already done so,  I encourage you to sign up for [employer] ([email or web address]).  This will allow you to review your results, securely communicate with a provider, and schedule virtual visits as well.

## 2023-12-19 LAB
ALBUMIN SERPL BCG-MCNC: 4.8 G/DL (ref 3.5–5.2)
ALP SERPL-CCNC: 55 U/L (ref 40–150)
ALT SERPL W P-5'-P-CCNC: 27 U/L (ref 0–70)
ANION GAP SERPL CALCULATED.3IONS-SCNC: 12 MMOL/L (ref 7–15)
AST SERPL W P-5'-P-CCNC: 24 U/L (ref 0–45)
BILIRUB SERPL-MCNC: 0.4 MG/DL
BUN SERPL-MCNC: 19.4 MG/DL (ref 8–23)
CALCIUM SERPL-MCNC: 9.5 MG/DL (ref 8.8–10.2)
CHLORIDE SERPL-SCNC: 96 MMOL/L (ref 98–107)
CREAT SERPL-MCNC: 0.79 MG/DL (ref 0.67–1.17)
DEPRECATED HCO3 PLAS-SCNC: 27 MMOL/L (ref 22–29)
EGFRCR SERPLBLD CKD-EPI 2021: >90 ML/MIN/1.73M2
GLUCOSE SERPL-MCNC: 83 MG/DL (ref 70–99)
POTASSIUM SERPL-SCNC: 4.5 MMOL/L (ref 3.4–5.3)
PROT SERPL-MCNC: 7.7 G/DL (ref 6.4–8.3)
PSA SERPL DL<=0.01 NG/ML-MCNC: 0.48 NG/ML (ref 0–4.5)
SODIUM SERPL-SCNC: 135 MMOL/L (ref 135–145)

## 2023-12-20 ENCOUNTER — OFFICE VISIT (OUTPATIENT)
Dept: SURGERY | Facility: CLINIC | Age: 58
End: 2023-12-20
Attending: PHYSICIAN ASSISTANT
Payer: COMMERCIAL

## 2023-12-20 VITALS
HEIGHT: 69 IN | SYSTOLIC BLOOD PRESSURE: 178 MMHG | TEMPERATURE: 98.1 F | DIASTOLIC BLOOD PRESSURE: 93 MMHG | BODY MASS INDEX: 28.34 KG/M2 | HEART RATE: 77 BPM | WEIGHT: 191.36 LBS

## 2023-12-20 DIAGNOSIS — Z72.0 TOBACCO ABUSE: ICD-10-CM

## 2023-12-20 DIAGNOSIS — F41.1 GAD (GENERALIZED ANXIETY DISORDER): Chronic | ICD-10-CM

## 2023-12-20 DIAGNOSIS — K42.9 UMBILICAL HERNIA WITHOUT OBSTRUCTION AND WITHOUT GANGRENE: Primary | ICD-10-CM

## 2023-12-20 DIAGNOSIS — F41.0 PANIC ATTACK: ICD-10-CM

## 2023-12-20 DIAGNOSIS — I10 HYPERTENSION, GOAL BELOW 140/90: Chronic | ICD-10-CM

## 2023-12-20 PROCEDURE — 99204 OFFICE O/P NEW MOD 45 MIN: CPT | Performed by: SURGERY

## 2023-12-20 RX ORDER — DIAZEPAM 2 MG
1 TABLET ORAL DAILY
Qty: 8 TABLET | Refills: 0 | Status: SHIPPED | OUTPATIENT
Start: 2023-12-20 | End: 2024-06-25

## 2023-12-20 ASSESSMENT — PAIN SCALES - GENERAL: PAINLEVEL: MILD PAIN (2)

## 2023-12-20 NOTE — LETTER
12/20/2023         RE: Munir Storey  [address] [place] [address]        Dear Colleague,    Thank you for referring your patient, Munir Storey, to the [facility]. Please see a copy of my visit note below.      Assessment & Plan  Problem List Items Addressed This Visit          Circulatory    Hypertension, goal below 140/90 (Chronic)       Behavioral    Tobacco abuse       Other    Panic attack     Other Visit Diagnoses       Umbilical hernia without obstruction and without gangrene    -  Primary           59 yo F with likely incarcerated incisional hernia  I discussed with the patient the pathophysiology and natural history of incisional and umbilical hernias, explaining that the presence of the hernia alone is not an indication for repair, but rather repair would be indicated if hernias are symptomatic or bothersome.  I explained that hernias are not expected to improve without repair, but avoiding activities such as strenuous physical activity, weight gain, and tobacco cessation can help prevent symptoms from worsening.  Weight loss is also encouraged for overall health with prevent worsening of hernia symptoms.    We did briefly discuss the risks, benefits, alternatives of the open incisional hernia with mesh.  Biggest thing about putting in mesh is infection especially with patient history of tobacco abuse.  Another risk is recurrence especially due to his tobacco use.  She is understanding of this.    Given the fact that patient as been largely asymptomatic,except the incarcerated peritoneal fat and some tenderness intermittent, I recommended continued observation of this hernia with nonoperative management for now until pt definitely wants surgery.   I counseled the patient to monitor for symptoms of a bowel obstruction, hernia incarceration, or increased size to the point of discomfort which should prompt him to seek care to continue to discuss surgical repair at that time.   "In the meantime, patient was counseled to avoid strenuous activity and weight loss.  All questions were answered.  He will think about the open incisional hernia repair, then will notify me if he would like to go forward with the repair.      BMI:   Estimated body mass index is 28.25 kg/m  as calculated from the following:    Height as of this encounter: 1.753 m (5' 9.02\").    Weight as of this encounter: 86.8 kg (191 lb 5.8 oz).       No follow-ups on file.    Face to Face/patient Contact total time: 25 minutes  Pre Charting time: 10 minutes; Post charting time, communication and other activities: 10 minutes;   Total time:  45 minutes      Valentin Soto MD  [facility] is a 58 year old, presenting for the following health issues:  Consult (Umbilical hernia)    Umbilical hernia  Noted a few weeks ago  Somewhat painful  Partially incarcerated with preperitoneal fat  Hx of SSBR via midline incision - does not remember why  No signs of obstruction; eating fine. No nausea; no vomiting.   No blood thinner  Pt hx of smoking  Never MI; no CVA.    CT abd/pel from 2020 noted incisional hernia already - preperitoneal fat with only one defect ~1cm.  No meshes noted.   He's not sure if he wants this repaired yet.           Review of Systems   Constitutional, HEENT, cardiovascular, pulmonary, GI, , musculoskeletal, neuro, skin, endocrine and psych systems are negative, except as otherwise noted.      Objective   BP (!) 178/93 (BP Location: Right arm, Patient Position: Sitting, Cuff Size: Adult Regular)   Pulse 77   Temp 98.1  F (36.7  C) (Tympanic)   Ht 1.753 m (5' 9.02\")   Wt 86.8 kg (191 lb 5.8 oz)   BMI 28.25 kg/m    Body mass index is 28.25 kg/m .  Physical Exam  Vitals reviewed.   Eyes:      Conjunctiva/sclera: Conjunctivae normal.   Cardiovascular:      Rate and Rhythm: Normal rate.      Pulses: Normal pulses.   Pulmonary:      Breath sounds: Normal breath sounds. "   Abdominal:      Palpations: Abdomen is soft.      Hernia: A hernia is present.       Skin:     General: Skin is warm.      Capillary Refill: Capillary refill takes less than 2 seconds.   Neurological:      General: No focal deficit present.      Mental Status: He is alert.   Psychiatric:         Mood and Affect: Mood normal.        EXAM: CT ABDOMEN PELVIS W CONTRAST  LOCATION: [facility]  DATE/TIME: 4/2/2020 8:21 PM     INDICATION: Acute right-sided pain.  COMPARISON: None.  TECHNIQUE: CT scan of the abdomen and pelvis was performed following injection of IV contrast. Multiplanar reformats were obtained. Dose reduction techniques were used.  CONTRAST: 99 mL Isovue 370.     FINDINGS:   LOWER CHEST: Basilar atelectasis.     HEPATOBILIARY: Normal.     PANCREAS: Normal.     SPLEEN: Normal.     ADRENAL GLANDS: Normal.     KIDNEYS/BLADDER: 2 mm stone in the distal right ureter with moderate right-sided hydronephrosis and perinephric soft tissue stranding. There is an additional 4 mm stone in the upper pole of the right kidney.     BOWEL: Postsurgical changes in the small intestine. Normal appendix.     LYMPH NODES: Normal.     VASCULATURE: Unremarkable.     PELVIC ORGANS: Normal.     MUSCULOSKELETAL: Normal.                                                                      IMPRESSION:   1.  2 mm stone in the distal right ureter with moderate right-sided hydronephrosis and perinephric soft tissue stranding. Additional 4 mm stone upper right kidney.     2.  No appendicitis.                 Again, thank you for allowing me to participate in the care of your patient.        Sincerely,        Valentin Soto MD

## 2023-12-20 NOTE — TELEPHONE ENCOUNTER
Reason for call:  Medication     If this is a refill request, has the caller requested the refill from the pharmacy already? No    Will the patient be using a [facility]? No    Name of the pharmacy and phone number for the current request: [facility] [phone]      Name of the medication requested: Diazepam    Other request: None    Phone number to reach patient:  Home number on file [phone] (home)    Best Time:  Anytime    Can we leave a detailed message on this number?  YES

## 2023-12-20 NOTE — NURSING NOTE
"Initial BP (!) 178/93 (BP Location: Right arm, Patient Position: Sitting, Cuff Size: Adult Regular)   Pulse 77   Temp 98.1  F (36.7  C) (Tympanic)   Ht 1.753 m (5' 9.02\")   Wt 86.8 kg (191 lb 5.8 oz)   BMI 28.25 kg/m   Estimated body mass index is 28.25 kg/m  as calculated from the following:    Height as of this encounter: 1.753 m (5' 9.02\").    Weight as of this encounter: 86.8 kg (191 lb 5.8 oz). .  Judy Carballo MA    "

## 2023-12-20 NOTE — TELEPHONE ENCOUNTER
Date of Last Office Visit: 11/15/23  Date of Next Office Visit: 1/3/24  No shows since last visit: 0  Cancellations since last visit: 0    Medication requested: diazepam (VALIUM) 2 MG tablet  Date last ordered: 11/17/23 Qty: 15 Refills: 0     Review of [place] ?: yes  Medication last sold date: 11/20/23 Qty filled: 15  Other controlled substance on [place] ?: yes  If yes, is this a new medication?: yes  If yes, name of medication: lorazepam and date filled: 10/30/23    Lapse in medication adherence greater than 5 days?: no  If yes, call patient and gather details: NA  Medication refill request verified as identical to current order?: yes  Result of Last DAM, VPA, Li+ Level, CBC, or Carbamazepine Level (at or since last visit): N/A    Last visit treatment plan:     ASSESSMENT AND PLAN    Patient is a 58 year old,  White Choose not to answer male with a history of major depressive disorder, recurrent episode, moderate, generalized anxiety disorder, panic attacks, and alcohol abuse, in remission who presents for initial psychiatric evaluation for the medication management of ongoing symptoms related to heightened anxiety and worsening depression.  Patient with longstanding history of worsening depression and increased anxiety with panic attacks who has had several failed psychotropic trial due to poor tolerability reports noticing some improvement in symptoms since going back on Lexapro 5 mg.  His anxiety and depression have improved slightly since starting back on his alcohol but still experiences incessant worry, feeling of impending doom, anhedonia, and lack of rabia.  He believes he can benefits on higher dose of Lexapro since he is not able to tolerate this medication.  He is also planning to connect with a therapist either within the network or outside network to further help with symptoms.  I suggested titrating Lexapro to 10 mg daily to further help with worsening depression and increased anxiety.  Patient verbalized  good understanding and he was amenable to taking Lexapro to further help with ongoing symptoms.  Patient reported both sleep and appetite at baseline, and stabilized.  I spent extensive time to educate patient about long-term use of benzodiazepine, especially diazepam since he has been taking this medication every day.  I discussed the risks for dependence, respiratory problems, fall risk, and memory problems.  Patient was receptive to this information and stated his long-term goals is to taper off of benzodiazepines.  Patient does use Ativan sparingly for anxiety anxiety and panic attack.  Patient currently denies more suicidal homicidal ideation.  He also denied both auditory and visual hallucination.  Patient report normal no hypomania.  Patient return to clinic in 6-week for follow-up.     Plan:  1.Patient will take the medications as prescribed.   Medications: Take Lexapro 10 mg daily for depression and anxiety  Continue Valium 0.5 tablet (1 mg) daily for anxiety  Continue Ativan 0,5 -1 tablet (0.25 -0.5 mg) daily as needed for increased anxiety or panic attack  Patient will not stop taking medications or adjust them without consulting with the provider.  2.Patient will call with any problems between visits.  3.Patient will go to the emergency room if not feeling safe , unable to function in the community, or if suicidal, homicidal or hearing voices or having paranoia.  4.Patient will abstain from drugs and alcohol./Pt denies use .  5.Patient will not drive if sedated on medications or under influence of any substance.   6.Patient will not mix psychiatric medications with drugs and alcohol.   7.Patient will watch his diet and exercise.  8.Patient will see non psychiatric providers for non psychiatric disorders.  9. Next appointment in 6 weeks    []Medication refilled per  Medication Refill in Ambulatory Care  policy.  [x]Medication unable to be refilled by RN due to criteria not met as indicated below:     []Eligibility - not seen in the last year   []Supervision - no future appointment   []Compliance - no shows, cancellations or lapse in therapy   []Verification - order discrepancy   [x]Controlled medication   []Medication not included in policy   []90-day supply request   []Other

## 2023-12-20 NOTE — PROGRESS NOTES
Assessment & Plan   Problem List Items Addressed This Visit          Circulatory    Hypertension, goal below 140/90 (Chronic)       Behavioral    Tobacco abuse       Other    Panic attack     Other Visit Diagnoses       Umbilical hernia without obstruction and without gangrene    -  Primary           59 yo F with likely incarcerated incisional hernia  I discussed with the patient the pathophysiology and natural history of incisional and umbilical hernias, explaining that the presence of the hernia alone is not an indication for repair, but rather repair would be indicated if hernias are symptomatic or bothersome.  I explained that hernias are not expected to improve without repair, but avoiding activities such as strenuous physical activity, weight gain, and tobacco cessation can help prevent symptoms from worsening.  Weight loss is also encouraged for overall health with prevent worsening of hernia symptoms.    We did briefly discuss the risks, benefits, alternatives of the open incisional hernia with mesh.  Biggest thing about putting in mesh is infection especially with patient history of tobacco abuse.  Another risk is recurrence especially due to his tobacco use.  She is understanding of this.    Given the fact that patient as been largely asymptomatic,except the incarcerated peritoneal fat and some tenderness intermittent, I recommended continued observation of this hernia with nonoperative management for now until pt definitely wants surgery.   I counseled the patient to monitor for symptoms of a bowel obstruction, hernia incarceration, or increased size to the point of discomfort which should prompt him to seek care to continue to discuss surgical repair at that time.  In the meantime, patient was counseled to avoid strenuous activity and weight loss.  All questions were answered.  He will think about the open incisional hernia repair, then will notify me if he would like to go forward with the repair.   "    BMI:   Estimated body mass index is 28.25 kg/m  as calculated from the following:    Height as of this encounter: 1.753 m (5' 9.02\").    Weight as of this encounter: 86.8 kg (191 lb 5.8 oz).       No follow-ups on file.    Face to Face/patient Contact total time: 25 minutes  Pre Charting time: 10 minutes; Post charting time, communication and other activities: 10 minutes;   Total time:  45 minutes      Valentin Soto MD  [facility] 58 year [facility], presenting for the following health issues:  Consult (Umbilical hernia)    Umbilical hernia  Noted a few weeks ago  Somewhat painful  Partially incarcerated with preperitoneal fat  Hx of SSBR via midline incision - does not remember why  No signs of obstruction; eating fine. No nausea; no vomiting.   No blood thinner  Pt hx of smoking  Never MI; no CVA.    CT abd/pel from 2020 noted incisional hernia already - preperitoneal fat with only one defect ~1cm.  No meshes noted.   He's not sure if he wants this repaired yet.           Review of Systems   Constitutional, HEENT, cardiovascular, pulmonary, GI, , musculoskeletal, neuro, skin, endocrine and psych systems are negative, except as otherwise noted.      Objective    BP (!) 178/93 (BP Location: Right arm, Patient Position: Sitting, Cuff Size: Adult Regular)   Pulse 77   Temp 98.1  F (36.7  C) (Tympanic)   Ht 1.753 m (5' 9.02\")   Wt 86.8 kg (191 lb 5.8 oz)   BMI 28.25 kg/m    Body mass index is 28.25 kg/m .  Physical Exam  Vitals reviewed.   Eyes:      Conjunctiva/sclera: Conjunctivae normal.   Cardiovascular:      Rate and Rhythm: Normal rate.      Pulses: Normal pulses.   Pulmonary:      Breath sounds: Normal breath sounds.   Abdominal:      Palpations: Abdomen is soft.      Hernia: A hernia is present.       Skin:     General: Skin is warm.      Capillary Refill: Capillary refill takes less than 2 seconds.   Neurological:      General: No focal deficit present.      " Mental Status: He is alert.   Psychiatric:         Mood and Affect: Mood normal.        EXAM: CT ABDOMEN PELVIS W CONTRAST  LOCATION: [facility]  DATE/TIME: 4/2/2020 8:21 PM     INDICATION: Acute right-sided pain.  COMPARISON: None.  TECHNIQUE: CT scan of the abdomen and pelvis was performed following injection of IV contrast. Multiplanar reformats were obtained. Dose reduction techniques were used.  CONTRAST: 99 mL Isovue 370.     FINDINGS:   LOWER CHEST: Basilar atelectasis.     HEPATOBILIARY: Normal.     PANCREAS: Normal.     SPLEEN: Normal.     ADRENAL GLANDS: Normal.     KIDNEYS/BLADDER: 2 mm stone in the distal right ureter with moderate right-sided hydronephrosis and perinephric soft tissue stranding. There is an additional 4 mm stone in the upper pole of the right kidney.     BOWEL: Postsurgical changes in the small intestine. Normal appendix.     LYMPH NODES: Normal.     VASCULATURE: Unremarkable.     PELVIC ORGANS: Normal.     MUSCULOSKELETAL: Normal.                                                                      IMPRESSION:   1.  2 mm stone in the distal right ureter with moderate right-sided hydronephrosis and perinephric soft tissue stranding. Additional 4 mm stone upper right kidney.     2.  No appendicitis.

## 2024-01-18 ENCOUNTER — TELEPHONE (OUTPATIENT)
Dept: FAMILY MEDICINE | Facility: CLINIC | Age: 59
End: 2024-01-18
Payer: COMMERCIAL

## 2024-01-18 DIAGNOSIS — F41.1 GAD (GENERALIZED ANXIETY DISORDER): ICD-10-CM

## 2024-01-18 DIAGNOSIS — I10 HYPERTENSION, GOAL BELOW 140/90: ICD-10-CM

## 2024-01-18 RX ORDER — LORAZEPAM 0.5 MG/1
.25-.5 TABLET ORAL DAILY PRN
Qty: 15 TABLET | Refills: 1 | Status: SHIPPED | OUTPATIENT
Start: 2024-01-18 | End: 2024-03-28

## 2024-01-18 RX ORDER — ESCITALOPRAM OXALATE 5 MG/1
5 TABLET ORAL
Qty: 90 TABLET | Refills: 1 | Status: SHIPPED | OUTPATIENT
Start: 2024-01-18 | End: 2024-04-04

## 2024-01-18 RX ORDER — LISINOPRIL AND HYDROCHLOROTHIAZIDE 12.5; 2 MG/1; MG/1
2 TABLET ORAL DAILY
Qty: 180 TABLET | Refills: 1 | OUTPATIENT
Start: 2024-01-18

## 2024-01-22 ENCOUNTER — TELEPHONE (OUTPATIENT)
Dept: FAMILY MEDICINE | Facility: CLINIC | Age: 59
End: 2024-01-22
Payer: COMMERCIAL

## 2024-01-22 DIAGNOSIS — F41.1 GAD (GENERALIZED ANXIETY DISORDER): Primary | ICD-10-CM

## 2024-01-22 RX ORDER — ESCITALOPRAM OXALATE 10 MG/1
10 TABLET ORAL DAILY
Qty: 90 TABLET | Refills: 1 | Status: SHIPPED | OUTPATIENT
Start: 2024-01-22 | End: 2024-09-09

## 2024-01-22 NOTE — TELEPHONE ENCOUNTER
Patient requests new Rx. Patient states he should be receiving 10 mg of Escitalopram but there is nothing on record. Please review and advise. Thank you!

## 2024-01-22 NOTE — TELEPHONE ENCOUNTER
Patient informed of the prescription on file for escitalopram (LEXAPRO) 10 MG tablets. He verbalized a good understanding.     Patient has been taking the 10 mg dose for over a month and is doing pretty good but is not quite there yet.     Patient has an Anxiety visit (in person) with Dr. Morales on 2/6/24.   Munir said he will remain in the 10 mg dose until he talks more with Dr. Morales.     Deandra Sarmiento RN BSN  [facility]

## 2024-01-22 NOTE — TELEPHONE ENCOUNTER
Patient states that he will have his colonoscopy with [facility] on 1/24/24. He has also requested a bowel prep kit from them. I advised patient to contact them if he does not hear back from them  or his pharmacy by tomorrow.  Patient agreed with with this plan.     Deandra MÉNDEZN  [facility]

## 2024-01-22 NOTE — TELEPHONE ENCOUNTER
"See note below (fax from pharmacy).     Previous call on 1/18/24 states:    Patient is out of the lexapro due to he has been taking 10 mg.        However, refill was sent for escitalopram (LEXAPRO) 5 MG tablet   Last on 1/18/24.     Pharmacy confirmed that patient picked up the escitalopram (LEXAPRO) 5 MG tablets # 90 tablets on 1/20/24 (a 45 day supply).     Did you want to place a new prescription \"on file\" for the 10 mg tablets?     Deandra Sarmiento RN BSN  [facility]     "

## 2024-01-24 ENCOUNTER — TRANSFERRED RECORDS (OUTPATIENT)
Dept: HEALTH INFORMATION MANAGEMENT | Facility: CLINIC | Age: 59
End: 2024-01-24
Payer: COMMERCIAL

## 2024-02-06 ENCOUNTER — OFFICE VISIT (OUTPATIENT)
Dept: FAMILY MEDICINE | Facility: CLINIC | Age: 59
End: 2024-02-06
Payer: COMMERCIAL

## 2024-02-06 VITALS
HEIGHT: 69 IN | WEIGHT: 200.4 LBS | TEMPERATURE: 98 F | BODY MASS INDEX: 29.68 KG/M2 | SYSTOLIC BLOOD PRESSURE: 160 MMHG | RESPIRATION RATE: 20 BRPM | OXYGEN SATURATION: 98 % | DIASTOLIC BLOOD PRESSURE: 94 MMHG | HEART RATE: 86 BPM

## 2024-02-06 DIAGNOSIS — I10 HYPERTENSION, GOAL BELOW 140/90: Chronic | ICD-10-CM

## 2024-02-06 DIAGNOSIS — F33.1 MODERATE EPISODE OF RECURRENT MAJOR DEPRESSIVE DISORDER (H): ICD-10-CM

## 2024-02-06 DIAGNOSIS — F10.21 ALCOHOL DEPENDENCE IN REMISSION (H): ICD-10-CM

## 2024-02-06 DIAGNOSIS — F41.1 GAD (GENERALIZED ANXIETY DISORDER): Primary | Chronic | ICD-10-CM

## 2024-02-06 PROCEDURE — 99214 OFFICE O/P EST MOD 30 MIN: CPT | Performed by: FAMILY MEDICINE

## 2024-02-06 RX ORDER — BUSPIRONE HYDROCHLORIDE 5 MG/1
TABLET ORAL
Qty: 180 TABLET | Refills: 0 | Status: SHIPPED | OUTPATIENT
Start: 2024-02-06 | End: 2024-04-04

## 2024-02-06 ASSESSMENT — ANXIETY QUESTIONNAIRES
2. NOT BEING ABLE TO STOP OR CONTROL WORRYING: MORE THAN HALF THE DAYS
IF YOU CHECKED OFF ANY PROBLEMS ON THIS QUESTIONNAIRE, HOW DIFFICULT HAVE THESE PROBLEMS MADE IT FOR YOU TO DO YOUR WORK, TAKE CARE OF THINGS AT HOME, OR GET ALONG WITH OTHER PEOPLE: SOMEWHAT DIFFICULT
7. FEELING AFRAID AS IF SOMETHING AWFUL MIGHT HAPPEN: MORE THAN HALF THE DAYS
4. TROUBLE RELAXING: NEARLY EVERY DAY
GAD7 TOTAL SCORE: 14
7. FEELING AFRAID AS IF SOMETHING AWFUL MIGHT HAPPEN: MORE THAN HALF THE DAYS
3. WORRYING TOO MUCH ABOUT DIFFERENT THINGS: MORE THAN HALF THE DAYS
6. BECOMING EASILY ANNOYED OR IRRITABLE: SEVERAL DAYS
8. IF YOU CHECKED OFF ANY PROBLEMS, HOW DIFFICULT HAVE THESE MADE IT FOR YOU TO DO YOUR WORK, TAKE CARE OF THINGS AT HOME, OR GET ALONG WITH OTHER PEOPLE?: SOMEWHAT DIFFICULT
1. FEELING NERVOUS, ANXIOUS, OR ON EDGE: MORE THAN HALF THE DAYS
5. BEING SO RESTLESS THAT IT IS HARD TO SIT STILL: MORE THAN HALF THE DAYS
GAD7 TOTAL SCORE: 14
GAD7 TOTAL SCORE: 14

## 2024-02-06 ASSESSMENT — PATIENT HEALTH QUESTIONNAIRE - PHQ9
SUM OF ALL RESPONSES TO PHQ QUESTIONS 1-9: 11
10. IF YOU CHECKED OFF ANY PROBLEMS, HOW DIFFICULT HAVE THESE PROBLEMS MADE IT FOR YOU TO DO YOUR WORK, TAKE CARE OF THINGS AT HOME, OR GET ALONG WITH OTHER PEOPLE: SOMEWHAT DIFFICULT
SUM OF ALL RESPONSES TO PHQ QUESTIONS 1-9: 11

## 2024-02-06 ASSESSMENT — PAIN SCALES - GENERAL: PAINLEVEL: NO PAIN (0)

## 2024-02-06 NOTE — PATIENT INSTRUCTIONS
I can see you for anxiety.     Keep the Lexapro the same.     Will add BuSpar. Be patient.     Stay on your blood pressure medications.     Come back in one month. We'll check your blood pressure and see how your anxiety is doing.

## 2024-02-06 NOTE — PROGRESS NOTES
"  Assessment & Plan     (F41.1) SANDRA (generalized anxiety disorder)  (primary encounter diagnosis)  Comment: Recent increase in anxiety.-Tolerating SSRI therapy well.  Reviewed options, will add BuSpar, continue on Lexapro, may use benzodiazepines as needed for situational stress.  Plan: busPIRone (BUSPAR) 5 MG tablet        Follow-up in 1 month    (F33.1) Moderate episode of recurrent major depressive disorder (H)  Comment: Symptoms seem more consistent with anxiety versus depression.  Plan: New SSRI therapy.    (F10.21) Alcohol dependence in remission (H)  Comment: Patient states he is abstaining from alcohol.  Plan: Monitor.    (I10) Hypertension, goal below 140/90  Comment: Elevated, will treat anxiety, continue on current blood pressure medications and reevaluate in 1 month.  Plan: If still elevated may adjust his blood pressure medications.    No results found for any visits on 02/06/24.     Patient Instructions   I can see you for anxiety.     Keep the Lexapro the same.     Will add BuSpar. Be patient.     Stay on your blood pressure medications.     Come back in one month. We'll check your blood pressure and see how your anxiety is doing.          BMI  Estimated body mass index is 30.02 kg/m  as calculated from the following:    Height as of this encounter: 1.74 m (5' 8.5\").    Weight as of this encounter: 90.9 kg (200 lb 6.4 oz).   Weight management plan: Discussed healthy diet and exercise guidelines          Sandhya Amaral is a 58 year old, presenting for the following health issues:  Recheck Medication        2/6/2024    10:54 AM   Additional Questions   Roomed by Zoie KEATING MA   Accompanied by No one         2/6/2024    10:54 AM   Patient Reported Additional Medications   Patient reports taking the following new medications None     History of Present Illness       Hypertension: He presents for follow up of hypertension.  He does not check blood pressure  regularly outside of the clinic. Outpatient " blood pressures have not been over 140/90. He follows a low salt diet.     Vascular Disease:  He presents for follow up of vascular disease.     He never takes nitroglycerin. He is not taking daily aspirin.    He eats 0-1 servings of fruits and vegetables daily.He consumes 0 sweetened beverage(s) daily.He exercises with enough effort to increase his heart rate 9 or less minutes per day.  He exercises with enough effort to increase his heart rate 3 or less days per week.   He is taking medications regularly.         Hypertension Follow-up    Do you check your blood pressure regularly outside of the clinic? No   Are you following a low salt diet? Yes  Are your blood pressures ever more than 140 on the top number (systolic) OR more   than 90 on the bottom number (diastolic), for example 140/90? Yes    Anxiety Follow-Up  How are you doing with your anxiety since your last visit? Worsened   Are you having other symptoms that might be associated with anxiety? Yes:  feeling anxious, no energy, flutter feeling , dont ever feeling like doing anything.  Have you had a significant life event? No   Are you feeling depressed? Yes:  Not content with life  Do you have any concerns with your use of alcohol or other drugs? No    Social History     Tobacco Use    Smoking status: Former     Packs/day: 0.50     Years: 20.00     Additional pack years: 0.00     Total pack years: 10.00     Types: Cigarettes, Dip, chew, snus or snuff     Quit date: 2019     Years since quittin.4     Passive exposure: Past    Smokeless tobacco: Former     Types: Chew   Vaping Use    Vaping Use: Never used   Substance Use Topics    Alcohol use: No     Comment: hx alcohol abuse, sober since     Drug use: No         3/16/2023    12:57 PM 11/15/2023     2:19 PM 2024    10:39 AM   SANDRA-7 SCORE   Total Score 8 (mild anxiety) 12 (moderate anxiety) 14 (moderate anxiety)   Total Score 8    8 12 14         8/10/2023     9:24 AM 11/15/2023     2:17 PM  "2/6/2024    10:38 AM   PHQ   PHQ-9 Total Score 5 5 11   Q9: Thoughts of better off dead/self-harm past 2 weeks Not at all Not at all Not at all         2/6/2024    10:38 AM   Last PHQ-9   1.  Little interest or pleasure in doing things 2   2.  Feeling down, depressed, or hopeless 2   3.  Trouble falling or staying asleep, or sleeping too much 2   4.  Feeling tired or having little energy 2   5.  Poor appetite or overeating 1   6.  Feeling bad about yourself 2   7.  Trouble concentrating 0   8.  Moving slowly or restless 0   Q9: Thoughts of better off dead/self-harm past 2 weeks 0   PHQ-9 Total Score 11         2/6/2024    10:39 AM   SANDRA-7    1. Feeling nervous, anxious, or on edge 2   2. Not being able to stop or control worrying 2   3. Worrying too much about different things 2   4. Trouble relaxing 3   5. Being so restless that it is hard to sit still 2   6. Becoming easily annoyed or irritable 1   7. Feeling afraid, as if something awful might happen 2   SANDRA-7 Total Score 14   If you checked any problems, how difficult have they made it for you to do your work, take care of things at home, or get along with other people? Somewhat difficult         Review of Systems  Constitutional, HEENT, cardiovascular, pulmonary, gi and gu systems are negative, except as otherwise noted.      Objective    BP (!) 160/94   Pulse 86   Temp 98  F (36.7  C) (Temporal)   Resp 20   Ht 1.74 m (5' 8.5\")   Wt 90.9 kg (200 lb 6.4 oz)   SpO2 98%   BMI 30.02 kg/m    Body mass index is 30.02 kg/m .  Physical Exam   GENERAL: Healthy, alert and no distress  EYES: Eyes grossly normal to inspection, conjunctivae and sclerae normal  RESP: Lungs clear to auscultation - no rales, rhonchi or wheezes  CV: Regular rate and rhythm, normal S1 S2, no murmur  MS: No gross musculoskeletal defects noted, no edema  NEURO: Normal strength and tone, mentation intact and speech normal  PSYCH: Mentation appears normal, affect normal/bright         "     Signed Electronically by: Magalis Morales MD

## 2024-02-13 ENCOUNTER — PATIENT OUTREACH (OUTPATIENT)
Dept: CARE COORDINATION | Facility: CLINIC | Age: 59
End: 2024-02-13
Payer: COMMERCIAL

## 2024-03-06 ENCOUNTER — TELEPHONE (OUTPATIENT)
Dept: FAMILY MEDICINE | Facility: CLINIC | Age: 59
End: 2024-03-06
Payer: COMMERCIAL

## 2024-03-06 DIAGNOSIS — F41.1 GAD (GENERALIZED ANXIETY DISORDER): Chronic | ICD-10-CM

## 2024-03-06 RX ORDER — BUSPIRONE HYDROCHLORIDE 5 MG/1
TABLET ORAL
Qty: 180 TABLET | Refills: 0 | Status: CANCELLED | OUTPATIENT
Start: 2024-03-06 | End: 2024-05-04

## 2024-03-07 DIAGNOSIS — F41.1 GAD (GENERALIZED ANXIETY DISORDER): Chronic | ICD-10-CM

## 2024-03-07 NOTE — TELEPHONE ENCOUNTER
Please call patient to confirm current dose.     Deandra Sarmiento RN BSN  [facility]

## 2024-03-07 NOTE — TELEPHONE ENCOUNTER
RN left VM for patient requesting call back to clinic.    RN called to determine current dose.    RN replied to patient via [employer]. See message for details.     Rm Medina RN, BSN, PHN  [facility]: [place]

## 2024-03-08 RX ORDER — BUSPIRONE HYDROCHLORIDE 5 MG/1
TABLET ORAL
Qty: 120 TABLET | Refills: 1 | OUTPATIENT
Start: 2024-03-08 | End: 2024-05-06

## 2024-03-08 RX ORDER — BUSPIRONE HYDROCHLORIDE 10 MG/1
10 TABLET ORAL 2 TIMES DAILY
Qty: 180 TABLET | Refills: 1 | Status: SHIPPED | OUTPATIENT
Start: 2024-03-08 | End: 2024-06-25

## 2024-03-13 NOTE — TELEPHONE ENCOUNTER
Left msg for the patient to call back the [facility] [place] clinic. Please assist in scheduling medication recheck appointment (in clinic) if he returns call.  
Please call patient, due for recheck for further refills, please assist patient in scheduling appt (office visit to recheck BP and recheck meds).  Gabby refill given.       Nasreen Martínez PA-C    
Routing refill request to provider for review/approval because:  PHQ-9> 4  PHQ-9 score:    PHQ 2/18/2021   PHQ-9 Total Score 9   Q9: Thoughts of better off dead/self-harm past 2 weeks Not at all   F/U: Thoughts of suicide or self-harm -   F/U: Safety concerns -       Nasreen Montoya RN    
Scheduled 03/29/2021. ROSLYN Sheppard  
Yes

## 2024-03-28 DIAGNOSIS — F41.1 GAD (GENERALIZED ANXIETY DISORDER): ICD-10-CM

## 2024-03-28 NOTE — TELEPHONE ENCOUNTER
Medication Question or Refill    Contacts         Type Contact Phone/Fax    03/28/2024 09:22 AM CDT Phone (Incoming) Munir Storey (Self) [phone] (H)            What medication are you calling about (include dose and sig)?: Lorazepam 0.5mg    Preferred Pharmacy:   [facility] [id number] [facility] - [place] - [address]  [address]  [place] [address]  Phone: [phone] Fax: [phone]      Controlled Substance Agreement on file:   CSA -- Patient Level:    CSA: None found at the patient level.       Who prescribed the medication?: Dr. Morales    Do you need a refill? Yes    When did you use the medication last? 03/28/24    Patient offered an appointment? No    Do you have any questions or concerns?  No      Could we send this information to you in [facility] or would you prefer to receive a phone call?:   Patient would prefer a phone call   Okay to leave a detailed message?: Yes at Home number on file [phone] (home)

## 2024-03-29 RX ORDER — LORAZEPAM 0.5 MG/1
.25-.5 TABLET ORAL DAILY PRN
Qty: 15 TABLET | Refills: 1 | Status: SHIPPED | OUTPATIENT
Start: 2024-03-29

## 2024-04-03 ENCOUNTER — NURSE TRIAGE (OUTPATIENT)
Dept: FAMILY MEDICINE | Facility: CLINIC | Age: 59
End: 2024-04-03
Payer: COMMERCIAL

## 2024-04-03 NOTE — TELEPHONE ENCOUNTER
Nurse Triage SBAR    Is this a 2nd Level Triage? YES, LICENSED PRACTITIONER REVIEW IS REQUIRED    Situation: Pt calling to state that his BP is 182/104    Background: Pt has HTN and takes lisinopril-hydrochlorothiazide (ZESTORETIC) 20-12.5 MG tablet. See 2/6/24 OV:     (I10) Hypertension, goal below 140/90  Comment: Elevated, will treat anxiety, continue on current blood pressure medications and reevaluate in 1 month.  Plan: If still elevated may adjust his blood pressure medications.    Assessment: Pt did not take BP again but states that he took BP on both arms with elevated bp of 182/104. No difficulty breathing, no weakness, no blurred vision.     Protocol Recommended Disposition:   Discuss With PCP And Callback By Nurse Within 1 Hour: Has metropolol and amlodipine if pcp recommends taking this.       Recommendation: Team will need to call pt with pcp's advice in an hour.      Routed to provider    Does the patient meet one of the following criteria for ADS visit consideration? 16+ years old, with an [facility] PCP     TIP  Providers, please consider if this condition is appropriate for management at one of our Acute and Diagnostic Services sites.     If patient is a good candidate, please use dotphrase <dot>triageresponse and select Refer to ADS to document.    Gabby Jackson RN on 4/3/2024 at 2:41 PM    Reason for Disposition   Systolic BP >= 180 OR Diastolic >= 110, and missed most recent dose of blood pressure medication    Additional Information   Negative: Sounds like a life-threatening emergency to the triager   Negative: Symptom is main concern (e.g., headache, chest pain)   Negative: Low blood pressure is main concern   Negative: Systolic BP >= 160 OR Diastolic >= 100, and any cardiac (e.g., breathing difficulty, chest pain) or neurologic symptoms (e.g., new-onset blurred or double vision)   Negative: Pregnant 20 or more weeks (or postpartum < 6 weeks) with new hand or face swelling   Negative: Pregnant 20 or  "more weeks (or postpartum < 6 weeks) and Systolic BP >= 160 OR Diastolic >= 110   Negative: Patient sounds very sick or weak to the triager   Negative: Systolic BP >= 200 OR Diastolic >= 120 and having NO cardiac or neurologic symptoms   Negative: Pregnant 20 or more weeks (or postpartum < 6 weeks) with Systolic BP >= 140 OR Diastolic >= 90    Answer Assessment - Initial Assessment Questions  1. BLOOD PRESSURE: \"What is the blood pressure?\" \"Did you take at least two measurements 5 minutes apart?\"      182/101  2. ONSET: \"When did you take your blood pressure?\"      2pm 4/3/24  3. HOW: \"How did you take your blood pressure?\" (e.g., automatic home BP monitor, visiting nurse)      Automatic home BP monitor  4. HISTORY: \"Do you have a history of high blood pressure?\"      yes  5. MEDICINES: \"Are you taking any medicines for blood pressure?\" \"Have you missed any doses recently?\"      lisinopril  6. OTHER SYMPTOMS: \"Do you have any symptoms?\" (e.g., blurred vision, chest pain, difficulty breathing, headache, weakness)      Light headache can feel in face    Protocols used: Blood Pressure - High-A-OH    "

## 2024-04-04 NOTE — TELEPHONE ENCOUNTER
I talked with the patient.  Overall feels well, denies headaches, blurred vision, chest pain or shortness of breath.  He has been compliant with his medications.  Recent increase stress with his wife having surgery this coming Monday or  kidney mass, possible cancer.    Advised that he come in tomorrow for a MA blood pressure check.  He is to bring in his home blood pressure monitor to see how it correlates with our sphygmomanometer.    You please set up MA appointment for blood pressure check?  Thank you.

## 2024-04-04 NOTE — TELEPHONE ENCOUNTER
Patient called back & I informed him that Dr. Morales is planning to give him a call. Patient verbalized a good understanding.     Munir can be reached at [phone].     Secure chat message sent to Dr. Morales.     Deandra Sarmiento RN BSN  [facility]

## 2024-04-04 NOTE — TELEPHONE ENCOUNTER
Patient called back, scheduled to see RN at 11:30 AM tomorrow.   He will bring home monitor for comparison.    Sima PETERSON RN  [facility] Triage

## 2024-04-04 NOTE — TELEPHONE ENCOUNTER
MA's do not have a schedule, huddled with NICHOLE Lamb to get patient on the RN scheduled to do this visit.     Called patient to scheduled, left message to return call to clinic. Okay to schedule on the RN schedule tomorrow.     Nasreen Montoya RN

## 2024-04-05 ENCOUNTER — ALLIED HEALTH/NURSE VISIT (OUTPATIENT)
Dept: FAMILY MEDICINE | Facility: CLINIC | Age: 59
End: 2024-04-05
Payer: COMMERCIAL

## 2024-04-05 ENCOUNTER — TELEPHONE (OUTPATIENT)
Dept: FAMILY MEDICINE | Facility: CLINIC | Age: 59
End: 2024-04-05

## 2024-04-05 VITALS — SYSTOLIC BLOOD PRESSURE: 151 MMHG | DIASTOLIC BLOOD PRESSURE: 80 MMHG

## 2024-04-05 DIAGNOSIS — I10 HYPERTENSION, GOAL BELOW 140/90: Primary | ICD-10-CM

## 2024-04-05 DIAGNOSIS — Z87.891 PERSONAL HISTORY OF TOBACCO USE: Primary | ICD-10-CM

## 2024-04-05 PROCEDURE — 99207 PR NO CHARGE NURSE ONLY: CPT

## 2024-04-05 NOTE — TELEPHONE ENCOUNTER
Patient would like to know if he should be getting the CT CHEST LUNG CANCER SCREEN LOW DOSE WITHOUT this year. He states that he was told it would be done annually. Please call him to advise. There is currently no order in the system now.     Thank you,  Andrew [facility]

## 2024-04-05 NOTE — TELEPHONE ENCOUNTER
"Patient came to RN blood pressure check visit. Please see note in that encounter.     Patient came to the office with a OTC supplement:       \"Super Beets\" Heart Chews (Made by [employer]). He was wondering if these would be appropriate to use? Hopeful this could help him get off of some blood pressure medications.     Please advise     Robyn Mac RN on 4/5/2024 at 11:56 AM    "

## 2024-04-05 NOTE — PROGRESS NOTES
He presents to the clinic to compare his home blood pressure cuff to our machine here at the clinic.     Patient states he typically does not take his blood pressure at home. He has anxiety around taking BP at home, this is why he does not monitor it.  He stated he can feel when his BP is high because he gets an itchy face and itchy eyes.     He takes his blood pressure medications daily, he does not have troubles remembering his medication. He takes as prescribed.     Patient took lorazepam at 6:30 am today. He took LORazepam (ATIVAN) 0.5 MG tablet   Half a tablet (= 0.25 mg)    Patients home BP cuff was taken on his right upper arm. He was in the sitting position, feet flat on the floor. RN gave time in the room to relax prior to test.   155/87  He stated he does not feel any symptoms. Denies blurred vision or headaches.     Office blood pressure machine:  129/90    RN offered to do manual, he declined and was okay with our machine.     ------    10 minutes later we retook BP     Home machine:   149/82    BP on office blood pressure cuff   151/80    Throughout visit, patient expressed significant anxiety around a stressor in his life. His wife is having surgery on Monday and this was a great stressor currently.     Dr. Morales did come into RN visit and spoke to patient prior to discharge.      Routing to provider to review.         Robyn Mac RN on 4/5/2024 at 11:53 AM                
Equal and normal pulses (carotid, femoral, dorsalis pedis)

## 2024-04-08 NOTE — TELEPHONE ENCOUNTER
Order placed for lung cancer screening CT. Our office should be contacting him or he may schedule through [employer]. Please update patient. Thank you.

## 2024-04-08 NOTE — TELEPHONE ENCOUNTER
Dr. Morales came into the visit to answer these questions. RN closing this encounter.     Robyn Mac RN on 4/8/2024 at 7:06 AM

## 2024-04-08 NOTE — TELEPHONE ENCOUNTER
Patient notified and voiced understanding and agreement.  I provided  patient the Imaging Scheduling line phone number.    Deandra ENCISO  [facility]

## 2024-04-21 ENCOUNTER — HEALTH MAINTENANCE LETTER (OUTPATIENT)
Age: 59
End: 2024-04-21

## 2024-05-28 DIAGNOSIS — I10 HYPERTENSION, GOAL BELOW 140/90: ICD-10-CM

## 2024-05-29 RX ORDER — LISINOPRIL AND HYDROCHLOROTHIAZIDE 12.5; 2 MG/1; MG/1
2 TABLET ORAL DAILY
Qty: 60 TABLET | Refills: 0 | Status: SHIPPED | OUTPATIENT
Start: 2024-05-29 | End: 2024-06-27

## 2024-06-25 ENCOUNTER — OFFICE VISIT (OUTPATIENT)
Dept: FAMILY MEDICINE | Facility: CLINIC | Age: 59
End: 2024-06-25
Payer: COMMERCIAL

## 2024-06-25 VITALS
WEIGHT: 182 LBS | OXYGEN SATURATION: 98 % | BODY MASS INDEX: 26.96 KG/M2 | HEIGHT: 69 IN | TEMPERATURE: 97.6 F | RESPIRATION RATE: 16 BRPM | SYSTOLIC BLOOD PRESSURE: 136 MMHG | HEART RATE: 78 BPM | DIASTOLIC BLOOD PRESSURE: 70 MMHG

## 2024-06-25 DIAGNOSIS — I25.10 CORONARY ARTERY CALCIFICATION SEEN ON CT SCAN: ICD-10-CM

## 2024-06-25 DIAGNOSIS — H61.23 BILATERAL IMPACTED CERUMEN: ICD-10-CM

## 2024-06-25 DIAGNOSIS — I10 HYPERTENSION, GOAL BELOW 140/90: Primary | Chronic | ICD-10-CM

## 2024-06-25 DIAGNOSIS — R09.81 NASAL CONGESTION: ICD-10-CM

## 2024-06-25 DIAGNOSIS — F41.1 GAD (GENERALIZED ANXIETY DISORDER): Chronic | ICD-10-CM

## 2024-06-25 DIAGNOSIS — F10.21 ALCOHOL DEPENDENCE IN REMISSION (H): ICD-10-CM

## 2024-06-25 DIAGNOSIS — Z72.0 TOBACCO ABUSE: ICD-10-CM

## 2024-06-25 PROCEDURE — 96127 BRIEF EMOTIONAL/BEHAV ASSMT: CPT | Performed by: FAMILY MEDICINE

## 2024-06-25 PROCEDURE — 99214 OFFICE O/P EST MOD 30 MIN: CPT | Mod: 25 | Performed by: FAMILY MEDICINE

## 2024-06-25 PROCEDURE — 69209 REMOVE IMPACTED EAR WAX UNI: CPT | Mod: 50 | Performed by: FAMILY MEDICINE

## 2024-06-25 ASSESSMENT — PATIENT HEALTH QUESTIONNAIRE - PHQ9
10. IF YOU CHECKED OFF ANY PROBLEMS, HOW DIFFICULT HAVE THESE PROBLEMS MADE IT FOR YOU TO DO YOUR WORK, TAKE CARE OF THINGS AT HOME, OR GET ALONG WITH OTHER PEOPLE: SOMEWHAT DIFFICULT
SUM OF ALL RESPONSES TO PHQ QUESTIONS 1-9: 10
SUM OF ALL RESPONSES TO PHQ QUESTIONS 1-9: 10

## 2024-06-25 ASSESSMENT — ANXIETY QUESTIONNAIRES
1. FEELING NERVOUS, ANXIOUS, OR ON EDGE: MORE THAN HALF THE DAYS
6. BECOMING EASILY ANNOYED OR IRRITABLE: NOT AT ALL
GAD7 TOTAL SCORE: 9
3. WORRYING TOO MUCH ABOUT DIFFERENT THINGS: MORE THAN HALF THE DAYS
7. FEELING AFRAID AS IF SOMETHING AWFUL MIGHT HAPPEN: MORE THAN HALF THE DAYS
4. TROUBLE RELAXING: SEVERAL DAYS
7. FEELING AFRAID AS IF SOMETHING AWFUL MIGHT HAPPEN: MORE THAN HALF THE DAYS
5. BEING SO RESTLESS THAT IT IS HARD TO SIT STILL: NOT AT ALL
IF YOU CHECKED OFF ANY PROBLEMS ON THIS QUESTIONNAIRE, HOW DIFFICULT HAVE THESE PROBLEMS MADE IT FOR YOU TO DO YOUR WORK, TAKE CARE OF THINGS AT HOME, OR GET ALONG WITH OTHER PEOPLE: SOMEWHAT DIFFICULT
2. NOT BEING ABLE TO STOP OR CONTROL WORRYING: MORE THAN HALF THE DAYS
8. IF YOU CHECKED OFF ANY PROBLEMS, HOW DIFFICULT HAVE THESE MADE IT FOR YOU TO DO YOUR WORK, TAKE CARE OF THINGS AT HOME, OR GET ALONG WITH OTHER PEOPLE?: SOMEWHAT DIFFICULT
GAD7 TOTAL SCORE: 9
GAD7 TOTAL SCORE: 9

## 2024-06-25 ASSESSMENT — PAIN SCALES - GENERAL: PAINLEVEL: NO PAIN (1)

## 2024-06-25 NOTE — PROGRESS NOTES
Assessment & Plan     (I10) Hypertension, goal below 140/90  (primary encounter diagnosis)  Comment: Currently on 3 drug regimen: Beta-blocker, thiazide diuretic, and ACE inhibitor.  Within guidelines.  Excellent renal function, normal potassium.  Plan: Continue.    (F41.1) SANDRA (generalized anxiety disorder)  Comment: Ongoing, patient functional but frustrated with this anxiety.  He discontinued BuSpar, currently on SSRI therapy and hydroxyzine.  Plan: Reviewed options.  Will increase his Lexapro to 15 mg daily.  Discontinue BuSpar, patient will be seeing a counselor this afternoon, stressed the importance of establishing with a counselor for ongoing therapy.  Using lorazepam sparingly.  Discussed that some of anxiety may be secondary to untreated sleep apnea.  Encouraged regular use of his CPAP.    (R09.81) Nasal congestion  Comment: Chronic.  Plan: Adult ENT  Referral        Will refer to ENT for consultation.    (Z72.0) Tobacco abuse  Comment: Patient has quit smoking, quit chewing tobacco.  Now is on nicotine pouches and intends to quit completely.  Plan: Encouragement given.    (F10.21) Alcohol dependence in remission (H)  Comment: Patient has been maintaining sobriety.  Denies alcohol use.  Plan: Monitor.    (I25.10) Coronary artery calcification seen on CT scan  Comment: Incidental finding on pulmonary CT scan for lung cancer screening.  Plan: Continue high intensity statin.    (H61.23) Bilateral impacted cerumen  Comment: Successful irrigation by MA.  Plan: VT REMOVAL IMPACTED CERUMEN IRRIGATION/LVG         UNILAT            Patient Instructions   For the anxiety, I recommend using the CPAP consistently.    See the counselor.     Increase the Lexapro, or escitalopram to 15 mg per day. Give this one month.     Okay with the gummies.     Blood pressure is good.     Follow up in 3 months.                  Regular exercise    Subjective   Munir is a 59 year old, presenting for the following health  issues:  Recheck Medication        2024    10:37 AM   Additional Questions   Roomed by Osiris   Accompanied by Self         2024    10:37 AM   Patient Reported Additional Medications   Patient reports taking the following new medications OTC Benedryl       Patient with history of Anxiety, Depression and Hypertension arrived for medication follow-up. GAD7 and PHQ9 completed.     Patient would also like Left Ear looked at, reports there was pain and loss of hearing.     History of Present Illness       Mental Health Follow-up:  Patient presents to follow-up on Depression & Anxiety.Patient's depression since last visit has been:  No change  The patient is not having other symptoms associated with depression.  Patient's anxiety since last visit has been:  No change  The patient is not having other symptoms associated with anxiety.  Any significant life events: job concerns and financial concerns  Patient is not feeling anxious or having panic attacks.  Patient has no concerns about alcohol or drug use.    Hypertension: He presents for follow up of hypertension.  He does not check blood pressure  regularly outside of the clinic. Outside blood pressures have been over 140/90. He follows a low salt diet.     He eats 2-3 servings of fruits and vegetables daily.He consumes 0 sweetened beverage(s) daily.He exercises with enough effort to increase his heart rate 9 or less minutes per day.  He exercises with enough effort to increase his heart rate 3 or less days per week.   He is taking medications regularly.     Social History     Tobacco Use    Smoking status: Former     Current packs/day: 0.00     Average packs/day: 1 pack/day for 20.0 years (20.0 ttl pk-yrs)     Types: Cigarettes, Dip, chew, snus or snuff     Start date: 1999     Quit date: 2019     Years since quittin.8     Passive exposure: Past    Smokeless tobacco: Former     Types: Chew   Vaping Use    Vaping status: Never Used   Substance Use  "Topics    Alcohol use: No     Comment: hx alcohol abuse, sober since 2015    Drug use: No         11/15/2023     2:17 PM 2/6/2024    10:38 AM 6/25/2024    10:29 AM   PHQ   PHQ-9 Total Score 5 11 10   Q9: Thoughts of better off dead/self-harm past 2 weeks Not at all Not at all Not at all         11/15/2023     2:19 PM 2/6/2024    10:39 AM 6/25/2024    10:30 AM   SANDRA-7 SCORE   Total Score 12 (moderate anxiety) 14 (moderate anxiety) 9 (mild anxiety)   Total Score 12 14 9             Review of Systems  CONSTITUTIONAL: NEGATIVE for fever, chills, change in weight  ENT/MOUTH: Left ear fullness, chronic nasal congestion.  RESP: NEGATIVE for significant cough or SOB  CV: NEGATIVE for chest pain, palpitations or peripheral edema  GI: NEGATIVE for nausea, abdominal pain, heartburn, or change in bowel habits  PSYCHIATRIC: Ongoing anxiety.      Objective    /70   Pulse 78   Temp 97.6  F (36.4  C) (Tympanic)   Resp 16   Ht 1.74 m (5' 8.5\")   Wt 82.6 kg (182 lb)   SpO2 98%   BMI 27.27 kg/m    Body mass index is 27.27 kg/m .  Physical Exam   GENERAL: Healthy, alert and no distress  EYES: Eyes grossly normal to inspection, conjunctivae and sclerae normal  Heent: Ear canals show bilateral cerumen impaction, no facial pain, oral mucosa moist, posterior pharynx without erythema or exudate.    RESP: Lungs clear to auscultation - no rales, rhonchi or wheezes  CV: Regular rate and rhythm, normal S1 S2, no murmur  MS: No gross musculoskeletal defects noted, no edema  NEURO: Normal strength and tone, mentation intact and speech normal  PSYCH: Mentation appears normal, affect normal/bright       The longitudinal plan of care for the diagnosis(es)/condition(s) as documented were addressed during this visit. Due to the added complexity in care, I will continue to support Munir in the subsequent management and with ongoing continuity of care.         Signed Electronically by: Magalis Morales MD    "

## 2024-06-25 NOTE — PATIENT INSTRUCTIONS
For the anxiety, I recommend using the CPAP consistently.    See the counselor.     Increase the Lexapro, or escitalopram to 15 mg per day. Give this one month.     Okay with the gummies.     Blood pressure is good.     Follow up in 3 months.

## 2024-06-25 NOTE — NURSING NOTE
Patient identified using two patient identifiers.  Ear exam showing wax occlusion completed by RN.  Solution: warm water was placed in the bilateral ear(s) via irrigation tool: elephant ear.     Osiris Pike MA on 6/25/2024 at 11:30 AM

## 2024-06-26 DIAGNOSIS — I10 HYPERTENSION, GOAL BELOW 140/90: ICD-10-CM

## 2024-06-27 RX ORDER — LISINOPRIL AND HYDROCHLOROTHIAZIDE 12.5; 2 MG/1; MG/1
2 TABLET ORAL DAILY
Qty: 180 TABLET | Refills: 1 | Status: SHIPPED | OUTPATIENT
Start: 2024-06-27

## 2024-09-07 DIAGNOSIS — F41.1 GAD (GENERALIZED ANXIETY DISORDER): ICD-10-CM

## 2024-09-09 RX ORDER — ESCITALOPRAM OXALATE 10 MG/1
10 TABLET ORAL DAILY
Qty: 90 TABLET | Refills: 1 | Status: SHIPPED | OUTPATIENT
Start: 2024-09-09

## 2024-09-10 ENCOUNTER — PATIENT OUTREACH (OUTPATIENT)
Dept: CARE COORDINATION | Facility: CLINIC | Age: 59
End: 2024-09-10
Payer: COMMERCIAL

## 2024-10-01 NOTE — GROUP NOTE
"Subjective   Patient ID: Gracy Swanson is a 79 y.o. female who presents for Follow-up.    Ms. Swanson today came here for follow-up on various conditions.  Overall, she is a happy person.  Appetite and weight are okay.  No chest pain.  She is okay for flu shot.  Taking medications regularly.  No side effects.  She came for follow-up.    I have personally reviewed the patient's Past Medical History, Medications, Allergies, Social History, and Family History in the EMR.    Review of Systems   All other systems reviewed and are negative.    Objective   /60   Ht 1.702 m (5' 7\")   Wt 86.6 kg (191 lb)   BMI 29.91 kg/m²     Physical Exam  Vitals reviewed.   Cardiovascular:      Heart sounds: Normal heart sounds, S1 normal and S2 normal. No murmur heard.     No friction rub.   Pulmonary:      Effort: Pulmonary effort is normal.      Breath sounds: Normal breath sounds and air entry.   Abdominal:      Palpations: There is no hepatomegaly, splenomegaly or mass.   Musculoskeletal:      Right lower leg: No edema.      Left lower leg: No edema.   Lymphadenopathy:      Lower Body: No right inguinal adenopathy. No left inguinal adenopathy.   Neurological:      Cranial Nerves: Cranial nerves 2-12 are intact.      Sensory: No sensory deficit.      Motor: Motor function is intact.      Deep Tendon Reflexes: Reflexes are normal and symmetric.     LAB WORK: Laboratory testing discussed.    Assessment/Plan   Problem List Items Addressed This Visit             ICD-10-CM       Cardiac and Vasculature    Hypercholesterolemia E78.00    Relevant Medications    atorvastatin (Lipitor) 20 mg tablet    Other Relevant Orders    Comprehensive Metabolic Panel    Lipid Panel    Hypertension I10    Relevant Medications    losartan (Cozaar) 100 mg tablet    Other Relevant Orders    CBC    Urinalysis with Reflex Microscopic    Thyroid Stimulating Hormone       Endocrine/Metabolic    Hypothyroidism - Primary E03.9     Other Visit Diagnoses  " Psychoeducation Group Note    PATIENT'S NAME: Munir Storey  MRN:   [id number]  :   1965  ACCT. NUMBER: [id number]  DATE OF SERVICE: 22  START TIME:  3:00 PM  END TIME:  3:50 PM  FACILITATOR: Manuel Pisano RN  TOPIC: MH Life Skills Group: Lifestyle Balance and Structure                                    Service Modality:  Video Visit     Telemedicine Visit: The patient's condition can be safely assessed and treated via synchronous audio and visual telemedicine encounter.      Reason for Telemedicine Visit: Covid19    Originating Site (Patient Location): Patient's home    Distant Site (Provider Location): Provider Remote Setting- Home Office    Consent:  The patient/guardian has verbally consented to: the potential risks and benefits of telemedicine (video visit) versus in person care; bill my insurance or make self-payment for services provided; and responsibility for payment of non-covered services.     Patient would like the video invitation sent by:  My Chart    Mode of Communication:  Video Conference via Medical Zoom    As the provider I attest to compliance with applicable laws and regulations related to telemedicine.        [facility] Hospitalization Program  TRACK: PHP1    NUMBER OF PARTICIPANTS: 7    Summary of Group / Topics Discussed:  Lifestyle Balance and Strucure:  Time Management: Time for Tips and Tips for Time: Patients were introduced to how effective time management is beneficial to self esteem, relationships with others, life balance, and other aspects of daily life.   Patients were taught strategies on how to improve time management skills.    Patient Session Goals / Objectives:    Facilitated the discussion on challenges/barriers and personal situations with time management     Identified specific techniques and strategies to improve time management to support mental health recovery       Identified a plan to implement strategies into daily life to support         Codes    Need for influenza vaccination     Z23    Relevant Orders    Flu vaccine, trivalent, preservative free, HIGH-DOSE, age 65y+ (Fluzone) (Completed)    Pre-diabetes     R73.03    Dermatitis     L30.9    Vitamin D deficiency     E55.9        1. Hypothyroid.  Same dose of Synthroid okay.  2. Hypertension, okay.  3. High cholesterol, on medication.  4. Pre-diabetic, stable.  Diet, exercise.  5. Dermatitis, okay.  6. Low vitamin D, given.  7. Recommended mammogram, she turned down.  She does not want Pap test, mammogram, colonoscopy.  8. Flu shot given.  9. Follow-up in three months, but always happy to serve her anytime sooner if necessary.    Scribe Attestation  By signing my name below, IAlina Scribe attest that this documentation has been prepared under the direction and in the presence of Milind Parada MD.    improved time management       Patient Participation / Response:  Fully participated with the group by sharing personal reflections / insights and openly received / provided feedback with other participants.    Verbalized understanding of content    Treatment Plan:  Patient has a current master individualized treatment plan.  See Epic treatment plan for more information.    Manuel Pisano RN

## 2025-01-15 ENCOUNTER — ANCILLARY PROCEDURE (OUTPATIENT)
Dept: CT IMAGING | Facility: CLINIC | Age: 60
End: 2025-01-15
Payer: COMMERCIAL

## 2025-01-15 DIAGNOSIS — Z87.891 SMOKING HISTORY: ICD-10-CM

## 2025-01-15 PROCEDURE — 71271 CT THORAX LUNG CANCER SCR C-: CPT | Mod: TC | Performed by: RADIOLOGY

## 2025-01-27 ENCOUNTER — OFFICE VISIT (OUTPATIENT)
Dept: INTERNAL MEDICINE | Facility: CLINIC | Age: 60
End: 2025-01-27
Payer: COMMERCIAL

## 2025-01-27 VITALS
HEIGHT: 68 IN | DIASTOLIC BLOOD PRESSURE: 80 MMHG | OXYGEN SATURATION: 99 % | WEIGHT: 201 LBS | RESPIRATION RATE: 18 BRPM | TEMPERATURE: 98.3 F | BODY MASS INDEX: 30.46 KG/M2 | SYSTOLIC BLOOD PRESSURE: 139 MMHG | HEART RATE: 77 BPM

## 2025-01-27 DIAGNOSIS — E78.2 MIXED HYPERLIPIDEMIA: Primary | ICD-10-CM

## 2025-01-27 DIAGNOSIS — F32.A ANXIETY AND DEPRESSION: ICD-10-CM

## 2025-01-27 DIAGNOSIS — Z12.11 SCREEN FOR COLON CANCER: ICD-10-CM

## 2025-01-27 DIAGNOSIS — G89.29 CHRONIC LEFT SHOULDER PAIN: ICD-10-CM

## 2025-01-27 DIAGNOSIS — M25.512 CHRONIC LEFT SHOULDER PAIN: ICD-10-CM

## 2025-01-27 DIAGNOSIS — F41.9 ANXIETY AND DEPRESSION: ICD-10-CM

## 2025-01-27 PROCEDURE — 99214 OFFICE O/P EST MOD 30 MIN: CPT

## 2025-01-27 RX ORDER — ESCITALOPRAM OXALATE 5 MG/1
15 TABLET ORAL DAILY
Qty: 90 TABLET | Refills: 0 | Status: SHIPPED | OUTPATIENT
Start: 2025-01-27 | End: 2025-02-26

## 2025-01-27 NOTE — PATIENT INSTRUCTIONS
Bergamot 500 mg-1,000 mg can help lower cholesterol     [employer], [employer] and [employer]     Quanol COQ10 heart

## 2025-01-27 NOTE — PROGRESS NOTES
(E78.2) Mixed hyperlipidemia  (primary encounter diagnosis)  Comment: Patient see in clinic today to discuss prior lab results discussed how to improve his lipid labs with diet and exercise as well as discussed Bergamot supplement and how it can help decrease lipid values. Patient does not want to start a statin at this time discussed doing repeat lab in 6 months.   Plan: Lipid panel reflex to direct LDL Fasting        Future     (Z12.11) Screen for colon cancer  Comment: Patient has colonoscopy schedule with another clinic will update with results.   Plan: Patient will update with results     (F41.9,  F32.A) Anxiety and depression  Comment: Chronic, unstable. Discussed changing dose to 15 mg. Discussed updating with any negative side effects. Patient acknowledged and agreed to plan of care.   Plan: escitalopram (LEXAPRO) 5 MG tablet        Prescription sent to pharmacy    (M25.512,  G89.29) Chronic left shoulder pain  Comment: Chronic, unstable.  Patient's left shoulder pain has recently increased and has affected his range of motion.  Discussed need for orthopedic referral order placed.  Plan: Orthopedic  Referral        Future            Sandhya Amaral is a 59 year old, presenting for the following health issues:  Hypertension and Results (LAB and CT results )        1/27/2025     7:41 AM   Additional Questions   Roomed by Sarah LOVE     History of Present Illness       Hypertension: He presents for follow up of hypertension.  He does not check blood pressure  regularly outside of the clinic. Outside blood pressures have been over 140/90. He follows a low salt diet.     He eats 0-1 servings of fruits and vegetables daily.He consumes 0 sweetened beverage(s) daily.He exercises with enough effort to increase his heart rate 9 or less minutes per day.  He exercises with enough effort to increase his heart rate 3 or less days per week. He is missing 1 dose(s) of medications per week.                 Review  "of Systems  Constitutional, HEENT, cardiovascular, pulmonary, gi and gu systems are negative, except as otherwise noted.      Objective    BP (!) 165/87 (BP Location: Left arm, Patient Position: Sitting, Cuff Size: Adult Regular)   Pulse 77   Temp 98.3  F (36.8  C) (Temporal)   Resp 18   Ht 1.715 m (5' 7.5\")   Wt 91.2 kg (201 lb)   SpO2 99%   BMI 31.02 kg/m    Body mass index is 31.02 kg/m .  Physical Exam  Constitutional:       Appearance: Normal appearance.   HENT:      Nose: Nose normal. No congestion or rhinorrhea.   Eyes:      General:         Right eye: No discharge.         Left eye: No discharge.      Pupils: Pupils are equal, round, and reactive to light.   Pulmonary:      Effort: Pulmonary effort is normal. No respiratory distress.      Breath sounds: Normal breath sounds. No stridor. No wheezing, rhonchi or rales.   Chest:      Chest wall: No tenderness.   Musculoskeletal:         General: Tenderness present.      Comments: Left shoulder    Skin:     General: Skin is warm.      Coloration: Skin is not jaundiced or pale.      Findings: No bruising, erythema, lesion or rash.   Neurological:      General: No focal deficit present.      Mental Status: He is alert and oriented to person, place, and time.   Psychiatric:         Mood and Affect: Mood normal.         Behavior: Behavior normal.         Thought Content: Thought content normal.         Judgment: Judgment normal.          Office Visit on 12/13/2024   Component Date Value Ref Range Status    Cholesterol 12/13/2024 225 (H)  <200 mg/dL Final    Triglycerides 12/13/2024 100  <150 mg/dL Final    Direct Measure HDL 12/13/2024 33 (L)  >=40 mg/dL Final    LDL Cholesterol Calculated 12/13/2024 172 (H)  <100 mg/dL Final    Non HDL Cholesterol 12/13/2024 192 (H)  <130 mg/dL Final    Patient Fasting > 8hrs? 12/13/2024 Yes   Final    Sodium 12/13/2024 135  135 - 145 mmol/L Final    Potassium 12/13/2024 5.0  3.4 - 5.3 mmol/L Final    Carbon Dioxide (CO2) " 12/13/2024 25  22 - 29 mmol/L Final    Anion Gap 12/13/2024 10  7 - 15 mmol/L Final    Urea Nitrogen 12/13/2024 33.2 (H)  8.0 - 23.0 mg/dL Final    Creatinine 12/13/2024 0.81  0.67 - 1.17 mg/dL Final    GFR Estimate 12/13/2024 >90  >60 mL/min/1.73m2 Final    eGFR calculated using 2021 CKD-EPI equation.    Calcium 12/13/2024 9.4  8.8 - 10.4 mg/dL Final    Reference intervals for this test were updated on 7/16/2024 to reflect our healthy population more accurately. There may be differences in the flagging of prior results with similar values performed with this method. Those prior results can be interpreted in the context of the updated reference intervals.    Chloride 12/13/2024 100  98 - 107 mmol/L Final    Glucose 12/13/2024 102 (H)  70 - 99 mg/dL Final    Alkaline Phosphatase 12/13/2024 54  40 - 150 U/L Final    AST 12/13/2024 21  0 - 45 U/L Final    ALT 12/13/2024 25  0 - 70 U/L Final    Protein Total 12/13/2024 7.5  6.4 - 8.3 g/dL Final    Albumin 12/13/2024 4.7  3.5 - 5.2 g/dL Final    Bilirubin Total 12/13/2024 0.3  <=1.2 mg/dL Final    Patient Fasting > 8hrs? 12/13/2024 Yes   Final    Vitamin D, Total (25-Hydroxy) 12/13/2024 28  20 - 50 ng/mL Final    optimum levels    Vitamin B12 12/13/2024 476  232 - 1,245 pg/mL Final    Estimated Average Glucose 12/13/2024 111  <117 mg/dL Final    Hemoglobin A1C 12/13/2024 5.5  0.0 - 5.6 % Final    Normal <5.7%   Prediabetes 5.7-6.4%    Diabetes 6.5% or higher     Note: Adopted from ADA consensus guidelines.    Magnesium 12/13/2024 2.4 (H)  1.7 - 2.3 mg/dL Final    Vitamin B2 12/13/2024 3  1 - 19 mcg/L Final       -------------------ADDITIONAL INFORMATION-------------------  This test was developed and its performance characteristics   determined by [facility] in a manner consistent with CLIA   requirements. This test has not been cleared or approved by   the U.S. Food and Drug Administration.     Test Performed by:  [facility]  [address]  [address] [id number]  : Tomas Huerta Ph.D.; CLIA# [id number]    WBC Count 12/13/2024 3.8 (L)  4.0 - 11.0 10e3/uL Final    RBC Count 12/13/2024 4.75  4.40 - 5.90 10e6/uL Final    Hemoglobin 12/13/2024 14.9  13.3 - 17.7 g/dL Final    Hematocrit 12/13/2024 44.1  40.0 - 53.0 % Final    MCV 12/13/2024 93  78 - 100 fL Final    MCH 12/13/2024 31.4  26.5 - 33.0 pg Final    MCHC 12/13/2024 33.8  31.5 - 36.5 g/dL Final    RDW 12/13/2024 12.8  10.0 - 15.0 % Final    Platelet Count 12/13/2024 203  150 - 450 10e3/uL Final    % Neutrophils 12/13/2024 67  % Final    % Lymphocytes 12/13/2024 23  % Final    % Monocytes 12/13/2024 8  % Final    % Eosinophils 12/13/2024 2  % Final    % Basophils 12/13/2024 1  % Final    % Immature Granulocytes 12/13/2024 0  % Final    Absolute Neutrophils 12/13/2024 2.6  1.6 - 8.3 10e3/uL Final    Absolute Lymphocytes 12/13/2024 0.9  0.8 - 5.3 10e3/uL Final    Absolute Monocytes 12/13/2024 0.3  0.0 - 1.3 10e3/uL Final    Absolute Eosinophils 12/13/2024 0.1  0.0 - 0.7 10e3/uL Final    Absolute Basophils 12/13/2024 0.0  0.0 - 0.2 10e3/uL Final    Absolute Immature Granulocytes 12/13/2024 0.0  <=0.4 10e3/uL Final           I spent a total of 30 minutes on the day of the visit.   Time spent by me today doing chart review, history and exam, documentation and further activities per the note    Signed Electronically by: CORAZON Ahmadi CNP

## 2025-02-03 ENCOUNTER — OFFICE VISIT (OUTPATIENT)
Dept: ORTHOPEDICS | Facility: CLINIC | Age: 60
End: 2025-02-03
Payer: COMMERCIAL

## 2025-02-03 ENCOUNTER — ANCILLARY PROCEDURE (OUTPATIENT)
Dept: GENERAL RADIOLOGY | Facility: CLINIC | Age: 60
End: 2025-02-03
Attending: PEDIATRICS
Payer: COMMERCIAL

## 2025-02-03 DIAGNOSIS — G89.29 CHRONIC LEFT SHOULDER PAIN: ICD-10-CM

## 2025-02-03 DIAGNOSIS — M25.512 CHRONIC LEFT SHOULDER PAIN: ICD-10-CM

## 2025-02-03 PROCEDURE — 99244 OFF/OP CNSLTJ NEW/EST MOD 40: CPT | Performed by: PEDIATRICS

## 2025-02-03 PROCEDURE — 73030 X-RAY EXAM OF SHOULDER: CPT | Mod: TC | Performed by: RADIOLOGY

## 2025-02-03 NOTE — PROGRESS NOTES
ASSESSMENT & PLAN    Munir was seen today for pain.    Diagnoses and all orders for this visit:    Chronic left shoulder pain  -     Orthopedic  Referral  -     XR Shoulder Left G/E 3 Views; Future  -     MR Shoulder Left w/o Contrast; Future      This issue is chronic and Unchanged.        ICD-10-CM    1. Chronic left shoulder pain  M25.512 Orthopedic  Referral    G89.29 XR Shoulder Left G/E 3 Views     MR Shoulder Left w/o Contrast          We discussed these other possible diagnosis: Left shoulder pain with decreased, motion, concern for rotator cuff tear, will obtain MRI.    Plan:  - Today's Plan of Care:  MRI of the Left Shoulder - Call [phone] to schedule MRI     Discussed activity considerations and other supportive care including Ice/Heat, OTC and other topical medications as needed.    Start Home Exercise Program    -We also discussed other future treatment options:  Referral to Orthopedic Surgery  Referral to Physical Therapy    Follow Up: In clinic with Dr. Dc after MRI (wait at least 1-2 days)     Concerning signs and symptoms were reviewed and all questions were answered at this time.    Thanks for the opportunity to participate in the care of this patient, I will keep you updated on their progress.    CC: Stephy Dc MD [facility]  Sports Medicine Physician  [facility]    -----  Chief Complaint   Patient presents with    Left Shoulder - Pain       SUBJECTIVE  Munir Storey is a/an 59 year old male who is seen in consultation at the request of  Stephy Gilliam C.N.P. for evaluation of left shoulder pain.    The patient is seen by themselves.  The patient is Right handed    Onset: 4 month(s) ago. Reports insidious onset without acute precipitating event.  Location of Pain: left shoulder; RTC   Worsened by: abduction, sleeping (arms overhead), overhead,   Better with: unsure   Treatments tried: no treatment tried to  date  Associated symptoms: weakness of left shoulder and decreased ROM     Orthopedic/Surgical history: YES - Date: has had Cervical spine surgery in the past  Social History/Occupation: likes to golf    REVIEW OF SYSTEMS:  Review of Systems    OBJECTIVE:  There were no vitals taken for this visit.   General: healthy, alert and in no distress  Skin: no suspicious lesions or rash.  CV: distal perfusion intact   Resp: normal respiratory effort without conversational dyspnea   Psych: normal mood and affect  Gait: NORMAL  Neuro: Normal light sensory exam of upper extremity    Bilateral Shoulder exam    Inspection and Posture:       normal    Skin:        no visible deformities    Tender:        subacromial space left    Non Tender:       remainder of shoulder bilateral    ROM:        forward flexion 140  left       abduction 90 left       internal rotation thoracic left       external rotation 35 left    Painful motions:       flexion left       elevation left    Strength:        abduction 3/5 left       flexion 4/5 left       internal rotation 5/5 bilateral       external rotation 5/5 bilateral    Impingement testing:       neg (-) Neer left       positive (+) Presley left    Sensation:        normal sensation over shoulder and upper extremity      RADIOLOGY:  Final results and radiologist's interpretation, available in the [facility] health record.  Images were reviewed with the patient in the office today.  My personal interpretation of the performed imaging:     3 XR views of left shoulder reviewed: no acute bony abnormality, mild GH joint degenerative change  - will follow official read    Review of the result(s) of each unique test - XR

## 2025-02-03 NOTE — PATIENT INSTRUCTIONS
We discussed these other possible diagnosis: Left shoulder pain with decreased, motion, concern for rotator cuff tear, will obtain MRI.    Plan:  - Today's Plan of Care:  MRI of the Left Shoulder - Call [phone] to schedule MRI     Discussed activity considerations and other supportive care including Ice/Heat, OTC and other topical medications as needed.    Start Home Exercise Program    -We also discussed other future treatment options:  Referral to Orthopedic Surgery  Referral to Physical Therapy    Follow Up: In clinic with Dr. Dc after MRI (wait at least 1-2 days)     If you have any further questions for your physician or physician s care team you can call [phone].

## 2025-02-03 NOTE — LETTER
2/3/2025      Munir Storey  [address] [place] [address]      Dear Colleague,    Thank you for referring your patient, Munir Storey, to the [facility] [place]. Please see a copy of my visit note below.    ASSESSMENT & PLAN    Munir was seen today for pain.    Diagnoses and all orders for this visit:    Chronic left shoulder pain  -     Orthopedic  Referral  -     XR Shoulder Left G/E 3 Views; Future  -     MR Shoulder Left w/o Contrast; Future      This issue is chronic and Unchanged.        ICD-10-CM    1. Chronic left shoulder pain  M25.512 Orthopedic  Referral    G89.29 XR Shoulder Left G/E 3 Views     MR Shoulder Left w/o Contrast          We discussed these other possible diagnosis: Left shoulder pain with decreased, motion, concern for rotator cuff tear, will obtain MRI.    Plan:  - Today's Plan of Care:  MRI of the Left Shoulder - Call [phone] to schedule MRI     Discussed activity considerations and other supportive care including Ice/Heat, OTC and other topical medications as needed.    Start Home Exercise Program    -We also discussed other future treatment options:  Referral to Orthopedic Surgery  Referral to Physical Therapy    Follow Up: In clinic with Dr. Dc after MRI (wait at least 1-2 days)     Concerning signs and symptoms were reviewed and all questions were answered at this time.    Thanks for the opportunity to participate in the care of this patient, I will keep you updated on their progress.    CC: Stephy Dc MD [facility]  Sports Medicine Physician  [facility]    -----  Chief Complaint   Patient presents with     Left Shoulder - Pain       SUBJECTIVE  Munir Storey is a/an 59 year old male who is seen in consultation at the request of  Stephy Gilliam C.N.P. for evaluation of left shoulder pain.    The patient is seen by themselves.  The patient is Right handed    Onset: 4  month(s) ago. Reports insidious onset without acute precipitating event.  Location of Pain: left shoulder; RTC   Worsened by: abduction, sleeping (arms overhead), overhead,   Better with: unsure   Treatments tried: no treatment tried to date  Associated symptoms: weakness of left shoulder and decreased ROM     Orthopedic/Surgical history: YES - Date: has had Cervical spine surgery in the past  Social History/Occupation: likes to golf    REVIEW OF SYSTEMS:  Review of Systems    OBJECTIVE:  There were no vitals taken for this visit.   General: healthy, alert and in no distress  Skin: no suspicious lesions or rash.  CV: distal perfusion intact   Resp: normal respiratory effort without conversational dyspnea   Psych: normal mood and affect  Gait: NORMAL  Neuro: Normal light sensory exam of upper extremity    Bilateral Shoulder exam    Inspection and Posture:       normal    Skin:        no visible deformities    Tender:        subacromial space left    Non Tender:       remainder of shoulder bilateral    ROM:        forward flexion 140  left       abduction 90 left       internal rotation thoracic left       external rotation 35 left    Painful motions:       flexion left       elevation left    Strength:        abduction 3/5 left       flexion 4/5 left       internal rotation 5/5 bilateral       external rotation 5/5 bilateral    Impingement testing:       neg (-) Neer left       positive (+) Presley left    Sensation:        normal sensation over shoulder and upper extremity      RADIOLOGY:  Final results and radiologist's interpretation, available in the [facility] health record.  Images were reviewed with the patient in the office today.  My personal interpretation of the performed imaging:     3 XR views of left shoulder reviewed: no acute bony abnormality, mild GH joint degenerative change  - will follow official read    Review of the result(s) of each unique test - XR             Again, thank you for allowing me to  participate in the care of your patient.        Sincerely,        Silke Dc MD    Electronically signed

## 2025-02-14 ENCOUNTER — ANCILLARY PROCEDURE (OUTPATIENT)
Dept: MRI IMAGING | Facility: CLINIC | Age: 60
End: 2025-02-14
Attending: PEDIATRICS
Payer: COMMERCIAL

## 2025-02-14 DIAGNOSIS — M25.512 CHRONIC LEFT SHOULDER PAIN: ICD-10-CM

## 2025-02-14 DIAGNOSIS — G89.29 CHRONIC LEFT SHOULDER PAIN: ICD-10-CM

## 2025-02-14 PROCEDURE — 73221 MRI JOINT UPR EXTREM W/O DYE: CPT | Mod: LT | Performed by: RADIOLOGY

## 2025-02-25 NOTE — PROGRESS NOTES
ASSESSMENT & PLAN    Munir was seen today for follow up.    Diagnoses and all orders for this visit:    Chronic left shoulder pain  -     Physical Therapy  Referral; Future    Nontraumatic incomplete tear of left rotator cuff    Arthritis of left acromioclavicular joint      This issue is acute on chronic and Unchanged.    ICD-10-CM    1. Chronic left shoulder pain  M25.512 Physical Therapy  Referral    G89.29       2. Nontraumatic incomplete tear of left rotator cuff  M75.112       3. Arthritis of left acromioclavicular joint  M19.012       4. Rupture of left proximal biceps tendon, initial encounter  S46.212A         We discussed the following treatment options: symptom treatment, activity modification/rest, imaging, rehab and referral. Following discussion, plan: patient would like to avoid surgery, will start with physical therapy.    Plan:  - Today's Plan of Care:  Rehab: Physical Therapy: [facility] - [phone]    Discussed activity considerations and other supportive care including Ice/Heat, OTC and other topical medications as needed.    -We also discussed other future treatment options:  Consideration of corticosteroid injection  Referral to Physical Therapy    Follow Up: 6 - 8 weeks and as needed  Can continue to follow up with me for further treatment recommendations    Concerning signs and symptoms were reviewed and all questions were answered at this time.    Silke Dc MD [facility]  Sports Medicine Physician  [facility]    SUBJECTIVE- Interim History February 25, 2025    Chief Complaint   Patient presents with    Left Shoulder - Follow Up       Munir Storey is a 59 year old male who is seen in f/u up for chronic left shoulder pain. Since last visit on 2/3/25 patient has been feeling about the same. A left shoulder MRI was performed on 2/14/25 and the patient is here to review the results. Some days are better than others.  - Now ~ 5 months from initial  onset    Location of Pain: left shoulder; RTC   Worsened by: abduction, sleeping (arms overhead), overhead  Better with: unsure   Treatments tried: no treatment tried to date  Associated symptoms: weakness of left shoulder and decreased ROM     The patient is seen by themselves.  The patient is Right handed    Orthopedic/Surgical history: YES - Date: has had Cervical spine surgery in the past  Social History/Occupation: likes to golf      REVIEW OF SYSTEMS:  Review of Systems    OBJECTIVE:  There were no vitals taken for this visit.   General: healthy, alert and in no distress  Skin: no suspicious lesions or rash.  CV: distal perfusion intact   Resp: normal respiratory effort without conversational dyspnea   Psych: normal mood and affect  Gait: NORMAL  Neuro: Normal light sensory exam of upper extremity     Bilateral Shoulder exam  Inspection and Posture:       normal     Skin:        no visible deformities     Tender:        subacromial space left     Non Tender:       remainder of shoulder bilateral     ROM:        forward flexion 140  left       abduction 140 left       internal rotation thoracic left       external rotation 35 left     Painful motions: end range of motion left     Strength:        abduction 5/5 left       flexion 5/5 left       internal rotation 5/5 bilateral       external rotation 5/5 bilateral     Impingement testing:       neg (-) Neer left       positive (+) Presley left     Sensation:        normal sensation over shoulder and upper extremity      RADIOLOGY:  Final results and radiologist's interpretation, available in the [facility] health record.  Images were reviewed with the patient in the office today.  My personal interpretation of the performed imaging:     Reviewed left shoulder MRI 2/14/2025 -severe rotator cuff tendinosis along with low-grade articular side supraspinatus rotator cuff tear, subacromial bursitis with moderate AC joint degenerative change, proximal biceps rupture    EXAM:  MR left shoulder without  contrast 2/14/2025 11:28 AM  TECHNIQUE: Multiplanar, multisequence imaging of the left shoulder  were obtained without administration of intravenous or intra-articular  gadolinium contrast using routine protocol.  History: eval rotator cuff tear; Chronic left shoulder pain; Chronic  left shoulder pain  Comparison: Radiograph 2/3/2025  Findings:  ROTATOR CUFF and ASSOCIATED STRUCTURES  Rotator cuff: On the background of severe tendinosis, low-grade  articular sided tear of the supraspinatus middle fibers including  infraspinatus junctional fibers at the footprint involving  approximately 7 mm in anterior-posterior dimension with medial  retraction of torn fibers approximately 15 mm. Infraspinatus  tendinosis. Teres minor tendon is intact.  On the background of tendinosis, small focal low-grade intrasubstance  tear of subscapularis upper fibers at the footprint.  Bursa: Mild subacromial/subdeltoid bursitis with fluid extending into  the subcoracoid bursa.  Musculature: Moderate fatty infiltration of the teres minor muscle.  Otherwise, Muscle bulk of rotator cuff is preserved.  Deltoid muscle  bulk is also preserved. Hart cyst formation along the infraspinous  myotendinous junction.  Acromioclavicular joint  There are moderate degenerative changes of the acromioclavicular joint  with associated ossicle, likely related to remote trauma. Small to  moderate acromioclavicular joint effusion likely communicating with  subacromial/subdeltoid bursa via capsular/ligamentous defect/tear.  Acromion is type 2 in sagittal morphology.  Coracoacromial ligament is  not thickened.  OSSEOUS STRUCTURES  No fracture, marrow contusion or marrow infiltration. Subcortical  cystic changes and edema like marrow signal intensity at the greater  and lesser tuberosities. Enthesopathic change at the greater and  lesser tuberosities.  LONG BICIPITAL TENDON  Complete tear of long head of biceps tendon at the bicipital  labral  anchor with tendon retraction to the bicipital pulley region with  split tearing extension distally in the intertubercular groove.  GLENOHUMERAL JOINT  Joint fluid: Small joint effusion.  Cartilage and subarticular bone:  No focal hyaline cartilage defects  are noted. No Hill-Sachs, reverse Hill-Sachs, or bony Bankart lesions  are seen.  Labrum: Limited assessment on this study with relative lack of joint  distention shows superior and posterior labral tear. Absent  anterosuperior labrum, likely variant of view for complex.  ANCILLARY FINDINGS:                                                       Impression:  1. Rotator cuff pathology:  *  On the background of severe tendinosis, low-grade articular sided  tear of the supraspinatus middle fibers including infraspinatus  junctional fibers at the footprint.  *  Small focal low-grade intrasubstance tear of subscapularis upper  fibers at the footprint.  2. Subacromial/subdeltoid/subcoracoid bursitis.  3. Moderate chronic of the great joint degenerative change, likely  posttraumatic.  4. Complete tear of long head of biceps tendon at the bicipital labral  anchor with tendon retraction to the bicipital pulley region with  split tearing extension distally in the intertubercular groove.  5. Superior and posterior labral tear.   SHANNON CRANE     Review of the result(s) of each unique test - MRI

## 2025-02-27 ENCOUNTER — OFFICE VISIT (OUTPATIENT)
Dept: ORTHOPEDICS | Facility: CLINIC | Age: 60
End: 2025-02-27
Payer: COMMERCIAL

## 2025-02-27 DIAGNOSIS — M75.112 NONTRAUMATIC INCOMPLETE TEAR OF LEFT ROTATOR CUFF: ICD-10-CM

## 2025-02-27 DIAGNOSIS — G89.29 CHRONIC LEFT SHOULDER PAIN: Primary | ICD-10-CM

## 2025-02-27 DIAGNOSIS — M19.012 ARTHRITIS OF LEFT ACROMIOCLAVICULAR JOINT: ICD-10-CM

## 2025-02-27 DIAGNOSIS — S46.212A RUPTURE OF LEFT PROXIMAL BICEPS TENDON, INITIAL ENCOUNTER: ICD-10-CM

## 2025-02-27 DIAGNOSIS — M25.512 CHRONIC LEFT SHOULDER PAIN: Primary | ICD-10-CM

## 2025-02-27 NOTE — LETTER
2/27/2025      Munir Storey  [address] [place] [address]      Dear Colleague,    Thank you for referring your patient, Munir Storey, to the [facility]. Please see a copy of my visit note below.    ASSESSMENT & PLAN    Munir was seen today for follow up.    Diagnoses and all orders for this visit:    Chronic left shoulder pain  -     Physical Therapy  Referral; Future    Nontraumatic incomplete tear of left rotator cuff    Arthritis of left acromioclavicular joint      This issue is acute on chronic and Unchanged.    ICD-10-CM    1. Chronic left shoulder pain  M25.512 Physical Therapy  Referral    G89.29       2. Nontraumatic incomplete tear of left rotator cuff  M75.112       3. Arthritis of left acromioclavicular joint  M19.012       4. Rupture of left proximal biceps tendon, initial encounter  S46.212A         We discussed the following treatment options: symptom treatment, activity modification/rest, imaging, rehab and referral. Following discussion, plan: patient would like to avoid surgery, will start with physical therapy.    Plan:  - Today's Plan of Care:  Rehab: Physical Therapy: [facility] - [phone]    Discussed activity considerations and other supportive care including Ice/Heat, OTC and other topical medications as needed.    -We also discussed other future treatment options:  Consideration of corticosteroid injection  Referral to Physical Therapy    Follow Up: 6 - 8 weeks and as needed  Can continue to follow up with me for further treatment recommendations    Concerning signs and symptoms were reviewed and all questions were answered at this time.    Silke Dc MD [facility]  Sports Medicine Physician  [facility]    SUBJECTIVE- Interim History February 25, 2025    Chief Complaint   Patient presents with     Left Shoulder - Follow Up       Munir Storey is a 59 year old male who is seen in f/u up for chronic  left shoulder pain. Since last visit on 2/3/25 patient has been feeling about the same. A left shoulder MRI was performed on 2/14/25 and the patient is here to review the results. Some days are better than others.  - Now ~ 5 months from initial onset    Location of Pain: left shoulder; RTC   Worsened by: abduction, sleeping (arms overhead), overhead  Better with: unsure   Treatments tried: no treatment tried to date  Associated symptoms: weakness of left shoulder and decreased ROM     The patient is seen by themselves.  The patient is Right handed    Orthopedic/Surgical history: YES - Date: has had Cervical spine surgery in the past  Social History/Occupation: likes to golf      REVIEW OF SYSTEMS:  Review of Systems    OBJECTIVE:  There were no vitals taken for this visit.   General: healthy, alert and in no distress  Skin: no suspicious lesions or rash.  CV: distal perfusion intact   Resp: normal respiratory effort without conversational dyspnea   Psych: normal mood and affect  Gait: NORMAL  Neuro: Normal light sensory exam of upper extremity     Bilateral Shoulder exam  Inspection and Posture:       normal     Skin:        no visible deformities     Tender:        subacromial space left     Non Tender:       remainder of shoulder bilateral     ROM:        forward flexion 140  left       abduction 140 left       internal rotation thoracic left       external rotation 35 left     Painful motions: end range of motion left     Strength:        abduction 5/5 left       flexion 5/5 left       internal rotation 5/5 bilateral       external rotation 5/5 bilateral     Impingement testing:       neg (-) Neer left       positive (+) Presley left     Sensation:        normal sensation over shoulder and upper extremity      RADIOLOGY:  Final results and radiologist's interpretation, available in the [facility] health record.  Images were reviewed with the patient in the office today.  My personal interpretation of the performed  imaging:     Reviewed left shoulder MRI 2/14/2025 -severe rotator cuff tendinosis along with low-grade articular side supraspinatus rotator cuff tear, subacromial bursitis with moderate AC joint degenerative change, proximal biceps rupture    EXAM: MR left shoulder without  contrast 2/14/2025 11:28 AM  TECHNIQUE: Multiplanar, multisequence imaging of the left shoulder  were obtained without administration of intravenous or intra-articular  gadolinium contrast using routine protocol.  History: eval rotator cuff tear; Chronic left shoulder pain; Chronic  left shoulder pain  Comparison: Radiograph 2/3/2025  Findings:  ROTATOR CUFF and ASSOCIATED STRUCTURES  Rotator cuff: On the background of severe tendinosis, low-grade  articular sided tear of the supraspinatus middle fibers including  infraspinatus junctional fibers at the footprint involving  approximately 7 mm in anterior-posterior dimension with medial  retraction of torn fibers approximately 15 mm. Infraspinatus  tendinosis. Teres minor tendon is intact.  On the background of tendinosis, small focal low-grade intrasubstance  tear of subscapularis upper fibers at the footprint.  Bursa: Mild subacromial/subdeltoid bursitis with fluid extending into  the subcoracoid bursa.  Musculature: Moderate fatty infiltration of the teres minor muscle.  Otherwise, Muscle bulk of rotator cuff is preserved.  Deltoid muscle  bulk is also preserved. [place] cyst formation along the infraspinous  myotendinous junction.  Acromioclavicular joint  There are moderate degenerative changes of the acromioclavicular joint  with associated ossicle, likely related to remote trauma. Small to  moderate acromioclavicular joint effusion likely communicating with  subacromial/subdeltoid bursa via capsular/ligamentous defect/tear.  Acromion is type 2 in sagittal morphology.  Coracoacromial ligament is  not thickened.  OSSEOUS STRUCTURES  No fracture, marrow contusion or marrow infiltration.  Subcortical  cystic changes and edema like marrow signal intensity at the greater  and lesser tuberosities. Enthesopathic change at the greater and  lesser tuberosities.  LONG BICIPITAL TENDON  Complete tear of long head of biceps tendon at the bicipital labral  anchor with tendon retraction to the bicipital pulley region with  split tearing extension distally in the intertubercular groove.  GLENOHUMERAL JOINT  Joint fluid: Small joint effusion.  Cartilage and subarticular bone:  No focal hyaline cartilage defects  are noted. No Hill-Sachs, reverse Hill-Sachs, or bony [place] lesions  are seen.  Labrum: Limited assessment on this study with relative lack of joint  distention shows superior and posterior labral tear. Absent  anterosuperior labrum, likely variant of view for complex.  ANCILLARY FINDINGS:                                                       Impression:  1. Rotator cuff pathology:  *  On the background of severe tendinosis, low-grade articular sided  tear of the supraspinatus middle fibers including infraspinatus  junctional fibers at the footprint.  *  Small focal low-grade intrasubstance tear of subscapularis upper  fibers at the footprint.  2. Subacromial/subdeltoid/subcoracoid bursitis.  3. Moderate chronic of the great joint degenerative change, likely  posttraumatic.  4. Complete tear of long head of biceps tendon at the bicipital labral  anchor with tendon retraction to the bicipital pulley region with  split tearing extension distally in the intertubercular groove.  5. Superior and posterior labral tear.   SHANNON CRANE     Review of the result(s) of each unique test - MRI             Again, thank you for allowing me to participate in the care of your patient.        Sincerely,        Silke Dc MD    Electronically signed

## 2025-02-27 NOTE — PATIENT INSTRUCTIONS
We discussed the following treatment options: symptom treatment, activity modification/rest, imaging, rehab and referral. Following discussion, plan: patient would like to avoid surgery, will start with physical therapy.    Plan:  - Today's Plan of Care:  Rehab: Physical Therapy: [facility] - [phone]    Discussed activity considerations and other supportive care including Ice/Heat, OTC and other topical medications as needed.    -We also discussed other future treatment options:  Consideration of corticosteroid injection  Referral to Physical Therapy    Follow Up: 6 - 8 weeks and as needed  Can continue to follow up with me for further treatment recommendations    If you have any further questions for your physician or physician s care team you can call [phone].

## 2025-03-11 NOTE — PROGRESS NOTES
[facility] - [facility]   Mental Health & Addiction Services      Progress Note - Initial [facility] Visit     Patient Name: Munir Storey    Date: March 12, 2025  Service Type: Individual   Visit Start Time: 8:40 AM  Visit End Time:  909 AM   Attendees: Patient   Service Modality: In-person     [facility] Visit Activities (Refresh list every visit): NEW and [facility] Only         DATA:     Interactive Complexity: No   Crisis: No     Assessments completed prior to this visit:     The following assessments were completed by patient for this visit:  PHQ9:       3/14/2023     8:16 AM 3/16/2023    12:56 PM 8/10/2023     9:24 AM 11/15/2023     2:17 PM 2/6/2024    10:38 AM 6/25/2024    10:29 AM 12/13/2024     9:05 AM   PHQ-9 SCORE   PHQ-9 Total Score MyChart 7 (Mild depression) 6 (Mild depression) 5 (Mild depression) 5 (Mild depression) 11 (Moderate depression) 10 (Moderate depression) 4 (Minimal depression)   PHQ-9 Total Score 7 6    6 5 5 11 10 4        Patient-reported     GAD2:       1/13/2023    11:43 AM 2/14/2023     1:11 PM 3/16/2023    12:57 PM 4/11/2023     1:53 PM 6/22/2023     1:24 PM 8/8/2023     2:48 PM 11/15/2023     2:19 PM   SANDRA-2   Feeling nervous, anxious, or on edge 2 1 2 1  2  1 2    Not being able to stop or control worrying 2 1 2 1  0  1 2    SANDRA-2 Total Score 4    4 2    2 4    4 2    2 2    2 2 4       Proxy-reported     GAD7:       11/28/2022     9:05 AM 12/16/2022    10:48 AM 1/2/2023     9:28 AM 3/16/2023    12:57 PM 11/15/2023     2:19 PM 2/6/2024    10:39 AM 6/25/2024    10:30 AM   SANDRA-7 SCORE   Total Score 11 (moderate anxiety) 8 (mild anxiety) 15 (severe anxiety) 8 (mild anxiety) 12 (moderate anxiety) 14 (moderate anxiety) 9 (mild anxiety)   Total Score 11 8 15    15    15 8    8 12 14 9     CAGE-AID:       4/11/2022     9:09 AM 4/15/2022     2:00 PM   CAGE-AID Total Score   Total Score 0    Total Score MyChart 0 (A total score  "of 2 or greater is considered clinically significant)        Information is confidential and restricted. Go to Review Flowsheets to unlock data.     PROMIS 10-Global Health (only subscores and total score):       4/11/2022     9:09 AM 4/11/2022     7:44 PM 4/18/2022     8:27 AM 4/18/2022     1:51 PM 1/2/2023     9:31 AM 8/8/2023     2:51 PM 11/15/2023     2:22 PM   PROMIS-10 Scores Only   Global Mental Health Score 10 7 6    6    6    6    6 6 8    8    8 9    Global Physical Health Score 16 16 11    11    11    11    11 14 15    15    15 15    PROMIS TOTAL - SUBSCORES 26 23 17    17    17    17    17 20 23    23    23 24        Information is confidential and restricted. Go to Review Flowsheets to unlock data.     [place] Suicide Severity Rating Scale (Lifetime/Recent)      3/27/2022    10:24 AM 4/12/2022    10:00 AM 4/15/2022     2:00 PM 4/30/2022    11:29 AM 5/2/2022     9:40 AM 5/3/2022     3:15 PM 3/17/2025     9:22 AM   [place] Suicide Severity Rating (Lifetime/Recent)   Q1 Wish to be Dead (Lifetime)  Yes        Comments  Patient reports \"dark thoughts\" about wanting his wife to and so he does not feel anxious        Q2 Non-Specific Active Suicidal Thoughts (Lifetime)  No        Q1 Wished to be Dead (Past Month) yes yes yes no yes yes    Q2 Suicidal Thoughts (Past Month) no no no no yes yes    Q3 Suicidal Thought Method no no no no no no    Q4 Suicidal Intent without Specific Plan no no no no yes no    Q5 Suicide Intent with Specific Plan no no no no no no    Q6 Suicide Behavior (Lifetime) no no no no no no    Level of Risk per Screen low risk low risk low risk low risk high risk low risk    1. Wish to be Dead (Lifetime)       Y   Wish to be Dead Description (Lifetime)       was in suicide watch, no attempts or aborted attempts, 3 years ago was last time had thoughts   1. Wish to be Dead (Past 1 Month)       N   2. Non-Specific Active Suicidal Thoughts (Lifetime)       Y   2. Non-Specific Active Suicidal " Thoughts (Past 1 Month)       N   3. Active Suicidal Ideation with any Methods (Not Plan) Without Intent to Act (Lifetime)       N   4. Active Suicidal Ideation with Some Intent to Act, Without Specific Plan (Lifetime)       N   5. Active Suicidal Ideation with Specific Plan and Intent (Lifetime)       N   Controllability (Lifetime)       1   Deterrents (Lifetime)       1   Actual Attempt (Lifetime)       N   Has subject engaged in non-suicidal self-injurious behavior? (Lifetime)       N   Interrupted Attempts (Lifetime)       N   Aborted or Self-Interrupted Attempt (Lifetime)       N   Preparatory Acts or Behavior (Lifetime)       N   Calculated C-SSRS Risk Score (Lifetime/Recent)       No Risk Indicated        Referral:   Patient was referred to [facility] by primary care provider.    Reason for referral: determine behavioral health treatment options.      [facility] introduced self and role. Discussed informed consent and limits to confidentiality.     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) and it's not busy, strained relationship, energy is low, hard to do things alone and leave his home     Suicidality: was in suicide watch, no attempts or aborted attempts, 3 years ago was last time had thoughts, has grand kids  Self-harm: pt denies   Firearms in home: yes, locked up and secure, ammunition is separate  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 Explained and reviewed treatment options.    Response to treatment interventions:   Patient was engaged in the therapy process.      Safety Issues and Plan for Safety and Risk Management:     Patient has had a history of suicidal ideation: was in suicide watch, no attempts or aborted attempts, 3 years ago was last time had thoughts, grandkids are protective factor, does not want to lose his relationship with his wife    Patient denies current fears or concerns for personal safety.   Patient denies current or recent suicidal ideation or behaviors.   Patient denies current or recent homicidal ideation or behaviors.   Patient denies current or recent self injurious behavior or ideation.   Patient denies other safety concerns.   Recommended that patient call 911 or go to the local ED should there be a change in any of these risk factors   Patient reports there are firearms in the house. The firearms are secured in a locked space.       ASSESSMENT:   Mental Status:    staff reported that patient was very agitated and upset about the wait time for the visit as well as appeared very anxious and restless.  Agoraphobia and the lorazepam may have been a factor.  Appearance:   Appropriate    Eye Contact:   Good    Psychomotor Behavior: Restless    Attitude:   Cooperative  Interested Pleasant   Orientation:   All   Speech Rate / Production: Normal/ Responsive   Volume:   Normal    Mood:    Anxious    Affect:    Worrisome    Thought Content:  Clear    Thought  Form:  Coherent  Logical    Insight:    Good         Diagnostic Criteria:   Generalized Anxiety Disorder  A. Excessive anxiety and worry about a number of events or activities (such as work or school performance).   B. The person finds it difficult to control the worry.  C. Select 3 or more symptoms (required for diagnosis). Only one item is required in children.   - Restlessness or feeling keyed up or on edge.    - Irritability.    - Muscle tension.   D. The focus of the anxiety and worry is not confined to features of an Axis I disorder.  E. The anxiety, worry, or physical symptoms cause clinically significant distress or impairment in social, occupational, or other important areas of functioning.   F. The disturbance is not due to the direct physiological effects of a substance (e.g., a drug of abuse, a medication) or a general medical condition (e.g., hyperthyroidism) and does not occur exclusively during a Mood Disorder, a Psychotic Disorder, or a Pervasive Developmental Disorder.  Agoraphobia  A. Anxiety about being in places or situations from which escape might be difficult (or embarrassing) or in which help may not be available in the event of having an unexpected or situationally predisposed Panic Attack or panic-like symptoms. Agoraphobic fears typically involve characteristic clusters of situations that include being outside the home alone; being in a crowd, or standing in a line; being on a bridge; and traveling in a bus, train, or automobile  B) The situations are avoided (e.g., travel is restricted) or else are endured with marked distress or with anxiety about having a Panic Attack or panic-like symptoms, or require the presence of a .  C) The anxiety or phobic avoidance is not better accounted for by another mental disorder, such as Social Phobia (e.g., avoidance limited to social situations because of fear of embarrassment), Specific Phobia (e.g., avoidance limited to a single situation  like elevators), Obsessive-Compulsive Disorder (e.g., avoidance of dirt in someone with an obsession about contamination), Posttraumatic Stress Disorder (e.g., avoidance of stimuli associated with a severe stressor), or Separation Anxiety Disorder (e.g., avoidance of leaving home or relatives).  Major Depressive Disorder  A) Recurrent episode(s) - symptoms have been present during the same 2-week period and represent a change from previous functioning 5 or more symptoms (required for diagnosis)   - Depressed mood. Note: In children and adolescents, can be irritable mood.     - Diminished interest or pleasure in all, or almost all, activities.    - Psychomotor activity agitation.    - Fatigue or loss of energy.    - Diminished ability to think or concentrate, or indecisiveness.   B) The symptoms cause clinically significant distress or impairment in social, occupational, or other important areas of functioning  C) The episode is not attributable to the physiological effects of a substance or to another medical condition  D) The occurence of major depressive episode is not better explained by other thought / psychotic disorders  E) There has never been a manic episode or hypomanic episode        DSM5 Diagnoses: (Sustained by DSM5 Criteria Listed Above)     Diagnoses: 296.32 (F33.1) Major Depressive Disorder, Recurrent Episode, Moderate _  300.22 (F40.00) Agoraphobia  300.02 (F41.1) Generalized Anxiety Disorder     Psychosocial / Contextual Factors: Medical Complexities, Relationship Concerns, Occupational Issues, and Financial Strain       Collateral Reports Completed:   Routed note to PCP         PLAN: (Homework, other):     1. Patient was provided:  recommendation to schedule follow-up with [facility], to continue to talk about types of therapy that would be helpful    2. Provider recommended the following referrals: To a [facility] therapist.        3. Suicide Risk and Safety Concerns were assessed for Munir FARR  Storey    Safety Plan:   Patient denied any current/recent/lifetime history of suicidal ideation and/or behaviors. Recommended that patient call 911 or go to the local ED should there be a change in any of these risk factors.       PACO Ashford, [facility]   March 12, 2025

## 2025-03-12 ENCOUNTER — OFFICE VISIT (OUTPATIENT)
Dept: BEHAVIORAL HEALTH | Facility: CLINIC | Age: 60
End: 2025-03-12
Payer: COMMERCIAL

## 2025-03-12 DIAGNOSIS — F33.1 MODERATE EPISODE OF RECURRENT MAJOR DEPRESSIVE DISORDER (H): Primary | ICD-10-CM

## 2025-03-12 DIAGNOSIS — F41.9 ANXIETY AND DEPRESSION: ICD-10-CM

## 2025-03-12 DIAGNOSIS — F32.A ANXIETY AND DEPRESSION: ICD-10-CM

## 2025-03-12 DIAGNOSIS — F40.00 AGORAPHOBIA: ICD-10-CM

## 2025-03-12 DIAGNOSIS — F41.1 GENERALIZED ANXIETY DISORDER: ICD-10-CM

## 2025-03-12 PROCEDURE — 90832 PSYTX W PT 30 MINUTES: CPT | Performed by: COUNSELOR

## 2025-03-14 ENCOUNTER — TRANSFERRED RECORDS (OUTPATIENT)
Dept: HEALTH INFORMATION MANAGEMENT | Facility: CLINIC | Age: 60
End: 2025-03-14
Payer: COMMERCIAL

## 2025-03-17 ASSESSMENT — COLUMBIA-SUICIDE SEVERITY RATING SCALE - C-SSRS
2. HAVE YOU ACTUALLY HAD ANY THOUGHTS OF KILLING YOURSELF?: YES
1. IN THE PAST MONTH, HAVE YOU WISHED YOU WERE DEAD OR WISHED YOU COULD GO TO SLEEP AND NOT WAKE UP?: NO
6. HAVE YOU EVER DONE ANYTHING, STARTED TO DO ANYTHING, OR PREPARED TO DO ANYTHING TO END YOUR LIFE?: NO
1. HAVE YOU WISHED YOU WERE DEAD OR WISHED YOU COULD GO TO SLEEP AND NOT WAKE UP?: YES
3. HAVE YOU BEEN THINKING ABOUT HOW YOU MIGHT KILL YOURSELF?: NO
ATTEMPT LIFETIME: NO
5. HAVE YOU STARTED TO WORK OUT OR WORKED OUT THE DETAILS OF HOW TO KILL YOURSELF? DO YOU INTEND TO CARRY OUT THIS PLAN?: NO
TOTAL  NUMBER OF ABORTED OR SELF INTERRUPTED ATTEMPTS LIFETIME: NO
4. HAVE YOU HAD THESE THOUGHTS AND HAD SOME INTENTION OF ACTING ON THEM?: NO
2. HAVE YOU ACTUALLY HAD ANY THOUGHTS OF KILLING YOURSELF?: NO
TOTAL  NUMBER OF INTERRUPTED ATTEMPTS LIFETIME: NO

## 2025-03-19 ENCOUNTER — OFFICE VISIT (OUTPATIENT)
Dept: BEHAVIORAL HEALTH | Facility: CLINIC | Age: 60
End: 2025-03-19
Payer: COMMERCIAL

## 2025-03-19 DIAGNOSIS — F33.1 MODERATE EPISODE OF RECURRENT MAJOR DEPRESSIVE DISORDER (H): ICD-10-CM

## 2025-03-19 DIAGNOSIS — F41.1 GENERALIZED ANXIETY DISORDER: Primary | ICD-10-CM

## 2025-03-19 PROCEDURE — 90791 PSYCH DIAGNOSTIC EVALUATION: CPT | Performed by: COUNSELOR

## 2025-03-19 NOTE — PATIENT INSTRUCTIONS
[email or web address]  [facility], Marriage & Family Therapist, [place], [address]  Psychology Today  Luther Garcia - [facility], Marriage & Family Therapist, [place], [address], [phone]     [email or web address]  Jonah Beyer, Marriage & Family Therapist, [place], [address]  Psychology Today  Jonah Beyer, Marriage & Family Therapist, [place], [address], [phone]     [email or web address]  [facility], Licensed Professional Clinical Counselor, [place], [address]  Psychology Today  Kiara Xiong - A [facility], Licensed Professional Clinical Counselor, [place], [address], [phone]

## 2025-03-19 NOTE — PROGRESS NOTES
"    [facility] Psychiatry Services - [place]       PATIENT'S NAME: Munir Storey    PREFERRED NAME: Munir  PRONOUNS:       MRN: [id number]  : 1965  ADDRESS: [address]  [place] [address]  [id number]  DATE OF SERVICE: 3/19/25  START TIME: 1000am  END TIME: 11am  PREFERRED PHONE: [phone]  May we leave a program related message: Yes  EMERGENCY CONTACT: was obtained Ariane.  SERVICE MODALITY:  In-person    [place] ADULT Mental Health DIAGNOSTIC ASSESSMENT    Identifying Information:  Patient is a 59 year old,    individual.  Patient was referred for an assessment by primary care provider .  Patient attended the session alone.    Chief Complaint:   The reason for seeking services at this time is: \" anxiety and depression. \"   The problem(s) began 3 years ago. Patient has attempted to resolve these concerns in the past through PCP, medication, [facility] and had a psychiatrist .    [facility] informs pt that she spoke with a colleague who is  male and identifies as a Rastafarian and would be open to meeting with pt, though he does not identify as a Rastafarian therapist. Pt says he will need to see if it is a good fit.  At the end of the visit patient indicated that he would prefer to meet with a female than a male therapist.      From 3/12/2025 initial visit with [facility]  ADLs: appetite- trying to eat healthy  Current Symptoms: dealt with depression and anxiety for 3 years, ended up in the hospital with acute MDD and anxiety, was on suicide watch  Has anxiety attacks  Took a lorazepam to help with anxiety since he felt anxious this morning and he doesn't take these that often  Pt is feeling stuck and is at a high level stress, he will have a pit in his stomach, he feels like he is experiencing depression more- has lack of enjoyment and he will still push himself to do things  Attends [employer]   Stressors: 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) and it's not busy, strained " "relationship, energy is low, hard to do things alone and leave his home     Suicidality: was in suicide watch, no attempts or aborted attempts, 3 years ago was last time had thoughts, has grand kids  Self-harm: pt denies   Firearms in home: yes, locked up and secure, ammunition is separate  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 Patient has not moved during childhood.  They were raised by biological parents.  Parents stayed ..   Patient reported that their childhood was \"very normal and enjoyable\". Had needs met. Raised during the be seen not heard generation.   Patient described their current relationships with family of origin as \"good\". Lot of sense of humor, loving and supportive. Hasn't seen children since 2005 and pt has reached out to them and his ex wife has alienated pt's children against him.     The patient describes their cultural background as Rastafarian. Grew up Amish.  Cultural influences and impact on patient's life structure, values, norms, and healthcare: Racial or Ethnic Self-Identification , Verbal / Non-verbal Communication Style: Able to understand and reciprocate, and Spiritual Beliefs: Rastafarian .  Contextual influences on patient's health include: Individual Factors patient is a  59-year-old white male, identifies as Rastafarian, is self-employed and Family Factors patient was raised by both of his parents, has not seen his children even though he has reached out to them due to his ex-wife possibly alienated his children against him .  Cultural, Contextual, and socioeconomic factors do not affect the patient's access to services.  These " factors will be addressed in the Preliminary Treatment plan.  Patient identified their preferred language to be English. Patient reported they do not  need the assistance of an  or other support involved in therapy.     Patient reported had no significant delays in developmental tasks.  Patient's highest education level was some college. Patient identified the following learning problems: none reported. Grades in school- on B honor roll. College career was abrupt due to not making the hokey team. Was on conference 4 years for high school. Modifications will not be used to assist communication in therapy.  Patient reports they are  able to understand written materials.    Patient reported the following relationship history 2x  Patient's current relationship status is  for 3, been with for 12 years. Patient identified their sexual orientation as heterosexual.  Patient reported having three child(lynn) and 2 from wife. Patient identified partner and family, Yazidi community  as part of their support system.  Patient identified the quality of these relationships as good and excellent if nee them.     Patient's current living/housing situation involves staying in own home/apartment.  They live with wife, 2 dogs and a cat and they report that housing is stable.     Patient is currently employed full time and reports they are able to function appropriately at work. Is self employed.  Patient reports their finances are obtained through employment and partner.  Patient does identify finances as a current stressor.      Patient reported that they have not been involved with the legal system.  Patient denies being on probation / parole / under the jurisdiction of the court.    Patient's Strengths and Limitations:  Patient identified the following strengths or resources that will help them succeed in treatment: Yazidi / Anglican, commitment to health and well being, insight, intelligence, motivation, sense of  humor, and work ethic. Things that may interfere with the patient's success in treatment include: financial hardship.     Assessments:  The following assessments were completed by patient for this visit:    Assessments were completed by patient during the visit on paper and then entered 3/26/2025 by [facility] into patient's chart.  PHQ9:       3/16/2023    12:56 PM 8/10/2023     9:24 AM 11/15/2023     2:17 PM 2/6/2024    10:38 AM 6/25/2024    10:29 AM 12/13/2024     9:05 AM 3/26/2025    10:24 PM   PHQ-9 SCORE   PHQ-9 Total Score MyChart 6 (Mild depression) 5 (Mild depression) 5 (Mild depression) 11 (Moderate depression) 10 (Moderate depression) 4 (Minimal depression)    PHQ-9 Total Score 6    6 5 5 11 10 4  8       Patient-reported     GAD2:       1/13/2023    11:43 AM 2/14/2023     1:11 PM 3/16/2023    12:57 PM 4/11/2023     1:53 PM 6/22/2023     1:24 PM 8/8/2023     2:48 PM 11/15/2023     2:19 PM   SANDRA-2   Feeling nervous, anxious, or on edge 2 1 2 1  2  1 2    Not being able to stop or control worrying 2 1 2 1  0  1 2    SANDRA-2 Total Score 4    4 2    2 4    4 2    2 2    2 2 4       Proxy-reported     GAD7:       11/28/2022     9:05 AM 12/16/2022    10:48 AM 1/2/2023     9:28 AM 3/16/2023    12:57 PM 11/15/2023     2:19 PM 2/6/2024    10:39 AM 6/25/2024    10:30 AM   SANDRA-7 SCORE   Total Score 11 (moderate anxiety) 8 (mild anxiety) 15 (severe anxiety) 8 (mild anxiety) 12 (moderate anxiety) 14 (moderate anxiety) 9 (mild anxiety)   Total Score 11 8 15    15    15 8    8 12 14 9     CAGE-AID:       4/11/2022     9:09 AM 4/15/2022     2:00 PM 3/26/2025    10:24 PM   CAGE-AID Total Score   Total Score 0 0 1   Total Score MyChart 0 (A total score of 2 or greater is considered clinically significant)       PROMIS 10-Global Health (only subscores and total score):       4/11/2022     7:44 PM 4/18/2022     8:27 AM 4/18/2022     1:51 PM 1/2/2023     9:31 AM 8/8/2023     2:51 PM 11/15/2023     2:22 PM 3/26/2025    10:24 PM  "  PROMIS-10 Scores Only   Global Mental Health Score 7 6    6    6    6    6 6 8    8    8 9 9 11   Global Physical Health Score 16 11    11    11    11    11 14 15    15    15 15 17 16   PROMIS TOTAL - SUBSCORES 23 17    17    17    17    17 20 23    23    23 24 26 27     Franklin Suicide Severity Rating Scale (Lifetime/Recent)      3/27/2022    10:24 AM 4/12/2022    10:00 AM 4/15/2022     2:00 PM 4/30/2022    11:29 AM 5/2/2022     9:40 AM 5/3/2022     3:15 PM 3/17/2025     9:22 AM   Franklin Suicide Severity Rating (Lifetime/Recent)   Q1 Wish to be Dead (Lifetime)  Yes        Comments  Patient reports \"dark thoughts\" about wanting his wife to and so he does not feel anxious        Q2 Non-Specific Active Suicidal Thoughts (Lifetime)  No        Q1 Wished to be Dead (Past Month) yes yes yes no yes yes    Q2 Suicidal Thoughts (Past Month) no no no no yes yes    Q3 Suicidal Thought Method no no no no no no    Q4 Suicidal Intent without Specific Plan no no no no yes no    Q5 Suicide Intent with Specific Plan no no no no no no    Q6 Suicide Behavior (Lifetime) no no no no no no    Level of Risk per Screen low risk low risk low risk low risk high risk low risk    1. Wish to be Dead (Lifetime)       Y   Wish to be Dead Description (Lifetime)       was in suicide watch, no attempts or aborted attempts, 3 years ago was last time had thoughts   1. Wish to be Dead (Past 1 Month)       N   2. Non-Specific Active Suicidal Thoughts (Lifetime)       Y   2. Non-Specific Active Suicidal Thoughts (Past 1 Month)       N   3. Active Suicidal Ideation with any Methods (Not Plan) Without Intent to Act (Lifetime)       N   4. Active Suicidal Ideation with Some Intent to Act, Without Specific Plan (Lifetime)       N   5. Active Suicidal Ideation with Specific Plan and Intent (Lifetime)       N   Controllability (Lifetime)       1   Deterrents (Lifetime)       1   Actual Attempt (Lifetime)       N   Has subject engaged in non-suicidal " self-injurious behavior? (Lifetime)       N   Interrupted Attempts (Lifetime)       N   Aborted or Self-Interrupted Attempt (Lifetime)       N   Preparatory Acts or Behavior (Lifetime)       N   Calculated C-SSRS Risk Score (Lifetime/Recent)       No Risk Indicated       Personal and Family Medical History:  Patient does report a family history of mental health concerns. Dad and grandma, brothers.  Patient reports family history includes Depression in his father and paternal grandfather; Diabetes in his father.    Patient does report Mental Health Diagnosis and/or Treatment.  Patient Patient reported the following previous diagnoses which include(s): Depression and anaxiety   Patient reported symptoms began 3 years ago.   Patient has received mental health services in the past:  psychitry .  Psychiatric Hospitalizations:  [facility] 3 years ago suicide watch .  Patient denies a history of civil commitment.  Patient is receiving other mental health services.  These include  [facility] .       Patient has had a physical exam to rule out medical causes for current symptoms.  Date of last physical exam was within the past year. Client was encouraged to follow up with PCP if symptoms were to develop. The patient has a [facility] Primary Care Provider, who is named Magalis Morales.  Patient reports the following current medical concerns: See patient's chart .  Patient reports pain due to torn rotator cuff.  There are not significant appetite / nutritional concerns / weight changes. These may include: no concerns.  Patient does report a history of head injury / trauma / cognitive impairment. In life time yes, hockey, football    Patient reports current meds as:   Current Outpatient Medications   Medication Sig Dispense Refill    escitalopram (LEXAPRO) 20 MG tablet Take 1 tablet (20 mg) by mouth daily. 30 tablet 1    LORazepam (ATIVAN) 0.5 MG tablet Take 0.5-1 tablets (0.25-0.5 mg) by mouth daily as needed for anxiety (or severe panic  attacks) 15 tablet 1    NONFORMULARY CBD gummi and oil      escitalopram (LEXAPRO) 5 MG tablet Take 3 tablets (15 mg) by mouth daily. 90 tablet 0    lisinopril-hydrochlorothiazide (ZESTORETIC) 20-12.5 MG tablet TAKE 2 TABLETS BY MOUTH DAILY FOR BLOOD PRESSURE 180 tablet 1    LORazepam (ATIVAN) 0.5 MG tablet Take 1 tablet (0.5 mg) by mouth every 6 hours as needed for anxiety. 15 tablet 0     No current facility-administered medications for this visit.       Medication Adherence:  Patient reports taking psychiatric medications as prescribed.    Patient Allergies:    Allergies   Allergen Reactions    Seasonal Allergies     Sulfamethoxazole-Trimethoprim Nausea       Medical History:    Past Medical History:   Diagnosis Date    Alcohol withdrawal (H) 04/28/2015    Atrial flutter (H)     Depressive disorder     Ejection fraction < 50% 04/2015    Ejection fraction 45% per echo April 2015 (per [facility] records), possible alcohol or tachycardia induced cardiomyopathy. EF normalized on follow-up echo 2016    SANDRA (generalized anxiety disorder)     H/O atrioventricular quita ablation 12/2015    Hypertension, goal below 140/90     Tobacco abuse          Current Mental Status Exam:   Appearance:  Appropriate    Eye Contact:  Good   Psychomotor:  Normal       Gait / station:  no problem  Attitude / Demeanor: Cooperative  Interested Pleasant  Speech      Rate / Production: Normal/ Responsive      Volume:  Normal       Language:  intact  Mood:   Anxious  Depressed   Affect:   Appropriate    Thought Content: Clear   Thought Process: Coherent  Logical       Associations: No loosening of associations  Insight:   Good   Judgment:  Intact   Orientation:  All  Attention/concentration: Good    Substance Use:   Patient did report a family history of substance use concerns; see medical history section for details. Runs in the family. Patient has received chemical dependency treatment in the past at [facility] .  Patient has ever been to detox.       Patient is not currently receiving any chemical dependency treatment.           Substance History of use Age of first use Date of last use     Pattern and duration of use (include amounts and frequency)   Alcohol         In recovery   Cannabis         REPORTS SUBSTANCE USE: N/A     Amphetamines         REPORTS SUBSTANCE USE: N/A   Cocaine/crack            REPORTS SUBSTANCE USE: N/A   Hallucinogens           REPORTS SUBSTANCE USE: N/A   Inhalants           REPORTS SUBSTANCE USE: N/A   Heroin           REPORTS SUBSTANCE USE: N/A   Other Opiates       REPORTS SUBSTANCE USE: N/A   Benzodiazepine         REPORTS SUBSTANCE USE: N/A   Barbiturates       REPORTS SUBSTANCE USE: N/A   Over the counter meds       REPORTS SUBSTANCE USE: N/A   Caffeine       Half and half coffee   Nicotine        Nicotine pouches, trying to quit and will take a tea pouch   Other substances not listed above:  Identify:        REPORTS SUBSTANCE USE: N/A     Patient reported the following problems as a result of their substance use:  he lost off of his friends with getting sober.     Substance Use: No symptoms    Based on the CAGE score 0 and  clinical interview there  are not indications of drug or alcohol abuse.    Significant Losses / Trauma / Abuse / Neglect Issues:   Patient   did not serve in the .  There are indications or report of significant loss, trauma, abuse or neglect issues related to: are indications or report of significant loss, trauma, abuse or neglect issues related to see below.  Patient has not been a victim of exploitation.  Concerns for possible neglect are not present.     Safety Assessment:   Patient denies current or past homicidal ideation and behaviors.  Patient denies current/recent suicide ideation, plans, intent, or attempts but reports a history of being on suicide watch, no attempts or aborted attempts  Patient denies current or past self-injurious behaviors.  Patient denied risk behaviors associated  with substance use.  Patient denies any high risk behaviors associated with mental health symptoms.  Patient denied current or past personal safety concerns.    Patient denies past of current/recent assaultive behaviors.    Patient denied a history of sexual assault behaviors.     Patient reports there are   firearms in the house. They are locked up and secure, ammunition is separate.    Patient reports the following protective factors: access to and engagement with healthcare, current engagement in treatment and/or motivation to establish therapeutic relationship, lives in a responsibly safe environment, and responsibilities to others  , grand kids, has gone to the hospital in the past, fabiana, seen what it does to other people    Risk Plan:  See Recommendations for Safety and Risk Management Plan    Review of Symptoms per patient report:   Depression: Lack of interest or pleasure in doing things and Feeling sad, down, or depressed- work has picked up, work related guilt when it slows down, crying moments when proud or talking about things that are heart felt, at times he will let things build up if he doesn't agree with something and will have a hard time to express himself and doesn't like to hurt people or elegantly to let someone know how he feel  Autumn:  No Symptoms  Psychosis: No Symptoms  Anxiety: Excessive worry and Nervousness- the fight or flight is gone, is worried that something will happen again and is on edge and hasn't been able to travel in 3 years and he needs to tackle the anxiety and fear, he doesn't have this dear with work, it's hard to take the dogs out or go on the pontoon by himself, no anxiety attacks and if he feels them he will use meds and he will rarely use them takes a .25 if needed   Panic:  No symptoms, are gone now, were happening daily in the last, last time had one was 3 years ago   Post Traumatic Stress Disorder:  Experienced traumatic event    issues with police offers- put at  "gun point 2x and put on the ground, had a few DWIs and if a  follows he will think the worse, lost a section of his finger, mom's death was unexpected and very abrupt, dream about past person in his life, ex wife was never there for pt   Eating Disorder: No Symptoms  ADD / ADHD:  Poor task completion, Distractibility, Interrupts, and Restlessness/fidgety, procrastinate and get board with things, will see the dogs water dish is low and will put it off, and will avoid non-preferred tasks   Conduct Disorder: No symptoms  Autism Spectrum Disorder: No symptoms  Obsessive Compulsive Disorder: No Symptoms  Personality Disorders:   none    Patient reports the following compulsive behaviors and treatment history: None.    He has a time here and there where a door will shut or loud sounds that are unexpected and pt will jump and was a lot worse 3 years ago and now it is random    Sleep- \"good\", avg 8 hrs, ordered a sleep ring    Diagnostic Criteria:   Generalized Anxiety Disorder  A. Excessive anxiety and worry about a number of events or activities (such as work or school performance).   B. The person finds it difficult to control the worry.  C. Select 3 or more symptoms (required for diagnosis). Only one item is required in children.   - Restlessness or feeling keyed up or on edge.    - Being easily fatigued.    - Difficulty concentrating or mind going blank.    - Sleep disturbance (difficulty falling or staying asleep, or restless unsatisfying sleep).   D. The focus of the anxiety and worry is not confined to features of an Axis I disorder.  E. The anxiety, worry, or physical symptoms cause clinically significant distress or impairment in social, occupational, or other important areas of functioning.   F. The disturbance is not due to the direct physiological effects of a substance (e.g., a drug of abuse, a medication) or a general medical condition (e.g., hyperthyroidism) and does not occur exclusively during a Mood " Disorder, a Psychotic Disorder, or a Pervasive Developmental Disorder. Major Depressive Disorder  A) Recurrent episode(s) - symptoms have been present during the same 2-week period and represent a change from previous functioning 5 or more symptoms (required for diagnosis)   - Depressed mood. Note: In children and adolescents, can be irritable mood.     - Diminished interest or pleasure in all, or almost all, activities.    - Fatigue or loss of energy.    - Feelings of worthlessness or inappropriate and excessive guilt.    - Diminished ability to think or concentrate, or indecisiveness.   B) The symptoms cause clinically significant distress or impairment in social, occupational, or other important areas of functioning  C) The episode is not attributable to the physiological effects of a substance or to another medical condition  D) The occurence of major depressive episode is not better explained by other thought / psychotic disorders  E) There has never been a manic episode or hypomanic episode    Functional Status:  Patient reports the following functional impairments:  home life with  , relationship(s), and work / vocational responsibilities.     Nonprogrammatic care:  Patient is requesting basic services to address current mental health concerns.    Clinical Summary:  1. Psychosocial Factors:  Medical complexities, Trauma history, Substance use history/concerns, Relationship concerns, Grief/loss.  Cultural and Contextual Factors: Bahai  2. Principal DSM5 Diagnoses  (Sustained by DSM5 Criteria Listed Above):   296.32 (F33.1) Major Depressive Disorder, Recurrent Episode, Moderate _  300.02 (F41.1) Generalized Anxiety Disorder.  3. Other Diagnoses that is relevant to services:   Past concussions and chronic pain.  4. Provisional Diagnosis:   296.32 (F33.1) Major Depressive Disorder, Recurrent Episode, Moderate _  300.02 (F41.1) Generalized Anxiety Disorder as evidenced by PHQ9, GAD7 and clinical interview  5.  Prognosis: Expect improvement.  6. Likely consequences of symptoms if not treated: worsening MH.  7. Patient strengths include:  committed to sobriety, educated, employed, goal-focused, has a previous history of therapy, insightful, intelligent, motivated, open to learning, support of family, friends and providers, and work history .     Recommendations:     1. Plan for Safety and Risk Management:   Safety and Risk: Recommended that patient call 911 or go to the local ED should there be a change in any of these risk factors        Report to child / adult protection services was NA.     2. Patient's identified mental health concerns with a cultural influence will be addressed by [facility] staff .     3. Initial Treatment will focus on:    Depressed Mood - feeling down, lack of interest, guilt  Anxiety - , worry, nervousness .     4. Resources/Service Plan:    services are not indicated.   Modifications to assist communication are not indicated.   Additional disability accommodations are not indicated.      5. Collaboration:   Collaboration / coordination of treatment will be initiated with the following  support professionals: primary care physician.      6.  Referrals:   The following referral(s) will be initiated: Outpatient Mental [facility] Therapy.       A Release of Information has been obtained for the following:  N/A .     Clinical Substantiation/medical necessity for the above recommendations:  Pt has a hx of depression, anxiety symptoms that are impacting daily functioning in daily living and social settings. Through receiving support through the use of coping skills and therapy to help combat these symptoms may provide Pt with relief. Pt reports that they are struggling to manage depressive and anxiety symptoms and again therapy can assist with providing coping skills, following up that pt is using these skills, safety plan or other interventions along with medication to have the best impact to manage  symptoms and provide relief. At this time pt's symptoms are able to be managed with OP services and pt will be referred to a higher level of care if there are abrupt changes in presentation or risk of harm.    7. ALEX:    ALEX:  Attend a sober community support program including AA, [facility], [facility], etc.).. Provider gave patient printed information about the effects of chemical use on their health and well being. Recommendations: Maintain sobriety.     8. Records:   These were reviewed at time of assessment.   Information in this assessment was obtained from the medical record and  provided by patient who is a good historian.    Patient will have open access to their mental health medical record.    9.   Interactive Complexity: No    10. Safety Plan: No Safety plan indicated     Provider Name/ Credentials:  SERGE Ashford, PACO [facility]  March 19, 2025

## 2025-03-20 PROBLEM — D12.6 ADENOMATOUS POLYP OF COLON: Status: ACTIVE | Noted: 2025-03-20

## 2025-03-26 ASSESSMENT — PATIENT HEALTH QUESTIONNAIRE - PHQ9: SUM OF ALL RESPONSES TO PHQ QUESTIONS 1-9: 8

## 2025-04-01 DIAGNOSIS — I10 HYPERTENSION, GOAL BELOW 140/90: ICD-10-CM

## 2025-04-01 DIAGNOSIS — F32.A ANXIETY AND DEPRESSION: ICD-10-CM

## 2025-04-01 DIAGNOSIS — F41.9 ANXIETY AND DEPRESSION: ICD-10-CM

## 2025-04-01 RX ORDER — LISINOPRIL AND HYDROCHLOROTHIAZIDE 12.5; 2 MG/1; MG/1
2 TABLET ORAL DAILY
Qty: 180 TABLET | Refills: 1 | Status: SHIPPED | OUTPATIENT
Start: 2025-04-01

## 2025-04-01 NOTE — TELEPHONE ENCOUNTER
Patient calling to check on this refill, says he is completely out of med so pharmacy advised him to call to expedite it.    He had routine visit in December and saw Dr. Morales in June.    Creatinine   Date Value Ref Range Status   12/13/2024 0.81 0.67 - 1.17 mg/dL Final   05/28/2021 0.77 0.66 - 1.25 mg/dL Final     BP Readings from Last 3 Encounters:   01/27/25 139/80   12/13/24 138/82   06/25/24 136/70     Appears it would like pass protocol for refill nurse so routed high priority to refill team.    Sima PETERSON RN  [facility] Triage

## 2025-04-02 RX ORDER — ESCITALOPRAM OXALATE 5 MG/1
15 TABLET ORAL DAILY
Qty: 90 TABLET | Refills: 0 | Status: SHIPPED | OUTPATIENT
Start: 2025-04-02

## 2025-04-23 NOTE — GROUP NOTE
Psychoeducation Group Note    PATIENT'S NAME: Munir Storey  MRN:   [id number]  :   1965  ACCT. NUMBER: [id number]  DATE OF SERVICE: 22  START TIME: 11:00 AM  END TIME: 11:50 AM  FACILITATOR: Ema Akbar OTR/L  TOPIC: MH PHP OT Group: Self- Regulation Skills  [facility]  TRACK: PHP1    NUMBER OF PARTICIPANTS: 6                                      Service Modality:  Video Visit     Telemedicine Visit: The patient's condition can be safely assessed and treated via synchronous audio and visual telemedicine encounter.      Reason for Telemedicine Visit: Services only offered telehealth    Originating Site (Patient Location): Patient's home    Distant Site (Provider Location): Provider Remote Setting- Home Office    Consent:  The patient/guardian has verbally consented to: the potential risks and benefits of telemedicine (video visit) versus in person care; bill my insurance or make self-payment for services provided; and responsibility for payment of non-covered services.     Patient would like the video invitation sent by:  My Chart    Mode of Communication:  Video Conference via Medical Zoom    As the provider I attest to compliance with applicable laws and regulations related to telemedicine.     Summary of Group / Topics Discussed:  Self-Regulation Skills: Coping Through the Senses Introduction: Patients were introduced and taught about neurosensory based skills and strategies related to supporting effective self regulation skills.  Patients were taught about the sensory systems (external and internal) and how they can be used for coping with mental health symptoms and stressors.  Patients were provided with an experiential opportunity to increase self-awareness of helpful sensory input and self care activities. Patients were introduced on how to create supportive environments that encourage use of these skills.    Patient Session Goals /  Objectives:    Review/learn the sensory systems and how they relate to self-regulation    Identified specific and individualized neurosensory skills to help when distressed      Identified skills learned and how this applies to current daily life    Established a plan for practice of these skills in their own environments      Patient Participation / Response:  Fully participated with the group by sharing personal reflections / insights and openly received / provided feedback with other participants.    Patient presentation:   congruent, demonstrated understanding of topic through discussion and participation in activities. Munir shared enjoyment of cedar, silk, flannel, and spending time outdoors in his backyard.    Treatment Plan:  Patient has a current master individualized treatment plan.  See Epic treatment plan for more information.    Ema Akbar, OTR/L       gallbladder Statement Selected

## 2025-04-27 DIAGNOSIS — F41.9 ANXIETY AND DEPRESSION: ICD-10-CM

## 2025-04-27 DIAGNOSIS — F32.A ANXIETY AND DEPRESSION: ICD-10-CM

## 2025-04-27 RX ORDER — ESCITALOPRAM OXALATE 20 MG/1
20 TABLET ORAL DAILY
Qty: 90 TABLET | Refills: 1 | Status: SHIPPED | OUTPATIENT
Start: 2025-04-27

## 2025-05-15 ENCOUNTER — OFFICE VISIT (OUTPATIENT)
Dept: PSYCHOLOGY | Facility: CLINIC | Age: 60
End: 2025-05-15
Payer: COMMERCIAL

## 2025-05-15 DIAGNOSIS — F33.1 MAJOR DEPRESSIVE DISORDER, RECURRENT EPISODE, MODERATE (H): Primary | ICD-10-CM

## 2025-05-15 DIAGNOSIS — F41.1 GENERALIZED ANXIETY DISORDER: ICD-10-CM

## 2025-05-15 ASSESSMENT — PATIENT HEALTH QUESTIONNAIRE - PHQ9
SUM OF ALL RESPONSES TO PHQ QUESTIONS 1-9: 7
SUM OF ALL RESPONSES TO PHQ QUESTIONS 1-9: 7
10. IF YOU CHECKED OFF ANY PROBLEMS, HOW DIFFICULT HAVE THESE PROBLEMS MADE IT FOR YOU TO DO YOUR WORK, TAKE CARE OF THINGS AT HOME, OR GET ALONG WITH OTHER PEOPLE: SOMEWHAT DIFFICULT

## 2025-05-15 NOTE — PROGRESS NOTES
[facility]                                     Progress Note    Patient Name: Munir Storey  Date: 5-15-25         Service Type: Individual      Session Start Time: 9am  Session End Time: 950am     Session Length: 50min    Session #: 2    Attendees: Client attended alone    Service Modality:  In-person    DATA  Interactive Complexity: No  Crisis: No        Progress Since Last Session (Related to Symptoms / Goals / Homework):   Symptoms: Transfer patient who is here to work on management of depression and anxiety.  Has been more anxious tied to finance and work.     Homework: Partially completed  Completed in session      Episode of Care Goals: Minimal progress - ACTION (Actively working towards change); Intervened by reinforcing change plan / affirming steps taken     Current / Ongoing Stressors and Concerns:   -Struggles with anxiety, depression, motivation and sleep.     -Has some jobs lined up for this month.     -Some tension between adult children.   -Reviewed physical symptoms of anxiety.     -Good support through [employer].    -Met with .  Will have to work another 3 years.      Treatment Objective(s) Addressed in This Session:   use at least 8 coping skills for anxiety management in the next 16 weeks       Intervention:   Reviewed physical symptoms of anxiety   Attempt to line up two jobs a month.   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       Assessments completed prior to visit:  PHQ2: No questionnaires on file.  PHQ9:       8/10/2023     9:24 AM 11/15/2023     2:17 PM 2/6/2024    10:38 AM 6/25/2024    10:29 AM 12/13/2024     9:05 AM 3/26/2025    10:24 PM 5/15/2025     8:41 AM   PHQ-9 SCORE   PHQ-9 Total Score MyChart 5 (Mild depression) 5 (Mild depression) 11 (Moderate depression) 10 (Moderate depression) 4 (Minimal depression)  7 (Mild depression)   PHQ-9 Total Score 5 5 11 10 4  8 7        Patient-reported     GAD2:       2/14/2023     1:11 PM 3/16/2023    12:57 PM 4/11/2023      1:53 PM 6/22/2023     1:24 PM 8/8/2023     2:48 PM 11/15/2023     2:19 PM 5/15/2025     8:41 AM   SANDRA-2   Feeling nervous, anxious, or on edge 1 2 1  2  1 2  1   Not being able to stop or control worrying 1 2 1  0  1 2  1   SANDRA-2 Total Score 2    2 4    4 2    2 2    2 2 4 2        Patient-reported    Proxy-reported     GAD7:       11/28/2022     9:05 AM 12/16/2022    10:48 AM 1/2/2023     9:28 AM 3/16/2023    12:57 PM 11/15/2023     2:19 PM 2/6/2024    10:39 AM 6/25/2024    10:30 AM   SANDRA-7 SCORE   Total Score 11 (moderate anxiety) 8 (mild anxiety) 15 (severe anxiety) 8 (mild anxiety) 12 (moderate anxiety) 14 (moderate anxiety) 9 (mild anxiety)   Total Score 11 8 15    15    15 8    8 12 14 9     PROMIS 10-Global Health (all questions and answers displayed):       4/11/2022     7:44 PM 4/18/2022     8:27 AM 4/18/2022     1:51 PM 1/2/2023     9:31 AM 8/8/2023     2:51 PM 11/15/2023     2:22 PM 3/26/2025    10:24 PM   PROMIS 10   In general, would you say your health is: Good Fair Fair Fair Good     In general, would you say your quality of life is: Good Fair Fair Fair Good     In general, how would you rate your physical health? Good Fair Fair Good Good     In general, how would you rate your mental health, including your mood and your ability to think? Poor Poor Poor Good Fair     In general, how would you rate your satisfaction with your social activities and relationships? Fair Fair Fair Poor Fair     In general, please rate how well you carry out your usual social activities and roles Fair Fair Good Fair Fair     To what extent are you able to carry out your everyday physical activities such as walking, climbing stairs, carrying groceries, or moving a chair? Completely Moderately Completely Completely Completely     In the past 7 days, how often have you been bothered by emotional problems such as feeling anxious, depressed, or irritable? Always Always Always Often Often     In the past 7 days, how would you  rate your fatigue on average? Moderate Moderate Moderate Severe Moderate     In the past 7 days, how would you rate your pain on average, where 0 means no pain, and 10 means worst imaginable pain? 0 5 3 0 3     In general, would you say your health is: 3 2 2 2 3  3   In general, would you say your quality of life is: 3 2 2 2 3  4   In general, how would you rate your physical health? 3 2 2 3 3  4   In general, how would you rate your mental health, including your mood and your ability to think? 1 1 1 3 2  3   In general, how would you rate your satisfaction with your social activities and relationships? 2 2 2 1 2  2   In general, please rate how well you carry out your usual social activities and roles. (This includes activities at home, at work and in your community, and responsibilities as a parent, child, spouse, employee, friend, etc.) 2 2 3 2 2  3   To what extent are you able to carry out your everyday physical activities such as walking, climbing stairs, carrying groceries, or moving a chair? 5 3 5 5 5  5   In the past 7 days, how often have you been bothered by emotional problems such as feeling anxious, depressed, or irritable? 5 5 5 4 4  4   In the past 7 days, how would you rate your fatigue on average? 3 3 3 4 3  3   In the past 7 days, how would you rate your pain on average, where 0 means no pain, and 10 means worst imaginable pain? 0 5 3 0 3  1   Global Mental Health Score 7 6    6    6    6    6 6 8    8    8 9  11   Global Physical Health Score 16 11    11    11    11    11 14 15    15    15 15  16   PROMIS TOTAL - SUBSCORES 23 17    17    17    17    17 20 23    23    23 24  27       Information is confidential and restricted. Go to Review Flowsheets to unlock data.     PROMIS 10-Global Health (only subscores and total score):       4/11/2022     7:44 PM 4/18/2022     8:27 AM 4/18/2022     1:51 PM 1/2/2023     9:31 AM 8/8/2023     2:51 PM 11/15/2023     2:22 PM 3/26/2025    10:24 PM   PROMIS-10  "Scores Only   Global Mental Health Score 7 6    6    6    6    6 6 8    8    8 9  11   Global Physical Health Score 16 11    11    11    11    11 14 15    15    15 15  16   PROMIS TOTAL - SUBSCORES 23 17    17    17    17    17 20 23    23    23 24  27       Information is confidential and restricted. Go to Review Flowsheets to unlock data.     [place] Suicide Severity Rating Scale (Lifetime/Recent)      3/27/2022    10:24 AM 4/12/2022    10:00 AM 4/15/2022     2:00 PM 4/30/2022    11:29 AM 5/2/2022     9:40 AM 5/3/2022     3:15 PM 3/17/2025     9:22 AM   [place] Suicide Severity Rating (Lifetime/Recent)   Q1 Wish to be Dead (Lifetime)  Yes        Comments  Patient reports \"dark thoughts\" about wanting his wife to and so he does not feel anxious        Q2 Non-Specific Active Suicidal Thoughts (Lifetime)  No        Q1 Wished to be Dead (Past Month) yes  yes  yes  no  yes  yes     Q2 Suicidal Thoughts (Past Month) no  no  no  no  yes  yes     Q3 Suicidal Thought Method no  no  no  no  no  no     Q4 Suicidal Intent without Specific Plan no  no  no  no  yes  no     Q5 Suicide Intent with Specific Plan no  no  no  no  no  no     Q6 Suicide Behavior (Lifetime) no  no  no  no  no  no     Level of Risk per Screen low risk  low risk  low risk  low risk  high risk  low risk     1. Wish to be Dead (Lifetime)       Y   Wish to be Dead Description (Lifetime)       was in suicide watch, no attempts or aborted attempts, 3 years ago was last time had thoughts   1. Wish to be Dead (Past 1 Month)       N   2. Non-Specific Active Suicidal Thoughts (Lifetime)       Y   2. Non-Specific Active Suicidal Thoughts (Past 1 Month)       N   3. Active Suicidal Ideation with any Methods (Not Plan) Without Intent to Act (Lifetime)       N   4. Active Suicidal Ideation with Some Intent to Act, Without Specific Plan (Lifetime)       N   5. Active Suicidal Ideation with Specific Plan and Intent (Lifetime)       N   Controllability (Lifetime)      "  1   Deterrents (Lifetime)       1   Actual Attempt (Lifetime)       N   Has subject engaged in non-suicidal self-injurious behavior? (Lifetime)       N   Interrupted Attempts (Lifetime)       N   Aborted or Self-Interrupted Attempt (Lifetime)       N   Preparatory Acts or Behavior (Lifetime)       N   Calculated C-SSRS Risk Score (Lifetime/Recent)       No Risk Indicated       Data saved with a previous flowsheet row definition         ASSESSMENT: Current Emotional / Mental Status (status of significant symptoms):   Risk status (Self / Other harm or suicidal ideation)   Patient denies current fears or concerns for personal safety.   Patient denies current or recent suicidal ideation or behaviors.   Patient denies current or recent homicidal ideation or behaviors.   Patient denies current or recent self injurious behavior or ideation.   Patient denies other safety concerns.   Patient reports there has been no change in risk factors since their last session.     Patient reports there has been no change in protective factors since their last session.     Recommended that patient call 911 or go to the local ED should there be a change in any of these risk factors     Appearance:   Appropriate    Eye Contact:   Good    Psychomotor Behavior: Normal    Attitude:   Cooperative    Orientation:   All   Speech    Rate / Production: Normal     Volume:  Normal    Mood:    Anxious    Affect:    Worrisome    Thought Content:  Clear    Thought Form:  Coherent  Goal Directed    Insight:    Good      Medication Review:   No changes to current psychiatric medication(s)     Medication Compliance:   Yes     Changes in Health Issues:   None reported     Chemical Use Review:   Substance Use: Chemical use reviewed, no active concerns identified      Tobacco Use: Current tobacco use     Diagnosis:  296.32 (F33.1) Major Depressive Disorder, Recurrent Episode, Moderate _  300.02 (F41.1) Generalized Anxiety Disorder.    Collateral Reports  Completed:   Not Applicable    PLAN: (Patient Tasks / Therapist Tasks / Other)  Use new ring to monitor sleep    Read [employer].   Line up two jobs a month.         Chante Vides, LMFT                                                         ______________________________________________________________________    Individual Treatment Plan    Patient's Name: Munir Storey  YOB: 1965    Date of Creation: 5-2-25  Date Treatment Plan Last Reviewed/Revised: 5-2-25    DSM5 Diagnoses: 296.32 (F33.1) Major Depressive Disorder, Recurrent Episode, Moderate _  300.02 (F41.1) Generalized Anxiety Disorder.  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    Anticipated number of session for this episode of care: 6-9 sessions  Anticipation frequency of session: Biweekly  Anticipated Duration of each session: 38-52 minutes  Treatment plan will be reviewed in 90 days or when goals have been changed.       MeasurableTreatment Goal(s) related to diagnosis / functional impairment(s)  Goal 1: Patient will address struggles with anxiety and depressed mood.     I will know I've met my goal when I am not experiencing so many physical symptoms.      Objective #A (Patient Action)    Patient will use at least 8 coping skills for anxiety management in the next 16 weeks.  Status: New - Date: 5-2-25     Intervention(s)  Therapist will teach anxiety management through CBT.    Objective #B  Patient will identify 5 sleep hygiene practices.  Status: New - Date: 5-2-25     Intervention(s)  Therapist will use CBT and behavioral activation .    Objective #C  Patient will work on increasing motivation by 50%.  Status: New - Date: 5-2-25     Intervention(s)  Therapist will use behavioral activation.      Goal 2: Patient will monitor for any safety concerns    I  will know I've met my goal when I continue to be free of any safety concerns.      Objective #A (Patient Action)    Status: New - Date: 5-2-25     Patient will alert therapist to any safety concerns he may be having.  No safety issues since 2020.    Intervention(s)  Therapist will use safety planning if needed.            Patient has reviewed and agreed to the above plan.      Chante Vides, CATHLEEN  May 2, 2025